# Patient Record
Sex: MALE | Race: WHITE | NOT HISPANIC OR LATINO | Employment: OTHER | URBAN - METROPOLITAN AREA
[De-identification: names, ages, dates, MRNs, and addresses within clinical notes are randomized per-mention and may not be internally consistent; named-entity substitution may affect disease eponyms.]

---

## 2017-01-23 ENCOUNTER — OFFICE VISIT (OUTPATIENT)
Dept: URGENT CARE | Facility: CLINIC | Age: 77
End: 2017-01-23
Payer: MEDICARE

## 2017-01-23 ENCOUNTER — OFFICE VISIT (OUTPATIENT)
Dept: CARDIOLOGY | Facility: CLINIC | Age: 77
End: 2017-01-23
Payer: MEDICARE

## 2017-01-23 VITALS
RESPIRATION RATE: 18 BRPM | HEART RATE: 67 BPM | DIASTOLIC BLOOD PRESSURE: 70 MMHG | HEIGHT: 70 IN | OXYGEN SATURATION: 95 % | TEMPERATURE: 98 F | BODY MASS INDEX: 27.94 KG/M2 | WEIGHT: 195.13 LBS | SYSTOLIC BLOOD PRESSURE: 120 MMHG

## 2017-01-23 VITALS
SYSTOLIC BLOOD PRESSURE: 120 MMHG | HEART RATE: 60 BPM | BODY MASS INDEX: 27.2 KG/M2 | WEIGHT: 189.63 LBS | RESPIRATION RATE: 16 BRPM | DIASTOLIC BLOOD PRESSURE: 64 MMHG

## 2017-01-23 DIAGNOSIS — I50.32 CHRONIC DIASTOLIC HEART FAILURE: ICD-10-CM

## 2017-01-23 DIAGNOSIS — I10 ESSENTIAL HYPERTENSION, MALIGNANT: Primary | ICD-10-CM

## 2017-01-23 DIAGNOSIS — H61.21 IMPACTED CERUMEN OF RIGHT EAR: Primary | ICD-10-CM

## 2017-01-23 DIAGNOSIS — R94.31 ABNORMAL ECG: Chronic | ICD-10-CM

## 2017-01-23 DIAGNOSIS — I10 ESSENTIAL HYPERTENSION: Chronic | ICD-10-CM

## 2017-01-23 DIAGNOSIS — I35.1 NONRHEUMATIC AORTIC VALVE INSUFFICIENCY: Chronic | ICD-10-CM

## 2017-01-23 DIAGNOSIS — E78.2 MIXED HYPERLIPIDEMIA: ICD-10-CM

## 2017-01-23 DIAGNOSIS — I27.20 PULMONARY HYPERTENSION: ICD-10-CM

## 2017-01-23 DIAGNOSIS — I49.3 PVC'S (PREMATURE VENTRICULAR CONTRACTIONS): ICD-10-CM

## 2017-01-23 DIAGNOSIS — I36.1 NON-RHEUMATIC TRICUSPID VALVE INSUFFICIENCY: ICD-10-CM

## 2017-01-23 PROCEDURE — 1159F MED LIST DOCD IN RCRD: CPT | Mod: S$GLB,,, | Performed by: NURSE PRACTITIONER

## 2017-01-23 PROCEDURE — 1160F RVW MEDS BY RX/DR IN RCRD: CPT | Mod: S$GLB,,, | Performed by: NURSE PRACTITIONER

## 2017-01-23 PROCEDURE — 3078F DIAST BP <80 MM HG: CPT | Mod: S$GLB,,, | Performed by: INTERNAL MEDICINE

## 2017-01-23 PROCEDURE — 99213 OFFICE O/P EST LOW 20 MIN: CPT | Mod: S$GLB,,, | Performed by: INTERNAL MEDICINE

## 2017-01-23 PROCEDURE — 3078F DIAST BP <80 MM HG: CPT | Mod: S$GLB,,, | Performed by: NURSE PRACTITIONER

## 2017-01-23 PROCEDURE — 99999 PR PBB SHADOW E&M-EST. PATIENT-LVL IV: CPT | Mod: PBBFAC,,,

## 2017-01-23 PROCEDURE — 1126F AMNT PAIN NOTED NONE PRSNT: CPT | Mod: S$GLB,,, | Performed by: NURSE PRACTITIONER

## 2017-01-23 PROCEDURE — 1159F MED LIST DOCD IN RCRD: CPT | Mod: S$GLB,,, | Performed by: INTERNAL MEDICINE

## 2017-01-23 PROCEDURE — 3074F SYST BP LT 130 MM HG: CPT | Mod: S$GLB,,, | Performed by: NURSE PRACTITIONER

## 2017-01-23 PROCEDURE — 1157F ADVNC CARE PLAN IN RCRD: CPT | Mod: S$GLB,,, | Performed by: INTERNAL MEDICINE

## 2017-01-23 PROCEDURE — 99999 PR PBB SHADOW E&M-EST. PATIENT-LVL III: CPT | Mod: PBBFAC,,, | Performed by: INTERNAL MEDICINE

## 2017-01-23 PROCEDURE — 99213 OFFICE O/P EST LOW 20 MIN: CPT | Mod: S$GLB,,, | Performed by: NURSE PRACTITIONER

## 2017-01-23 PROCEDURE — 1157F ADVNC CARE PLAN IN RCRD: CPT | Mod: S$GLB,,, | Performed by: NURSE PRACTITIONER

## 2017-01-23 PROCEDURE — 99499 UNLISTED E&M SERVICE: CPT | Mod: S$GLB,,, | Performed by: INTERNAL MEDICINE

## 2017-01-23 PROCEDURE — 3074F SYST BP LT 130 MM HG: CPT | Mod: S$GLB,,, | Performed by: INTERNAL MEDICINE

## 2017-01-23 PROCEDURE — 1160F RVW MEDS BY RX/DR IN RCRD: CPT | Mod: S$GLB,,, | Performed by: INTERNAL MEDICINE

## 2017-01-23 RX ORDER — HYDRALAZINE HYDROCHLORIDE 25 MG/1
TABLET, FILM COATED ORAL
Qty: 180 TABLET | Refills: 3 | Status: SHIPPED | OUTPATIENT
Start: 2017-01-23 | End: 2018-03-27 | Stop reason: SDUPTHER

## 2017-01-23 RX ORDER — HYDRALAZINE HYDROCHLORIDE 50 MG/1
50 TABLET, FILM COATED ORAL EVERY MORNING
Qty: 90 TABLET | Refills: 3 | Status: SHIPPED | OUTPATIENT
Start: 2017-01-23 | End: 2018-01-03 | Stop reason: SDUPTHER

## 2017-01-23 NOTE — PROGRESS NOTES
CHIEF COMPLAINT/REASON FOR VISIT: right ear stopped up      HISTORY OF PRESENT ILLNESS:  76 year-old male complains right ear stopped up! Patient admits tried OTC medication Debrox, with left ear unplugged.  Patient denies chest pain, shortness of breath, congestion, fever, cough, back pain and urinary discomfort.        Past Medical History   Diagnosis Date    Abnormal ECG 6/24/2013    Aortic insufficiency 6/24/2013    Aortic valve disorder 6/25/2013    Asthma     GERD (gastroesophageal reflux disease)     Hypertension     Mixed hyperlipidemia 6/24/2013    PSVT (paroxysmal supraventricular tachycardia) 6/24/2013    Pulmonary hypertension 6/13/2016    Rectal adenocarcinoma     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     left parietal scalp    Thymoma     TR (tricuspid regurgitation) 6/13/2016     Social History     Social History    Marital status: Single     Spouse name: N/A    Number of children: N/A    Years of education: N/A     Occupational History    Not on file.     Social History Main Topics    Smoking status: Former Smoker     Packs/day: 1.00     Years: 15.00     Quit date: 2/6/1969    Smokeless tobacco: Never Used    Alcohol use 1.8 oz/week     3 Cans of beer per week      Comment: socially    Drug use: No    Sexual activity: Not on file     Other Topics Concern    Not on file     Social History Narrative     Family History   Problem Relation Age of Onset    Diabetes Father     Amblyopia Neg Hx     Blindness Neg Hx     Cancer Neg Hx     Cataracts Neg Hx     Glaucoma Neg Hx     Hypertension Neg Hx     Macular degeneration Neg Hx     Retinal detachment Neg Hx     Strabismus Neg Hx     Stroke Neg Hx     Thyroid disease Neg Hx     Melanoma Neg Hx        ROS:  GENERAL: No fever, chills, fatigability or weight loss.  SKIN: No rashes, itching or changes in color or texture of skin.  HEENT: right ear stopped up. No headaches or recent head trauma. . Denies ear pain, discharge or  vertigo. No loss of smell, no epistaxis or postnasal drip. No hoarseness or change in voice.   CHEST: Denies cyanosis, wheezing, cough and sputum production.  CARDIOVASCULAR: Denies chest pain, PND, orthopnea or reduced exercise tolerance.  ABDOMEN: Appetite fine. No weight loss. Denies diarrhea, abdominal pain,  blood in stool.  MUSCULOSKELETAL: No joint stiffness or swelling. Denies back pain.  NEUROLOGIC: No history of seizures, paralysis, alteration of gait or coordination.  PSYCHIATRIC: Denies mood swings, depression or suicidal thoughts.    PE:   APPEARANCE: Well nourished, well developed, in no acute distress. Pulse ox: 95%  SKIN: Normal skin turgor, no lesions.  HEENT: turbinates pink, pink pharynx, right ear canal with cerumen impaction, TMs clear bilateral.  CHEST: No respiratory symptoms. No wheezing  CARDIOVASCULAR: Heart regular rate   MUSCULOSKELETAL: Motor: 5/5 strength major flexors/extensors.  NEUROLOGIC: No sensory deficits. Gait & Posture: Normal gait and fine motion. No cerebellar signs.  MENTAL STATUS: Patient alert, oriented x 3 & conversant.    PLAN:   Right Ear irrigation  Advised continue OTC Debrox  Advise increase p.o. fluids- of water/juice & rest  Tylenol or Ibuprofen for fever, headache and body aches.  Warm salt water gargles for throat comfort.  See PCP or go to ER if symptoms worsen or fail to improve with treatment.    DIAGNOSIS:  Cerumen impaction / right ear  Hypertension

## 2017-01-23 NOTE — PROGRESS NOTES
Subjective:    Patient ID:  Homero Tanner is a 76 y.o. male who presents for evaluation of Congestive Heart Failure; Hyperlipidemia; and Hypertension      HPI Mr. Tanner returns for f/u. His current medical conditions include chronic HTN, DD, PHTN, AI, TR, dyslipidemia, prostate & rectal cancer. Nonsmoker.    Had LHC 20+ years ago, no significant blockages noted.  He has chronic abnl ecgs.   Also has had PSVT with stress testing in past.    He has h/o thymoma surgery with xrt and also had rectal polyp surgery.  Pt here for f/u.   No chest pain sxs.  Denies dyspnea, pnd/orthopnea.  No palpitations, dizziness.  Used to run a lot.  Saw EP, Dr. Aguilar, for frequent pvcs and no new recs.  BP controlled on current meds.    Review of Systems   Constitution: Negative.   HENT: Negative.    Eyes: Negative.    Cardiovascular: Negative.    Respiratory: Negative.    Endocrine: Negative.    Hematologic/Lymphatic: Negative.    Skin: Negative.    Musculoskeletal: Negative.    Gastrointestinal: Negative.    Genitourinary: Negative.    Neurological: Negative.    Psychiatric/Behavioral: Negative.    Allergic/Immunologic: Negative.            Visit Vitals    /64 (BP Location: Right arm, Patient Position: Sitting)    Pulse 60    Resp 16    Wt 86 kg (189 lb 9.5 oz)    BMI 27.2 kg/m2         Objective:    Physical Exam   Constitutional: He is oriented to person, place, and time. He appears well-developed and well-nourished.   HENT:   Head: Normocephalic.   Neck: Normal range of motion. Neck supple. Normal carotid pulses, no hepatojugular reflux and no JVD present. Carotid bruit is not present. No thyromegaly present.   Cardiovascular: Normal rate, S1 normal and S2 normal.  A regularly irregular rhythm present. PMI is not displaced.  Exam reveals no S3, no S4, no distant heart sounds, no friction rub, no midsystolic click and no opening snap.    No murmur heard.  Pulses:       Radial pulses are 2+ on the right side,  and 2+ on the left side.   Pulmonary/Chest: Effort normal and breath sounds normal. He has no wheezes. He has no rales.   Abdominal: Soft. Bowel sounds are normal. He exhibits no distension, no abdominal bruit, no ascites and no mass. There is no tenderness.   Musculoskeletal: He exhibits no edema.   Neurological: He is alert and oriented to person, place, and time.   Skin: Skin is warm.   Psychiatric: He has a normal mood and affect. His behavior is normal.   Nursing note and vitals reviewed.      I have reviewed all pertinent labs and cardiac studies.    Lab Results   Component Value Date    CHOL 164 06/06/2016    CHOL 123 05/08/2013    CHOL 190 10/09/2012     Lab Results   Component Value Date    HDL 79 (H) 06/06/2016    HDL 60 05/08/2013    HDL 87 (H) 10/09/2012     Lab Results   Component Value Date    LDLCALC 69.4 06/06/2016    LDLCALC 50.0 (L) 05/08/2013    LDLCALC 91.0 10/09/2012     Lab Results   Component Value Date    TRIG 78 06/06/2016    TRIG 64 05/08/2013    TRIG 60 10/09/2012     Lab Results   Component Value Date    CHOLHDL 48.2 06/06/2016    CHOLHDL 48.8 05/08/2013    CHOLHDL 45.8 10/09/2012           Assessment:       Stable cv conditions on current medical tx.    1. Essential hypertension, malignant    2. Nonrheumatic aortic valve insufficiency    3. Essential hypertension    4. Chronic diastolic heart failure    5. PVC's (premature ventricular contractions)    6. Non-rheumatic tricuspid valve insufficiency    7. Pulmonary hypertension    8. Mixed hyperlipidemia    9. Abnormal ECG         Plan:             Continue current medical tx.  Cardiac diet.  F/u 6 months with echo/bnp/stress test.

## 2017-01-23 NOTE — PATIENT INSTRUCTIONS
PLAN:   Right Ear irrigation  Advise increase p.o. fluids- of water/juice & rest  Tylenol or Ibuprofen for fever, headache and body aches.  Warm salt water gargles for throat comfort.  See PCP or go to ER if symptoms worsen or fail to improve with treatment.

## 2017-01-23 NOTE — MR AVS SNAPSHOT
[facility]  [address]  [place] [address]  Phone: [phone]  Fax: [phone]                  Homero Tanner   2017 9:20 AM   Office Visit    Description:  Male : 1940   Provider:  Cristhian Wright MD   Department:  [facility] - Cardiology           Reason for Visit     Congestive Heart Failure     Hyperlipidemia     Hypertension           Diagnoses this Visit        Comments    Essential hypertension, malignant    -  Primary     Nonrheumatic aortic valve insufficiency         Essential hypertension         Chronic diastolic heart failure         PVC's (premature ventricular contractions)         Non-rheumatic tricuspid valve insufficiency         Pulmonary hypertension         Mixed hyperlipidemia         Abnormal ECG                To Do List           Future Appointments        Provider Department Dept Phone    2017 2:30 PM [facility]AT[facility], [facility] [phone]    2017 3:00 PM Abiodun Montaño MD [facility] Hemotology Oncology [phone]      Goals (5 Years of Data)     None      Follow-Up and Disposition     Return in about 6 months (around 2017).       These Medications        Disp Refills Start End    hydrALAZINE (APRESOLINE) 50 MG tablet 90 tablet 3 2017     Take 1 tablet (50 mg total) by mouth every morning. - Oral    [facility] [address] [place] [address] [id number] AT [facility] Ph #: [phone]       Notes to Pharmacy: **Patient requests 90 days supply**    hydrALAZINE (APRESOLINE) 25 MG tablet 180 tablet 3 2017     Take 1 tablet by mouth mid-day and in the evening    Pharmacy: [facility] [address] [place] [address] [id number] AT [facility] Ph #: [phone]         [facility]     [facility] -  Assistance  Registered nurses in the [facility] provide clinical advisement, health  education, appointment booking, and other advisory services.  Call for this free service at [phone].             Medications           Message regarding Medications     Verify the changes and/or additions to your medication regime listed below are the same as discussed with your clinician today.  If any of these changes or additions are incorrect, please notify your healthcare provider.        START taking these NEW medications        Refills    hydrALAZINE (APRESOLINE) 25 MG tablet 3    Sig: Take 1 tablet by mouth mid-day and in the evening    Class: Normal      CHANGE how you are taking these medications     Start Taking Instead of    hydrALAZINE (APRESOLINE) 50 MG tablet hydrALAZINE (APRESOLINE) 50 MG tablet    Dosage:  Take 1 tablet (50 mg total) by mouth every morning. Dosage:  TAKE 1 TABLET BY MOUTH EVERY MORNING, 1/2 TABLET AT MIDDDAY AND 1/2 TABLET EVERY EVENING    Reason for Change:  Reorder            Verify that the below list of medications is an accurate representation of the medications you are currently taking.  If none reported, the list may be blank. If incorrect, please contact your healthcare provider. Carry this list with you in case of emergency.           Current Medications     aspirin (ECOTRIN) 81 MG EC tablet Take 81 mg by mouth once daily.    b complex vitamins tablet Take by mouth. 1 tablet Oral Every day    clobetasol (TEMOVATE) 0.05 % external solution Apply to affected areas of scalp 1-2 times daily as needed for itch    cyanocobalamin 1,000 mcg/mL injection Give 1cc under the skin once a month    fluticasone (FLONASE) 50 mcg/actuation nasal spray 1 spray by Nasal route 2 (two) times daily as needed for Rhinitis or Allergies. 1 Aerosol, Spray Each nostril Twice a day     furosemide (LASIX) 20 MG tablet TAKE 1 TABLET(20 MG) BY MOUTH EVERY DAY    hydrALAZINE (APRESOLINE) 50 MG tablet Take 1 tablet (50 mg total) by mouth every morning.    ketoconazole (NIZORAL) 2 % shampoo Wash hair  with medicated shampoo at least 3x/week - let sit on scalp at least 5 minutes prior to rinsing    leuprolide (LUPRON) 3.75 mg injection Inject 3.75 mg into the muscle every 3 (three) months.     metoprolol tartrate (LOPRESSOR) 100 MG tablet Take 1 tablet (100 mg total) by mouth 2 (two) times daily.    metronidazole 0.75% (METROCREAM) 0.75 % Crea AAA face bid    omeprazole (PRILOSEC) 20 MG capsule TAKE ONE CAPSULE BY MOUTH DAILY    pravastatin (PRAVACHOL) 40 MG tablet TAKE 1 TABLET BY MOUTH DAILY    sulfacetamide sodium 10 % Sham Use twice daily as face wash for rosacea    triamcinolone acetonide 0.025% (KENALOG) 0.025 % cream AAA bid as needed for flares on face.  Mild steroid.    hydrALAZINE (APRESOLINE) 25 MG tablet Take 1 tablet by mouth mid-day and in the evening           Clinical Reference Information           Vital Signs - Last Recorded  Most recent update: 1/23/2017  9:24 AM by Cristhian Wright MD    BP Pulse Resp Wt BMI    120/64 (BP Location: Right arm, Patient Position: Sitting) 60 16 86 kg (189 lb 9.5 oz) 27.2 kg/m2      Blood Pressure          Most Recent Value    BP  120/64      Allergies as of 1/23/2017     Lipitor [Atorvastatin]    Norvasc [Amlodipine]      Immunizations Administered on Date of Encounter - 1/23/2017     None      Orders Placed During Today's Visit     Future Labs/Procedures Expected by Expires    2D echo with color flow doppler  7/23/2017 1/23/2018    Brain natriuretic peptide  7/23/2017 3/24/2018    NM Multi Pharm Stress Cardiac Component  7/23/2017 (Approximate) 1/23/2018    NM Myocardial Perfusion Spect Multi Pharmacologic  7/23/2017 (Approximate) 1/23/2018

## 2017-01-23 NOTE — MR AVS SNAPSHOT
[facility]  [address] [place] [address]  Phone: [phone]  Fax: [phone]                  Homero Tanner   2017 11:00 AM   Office Visit    Description:  Male : 1940   Provider:  [facility], [facility]   Department:  [place] - Urgent Care           Reason for Visit     Cerumen Impaction           Diagnoses this Visit        Comments    Impacted cerumen of right ear    -  Primary            To Do List           Future Appointments        Provider Department Dept Phone    2017 2:30 PM LAB[facility], [facility] [phone]    2017 3:00 PM Abiodun Montaño MD [facility] - Hemotology Oncology [phone]      Goals (5 Years of Data)     None      [facility]     [facility] Line -  Assistance  Registered nurses in the [facility] provide clinical advisement, health education, appointment booking, and other advisory services.  Call for this free service at [phone].             Medications           Message regarding Medications     Verify the changes and/or additions to your medication regime listed below are the same as discussed with your clinician today.  If any of these changes or additions are incorrect, please notify your healthcare provider.             Verify that the below list of medications is an accurate representation of the medications you are currently taking.  If none reported, the list may be blank. If incorrect, please contact your healthcare provider. Carry this list with you in case of emergency.           Current Medications     aspirin (ECOTRIN) 81 MG EC tablet Take 81 mg by mouth once daily.    b complex vitamins tablet Take by mouth. 1 tablet Oral Every day    clobetasol (TEMOVATE) 0.05 % external solution Apply to affected areas of scalp 1-2 times daily as needed for itch    cyanocobalamin 1,000 mcg/mL injection Give 1cc under the skin once a month     "fluticasone (FLONASE) 50 mcg/actuation nasal spray 1 spray by Nasal route 2 (two) times daily as needed for Rhinitis or Allergies. 1 Aerosol, Spray Each nostril Twice a day     furosemide (LASIX) 20 MG tablet TAKE 1 TABLET(20 MG) BY MOUTH EVERY DAY    hydrALAZINE (APRESOLINE) 50 MG tablet Take 1 tablet (50 mg total) by mouth every morning.    ketoconazole (NIZORAL) 2 % shampoo Wash hair with medicated shampoo at least 3x/week - let sit on scalp at least 5 minutes prior to rinsing    leuprolide (LUPRON) 3.75 mg injection Inject 3.75 mg into the muscle every 3 (three) months.     metoprolol tartrate (LOPRESSOR) 100 MG tablet Take 1 tablet (100 mg total) by mouth 2 (two) times daily.    metronidazole 0.75% (METROCREAM) 0.75 % Crea AAA face bid    omeprazole (PRILOSEC) 20 MG capsule TAKE ONE CAPSULE BY MOUTH DAILY    pravastatin (PRAVACHOL) 40 MG tablet TAKE 1 TABLET BY MOUTH DAILY    sulfacetamide sodium 10 % Sham Use twice daily as face wash for rosacea    triamcinolone acetonide 0.025% (KENALOG) 0.025 % cream AAA bid as needed for flares on face.  Mild steroid.    hydrALAZINE (APRESOLINE) 25 MG tablet Take 1 tablet by mouth mid-day and in the evening           Clinical Reference Information           Vital Signs - Last Recorded  Most recent update: 1/23/2017 11:05 AM by Elsy Rushing LPN    BP Pulse Temp Resp    120/70 (BP Location: Left arm, Patient Position: Sitting, BP Method: Manual) 67 97.6 °F (36.4 °C) (Tympanic) 18    Ht Wt SpO2 BMI    5' 10" (1.778 m) 88.5 kg (195 lb 1.7 oz) 95% 27.99 kg/m2      Blood Pressure          Most Recent Value    BP  120/70      Allergies as of 1/23/2017     Lipitor [Atorvastatin]    Norvasc [Amlodipine]      Immunizations Administered on Date of Encounter - 1/23/2017     None      Orders Placed During Today's Visit      Normal Orders This Visit    Ear wax removal       Instructions    PLAN:   Right Ear irrigation  Advise increase p.o. fluids- of water/juice & rest  Tylenol or " Ibuprofen for fever, headache and body aches.  Warm salt water gargles for throat comfort.  See PCP or go to ER if symptoms worsen or fail to improve with treatment.

## 2017-01-26 ENCOUNTER — OFFICE VISIT (OUTPATIENT)
Dept: HEMATOLOGY/ONCOLOGY | Facility: CLINIC | Age: 77
End: 2017-01-26
Payer: MEDICARE

## 2017-01-26 ENCOUNTER — LAB VISIT (OUTPATIENT)
Dept: LAB | Facility: HOSPITAL | Age: 77
End: 2017-01-26
Attending: INTERNAL MEDICINE
Payer: MEDICARE

## 2017-01-26 DIAGNOSIS — Z85.048 HISTORY OF RECTAL CANCER: ICD-10-CM

## 2017-01-26 DIAGNOSIS — Z87.898 HISTORY OF THYMOMA: ICD-10-CM

## 2017-01-26 DIAGNOSIS — C61 PROSTATE CANCER: Primary | ICD-10-CM

## 2017-01-26 DIAGNOSIS — D89.2 PARAPROTEINEMIA: ICD-10-CM

## 2017-01-26 DIAGNOSIS — D51.8 OTHER VITAMIN B12 DEFICIENCY ANEMIA: ICD-10-CM

## 2017-01-26 DIAGNOSIS — C61 PROSTATE CANCER: ICD-10-CM

## 2017-01-26 LAB
ALBUMIN SERPL BCP-MCNC: 3.6 G/DL
ALP SERPL-CCNC: 97 U/L
ALT SERPL W/O P-5'-P-CCNC: 16 U/L
ANION GAP SERPL CALC-SCNC: 18 MMOL/L
AST SERPL-CCNC: 32 U/L
BASOPHILS # BLD AUTO: 0.04 K/UL
BASOPHILS NFR BLD: 0.7 %
BILIRUB SERPL-MCNC: 0.9 MG/DL
BUN SERPL-MCNC: 17 MG/DL
CALCIUM SERPL-MCNC: 9.7 MG/DL
CHLORIDE SERPL-SCNC: 94 MMOL/L
CO2 SERPL-SCNC: 23 MMOL/L
COMPLEXED PSA SERPL-MCNC: 0.79 NG/ML
CREAT SERPL-MCNC: 1.4 MG/DL
DIFFERENTIAL METHOD: ABNORMAL
EOSINOPHIL # BLD AUTO: 0.2 K/UL
EOSINOPHIL NFR BLD: 3 %
ERYTHROCYTE [DISTWIDTH] IN BLOOD BY AUTOMATED COUNT: 12.5 %
EST. GFR  (AFRICAN AMERICAN): 56 ML/MIN/1.73 M^2
EST. GFR  (NON AFRICAN AMERICAN): 49 ML/MIN/1.73 M^2
GLUCOSE SERPL-MCNC: 108 MG/DL
HCT VFR BLD AUTO: 36.6 %
HGB BLD-MCNC: 12.4 G/DL
LYMPHOCYTES # BLD AUTO: 1.9 K/UL
LYMPHOCYTES NFR BLD: 31.7 %
MCH RBC QN AUTO: 33.2 PG
MCHC RBC AUTO-ENTMCNC: 33.9 %
MCV RBC AUTO: 98 FL
MONOCYTES # BLD AUTO: 0.8 K/UL
MONOCYTES NFR BLD: 13.7 %
NEUTROPHILS # BLD AUTO: 3.1 K/UL
NEUTROPHILS NFR BLD: 50.9 %
PLATELET # BLD AUTO: 186 K/UL
PMV BLD AUTO: 9.5 FL
POTASSIUM SERPL-SCNC: 4.6 MMOL/L
PROT SERPL-MCNC: 7.9 G/DL
RBC # BLD AUTO: 3.73 M/UL
SODIUM SERPL-SCNC: 135 MMOL/L
WBC # BLD AUTO: 6.08 K/UL

## 2017-01-26 PROCEDURE — 1159F MED LIST DOCD IN RCRD: CPT | Mod: S$GLB,,, | Performed by: INTERNAL MEDICINE

## 2017-01-26 PROCEDURE — 84165 PROTEIN E-PHORESIS SERUM: CPT | Mod: 26,,, | Performed by: PATHOLOGY

## 2017-01-26 PROCEDURE — 99214 OFFICE O/P EST MOD 30 MIN: CPT | Mod: S$GLB,,, | Performed by: INTERNAL MEDICINE

## 2017-01-26 PROCEDURE — 1160F RVW MEDS BY RX/DR IN RCRD: CPT | Mod: S$GLB,,, | Performed by: INTERNAL MEDICINE

## 2017-01-26 PROCEDURE — 99499 UNLISTED E&M SERVICE: CPT | Mod: S$GLB,,, | Performed by: INTERNAL MEDICINE

## 2017-01-26 PROCEDURE — 1157F ADVNC CARE PLAN IN RCRD: CPT | Mod: S$GLB,,, | Performed by: INTERNAL MEDICINE

## 2017-01-27 ENCOUNTER — TELEPHONE (OUTPATIENT)
Dept: HEMATOLOGY/ONCOLOGY | Facility: CLINIC | Age: 77
End: 2017-01-27

## 2017-01-27 VITALS
TEMPERATURE: 98 F | RESPIRATION RATE: 18 BRPM | DIASTOLIC BLOOD PRESSURE: 62 MMHG | BODY MASS INDEX: 27.81 KG/M2 | SYSTOLIC BLOOD PRESSURE: 118 MMHG | HEIGHT: 70 IN | WEIGHT: 194.25 LBS | HEART RATE: 60 BPM | OXYGEN SATURATION: 95 %

## 2017-01-27 LAB
ALBUMIN SERPL ELPH-MCNC: 3.63 G/DL
ALPHA1 GLOB SERPL ELPH-MCNC: 0.32 G/DL
ALPHA2 GLOB SERPL ELPH-MCNC: 0.82 G/DL
B-GLOBULIN SERPL ELPH-MCNC: 2.3 G/DL
GAMMA GLOB SERPL ELPH-MCNC: 0.62 G/DL
PROT SERPL-MCNC: 7.7 G/DL

## 2017-01-27 NOTE — TELEPHONE ENCOUNTER
----- Message from Abiodun Montaño MD sent at 1/27/2017  6:51 AM CST -----  Please call patietn and elt him know his PSA was 0.79, which is good.  The CBC, CMp were OK  Spep is pending and it will take a few days. I will let him know if there is anything out of the ordinary.  He had a flexible sigmoidoscopy 12/2015 and was supposed to have another in a year  Gi should be calling him. If he ahs notg hear from them by Tuesday, let me know  See me in 3 months with labs ( see yellow page)  DR MONTAÑO

## 2017-01-27 NOTE — PROGRESS NOTES
Subjective:       Patient ID: Homero Tanner is a 76 y.o. male.    Chief Complaint: No chief complaint on file.    HPI This is a 76  year-old gentleman who comes for follow up of her previously diagnosed thymoma, rectal cancer, prostate cancer, pararpteinemia and b12 deficiency.  He is known to us because of ahistory of   previously treated prostate cancer. He was diagnosed around 2004 and treated   with surgery. He relapsed by PSA and was treated with local radiation therapy.   He had further elevation of the PSA and started intermittent LHRH   analogues through the office of his urologist outside our clinic  he receives the injections at his urologist office.    A CBC done on   November 2012 was reported showing a hemoglobin of 12.7. As part of workup his   primary care physician ordered a serum ferritin and total iron binding   capacity, which were unremarkable. Serum folate was normal at 5.5. Vitamin B12   was low at 151. He then started supplemental B12 injection, which she is still   getting.   Also, as part of the workup for the anemia, serum protein electrophoresis   showed a paraprotein band that was quantified at 1.03 g and identified as IgM.   The patient was then referred to me. I ordered lab tests, which included a   thoracic spine view looking for lytic lesions. The radiologist reported that there   were no lytic lesions but there was a mass in the center of the chest  . This was followed by chest x-rays and CT scans. The   CT scan showed a large mediastinal mass. A CT-guided biopsy at [facility] was read   as showing a thymoma.   He underwent surgery with Dr. Levi Butler at [facility]. The thymoma was involving the pericardium so he received   postoperative radiation therapy.   He finished the radiation therapy and has   remained with without evaluable disease in regards to his thymoma.     A routine lab tests done a few months later showed that there has been a dramatic    drop in his hemoglobin, which was now 7.8 with a low serum ferritin of 8   suggesting iron deficiency anemia.   Stools were frankly Hemoccult positive.   In regards to his past history, the patient underwent a colonoscopy within our   system on 02/28/2012. The patient was found to have a sessile polyp in the   rectum. An endoscopic removal could not be done.   The patient was referred to another specialist in [facility] for resection since it seemed to   be arising in the setting of the radiation therapy field for his prostate   cancer.   The pathology report from that 2/2012 endoscopy was that of a tubular adenoma.   The patient was then seen in consultation by GI doctor outside the clinic. ,giana Arellano. The patient then underwent resection of the   lesion. The pathology report was the same.   The patient underwent a colonoscopy at [facility] on   05/07/2014, as part of the work up for his recently diagnosed iron deficiency.. It was done by Dr. Jaime Ash. Dr. Ash found a mass in the   rectal area. It was partially excised. Pathology was once again that of   tubulovillous adenoma with dysplasia.   The area of involvement coincided with the radiation therapy field for the prostate   cancer.   The patient had removal of the villous adenoma in [facility] by Dr Ansari.   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   Following the excision he had 2 episodes of rectal bleeding. He ended up having another surgery to control the bleeing and to do a re-excision of the area where the minimally invasive cancer was found.  Pathology revealed no residual cancer.  There has been no more bleeding. He feels fine  He also injects himself with b12 due low b12 levels.  He comes for follow up. He is receiving  Lupron at his urologist office  .He has had a colonosocopy Dec 2015 that showed intestinal polyps and was asked to return in a year polyps  ALLERGIES: see Med  card  MEDICATIONS: See MedCard.   PREVIOUS SURGERIES: Prostatectomy around the year 2000 or so, rectal surgery   for removal of tubular adenoma.x2, and another surgery for a minimally invasive rectal cancer 10/2014 Tonsillectomy, removal of thymoma.   SOCIAL HISTORY: Single with two children. Lives in [place]. He smoked   until age 31, approximately a pack a day for 15 years. He is a retired   .   FAMILY HISTORY: No cancer. Father had diabetes. Father had heart disease.   PAST MEDICAL HISTORY:   1. Anemia.   2. 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   3. History of treated prostate cancer.   4. Mild anemia.   5. Iron deficiency anemia.   6. High blood pressure.   7. Elevated cholesterol.   8. Recurrent villous adenoma of the rectum.   9. Reflux.   10. Vitamin B12 deficiency.   11. Status post resection of thymoma of the anterior mediastinum.   12. Skin cancer scalp  Review of Systems   Constitutional: Negative.  Negative for fatigue.   Eyes: Negative.    Cardiovascular: Negative.  Negative for chest pain.   Gastrointestinal: Negative for abdominal pain and nausea.   Genitourinary: Negative.  Negative for hematuria.   Musculoskeletal: Negative.    Skin: Negative.    Neurological: Negative.    Psychiatric/Behavioral: Negative.        Objective:      Physical Exam   Constitutional: He is oriented to person, place, and time. He appears well-developed and well-nourished.   HENT:   Head: Normocephalic.   Mouth/Throat: No oropharyngeal exudate.   Eyes: Pupils are equal, round, and reactive to light.   Neck: No thyromegaly present.   Cardiovascular: Normal rate, regular rhythm and normal heart sounds.  Exam reveals no gallop.    No murmur heard.  Pulmonary/Chest: No respiratory distress. He has no wheezes. He has no rales.   Abdominal: Soft. Bowel sounds are normal. He exhibits no distension and no mass. There is no rebound and no guarding.   Musculoskeletal: Normal range of  motion. He exhibits no edema.   Lymphadenopathy:     He has no cervical adenopathy.   Neurological: He is alert and oriented to person, place, and time.   Skin: Skin is warm and dry.   Psychiatric: He has a normal mood and affect. His behavior is normal.       Wt Readings from Last 3 Encounters:   01/23/17 88.5 kg (195 lb 1.7 oz)   01/23/17 86 kg (189 lb 9.5 oz)   10/12/16 88.2 kg (194 lb 7.1 oz)     Temp Readings from Last 3 Encounters:   01/23/17 97.6 °F (36.4 °C) (Tympanic)   10/10/16 98.1 °F (36.7 °C) (Oral)   06/09/16 (!) 95.9 °F (35.5 °C) (Oral)     BP Readings from Last 3 Encounters:   01/23/17 120/70   01/23/17 120/64   10/12/16 124/68     Pulse Readings from Last 3 Encounters:   01/23/17 67   01/23/17 60   10/12/16 78       Assessment:       1. Prostate cancer    2. History of thymoma    3. Other vitamin B12 deficiency anemia    4. Paraproteinemia    5. History of rectal cancer        Plan:       .lstcbc  Lab Results   Component Value Date    CREATININE 1.4 01/26/2017     Lab Results   Component Value Date    ALT 16 01/26/2017    AST 32 01/26/2017    ALKPHOS 97 01/26/2017    BILITOT 0.9 01/26/2017       Dx PSA is 0.79    Which is Ok  SPEp pending  continue Lupron through [facility] urologist office  No evidence of recurrence of thymma. CXR would due around mid 2017.  Continue b12 injections at home. Recheck b12 next time he comes  SPeP pending, will monitor  Due for flexible sigmoidoscopy/colonsocopy. Will ask GI to schedule  See me in 2 months with a cbc, cmp, SPEp , CEA and dx PSA

## 2017-01-28 ENCOUNTER — TELEPHONE (OUTPATIENT)
Dept: GASTROENTEROLOGY | Facility: HOSPITAL | Age: 77
End: 2017-01-28

## 2017-01-28 DIAGNOSIS — Z86.010 PERSONAL HISTORY OF COLONIC POLYPS: Primary | ICD-10-CM

## 2017-01-28 RX ORDER — SODIUM, POTASSIUM,MAG SULFATES 17.5-3.13G
SOLUTION, RECONSTITUTED, ORAL ORAL
Qty: 1 BOTTLE | Refills: 0 | Status: ON HOLD | OUTPATIENT
Start: 2017-01-28 | End: 2017-02-27 | Stop reason: CLARIF

## 2017-01-28 NOTE — TELEPHONE ENCOUNTER
Personal history of colonic polyps  -     sodium,potassium,mag sulfates (SUPREP BOWEL PREP KIT) 17.5-3.13-1.6 gram SolR; As directed  Dispense: 1 Bottle; Refill: 0  -     Case request GI: COLONOSCOPY        Please schedule colonoscopy. Bowel prep sent to pharmacy.       Abraham Hong

## 2017-01-28 NOTE — TELEPHONE ENCOUNTER
----- Message from Abiodun Montaño MD sent at 1/27/2017  6:41 AM CST -----  Abraham, you did a flex sig on this patient on 12/2015 and he was supposed to go back in a year.  I do not see an appointment   scheduled.  Can your department call him.?  Thanks  Abiodun

## 2017-01-30 LAB — PATHOLOGIST INTERPRETATION SPE: NORMAL

## 2017-02-25 PROBLEM — Z86.010 HISTORY OF COLON POLYPS: Status: ACTIVE | Noted: 2017-02-25

## 2017-02-25 PROBLEM — Z86.0100 HISTORY OF COLON POLYPS: Status: ACTIVE | Noted: 2017-02-25

## 2017-02-27 ENCOUNTER — SURGERY (OUTPATIENT)
Age: 77
End: 2017-02-27

## 2017-02-27 ENCOUNTER — ANESTHESIA (OUTPATIENT)
Dept: ENDOSCOPY | Facility: HOSPITAL | Age: 77
End: 2017-02-27
Payer: MEDICARE

## 2017-02-27 ENCOUNTER — PATIENT MESSAGE (OUTPATIENT)
Dept: GASTROENTEROLOGY | Facility: HOSPITAL | Age: 77
End: 2017-02-27

## 2017-02-27 ENCOUNTER — HOSPITAL ENCOUNTER (OUTPATIENT)
Facility: HOSPITAL | Age: 77
Discharge: HOME | End: 2017-02-27
Attending: INTERNAL MEDICINE | Admitting: INTERNAL MEDICINE
Payer: MEDICARE

## 2017-02-27 ENCOUNTER — ANESTHESIA EVENT (OUTPATIENT)
Dept: ENDOSCOPY | Facility: HOSPITAL | Age: 77
End: 2017-02-27
Payer: MEDICARE

## 2017-02-27 VITALS
OXYGEN SATURATION: 98 % | WEIGHT: 190 LBS | TEMPERATURE: 98 F | RESPIRATION RATE: 35 BRPM | HEIGHT: 70 IN | DIASTOLIC BLOOD PRESSURE: 80 MMHG | SYSTOLIC BLOOD PRESSURE: 121 MMHG | BODY MASS INDEX: 27.2 KG/M2 | HEART RATE: 80 BPM | RESPIRATION RATE: 20 BRPM

## 2017-02-27 DIAGNOSIS — Z12.11 SPECIAL SCREENING FOR MALIGNANT NEOPLASMS, COLON: ICD-10-CM

## 2017-02-27 DIAGNOSIS — K57.30 DIVERTICULOSIS OF LARGE INTESTINE WITHOUT HEMORRHAGE: Chronic | ICD-10-CM

## 2017-02-27 DIAGNOSIS — Z85.048 HISTORY OF RECTAL CANCER: Primary | ICD-10-CM

## 2017-02-27 DIAGNOSIS — D12.0 BENIGN NEOPLASM OF CECUM: ICD-10-CM

## 2017-02-27 DIAGNOSIS — Z86.010 HISTORY OF COLON POLYPS: ICD-10-CM

## 2017-02-27 DIAGNOSIS — D12.3 BENIGN NEOPLASM OF TRANSVERSE COLON: ICD-10-CM

## 2017-02-27 PROCEDURE — 37000009 HC ANESTHESIA EA ADD 15 MINS: Performed by: INTERNAL MEDICINE

## 2017-02-27 PROCEDURE — 45385 COLONOSCOPY W/LESION REMOVAL: CPT | Performed by: INTERNAL MEDICINE

## 2017-02-27 PROCEDURE — 37000008 HC ANESTHESIA 1ST 15 MINUTES: Performed by: INTERNAL MEDICINE

## 2017-02-27 PROCEDURE — 63600175 PHARM REV CODE 636 W HCPCS: Performed by: NURSE ANESTHETIST, CERTIFIED REGISTERED

## 2017-02-27 PROCEDURE — 27201089 HC SNARE, DISP (ANY): Performed by: INTERNAL MEDICINE

## 2017-02-27 PROCEDURE — 88305 TISSUE EXAM BY PATHOLOGIST: CPT | Performed by: PATHOLOGY

## 2017-02-27 PROCEDURE — 45385 COLONOSCOPY W/LESION REMOVAL: CPT | Mod: PT,,, | Performed by: INTERNAL MEDICINE

## 2017-02-27 PROCEDURE — 25000003 PHARM REV CODE 250: Performed by: INTERNAL MEDICINE

## 2017-02-27 PROCEDURE — 88305 TISSUE EXAM BY PATHOLOGIST: CPT | Mod: 26,,, | Performed by: PATHOLOGY

## 2017-02-27 PROCEDURE — 25000003 PHARM REV CODE 250: Performed by: NURSE ANESTHETIST, CERTIFIED REGISTERED

## 2017-02-27 RX ORDER — SODIUM CHLORIDE, SODIUM LACTATE, POTASSIUM CHLORIDE, CALCIUM CHLORIDE 600; 310; 30; 20 MG/100ML; MG/100ML; MG/100ML; MG/100ML
INJECTION, SOLUTION INTRAVENOUS CONTINUOUS
Status: DISCONTINUED | OUTPATIENT
Start: 2017-02-27 | End: 2017-02-27 | Stop reason: HOSPADM

## 2017-02-27 RX ORDER — PROPOFOL 10 MG/ML
VIAL (ML) INTRAVENOUS
Status: DISCONTINUED | OUTPATIENT
Start: 2017-02-27 | End: 2017-02-27

## 2017-02-27 RX ORDER — LIDOCAINE HYDROCHLORIDE 10 MG/ML
INJECTION INFILTRATION; PERINEURAL
Status: DISCONTINUED | OUTPATIENT
Start: 2017-02-27 | End: 2017-02-27

## 2017-02-27 RX ADMIN — PROPOFOL 100 MG: 10 INJECTION, EMULSION INTRAVENOUS at 12:02

## 2017-02-27 RX ADMIN — PROPOFOL 30 MG: 10 INJECTION, EMULSION INTRAVENOUS at 12:02

## 2017-02-27 RX ADMIN — PROPOFOL 50 MG: 10 INJECTION, EMULSION INTRAVENOUS at 12:02

## 2017-02-27 RX ADMIN — LIDOCAINE HYDROCHLORIDE 50 MG: 10 INJECTION, SOLUTION INFILTRATION; PERINEURAL at 12:02

## 2017-02-27 RX ADMIN — SODIUM CHLORIDE, SODIUM LACTATE, POTASSIUM CHLORIDE, AND CALCIUM CHLORIDE: 600; 310; 30; 20 INJECTION, SOLUTION INTRAVENOUS at 12:02

## 2017-02-27 RX ADMIN — SODIUM CHLORIDE, SODIUM LACTATE, POTASSIUM CHLORIDE, AND CALCIUM CHLORIDE: 600; 310; 30; 20 INJECTION, SOLUTION INTRAVENOUS at 11:02

## 2017-02-27 NOTE — DISCHARGE INSTRUCTIONS
If you develop fevers, severe abdominal pain, significant bleeding, nausea or vomiting, please contact us or come to our Emergency Department at [facility].  Our  contact number is [phone] available for emergencies or any question that you may have.      You may return to normal activities tomorrow.  Written discharge instructions were provided to you today and have them handy since you may not remember our conversation today.       I also recommend that you follow a high fiber diet and take calcium supplements daily as well as to continue your present medications.      If you take Aspirin, please resume taking it at your prior dose today. If we obtained biopsies or removed polyps during your procedure, we will contact you within 5 to 6 days with the results.      Thanks for trusting us with your healthcare needs.      Sincerely,     Abraham Hong M.D.        To rate your experience with Dr. Hong, please click on the link below     [email or web address]

## 2017-02-27 NOTE — IP AVS SNAPSHOT
[address] [place] [address]           Patient Discharge Instructions     Our goal is to set you up for success. This packet includes information on your condition, medications, and your home care. It will help you to care for yourself so you don't get sicker and need to go back to the hospital.     Please ask your nurse if you have any questions.        There are many details to remember when preparing to leave the hospital. Here is what you will need to do:    1. Take your medicine. If you are prescribed medications, review your Medication List in the following pages. You may have new medications to  at the pharmacy and others that you'll need to stop taking. Review the instructions for how and when to take your medications. Talk with your doctor or nurses if you are unsure of what to do.     2. Go to your follow-up appointments. Specific follow-up information is listed in the following pages. Your may be contacted by a transition nurse or clinical provider about future appointments. Be sure we have all of the phone numbers to reach you, if needed. Please contact your provider's office if you are unable to make an appointment.     3. Watch for warning signs. Your doctor or nurse will give you detailed warning signs to watch for and when to call for assistance. These instructions may also include educational information about your condition. If you experience any of warning signs to your health, call your doctor.               ** Verify the list of medication(s) below is accurate and up to date. Carry this with you in case of emergency. If your medications have changed, please notify your healthcare provider.             Medication List      CONTINUE taking these medications        Additional Info                      aspirin 81 MG EC tablet   Commonly known as:  ECOTRIN   Refills:  0   Dose:  81 mg    Instructions:  Take 81 mg by mouth once daily.     Begin  Date    AM    Noon    PM    Bedtime       b complex vitamins tablet   Refills:  0    Instructions:  Take by mouth. 1 tablet Oral Every day     Begin Date    AM    Noon    PM    Bedtime       clobetasol 0.05 % external solution   Commonly known as:  TEMOVATE   Quantity:  50 mL   Refills:  3    Instructions:  Apply to affected areas of scalp 1-2 times daily as needed for itch     Begin Date    AM    Noon    PM    Bedtime       cyanocobalamin 1,000 mcg/mL injection   Quantity:  10 mL   Refills:  4    Instructions:  Give 1cc under the skin once a month     Begin Date    AM    Noon    PM    Bedtime       FLONASE 50 mcg/actuation nasal spray   Refills:  0   Dose:  1 spray   Generic drug:  fluticasone    Instructions:  1 spray by Nasal route 2 (two) times daily as needed for Rhinitis or Allergies. 1 Aerosol, Spray Each nostril Twice a day     Begin Date    AM    Noon    PM    Bedtime       furosemide 20 MG tablet   Commonly known as:  LASIX   Quantity:  90 tablet   Refills:  11   Comments:  **Patient requests 90 days supply**    Instructions:  TAKE 1 TABLET(20 MG) BY MOUTH EVERY DAY     Begin Date    AM    Noon    PM    Bedtime       * hydrALAZINE 50 MG tablet   Commonly known as:  APRESOLINE   Quantity:  90 tablet   Refills:  3   Dose:  50 mg   Comments:  **Patient requests 90 days supply**    Instructions:  Take 1 tablet (50 mg total) by mouth every morning.     Begin Date    AM    Noon    PM    Bedtime       * hydrALAZINE 25 MG tablet   Commonly known as:  APRESOLINE   Quantity:  180 tablet   Refills:  3    Instructions:  Take 1 tablet by mouth mid-day and in the evening     Begin Date    AM    Noon    PM    Bedtime       ketoconazole 2 % shampoo   Commonly known as:  NIZORAL   Quantity:  120 mL   Refills:  5    Instructions:  Wash hair with medicated shampoo at least 3x/week - let sit on scalp at least 5 minutes prior to rinsing     Begin Date    AM    Noon    PM    Bedtime       leuprolide 3.75 mg injection   Commonly  known as:  LUPRON   Refills:  0   Dose:  3.75 mg    Instructions:  Inject 3.75 mg into the muscle every 3 (three) months.     Begin Date    AM    Noon    PM    Bedtime       metoprolol tartrate 100 MG tablet   Commonly known as:  LOPRESSOR   Quantity:  60 tablet   Refills:  11   Dose:  100 mg    Instructions:  Take 1 tablet (100 mg total) by mouth 2 (two) times daily.     Begin Date    AM    Noon    PM    Bedtime       omeprazole 20 MG capsule   Commonly known as:  PRILOSEC   Quantity:  90 capsule   Refills:  4    Instructions:  TAKE ONE CAPSULE BY MOUTH DAILY     Begin Date    AM    Noon    PM    Bedtime       pravastatin 40 MG tablet   Commonly known as:  PRAVACHOL   Quantity:  90 tablet   Refills:  11   Comments:  **Patient requests 90 days supply**    Instructions:  TAKE 1 TABLET BY MOUTH DAILY     Begin Date    AM    Noon    PM    Bedtime       sulfacetamide sodium 10 % Sham   Quantity:  237 mL   Refills:  6    Instructions:  Use twice daily as face wash for rosacea     Begin Date    AM    Noon    PM    Bedtime       triamcinolone acetonide 0.025% 0.025 % cream   Commonly known as:  KENALOG   Quantity:  30 g   Refills:  1    Instructions:  AAA bid as needed for flares on face.  Mild steroid.     Begin Date    AM    Noon    PM    Bedtime       * Notice:  This list has 2 medication(s) that are the same as other medications prescribed for you. Read the directions carefully, and ask your doctor or other care provider to review them with you.               Please bring to all follow up appointments:    1. A copy of your discharge instructions.  2. All medicines you are currently taking in their original bottles.  3. Identification and insurance card.    Please arrive 15 minutes ahead of scheduled appointment time.    Please call 24 hours in advance if you must reschedule your appointment and/or time.        Your Scheduled Appointments     Apr 13, 2017  9:50 AM CDT   Non-Fasting Lab with [facility], [facility] ([facility])    [address] [facility] [address]  [place] [address]   [phone]            Apr 18, 2017  2:40 PM CDT   Established Patient Visit with Abiodun Montaño MD   [facility] ([facility])    [address] [facility] [address]  [place] [id number] [phone]              Follow-up Information     Follow up with Solomon Cortés MD.    Specialty:  Internal Medicine    Why:  As needed    Contact information:    [address]  [address] [place] [address] [phone]          Discharge Instructions     Future Orders    Activity as tolerated     Call MD for:  difficulty breathing, headache or visual disturbances     Call MD for:  hives     Call MD for:  persistent dizziness or light-headedness     Call MD for:  persistent nausea and vomiting     Call MD for:  redness, tenderness, or signs of infection (pain, swelling, redness, odor or green/yellow discharge around incision site)     Call MD for:  severe uncontrolled pain     Call MD for:  temperature >100.4     Diet general     Questions:    Total calories:      Fat restriction, if any:      Protein restriction, if any:      Na restriction, if any:      Fluid restriction:      Additional restrictions:      No dressing needed         Discharge Instructions       If you develop fevers, severe abdominal pain, significant bleeding, nausea or vomiting, please contact us or come to our Emergency Department at [facility].  Our  contact number is [phone] available for emergencies or any question that you may have.      You may return to normal activities tomorrow.  Written discharge instructions were provided to you today and have them handy since you may not remember our conversation today.       I also recommend that you follow a high fiber diet and take calcium supplements daily as well as to continue your present medications.      If you take Aspirin, please resume taking it at your prior dose today. If we  obtained biopsies or removed polyps during your procedure, we will contact you within 5 to 6 days with the results.      Thanks for trusting us with your healthcare needs.      Sincerely,     Abraham oHng M.D.        To rate your experience with Dr. Hong, please click on the link below     [email or web address]      Discharge References/Attachments     DIVERTICULOSIS (ENGLISH)    COLON AND RECTAL POLYPS, UNDERSTANDING (ENGLISH)        Primary Diagnosis     Your primary diagnosis was:  History Of Malignant Neoplasm Of Rectum      Admission Information     Date & Time Provider Department CSN    2/27/2017 10:31 AM Abraham Hong MD [facility] -  [id number]      Care Providers     Provider Role Specialty Primary office phone    Abraham Hong MD Attending Provider Gastroenterology [phone]    Abraham Hong MD Surgeon  Gastroenterology [phone]      Your Vitals Were     BP                   150/88 (BP Location: Left arm, Patient Position: Lying, BP Method: Automatic)           Recent Lab Values        10/10/2014 6/6/2016                        4:55 AM 10:03 AM          A1C 5.1 5.2                     Pending Labs     Order Current Status    Specimen to Pathology - Surgery Collected (02/27/17 1304)    Protime-INR In process      Allergies as of 2/27/2017        Reactions    Lipitor [Atorvastatin] Other (See Comments)    Norvasc [Amlodipine] Swelling      [facility]     [facility] Nurse Care Line - 24/7 Assistance  Unless otherwise directed by your provider, please contact [facility], our nurse care line that is available for 24/7 assistance.     Registered nurses in the [facility] provide clinical advisement, health education, appointment booking, and other advisory services.  Call for this free service at [phone].        Advance Directives     An advance directive is a document which, in the event you are no longer able to make  decisions for yourself, tells your healthcare team what kind of treatment you do or do not want to receive, or who you would like to make those decisions for you.  If you do not currently have an advance directive, [facility] encourages you to create one.  For more information call:  [phone]), [phone] ([phone]),  or log on to [email or web address].        Smoking Cessation     If you would like to quit smoking:   You may be eligible for free services if you are a [place] resident and started smoking cigarettes before September 1, 1988.  Call the Smoking Cessation Trust ([facility]) toll free at (282) 393-3713 or (748) 269-4872.   Call 6-325-QUIT-NOW if you do not meet the above criteria.            Language Assistance Services     ATTENTION: Language assistance services are available, free of charge. Please call [phone].      ATENCIÓN: Si habla español, tiene a wing disposición servicios gratuitos de asistencia lingüística. Llame al [phone].     CHÚ Ý: N?u b?n nói Ti?ng Vi?t, có các d?ch v? h? tr? ngôn ng? mi?n phí dành cho b?n. G?i s? [phone].        Heart Failure Education       Heart Failure: Being Active  You have a condition called heart failure. Being active doesnt mean that you have to wear yourself out. Even a little movement each day helps to strengthen your heart. If you cant get out to exercise, you can do simple stretching and strengthening exercises at home. These are good ways to keep you well-conditioned and prevent you and your heart from becoming excessively weak.    Ideas to get you started  · Add a little movement to things you do now. Walk to mail letters. Park your car at the far end of the parking lot and walk to the store. Walk up a flight of stairs instead of taking the elevator.  · Choose activities you enjoy. You might walk, swim, or ride an exercise bike. Things like gardening and washing the car count, too. Other possibilities include:  washing dishes, walking the dog, walking around the mall, and doing aerobic activities with friends.  · Join a group exercise program at a [facility], a senior center, or a community center. Or look into a hospital cardiac rehabilitation program. Ask your doctor if you qualify.  Tips to keep you going  · Get up and get dressed each day. Go to a coffee shop and read a newspaper or go somewhere that you'll be in the presence of other active people. Youll feel more like being active.  · Make a plan. Choose one or more activities that you enjoy and that you can easily do. Then plan to do at least one each day. You might write your plan on a calendar.  · Go with a friend or a group if you like company. This can help you feel supported and stay motivated, too.  · Plan social events that you enjoy. This will keep you mentally engaged as well as physically motivated to do things you find pleasure in.  For your safety  · Talk with your healthcare provider before starting an exercise program.  · Exercise indoors when its too hot or too cold outside, or when the air quality is poor. Try walking at a shopping mall.  · Wear socks and sturdy shoes to maintain your balance and prevent falls.  · Start slowly. Do a few minutes several times a day at first. Increase your time and speed little by little.  · Stop and rest whenever you feel tired or get short of breath.  · Dont push yourself on days when you dont feel well.  Date Last Reviewed: 3/20/2016  © 7556-2911 [employer]. [address] [place] [address], [place] [address]. All rights reserved. This information is not intended as a substitute for professional medical care. Always follow your healthcare professional's instructions.              Heart Failure: Evaluating Your Heart  You have a condition called heart failure. To evaluate your condition, your doctor will examine you, ask questions, and do some tests. Along with looking for signs of heart failure, the  doctor looks for any other health problems that may have led to heart failure. The results of your evaluation will help your doctor form a treatment plan.  Health history and physical exam  Your visit will start with a health history. Tell the doctor about any symptoms youve noticed and about all medicines you take. Then youll have a physical exam. This includes listening to your heartbeat and breathing. Youll also be checked for swelling (edema) in your legs and neck. When you have fluid buildup or fluid in the lungs, it may be called congestive heart failure.  Diagnosing heart failure     During an echocardiogram, sound waves bounce off the heart. These are converted into a picture on the screen.   The following may be done to help your doctor form a diagnosis:  · X-rays show the size and shape of your heart. These pictures can also show fluid in your lungs.  · An electrocardiogram (ECG or EKG) shows the pattern of your heartbeat. Small pads (electrodes) are placed on your chest, arms, and legs. Wires connect the pads to the ECG machine, which records your hearts electrical signals. This can give the doctor information about heart function.  · An echocardiogram uses ultrasound waves to show the structure and movement of your heart muscle. This shows how well the heart pumps. It also shows the thickness of the heart walls, and if the heart is enlarged. It is one of the most useful, non-invasive tests as it provides information about the heart's general function. This helps your doctor make treatment decisions.  · Lab tests evaluate small amounts of blood or urine for signs of problems. A BNP lab test can help diagnose and evaluate heart failure. BNP stands for B-type natriuretic peptide. The ventricles secrete more BNP when heart failure worsens. Lab tests can also provide information about metabolic dysfunction or heart dysfunction.  Your treatment plan  Based on the results of your evaluation and tests, your  doctor will develop a treatment plan. This plan is designed to relieve some of your heart failure symptoms and help make you more comfortable. Your treatment plan may include:  · Medicine to help your heart work better and improve your quality of life  · Changes in what you eat and drink to help prevent fluid from backing up in your body  · Daily monitoring of your weight and heart failure symptoms to see how well your treatment plan is working  · Exercise to help you stay healthy  · Help with quitting smoking  · Emotional and psychological support to help adjust to the changes  · Referrals to other specialists to make sure you are being treated comprehensively  Date Last Reviewed: 3/21/2016  © 0076-4634 [employer]. [address] [place] [address], [place] [address]. All rights reserved. This information is not intended as a substitute for professional medical care. Always follow your healthcare professional's instructions.              Heart Failure: Making Changes to Your Diet  You have a condition called heart failure. When you have heart failure, excess fluid is more likely to build up in your body because your heart isn't working well. This makes the heart work harder to pump blood. Fluid buildup causes symptoms such as shortness of breath and swelling (edema). This is often referred to as congestive heart failure or CHF. Controlling the amount of salt (sodium) you eat may help stop fluid from building up. Your doctor may also tell you to reduce the amount of fluid you drink.  Reading food labels    Your healthcare provider will tell you how much sodium you can eat each day. Read food labels to keep track. Keep in mind that certain foods are high in salt. These include canned, frozen, and processed foods. Check the amount of sodium in each serving. Watch out for high-sodium ingredients. These include MSG (monosodium glutamate), baking soda, and sodium phosphate.   Eating less salt  Give yourself time to  get used to eating less salt. It may take a little while. Here are some tips to help:  · Take the saltshaker off the table. Replace it with salt-free herb mixes and spices.  · Eat fresh or plain frozen vegetables. These have much less salt than canned vegetables.  · Choose low-sodium snacks like sodium-free pretzels, crackers, or air-popped popcorn.  · Dont add salt to your food when youre cooking. Instead, season your foods with pepper, lemon, garlic, or onion.  · When you eat out, ask that your food be cooked without added salt.  · Avoid eating fried foods as these often have a great deal of salt.  If youre told to limit fluids  You may need to limit how much fluid you have to help prevent swelling. This includes anything that is liquid at room temperature, such as ice cream and soup. If your doctor tells you to limit fluid, try these tips:  · Measure drinks in a measuring cup before you drink them. This will help you meet daily goals.  · Chill drinks to make them more refreshing.  · Suck on frozen lemon wedges to quench thirst.  · Only drink when youre thirsty.  · Chew sugarless gum or suck on hard candy to keep your mouth moist.  · Weigh yourself daily to know if your body's fluid content is rising.  My sodium goal  Your healthcare provider may give you a sodium goal to meet each day. This includes sodium found in food as well as salt that you add. My goal is to eat no more than ___________ mg of sodium per day.     When to call your doctor  Call your doctor right away if you have any symptoms of worsening heart failure. These can include:  · Sudden weight gain  · Increased swelling of your legs or ankles  · Trouble breathing when youre resting or at night  · Increase in the number of pillows you have to sleep on  · Chest pain, pressure, discomfort, or pain in the jaw, neck, or back   Date Last Reviewed: 3/21/2016  © 7830-8423 [employer]. [address] [place] [address], [place] [address]. All rights  reserved. This information is not intended as a substitute for professional medical care. Always follow your healthcare professional's instructions.              Heart Failure: Medicines to Help Your Heart    You have a condition called heart failure (also known as congestive heart failure, or CHF). Your doctor will likely prescribe medicines for heart failure and any underlying health problems you have. Most heart failure patients take one or more types of medicinen. Your healthcare provider will work to find the combination of medicines that works best for you.  Heart failure medicines  Here are the most common heart failure medicines:  · ACE inhibitors lower blood pressure and decrease strain on the heart. This makes it easier for the heart to pump. Angiotensin receptor blockers have similar effects. These are prescribed for some patients instead of ACE inhibitors.  · Beta-blockers relieve stress on the heart. They also improve symptoms. They may also improve the heart's pumping action over time.  · Diuretics (also called water pills) help rid your body of excess water. This can help rid your body of swelling (edema). Having less fluid to pump means your heart doesnt have to work as hard. Some diuretics make your body lose a mineral called potassium. Your doctor will tell you if you need to take supplements or eat more foods high in potassium.  · Digoxin helps your heart pump with more strength. This helps your heart pump more blood with each beat. So, more oxygen-rich blood travels to the rest of the body.  · Aldosterone antagonists help alter hormones and decrease strain on the heart.  · Hydralazine and nitrates are two separate medicines used together to treat heart failure. They may come in one combination pill. They lower blood pressure and decrease how hard the heart has to pump.  Medicines for related conditions  Controlling other heart problems helps keep heart failure under control, too. Depending  on other heart problems you have, medicines may be prescribed to:  · Lower blood pressure (antihypertensives).  · Lower cholesterol levels (statins).  · Prevent blood clots (anticoagulants or aspirin).  · Keep the heartbeat steady (antiarrhythmics).  Date Last Reviewed: 3/5/2016  © 8962-4718 [employer]. [address] [place] [address], [place] [address]. All rights reserved. This information is not intended as a substitute for professional medical care. Always follow your healthcare professional's instructions.              Heart Failure: Procedures That May Help    The heart is a muscle that pumps oxygen-rich blood to all parts of the body. When you have heart failure, the heart is not able to pump as well as it should. Blood and fluid may back up into the lungs (congestive heart failure), and some parts of the body dont get enough oxygen-rich blood to work normally. These problems lead to the symptoms of heart failure.     Certain procedures may help the heart pump better in some cases of heart failure. Some procedures are done to treat health problems that may have caused the heart failure such as coronary artery disease or heart rhythm problems. For more serious heart failure, other options are available.  Treating artery and valve problems  If you have coronary artery disease or valve disease, procedures may be done to improve blood flow. This helps the heart pump better, which can improve heart failure symptoms. First, your doctor may do a cardiac catheterization to help detect clogged blood vessels or valve damage. During this procedure, a  thin tube (catheter) in inserted into a blood vessel and guided to the heart. There a dye is injected and a special type of X-ray (angiogram) is taken of the blood vessels. Procedures to open a blocked artery or fix damaged valves can also be done using catheterization.  · Angioplasty uses a balloon-tipped instrument at the end of the catheter. The balloon is  inflated to widen the narrowed artery. In many cases, a stent is expanded to further support the narrowed artery. A stent is a metal mesh tube.  · Valve surgery repairs or replacement of faulty valves can also be done during catheterization so blood can flow properly through the chambers of the heart.  Bypass surgery is another option to help treat blocked arteries. It uses a healthy blood vessel from elsewhere in the body. The healthy blood vessel is attached above and below the blocked area so that blood can flow around the blocked artery.  Treating heart rhythm problems  A device may be placed in the chest to help a weak heart maintain a healthy, heartbeat so the heart can pump more effectively:  · Pacemaker. A pacemaker is an implanted device that regulates your heartbeat electronically. It monitors your heart's rhythm and generates a painless electric impulse that helps the heart beat in a regular rhythm. A pacemaker is programmed to meet your specific heart rhythm needs.  · Biventricular pacing/cardiac resynchronization therapy. A type of pacemaker that paces both pumping chambers of the heart at the same time to coordinate contractions and to improve the heart's function. Some people with heart failure are candidates for this therapy.  · Implantable cardioverter defibrillator. A device similar to a pacemaker that senses when the heart is beating too fast and delivers an electrical shock to convert the fast rhythm to a normal rhythm. This can be a life saving device.  In severe cases  In more serious cases of heart failure when other treatments no longer work, other options may include:  · Ventricular assist devices (VADs). These are mechanical devices used to take over the pumping function for one or both of the heart's ventricles, or pumping chambers. A VAD may be necessary when heart failure progresses to the point that medicines and other treatments no longer help. In some cases, a VAD may be used as a  bridge to transplant.  · Heart transplant. This is replacing the diseased heart with a healthy one from a donor. This is an option for a few people who are very sick. A heart transplant is very serious and not an option for all patients. Your doctor can tell you more.  Date Last Reviewed: 3/20/2016  © 0502-9428 [employer]. [address] [place] [address], [place] [address]. All rights reserved. This information is not intended as a substitute for professional medical care. Always follow your healthcare professional's instructions.              Heart Failure: Tracking Your Weight  You have a condition called heart failure. When you have heart failure, a sudden weight gain or a steady rise in weight is a warning sign that your body is retaining too much water and salt. This could mean your heart failure is getting worse. If left untreated, it can cause problems for your lungs and result in shortness of breath. Weighing yourself each day is the best way to know if youre retaining water. If your weight goes up quickly, call your doctor. You will be given instructions on how to get rid of the excess water. You will likely need medicines and to avoid salt. This will help your heart work better.  Call your doctor if you gain more than 2 pounds in 1 day, more than 5 pounds in 1 week, or whatever weight gain you were told to report by your doctor. This is often a sign of worsening heart failure and needs to be evaluated and treated. Your doctor will tell you what to do next.   Tips for weighing yourself    · Weigh yourself at the same time each morning, wearing the same clothes. Weigh yourself after urinating and before eating.  · Use the same scale each day. Make sure the numbers are easy to read. Put the scale on a flat, hard surface -- not on a rug or carpet.  · Do not stop weighing yourself. If you forget one day, weigh again the next morning.  How to use your weight chart  · Keep your weight chart near the scale.  Write your weight on the chart as soon as you get off the scale.  · Fill in the month and the start date on the chart. Then write down your weight each day. Your chart will look like this:    · If you miss a day, leave the space blank. Weigh yourself the next day and write your weight in the next space.  · Take your weight chart with you when you go to see your doctor.  Date Last Reviewed: 3/20/2016  © 8158-6345 [employer]. [address] [place] [address]. All rights reserved. This information is not intended as a substitute for professional medical care. Always follow your healthcare professional's instructions.              Heart Failure: Warning Signs of a Flare-Up  You have a condition called heart failure. Once you have heart failure, flare-ups can happen. Below are signs that can mean your heart failure is getting worse. If you notice any of these warning signs, call your healthcare provider.  Swelling    · Your feet, ankles, or lower legs get puffier.  · You notice skin changes on your lower legs.  · Your shoes feel too tight.  · Your clothes are tighter in the waist.  · You have trouble getting rings on or off your fingers.  Shortness of breath  · You have to breathe harder even when youre doing your normal activities or when youre resting.  · You are short of breath walking up stairs or even short distances.  · You wake up at night short of breath or coughing.  · You need to use more pillows or sit up to sleep.  · You wake up tired or restless.  Other warning signs  · You feel weaker, dizzy, or more tired.  · You have chest pain or changes in your heartbeat.  · You have a cough that wont go away.  · You cant remember things or dont feel like eating.  Tracking your weight  Gaining weight is often the first warning sign that heart failure is getting worse. Gaining even a few pounds can be a sign that your body is retaining excess water and salt. Weighing yourself each day in the  morning after you urinate and before you eat, is the best way to know if you're retaining water. Get a scale that is easy to read and make sure you wear the same clothes and use the same scale every time you weigh. Your healthcare provider will show you how to track your weight. Call your doctor if you gain more than 2 pounds in 1 day, 5 pounds in 1 week, or whatever weight gain you were told to report by your doctor. This is often a sign of worsening heart failure and needs to be evaluated and treated before it compromises your breathing. Your doctor will tell you what to do next.    Date Last Reviewed: 3/15/2016  © 2564-2780 [employer]. [address] [place] [address], [place] [address]. All rights reserved. This information is not intended as a substitute for professional medical care. Always follow your healthcare professional's instructions.               [facility] complies with applicable Federal civil rights laws and does not discriminate on the basis of race, color, national origin, age, disability, or sex.

## 2017-02-27 NOTE — DISCHARGE SUMMARY
Endoscopy Discharge Summary      Admit Date: 2/27/2017    Discharge Date and Time:  2/27/2017 1:06 PM    Attending Physician: Abraham Hong MD     Discharge Physician: Abraham Hong MD     Principal Admitting Diagnoses: History of rectal cancer         Discharge Diagnosis: The primary encounter diagnosis was History of rectal cancer. Diagnoses of History of colon polyps, Special screening for malignant neoplasms, colon, Benign neoplasm of cecum, Benign neoplasm of transverse colon, and Diverticulosis of large intestine without hemorrhage were also pertinent to this visit.     Discharged Condition: Good    Indication for Admission: History of rectal cancer     Hospital Course: Patient was admitted for an inpatient procedure and tolerated the procedure well with no complications.    Significant Diagnostic Studies: COLONOSCOPY    Pathology (if any):  Specimen (12h ago through future)    Start     Ordered    02/27/17 1253  Specimen to Pathology - Surgery  Once     Comments:  1. Cecum and transverse colon polyps X 3  2. Rectum polyp    02/27/17 1304          Disposition: Home.    Bleeding: None    No Implants    Follow Up/Patient Instructions:   Current Discharge Medication List      CONTINUE these medications which have NOT CHANGED    Details   b complex vitamins tablet Take by mouth. 1 tablet Oral Every day      clobetasol (TEMOVATE) 0.05 % external solution Apply to affected areas of scalp 1-2 times daily as needed for itch  Qty: 50 mL, Refills: 3    Associated Diagnoses: Seborrheic dermatitis      cyanocobalamin 1,000 mcg/mL injection Give 1cc under the skin once a month  Qty: 10 mL, Refills: 4    Associated Diagnoses: Thymoma; Other vitamin B12 deficiency anemia      furosemide (LASIX) 20 MG tablet TAKE 1 TABLET(20 MG) BY MOUTH EVERY DAY  Qty: 90 tablet, Refills: 11    Comments: **Patient requests 90 days supply**  Associated Diagnoses: Edema of both legs; Essential hypertension      !! hydrALAZINE (APRESOLINE)  25 MG tablet Take 1 tablet by mouth mid-day and in the evening  Qty: 180 tablet, Refills: 3    Associated Diagnoses: Essential hypertension, malignant      !! hydrALAZINE (APRESOLINE) 50 MG tablet Take 1 tablet (50 mg total) by mouth every morning.  Qty: 90 tablet, Refills: 3    Comments: **Patient requests 90 days supply**  Associated Diagnoses: Essential hypertension, malignant      ketoconazole (NIZORAL) 2 % shampoo Wash hair with medicated shampoo at least 3x/week - let sit on scalp at least 5 minutes prior to rinsing  Qty: 120 mL, Refills: 5    Associated Diagnoses: Seborrheic dermatitis      leuprolide (LUPRON) 3.75 mg injection Inject 3.75 mg into the muscle every 3 (three) months.       metoprolol tartrate (LOPRESSOR) 100 MG tablet Take 1 tablet (100 mg total) by mouth 2 (two) times daily.  Qty: 60 tablet, Refills: 11      omeprazole (PRILOSEC) 20 MG capsule TAKE ONE CAPSULE BY MOUTH DAILY  Qty: 90 capsule, Refills: 4      pravastatin (PRAVACHOL) 40 MG tablet TAKE 1 TABLET BY MOUTH DAILY  Qty: 90 tablet, Refills: 11    Comments: **Patient requests 90 days supply**      sulfacetamide sodium 10 % Sham Use twice daily as face wash for rosacea  Qty: 237 mL, Refills: 6    Associated Diagnoses: Rosacea      triamcinolone acetonide 0.025% (KENALOG) 0.025 % cream AAA bid as needed for flares on face.  Mild steroid.  Qty: 30 g, Refills: 1    Associated Diagnoses: Seborrheic dermatitis      aspirin (ECOTRIN) 81 MG EC tablet Take 81 mg by mouth once daily.      fluticasone (FLONASE) 50 mcg/actuation nasal spray 1 spray by Nasal route 2 (two) times daily as needed for Rhinitis or Allergies. 1 Aerosol, Spray Each nostril Twice a day        !! - Potential duplicate medications found. Please discuss with provider.            Discharge Procedure Orders  Diet general     Activity as tolerated     Call MD for:  temperature >100.4     Call MD for:  persistent nausea and vomiting     Call MD for:  severe uncontrolled pain      Call MD for:  difficulty breathing, headache or visual disturbances     Call MD for:  redness, tenderness, or signs of infection (pain, swelling, redness, odor or green/yellow discharge around incision site)     Call MD for:  hives     Call MD for:  persistent dizziness or light-headedness     No dressing needed         Follow-up Information     Follow up with Solomon Cortés MD.    Specialty:  Internal Medicine    Why:  As needed    Contact information:    [id number] [facility]  [address] [place] [address] [phone]

## 2017-02-27 NOTE — TRANSFER OF CARE
"Anesthesia Transfer of Care Note    Patient: Homero Worley Ciro    Procedure(s) Performed: Procedure(s) (LRB):  COLONOSCOPY (Left)    Patient location: PACU    Anesthesia Type: MAC    Transport from OR: Transported from OR on room air with adequate spontaneous ventilation    Post pain: adequate analgesia    Post assessment: no apparent anesthetic complications    Post vital signs: stable    Level of consciousness: awake    Nausea/Vomiting: no nausea/vomiting    Complications: none          Last vitals:   Visit Vitals    BP (!) 150/88 (BP Location: Left arm, Patient Position: Lying, BP Method: Automatic)    Pulse 85    Temp 36.8 °C (98.2 °F) (Oral)    Resp 20    Ht 5' 10" (1.778 m)    Wt 86.2 kg (190 lb)    SpO2 96%    BMI 27.26 kg/m2     "

## 2017-02-27 NOTE — ANESTHESIA PREPROCEDURE EVALUATION
02/27/2017  Homero Tanner is a 76 y.o., male.    [facility] Anesthesia Evaluation    I have reviewed the Patient Summary Reports.    I have reviewed the Nursing Notes.   I have reviewed the Medications.     Review of Systems  Anesthesia Hx:  No problems with previous Anesthesia  History of prior surgery of interest to airway management or planning: Denies Family Hx of Anesthesia complications.   Denies Personal Hx of Anesthesia complications.   Social:  Former Smoker    Hematology/Oncology:         -- Anemia: --  Cancer in past history:    Cardiovascular:   Hypertension Valvular problems/Murmurs, AI Dysrhythmias CHF hyperlipidemia    Pulmonary:   Asthma    Hepatic/GI:   GERD    Psych:  Psychiatric Normal           Physical Exam  General:  Well nourished    Airway/Jaw/Neck:  Airway Findings: Mouth Opening: Normal Tongue: Normal  General Airway Assessment: Adult  Mallampati: II  TM Distance: Normal, at least 6 cm       Chest/Lungs:  Chest/Lungs Findings: Normal Respiratory Rate     Heart/Vascular:  Heart Findings: Rate: Normal             Anesthesia Plan  Type of Anesthesia, risks & benefits discussed:  Anesthesia Type:  MAC  Patient's Preference:   Intra-op Monitoring Plan:   Intra-op Monitoring Plan Comments:   Post Op Pain Control Plan:   Post Op Pain Control Plan Comments:   Induction:   IV  Beta Blocker:  Patient is not currently on a Beta-Blocker (No further documentation required).       Informed Consent: Patient understands risks and agrees with Anesthesia plan.  Questions answered. Anesthesia consent signed with patient.  ASA Score: 3     Day of Surgery Review of History & Physical:    H&P update referred to the surgeon.         Ready For Surgery From Anesthesia Perspective.     Echo 06/16  CONCLUSIONS     1 - Mild left atrial enlargement.     2 - Normal left ventricular systolic function (EF 60-65%).      3 - Normal left ventricular diastolic function.     4 - Low normal right ventricular systolic function .     5 - Pulmonary hypertension. The estimated PA systolic pressure is greater than 47 mmHg.     6 - Mild aortic regurgitation.     7 - Mild to moderate tricuspid regurgitation.

## 2017-02-27 NOTE — H&P
Short Stay Endoscopy History and Physical    PCP - Solomon Cortés MD    Procedure - COLONOSCOPY  ASA - 2  Mallampati - 1  History of Anesthesia problems - no  Family history Anesthesia problems -  no     Patient Active Problem List    Diagnosis Date Noted    Special screening for malignant neoplasms, colon 02/27/2017    History of colon polyps 02/25/2017    B12 deficiency anemia 10/10/2016    Chronic diastolic heart failure 09/13/2016    Abnormal liver enzymes 09/13/2016    Pulmonary hypertension 06/13/2016    TR (tricuspid regurgitation) 06/13/2016    PVC's (premature ventricular contractions) 06/13/2016    Edema of both legs 06/13/2016    Hyperlipidemia 05/05/2016    Prostate cancer 02/25/2016    History of thymoma 02/25/2016    Adenomatous rectal polyp 12/07/2015    Paraproteinemia 11/19/2015    History of rectal cancer 11/19/2015    Iron deficiency anemia 10/26/2015    Colon polyps 10/26/2015    Iron deficiency 04/21/2014    Aortic insufficiency 06/24/2013    Abnormal ECG 06/24/2013    Essential hypertension 06/24/2013      1. History of rectal cancer    2. History of colon polyps    3. Special screening for malignant neoplasms, colon        ROS:  Constitutional: No fevers, chills, No weight loss  ENT: No allergies  CV: No chest pain  Pulm: No cough, No shortness of breath  Ophtho: No vision changes  GI: see HPI  Derm: No rash  Heme: No lymphadenopathy, No bruising  MSK: No arthritis  : No dysuria, No hematuria  Endo: No hot or cold intolerance  Neuro: No syncope, No seizure  Psych: No anxiety, No depression    Medical History:  has a past medical history of Abnormal ECG (6/24/2013); Aortic insufficiency (6/24/2013); Aortic valve disorder (6/25/2013); Asthma; CHF (congestive heart failure); Encounter for blood transfusion; GERD (gastroesophageal reflux disease); Hypertension; Mixed hyperlipidemia (6/24/2013); Prostate cancer; PSVT (paroxysmal supraventricular tachycardia) (6/24/2013);  Pulmonary hypertension (6/13/2016); Rectal adenocarcinoma; SCC (squamous cell carcinoma) (04/2015); Thymoma; and TR (tricuspid regurgitation) (6/13/2016).    Surgical History:  has a past surgical history that includes ostatectomy; Colon surgery; Tonsillectomy; biopsy for chest mass; Tumor removal; Rectal surgery (N/A, 09/2014); Colonoscopy (N/A, 10/26/2015); and mohs.    Family History: family history includes Diabetes in his father. There is no history of Amblyopia, Blindness, Cancer, Cataracts, Glaucoma, Hypertension, Macular degeneration, Retinal detachment, Strabismus, Stroke, Thyroid disease, or Melanoma.       Social History:  reports that he quit smoking about 48 years ago. He has a 15.00 pack-year smoking history. He has never used smokeless tobacco. He reports that he drinks about 1.8 oz of alcohol per week  He reports that he does not use illicit drugs.    Review of patient's allergies indicates:   Allergen Reactions    Lipitor [atorvastatin] Other (See Comments)    Norvasc [amlodipine] Swelling       Medications:   Prescriptions Prior to Admission   Medication Sig Dispense Refill Last Dose    b complex vitamins tablet Take by mouth. 1 tablet Oral Every day   2/26/2017 at Unknown time    clobetasol (TEMOVATE) 0.05 % external solution Apply to affected areas of scalp 1-2 times daily as needed for itch 50 mL 3 2/26/2017 at Unknown time    cyanocobalamin 1,000 mcg/mL injection Give 1cc under the skin once a month 10 mL 4 Past Month at Unknown time    furosemide (LASIX) 20 MG tablet TAKE 1 TABLET(20 MG) BY MOUTH EVERY DAY 90 tablet 11 2/26/2017 at Unknown time    hydrALAZINE (APRESOLINE) 25 MG tablet Take 1 tablet by mouth mid-day and in the evening 180 tablet 3 2/26/2017    hydrALAZINE (APRESOLINE) 50 MG tablet Take 1 tablet (50 mg total) by mouth every morning. 90 tablet 3 2/27/2017 at 0500    ketoconazole (NIZORAL) 2 % shampoo Wash hair with medicated shampoo at least 3x/week - let sit on scalp at  least 5 minutes prior to rinsing 120 mL 5 2/26/2017 at Unknown time    leuprolide (LUPRON) 3.75 mg injection Inject 3.75 mg into the muscle every 3 (three) months.    Past Month at Unknown time    metoprolol tartrate (LOPRESSOR) 100 MG tablet Take 1 tablet (100 mg total) by mouth 2 (two) times daily. 60 tablet 11 2/27/2017 at 0500    omeprazole (PRILOSEC) 20 MG capsule TAKE ONE CAPSULE BY MOUTH DAILY 90 capsule 4 2/26/2017 at Unknown time    pravastatin (PRAVACHOL) 40 MG tablet TAKE 1 TABLET BY MOUTH DAILY 90 tablet 11 2/26/2017 at Unknown time    sulfacetamide sodium 10 % Sham Use twice daily as face wash for rosacea 237 mL 6 2/26/2017 at Unknown time    triamcinolone acetonide 0.025% (KENALOG) 0.025 % cream AAA bid as needed for flares on face.  Mild steroid. 30 g 1 2/26/2017 at Unknown time    aspirin (ECOTRIN) 81 MG EC tablet Take 81 mg by mouth once daily.   2/20/2017    fluticasone (FLONASE) 50 mcg/actuation nasal spray 1 spray by Nasal route 2 (two) times daily as needed for Rhinitis or Allergies. 1 Aerosol, Spray Each nostril Twice a day    More than a month at Unknown time       Objective Findings:    Vital Signs: See nurses notes.     Physical Exam:  General Appearance: Well appearing in no acute distress  Eyes:    No scleral icterus  ENT: Neck supple, Lips, mucosa, and tongue normal; teeth and gums normal  Lungs: CTA bilaterally in anterior and posterior fields, no wheezes, no crackles.  Heart:  Regular rate, S1, S2 normal, no murmurs heard.  Abdomen: Soft, non tender, non distended with normal bowel sounds. No hepatosplenomegaly, ascites, or mass.  Extremities: No clubbing, cyanosis or edema  Skin: No rash    Labs:  Lab Results   Component Value Date    WBC 6.08 01/26/2017    HGB 12.4 (L) 01/26/2017    HCT 36.6 (L) 01/26/2017     01/26/2017    CHOL 164 06/06/2016    TRIG 78 06/06/2016    HDL 79 (H) 06/06/2016    ALT 16 01/26/2017    AST 32 01/26/2017     (L) 01/26/2017    K 4.6  01/26/2017    CL 94 (L) 01/26/2017    CREATININE 1.4 01/26/2017    BUN 17 01/26/2017    CO2 23 01/26/2017    TSH 2.0 04/23/2008    PSA 1.1 01/02/2014    INR 1.1 10/10/2014    HGBA1C 5.2 06/06/2016       I have explained the risks and benefits of endoscopy procedures to the patient including but not limited to bleeding, perforation, infection, and death.

## 2017-02-27 NOTE — ANESTHESIA RELEASE NOTE
"Anesthesia Release from PACU Note    Patient: Homero Tanner    Procedure(s) Performed: Procedure(s) (LRB):  COLONOSCOPY (Left)    Anesthesia type: MAC    Post pain: Adequate analgesia    Post assessment: no apparent anesthetic complications    Last Vitals:   Visit Vitals    BP (!) 150/88 (BP Location: Left arm, Patient Position: Lying, BP Method: Automatic)    Pulse 85    Temp 36.8 °C (98.2 °F) (Oral)    Resp 20    Ht 5' 10" (1.778 m)    Wt 86.2 kg (190 lb)    SpO2 96%    BMI 27.26 kg/m2       Post vital signs: stable    Level of consciousness: awake    Nausea/Vomiting: no nausea/no vomiting    Complications: none    Airway Patency: patent    Respiratory: unassisted    Cardiovascular: stable and blood pressure at baseline    Hydration: euvolemic  "

## 2017-02-27 NOTE — ANESTHESIA POSTPROCEDURE EVALUATION
"Anesthesia Post Evaluation    Patient: Homero Tanner    Procedure(s) Performed: Procedure(s) (LRB):  COLONOSCOPY (Left)    Final Anesthesia Type: MAC  Patient location during evaluation: PACU  Patient participation: Yes- Able to Participate  Level of consciousness: awake and alert  Post-procedure vital signs: reviewed and stable  Pain management: adequate  Airway patency: patent  PONV status at discharge: No PONV  Anesthetic complications: no      Cardiovascular status: blood pressure returned to baseline  Respiratory status: unassisted  Hydration status: euvolemic  Follow-up not needed.        Visit Vitals    BP (!) 150/88 (BP Location: Left arm, Patient Position: Lying, BP Method: Automatic)    Pulse 85    Temp 36.8 °C (98.2 °F) (Oral)    Resp 20    Ht 5' 10" (1.778 m)    Wt 86.2 kg (190 lb)    SpO2 96%    BMI 27.26 kg/m2       Pain/Jose Score: Jose Score: 9 (2/27/2017  1:09 PM)      "

## 2017-03-03 ENCOUNTER — PATIENT MESSAGE (OUTPATIENT)
Dept: GASTROENTEROLOGY | Facility: HOSPITAL | Age: 77
End: 2017-03-03

## 2017-03-03 NOTE — TELEPHONE ENCOUNTER
FINAL PATHOLOGIC DIAGNOSIS  1  Cecum and transverse colon polyps x 3:  Tubular adenomas.  2  Rectal polyp:  Scant fragment of colonic mucosa with superficial hyperplastic changes.    Biopsy results sent to the patient.    [email or web address]

## 2017-04-13 ENCOUNTER — LAB VISIT (OUTPATIENT)
Dept: LAB | Facility: HOSPITAL | Age: 77
End: 2017-04-13
Attending: INTERNAL MEDICINE
Payer: MEDICARE

## 2017-04-13 DIAGNOSIS — D51.8 OTHER VITAMIN B12 DEFICIENCY ANEMIA: ICD-10-CM

## 2017-04-13 DIAGNOSIS — Z85.048 HISTORY OF RECTAL CANCER: ICD-10-CM

## 2017-04-13 DIAGNOSIS — D89.2 PARAPROTEINEMIA: ICD-10-CM

## 2017-04-13 DIAGNOSIS — Z87.898 HISTORY OF THYMOMA: ICD-10-CM

## 2017-04-13 DIAGNOSIS — C61 PROSTATE CANCER: ICD-10-CM

## 2017-04-13 LAB
ALBUMIN SERPL BCP-MCNC: 3.9 G/DL
ALP SERPL-CCNC: 99 U/L
ALT SERPL W/O P-5'-P-CCNC: 17 U/L
ANION GAP SERPL CALC-SCNC: 17 MMOL/L
AST SERPL-CCNC: 35 U/L
BASOPHILS # BLD AUTO: 0.04 K/UL
BASOPHILS NFR BLD: 0.8 %
BILIRUB SERPL-MCNC: 0.8 MG/DL
BUN SERPL-MCNC: 19 MG/DL
CALCIUM SERPL-MCNC: 10 MG/DL
CEA SERPL-MCNC: 2.1 NG/ML
CHLORIDE SERPL-SCNC: 91 MMOL/L
CO2 SERPL-SCNC: 31 MMOL/L
COMPLEXED PSA SERPL-MCNC: 1 NG/ML
CREAT SERPL-MCNC: 1.7 MG/DL
DIFFERENTIAL METHOD: ABNORMAL
EOSINOPHIL # BLD AUTO: 0.1 K/UL
EOSINOPHIL NFR BLD: 2.7 %
ERYTHROCYTE [DISTWIDTH] IN BLOOD BY AUTOMATED COUNT: 12.5 %
EST. GFR  (AFRICAN AMERICAN): 45 ML/MIN/1.73 M^2
EST. GFR  (NON AFRICAN AMERICAN): 39 ML/MIN/1.73 M^2
GLUCOSE SERPL-MCNC: 110 MG/DL
HCT VFR BLD AUTO: 36 %
HGB BLD-MCNC: 12.2 G/DL
LYMPHOCYTES # BLD AUTO: 1.6 K/UL
LYMPHOCYTES NFR BLD: 30.9 %
MCH RBC QN AUTO: 33.2 PG
MCHC RBC AUTO-ENTMCNC: 33.9 %
MCV RBC AUTO: 98 FL
MONOCYTES # BLD AUTO: 0.6 K/UL
MONOCYTES NFR BLD: 12.3 %
NEUTROPHILS # BLD AUTO: 2.8 K/UL
NEUTROPHILS NFR BLD: 53.3 %
PLATELET # BLD AUTO: 158 K/UL
PMV BLD AUTO: 9.1 FL
POTASSIUM SERPL-SCNC: 4.4 MMOL/L
PROT SERPL-MCNC: 8.4 G/DL
RBC # BLD AUTO: 3.67 M/UL
SODIUM SERPL-SCNC: 139 MMOL/L
VIT B12 SERPL-MCNC: 682 PG/ML
WBC # BLD AUTO: 5.21 K/UL

## 2017-04-13 PROCEDURE — 82607 VITAMIN B-12: CPT

## 2017-04-13 PROCEDURE — 84165 PROTEIN E-PHORESIS SERUM: CPT

## 2017-04-13 PROCEDURE — 82378 CARCINOEMBRYONIC ANTIGEN: CPT

## 2017-04-13 PROCEDURE — 85025 COMPLETE CBC W/AUTO DIFF WBC: CPT | Mod: PO

## 2017-04-13 PROCEDURE — 36415 COLL VENOUS BLD VENIPUNCTURE: CPT | Mod: PO

## 2017-04-13 PROCEDURE — 84165 PROTEIN E-PHORESIS SERUM: CPT | Mod: 26,,, | Performed by: PATHOLOGY

## 2017-04-13 PROCEDURE — 80053 COMPREHEN METABOLIC PANEL: CPT | Mod: PO

## 2017-04-13 PROCEDURE — 84153 ASSAY OF PSA TOTAL: CPT

## 2017-04-17 LAB
ALBUMIN SERPL ELPH-MCNC: 3.74 G/DL
ALPHA1 GLOB SERPL ELPH-MCNC: 0.3 G/DL
ALPHA2 GLOB SERPL ELPH-MCNC: 0.83 G/DL
B-GLOBULIN SERPL ELPH-MCNC: 2.31 G/DL
GAMMA GLOB SERPL ELPH-MCNC: 0.62 G/DL
PATHOLOGIST INTERPRETATION SPE: NORMAL
PROT SERPL-MCNC: 7.8 G/DL

## 2017-04-18 ENCOUNTER — OFFICE VISIT (OUTPATIENT)
Dept: HEMATOLOGY/ONCOLOGY | Facility: CLINIC | Age: 77
End: 2017-04-18
Payer: MEDICARE

## 2017-04-18 VITALS
HEIGHT: 70 IN | WEIGHT: 194.25 LBS | DIASTOLIC BLOOD PRESSURE: 66 MMHG | HEART RATE: 68 BPM | TEMPERATURE: 98 F | SYSTOLIC BLOOD PRESSURE: 120 MMHG | OXYGEN SATURATION: 93 % | BODY MASS INDEX: 27.81 KG/M2 | RESPIRATION RATE: 16 BRPM

## 2017-04-18 DIAGNOSIS — D89.2 PARAPROTEINEMIA: Primary | ICD-10-CM

## 2017-04-18 DIAGNOSIS — Z85.048 HISTORY OF RECTAL CANCER: ICD-10-CM

## 2017-04-18 DIAGNOSIS — C61 PROSTATE CANCER: ICD-10-CM

## 2017-04-18 DIAGNOSIS — Z87.898 HISTORY OF THYMOMA: ICD-10-CM

## 2017-04-18 DIAGNOSIS — D51.8 OTHER VITAMIN B12 DEFICIENCY ANEMIA: ICD-10-CM

## 2017-04-18 PROCEDURE — 99999 PR PBB SHADOW E&M-EST. PATIENT-LVL IV: CPT | Mod: PBBFAC,,, | Performed by: INTERNAL MEDICINE

## 2017-04-18 PROCEDURE — 3078F DIAST BP <80 MM HG: CPT | Mod: S$GLB,,, | Performed by: INTERNAL MEDICINE

## 2017-04-18 PROCEDURE — 3074F SYST BP LT 130 MM HG: CPT | Mod: S$GLB,,, | Performed by: INTERNAL MEDICINE

## 2017-04-18 PROCEDURE — 1126F AMNT PAIN NOTED NONE PRSNT: CPT | Mod: S$GLB,,, | Performed by: INTERNAL MEDICINE

## 2017-04-18 PROCEDURE — 1160F RVW MEDS BY RX/DR IN RCRD: CPT | Mod: S$GLB,,, | Performed by: INTERNAL MEDICINE

## 2017-04-18 PROCEDURE — 1159F MED LIST DOCD IN RCRD: CPT | Mod: S$GLB,,, | Performed by: INTERNAL MEDICINE

## 2017-04-18 PROCEDURE — 99214 OFFICE O/P EST MOD 30 MIN: CPT | Mod: S$GLB,,, | Performed by: INTERNAL MEDICINE

## 2017-04-18 PROCEDURE — 1157F ADVNC CARE PLAN IN RCRD: CPT | Mod: S$GLB,,, | Performed by: INTERNAL MEDICINE

## 2017-04-18 PROCEDURE — 99499 UNLISTED E&M SERVICE: CPT | Mod: S$GLB,,, | Performed by: INTERNAL MEDICINE

## 2017-04-18 NOTE — PROGRESS NOTES
Subjective:       Patient ID: Homero Tanner is a 76 y.o. male.    Chief Complaint: Follow-up    HPI This is a 76  year-old gentleman who comes for follow up of her previously diagnosed thymoma, rectal cancer, prostate cancer, pararpteinemia and b12 deficiency.  He is known to us because of ahistory of   previously treated prostate cancer. He was diagnosed around 2004 and treated   with surgery. He relapsed by PSA and was treated with local radiation therapy.   He had further elevation of the PSA and started intermittent LHRH   analogues through the office of his urologist outside our clinic  he receives the injections at his urologist office.     A CBC done on   November 2012 was reported showing a hemoglobin of 12.7. As part of workup his   primary care physician ordered a serum ferritin and total iron binding   capacity, which were unremarkable. Serum folate was normal at 5.5. Vitamin B12   was low at 151. He then started supplemental B12 injection, which she is still   getting.   Also, as part of the workup for the anemia, serum protein electrophoresis   showed a paraprotein band that was quantified at 1.03 g and identified as IgM.   The patient was then referred to me. I ordered lab tests, which included a   thoracic spine view looking for lytic lesions. The radiologist reported that there   were no lytic lesions but there was a mass in the center of the chest  . This was followed by chest x-rays and CT scans. The   CT scan showed a large mediastinal mass. A CT-guided biopsy at [facility] was read   as showing a thymoma.   He underwent surgery with Dr. Levi Butler at [facility]. The thymoma was involving the pericardium so he received   postoperative radiation therapy.   He finished the radiation therapy and has   remained with without evaluable disease in regards to his thymoma.      A routine lab tests done a few months later showed that there has been a dramatic   drop in his  hemoglobin, which was now 7.8 with a low serum ferritin of 8   suggesting iron deficiency anemia.   Stools were frankly Hemoccult positive.   In regards to his past history, the patient underwent a colonoscopy within our   system on 02/28/2012. The patient was found to have a sessile polyp in the   rectum. An endoscopic removal could not be done.   The patient was referred to another specialist in [facility] [address] for resection since it seemed to   be arising in the setting of the radiation therapy field for his prostate   cancer.   The pathology report from that 2/2012 endoscopy was that of a tubular adenoma.   The patient was then seen in consultation by GI doctor outside the clinic. ,giana Arellano. The patient then underwent resection of the   lesion. The pathology report was the same.   The patient underwent a colonoscopy at [facility] on   05/07/2014, as part of the work up for his recently diagnosed iron deficiency.. It was done by Dr. Jaime Ash. Dr. Ash found a mass in the   rectal area. It was partially excised. Pathology was once again that of   tubulovillous adenoma with dysplasia.   The area of involvement coincided with the radiation therapy field for the prostate   cancer.   The patient had removal of the villous adenoma in [place] by Dr Ansari.   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   Following the excision he had 2 episodes of rectal bleeding. He ended up having another surgery to control the bleeing and to do a re-excision of the area where the minimally invasive cancer was found.  Pathology revealed no residual cancer.  There has been no more bleeding. He feels fine  He also injects himself with b12 due low b12 levels.  He comes for follow up. He is receiving  Lupron at his urologist office .He has had a colonosocopy feb 2017  that showed intestinal polyps and was asked to return in a year  ALLERGIES: see Med card  MEDICATIONS: See  uSampandrew.   PREVIOUS SURGERIES: Prostatectomy around the year 2000 or so, rectal surgery   for removal of tubular adenoma.x2, and another surgery for a minimally invasive rectal cancer 10/2014 Tonsillectomy, removal of thymoma.   SOCIAL HISTORY: Single with two children. Lives in [place]. He smoked   until age 31, approximately a pack a day for 15 years. He is a retired   .   FAMILY HISTORY: No cancer. Father had diabetes. Father had heart disease.   PAST MEDICAL HISTORY:   1. Anemia.   2. 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   3. History of treated prostate cancer.   4. Mild anemia.   5. Iron deficiency anemia.   6. High blood pressure.   7. Elevated cholesterol.   8. Recurrent villous adenoma of the rectum.   9. Reflux.   10. Vitamin B12 deficiency.   11. Status post resection of thymoma of the anterior mediastinum.   12. Skin cancer scalp  Review of Systems   Constitutional: Negative.  Negative for fatigue.   Eyes: Negative.    Cardiovascular: Negative.  Negative for chest pain.   Gastrointestinal: Negative for abdominal pain and nausea.   Genitourinary: Negative.  Negative for hematuria.   Musculoskeletal: Negative.    Skin: Negative.    Neurological: Negative.    Psychiatric/Behavioral: Negative.        Objective:      Physical Exam   Constitutional: He is oriented to person, place, and time. He appears well-developed and well-nourished.   HENT:   Head: Normocephalic.   Mouth/Throat: No oropharyngeal exudate.   Eyes: Pupils are equal, round, and reactive to light.   Neck: No thyromegaly present.   Cardiovascular: Normal rate, regular rhythm and normal heart sounds.  Exam reveals no gallop.    No murmur heard.  Pulmonary/Chest: No respiratory distress. He has no wheezes. He has no rales.   Abdominal: Soft. Bowel sounds are normal. He exhibits no distension and no mass. There is no rebound and no guarding.   Musculoskeletal: Normal range of motion. He exhibits no  edema.   Lymphadenopathy:     He has no cervical adenopathy.   Neurological: He is alert and oriented to person, place, and time.   Skin: Skin is warm and dry.   Psychiatric: He has a normal mood and affect. His behavior is normal.       Wt Readings from Last 3 Encounters:   04/18/17 88.1 kg (194 lb 3.6 oz)   02/27/17 86.2 kg (190 lb)   01/26/17 88.1 kg (194 lb 3.6 oz)     Temp Readings from Last 3 Encounters:   04/18/17 98.1 °F (36.7 °C) (Oral)   02/27/17 98.2 °F (36.8 °C) (Oral)   01/26/17 97.9 °F (36.6 °C) (Oral)     BP Readings from Last 3 Encounters:   04/18/17 120/66   02/27/17 121/80   01/26/17 118/62     Pulse Readings from Last 3 Encounters:   04/18/17 68   02/27/17 80   01/26/17 60       Assessment:       1. Paraproteinemia    2. History of rectal cancer    3. Prostate cancer    4. History of thymoma    5. Other vitamin B12 deficiency anemia        Plan:       Lab Results   Component Value Date    CEA 2.1 04/13/2017        Lab Results   Component Value Date    WBC 5.21 04/13/2017    HGB 12.2 (L) 04/13/2017    HCT 36.0 (L) 04/13/2017    MCV 98 04/13/2017     04/13/2017     Lab Results   Component Value Date    CREATININE 1.7 (H) 04/13/2017     PSA 1.0 gr    Lab Results   Component Value Date    [id number] 682 04/13/2017       continue b12 injections at home  Continue Lupron at the office of his urologist  SPEp stable. Will recheck in 3 months  Will order a CXR for his previous diagnosis of thymoma, next time he comes  Last colonoscopy 2.2017 told to return in a year  Complex visit requiring addressing multiple important medical conditions      Lab Results   Component Value Date    [id number] 682 04/13/2017     SPEp shows a decrease in the monoclonal spike which now is reported at    1.68 gr down from 1.82 dillan  Continue b12 at home

## 2017-04-25 DIAGNOSIS — R60.0 EDEMA OF BOTH LEGS: ICD-10-CM

## 2017-04-25 DIAGNOSIS — I10 ESSENTIAL HYPERTENSION: Chronic | ICD-10-CM

## 2017-04-25 RX ORDER — FUROSEMIDE 20 MG/1
TABLET ORAL
Qty: 30 TABLET | Refills: 12 | Status: SHIPPED | OUTPATIENT
Start: 2017-04-25 | End: 2018-04-24 | Stop reason: SDUPTHER

## 2017-05-30 DIAGNOSIS — L71.9 ROSACEA: ICD-10-CM

## 2017-05-30 RX ORDER — METRONIDAZOLE 7.5 MG/G
CREAM TOPICAL
Qty: 45 G | Refills: 0 | Status: SHIPPED | OUTPATIENT
Start: 2017-05-30 | End: 2017-10-06 | Stop reason: SDUPTHER

## 2017-05-30 RX ORDER — PRAVASTATIN SODIUM 40 MG/1
40 TABLET ORAL DAILY
Qty: 90 TABLET | Refills: 4 | Status: SHIPPED | OUTPATIENT
Start: 2017-05-30 | End: 2018-06-14 | Stop reason: SDUPTHER

## 2017-07-10 ENCOUNTER — HOSPITAL ENCOUNTER (OUTPATIENT)
Dept: RADIOLOGY | Facility: HOSPITAL | Age: 77
Discharge: HOME | End: 2017-07-10
Attending: INTERNAL MEDICINE
Payer: MEDICARE

## 2017-07-10 DIAGNOSIS — Z85.048 HISTORY OF RECTAL CANCER: ICD-10-CM

## 2017-07-10 PROCEDURE — 71020 XR CHEST PA AND LATERAL: CPT | Mod: TC,PO

## 2017-07-10 PROCEDURE — 71020 XR CHEST PA AND LATERAL: CPT | Mod: 26,,, | Performed by: RADIOLOGY

## 2017-07-17 ENCOUNTER — OFFICE VISIT (OUTPATIENT)
Dept: HEMATOLOGY/ONCOLOGY | Facility: CLINIC | Age: 77
End: 2017-07-17
Payer: MEDICARE

## 2017-07-17 VITALS
WEIGHT: 195.13 LBS | TEMPERATURE: 98 F | SYSTOLIC BLOOD PRESSURE: 118 MMHG | DIASTOLIC BLOOD PRESSURE: 70 MMHG | HEART RATE: 70 BPM | BODY MASS INDEX: 27.94 KG/M2 | RESPIRATION RATE: 18 BRPM | OXYGEN SATURATION: 93 % | HEIGHT: 70 IN

## 2017-07-17 DIAGNOSIS — D51.8 OTHER VITAMIN B12 DEFICIENCY ANEMIA: ICD-10-CM

## 2017-07-17 DIAGNOSIS — D49.89 THYMOMA: ICD-10-CM

## 2017-07-17 PROCEDURE — 99215 OFFICE O/P EST HI 40 MIN: CPT | Mod: S$GLB,,, | Performed by: INTERNAL MEDICINE

## 2017-07-17 PROCEDURE — 1126F AMNT PAIN NOTED NONE PRSNT: CPT | Mod: S$GLB,,, | Performed by: INTERNAL MEDICINE

## 2017-07-17 PROCEDURE — 99499 UNLISTED E&M SERVICE: CPT | Mod: S$GLB,,, | Performed by: INTERNAL MEDICINE

## 2017-07-17 PROCEDURE — 1159F MED LIST DOCD IN RCRD: CPT | Mod: S$GLB,,, | Performed by: INTERNAL MEDICINE

## 2017-07-17 PROCEDURE — 99999 PR PBB SHADOW E&M-EST. PATIENT-LVL III: CPT | Mod: PBBFAC,,, | Performed by: INTERNAL MEDICINE

## 2017-07-17 RX ORDER — CYANOCOBALAMIN 1000 UG/ML
INJECTION, SOLUTION INTRAMUSCULAR; SUBCUTANEOUS
Qty: 10 ML | Refills: 4 | Status: SHIPPED | OUTPATIENT
Start: 2017-07-17 | End: 2018-08-14 | Stop reason: SDUPTHER

## 2017-07-17 NOTE — PROGRESS NOTES
Subjective:       Patient ID: Homero Tanner is a 77 y.o. male.    Chief Complaint: Other (paraproteinemia)    HPI  This is a 76  year-old gentleman who comes for follow up of her previously diagnosed thymoma, rectal cancer, prostate cancer, pararpteinemia and b12 deficiency.  He is known to us because of ahistory of   previously treated prostate cancer. He was diagnosed around 2004 and treated   with surgery. He relapsed by PSA and was treated with local radiation therapy.   He had further elevation of the PSA and started intermittent LHRH   analogues through the office of his urologist outside our clinic  he receives the injections at his urologist office.     A CBC done on   November 2012 was reported showing a hemoglobin of 12.7. As part of workup his   primary care physician ordered a serum ferritin and total iron binding   capacity, which were unremarkable. Serum folate was normal at 5.5. Vitamin B12   was low at 151. He then started supplemental B12 injection, which  he is still   getting.   Also, as part of the workup for the anemia, serum protein electrophoresis   showed a paraprotein band that was quantified at 1.03 g and identified as IgM.   The patient was then referred to me. I ordered lab tests, which included a   thoracic spine view looking for lytic lesions. The radiologist reported that there   were no lytic lesions but there was a mass in the center of the chest  . This was followed by chest x-rays and CT scans. The   CT scan showed a large mediastinal mass. A CT-guided biopsy at [facility] was read   as showing a thymoma.   He underwent surgery with Dr. Levi Butler at [facility]. The thymoma was involving the pericardium so he received   postoperative radiation therapy.   He finished the radiation therapy and has   remained with without evaluable disease in regards to his thymoma.      A routine lab tests done a few months later showed that there has been a dramatic   drop  in his hemoglobin, which was now 7.8 with a low serum ferritin of 8   suggesting iron deficiency anemia.   Stools were frankly Hemoccult positive.   In regards to his past history, the patient underwent a colonoscopy within our   system on 02/28/2012. The patient was found to have a sessile polyp in the   rectum. An endoscopic removal could not be done.   The patient was referred to another specialist in [facility] for resection since it seemed to   be arising in the setting of the radiation therapy field for his prostate   cancer.   The pathology report from that 2/2012 endoscopy was that of a tubular adenoma.   The patient was then seen in consultation by GI doctor outside the clinic. ,giana Arellano. The patient then underwent resection of the   lesion. The pathology report was the same.   The patient underwent a colonoscopy at [facility] on   05/07/2014, as part of the work up for his recently diagnosed iron deficiency.. It was done by Dr. Jaime Ash. Dr. Ash found a mass in the   rectal area. It was partially excised. Pathology was once again that of   tubulovillous adenoma with dysplasia.   The area of involvement coincided with the radiation therapy field for the prostate   cancer.   The patient had removal of the villous adenoma in [place] by Dr Ansari.   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   Following the excision he had 2 episodes of rectal bleeding. He ended up having another surgery to control the bleeing and to do a re-excision of the area where the minimally invasive cancer was found.  Pathology revealed no residual cancer.  There has been no more bleeding. He feels fine  He also injects himself with b12 due low b12 levels.  He comes for follow up. He is receiving  Lupron at his urologist office .He has had a colonosocopy feb 2017  that showed intestinal polyps and was asked to return in a year    ALLERGIES: see Med  card  MEDICATIONS: See MedCard.   PREVIOUS SURGERIES: Prostatectomy around the year 2000 or so, rectal surgery   for removal of tubular adenoma.x2, and another surgery for a minimally invasive rectal cancer 10/2014 Tonsillectomy, removal of thymoma.   SOCIAL HISTORY: Single with two children. Lives in [place]. He smoked   until age 31, approximately a pack a day for 15 years. He is a retired   .   FAMILY HISTORY: No cancer. Father had diabetes. Father had heart disease.   PAST MEDICAL HISTORY:   1. Anemia.   2. 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   3. History of treated prostate cancer.   4. Mild anemia.   5. Iron deficiency anemia.   6. High blood pressure.   7. Elevated cholesterol.   8. Recurrent villous adenoma of the rectum.   9. Reflux.   10. Vitamin B12 deficiency.   11. Status post resection of thymoma of the anterior mediastinum.   12. Skin cancer scalp  Review of Systems   Constitutional: Negative.  Negative for appetite change, fatigue and unexpected weight change.   Eyes: Negative.  Negative for visual disturbance.   Respiratory: Negative for cough and shortness of breath.    Cardiovascular: Negative.  Negative for chest pain.   Gastrointestinal: Negative for abdominal pain, diarrhea and nausea.   Genitourinary: Negative.  Negative for frequency and hematuria.   Musculoskeletal: Negative.  Negative for back pain.   Skin: Negative.  Negative for rash.   Neurological: Negative.  Negative for headaches.   Hematological: Negative for adenopathy.   Psychiatric/Behavioral: Negative.  The patient is not nervous/anxious.        Objective:      Physical Exam   Constitutional: He is oriented to person, place, and time. He appears well-developed and well-nourished.   HENT:   Head: Normocephalic.   Mouth/Throat: No oropharyngeal exudate.   Eyes: Pupils are equal, round, and reactive to light.   Neck: No thyromegaly present.   Cardiovascular: Normal rate, regular rhythm  and normal heart sounds.  Exam reveals no gallop.    No murmur heard.  Pulmonary/Chest: No respiratory distress. He has no wheezes. He has no rales.   Abdominal: Soft. Bowel sounds are normal. He exhibits no distension and no mass. There is no rebound and no guarding.   Musculoskeletal: Normal range of motion. He exhibits no edema.   Lymphadenopathy:     He has no cervical adenopathy.   Neurological: He is alert and oriented to person, place, and time.   Skin: Skin is warm and dry.   Psychiatric: He has a normal mood and affect. His behavior is normal.       Wt Readings from Last 3 Encounters:   07/17/17 88.5 kg (195 lb 1.7 oz)   04/18/17 88.1 kg (194 lb 3.6 oz)   02/27/17 86.2 kg (190 lb)     Temp Readings from Last 3 Encounters:   07/17/17 98 °F (36.7 °C)   04/18/17 98.1 °F (36.7 °C) (Oral)   02/27/17 98.2 °F (36.8 °C) (Oral)     BP Readings from Last 3 Encounters:   07/17/17 118/70   04/18/17 120/66   02/27/17 121/80     Pulse Readings from Last 3 Encounters:   07/17/17 70   04/18/17 68   02/27/17 80       Assessment:       1. Thymoma    2. Other vitamin B12 deficiency anemia        Plan:       Lab Results   Component Value Date    WBC 5.59 07/10/2017    HGB 11.7 (L) 07/10/2017    HCT 35.0 (L) 07/10/2017    MCV 98 07/10/2017     07/10/2017     .   Lab Results   Component Value Date    ALT 17 04/13/2017    AST 35 04/13/2017    ALKPHOS 99 04/13/2017    BILITOT 0.8 04/13/2017       Lab Results   Component Value Date    CEA 2.2 07/10/2017       PSA is 0.58  Lab Results   Component Value Date    [id number] 682 04/13/2017         SPEp shows a spike of 1.72. This is up from the 1.03 which he had in Novemeber 2012.  He was told we will be monitoring his SPEP.  CXR done 7/2017 showed no evidence of recurrence of the thymoma    See me in3 mnths with a cbc, cmp, PSA, SPEp and free light chains  Continue b12 injections at home    No evidence of recurrence of colon cancer of thymoma. PSA adequately suppressed on  Lupron  Complex visit having to address multiple medical issues.  40 minutes spent, 90% coordinating care

## 2017-07-20 ENCOUNTER — OFFICE VISIT (OUTPATIENT)
Dept: DERMATOLOGY | Facility: CLINIC | Age: 77
End: 2017-07-20
Payer: MEDICARE

## 2017-07-20 DIAGNOSIS — Z85.828 HISTORY OF SKIN CANCER: ICD-10-CM

## 2017-07-20 DIAGNOSIS — L21.9 SEBORRHEIC DERMATITIS: ICD-10-CM

## 2017-07-20 DIAGNOSIS — Z12.83 SCREENING, MALIGNANT NEOPLASM, SKIN: ICD-10-CM

## 2017-07-20 DIAGNOSIS — L90.5 SCAR CONDITIONS/SKIN FIBROSIS: Primary | ICD-10-CM

## 2017-07-20 PROCEDURE — 99999 PR PBB SHADOW E&M-EST. PATIENT-LVL II: CPT | Mod: PBBFAC,,, | Performed by: DERMATOLOGY

## 2017-07-20 PROCEDURE — 1159F MED LIST DOCD IN RCRD: CPT | Mod: S$GLB,,, | Performed by: DERMATOLOGY

## 2017-07-20 PROCEDURE — 99213 OFFICE O/P EST LOW 20 MIN: CPT | Mod: S$GLB,,, | Performed by: DERMATOLOGY

## 2017-07-20 RX ORDER — FLUOCINONIDE TOPICAL SOLUTION USP, 0.05% 0.5 MG/ML
SOLUTION TOPICAL
Qty: 60 ML | Refills: 2 | Status: SHIPPED | OUTPATIENT
Start: 2017-07-20 | End: 2018-09-27

## 2017-07-20 RX ORDER — KETOCONAZOLE 20 MG/ML
SHAMPOO, SUSPENSION TOPICAL
Qty: 120 ML | Refills: 5 | Status: SHIPPED | OUTPATIENT
Start: 2017-07-20 | End: 2018-07-23 | Stop reason: SDUPTHER

## 2017-07-20 NOTE — PROGRESS NOTES
Subjective:       Patient ID:  Homero Tanner is a 77 y.o. 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  He c/o scaly and itchy areas of the scalp.  He uses ketoconazole shampoo 2 times/week and clobetasol solution several times/week.  He uses metrocream bid.         Review of Systems   Constitutional: Negative for fever and chills.   Gastrointestinal: Negative for nausea and vomiting.   Skin: Positive for itching. Negative for daily sunscreen use, activity-related sunscreen use and recent sunburn.   Hematologic/Lymphatic: Does not bruise/bleed easily.        Objective:    Physical Exam   Constitutional: He appears well-developed and well-nourished. No distress.   Neurological: He is alert and oriented to person, place, and time. He is not disoriented.   Psychiatric: He has a normal mood and affect.   Skin:   Areas Examined (abnormalities noted in diagram):   Scalp / Hair Palpated and Inspected  Head / Face Inspection Performed  Neck Inspection Performed  Chest / Axilla Inspection Performed  Abdomen Inspection Performed  Back Inspection Performed  RUE Inspected  LUE Inspection Performed  Nails and Digits Inspection Performed                   Diagram Legend     Erythematous scaling macule/papule c/w actinic keratosis       Vascular papule c/w angioma      Pigmented verrucoid papule/plaque c/w seborrheic keratosis      Yellow umbilicated papule c/w sebaceous hyperplasia      Irregularly shaped tan macule c/w lentigo     1-2 mm smooth white papules consistent with Milia      Movable subcutaneous cyst with punctum c/w epidermal inclusion cyst      Subcutaneous movable cyst c/w pilar cyst      Firm  pink to brown papule c/w dermatofibroma      Pedunculated fleshy papule(s) c/w skin tag(s)      Evenly pigmented macule c/w junctional nevus     Mildly variegated pigmented, slightly irregular-bordered macule c/w mildly atypical nevus      Flesh colored to evenly pigmented papule c/w intradermal nevus       Pink pearly papule/plaque c/w basal cell carcinoma      Erythematous hyperkeratotic cursted plaque c/w SCC      Surgical scar with no sign of skin cancer recurrence      Open and closed comedones      Inflammatory papules and pustules      Verrucoid papule consistent consistent with wart     Erythematous eczematous patches and plaques     Dystrophic onycholytic nail with subungual debris c/w onychomycosis     Umbilicated papule    Erythematous-base heme-crusted tan verrucoid plaque consistent with inflamed seborrheic keratosis     Erythematous Silvery Scaling Plaque c/w Psoriasis     See annotation      Assessment / Plan:        Scar conditions/skin fibrosis  Screening, malignant neoplasm, skin  History of skin cancer  Scar of the left parietal scalp, hx of NMSC.  No evidence of recurrence on physical exam today.  Continue routine skin surveillance. Daily sunscreen advised.    Seborrheic dermatitis  -     fluocinonide (LIDEX) 0.05 % external solution; AAA of scalp 1-2 times daily as needed for itch  Dispense: 60 mL; Refill: 2  -     ketoconazole (NIZORAL) 2 % shampoo; Wash hair with medicated shampoo at least 3x/week - let sit on scalp at least 5 minutes prior to rinsing  Dispense: 120 mL; Refill: 5  -     Improved scaling, predominant pruritus.  Will d/c clobetasol solution.  Will start lidex, continue ketoconazole shampoo             Return in about 6 months (around 1/20/2018).

## 2017-08-01 ENCOUNTER — HOSPITAL ENCOUNTER (OUTPATIENT)
Dept: RADIOLOGY | Facility: HOSPITAL | Age: 77
Discharge: HOME | End: 2017-08-01
Attending: INTERNAL MEDICINE
Payer: MEDICARE

## 2017-08-01 ENCOUNTER — CLINICAL SUPPORT (OUTPATIENT)
Dept: CARDIOLOGY | Facility: CLINIC | Age: 77
End: 2017-08-01
Payer: MEDICARE

## 2017-08-01 DIAGNOSIS — I49.3 PVC'S (PREMATURE VENTRICULAR CONTRACTIONS): ICD-10-CM

## 2017-08-01 DIAGNOSIS — R94.31 ABNORMAL ECG: Chronic | ICD-10-CM

## 2017-08-01 DIAGNOSIS — I50.32 CHRONIC DIASTOLIC HEART FAILURE: ICD-10-CM

## 2017-08-01 DIAGNOSIS — I27.20 PULMONARY HYPERTENSION: ICD-10-CM

## 2017-08-01 DIAGNOSIS — I35.1 NONRHEUMATIC AORTIC VALVE INSUFFICIENCY: Chronic | ICD-10-CM

## 2017-08-01 DIAGNOSIS — I36.1 NON-RHEUMATIC TRICUSPID VALVE INSUFFICIENCY: ICD-10-CM

## 2017-08-01 DIAGNOSIS — I10 ESSENTIAL HYPERTENSION, MALIGNANT: ICD-10-CM

## 2017-08-01 DIAGNOSIS — I10 ESSENTIAL HYPERTENSION: Chronic | ICD-10-CM

## 2017-08-01 LAB
AORTIC VALVE REGURGITATION: ABNORMAL
DIASTOLIC DYSFUNCTION: NO
DIASTOLIC DYSFUNCTION: YES
ESTIMATED PA SYSTOLIC PRESSURE: 47
RETIRED EF AND QEF - SEE NOTES: 55 (ref 55–65)
TRICUSPID VALVE REGURGITATION: ABNORMAL

## 2017-08-01 PROCEDURE — 93306 TTE W/DOPPLER COMPLETE: CPT | Mod: S$GLB,,, | Performed by: INTERNAL MEDICINE

## 2017-08-01 PROCEDURE — 78452 HT MUSCLE IMAGE SPECT MULT: CPT | Mod: 26,,, | Performed by: INTERNAL MEDICINE

## 2017-08-01 PROCEDURE — 93015 CV STRESS TEST SUPVJ I&R: CPT | Mod: S$GLB,,, | Performed by: INTERNAL MEDICINE

## 2017-08-14 ENCOUNTER — OFFICE VISIT (OUTPATIENT)
Dept: CARDIOLOGY | Facility: CLINIC | Age: 77
End: 2017-08-14
Payer: MEDICARE

## 2017-08-14 VITALS
SYSTOLIC BLOOD PRESSURE: 96 MMHG | WEIGHT: 192.69 LBS | HEART RATE: 64 BPM | HEIGHT: 70 IN | BODY MASS INDEX: 27.58 KG/M2 | DIASTOLIC BLOOD PRESSURE: 56 MMHG

## 2017-08-14 DIAGNOSIS — I10 ESSENTIAL HYPERTENSION: Chronic | ICD-10-CM

## 2017-08-14 DIAGNOSIS — E78.2 MIXED HYPERLIPIDEMIA: ICD-10-CM

## 2017-08-14 DIAGNOSIS — I49.3 PVC'S (PREMATURE VENTRICULAR CONTRACTIONS): ICD-10-CM

## 2017-08-14 DIAGNOSIS — I27.20 PULMONARY HYPERTENSION: ICD-10-CM

## 2017-08-14 DIAGNOSIS — I35.1 NONRHEUMATIC AORTIC VALVE INSUFFICIENCY: Chronic | ICD-10-CM

## 2017-08-14 DIAGNOSIS — I36.1 NON-RHEUMATIC TRICUSPID VALVE INSUFFICIENCY: Primary | ICD-10-CM

## 2017-08-14 DIAGNOSIS — I50.32 CHRONIC DIASTOLIC HEART FAILURE: ICD-10-CM

## 2017-08-14 DIAGNOSIS — R60.0 EDEMA OF BOTH LEGS: ICD-10-CM

## 2017-08-14 PROCEDURE — 1159F MED LIST DOCD IN RCRD: CPT | Mod: S$GLB,,, | Performed by: INTERNAL MEDICINE

## 2017-08-14 PROCEDURE — 99499 UNLISTED E&M SERVICE: CPT | Mod: S$GLB,,, | Performed by: INTERNAL MEDICINE

## 2017-08-14 PROCEDURE — 99214 OFFICE O/P EST MOD 30 MIN: CPT | Mod: S$GLB,,, | Performed by: INTERNAL MEDICINE

## 2017-08-14 PROCEDURE — 99999 PR PBB SHADOW E&M-EST. PATIENT-LVL III: CPT | Mod: PBBFAC,,, | Performed by: INTERNAL MEDICINE

## 2017-08-14 PROCEDURE — 3074F SYST BP LT 130 MM HG: CPT | Mod: S$GLB,,, | Performed by: INTERNAL MEDICINE

## 2017-08-14 PROCEDURE — 3008F BODY MASS INDEX DOCD: CPT | Mod: S$GLB,,, | Performed by: INTERNAL MEDICINE

## 2017-08-14 PROCEDURE — 3078F DIAST BP <80 MM HG: CPT | Mod: S$GLB,,, | Performed by: INTERNAL MEDICINE

## 2017-08-14 NOTE — PROGRESS NOTES
Subjective:    Patient ID:  Homero Tanner is a 77 y.o. male who presents for evaluation of Hyperlipidemia; Hypertension; Results; and Congestive Heart Failure      HPI Mr. Tanner returns for f/u.   His current medical conditions include chronic HTN, DD, PHTN, AI, PVCs, TR, dyslipidemia, prostate & rectal cancer. Nonsmoker.    Had LHC 20+ years ago, no significant blockages noted.  He has chronic abnl ecgs.   Also has had PSVT with stress testing in past.    He has h/o thymoma surgery with xrt and also had rectal polyp surgery.  Pt here for f/u.  He feels ok.  Denies cp sxs.  No dyspnea. No pnd/orthopnea.  Stable B LE edema, controlled on Lasix, takes extra lasix on prn basis.  BNP elevated but better than a year ago.   Creatinine elevated 1.7 4/17 labs drawn by other provider.  Stress test shows no ischemia.  Echo is stable, normal EF, DD, mild-mod PHTN, mild AI, LVH, mild RVE with nl EF.      Patient Active Problem List   Diagnosis    Aortic insufficiency    Abnormal ECG    Essential hypertension    Iron deficiency    Iron deficiency anemia    Colon polyps    Paraproteinemia    History of rectal cancer    Adenomatous rectal polyp    Prostate cancer    History of thymoma    Hyperlipidemia    Pulmonary hypertension    TR (tricuspid regurgitation)    PVC's (premature ventricular contractions)    Edema of both legs    Chronic diastolic heart failure    Abnormal liver enzymes    B12 deficiency anemia    History of colon polyps    Special screening for malignant neoplasms, colon    Benign neoplasm of cecum    Benign neoplasm of transverse colon    Diverticulosis of large intestine without hemorrhage     Past Medical History:   Diagnosis Date    Abnormal ECG 6/24/2013    Aortic insufficiency 6/24/2013    Aortic valve disorder 6/25/2013    Asthma     as a child    CHF (congestive heart failure)     Encounter for blood transfusion     GERD (gastroesophageal reflux disease)      Hypertension     Mixed hyperlipidemia 6/24/2013    Prostate cancer     PSVT (paroxysmal supraventricular tachycardia) 6/24/2013    Pulmonary hypertension 6/13/2016    Rectal adenocarcinoma     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    left parietal scalp    Thymoma     TR (tricuspid regurgitation) 6/13/2016       Current Outpatient Prescriptions:     aspirin (ECOTRIN) 81 MG EC tablet, Take 81 mg by mouth once daily., Disp: , Rfl:     b complex vitamins tablet, Take by mouth. 1 tablet Oral Every day, Disp: , Rfl:     cyanocobalamin 1,000 mcg/mL injection, Give 1cc under the skin once a month, Disp: 10 mL, Rfl: 4    fluocinonide (LIDEX) 0.05 % external solution, AAA of scalp 1-2 times daily as needed for itch, Disp: 60 mL, Rfl: 2    fluticasone (FLONASE) 50 mcg/actuation nasal spray, 1 spray by Nasal route 2 (two) times daily as needed for Rhinitis or Allergies. 1 Aerosol, Spray Each nostril Twice a day , Disp: , Rfl:     furosemide (LASIX) 20 MG tablet, TAKE 1 TABLET(20 MG) BY MOUTH EVERY DAY, Disp: 90 tablet, Rfl: 11    furosemide (LASIX) 20 MG tablet, TAKE 1 TABLET(20 MG) BY MOUTH EVERY DAY, Disp: 30 tablet, Rfl: 12    hydrALAZINE (APRESOLINE) 25 MG tablet, Take 1 tablet by mouth mid-day and in the evening, Disp: 180 tablet, Rfl: 3    hydrALAZINE (APRESOLINE) 50 MG tablet, Take 1 tablet (50 mg total) by mouth every morning., Disp: 90 tablet, Rfl: 3    ketoconazole (NIZORAL) 2 % shampoo, Wash hair with medicated shampoo at least 3x/week - let sit on scalp at least 5 minutes prior to rinsing, Disp: 120 mL, Rfl: 5    leuprolide (LUPRON) 3.75 mg injection, Inject 3.75 mg into the muscle every 3 (three) months. , Disp: , Rfl:     metoprolol tartrate (LOPRESSOR) 100 MG tablet, Take 1 tablet (100 mg total) by mouth 2 (two) times daily., Disp: 60 tablet, Rfl: 11    metronidazole 0.75% (METROCREAM) 0.75 % Crea, APPLY TO THE AFFECTED AREA ON FACE TWICE DAILY, Disp: 45 g, Rfl: 0    omeprazole (PRILOSEC)  "20 MG capsule, TAKE ONE CAPSULE BY MOUTH DAILY, Disp: 90 capsule, Rfl: 4    pravastatin (PRAVACHOL) 40 MG tablet, Take 1 tablet (40 mg total) by mouth once daily., Disp: 90 tablet, Rfl: 4    sulfacetamide sodium 10 % Sham, Use twice daily as face wash for rosacea, Disp: 237 mL, Rfl: 6    triamcinolone acetonide 0.025% (KENALOG) 0.025 % cream, AAA bid as needed for flares on face.  Mild steroid., Disp: 30 g, Rfl: 1      Review of Systems   Constitution: Negative.   HENT: Negative.    Eyes: Negative.    Cardiovascular: Positive for leg swelling.   Respiratory: Negative.    Endocrine: Negative.    Hematologic/Lymphatic: Negative.    Skin: Negative.    Musculoskeletal: Negative.    Gastrointestinal: Negative.    Genitourinary: Negative.    Neurological: Negative.    Psychiatric/Behavioral: Negative.    Allergic/Immunologic: Negative.        BP (!) 96/56   Pulse 64   Ht 5' 10" (1.778 m)   Wt 87.4 kg (192 lb 10.9 oz)   BMI 27.65 kg/m²     Wt Readings from Last 3 Encounters:   08/14/17 87.4 kg (192 lb 10.9 oz)   07/17/17 88.5 kg (195 lb 1.7 oz)   04/18/17 88.1 kg (194 lb 3.6 oz)     Temp Readings from Last 3 Encounters:   07/17/17 98 °F (36.7 °C)   04/18/17 98.1 °F (36.7 °C) (Oral)   02/27/17 98.2 °F (36.8 °C) (Oral)     BP Readings from Last 3 Encounters:   08/14/17 (!) 96/56   07/17/17 118/70   04/18/17 120/66     Pulse Readings from Last 3 Encounters:   08/14/17 64   07/17/17 70   04/18/17 68          Objective:    Physical Exam   Constitutional: He is oriented to person, place, and time. He appears well-developed and well-nourished.   HENT:   Head: Normocephalic.   Neck: Normal range of motion. Neck supple. Normal carotid pulses, no hepatojugular reflux and no JVD present. Carotid bruit is not present. No thyromegaly present.   Cardiovascular: Normal rate, regular rhythm, S1 normal and S2 normal.  PMI is not displaced.  Exam reveals no S3, no S4, no distant heart sounds, no friction rub, no midsystolic click and " no opening snap.    No murmur heard.  Pulses:       Radial pulses are 2+ on the right side, and 2+ on the left side.   Pulmonary/Chest: Effort normal and breath sounds normal. He has no wheezes. He has no rales.   Abdominal: Soft. Bowel sounds are normal. He exhibits no distension, no abdominal bruit, no ascites and no mass. There is no tenderness.   Musculoskeletal: He exhibits edema.   Neurological: He is alert and oriented to person, place, and time.   Skin: Skin is warm.   Psychiatric: He has a normal mood and affect. His behavior is normal.   Nursing note and vitals reviewed.      I have reviewed all pertinent labs and cardiac studies.      Component      Latest Ref Rng & Units 8/1/2017   BNP      0 - 99 pg/mL 238 (H)         Chemistry        Component Value Date/Time     04/13/2017 0956    K 4.4 04/13/2017 0956    CL 91 (L) 04/13/2017 0956    CO2 31 (H) 04/13/2017 0956    BUN 19 04/13/2017 0956    CREATININE 1.7 (H) 04/13/2017 0956     04/13/2017 0956        Component Value Date/Time    CALCIUM 10.0 04/13/2017 0956    ALKPHOS 99 04/13/2017 0956    AST 35 04/13/2017 0956    ALT 17 04/13/2017 0956    BILITOT 0.8 04/13/2017 0956    ESTGFRAFRICA 45 (A) 04/13/2017 0956    EGFRNONAA 39 (A) 04/13/2017 0956          Lab Results   Component Value Date    WBC 5.59 07/10/2017    HGB 11.7 (L) 07/10/2017    HCT 35.0 (L) 07/10/2017    MCV 98 07/10/2017     07/10/2017     Lab Results   Component Value Date    HGBA1C 5.2 06/06/2016     Lab Results   Component Value Date    CHOL 164 06/06/2016    CHOL 123 05/08/2013    CHOL 190 10/09/2012     Lab Results   Component Value Date    HDL 79 (H) 06/06/2016    HDL 60 05/08/2013    HDL 87 (H) 10/09/2012     Lab Results   Component Value Date    LDLCALC 69.4 06/06/2016    LDLCALC 50.0 (L) 05/08/2013    LDLCALC 91.0 10/09/2012     Lab Results   Component Value Date    TRIG 78 06/06/2016    TRIG 64 05/08/2013    TRIG 60 10/09/2012     Lab Results   Component Value  Date    CHOLL 48.2 06/06/2016    CHOLHDL 48.8 05/08/2013    CHOLHDL 45.8 10/09/2012     PRE-TEST DATA   EKG: EKG demonstrates adequate. Resting electrocardiogram reveals normal sinus rhythm with PACs at a rate of 68 bpm. There was ST or T wave suggestive of ischemia.     TEST DESCRIPTION   The patient received 0.4 mg of Regadenoson as an IV bolus. Peak heart rate was 90 bpm, which is 63% of the age predicted maximum heart rate. .     EKG Conclusions:    1. The EKG portion of this study is negative for ischemia at a peak heart rate of 90 bpm (63% of predicted).   2. Blood pressure remained stable throughout the protocol  (Presenting BP: 138/69 Peak BP: 122/71).   3. The following arrhythmias were present: frequent PACs & PVCs.   4. There were no symptoms of chest discomfort or significant dyspnea throughout the protocol.     Nuclear Procedure:  Following a single isotope protocol, 9.1 mCi of Tc99 labeled Tetrofosmin was given at rest and tomographic imaging was performed. Regadenoson pharmacologic stress testing was performed as described above. Immediately following the IV bolus of   regadenoson, 28 mCi of Tc99 labeled Tetrofosmin was given and tomographic imaging was performed. The site of the IV injection was the right hand. Images were obtained on a Settle camera.     Comments:  This is a technically adequate study. Inspection of the transaxial images demonstrated no significant cranial, caudal, or lateral patient motion in the camera between rest and stress acquisitions. There is mild decrease activity of radiotracer in the   inferolateral wall of the left ventricle associated with radial thickening attributed to diaphragm attenuation. The remainder of the myocardium demonstrates normal radiotracer uptake. The extracardiac distribution of radioactivity is normal. The left   ventricular cavity is normal in size and does not increase with stress. On gated SPECT, left ventricular motion is normal at rest.      Nuclear Quantitative Functional Analysis:   LVEF: >= 70 %    Impression: NORMAL MYOCARDIAL PERFUSION  1. The perfusion scan is free of evidence for myocardial ischemia or injury.   2. There is a mild intensity fixed defect in the inferolateral wall of the left ventricle, secondary to diaphragm attenuation.   3. Resting wall motion is physiologic.   4. Resting LV function is normal.   5. The ventricular volumes are normal at rest and stress.   6. The extracardiac distribution of radioactivity is normal.           This document has been electronically    SIGNED BY: Cristhian Wright MD On: 08/01/2017 16:39          Date of Procedure: 08/01/2017        TEST DESCRIPTION   Technical Quality: This is a technically challenging study.     Aorta: The aortic root is normal in size, measuring 3.0 cm at sinotubular junction and 3.3 cm at Sinuses of Valsalva. The proximal ascending aorta is normal in size, measuring 3.0 cm across.     Left Atrium: The left atrial volume index is moderately enlarged, measuring 43.66 cc/m2.     Left Ventricle: The left ventricle is normal in size, with an end-diastolic diameter of 4.6 cm, and an end-systolic diameter of 3.3 cm. Septal wall thickness is increased, and posterior wall thickness is mildly increased, with the septum measuring 1.6 cm   and the posterior wall measuring 1.4 cm across. Relative wall thickness was increased at 0.61, and the LV mass index was increased at 165.9 g/m2 consistent with concentric left ventricular hypertrophy. There are no regional wall motion abnormalities.   Left ventricular systolic function appears normal. Visually estimated ejection fraction is 55-60%. The LV Doppler derived stroke volume equals 89.0 ccs; at a measured heart rate of 78 bpm this results in a LV Doppler derived cardiac output of  L/min.     Diastolic indices: E wave velocity 0.7 m/s, E/A ratio 0.8,  msec., E/e' ratio(avg) 9. There is pseudonormalization of mitral inflow pattern consistent  with significant diastolic dysfunction.     Right Atrium: The right atrium is mildly to moderately enlarged, measuring 5.2 cm in length and 4.0 cm in width in the apical view. The end-systolic right atrial area is 16.0 cm2.     Right Ventricle: The right ventricle is mildly enlarged measuring 4.3 cm at the base, 3.2 cm in the mid-cavity, and 6.2 cm in length in the apical right ventricle-focused view. Global right ventricular systolic function appears normal. RV fractional area   change (FAC) is 36%. Tricuspid annular plane systolic excursion (TAPSE) is 2.1 cm. Tissue Doppler-derived tricuspid annular peak systolic velocity (S prime) is 15.0 cm/s. The estimated PA systolic pressure is 47 mmHg, PA diastolic pressure is 11.64   mmHg.     Aortic Valve:  The aortic valve is moderately sclerotic with normal leaflet mobility. The peak gradient obtained across the aortic valve is 7 mmHg, with a mean gradient of 4 mmHg. Additionally, there is mild aortic regurgitation. There is a pressure half   time of 854.0 msec.     Mitral Valve:  The mitral valve is normal in structure.     Tricuspid Valve:  The tricuspid valve is normal in structure. There is mild to moderate tricuspid regurgitation.     Pulmonary Valve:  The pulmonic valve is not well seen. The peak gradient obtained across the pulmonic valve is 5 mmHg., with a mean gradient of 3 mmHg.     IVC: IVC is normal in size and collapses > 50% with a sniff, suggesting normal right atrial pressure of 3 mmHg.     Intracavitary: There is no evidence of pericardial effusion, intracavity mass, thrombi, or vegetation.         CONCLUSIONS     1 - Normal left ventricular systolic function (EF 55-60%).     2 - Impaired LV relaxation, elevated LAP (grade 2 diastolic dysfunction).     3 - Right ventricular enlargement with normal systolic function.     4 - Mild aortic regurgitation.     5 - Mild to moderate tricuspid regurgitation.     6 - Biatrial enlargement.     7 - Concentric  hypertrophy.     8 - Pulmonary hypertension. The estimated PA systolic pressure is 47 mmHg.             This document has been electronically    SIGNED BY: Cristhian Wright MD On: 08/01/2017 12:24      Specimen Collected: 08/01/17 08:00                  Assessment:       1. Non-rheumatic tricuspid valve insufficiency    2. PVC's (premature ventricular contractions)    3. Pulmonary hypertension    4. Mixed hyperlipidemia    5. Chronic diastolic heart failure    6. Essential hypertension    7. Edema of both legs    8. Nonrheumatic aortic valve insufficiency         Plan:             STABLE CHRONIC DIASTOLIC CHF ON CURRENT MEDICAL TX.  ALL TEST RESULTS REVIEWED IN DETAIL WITH PT.  CONTINUE CURRENT MEDICAL TX.  CARDIAC LOW SALT DIET.  F/U WITH PCP ON LABS, NEEDS F/U CMP TO ASSESS RENAL FUNCTION IN FUTURE (PT PREFERS TO HAVE HIS REGULAR LABS CHECKED BY DR. VALLEJO IN NEXT FEW MONTHS).  F/U 6 MONTHS WITH CMP/BNP.

## 2017-09-01 RX ORDER — OMEPRAZOLE 20 MG/1
20 CAPSULE, DELAYED RELEASE ORAL DAILY
Qty: 90 CAPSULE | Refills: 0 | Status: SHIPPED | OUTPATIENT
Start: 2017-09-01 | End: 2017-12-16 | Stop reason: SDUPTHER

## 2017-09-01 RX ORDER — METOPROLOL TARTRATE 100 MG/1
100 TABLET ORAL 2 TIMES DAILY
Qty: 180 TABLET | Refills: 0 | Status: SHIPPED | OUTPATIENT
Start: 2017-09-01 | End: 2017-12-04 | Stop reason: SDUPTHER

## 2017-09-14 DIAGNOSIS — C61 PROSTATE CANCER: Primary | ICD-10-CM

## 2017-09-14 DIAGNOSIS — Z85.048 HISTORY OF RECTAL CANCER: ICD-10-CM

## 2017-09-14 DIAGNOSIS — D89.2 PARAPROTEINEMIA: ICD-10-CM

## 2017-09-14 DIAGNOSIS — D50.0 IRON DEFICIENCY ANEMIA DUE TO CHRONIC BLOOD LOSS: ICD-10-CM

## 2017-10-06 DIAGNOSIS — L71.9 ROSACEA: ICD-10-CM

## 2017-10-06 RX ORDER — METRONIDAZOLE 7.5 MG/G
1 CREAM TOPICAL 2 TIMES DAILY
Qty: 45 G | Refills: 0 | Status: CANCELLED | OUTPATIENT
Start: 2017-10-06

## 2017-10-09 ENCOUNTER — LAB VISIT (OUTPATIENT)
Dept: LAB | Facility: HOSPITAL | Age: 77
End: 2017-10-09
Attending: INTERNAL MEDICINE
Payer: MEDICARE

## 2017-10-09 DIAGNOSIS — C61 PROSTATE CANCER: ICD-10-CM

## 2017-10-09 DIAGNOSIS — D50.0 IRON DEFICIENCY ANEMIA DUE TO CHRONIC BLOOD LOSS: ICD-10-CM

## 2017-10-09 DIAGNOSIS — Z85.048 HISTORY OF RECTAL CANCER: ICD-10-CM

## 2017-10-09 DIAGNOSIS — D89.2 PARAPROTEINEMIA: ICD-10-CM

## 2017-10-09 LAB
ALBUMIN SERPL BCP-MCNC: 3.5 G/DL
ALP SERPL-CCNC: 141 U/L
ALT SERPL W/O P-5'-P-CCNC: 25 U/L
ANION GAP SERPL CALC-SCNC: 14 MMOL/L
AST SERPL-CCNC: 47 U/L
BASOPHILS # BLD AUTO: 0.03 K/UL
BASOPHILS NFR BLD: 0.6 %
BILIRUB SERPL-MCNC: 0.7 MG/DL
BUN SERPL-MCNC: 15 MG/DL
CALCIUM SERPL-MCNC: 9.6 MG/DL
CHLORIDE SERPL-SCNC: 96 MMOL/L
CO2 SERPL-SCNC: 29 MMOL/L
COMPLEXED PSA SERPL-MCNC: 0.48 NG/ML
CREAT SERPL-MCNC: 1.4 MG/DL
DIFFERENTIAL METHOD: ABNORMAL
EOSINOPHIL # BLD AUTO: 0.1 K/UL
EOSINOPHIL NFR BLD: 2 %
ERYTHROCYTE [DISTWIDTH] IN BLOOD BY AUTOMATED COUNT: 13 %
EST. GFR  (AFRICAN AMERICAN): 56 ML/MIN/1.73 M^2
EST. GFR  (NON AFRICAN AMERICAN): 48 ML/MIN/1.73 M^2
GLUCOSE SERPL-MCNC: 112 MG/DL
HCT VFR BLD AUTO: 34 %
HGB BLD-MCNC: 11.7 G/DL
LYMPHOCYTES # BLD AUTO: 1.5 K/UL
LYMPHOCYTES NFR BLD: 27.4 %
MCH RBC QN AUTO: 34 PG
MCHC RBC AUTO-ENTMCNC: 34.4 G/DL
MCV RBC AUTO: 99 FL
MONOCYTES # BLD AUTO: 0.5 K/UL
MONOCYTES NFR BLD: 9.4 %
NEUTROPHILS # BLD AUTO: 3.3 K/UL
NEUTROPHILS NFR BLD: 60.6 %
PLATELET # BLD AUTO: 192 K/UL
PMV BLD AUTO: 9.6 FL
POTASSIUM SERPL-SCNC: 4.7 MMOL/L
PROT SERPL-MCNC: 8.1 G/DL
RBC # BLD AUTO: 3.44 M/UL
SODIUM SERPL-SCNC: 139 MMOL/L
WBC # BLD AUTO: 5.43 K/UL

## 2017-10-09 PROCEDURE — 84165 PROTEIN E-PHORESIS SERUM: CPT

## 2017-10-09 PROCEDURE — 84153 ASSAY OF PSA TOTAL: CPT

## 2017-10-09 PROCEDURE — 84165 PROTEIN E-PHORESIS SERUM: CPT | Mod: 26,,, | Performed by: PATHOLOGY

## 2017-10-09 PROCEDURE — 80053 COMPREHEN METABOLIC PANEL: CPT | Mod: PO

## 2017-10-09 PROCEDURE — 36415 COLL VENOUS BLD VENIPUNCTURE: CPT | Mod: PO

## 2017-10-09 PROCEDURE — 85025 COMPLETE CBC W/AUTO DIFF WBC: CPT | Mod: PO

## 2017-10-09 PROCEDURE — 83520 IMMUNOASSAY QUANT NOS NONAB: CPT

## 2017-10-09 RX ORDER — METRONIDAZOLE 7.5 MG/G
1 CREAM TOPICAL 2 TIMES DAILY
Qty: 45 G | Refills: 5 | Status: SHIPPED | OUTPATIENT
Start: 2017-10-09 | End: 2018-10-25

## 2017-10-10 LAB
ALBUMIN SERPL ELPH-MCNC: 3.78 G/DL
ALPHA1 GLOB SERPL ELPH-MCNC: 0.31 G/DL
ALPHA2 GLOB SERPL ELPH-MCNC: 0.81 G/DL
B-GLOBULIN SERPL ELPH-MCNC: 2.33 G/DL
GAMMA GLOB SERPL ELPH-MCNC: 0.56 G/DL
KAPPA LC SER QL IA: 1.89 MG/DL
KAPPA LC/LAMBDA SER IA: 0.4
LAMBDA LC SER QL IA: 4.68 MG/DL
PATHOLOGIST INTERPRETATION SPE: NORMAL
PROT SERPL-MCNC: 7.8 G/DL

## 2017-10-12 ENCOUNTER — OFFICE VISIT (OUTPATIENT)
Dept: HEMATOLOGY/ONCOLOGY | Facility: CLINIC | Age: 77
End: 2017-10-12
Payer: MEDICARE

## 2017-10-12 VITALS
HEIGHT: 70 IN | WEIGHT: 192 LBS | SYSTOLIC BLOOD PRESSURE: 110 MMHG | BODY MASS INDEX: 27.49 KG/M2 | RESPIRATION RATE: 18 BRPM | OXYGEN SATURATION: 96 % | HEART RATE: 67 BPM | DIASTOLIC BLOOD PRESSURE: 64 MMHG | TEMPERATURE: 98 F

## 2017-10-12 DIAGNOSIS — Z85.048 HISTORY OF RECTAL CANCER: Primary | ICD-10-CM

## 2017-10-12 DIAGNOSIS — Z87.898 HISTORY OF THYMOMA: ICD-10-CM

## 2017-10-12 DIAGNOSIS — D89.2 PARAPROTEINEMIA: ICD-10-CM

## 2017-10-12 DIAGNOSIS — D51.8 OTHER VITAMIN B12 DEFICIENCY ANEMIA: ICD-10-CM

## 2017-10-12 DIAGNOSIS — C61 PROSTATE CANCER: ICD-10-CM

## 2017-10-12 PROCEDURE — 99499 UNLISTED E&M SERVICE: CPT | Mod: S$GLB,,, | Performed by: INTERNAL MEDICINE

## 2017-10-12 PROCEDURE — 99999 PR PBB SHADOW E&M-EST. PATIENT-LVL IV: CPT | Mod: PBBFAC,,, | Performed by: INTERNAL MEDICINE

## 2017-10-12 PROCEDURE — 99215 OFFICE O/P EST HI 40 MIN: CPT | Mod: S$GLB,,, | Performed by: INTERNAL MEDICINE

## 2017-10-12 PROCEDURE — 90662 IIV NO PRSV INCREASED AG IM: CPT | Mod: S$GLB,,, | Performed by: INTERNAL MEDICINE

## 2017-10-12 PROCEDURE — G0008 ADMIN INFLUENZA VIRUS VAC: HCPCS | Mod: S$GLB,,, | Performed by: INTERNAL MEDICINE

## 2017-10-12 NOTE — PROGRESS NOTES
Subjective:       Patient ID: Homero Tanner is a 77 y.o. male.    Chief Complaint: Follow-up    HPI This is a 76  year-old gentleman who comes for follow up of her previously diagnosed thymoma, rectal cancer, prostate cancer, pararpteinemia and b12 deficiency.  He is known to us because of ahistory of   previously treated prostate cancer. He was diagnosed around 2004 and treated   with surgery. He relapsed by PSA and was treated with local radiation therapy.   He had further elevation of the PSA and started intermittent LHRH   analogues through the office of his urologist outside our clinic  he receives the injections at his urologist office.     A CBC done on   November 2012 was reported showing a hemoglobin of 12.7. As part of workup his   primary care physician ordered a serum ferritin and total iron binding   capacity, which were unremarkable. Serum folate was normal at 5.5. Vitamin B12   was low at 151. He then started supplemental B12 injection, which  he is still   getting.   Also, as part of the workup for the anemia, serum protein electrophoresis   showed a paraprotein band that was quantified at 1.03 g and identified as IgM.   The patient was then referred to me. I ordered lab tests, which included a   thoracic spine view looking for lytic lesions. The radiologist reported that there   were no lytic lesions but there was a mass in the center of the chest  . This was followed by chest x-rays and CT scans. The   CT scan showed a large mediastinal mass. A CT-guided biopsy at [facility] was read   as showing a thymoma.   He underwent surgery with Dr. Levi Butler at [facility]. The thymoma was involving the pericardium so he received   postoperative radiation therapy.   He finished the radiation therapy and has   remained with without evaluable disease in regards to his thymoma.      A routine lab tests done a few months later showed that there has been a dramatic   drop in his  hemoglobin, which was now 7.8 with a low serum ferritin of 8   suggesting iron deficiency anemia.   Stools were frankly Hemoccult positive.   In regards to his past history, the patient underwent a colonoscopy within our   system on 02/28/2012. The patient was found to have a sessile polyp in the   rectum. An endoscopic removal could not be done.   The patient was referred to another specialist in [facility] for resection since it seemed to   be arising in the setting of the radiation therapy field for his prostate   cancer.   The pathology report from that 2/2012 endoscopy was that of a tubular adenoma.   The patient was then seen in consultation by GI doctor outside the clinic. ,giana Arellano. The patient then underwent resection of the   lesion. The pathology report was the same.   The patient underwent a colonoscopy at [facility] on   05/07/2014, as part of the work up for his recently diagnosed iron deficiency.. It was done by Dr. Jaime Ash. Dr. Ash found a mass in the   rectal area. It was partially excised. Pathology was once again that of   tubulovillous adenoma with dysplasia.   The area of involvement coincided with the radiation therapy field for the prostate   cancer.   The patient had removal of the villous adenoma in [place] by Dr Ansari.   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   Following the excision he had 2 episodes of rectal bleeding. He ended up having another surgery to control the bleeing and to do a re-excision of the area where the minimally invasive cancer was found.  Pathology revealed no residual cancer.  There has been no more bleeding. He feels fine  He also injects himself with b12 due low b12 levels.  He comes for follow up   He riaz a history of prostate cancer treated with radiation therapy. He was placed on Lupron because of elevation of the PSA post-treatmetn but currently is off Lupron. He is receiving  Lupron  at his urologist office .He has had a colonosocopy feb 2017  that showed intestinal polyps and was asked to return in a year    ALLERGIES: see Med card  MEDICATIONS: See MedCard.   PREVIOUS SURGERIES: Prostatectomy around the year 2000 or so, rectal surgery   for removal of tubular adenoma.x2, and another surgery for a minimally invasive rectal cancer 10/2014 Tonsillectomy, removal of thymoma.   SOCIAL HISTORY: Single with two children. Lives in [place]. He smoked   until age 31, approximately a pack a day for 15 years. He is a retired   .   FAMILY HISTORY: No cancer. Father had diabetes. Father had heart disease.   PAST MEDICAL HISTORY:   1. Anemia.   2. 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   3. History of treated prostate cancer.   4. Mild anemia.   5. Iron deficiency anemia.   6. High blood pressure.   7. Elevated cholesterol.   8. Recurrent villous adenoma of the rectum.   9. Reflux.   10. Vitamin B12 deficiency.   11. Status post resection of thymoma of the anterior mediastinum.   12. Skin cancer scalp  13-hx of treated prostate cancer  Review of Systems   Constitutional: Negative.  Negative for appetite change, fatigue and unexpected weight change.   Eyes: Negative.  Negative for visual disturbance.   Respiratory: Negative for cough and shortness of breath.    Cardiovascular: Negative.  Negative for chest pain.   Gastrointestinal: Negative for abdominal pain, diarrhea and nausea.   Genitourinary: Positive for frequency. Negative for hematuria.   Musculoskeletal: Negative.  Negative for back pain.   Skin: Negative.  Negative for rash.   Neurological: Negative.  Negative for headaches.   Hematological: Negative for adenopathy.   Psychiatric/Behavioral: Negative.  The patient is not nervous/anxious.        Objective:      Physical Exam   Constitutional: He is oriented to person, place, and time. He appears well-developed and well-nourished.   HENT:   Head:  Normocephalic.   Mouth/Throat: No oropharyngeal exudate.   Eyes: Pupils are equal, round, and reactive to light.   Neck: No thyromegaly present.   Cardiovascular: Normal rate, regular rhythm and normal heart sounds.  Exam reveals no gallop.    No murmur heard.  Pulmonary/Chest: No respiratory distress. He has no wheezes. He has no rales.   Abdominal: Soft. Bowel sounds are normal. He exhibits no distension and no mass. There is no rebound and no guarding.   Musculoskeletal: Normal range of motion. He exhibits no edema.   Lymphadenopathy:     He has no cervical adenopathy.   Neurological: He is alert and oriented to person, place, and time.   Skin: Skin is warm and dry.   Psychiatric: He has a normal mood and affect. His behavior is normal.       Wt Readings from Last 3 Encounters:   10/12/17 87.1 kg (192 lb 0.3 oz)   08/14/17 87.4 kg (192 lb 10.9 oz)   07/17/17 88.5 kg (195 lb 1.7 oz)     Temp Readings from Last 3 Encounters:   10/12/17 97.6 °F (36.4 °C) (Oral)   07/17/17 98 °F (36.7 °C)   04/18/17 98.1 °F (36.7 °C) (Oral)     BP Readings from Last 3 Encounters:   10/12/17 110/64   08/14/17 (!) 96/56   07/17/17 118/70     Pulse Readings from Last 3 Encounters:   10/12/17 67   08/14/17 64   07/17/17 70       Assessment:       1. History of rectal cancer    2. Other vitamin B12 deficiency anemia    3. Prostate cancer    4. Paraproteinemia    5. History of thymoma        Plan:         He remains stable from his colon and prostate cancer point of view, as well as his history of treated thymoma and anemia.  Lab Results   Component Value Date    WBC 5.43 10/09/2017    HGB 11.7 (L) 10/09/2017    HCT 34.0 (L) 10/09/2017    MCV 99 (H) 10/09/2017     10/09/2017     Lab Results   Component Value Date    CEA 2.2 07/10/2017     Lab Results   Component Value Date    CREATININE 1.4 10/09/2017     Lab Results   Component Value Date    ALT 25 10/09/2017    AST 47 (H) 10/09/2017    ALKPHOS 141 (H) 10/09/2017    BILITOT 0.7  10/09/2017     PSA 0.48     SPEP SHOWS A SPIKE OF 1.90 GR  FREE LAMBDA CHAINS 4.8  WIll see hijm again in 3 months with cbc, cmp, PSA, SPEp, free light chains and CEA  Will be due for a CXR around 7/2018 to check on his history of operated thymoma  May need a bone marrow in the future  Complicated visit requing addressing many complicated issues.  Colonoscopy due 2/218  Flu shot today

## 2017-12-04 RX ORDER — METOPROLOL TARTRATE 100 MG/1
TABLET ORAL
Qty: 180 TABLET | Refills: 0 | Status: SHIPPED | OUTPATIENT
Start: 2017-12-04 | End: 2018-03-12 | Stop reason: SDUPTHER

## 2017-12-17 RX ORDER — OMEPRAZOLE 20 MG/1
CAPSULE, DELAYED RELEASE ORAL
Qty: 90 CAPSULE | Refills: 0 | Status: SHIPPED | OUTPATIENT
Start: 2017-12-17 | End: 2018-04-06 | Stop reason: SDUPTHER

## 2018-01-03 DIAGNOSIS — I10 ESSENTIAL HYPERTENSION, MALIGNANT: ICD-10-CM

## 2018-01-03 RX ORDER — HYDRALAZINE HYDROCHLORIDE 50 MG/1
TABLET, FILM COATED ORAL
Qty: 90 TABLET | Refills: 3 | Status: ON HOLD | OUTPATIENT
Start: 2018-01-03 | End: 2018-05-01 | Stop reason: HOSPADM

## 2018-01-16 ENCOUNTER — LAB VISIT (OUTPATIENT)
Dept: LAB | Facility: HOSPITAL | Age: 78
End: 2018-01-16
Attending: INTERNAL MEDICINE
Payer: MEDICARE

## 2018-01-16 DIAGNOSIS — Z85.048 HISTORY OF RECTAL CANCER: ICD-10-CM

## 2018-01-16 DIAGNOSIS — D89.2 PARAPROTEINEMIA: ICD-10-CM

## 2018-01-16 DIAGNOSIS — D51.8 OTHER VITAMIN B12 DEFICIENCY ANEMIA: ICD-10-CM

## 2018-01-16 DIAGNOSIS — C61 PROSTATE CANCER: ICD-10-CM

## 2018-01-16 LAB
ALBUMIN SERPL BCP-MCNC: 3.5 G/DL
ALP SERPL-CCNC: 200 U/L
ALT SERPL W/O P-5'-P-CCNC: 47 U/L
ANION GAP SERPL CALC-SCNC: 15 MMOL/L
AST SERPL-CCNC: 95 U/L
BASOPHILS # BLD AUTO: 0.02 K/UL
BASOPHILS NFR BLD: 0.4 %
BILIRUB SERPL-MCNC: 0.8 MG/DL
BUN SERPL-MCNC: 13 MG/DL
CALCIUM SERPL-MCNC: 9.1 MG/DL
CEA SERPL-MCNC: 2.6 NG/ML
CHLORIDE SERPL-SCNC: 96 MMOL/L
CO2 SERPL-SCNC: 28 MMOL/L
COMPLEXED PSA SERPL-MCNC: 0.48 NG/ML
CREAT SERPL-MCNC: 1.5 MG/DL
DIFFERENTIAL METHOD: ABNORMAL
EOSINOPHIL # BLD AUTO: 0.1 K/UL
EOSINOPHIL NFR BLD: 1.5 %
ERYTHROCYTE [DISTWIDTH] IN BLOOD BY AUTOMATED COUNT: 14.2 %
EST. GFR  (AFRICAN AMERICAN): 52 ML/MIN/1.73 M^2
EST. GFR  (NON AFRICAN AMERICAN): 45 ML/MIN/1.73 M^2
GLUCOSE SERPL-MCNC: 110 MG/DL
HCT VFR BLD AUTO: 33.5 %
HGB BLD-MCNC: 11.4 G/DL
LYMPHOCYTES # BLD AUTO: 1.4 K/UL
LYMPHOCYTES NFR BLD: 26 %
MCH RBC QN AUTO: 33.6 PG
MCHC RBC AUTO-ENTMCNC: 34 G/DL
MCV RBC AUTO: 99 FL
MONOCYTES # BLD AUTO: 0.6 K/UL
MONOCYTES NFR BLD: 10.2 %
NEUTROPHILS # BLD AUTO: 3.3 K/UL
NEUTROPHILS NFR BLD: 61.9 %
PLATELET # BLD AUTO: 177 K/UL
PMV BLD AUTO: 9.3 FL
POTASSIUM SERPL-SCNC: 3.7 MMOL/L
PROT SERPL-MCNC: 8 G/DL
RBC # BLD AUTO: 3.39 M/UL
SODIUM SERPL-SCNC: 139 MMOL/L
VIT B12 SERPL-MCNC: >2000 PG/ML
WBC # BLD AUTO: 5.39 K/UL

## 2018-01-16 PROCEDURE — 84153 ASSAY OF PSA TOTAL: CPT

## 2018-01-16 PROCEDURE — 84165 PROTEIN E-PHORESIS SERUM: CPT | Mod: 26,,, | Performed by: PATHOLOGY

## 2018-01-16 PROCEDURE — 84165 PROTEIN E-PHORESIS SERUM: CPT

## 2018-01-16 PROCEDURE — 85025 COMPLETE CBC W/AUTO DIFF WBC: CPT | Mod: PO

## 2018-01-16 PROCEDURE — 82378 CARCINOEMBRYONIC ANTIGEN: CPT

## 2018-01-16 PROCEDURE — 80053 COMPREHEN METABOLIC PANEL: CPT | Mod: PO

## 2018-01-16 PROCEDURE — 36415 COLL VENOUS BLD VENIPUNCTURE: CPT | Mod: PO

## 2018-01-16 PROCEDURE — 82607 VITAMIN B-12: CPT

## 2018-01-16 PROCEDURE — 83520 IMMUNOASSAY QUANT NOS NONAB: CPT | Mod: 59

## 2018-01-17 LAB
ALBUMIN SERPL ELPH-MCNC: 3.48 G/DL
ALPHA1 GLOB SERPL ELPH-MCNC: 0.35 G/DL
ALPHA2 GLOB SERPL ELPH-MCNC: 0.82 G/DL
B-GLOBULIN SERPL ELPH-MCNC: 2.28 G/DL
GAMMA GLOB SERPL ELPH-MCNC: 0.57 G/DL
KAPPA LC SER QL IA: 2.4 MG/DL
KAPPA LC/LAMBDA SER IA: 0.53
LAMBDA LC SER QL IA: 4.56 MG/DL
PATHOLOGIST INTERPRETATION SPE: NORMAL
PROT SERPL-MCNC: 7.5 G/DL

## 2018-01-23 ENCOUNTER — LAB VISIT (OUTPATIENT)
Dept: LAB | Facility: HOSPITAL | Age: 78
End: 2018-01-23
Attending: INTERNAL MEDICINE
Payer: MEDICARE

## 2018-01-23 ENCOUNTER — OFFICE VISIT (OUTPATIENT)
Dept: HEMATOLOGY/ONCOLOGY | Facility: CLINIC | Age: 78
End: 2018-01-23
Payer: MEDICARE

## 2018-01-23 VITALS
RESPIRATION RATE: 18 BRPM | WEIGHT: 193.81 LBS | HEIGHT: 70 IN | BODY MASS INDEX: 27.75 KG/M2 | SYSTOLIC BLOOD PRESSURE: 112 MMHG | TEMPERATURE: 98 F | DIASTOLIC BLOOD PRESSURE: 72 MMHG | OXYGEN SATURATION: 93 % | HEART RATE: 110 BPM

## 2018-01-23 DIAGNOSIS — D50.0 IRON DEFICIENCY ANEMIA DUE TO CHRONIC BLOOD LOSS: ICD-10-CM

## 2018-01-23 DIAGNOSIS — D89.2 PARAPROTEINEMIA: ICD-10-CM

## 2018-01-23 DIAGNOSIS — Z85.048 HISTORY OF RECTAL CANCER: ICD-10-CM

## 2018-01-23 DIAGNOSIS — C61 PROSTATE CANCER: ICD-10-CM

## 2018-01-23 DIAGNOSIS — Z87.898 HISTORY OF THYMOMA: ICD-10-CM

## 2018-01-23 DIAGNOSIS — R74.01 TRANSAMINITIS: ICD-10-CM

## 2018-01-23 DIAGNOSIS — R74.01 TRANSAMINITIS: Primary | ICD-10-CM

## 2018-01-23 LAB
ALBUMIN SERPL BCP-MCNC: 3.3 G/DL
ALP SERPL-CCNC: 174 U/L
ALT SERPL W/O P-5'-P-CCNC: 32 U/L
ANION GAP SERPL CALC-SCNC: 12 MMOL/L
AST SERPL-CCNC: 67 U/L
BILIRUB SERPL-MCNC: 0.6 MG/DL
BUN SERPL-MCNC: 13 MG/DL
CALCIUM SERPL-MCNC: 8.5 MG/DL
CEA SERPL-MCNC: 2.3 NG/ML
CHLORIDE SERPL-SCNC: 99 MMOL/L
CO2 SERPL-SCNC: 30 MMOL/L
CREAT SERPL-MCNC: 1.4 MG/DL
EST. GFR  (AFRICAN AMERICAN): 56 ML/MIN/1.73 M^2
EST. GFR  (NON AFRICAN AMERICAN): 48 ML/MIN/1.73 M^2
GLUCOSE SERPL-MCNC: 98 MG/DL
POTASSIUM SERPL-SCNC: 4.2 MMOL/L
PROT SERPL-MCNC: 7.5 G/DL
SODIUM SERPL-SCNC: 141 MMOL/L

## 2018-01-23 PROCEDURE — 36415 COLL VENOUS BLD VENIPUNCTURE: CPT | Mod: PO

## 2018-01-23 PROCEDURE — 99999 PR PBB SHADOW E&M-EST. PATIENT-LVL III: CPT | Mod: PBBFAC,,, | Performed by: INTERNAL MEDICINE

## 2018-01-23 PROCEDURE — 80053 COMPREHEN METABOLIC PANEL: CPT | Mod: PO

## 2018-01-23 PROCEDURE — 82378 CARCINOEMBRYONIC ANTIGEN: CPT

## 2018-01-23 PROCEDURE — 99215 OFFICE O/P EST HI 40 MIN: CPT | Mod: S$GLB,,, | Performed by: INTERNAL MEDICINE

## 2018-01-23 NOTE — PROGRESS NOTES
Hematology/Oncology Office Note    CC:    Referred by:  No ref. provider found    Diagnosis:  MGUS  Prostate cancer  Rectal cancer  Thymoma    HPI This is a 76  year-old gentleman who comes for follow up of her previously diagnosed thymoma, rectal cancer, prostate cancer, pararpteinemia and b12 deficiency.  He is known to us because of ahistory of   previously treated prostate cancer. He was diagnosed around 2004 and treated   with surgery. He relapsed by PSA and was treated with local radiation therapy.   He had further elevation of the PSA and started intermittent LHRH   analogues through the office of his urologist outside our clinic  he receives the injections at his urologist office.    I have reviewed and updated the HPI, ROS, PMHx, Social Hx, Family Hx and treatment history.    Today's visit:  Patient is without complaints today      Past Medical History:   Diagnosis Date    Abnormal ECG 6/24/2013    Aortic insufficiency 6/24/2013    Aortic valve disorder 6/25/2013    Asthma     as a child    CHF (congestive heart failure)     Encounter for blood transfusion     GERD (gastroesophageal reflux disease)     Hypertension     Mixed hyperlipidemia 6/24/2013    Prostate cancer     PSVT (paroxysmal supraventricular tachycardia) 6/24/2013    Pulmonary hypertension 6/13/2016    Rectal adenocarcinoma     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    left parietal scalp    Thymoma     TR (tricuspid regurgitation) 6/13/2016         Social History:  No tobacco, alcohol, or illicit drugs      Family History: family history includes Diabetes in his father.      HPI  Review of Systems   Constitutional: Negative for activity change, appetite change and unexpected weight change.   HENT: Negative.    Eyes: Negative.  Negative for visual disturbance.   Respiratory: Negative for apnea, cough, chest tightness, shortness of breath and stridor.    Cardiovascular: Negative for chest pain.   Gastrointestinal:  "Negative for abdominal distention, abdominal pain, blood in stool, constipation and diarrhea.   Endocrine: Negative.    Genitourinary: Negative for frequency.   Musculoskeletal: Negative for back pain.   Skin: Negative for rash.   Allergic/Immunologic: Negative.    Neurological: Negative.  Negative for dizziness, facial asymmetry and headaches.   Hematological: Negative for adenopathy.   Psychiatric/Behavioral: Negative.  The patient is not nervous/anxious.        Objective:       Vitals:    01/23/18 0905   BP: 112/72   Pulse: 110   Resp: 18   Temp: 97.5 °F (36.4 °C)   TempSrc: Oral   SpO2: (!) 93%   Weight: 87.9 kg (193 lb 12.6 oz)   Height: 5' 10" (1.778 m)     Physical Exam   Constitutional: He is oriented to person, place, and time. He appears well-developed and well-nourished. No distress.   HENT:   Head: Normocephalic and atraumatic.   Nose: Nose normal.   Mouth/Throat: Oropharynx is clear and moist. No oropharyngeal exudate.   Eyes: Conjunctivae and EOM are normal. Pupils are equal, round, and reactive to light. Right eye exhibits no discharge. Left eye exhibits no discharge. No scleral icterus.   Neck: Normal range of motion. Neck supple. No JVD present. No tracheal deviation present. No thyromegaly present.   Cardiovascular: Normal rate and regular rhythm.  Exam reveals no gallop and no friction rub.    No murmur heard.  Pulmonary/Chest: Effort normal and breath sounds normal. No accessory muscle usage or stridor. No respiratory distress. He has no wheezes. He has no rales. He exhibits no tenderness.   Abdominal: Soft. Bowel sounds are normal. He exhibits no distension and no mass. There is no tenderness. There is no rebound.   Musculoskeletal: Normal range of motion. He exhibits no edema.   Lymphadenopathy:     He has no cervical adenopathy.   Neurological: He is alert and oriented to person, place, and time. No cranial nerve deficit. He exhibits normal muscle tone. Coordination normal.   Skin: Skin is " warm and dry. No rash noted. He is not diaphoretic.   Psychiatric: He has a normal mood and affect. Thought content normal.   Vitals reviewed.      Lab Results   Component Value Date    WBC 5.39 01/16/2018    HGB 11.4 (L) 01/16/2018    HCT 33.5 (L) 01/16/2018    MCV 99 (H) 01/16/2018     01/16/2018     Lab Results   Component Value Date    CREATININE 1.5 (H) 01/16/2018    BUN 13 01/16/2018     01/16/2018    K 3.7 01/16/2018    CL 96 01/16/2018    CO2 28 01/16/2018     Lab Results   Component Value Date    ALT 47 (H) 01/16/2018    AST 95 (H) 01/16/2018    ALKPHOS 200 (H) 01/16/2018    BILITOT 0.8 01/16/2018         Assessment:       77-year-old gentleman with a history of prostate cancer, rectal cancer, thymoma, and MGUS.  He is followed by Dr. Fede Montaño in the hematology/oncology clinic and I'm seeing the patient for the first time today in Dr. Montaño's absence.  Labs have demonstrated a stable M spike, however, he has mild transaminitis.  We will repeat CMP today and proceed with imaging of the liver if AST/ALT remain elevated.  He will follow-up with Dr. Montaño in 2 months      Transaminitis:  --Repeat CMP today  --Proceed with imaging of liver if enzymes remain elevated    MGUS:  --Continue to monitor SPEP    History of rectal cancer:  --Repeat CEA  --Continue surveillance    H/o Prostate cancer:  --Continue surveillance

## 2018-01-25 DIAGNOSIS — R74.01 TRANSAMINITIS: Primary | ICD-10-CM

## 2018-02-14 ENCOUNTER — LAB VISIT (OUTPATIENT)
Dept: LAB | Facility: HOSPITAL | Age: 78
End: 2018-02-14
Attending: INTERNAL MEDICINE
Payer: MEDICARE

## 2018-02-14 DIAGNOSIS — I50.32 CHRONIC DIASTOLIC HEART FAILURE: ICD-10-CM

## 2018-02-14 LAB
ALBUMIN SERPL BCP-MCNC: 3 G/DL
ALP SERPL-CCNC: 190 U/L
ALT SERPL W/O P-5'-P-CCNC: 38 U/L
ANION GAP SERPL CALC-SCNC: 14 MMOL/L
AST SERPL-CCNC: 77 U/L
BILIRUB SERPL-MCNC: 1 MG/DL
BNP SERPL-MCNC: 477 PG/ML
BUN SERPL-MCNC: 16 MG/DL
CALCIUM SERPL-MCNC: 8.8 MG/DL
CHLORIDE SERPL-SCNC: 97 MMOL/L
CO2 SERPL-SCNC: 27 MMOL/L
CREAT SERPL-MCNC: 1.4 MG/DL
EST. GFR  (AFRICAN AMERICAN): 56 ML/MIN/1.73 M^2
EST. GFR  (NON AFRICAN AMERICAN): 48.5 ML/MIN/1.73 M^2
GLUCOSE SERPL-MCNC: 104 MG/DL
POTASSIUM SERPL-SCNC: 4.2 MMOL/L
PROT SERPL-MCNC: 7.6 G/DL
SODIUM SERPL-SCNC: 138 MMOL/L

## 2018-02-14 PROCEDURE — 83880 ASSAY OF NATRIURETIC PEPTIDE: CPT

## 2018-02-14 PROCEDURE — 80053 COMPREHEN METABOLIC PANEL: CPT

## 2018-02-14 PROCEDURE — 36415 COLL VENOUS BLD VENIPUNCTURE: CPT | Mod: PO

## 2018-02-23 DIAGNOSIS — I10 ESSENTIAL HYPERTENSION: Primary | ICD-10-CM

## 2018-02-27 ENCOUNTER — CLINICAL SUPPORT (OUTPATIENT)
Dept: CARDIOLOGY | Facility: CLINIC | Age: 78
End: 2018-02-27
Payer: MEDICARE

## 2018-02-27 ENCOUNTER — OFFICE VISIT (OUTPATIENT)
Dept: CARDIOLOGY | Facility: CLINIC | Age: 78
End: 2018-02-27
Payer: MEDICARE

## 2018-02-27 VITALS
WEIGHT: 189 LBS | DIASTOLIC BLOOD PRESSURE: 80 MMHG | HEIGHT: 70 IN | HEART RATE: 82 BPM | BODY MASS INDEX: 27.06 KG/M2 | SYSTOLIC BLOOD PRESSURE: 126 MMHG

## 2018-02-27 DIAGNOSIS — I27.20 PULMONARY HYPERTENSION: ICD-10-CM

## 2018-02-27 DIAGNOSIS — I49.3 PVC'S (PREMATURE VENTRICULAR CONTRACTIONS): ICD-10-CM

## 2018-02-27 DIAGNOSIS — I35.1 NONRHEUMATIC AORTIC VALVE INSUFFICIENCY: Primary | Chronic | ICD-10-CM

## 2018-02-27 DIAGNOSIS — I10 HTN (HYPERTENSION): ICD-10-CM

## 2018-02-27 DIAGNOSIS — I36.1 NON-RHEUMATIC TRICUSPID VALVE INSUFFICIENCY: ICD-10-CM

## 2018-02-27 DIAGNOSIS — R94.31 ABNORMAL ECG: Chronic | ICD-10-CM

## 2018-02-27 DIAGNOSIS — I10 ESSENTIAL HYPERTENSION: Chronic | ICD-10-CM

## 2018-02-27 DIAGNOSIS — I50.32 CHRONIC DIASTOLIC HEART FAILURE: ICD-10-CM

## 2018-02-27 DIAGNOSIS — E78.2 MIXED HYPERLIPIDEMIA: ICD-10-CM

## 2018-02-27 DIAGNOSIS — R60.0 EDEMA OF BOTH LEGS: ICD-10-CM

## 2018-02-27 PROCEDURE — 99499 UNLISTED E&M SERVICE: CPT | Mod: S$GLB,,, | Performed by: INTERNAL MEDICINE

## 2018-02-27 PROCEDURE — 93000 ELECTROCARDIOGRAM COMPLETE: CPT | Mod: S$GLB,,, | Performed by: INTERNAL MEDICINE

## 2018-02-27 PROCEDURE — 99214 OFFICE O/P EST MOD 30 MIN: CPT | Mod: S$GLB,,, | Performed by: INTERNAL MEDICINE

## 2018-02-27 PROCEDURE — 1159F MED LIST DOCD IN RCRD: CPT | Mod: S$GLB,,, | Performed by: INTERNAL MEDICINE

## 2018-02-27 PROCEDURE — 99999 PR PBB SHADOW E&M-EST. PATIENT-LVL III: CPT | Mod: PBBFAC,,, | Performed by: INTERNAL MEDICINE

## 2018-02-27 PROCEDURE — 3008F BODY MASS INDEX DOCD: CPT | Mod: S$GLB,,, | Performed by: INTERNAL MEDICINE

## 2018-02-27 NOTE — PROGRESS NOTES
Subjective:    Patient ID:  Homero Tanner is a 77 y.o. male who presents for evaluation of Hypertension; Congestive Heart Failure; Valvular Heart Disease; and Hyperlipidemia      HPI Mr. Tanner returns for f/u.   His current medical conditions include chronic HTN, DD, PHTN, AI, PVCs, TR, dyslipidemia, prostate & rectal cancer. Nonsmoker.    Had LHC 20+ years ago, no significant blockages noted.  He has chronic abnl ecgs.   Also has had PSVT with stress testing in past.    He has h/o thymoma surgery with xrt and also had rectal polyp surgery.  - Stress MPI Aug 2017.  Echo Aug 2017 normal EF, DD, mild-mod PHTN, mild AI, LVH, mild RVE.  No cp sxs.   No palpitations.  No dizziness.  No exercise.  No dyspnea, no pnd/orthopnea.  Stable B LE edema, none right now.  BNP is variable, higher than last check but still within prior range of old values.  ecg today shows NSR, chronic st-t abnl. Pvc, pac.  HTN well controlled on current meds, no associated sxs.  Lipids controlled in past, on statin.    Past Medical History:   Diagnosis Date    Abnormal ECG 6/24/2013    Aortic insufficiency 6/24/2013    Aortic valve disorder 6/25/2013    Asthma     as a child    CHF (congestive heart failure)     Encounter for blood transfusion     GERD (gastroesophageal reflux disease)     Hypertension     Mixed hyperlipidemia 6/24/2013    Prostate cancer     PSVT (paroxysmal supraventricular tachycardia) 6/24/2013    Pulmonary hypertension 6/13/2016    Rectal adenocarcinoma     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    left parietal scalp    Thymoma     TR (tricuspid regurgitation) 6/13/2016       Current Outpatient Prescriptions:     aspirin (ECOTRIN) 81 MG EC tablet, Take 81 mg by mouth once daily., Disp: , Rfl:     b complex vitamins tablet, Take by mouth. 1 tablet Oral Every day, Disp: , Rfl:     cyanocobalamin 1,000 mcg/mL injection, Give 1cc under the skin once a month, Disp: 10 mL, Rfl: 4    fluocinonide (LIDEX)  0.05 % external solution, AAA of scalp 1-2 times daily as needed for itch, Disp: 60 mL, Rfl: 2    fluticasone (FLONASE) 50 mcg/actuation nasal spray, 1 spray by Nasal route 2 (two) times daily as needed for Rhinitis or Allergies. 1 Aerosol, Spray Each nostril Twice a day , Disp: , Rfl:     furosemide (LASIX) 20 MG tablet, TAKE 1 TABLET(20 MG) BY MOUTH EVERY DAY, Disp: 30 tablet, Rfl: 12    hydrALAZINE (APRESOLINE) 25 MG tablet, Take 1 tablet by mouth mid-day and in the evening, Disp: 180 tablet, Rfl: 3    hydrALAZINE (APRESOLINE) 50 MG tablet, TAKE 1 TABLET BY MOUTH EVERY MORNING, Disp: 90 tablet, Rfl: 3    ketoconazole (NIZORAL) 2 % shampoo, Wash hair with medicated shampoo at least 3x/week - let sit on scalp at least 5 minutes prior to rinsing, Disp: 120 mL, Rfl: 5    leuprolide (LUPRON) 3.75 mg injection, Inject 3.75 mg into the muscle every 3 (three) months. , Disp: , Rfl:     metoprolol tartrate (LOPRESSOR) 100 MG tablet, TAKE 1 TABLET(100 MG) BY MOUTH TWICE DAILY, Disp: 180 tablet, Rfl: 0    metronidazole 0.75% (METROCREAM) 0.75 % Crea, Apply 1 application topically 2 (two) times daily. Apply to affected on face, Disp: 45 g, Rfl: 5    omeprazole (PRILOSEC) 20 MG capsule, TAKE 1 CAPSULE(20 MG) BY MOUTH EVERY DAY, Disp: 90 capsule, Rfl: 0    pravastatin (PRAVACHOL) 40 MG tablet, Take 1 tablet (40 mg total) by mouth once daily., Disp: 90 tablet, Rfl: 4    sulfacetamide sodium 10 % Sham, Use twice daily as face wash for rosacea, Disp: 237 mL, Rfl: 6    triamcinolone acetonide 0.025% (KENALOG) 0.025 % cream, AAA bid as needed for flares on face.  Mild steroid., Disp: 30 g, Rfl: 1      Review of Systems   Constitution: Negative.   HENT: Negative.    Eyes: Negative.    Cardiovascular: Negative.    Respiratory: Negative.    Endocrine: Negative.    Hematologic/Lymphatic: Negative.    Skin: Negative.    Musculoskeletal: Negative.    Gastrointestinal: Negative.    Genitourinary: Negative.    Neurological:  "Negative.    Psychiatric/Behavioral: Negative.    Allergic/Immunologic: Negative.        /80 (BP Location: Left arm)   Pulse 82   Ht 5' 10" (1.778 m)   Wt 85.7 kg (189 lb)   BMI 27.12 kg/m²     Wt Readings from Last 3 Encounters:   02/27/18 85.7 kg (189 lb)   01/23/18 87.9 kg (193 lb 12.6 oz)   10/12/17 87.1 kg (192 lb 0.3 oz)     Temp Readings from Last 3 Encounters:   01/23/18 97.5 °F (36.4 °C) (Oral)   10/12/17 97.6 °F (36.4 °C) (Oral)   07/17/17 98 °F (36.7 °C)     BP Readings from Last 3 Encounters:   02/27/18 126/80   01/23/18 112/72   10/12/17 110/64     Pulse Readings from Last 3 Encounters:   02/27/18 82   01/23/18 110   10/12/17 67          Objective:    Physical Exam   Constitutional: He is oriented to person, place, and time. He appears well-developed and well-nourished.   HENT:   Head: Normocephalic.   Neck: Normal range of motion. Neck supple. Normal carotid pulses, no hepatojugular reflux and no JVD present. Carotid bruit is not present. No thyromegaly present.   Cardiovascular: Normal rate, regular rhythm, S1 normal and S2 normal.  PMI is not displaced.  Exam reveals no S3, no S4, no distant heart sounds, no friction rub, no midsystolic click and no opening snap.    No murmur heard.  Pulses:       Radial pulses are 2+ on the right side, and 2+ on the left side.   Pulmonary/Chest: Effort normal and breath sounds normal. He has no wheezes. He has no rales.   Abdominal: Soft. Bowel sounds are normal. He exhibits no distension, no abdominal bruit, no ascites and no mass. There is no tenderness.   Musculoskeletal: He exhibits no edema.   Neurological: He is alert and oriented to person, place, and time.   Skin: Skin is warm.   Psychiatric: He has a normal mood and affect. His behavior is normal.   Nursing note and vitals reviewed.      I have reviewed all pertinent labs and cardiac studies.      Chemistry        Component Value Date/Time     02/14/2018 0732    K 4.2 02/14/2018 0732    CL " 97 02/14/2018 0732    CO2 27 02/14/2018 0732    BUN 16 02/14/2018 0732    CREATININE 1.4 02/14/2018 0732     02/14/2018 0732        Component Value Date/Time    CALCIUM 8.8 02/14/2018 0732    ALKPHOS 190 (H) 02/14/2018 0732    AST 77 (H) 02/14/2018 0732    ALT 38 02/14/2018 0732    BILITOT 1.0 02/14/2018 0732    ESTGFRAFRICA 56.0 (A) 02/14/2018 0732    EGFRNONAA 48.5 (A) 02/14/2018 0732        Component      Latest Ref Rng & Units 2/14/2018   BNP      0 - 99 pg/mL 477 (H)         Assessment:       1. Nonrheumatic aortic valve insufficiency    2. Abnormal ECG    3. Essential hypertension    4. Chronic diastolic heart failure    5. Edema of both legs    6. PVC's (premature ventricular contractions)    7. Non-rheumatic tricuspid valve insufficiency    8. Pulmonary hypertension    9. Mixed hyperlipidemia         Plan:             - Stable CV conditions on current medical tx.  - Chronic stable diastolic CHF, continue medical mgt.  - May take extra Lasix on prn basis for weight gain/fluid retention.  - Continue current meds.  - Cardiac low salt diet.  - Exercise 30 minutes daily.    F/u 6 months with labs.

## 2018-03-12 RX ORDER — METOPROLOL TARTRATE 100 MG/1
TABLET ORAL
Qty: 180 TABLET | Refills: 0 | Status: SHIPPED | OUTPATIENT
Start: 2018-03-12 | End: 2018-06-14 | Stop reason: SDUPTHER

## 2018-03-21 ENCOUNTER — HOSPITAL ENCOUNTER (OUTPATIENT)
Dept: RADIOLOGY | Facility: HOSPITAL | Age: 78
Discharge: HOME | End: 2018-03-21
Attending: INTERNAL MEDICINE
Payer: MEDICARE

## 2018-03-21 DIAGNOSIS — R74.01 TRANSAMINITIS: ICD-10-CM

## 2018-03-21 PROCEDURE — 76705 ECHO EXAM OF ABDOMEN: CPT | Mod: TC,PO

## 2018-03-21 PROCEDURE — 76705 ECHO EXAM OF ABDOMEN: CPT | Mod: 26,,, | Performed by: RADIOLOGY

## 2018-03-27 DIAGNOSIS — I10 ESSENTIAL HYPERTENSION, MALIGNANT: ICD-10-CM

## 2018-03-27 RX ORDER — HYDRALAZINE HYDROCHLORIDE 25 MG/1
TABLET, FILM COATED ORAL
Qty: 180 TABLET | Refills: 3 | Status: SHIPPED | OUTPATIENT
Start: 2018-03-27 | End: 2018-08-28 | Stop reason: ALTCHOICE

## 2018-03-28 ENCOUNTER — LAB VISIT (OUTPATIENT)
Dept: LAB | Facility: HOSPITAL | Age: 78
End: 2018-03-28
Attending: INTERNAL MEDICINE
Payer: MEDICARE

## 2018-03-28 ENCOUNTER — OFFICE VISIT (OUTPATIENT)
Dept: HEMATOLOGY/ONCOLOGY | Facility: CLINIC | Age: 78
End: 2018-03-28
Payer: MEDICARE

## 2018-03-28 VITALS
OXYGEN SATURATION: 95 % | TEMPERATURE: 98 F | HEIGHT: 70 IN | RESPIRATION RATE: 20 BRPM | BODY MASS INDEX: 26.82 KG/M2 | HEART RATE: 72 BPM | SYSTOLIC BLOOD PRESSURE: 110 MMHG | DIASTOLIC BLOOD PRESSURE: 62 MMHG | WEIGHT: 187.38 LBS

## 2018-03-28 DIAGNOSIS — Z87.898 HISTORY OF THYMOMA: ICD-10-CM

## 2018-03-28 DIAGNOSIS — Z85.048 HISTORY OF RECTAL CANCER: Primary | ICD-10-CM

## 2018-03-28 DIAGNOSIS — R74.8 ABNORMAL LIVER ENZYMES: ICD-10-CM

## 2018-03-28 DIAGNOSIS — C61 PROSTATE CANCER: ICD-10-CM

## 2018-03-28 DIAGNOSIS — K80.20 GALLSTONES: ICD-10-CM

## 2018-03-28 DIAGNOSIS — D89.2 PARAPROTEINEMIA: ICD-10-CM

## 2018-03-28 DIAGNOSIS — D51.8 OTHER VITAMIN B12 DEFICIENCY ANEMIA: ICD-10-CM

## 2018-03-28 PROCEDURE — 99999 PR PBB SHADOW E&M-EST. PATIENT-LVL IV: CPT | Mod: PBBFAC,,, | Performed by: INTERNAL MEDICINE

## 2018-03-28 PROCEDURE — 86803 HEPATITIS C AB TEST: CPT

## 2018-03-28 PROCEDURE — 36415 COLL VENOUS BLD VENIPUNCTURE: CPT | Mod: PO

## 2018-03-28 PROCEDURE — 3078F DIAST BP <80 MM HG: CPT | Mod: CPTII,S$GLB,, | Performed by: INTERNAL MEDICINE

## 2018-03-28 PROCEDURE — 3074F SYST BP LT 130 MM HG: CPT | Mod: CPTII,S$GLB,, | Performed by: INTERNAL MEDICINE

## 2018-03-28 PROCEDURE — 99215 OFFICE O/P EST HI 40 MIN: CPT | Mod: S$GLB,,, | Performed by: INTERNAL MEDICINE

## 2018-03-28 PROCEDURE — 87340 HEPATITIS B SURFACE AG IA: CPT

## 2018-03-28 PROCEDURE — 99499 UNLISTED E&M SERVICE: CPT | Mod: S$GLB,,, | Performed by: INTERNAL MEDICINE

## 2018-03-28 NOTE — PROGRESS NOTES
Subjective:       Patient ID: Homero Tanner is a 77 y.o. male.    Chief Complaint: No chief complaint on file.    HPI This is a 77  year-old gentleman who comes for follow up of her previously diagnosed thymoma, rectal cancer, prostate cancer, pararpteinemia and b12 deficiency.  He is known to us because of  A history of   previously treated prostate cancer. He was diagnosed around 2004 and treated   with surgery. He relapsed by PSA and was treated with local radiation therapy.   He had further elevation of the PSA and started intermittent LHRH   analogues through the office of his urologist outside our clinic  he receives the injections at his urologist office.  He says he hs been of Lupron x 6-8 months     A CBC done on   November 2012 was reported showing a hemoglobin of 12.7. As part of workup his   primary care physician ordered a serum ferritin and total iron binding   capacity, which were unremarkable. Serum folate was normal at 5.5. Vitamin B12   was low at 151. He then started supplemental B12 injection, which  he is still   getting.   Also, as part of the workup for the anemia, serum protein electrophoresis   showed a paraprotein band that was quantified at 1.03 g and identified as IgM.   The patient was then referred to me. I ordered lab tests, which included a   thoracic spine view looking for lytic lesions. The radiologist reported that there   were no lytic lesions but there was a mass in the center of the chest  . This was followed by chest x-rays and CT scans. The   CT scan showed a large mediastinal mass. A CT-guided biopsy at [facility] was read   as showing a thymoma.   He underwent surgery with Dr. Levi Butler at [facility]. The thymoma was involving the pericardium so he received   postoperative radiation therapy.   He finished the radiation therapy and has   remained with without evaluable disease in regards to his thymoma.      A routine lab tests done a few months  later showed that there has been a dramatic   drop in his hemoglobin, which was now 7.8 with a low serum ferritin of 8   suggesting iron deficiency anemia.   Stools were frankly Hemoccult positive.   In regards to his past history, the patient underwent a colonoscopy within our   system on 02/28/2012. The patient was found to have a sessile polyp in the   rectum. An endoscopic removal could not be done.   The patient was referred to another specialist in [facility] for resection since it seemed to   be arising in the setting of the radiation therapy field for his prostate   cancer.   The pathology report from that 2/2012 endoscopy was that of a tubular adenoma.   The patient was then seen in consultation by GI doctor outside the clinic. ,giana Arellano. The patient then underwent resection of the   lesion. The pathology report was the same.   The patient underwent a colonoscopy at [facility] on   05/07/2014, as part of the work up for his recently diagnosed iron deficiency.. It was done by Dr. Jaime Ash. Dr. Ash found a mass in the   rectal area. It was partially excised. Pathology was once again that of   tubulovillous adenoma with dysplasia.   The area of involvement coincided with the radiation therapy field for the prostate   cancer.   The patient had removal of the villous adenoma in [facility] by Dr Ansari.   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   Following the excision he had 2 episodes of rectal bleeding. He ended up having another surgery to control the bleeing and to do a re-excision of the area where the minimally invasive cancer was found.  Pathology revealed no residual cancer.  There has been no more bleeding. He feels fine  He also injects himself with b12 due low b12 levels.  He comes for follow up     .He has had a colonosocopy feb 2017  that showed intestinal polyps and was asked to return in a year     During a recent visit he was  found to have el;evated LFTs and an Us was requested.  He had gallstones and a fatty liver.  He says he has been drinking 3 alcoholic drinks a day   ALLERGIES: see Med card  MEDICATIONS: See MedCard.   PREVIOUS SURGERIES: Prostatectomy around the year 2000 or so, rectal surgery   for removal of tubular adenoma.x2, and another surgery for a minimally invasive rectal cancer 10/2014 Tonsillectomy, removal of thymoma.   SOCIAL HISTORY: Single with two children. Lives in [place]. He smoked   until age 31, approximately a pack a day for 15 years. He is a retired   .   FAMILY HISTORY: No cancer. Father had diabetes. Father had heart disease.   PAST MEDICAL HISTORY:   1. Anemia.   2. 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   3. History of treated prostate cancer.   4. Mild anemia.   5. Iron deficiency anemia.   6. High blood pressure.   7. Elevated cholesterol.   8. Recurrent villous adenoma of the rectum.   9. Reflux.   10. Vitamin B12 deficiency.   11. Status post resection of thymoma of the anterior mediastinum.   12. Skin cancer scalp  13-hx of treated prostate cancer  Review of Systems   Constitutional: Negative.  Negative for fatigue.   Eyes: Negative.    Cardiovascular: Negative.  Negative for chest pain.   Gastrointestinal: Negative for abdominal pain and nausea.   Genitourinary: Negative.  Negative for hematuria.   Musculoskeletal: Negative.    Skin: Negative.    Neurological: Negative.    Psychiatric/Behavioral: Negative.        Objective:      Physical Exam   Constitutional: He is oriented to person, place, and time. He appears well-developed and well-nourished.   HENT:   Head: Normocephalic.   Mouth/Throat: No oropharyngeal exudate.   Eyes: Pupils are equal, round, and reactive to light.   Neck: No thyromegaly present.   Cardiovascular: Normal rate, regular rhythm and normal heart sounds.  Exam reveals no gallop.    No murmur heard.  Pulmonary/Chest: No respiratory  distress. He has no wheezes. He has no rales.   Abdominal: Soft. Bowel sounds are normal. He exhibits no distension and no mass. There is no rebound and no guarding.   Musculoskeletal: Normal range of motion. He exhibits no edema.   Lymphadenopathy:     He has no cervical adenopathy.   Neurological: He is alert and oriented to person, place, and time.   Skin: Skin is warm and dry.   Psychiatric: He has a normal mood and affect. His behavior is normal.       Wt Readings from Last 3 Encounters:   03/28/18 85 kg (187 lb 6.3 oz)   02/27/18 85.7 kg (189 lb)   01/23/18 87.9 kg (193 lb 12.6 oz)     Temp Readings from Last 3 Encounters:   03/28/18 98.3 °F (36.8 °C) (Oral)   01/23/18 97.5 °F (36.4 °C) (Oral)   10/12/17 97.6 °F (36.4 °C) (Oral)     BP Readings from Last 3 Encounters:   03/28/18 110/62   02/27/18 126/80   01/23/18 112/72     Pulse Readings from Last 3 Encounters:   03/28/18 72   02/27/18 82   01/23/18 110       Lab Results   Component Value Date    CREATININE 1.5 (H) 03/21/2018     Lab Results   Component Value Date    ALT 48 (H) 03/21/2018    AST 82 (H) 03/21/2018    ALKPHOS 254 (H) 03/21/2018    BILITOT 0.8 03/21/2018     Lab Results   Component Value Date    CEA 2.9 03/21/2018        Lab Results   Component Value Date    WBC 5.71 03/21/2018    HGB 11.8 (L) 03/21/2018    HCT 34.5 (L) 03/21/2018    MCV 99 (H) 03/21/2018     03/21/2018       PSA  0.73  SPEp shows a monoclonal spike of 2.02 gr. Free light chains are both slightly elevated  Assessment:       1. History of rectal cancer    2. Paraproteinemia    3. Prostate cancer    4. History of thymoma    5. Abnormal liver enzymes    6. Other vitamin B12 deficiency anemia    7. Gallstones        Plan:        1-Hx of rectal cacner; CEA OK.N eeds referral for colonoscopy  2-Paraproteinemia: will monitor  3-prostate cancer: being followed by urology outside the clinic. PSa low  4-Hx of thymoma: needs CXR in 3 months  5-abnormal LFTS: asked to stop drinking.  Informed of gallstones finding. Hep b and C serologies ordered today. Will repeat LFTs in 3 months     6-B12 def: Continue b12 at home  7-gallstones: asymptomatic, will follow expectantly  Very complex visit requiring addressing multiple complicated medical issues

## 2018-03-29 LAB
HBV SURFACE AG SERPL QL IA: NEGATIVE
HCV AB SERPL QL IA: NEGATIVE

## 2018-04-06 RX ORDER — OMEPRAZOLE 20 MG/1
CAPSULE, DELAYED RELEASE ORAL
Qty: 90 CAPSULE | Refills: 0 | Status: SHIPPED | OUTPATIENT
Start: 2018-04-06 | End: 2018-07-08 | Stop reason: SDUPTHER

## 2018-04-11 ENCOUNTER — PATIENT MESSAGE (OUTPATIENT)
Dept: GASTROENTEROLOGY | Facility: CLINIC | Age: 78
End: 2018-04-11

## 2018-04-11 ENCOUNTER — DOCUMENTATION ONLY (OUTPATIENT)
Dept: ENDOSCOPY | Facility: HOSPITAL | Age: 78
End: 2018-04-11

## 2018-04-11 RX ORDER — SODIUM, POTASSIUM,MAG SULFATES 17.5-3.13G
SOLUTION, RECONSTITUTED, ORAL ORAL
Qty: 354 ML | Refills: 0 | Status: ON HOLD | OUTPATIENT
Start: 2018-04-11 | End: 2018-05-01 | Stop reason: HOSPADM

## 2018-04-24 DIAGNOSIS — R60.0 EDEMA OF BOTH LEGS: ICD-10-CM

## 2018-04-24 DIAGNOSIS — I10 ESSENTIAL HYPERTENSION: Chronic | ICD-10-CM

## 2018-04-24 RX ORDER — FUROSEMIDE 20 MG/1
TABLET ORAL
Qty: 30 TABLET | Refills: 12 | Status: SHIPPED | OUTPATIENT
Start: 2018-04-24 | End: 2018-08-28

## 2018-04-24 RX ORDER — FUROSEMIDE 20 MG/1
TABLET ORAL
Qty: 30 TABLET | Refills: 12 | Status: SHIPPED | OUTPATIENT
Start: 2018-04-24 | End: 2018-08-28 | Stop reason: SDUPTHER

## 2018-04-24 RX ORDER — FUROSEMIDE 20 MG/1
TABLET ORAL
Qty: 30 TABLET | Refills: 12 | Status: ON HOLD | OUTPATIENT
Start: 2018-04-24 | End: 2018-05-01 | Stop reason: HOSPADM

## 2018-05-01 ENCOUNTER — ANESTHESIA EVENT (OUTPATIENT)
Dept: ENDOSCOPY | Facility: HOSPITAL | Age: 78
End: 2018-05-01
Payer: MEDICARE

## 2018-05-01 ENCOUNTER — SURGERY (OUTPATIENT)
Age: 78
End: 2018-05-01

## 2018-05-01 ENCOUNTER — HOSPITAL ENCOUNTER (OUTPATIENT)
Facility: HOSPITAL | Age: 78
Discharge: HOME | End: 2018-05-01
Attending: INTERNAL MEDICINE | Admitting: INTERNAL MEDICINE
Payer: MEDICARE

## 2018-05-01 ENCOUNTER — ANESTHESIA (OUTPATIENT)
Dept: ENDOSCOPY | Facility: HOSPITAL | Age: 78
End: 2018-05-01
Payer: MEDICARE

## 2018-05-01 VITALS
RESPIRATION RATE: 18 BRPM | DIASTOLIC BLOOD PRESSURE: 67 MMHG | BODY MASS INDEX: 27.92 KG/M2 | SYSTOLIC BLOOD PRESSURE: 130 MMHG | OXYGEN SATURATION: 98 % | WEIGHT: 195 LBS | HEART RATE: 66 BPM | HEIGHT: 70 IN | TEMPERATURE: 98 F

## 2018-05-01 DIAGNOSIS — C20 RECTAL CANCER: ICD-10-CM

## 2018-05-01 PROCEDURE — 27201012 HC FORCEPS, HOT/COLD, DISP: Performed by: INTERNAL MEDICINE

## 2018-05-01 PROCEDURE — 45381 COLONOSCOPY SUBMUCOUS NJX: CPT | Performed by: INTERNAL MEDICINE

## 2018-05-01 PROCEDURE — 45385 COLONOSCOPY W/LESION REMOVAL: CPT | Mod: PT,,, | Performed by: INTERNAL MEDICINE

## 2018-05-01 PROCEDURE — 25000003 PHARM REV CODE 250: Performed by: INTERNAL MEDICINE

## 2018-05-01 PROCEDURE — 45381 COLONOSCOPY SUBMUCOUS NJX: CPT | Mod: 51,,, | Performed by: INTERNAL MEDICINE

## 2018-05-01 PROCEDURE — 88305 TISSUE EXAM BY PATHOLOGIST: CPT | Performed by: PATHOLOGY

## 2018-05-01 PROCEDURE — 45385 COLONOSCOPY W/LESION REMOVAL: CPT | Performed by: INTERNAL MEDICINE

## 2018-05-01 PROCEDURE — 37000008 HC ANESTHESIA 1ST 15 MINUTES: Performed by: INTERNAL MEDICINE

## 2018-05-01 PROCEDURE — 45380 COLONOSCOPY AND BIOPSY: CPT | Performed by: INTERNAL MEDICINE

## 2018-05-01 PROCEDURE — 63600175 PHARM REV CODE 636 W HCPCS: Performed by: NURSE ANESTHETIST, CERTIFIED REGISTERED

## 2018-05-01 PROCEDURE — 25000003 PHARM REV CODE 250: Performed by: NURSE ANESTHETIST, CERTIFIED REGISTERED

## 2018-05-01 PROCEDURE — 27201089 HC SNARE, DISP (ANY): Performed by: INTERNAL MEDICINE

## 2018-05-01 PROCEDURE — 37000009 HC ANESTHESIA EA ADD 15 MINS: Performed by: INTERNAL MEDICINE

## 2018-05-01 PROCEDURE — 88305 TISSUE EXAM BY PATHOLOGIST: CPT | Mod: 26,,, | Performed by: PATHOLOGY

## 2018-05-01 PROCEDURE — 45380 COLONOSCOPY AND BIOPSY: CPT | Mod: 59,,, | Performed by: INTERNAL MEDICINE

## 2018-05-01 RX ORDER — LIDOCAINE HCL/PF 100 MG/5ML
SYRINGE (ML) INTRAVENOUS
Status: DISCONTINUED | OUTPATIENT
Start: 2018-05-01 | End: 2018-05-01

## 2018-05-01 RX ORDER — SODIUM CHLORIDE, SODIUM LACTATE, POTASSIUM CHLORIDE, CALCIUM CHLORIDE 600; 310; 30; 20 MG/100ML; MG/100ML; MG/100ML; MG/100ML
INJECTION, SOLUTION INTRAVENOUS ONCE
Status: COMPLETED | OUTPATIENT
Start: 2018-05-01 | End: 2018-05-01

## 2018-05-01 RX ORDER — SODIUM CHLORIDE, SODIUM LACTATE, POTASSIUM CHLORIDE, CALCIUM CHLORIDE 600; 310; 30; 20 MG/100ML; MG/100ML; MG/100ML; MG/100ML
INJECTION, SOLUTION INTRAVENOUS CONTINUOUS PRN
Status: DISCONTINUED | OUTPATIENT
Start: 2018-05-01 | End: 2018-05-01

## 2018-05-01 RX ORDER — PROPOFOL 10 MG/ML
INJECTION, EMULSION INTRAVENOUS
Status: DISCONTINUED | OUTPATIENT
Start: 2018-05-01 | End: 2018-05-01

## 2018-05-01 RX ADMIN — PROPOFOL 30 MG: 10 INJECTION, EMULSION INTRAVENOUS at 08:05

## 2018-05-01 RX ADMIN — PROPOFOL 140 MG: 10 INJECTION, EMULSION INTRAVENOUS at 08:05

## 2018-05-01 RX ADMIN — SODIUM CHLORIDE, SODIUM LACTATE, POTASSIUM CHLORIDE, AND CALCIUM CHLORIDE: .6; .31; .03; .02 INJECTION, SOLUTION INTRAVENOUS at 07:05

## 2018-05-01 RX ADMIN — SODIUM CHLORIDE, SODIUM LACTATE, POTASSIUM CHLORIDE, AND CALCIUM CHLORIDE: 600; 310; 30; 20 INJECTION, SOLUTION INTRAVENOUS at 07:05

## 2018-05-01 RX ADMIN — LIDOCAINE HYDROCHLORIDE 100 MG: 20 INJECTION, SOLUTION INTRAVENOUS at 08:05

## 2018-05-01 RX ADMIN — SODIUM CHLORIDE, SODIUM LACTATE, POTASSIUM CHLORIDE, AND CALCIUM CHLORIDE: 600; 310; 30; 20 INJECTION, SOLUTION INTRAVENOUS at 08:05

## 2018-05-01 NOTE — ANESTHESIA POSTPROCEDURE EVALUATION
"Anesthesia Post Evaluation    Patient: Homero Maseau    Procedure(s) Performed: Procedure(s) (LRB):  hixtory of rectal cancer. Colonsocopy asked to be repeated minnie  year, alst one 2/2017 (N/A)    Final Anesthesia Type: MAC  Patient location during evaluation: PACU  Patient participation: Yes- Able to Participate  Level of consciousness: awake and alert and oriented  Post-procedure vital signs: reviewed and stable  Pain management: adequate  Airway patency: patent  PONV status at discharge: No PONV  Anesthetic complications: no      Cardiovascular status: blood pressure returned to baseline  Respiratory status: unassisted, spontaneous ventilation and room air  Hydration status: euvolemic  Follow-up not needed.        Visit Vitals  BP (!) 110/56   Pulse 71   Temp 36.4 °C (97.6 °F)   Resp 18   Ht 5' 10" (1.778 m)   Wt 88.5 kg (195 lb)   SpO2 97%   BMI 27.98 kg/m²       Pain/Jose Score: Pain Assessment Performed: Yes (5/1/2018  7:24 AM)  Presence of Pain: denies (5/1/2018  8:42 AM)  Jose Score: 10 (5/1/2018  8:42 AM)      "

## 2018-05-01 NOTE — ANESTHESIA PREPROCEDURE EVALUATION
05/01/2018  Homero Tanner is a 77 y.o., male.    Anesthesia Evaluation    I have reviewed the Patient Summary Reports.    I have reviewed the Nursing Notes.   I have reviewed the Medications.     Review of Systems  Anesthesia Hx:  No problems with previous Anesthesia  Denies Family Hx of Anesthesia complications.   Denies Personal Hx of Anesthesia complications.   Social:  Former Smoker, Social Alcohol Use    Hematology/Oncology:         -- Anemia: --  Cancer in past history:  surgery and radiation  Oncology Comments: Rectal adenocarcinoma  Skin cancer     EENT/Dental:   chronic allergic rhinitis   Cardiovascular:   Exercise tolerance: good Hypertension, well controlled Valvular problems/Murmurs, AI CHF hyperlipidemia ECG has been reviewed. Aortic insufficiency  PSVT (paroxysmal supraventricular tachycardia)  CHF (congestive heart failure)  TR (tricuspid regurgitation)  PVC's (premature ventricular contractions)  Edema of both legs     Pulmonary:   Asthma mild Shortness of breath    Renal/:  Renal/ Normal     Hepatic/GI:   Bowel Prep. GERD 0500 last drink of fluid.   Musculoskeletal:  Musculoskeletal Normal    Neurological:  Neurology Normal    Endocrine:  Endocrine Normal    Dermatological:  Skin Normal    Psych:  Psychiatric Normal           Physical Exam  General:  Well nourished    Airway/Jaw/Neck:  Airway Findings: Mouth Opening: Normal Tongue: Normal  General Airway Assessment: Adult, Average  Mallampati: II  TM Distance: Normal, at least 6 cm       Chest/Lungs:  Chest/Lungs Findings: Clear to auscultation, Normal Respiratory Rate     Heart/Vascular:  Heart Findings: Rate: Normal  Rhythm: Regular Rhythm  Sounds: Normal        Mental Status:  Mental Status Findings:  Cooperative, Alert and Oriented         Anesthesia Plan  Type of Anesthesia, risks & benefits discussed:  Anesthesia Type:   MAC  Patient's Preference:   Intra-op Monitoring Plan:   Intra-op Monitoring Plan Comments:   Post Op Pain Control Plan:   Post Op Pain Control Plan Comments:   Induction:   IV  Beta Blocker:  Patient is on a Beta-Blocker and has received one dose within the past 24 hours (No further documentation required).       Informed Consent: Patient understands risks and agrees with Anesthesia plan.  Questions answered. Anesthesia consent signed with patient.  ASA Score: 3     Day of Surgery Review of History & Physical: I have interviewed and examined the patient. I have reviewed the patient's H&P dated: 5/1/18. There are no significant changes.          Ready For Surgery From Anesthesia Perspective.

## 2018-05-01 NOTE — H&P
Short Stay Endoscopy History and Physical    PCP - Solomon Cortés MD    Procedure - Colonoscopy  ASA - II  Mallampati - per anesthesia  History of Anesthesia problems - no  Family history Anesthesia problems -  no     HPI:  This is a 77 y.o. male here for evaluation of :  Screening for colon cancer    Average Risk Screening:no  Personal history of colon cancer:yes  History of polyps: yes  Anemia: No  Blood in stools: No  Diarrhea: No  Abdominal Pain: No    Review of Systems:  CONSTITUTIONAL: Denies weight change,  fatigue, fevers, chills, night sweats.  CARDIOVASCULAR: Denies chest pain, shortness of breath, orthopnea and edema.  RESPIRATORY: Denies cough, hemoptysis, dyspnea, and wheezing.  GI: See HPI.    Medical History:  Past Medical History:   Diagnosis Date    Abnormal ECG 6/24/2013    Aortic insufficiency 6/24/2013    Aortic valve disorder 6/25/2013    Asthma     as a child    CHF (congestive heart failure)     Encounter for blood transfusion     GERD (gastroesophageal reflux disease)     Hypertension     Mixed hyperlipidemia 6/24/2013    Prostate cancer     PSVT (paroxysmal supraventricular tachycardia) 6/24/2013    Pulmonary hypertension 6/13/2016    Rectal adenocarcinoma     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    left parietal scalp    Thymoma     TR (tricuspid regurgitation) 6/13/2016       Surgical History:   Past Surgical History:   Procedure Laterality Date    biopsy for chest mass      COLON SURGERY      COLONOSCOPY N/A 10/26/2015    Procedure: Colonoscopy;  Surgeon: Abraham Hong MD;  Location: [facility];  Service: Endoscopy;  Laterality: N/A;    COLONOSCOPY Left 2/27/2017    Procedure: COLONOSCOPY;  Surgeon: Abraham Hong MD;  Location: [facility];  Service: Endoscopy;  Laterality: Left;    mohs      PROSTATECTOMY      RECTAL SURGERY N/A 09/2014    TONSILLECTOMY      TUMOR REMOVAL         Family History:   Family History   Problem Relation Age of Onset    Diabetes  Father     Amblyopia Neg Hx     Blindness Neg Hx     Cancer Neg Hx     Cataracts Neg Hx     Glaucoma Neg Hx     Hypertension Neg Hx     Macular degeneration Neg Hx     Retinal detachment Neg Hx     Strabismus Neg Hx     Stroke Neg Hx     Thyroid disease Neg Hx     Melanoma Neg Hx        Social History:   Social History   Substance Use Topics    Smoking status: Former Smoker     Packs/day: 1.00     Years: 15.00     Quit date: 2/6/1969    Smokeless tobacco: Never Used    Alcohol use 1.8 oz/week     3 Cans of beer per week      Comment: socially       Allergies: Reviewed.    Medications:  No current facility-administered medications on file prior to encounter.      Current Outpatient Prescriptions on File Prior to Encounter   Medication Sig Dispense Refill    b complex vitamins tablet Take by mouth. 1 tablet Oral Every day      cyanocobalamin 1,000 mcg/mL injection Give 1cc under the skin once a month 10 mL 4    fluocinonide (LIDEX) 0.05 % external solution AAA of scalp 1-2 times daily as needed for itch 60 mL 2    hydrALAZINE (APRESOLINE) 25 MG tablet TAKE 1 TABLET BY MOUTH MID-DAY AND IN THE EVENING 180 tablet 3    ketoconazole (NIZORAL) 2 % shampoo Wash hair with medicated shampoo at least 3x/week - let sit on scalp at least 5 minutes prior to rinsing 120 mL 5    metronidazole 0.75% (METROCREAM) 0.75 % Crea Apply 1 application topically 2 (two) times daily. Apply to affected on face 45 g 5    pravastatin (PRAVACHOL) 40 MG tablet Take 1 tablet (40 mg total) by mouth once daily. 90 tablet 4    sulfacetamide sodium 10 % Sham Use twice daily as face wash for rosacea 237 mL 6    aspirin (ECOTRIN) 81 MG EC tablet Take 81 mg by mouth once daily.      fluticasone (FLONASE) 50 mcg/actuation nasal spray 1 spray by Nasal route 2 (two) times daily as needed for Rhinitis or Allergies. 1 Aerosol, Spray Each nostril Twice a day       hydrALAZINE (APRESOLINE) 50 MG tablet TAKE 1 TABLET BY MOUTH EVERY  MORNING 90 tablet 3    leuprolide (LUPRON) 3.75 mg injection Inject 3.75 mg into the muscle every 3 (three) months.       metoprolol tartrate (LOPRESSOR) 100 MG tablet TAKE 1 TABLET(100 MG) BY MOUTH TWICE DAILY 180 tablet 0    triamcinolone acetonide 0.025% (KENALOG) 0.025 % cream AAA bid as needed for flares on face.  Mild steroid. 30 g 1       Physical Exam:  Vital Signs:   Vitals:    05/01/18 0737   BP: 125/76   Pulse: 68   Resp: 19   Temp: 97.6 °F (36.4 °C)     General Appearance: Well appearing in no acute distress  ENT: OP clear  Chest: CTA B  CV: RRR, no m/r/g  Abd: s/nt/nd/nabs  Ext: no edema    Labs:  Lab Results   Component Value Date    WBC 5.71 03/21/2018    HGB 11.8 (L) 03/21/2018    HCT 34.5 (L) 03/21/2018    MCV 99 (H) 03/21/2018     03/21/2018     Lab Results   Component Value Date    INR 1.1 10/10/2014    INR 3.7 (H) 10/09/2014    INR 1.1 09/30/2014     Lab Results   Component Value Date    IRON 101 11/16/2015    TIBC 216 (L) 11/16/2015    FERRITIN 124 11/16/2015         IMPRESSION:  Patient Active Problem List   Diagnosis    Aortic insufficiency    Abnormal ECG    Essential hypertension    Iron deficiency    Iron deficiency anemia    Colon polyps    Paraproteinemia    History of rectal cancer    Adenomatous rectal polyp    Prostate cancer    History of thymoma    Hyperlipidemia    Pulmonary hypertension    TR (tricuspid regurgitation)    PVC's (premature ventricular contractions)    Edema of both legs    Chronic diastolic heart failure    Abnormal liver enzymes    B12 deficiency anemia    History of colon polyps    Special screening for malignant neoplasms, colon    Benign neoplasm of cecum    Benign neoplasm of transverse colon    Diverticulosis of large intestine without hemorrhage    Gallstones     Personal history of rectal cancer    Plan:  I have explained the risks and benefits of colonoscopy to the patient including but not limited to bleeding, perforation,  infection, and death. The patient wishes to proceed with colonoscopy.

## 2018-05-01 NOTE — PROVATION PATIENT INSTRUCTIONS
Discharge Summary/Instructions after an Endoscopic Procedure  Patient Name: Homero Tanner  Patient MRN: [id number]  Patient YOB: 1940  Tuesday, May 01, 2018 Patricio Streeter MD  RESTRICTIONS:  During your procedure today, you received medications for sedation.  These   medications may affect your judgment, balance and coordination.  Therefore,   for 24 hours, you have the following restrictions:   - DO NOT drive a car, operate machinery, make legal/financial decisions,   sign important papers or drink alcohol.    ACTIVITY:  The following day: return to full activity including work, except no heavy   lifting, straining or running for 3 days if polyps were removed.  DIET:  Eat and drink normally unless instructed otherwise.     TREATMENT FOR COMMON SIDE EFFECTS:  - Mild abdominal pain, nausea, belching, bloating or excessive gas:  rest,   eat lightly and use a heating pad.  - Sore Throat: treat with throat lozenges and/or gargle with warm salt   water.  - Because air was used during the procedure, expelling large amounts of air   from your rectum or belching is normal.  - If a bowel prep was taken, you may not have a bowel movement for 1-3 days.    This is normal.  SYMPTOMS TO WATCH FOR AND REPORT TO YOUR PHYSICIAN:  1. Abdominal pain or bloating, other than gas cramps.  2. Chest pain.  3. Back pain.  4. Signs of infection such as: chills or fever occurring within 24 hours   after the procedure.  5. Rectal bleeding, which would show as bright red, maroon, or black stools.   (A tablespoon of blood from the rectum is not serious, especially if   hemorrhoids are present.)  6. Vomiting.  7. Weakness or dizziness.  GO DIRECTLY TO THE NEAREST EMERGENCY ROOM IF YOU HAVE ANY OF THE FOLLOWING:      Difficulty breathing              Chills and/or fever over 101 F   Persistent vomiting and/or vomiting blood   Severe abdominal pain   Severe chest pain   Black, tarry stools   Bleeding- more than one  tablespoon   Any other symptom or condition that you feel may need urgent attention  Your doctor recommends these additional instructions:  If any biopsies were taken, your doctors clinic will contact you in 1 to 2   weeks with any results.  - Patient has a contact number available for emergencies.  The signs and   symptoms of potential delayed complications were discussed with the   patient.  Return to normal activities tomorrow.  Written discharge   instructions were provided to the patient.   - Resume previous diet.   - Continue present medications.   - Await pathology results.   - Perform a flexible sigmoidoscopy for polypectomy at appointment to be   scheduled for ESD at [facility]   - Return to primary care physician as previously scheduled.   - Repeat colonoscopy in 1 year for surveillance.   - No ibuprofen, naproxen, or other non-steroidal anti-inflammatory drugs for   2 weeks after polyp removal.   - Discharge patient to home (with escort).  For questions, problems or results please call your physician Patricio Streeter MD at Work:  [phone]  If you have any questions about the above instructions, call the GI   department at [phone] or call the endoscopy unit at [phone]   from 7am until 3 pm.  [facility] [place], EMERGENCY ROOM PHONE NUMBER:   [phone]  IF A COMPLICATION OR EMERGENCY SITUATION ARISES AND YOU ARE UNABLE TO REACH   YOUR PHYSICIAN - GO DIRECTLY TO THE EMERGENCY ROOM.  I have read or have had read to me these discharge instructions for my   procedure and have received a written copy.  I understand these   instructions and will follow-up with my physician if I have any questions.     __________________________________       _____________________________________  Nurse Signature                                          Patient/Designated   Responsible Party Signature  Patricio Streeter MD  5/1/2018 8:46:09 AM  This report has  been verified and signed electronically.

## 2018-05-01 NOTE — DISCHARGE INSTRUCTIONS
Understanding Colon and Rectal Polyps    The colon (also called the large intestine) is a muscular tube that forms the last part of the digestive tract. It absorbs water and stores food waste. The colon is about 4 to 6 feet long. The rectum is the last 6 inches of the colon. The colon and rectum have a smooth lining composed of millions of cells. Changes in these cells can lead to growths in the colon that can become cancerous and should be removed. Multiple tests are available to screen for colon cancer, but the colonoscopy is the most recommended test. During colonoscopy, these polyps can be removed. How often you need this test depends on many things including your condition, your family history, symptoms, and what the findings were at the previous colonoscopy.   When the colon lining changes  Changes that happen in the cells that line the colon or rectum can lead to growths called polyps. Over a period of years, polyps can turn cancerous. Removing polyps early may prevent cancer from ever forming.  Polyps  Polyps are fleshy clumps of tissue that form on the lining of the colon or rectum. Small polyps are usually benign (not cancerous). However, over time, cells in a polyp can change and become cancerous. Certain types of polyps known as adenomatous polyps are premalignant. The risk for invasive cancer increases with the size of the polyp and certain cell and gene features. This means that they can become cancerous if they're not removed. Hyperplastic polyps are benign. They can grow quite large and not turn cancerous.   Cancer  Almost all colorectal cancers start when polyp cells begin growing abnormally. As a cancerous tumor grows, it may involve more and more of the colon or rectum. In time, cancer can also grow beyond the colon or rectum and spread to nearby organs or to glands called lymph nodes. The cells can also travel to other parts of the body. This is known as metastasis. The earlier a cancerous  tumor is removed, the better the chance of preventing its spread.    Date Last Reviewed: 8/1/2016  © 1741-8424 [employer]. [address] [place] [address], [place] [address]. All rights reserved. This information is not intended as a substitute for professional medical care. Always follow your healthcare professional's instructions.        Diverticulosis    Diverticulosis means that small pouches have formed in the wall of your large intestine (colon). Most often, this problem causes no symptoms and is common as people age. But the pouches in the colon are at risk of becoming infected. When this happens, the condition is called diverticulitis. Although most people with diverticulosis never develop diverticulitis, it is still not uncommon. Rectal bleeding can also occur and in less common situations, a type of colon inflammation called colitis.  While most people do not have symptoms, some people with diverticulosis may have:  · Abdominal cramps and pain  · Bloating  · Constipation  · Change in bowel habits  Causes  The exact cause of diverticulosis (and diverticulitis) has not been proved, but a few things are associated with the condition:  · Low-fiber diet  · Constipation  · Lack of exercise  Your healthcare provider will talk with you about how to manage your condition. Diet changes may be all that are needed to help control diverticulosis and prevent progression to diverticulitis. If you develop diverticulitis, you will likely need other treatments.  Home care  You may be told to take fiber supplements daily. Fiber adds bulk to the stool so that it passes through the colon more easily. Stool softeners may be recommended. You may also be given medications for pain relief. Be sure to take all medications as directed.  In the past, people were told to avoid corn, nuts, and seeds. This is no longer necessary.  Follow these guidelines when caring for yourself at home:  · Eat unprocessed foods that are high in  fiber. Whole grains, fruits, and vegetables are good choices.  · Drink 6 to 8 glasses of water every day unless your healthcare provider has you limit how much fluid you should have.  · Watch for changes in your bowel movements. Tell your provider if you notice any changes.  · Begin an exercise program. Ask your provider how to get started. Generally, walking is the best.  · Get plenty of rest and sleep.  Follow-up care  Follow up with your healthcare provider, or as advised. Regular visits may be needed to check on your health. Sometimes special procedures such as colonoscopy, are needed after an episode of diverticulitis or blooding. Be sure to keep all your appointments.  If a stool sample was taken, or cultures were done, you should be told if they are positive, or if your treatment needs to be changed. You can call as directed for the results.  If X-rays were done, a radiologist will look at them. You will be told if there is a change in your treatment.  If antibiotics were prescribed, be sure to finish them all.  When to seek medical advice  Call your healthcare provider right away if any of these occur:  · Fever of 100.4°F (38°C) or higher, or as directed by your healthcare provider  · Severe cramps in the lower left side of the abdomen or pain that is getting worse  · Tenderness in the lower left side of the abdomen or worsening pain throughout the abdomen  · Diarrhea or constipation that doesn't get better within 24 hours  · Nausea and vomiting  · Bleeding from the rectum  Call 911  Call emergency services if any of the following occur:  · Trouble breathing  · Confusion  · Very drowsy or trouble awakening  · Fainting or loss of consciousness  · Rapid heart rate  · Chest pain  Date Last Reviewed: 12/30/2015  © 5258-2420 [employer]. [address] [place] [address], [place] [address]. All rights reserved. This information is not intended as a substitute for professional medical care. Always follow your  healthcare professional's instructions.

## 2018-05-01 NOTE — OR NURSING
+++++  Diverticulosis noted.  1. Ascending colon polyps.  2. Sigmoid polyp.  Patient tolerated procedure well.

## 2018-05-01 NOTE — ANESTHESIA RELEASE NOTE
"Anesthesia Release from PACU Note    Patient: Homero Worley Ciro    Procedure(s) Performed: Procedure(s) (LRB):  hixtory of rectal cancer. Colonsocopy asked to be repeated minnie  year, alst one 2/2017 (N/A)    Anesthesia type: MAC    Post pain: Adequate analgesia    Post assessment: no apparent anesthetic complications, tolerated procedure well and no evidence of recall    Last Vitals:   Visit Vitals  BP (!) 110/56   Pulse 71   Temp 36.4 °C (97.6 °F)   Resp 18   Ht 5' 10" (1.778 m)   Wt 88.5 kg (195 lb)   SpO2 97%   BMI 27.98 kg/m²       Post vital signs: stable    Level of consciousness: awake and alert     Nausea/Vomiting: no nausea/no vomiting    Complications: none    Airway Patency: patent    Respiratory: unassisted, spontaneous ventilation, room air    Cardiovascular: stable    Hydration: euvolemic  "

## 2018-05-01 NOTE — TRANSFER OF CARE
"Anesthesia Transfer of Care Note    Patient: Homero Worley Ciro    Procedure(s) Performed: Procedure(s) (LRB):  hixtory of rectal cancer. Colonsocopy asked to be repeated minnie  year, alst one 2/2017 (N/A)    Patient location: PACU    Anesthesia Type: MAC    Transport from OR: Transported from OR on room air with adequate spontaneous ventilation    Post pain: adequate analgesia    Post assessment: no apparent anesthetic complications    Post vital signs: stable    Level of consciousness: awake and alert    Nausea/Vomiting: no nausea/vomiting    Complications: none    Transfer of care protocol was followed      Last vitals:   Visit Vitals  BP (!) 110/56   Pulse 71   Temp 36.4 °C (97.6 °F)   Resp 18   Ht 5' 10" (1.778 m)   Wt 88.5 kg (195 lb)   SpO2 97%   BMI 27.98 kg/m²     "

## 2018-05-03 ENCOUNTER — TELEPHONE (OUTPATIENT)
Dept: GASTROENTEROLOGY | Facility: CLINIC | Age: 78
End: 2018-05-03

## 2018-05-03 DIAGNOSIS — K63.5 POLYP OF COLON, UNSPECIFIED PART OF COLON, UNSPECIFIED TYPE: Primary | ICD-10-CM

## 2018-05-03 NOTE — TELEPHONE ENCOUNTER
----- Message from Murray Bee MD sent at 5/3/2018  3:13 PM CDT -----  Ok, we will schedule a colonoscopy with biopsies and endoscopic therapy. I am copying Lisbeth to schedule.  Thanks  Murray  ----- Message -----  From: Patricio Streeter MD  Sent: 5/3/2018   3:10 PM  To: Murray Bee MD    I did not biopsy! I was convinced it was recurrence... Sorry!    ----- Message -----  From: Murray Bee MD  Sent: 5/3/2018   2:48 PM  To: Patricio Streeter MD    I think we need to wait first for the pathology. This area was treated before and it may be difficult to removed vis ESD due to scarring. If the biopsies are negative for malignancy he may be a good candidate for hybrid APC or a EndoRotor device.  Thanks  Murray Bee MD  ----- Message -----  From: Patricio Streeter MD  Sent: 5/3/2018   2:18 PM  To: Lisbeth Linares MA, Murray Bee MD, #    Dr. Bee    He has h/o recurrent polyp at prior polypectomy site/adenocarcinoma (last biopsy hyperplastic) but has visible polyp now. The center of polyp was tethered so I didn't remove it. I think he would benefit from an ESD. Can you please schedule it?  Thanks  Patricio

## 2018-05-16 ENCOUNTER — TELEPHONE (OUTPATIENT)
Dept: GASTROENTEROLOGY | Facility: CLINIC | Age: 78
End: 2018-05-16

## 2018-05-16 NOTE — TELEPHONE ENCOUNTER
Spoke with patient. Colonoscopy/poss EMR scheduled for 6/27 at 11:30a. Endo scheduling nurse will contact patient with prep

## 2018-05-31 ENCOUNTER — LAB VISIT (OUTPATIENT)
Dept: LAB | Facility: HOSPITAL | Age: 78
End: 2018-05-31
Attending: INTERNAL MEDICINE
Payer: MEDICARE

## 2018-05-31 ENCOUNTER — OFFICE VISIT (OUTPATIENT)
Dept: INTERNAL MEDICINE | Facility: CLINIC | Age: 78
End: 2018-05-31
Payer: MEDICARE

## 2018-05-31 VITALS
WEIGHT: 188.69 LBS | HEIGHT: 70 IN | HEART RATE: 72 BPM | DIASTOLIC BLOOD PRESSURE: 60 MMHG | TEMPERATURE: 99 F | BODY MASS INDEX: 27.01 KG/M2 | SYSTOLIC BLOOD PRESSURE: 110 MMHG

## 2018-05-31 DIAGNOSIS — I10 ESSENTIAL HYPERTENSION: Chronic | ICD-10-CM

## 2018-05-31 DIAGNOSIS — E78.2 MIXED HYPERLIPIDEMIA: ICD-10-CM

## 2018-05-31 DIAGNOSIS — Z85.048 HISTORY OF RECTAL CANCER: ICD-10-CM

## 2018-05-31 DIAGNOSIS — D89.2 PARAPROTEINEMIA: ICD-10-CM

## 2018-05-31 DIAGNOSIS — Z87.898 HISTORY OF THYMOMA: ICD-10-CM

## 2018-05-31 DIAGNOSIS — C61 PROSTATE CANCER: ICD-10-CM

## 2018-05-31 DIAGNOSIS — Z00.00 ROUTINE GENERAL MEDICAL EXAMINATION AT HEALTH CARE FACILITY: Primary | ICD-10-CM

## 2018-05-31 DIAGNOSIS — D12.8 ADENOMATOUS RECTAL POLYP: ICD-10-CM

## 2018-05-31 PROBLEM — D12.3 BENIGN NEOPLASM OF TRANSVERSE COLON: Status: RESOLVED | Noted: 2017-02-27 | Resolved: 2018-05-31

## 2018-05-31 PROBLEM — Z12.11 SPECIAL SCREENING FOR MALIGNANT NEOPLASMS, COLON: Status: RESOLVED | Noted: 2017-02-27 | Resolved: 2018-05-31

## 2018-05-31 PROBLEM — C20 RECTAL CANCER: Status: RESOLVED | Noted: 2018-05-01 | Resolved: 2018-05-31

## 2018-05-31 PROBLEM — D12.0 BENIGN NEOPLASM OF CECUM: Status: RESOLVED | Noted: 2017-02-27 | Resolved: 2018-05-31

## 2018-05-31 LAB
ALBUMIN SERPL BCP-MCNC: 3.2 G/DL
ALP SERPL-CCNC: 267 U/L
ALT SERPL W/O P-5'-P-CCNC: 55 U/L
ANION GAP SERPL CALC-SCNC: 13 MMOL/L
AST SERPL-CCNC: 116 U/L
BILIRUB SERPL-MCNC: 1 MG/DL
BUN SERPL-MCNC: 13 MG/DL
CALCIUM SERPL-MCNC: 9 MG/DL
CHLORIDE SERPL-SCNC: 95 MMOL/L
CO2 SERPL-SCNC: 28 MMOL/L
CREAT SERPL-MCNC: 1.2 MG/DL
EST. GFR  (AFRICAN AMERICAN): >60 ML/MIN/1.73 M^2
EST. GFR  (NON AFRICAN AMERICAN): 58.4 ML/MIN/1.73 M^2
GLUCOSE SERPL-MCNC: 88 MG/DL
POTASSIUM SERPL-SCNC: 3.8 MMOL/L
PROT SERPL-MCNC: 7.7 G/DL
SODIUM SERPL-SCNC: 136 MMOL/L

## 2018-05-31 PROCEDURE — 99397 PER PM REEVAL EST PAT 65+ YR: CPT | Mod: S$GLB,,, | Performed by: INTERNAL MEDICINE

## 2018-05-31 PROCEDURE — 99499 UNLISTED E&M SERVICE: CPT | Mod: HCNC,S$GLB,, | Performed by: INTERNAL MEDICINE

## 2018-05-31 PROCEDURE — 3074F SYST BP LT 130 MM HG: CPT | Mod: CPTII,S$GLB,, | Performed by: INTERNAL MEDICINE

## 2018-05-31 PROCEDURE — 80053 COMPREHEN METABOLIC PANEL: CPT

## 2018-05-31 PROCEDURE — 99999 PR PBB SHADOW E&M-EST. PATIENT-LVL III: CPT | Mod: PBBFAC,,, | Performed by: INTERNAL MEDICINE

## 2018-05-31 PROCEDURE — 36415 COLL VENOUS BLD VENIPUNCTURE: CPT | Mod: PO

## 2018-05-31 PROCEDURE — 3078F DIAST BP <80 MM HG: CPT | Mod: CPTII,S$GLB,, | Performed by: INTERNAL MEDICINE

## 2018-05-31 NOTE — PROGRESS NOTES
Subjective:       Patient ID: Homero Tanner is a 77 y.o. male.    Chief Complaint: Annual Exam    HPI Patient is a 77-year-old male presenting today for updated physical exam review of chronic health issues.  He has longstanding issues with cancer related diagnoses.  He has been followed consistently by his Hematology Oncology team as well as his urologist.  He had lost follow-up with primary care.    Patient has history of hypertension which has been under good control.  He  continues take his medications as recommended.  He  describes no issues of cardiac decompensation.    He has had a history of rectal cancer which was surgically excised.  He has a polyp that tends to generate at the area of the surgical scar.  He is due for colonoscopy and will be going down new wounds for the colorectal surgeons to do an updated scope to assess that area.  He notes no bleeding or other issues associated with colon cancer.    Patient has history of prostate cancer and is followed by outside Urology.  He is on Lupron injections at this time.  He has regular PSAs drawn by the urologist and the management of that issue is being done by the urologist.  He relates no acute issues there.    He has had a history of a malignant thymoma.  This was treated with surgery and radiation.  He is followed by Oncology and there has been no evidence recurrence or further issues with that.    He has hyperlipidemia which has not been assessed recently.  He is followed by Dr. Wright for his cardiac issues.  In reviewing the chart it appears he has a  lipid panel scheduled for August.  Rather than duplicate result we will let Dr. Wright is labs in August take care updating his lipid panel.  Patient relates no cardiac symptoms at this time.    He is also noted to have paraproteinemia which is being followed by Hematology.  There has been no evidence of progression on this issue.    Overall the patient feels like he is stable and is doing well.   "He has no acute complaints.    Review of Systems   Constitutional: Positive for fatigue. Negative for fever and unexpected weight change.   HENT: Negative for hearing loss, postnasal drip and rhinorrhea.    Eyes: Negative for pain and visual disturbance.   Respiratory: Negative for cough, shortness of breath and wheezing.    Cardiovascular: Negative for chest pain and palpitations.   Gastrointestinal: Negative for constipation, diarrhea, nausea and vomiting.   Genitourinary: Negative for dysuria and hematuria.   Musculoskeletal: Positive for arthralgias. Negative for back pain, myalgias and neck stiffness.   Skin: Negative for pallor and rash.   Neurological: Negative for seizures, syncope and headaches.   Hematological: Negative for adenopathy.   Psychiatric/Behavioral: Negative for dysphoric mood. The patient is not nervous/anxious.        Objective:   /60   Pulse 72   Temp 98.8 °F (37.1 °C) (Tympanic)   Ht 5' 10" (1.778 m)   Wt 85.6 kg (188 lb 11.4 oz)   BMI 27.08 kg/m²      Physical Exam   Constitutional: He is oriented to person, place, and time. He appears well-developed and well-nourished. No distress.   HENT:   Head: Normocephalic and atraumatic.   Mouth/Throat: Oropharynx is clear and moist.   Eyes: EOM are normal. Pupils are equal, round, and reactive to light.   Neck: Normal range of motion. Neck supple. No JVD present. No thyromegaly present.   Cardiovascular: Normal rate, regular rhythm, normal heart sounds and intact distal pulses.  Exam reveals no gallop and no friction rub.    No murmur heard.  Pulmonary/Chest: Effort normal and breath sounds normal. He has no wheezes. He has no rales.   Abdominal: Soft. Bowel sounds are normal. He exhibits no distension. There is no tenderness. There is no rebound and no guarding.   Musculoskeletal: Normal range of motion. He exhibits no edema.   Lymphadenopathy:     He has no cervical adenopathy.   Neurological: He is alert and oriented to person, place, " and time. He has normal reflexes. No cranial nerve deficit.   Skin: Skin is warm and dry. No rash noted.   Psychiatric: He has a normal mood and affect. Judgment normal.   Vitals reviewed.          Assessment:       1. Routine general medical examination at health care facility    2. Essential hypertension    3. Adenomatous rectal polyp    4. Prostate cancer    5. History of rectal cancer    6. History of thymoma    7. Mixed hyperlipidemia    8. Paraproteinemia        Plan:   Essential hypertension  Blood pressure is under good control.  We will continue the current regimen.  Will work on regular aerobic exercise and a low salt diet.        Hyperlipidemia  Needs updated labs, continue pravastatin.    Paraproteinemia  Followed by hem;/onc. No issues at this time.    Prostate cancer  Followed by Dr. Fuentes with recurrent PSAs.  Being treated with lupron at this time.    History of thymoma  Chronic monitoring with cxr by hem/onc. No evidence of recurrence    History of rectal cancer  CLose follow up with annual colonoscopy.  GOes to [place] for scopes due to polyp at the scar from prior surgery.    Homero was seen today for annual exam.    Diagnoses and all orders for this visit:    Routine general medical examination at health care facility  Comments:  Focus on good health habits, low fat diet, regular exercise, seatbelt use, sunscreen use    Essential hypertension  -     Comprehensive metabolic panel; Future    Adenomatous rectal polyp    Prostate cancer    History of rectal cancer    History of thymoma    Mixed hyperlipidemia  Comments:  Having lipid panel drawn by Dr. Wright in August.    Paraproteinemia        Follow-up in about 6 months (around 11/30/2018).

## 2018-05-31 NOTE — ASSESSMENT & PLAN NOTE
CLose follow up with annual colonoscopy.  GOes to [place] for scopes due to polyp at the scar from prior surgery.

## 2018-06-01 ENCOUNTER — TELEPHONE (OUTPATIENT)
Dept: ENDOSCOPY | Facility: HOSPITAL | Age: 78
End: 2018-06-01

## 2018-06-01 ENCOUNTER — PATIENT MESSAGE (OUTPATIENT)
Dept: INTERNAL MEDICINE | Facility: CLINIC | Age: 78
End: 2018-06-01

## 2018-06-01 DIAGNOSIS — Z12.11 SPECIAL SCREENING FOR MALIGNANT NEOPLASMS, COLON: Primary | ICD-10-CM

## 2018-06-01 DIAGNOSIS — R74.01 TRANSAMINITIS: Primary | ICD-10-CM

## 2018-06-01 RX ORDER — SODIUM, POTASSIUM,MAG SULFATES 17.5-3.13G
1 SOLUTION, RECONSTITUTED, ORAL ORAL ONCE
Qty: 1 KIT | Refills: 0 | Status: SHIPPED | OUTPATIENT
Start: 2018-06-01 | End: 2018-06-01

## 2018-06-01 NOTE — TELEPHONE ENCOUNTER
Patient was informed of his results via phone.  Patient verbalized understanding.  Appointment scheduled and mailed for reminder.

## 2018-06-01 NOTE — TELEPHONE ENCOUNTER
Liver functions continue to increase.  I am going to make a referral to GI to address the liver abnormalities.    The rest of the labs looked okay.

## 2018-06-14 RX ORDER — METOPROLOL TARTRATE 100 MG/1
TABLET ORAL
Qty: 180 TABLET | Refills: 3 | Status: SHIPPED | OUTPATIENT
Start: 2018-06-14 | End: 2019-06-16 | Stop reason: SDUPTHER

## 2018-06-14 RX ORDER — PRAVASTATIN SODIUM 40 MG/1
TABLET ORAL
Qty: 90 TABLET | Refills: 3 | Status: SHIPPED | OUTPATIENT
Start: 2018-06-14 | End: 2018-08-28 | Stop reason: ALTCHOICE

## 2018-06-26 ENCOUNTER — HOSPITAL ENCOUNTER (OUTPATIENT)
Dept: RADIOLOGY | Facility: HOSPITAL | Age: 78
Discharge: HOME | End: 2018-06-26
Attending: INTERNAL MEDICINE
Payer: MEDICARE

## 2018-06-26 DIAGNOSIS — Z87.898 HISTORY OF THYMOMA: ICD-10-CM

## 2018-06-26 PROCEDURE — 71046 X-RAY EXAM CHEST 2 VIEWS: CPT | Mod: 26,,, | Performed by: RADIOLOGY

## 2018-06-26 PROCEDURE — 71046 X-RAY EXAM CHEST 2 VIEWS: CPT | Mod: TC,FY,PO

## 2018-06-27 ENCOUNTER — ANESTHESIA EVENT (OUTPATIENT)
Dept: ENDOSCOPY | Facility: HOSPITAL | Age: 78
End: 2018-06-27
Payer: MEDICARE

## 2018-06-27 ENCOUNTER — HOSPITAL ENCOUNTER (OUTPATIENT)
Facility: HOSPITAL | Age: 78
Discharge: HOME | End: 2018-06-27
Attending: INTERNAL MEDICINE | Admitting: INTERNAL MEDICINE
Payer: MEDICARE

## 2018-06-27 ENCOUNTER — ANESTHESIA (OUTPATIENT)
Dept: ENDOSCOPY | Facility: HOSPITAL | Age: 78
End: 2018-06-27
Payer: MEDICARE

## 2018-06-27 ENCOUNTER — SURGERY (OUTPATIENT)
Age: 78
End: 2018-06-27

## 2018-06-27 VITALS
DIASTOLIC BLOOD PRESSURE: 59 MMHG | SYSTOLIC BLOOD PRESSURE: 121 MMHG | WEIGHT: 187 LBS | RESPIRATION RATE: 18 BRPM | HEART RATE: 78 BPM | HEIGHT: 70 IN | TEMPERATURE: 99 F | OXYGEN SATURATION: 97 % | BODY MASS INDEX: 26.77 KG/M2

## 2018-06-27 DIAGNOSIS — D12.8 ADENOMATOUS RECTAL POLYP: Primary | ICD-10-CM

## 2018-06-27 PROCEDURE — 27200997: Performed by: INTERNAL MEDICINE

## 2018-06-27 PROCEDURE — 63600175 PHARM REV CODE 636 W HCPCS: Performed by: NURSE ANESTHETIST, CERTIFIED REGISTERED

## 2018-06-27 PROCEDURE — 88305 TISSUE EXAM BY PATHOLOGIST: CPT | Mod: 26,,, | Performed by: PATHOLOGY

## 2018-06-27 PROCEDURE — D9220A PRA ANESTHESIA: Mod: ANES,,, | Performed by: ANESTHESIOLOGY

## 2018-06-27 PROCEDURE — 37000009 HC ANESTHESIA EA ADD 15 MINS: Performed by: INTERNAL MEDICINE

## 2018-06-27 PROCEDURE — 45335 SIGMOIDOSCOPY W/SUBMUC INJ: CPT | Performed by: INTERNAL MEDICINE

## 2018-06-27 PROCEDURE — 25000003 PHARM REV CODE 250: Performed by: INTERNAL MEDICINE

## 2018-06-27 PROCEDURE — 25000003 PHARM REV CODE 250: Performed by: NURSE ANESTHETIST, CERTIFIED REGISTERED

## 2018-06-27 PROCEDURE — 27201028 HC NEEDLE, SCLERO: Performed by: INTERNAL MEDICINE

## 2018-06-27 PROCEDURE — 88305 TISSUE EXAM BY PATHOLOGIST: CPT | Performed by: PATHOLOGY

## 2018-06-27 PROCEDURE — 37000008 HC ANESTHESIA 1ST 15 MINUTES: Performed by: INTERNAL MEDICINE

## 2018-06-27 PROCEDURE — 45349 SIGMOIDOSCOPY W/RESECTION: CPT | Performed by: INTERNAL MEDICINE

## 2018-06-27 PROCEDURE — 45349 SIGMOIDOSCOPY W/RESECTION: CPT | Mod: ,,, | Performed by: INTERNAL MEDICINE

## 2018-06-27 PROCEDURE — D9220A PRA ANESTHESIA: Mod: CRNA,,, | Performed by: NURSE ANESTHETIST, CERTIFIED REGISTERED

## 2018-06-27 PROCEDURE — 45338 SIGMOIDOSCOPY W/TUMR REMOVE: CPT

## 2018-06-27 RX ORDER — PROPOFOL 10 MG/ML
VIAL (ML) INTRAVENOUS CONTINUOUS PRN
Status: DISCONTINUED | OUTPATIENT
Start: 2018-06-27 | End: 2018-06-27

## 2018-06-27 RX ORDER — SODIUM CHLORIDE 9 MG/ML
INJECTION, SOLUTION INTRAVENOUS CONTINUOUS
Status: DISCONTINUED | OUTPATIENT
Start: 2018-06-27 | End: 2018-06-27 | Stop reason: HOSPADM

## 2018-06-27 RX ORDER — METOCLOPRAMIDE HYDROCHLORIDE 5 MG/ML
10 INJECTION INTRAMUSCULAR; INTRAVENOUS EVERY 10 MIN PRN
Status: DISCONTINUED | OUTPATIENT
Start: 2018-06-27 | End: 2018-06-27 | Stop reason: HOSPADM

## 2018-06-27 RX ORDER — PROPOFOL 10 MG/ML
VIAL (ML) INTRAVENOUS
Status: DISCONTINUED | OUTPATIENT
Start: 2018-06-27 | End: 2018-06-27

## 2018-06-27 RX ORDER — PHENYLEPHRINE HYDROCHLORIDE 10 MG/ML
INJECTION INTRAVENOUS
Status: DISCONTINUED | OUTPATIENT
Start: 2018-06-27 | End: 2018-06-27

## 2018-06-27 RX ORDER — LIDOCAINE HCL/PF 100 MG/5ML
SYRINGE (ML) INTRAVENOUS
Status: DISCONTINUED | OUTPATIENT
Start: 2018-06-27 | End: 2018-06-27

## 2018-06-27 RX ADMIN — PHENYLEPHRINE HYDROCHLORIDE 100 MCG: 10 INJECTION INTRAVENOUS at 12:06

## 2018-06-27 RX ADMIN — LIDOCAINE HYDROCHLORIDE 100 MG: 20 INJECTION, SOLUTION INTRAVENOUS at 12:06

## 2018-06-27 RX ADMIN — Medication 20 MG: at 12:06

## 2018-06-27 RX ADMIN — Medication 30 MG: at 12:06

## 2018-06-27 RX ADMIN — PROPOFOL 60 MG: 10 INJECTION, EMULSION INTRAVENOUS at 12:06

## 2018-06-27 RX ADMIN — PROPOFOL 100 MCG/KG/MIN: 10 INJECTION, EMULSION INTRAVENOUS at 12:06

## 2018-06-27 RX ADMIN — SODIUM CHLORIDE: 0.9 INJECTION, SOLUTION INTRAVENOUS at 10:06

## 2018-06-27 NOTE — PLAN OF CARE
Plan of care discussed with pt. Understanding verbalized. Pt states can discuss results with his friend

## 2018-06-27 NOTE — [employer] PATIENT INSTRUCTIONS
Discharge Summary/Instructions after an Endoscopic Procedure  Patient Name: Homero Tanner  Patient MRN: [id number]  Patient YOB: 1940 Wednesday, June 27, 2018  Rao Medeiros MD  RESTRICTIONS:  During your procedure today, you received medications for sedation.  These   medications may affect your judgment, balance and coordination.  Therefore,   for 24 hours, you have the following restrictions:   - DO NOT drive a car, operate machinery, make legal/financial decisions,   sign important papers or drink alcohol.    ACTIVITY:  Today: no heavy lifting, straining or running due to procedural   sedation/anesthesia.  The following day: return to full activity including work.  DIET:  Eat and drink normally unless instructed otherwise.     TREATMENT FOR COMMON SIDE EFFECTS:  - Mild abdominal pain, nausea, belching, bloating or excessive gas:  rest,   eat lightly and use a heating pad.  - Sore Throat: treat with throat lozenges and/or gargle with warm salt   water.  - Because air was used during the procedure, expelling large amounts of air   from your rectum or belching is normal.  - If a bowel prep was taken, you may not have a bowel movement for 1-3 days.    This is normal.  SYMPTOMS TO WATCH FOR AND REPORT TO YOUR PHYSICIAN:  1. Abdominal pain or bloating, other than gas cramps.  2. Chest pain.  3. Back pain.  4. Signs of infection such as: chills or fever occurring within 24 hours   after the procedure.  5. Rectal bleeding, which would show as bright red, maroon, or black stools.   (A tablespoon of blood from the rectum is not serious, especially if   hemorrhoids are present.)  6. Vomiting.  7. Weakness or dizziness.  GO DIRECTLY TO THE NEAREST EMERGENCY ROOM IF YOU HAVE ANY OF THE FOLLOWING:      Difficulty breathing              Chills and/or fever over 101 F   Persistent vomiting and/or vomiting blood   Severe abdominal pain   Severe chest pain   Black, tarry stools   Bleeding- more than one  tablespoon   Any other symptom or condition that you feel may need urgent attention  Your doctor recommends these additional instructions:  If any biopsies were taken, your doctors clinic will contact you in 1 to 2   weeks with any results.  - Discharge patient to home (ambulatory).   - Patient has a contact number available for emergencies.  The signs and   symptoms of potential delayed complications were discussed with the   patient.  Return to normal activities tomorrow.  Written discharge   instructions were provided to the patient.   - Resume previous diet.   - Continue present medications.   - Await pathology results.   - Resume previous diet; Discharge to home (ambulatory); Resume outpatient   medications  - Repeat flexible sigmoidoscopy in 6 months for surveillance based on   pathology results.  For questions, problems or results please call your physician - Roa Medeiros MD at Work:  [phone].  [facility], EMERGENCY ROOM PHONE NUMBER: [phone]  IF A COMPLICATION OR EMERGENCY SITUATION ARISES AND YOU ARE UNABLE TO REACH   YOUR PHYSICIAN - GO DIRECTLY TO THE EMERGENCY ROOM.  Rao Medeiros MD  6/27/2018 2:50:49 PM  This report has been verified and signed electronically.  [employer]

## 2018-06-27 NOTE — PLAN OF CARE
Dr. Medeiros at bedside of patient.   preformed rectal exam.  No active bleeding noted. Dr. Medeiros gave orders that patient can be discharged home.   explained to patient if  he has active bleeding after being discharged that patient to go to nearest ER.  Patient stated understanding. Vss, No distress noted. Patient discharged home .

## 2018-06-27 NOTE — H&P
Short Stay Endoscopy History and Physical    PCP - Solomon Cortés MD  Referring Physician - Solomon Cortés MD  [id number] [facility]  [address]  [place] [id number]    Procedure - Colonoscopy with EMR  ASA - per anesthesia  Mallampati - per anesthesia  History of Anesthesia problems - no  Family history Anesthesia problems -  no   Plan of anesthesia - General    HPI:  This is a 77 y.o. male here for evaluation of: rectal adenoma    Reflux - no  Dysphagia - no  Abdominal pain - no  Diarrhea - no    ROS:  Constitutional: No fevers, chills, No weight loss  CV: No chest pain  Pulm: No cough, No shortness of breath  Ophtho: No vision changes  GI: see HPI  Derm: No rash    Medical History:  has a past medical history of Abnormal ECG (6/24/2013); Aortic insufficiency (6/24/2013); Aortic valve disorder (6/25/2013); Asthma; Benign neoplasm of cecum (2/27/2017); Benign neoplasm of transverse colon (2/27/2017); CHF (congestive heart failure); Encounter for blood transfusion; GERD (gastroesophageal reflux disease); History of colon polyps; Hypertension; Iron deficiency anemia (10/26/2015); Mixed hyperlipidemia (6/24/2013); Prostate cancer; PSVT (paroxysmal supraventricular tachycardia) (6/24/2013); Pulmonary hypertension (6/13/2016); Rectal adenocarcinoma; Rectal cancer (5/1/2018); SCC (squamous cell carcinoma) (04/2015); Thymoma; and TR (tricuspid regurgitation) (6/13/2016).    Surgical History:  has a past surgical history that includes Prostatectomy; Colon surgery; Tonsillectomy; biopsy for chest mass; Tumor removal; Rectal surgery (N/A, 09/2014); Colonoscopy (N/A, 10/26/2015); mohs; Colonoscopy (Left, 2/27/2017); and Colonoscopy (N/A, 5/1/2018).    Family History: family history includes Diabetes in his father..    Social History:  reports that he quit smoking about 49 years ago. He has a 15.00 pack-year smoking history. He has never used smokeless tobacco. He reports that he drinks about 1.8 oz of alcohol per  week . He reports that he does not use drugs.    Review of patient's allergies indicates:   Allergen Reactions    Lipitor [atorvastatin] Other (See Comments)    Norvasc [amlodipine] Swelling       Medications:   Prescriptions Prior to Admission   Medication Sig Dispense Refill Last Dose    aspirin (ECOTRIN) 81 MG EC tablet Take 81 mg by mouth once daily.   Past Week at Unknown time    b complex vitamins tablet Take by mouth. 1 tablet Oral Every day   6/27/2018 at 0600    cyanocobalamin 1,000 mcg/mL injection Give 1cc under the skin once a month 10 mL 4 Past Month at Unknown time    fluocinonide (LIDEX) 0.05 % external solution AAA of scalp 1-2 times daily as needed for itch 60 mL 2 6/26/2018 at Unknown time    furosemide (LASIX) 20 MG tablet TAKE 1 TABLET(20 MG) BY MOUTH EVERY DAY 30 tablet 12 6/27/2018 at 0600    furosemide (LASIX) 20 MG tablet TAKE 1 TABLET(20 MG) BY MOUTH EVERY DAY 30 tablet 12 6/27/2018 at 0600     hydrALAZINE (APRESOLINE) 25 MG tablet TAKE 1 TABLET BY MOUTH MID-DAY AND IN THE EVENING 180 tablet 3 6/27/2018 at 0600    ketoconazole (NIZORAL) 2 % shampoo Wash hair with medicated shampoo at least 3x/week - let sit on scalp at least 5 minutes prior to rinsing 120 mL 5 6/26/2018 at Unknown time    metoprolol tartrate (LOPRESSOR) 100 MG tablet TAKE 1 TABLET(100 MG) BY MOUTH TWICE DAILY 180 tablet 3 6/27/2018 at 0600    metronidazole 0.75% (METROCREAM) 0.75 % Crea Apply 1 application topically 2 (two) times daily. Apply to affected on face 45 g 5 6/26/2018 at Unknown time    omeprazole (PRILOSEC) 20 MG capsule TAKE 1 CAPSULE(20 MG) BY MOUTH EVERY DAY 90 capsule 0 6/26/2018 at Unknown time    pravastatin (PRAVACHOL) 40 MG tablet TAKE 1 TABLET BY MOUTH DAILY 90 tablet 3 6/26/2018 at Unknown time    sulfacetamide sodium 10 % Sham Use twice daily as face wash for rosacea 237 mL 6 6/27/2018 at Unknown time    fluticasone (FLONASE) 50 mcg/actuation nasal spray 1 spray by Nasal route 2 (two)  times daily as needed for Rhinitis or Allergies. 1 Aerosol, Spray Each nostril Twice a day    More than a month at Unknown time    leuprolide (LUPRON) 3.75 mg injection Inject 3.75 mg into the muscle every 3 (three) months.    More than a month at Unknown time    triamcinolone acetonide 0.025% (KENALOG) 0.025 % cream AAA bid as needed for flares on face.  Mild steroid. 30 g 1 Taking       Physical Exam:    Vital Signs:   Vitals:    06/27/18 1030   BP: 130/82   Pulse:    Resp:    Temp:        General Appearance: Well appearing in no acute distress  Eyes:    No scleral icterus  ENT: Neck supple  Lungs: CTA anteriorly  Heart:  Regular rate  Abdomen: Soft, non tender, non distended with normal bowel sounds.  Extremities: No edema  Skin: No rash    Labs:  Lab Results   Component Value Date    WBC 6.25 06/26/2018    HGB 10.7 (L) 06/26/2018    HCT 30.5 (L) 06/26/2018     06/26/2018    CHOL 164 06/06/2016    TRIG 78 06/06/2016    HDL 79 (H) 06/06/2016    ALT 41 06/26/2018    AST 82 (H) 06/26/2018     06/26/2018    K 4.1 06/26/2018    CL 97 06/26/2018    CREATININE 1.4 06/26/2018    BUN 10 06/26/2018    CO2 23 06/26/2018    TSH 2.0 04/23/2008    PSA 1.1 01/02/2014    INR 1.1 10/10/2014    HGBA1C 5.2 06/06/2016       I have explained the risks and benefits of this endoscopic procedure to the patient including but not limited to bleeding, inflammation, infection, perforation, and death.      Rao Medeiros MD

## 2018-06-27 NOTE — PLAN OF CARE
Patient Vss, no complaints of pain or nausea. Pt disconnected from monitors recovery complete. Patient getting dressed. Friend at side of bed. Patient waiting for Dr. Medeiros to come and discuss finding of procedure. Discharge instructions explained and given to patient.  Patient stated understanding.

## 2018-06-27 NOTE — ANESTHESIA POSTPROCEDURE EVALUATION
"Anesthesia Post Evaluation    Patient: Homero Tanner    Procedure(s) Performed: Procedure(s) (LRB):  COLONOSCOPY/hybrid APC or a EndoRotor device (N/A)    Final Anesthesia Type: general  Patient location during evaluation: [facility]  Patient participation: Yes- Able to Participate  Level of consciousness: awake and alert and oriented  Post-procedure vital signs: reviewed and stable  Pain management: adequate  Airway patency: patent  PONV status at discharge: No PONV  Anesthetic complications: no      Cardiovascular status: stable  Respiratory status: unassisted, spontaneous ventilation and room air  Hydration status: euvolemic  Follow-up not needed.        Visit Vitals  BP (!) 110/56 (BP Location: Left arm, Patient Position: Lying)   Pulse 70   Temp 36.5 °C (97.7 °F) (Temporal)   Resp 18   Ht 5' 10" (1.778 m)   Wt 84.8 kg (187 lb)   SpO2 97%   BMI 26.83 kg/m²       Pain/Jose Score: Pain Assessment Performed: Yes (6/27/2018  1:10 PM)  Presence of Pain: denies (6/27/2018  1:10 PM)  Jose Score: 9 (6/27/2018  1:10 PM)      "

## 2018-06-27 NOTE — DISCHARGE INSTRUCTIONS
Colonoscopy     A camera attached to a flexible tube with a viewing lens is used to take video pictures.     Colonoscopy is a test to view the inside of your lower digestive tract (colon and rectum). Sometimes it can show the last part of the small intestine (ileum). During the test, small pieces of tissue may be removed for testing. This is called a biopsy. Small growths, such as polyps, may also be removed.   Why is colonoscopy done?  The test is done to help look for colon cancer. And it can help find the source of abdominal pain, bleeding, and changes in bowel habits. It may be needed once a year, depending on factors such as your:  · Age  · Health history  · Family health history  · Symptoms  · Results from any prior colonoscopy  Risks and possible complications  These include:  · Bleeding               · A puncture or tear in the colon   · Risks of anesthesia  · A cancer lesion not being seen  Getting ready   To prepare for the test:  · Talk with your healthcare provider about the risks of the test (see below). Also ask your healthcare provider about alternatives to the test.  · Tell your healthcare provider about any medicines you take. Also tell him or her about any health conditions you may have.  · Make sure your rectum and colon are empty for the test. Follow the diet and bowel prep instructions exactly. If you dont, the test may need to be rescheduled.  · Plan for a friend or family member to drive you home after the test.     Colonoscopy provides an inside view of the entire colon.     You may discuss the results with your doctor right away or at a future visit.  During the test   The test is usually done in the hospital on an outpatient basis. This means you go home the same day. The procedure takes about 30 minutes. During that time:  · You are given relaxing (sedating) medicine through an IV line. You may be drowsy, or fully asleep.  · The healthcare provider will first give you a physical exam to  check for anal and rectal problems.  · Then the anus is lubricated and the scope inserted.  · If you are awake, you may have a feeling similar to needing to have a bowel movement. You may also feel pressure as air is pumped into the colon. Its OK to pass gas during the procedure.  · Biopsy, polyp removal, or other treatments may be done during the test.  After the test   You may have gas right after the test. It can help to try to pass it to help prevent later bloating. Your healthcare provider may discuss the results with you right away. Or you may need to schedule a follow-up visit to talk about the results. After the test, you can go back to your normal eating and other activities. You may be tired from the sedation and need to rest for a few hours.  Date Last Reviewed: 11/1/2016 © 2000-2017 [employer]. [address] [place] [address]. All rights reserved. This information is not intended as a substitute for professional medical care. Always follow your healthcare professional's instructions.    [employer] Patient Instructions        Discharge Summary/Instructions after an Endoscopic Procedure  Wednesday, June 27, 2018    RESTRICTIONS:  During your procedure today, you received medications for sedation.  These   medications may affect your judgment, balance and coordination.  Therefore,   for 24 hours, you have the following restrictions:   - DO NOT drive a car, operate machinery, make legal/financial decisions,   sign important papers or drink alcohol.    ACTIVITY:  Today: no heavy lifting, straining or running due to procedural   sedation/anesthesia.  The following day: return to full activity including work.  DIET:  Eat and drink normally unless instructed otherwise.                TREATMENT FOR COMMON SIDE EFFECTS:  - Mild abdominal pain, nausea, belching, bloating or excessive gas:  rest,   eat lightly and use a heating pad.  - Sore Throat: treat with throat lozenges and/or gargle  with warm salt   water.  - Because air was used during the procedure, expelling large amounts of air   from your rectum or belching is normal.  - If a bowel prep was taken, you may not have a bowel movement for 1-3 days.    This is normal.  SYMPTOMS TO WATCH FOR AND REPORT TO YOUR PHYSICIAN:  1. Abdominal pain or bloating, other than gas cramps.  2. Chest pain.  3. Back pain.  4. Signs of infection such as: chills or fever occurring within 24 hours   after the procedure.  5. Rectal bleeding, which would show as bright red, maroon, or black stools.   (A tablespoon of blood from the rectum is not serious, especially if   hemorrhoids are present.)  6. Vomiting.  7. Weakness or dizziness.  GO DIRECTLY TO THE NEAREST EMERGENCY ROOM IF YOU HAVE ANY OF THE FOLLOWING:                 Difficulty breathing              Chills and/or fever over 101 F              Persistent vomiting and/or vomiting blood              Severe abdominal pain              Severe chest pain              Black, tarry stools              Bleeding- more than one tablespoon              Any other symptom or condition that you feel may need urgent attention  Your doctor recommends these additional instructions:  If any biopsies were taken, your doctors clinic will contact you in 1 to 2   weeks with any results.  - Discharge patient to home.   - Resume previous diet.   - Continue present medications.   - Repeat colonoscopy in 4 months for surveillance after piecemeal   polypectomy.   - Await pathology results.   - Hold antiplatlets/anticoagulation for 3 days.   For questions, problems or results please call your physician - at Work:  [phone].  [facility], EMERGENCY ROOM PHONE NUMBER: [phone]  IF A COMPLICATION OR EMERGENCY SITUATION ARISES AND YOU ARE UNABLE TO REACH   YOUR PHYSICIAN - GO DIRECTLY TO THE EMERGENCY ROOM.    6/27/2018 12:57:03 PM  This report has been verified and signed electronically.  [employer]

## 2018-06-27 NOTE — PLAN OF CARE
Patient stated under briefs feel wet.  Observed that moderate rectal blood was in briefs. Patient placed back in bed and and monitor reattached , Dr. Medeiros notified.

## 2018-06-27 NOTE — ANESTHESIA PREPROCEDURE EVALUATION
06/27/2018  Homero Tanner is a 77 y.o., male with a pre-operative diagnosis of Polyp of colon, unspecified part of colon, unspecified type [K63.5] who is scheduled for Procedure(s) (LRB):  COLONOSCOPY/hybrid APC or a EndoRotor device (N/A).     Requested anesthesia type: General  Surgeon: Rao Medeiros MD  Allergies:   Review of patient's allergies indicates:   Allergen Reactions    Lipitor [atorvastatin] Other (See Comments)    Norvasc [amlodipine] Swelling     Vital Sign Range: BP: ()/()   Arterial Line BP: ()/()   Chronic Medications:   Prescriptions Prior to Admission   Medication Sig Dispense Refill Last Dose    aspirin (ECOTRIN) 81 MG EC tablet Take 81 mg by mouth once daily.   4/26/2018    b complex vitamins tablet Take by mouth. 1 tablet Oral Every day   4/30/2018 at Unknown time    cyanocobalamin 1,000 mcg/mL injection Give 1cc under the skin once a month 10 mL 4 Past Month at Unknown time    fluocinonide (LIDEX) 0.05 % external solution AAA of scalp 1-2 times daily as needed for itch 60 mL 2 4/30/2018 at Unknown time    fluticasone (FLONASE) 50 mcg/actuation nasal spray 1 spray by Nasal route 2 (two) times daily as needed for Rhinitis or Allergies. 1 Aerosol, Spray Each nostril Twice a day    More than a month at Unknown time    furosemide (LASIX) 20 MG tablet TAKE 1 TABLET(20 MG) BY MOUTH EVERY DAY 30 tablet 12 5/1/2018 at Unknown time    furosemide (LASIX) 20 MG tablet TAKE 1 TABLET(20 MG) BY MOUTH EVERY DAY 30 tablet 12     hydrALAZINE (APRESOLINE) 25 MG tablet TAKE 1 TABLET BY MOUTH MID-DAY AND IN THE EVENING 180 tablet 3 5/1/2018 at Unknown time    ketoconazole (NIZORAL) 2 % shampoo Wash hair with medicated shampoo at least 3x/week - let sit on scalp at least 5 minutes prior to rinsing 120 mL 5 4/30/2018 at Unknown time    leuprolide (LUPRON) 3.75 mg injection Inject 3.75  mg into the muscle every 3 (three) months.    More than a month at Unknown time    metoprolol tartrate (LOPRESSOR) 100 MG tablet TAKE 1 TABLET(100 MG) BY MOUTH TWICE DAILY 180 tablet 3     metronidazole 0.75% (METROCREAM) 0.75 % Crea Apply 1 application topically 2 (two) times daily. Apply to affected on face 45 g 5 5/1/2018 at Unknown time    omeprazole (PRILOSEC) 20 MG capsule TAKE 1 CAPSULE(20 MG) BY MOUTH EVERY DAY 90 capsule 0 4/30/2018 at Unknown time    pravastatin (PRAVACHOL) 40 MG tablet TAKE 1 TABLET BY MOUTH DAILY 90 tablet 3     sulfacetamide sodium 10 % Sham Use twice daily as face wash for rosacea 237 mL 6 4/30/2018 at Unknown time    triamcinolone acetonide 0.025% (KENALOG) 0.025 % cream AAA bid as needed for flares on face.  Mild steroid. 30 g 1 Taking     Current Medications:   No current facility-administered medications for this encounter.      Medical History:   Past Medical History:   Diagnosis Date    Abnormal ECG 6/24/2013    Aortic insufficiency 6/24/2013    Aortic valve disorder 6/25/2013    Asthma     as a child    Benign neoplasm of cecum 2/27/2017    Benign neoplasm of transverse colon 2/27/2017    CHF (congestive heart failure)     Encounter for blood transfusion     GERD (gastroesophageal reflux disease)     Hypertension     Iron deficiency anemia 10/26/2015    Mixed hyperlipidemia 6/24/2013    Prostate cancer     PSVT (paroxysmal supraventricular tachycardia) 6/24/2013    Pulmonary hypertension 6/13/2016    Rectal adenocarcinoma     Rectal cancer 5/1/2018    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    left parietal scalp    Thymoma     TR (tricuspid regurgitation) 6/13/2016     .    Anesthesia Evaluation    I have reviewed the Patient Summary Reports.    I have reviewed the Nursing Notes.   I have reviewed the Medications.     Review of Systems  Anesthesia Hx:  No problems with previous Anesthesia  Denies Family Hx of Anesthesia complications.   Denies Personal  Hx of Anesthesia complications.   Social:  Former Smoker, Social Alcohol Use    Hematology/Oncology:         -- Anemia: --  Cancer in past history:  surgery and radiation  Oncology Comments: Rectal adenocarcinoma  Skin cancer     EENT/Dental:   chronic allergic rhinitis   Cardiovascular:   Exercise tolerance: good Hypertension, well controlled Valvular problems/Murmurs, AI CHF hyperlipidemia ECG has been reviewed. Aortic insufficiency  PSVT (paroxysmal supraventricular tachycardia)  CHF (congestive heart failure)  TR (tricuspid regurgitation)  PVC's (premature ventricular contractions)  Edema of both legs     Pulmonary:   Asthma mild Shortness of breath    Renal/:  Renal/ Normal     Hepatic/GI:   Bowel Prep. GERD 0500 last drink of fluid.   Musculoskeletal:  Musculoskeletal Normal    Neurological:  Neurology Normal    Endocrine:  Endocrine Normal    Dermatological:  Skin Normal    Psych:  Psychiatric Normal           Physical Exam  General:  Well nourished    Airway/Jaw/Neck:  Airway Findings: Mouth Opening: Normal Tongue: Normal  General Airway Assessment: Adult, Average, Good  Mallampati: II  TM Distance: Normal, at least 6 cm       Chest/Lungs:  Chest/Lungs Findings: Clear to auscultation, Normal Respiratory Rate     Heart/Vascular:  Heart Findings: Rate: Normal  Rhythm: Regular Rhythm  Sounds: Normal        Mental Status:  Mental Status Findings:  Cooperative, Alert and Oriented         Anesthesia Plan  Type of Anesthesia, risks & benefits discussed:  Anesthesia Type:  general, MAC  Patient's Preference: as indicated  Intra-op Monitoring Plan: standard ASA monitors  Intra-op Monitoring Plan Comments:   Post Op Pain Control Plan:   Post Op Pain Control Plan Comments:   Induction:   IV  Beta Blocker:  Patient is on a Beta-Blocker and has received one dose within the past 24 hours (No further documentation required).       Informed Consent: Patient understands risks and agrees with Anesthesia plan.  Questions  answered. Anesthesia consent signed with patient.  ASA Score: 3     Day of Surgery Review of History & Physical:  There are no significant changes.  H&P update referred to the provider.     Anesthesia Plan Notes: Risks of dental and eye injury reviewed with patient, agrees to proceed. Reassurance given.        Ready For Surgery From Anesthesia Perspective.

## 2018-06-29 ENCOUNTER — OFFICE VISIT (OUTPATIENT)
Dept: HEMATOLOGY/ONCOLOGY | Facility: CLINIC | Age: 78
End: 2018-06-29
Payer: MEDICARE

## 2018-06-29 ENCOUNTER — LAB VISIT (OUTPATIENT)
Dept: LAB | Facility: HOSPITAL | Age: 78
End: 2018-06-29
Attending: INTERNAL MEDICINE
Payer: MEDICARE

## 2018-06-29 VITALS
RESPIRATION RATE: 18 BRPM | HEIGHT: 70 IN | HEART RATE: 78 BPM | OXYGEN SATURATION: 90 % | TEMPERATURE: 98 F | SYSTOLIC BLOOD PRESSURE: 98 MMHG | DIASTOLIC BLOOD PRESSURE: 58 MMHG | BODY MASS INDEX: 26.76 KG/M2 | WEIGHT: 186.94 LBS

## 2018-06-29 DIAGNOSIS — Z87.898 HISTORY OF THYMOMA: ICD-10-CM

## 2018-06-29 DIAGNOSIS — D50.0 IRON DEFICIENCY ANEMIA DUE TO CHRONIC BLOOD LOSS: Primary | ICD-10-CM

## 2018-06-29 DIAGNOSIS — D89.2 PARAPROTEINEMIA: ICD-10-CM

## 2018-06-29 DIAGNOSIS — D51.8 OTHER VITAMIN B12 DEFICIENCY ANEMIA: ICD-10-CM

## 2018-06-29 DIAGNOSIS — D50.0 IRON DEFICIENCY ANEMIA DUE TO CHRONIC BLOOD LOSS: ICD-10-CM

## 2018-06-29 DIAGNOSIS — C61 PROSTATE CANCER: ICD-10-CM

## 2018-06-29 LAB
FERRITIN SERPL-MCNC: 192 NG/ML
IRON SERPL-MCNC: 71 UG/DL
SATURATED IRON: 49 %
TOTAL IRON BINDING CAPACITY: 144 UG/DL
TRANSFERRIN SERPL-MCNC: 97 MG/DL
VIT B12 SERPL-MCNC: 1505 PG/ML

## 2018-06-29 PROCEDURE — 83540 ASSAY OF IRON: CPT

## 2018-06-29 PROCEDURE — 99215 OFFICE O/P EST HI 40 MIN: CPT | Mod: S$GLB,,, | Performed by: INTERNAL MEDICINE

## 2018-06-29 PROCEDURE — 3078F DIAST BP <80 MM HG: CPT | Mod: CPTII,S$GLB,, | Performed by: INTERNAL MEDICINE

## 2018-06-29 PROCEDURE — 99999 PR PBB SHADOW E&M-EST. PATIENT-LVL IV: CPT | Mod: PBBFAC,,, | Performed by: INTERNAL MEDICINE

## 2018-06-29 PROCEDURE — 82607 VITAMIN B-12: CPT

## 2018-06-29 PROCEDURE — 82728 ASSAY OF FERRITIN: CPT

## 2018-06-29 PROCEDURE — 3074F SYST BP LT 130 MM HG: CPT | Mod: CPTII,S$GLB,, | Performed by: INTERNAL MEDICINE

## 2018-06-29 NOTE — PROGRESS NOTES
Subjective:       Patient ID: Homero Tanner is a 77 y.o. male.    Chief Complaint: Follow-up    HPI This is a 77  year-old gentleman who comes for follow up of her previously diagnosed thymoma, rectal cancer, prostate cancer, pararpteinemia and b12 deficiency.  He is known to us because of  A history of   previously treated prostate cancer. He was diagnosed around 2004 and treated   with surgery. He relapsed by PSA and was treated with local radiation therapy.   He had further elevation of the PSA and started intermittent LHRH   analogues through the office of his urologist outside our clinic  he receives the injections at his urologist office.  He says he hs been of Lupron x 6-8 months     A CBC done on   November 2012 was reported showing a hemoglobin of 12.7. As part of workup his   primary care physician ordered a serum ferritin and total iron binding   capacity, which were unremarkable. Serum folate was normal at 5.5. Vitamin B12   was low at 151. He then started supplemental B12 injection, which  he is still   getting.   Also, as part of the workup for the anemia, serum protein electrophoresis   showed a paraprotein band that was quantified at 1.03 g and identified as IgM.   The patient was then referred to me. I ordered lab tests, which included a   thoracic spine view looking for lytic lesions. The radiologist reported that there   were no lytic lesions but there was a mass in the center of the chest  . This was followed by chest x-rays and CT scans. The   CT scan showed a large mediastinal mass. A CT-guided biopsy at [facility] was read   as showing a thymoma.   He underwent surgery with Dr. Levi Butler at [facility]. The thymoma was involving the pericardium so he received   postoperative radiation therapy.   He finished the radiation therapy and has   remained with without evaluable disease in regards to his thymoma.      A routine lab tests done a few months later showed that  there has been a dramatic   drop in his hemoglobin, which was now 7.8 with a low serum ferritin of 8   suggesting iron deficiency anemia.   Stools were frankly Hemoccult positive.   In regards to his past history, the patient underwent a colonoscopy within our   system on 02/28/2012. The patient was found to have a sessile polyp in the   rectum. An endoscopic removal could not be done.   The patient was referred to another specialist in [facility] for resection since it seemed to   be arising in the setting of the radiation therapy field for his prostate   cancer.   The pathology report from that 2/2012 endoscopy was that of a tubular adenoma.   The patient was then seen in consultation by GI doctor outside the clinic. ,giana Arellano. The patient then underwent resection of the   lesion. The pathology report was the same.   The patient underwent a colonoscopy at [facility] on   05/07/2014, as part of the work up for his recently diagnosed iron deficiency.. It was done by Dr. Jaime Ash. Dr. Ash found a mass in the   rectal area. It was partially excised. Pathology was once again that of   tubulovillous adenoma with dysplasia.   The area of involvement coincided with the radiation therapy field for the prostate   cancer.   The patient had removal of the villous adenoma in [place] by Dr Ansari.   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   Following the excision he had 2 episodes of rectal bleeding. He ended up having another surgery to control the bleeing and to do a re-excision of the area where the minimally invasive cancer was found.  Pathology revealed no residual cancer.  There has been no more bleeding. He feels fine  He also injects himself with b12 due low b12 levels.  He comes for follow up     .He has had a colonosocopy feb 2017  that showed intestinal polyps and was asked to return in a year     During a recent visit he was found to have  el;evated LFTs and an Us was requested.  He had gallstones and a fatty liver.  He had  been drinking 3 alcoholic drinks a day   ALLERGIES: see Med card  MEDICATIONS: See MedCard.   PREVIOUS SURGERIES: Prostatectomy around the year 2000 or so, rectal surgery   for removal of tubular adenoma.x2, and another surgery for a minimally invasive rectal cancer 10/2014 Tonsillectomy, removal of thymoma.   SOCIAL HISTORY: Single with two children. Lives in [place]. He smoked   until age 31, approximately a pack a day for 15 years. He is a retired   .   FAMILY HISTORY: No cancer. Father had diabetes. Father had heart disease.   PAST MEDICAL HISTORY:   1. Anemia.   2. 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   3. History of treated prostate cancer.   4. Mild anemia.   5. Iron deficiency anemia.   6. High blood pressure.   7. Elevated cholesterol.   8. Recurrent villous adenoma of the rectum.   9. Reflux.   10. Vitamin B12 deficiency.   11. Status post resection of thymoma of the anterior mediastinum.   12. Skin cancer scalp  13-hx of treated prostate cancer  Review of Systems   Constitutional: Negative.  Negative for fatigue.   Eyes: Negative.    Cardiovascular: Negative.  Negative for chest pain.   Gastrointestinal: Negative for abdominal pain and nausea.   Genitourinary: Negative.  Negative for hematuria.   Musculoskeletal: Negative.    Skin: Negative.    Neurological: Negative.    Psychiatric/Behavioral: Negative.        Objective:      Physical Exam   Constitutional: He is oriented to person, place, and time. He appears well-developed and well-nourished.   HENT:   Head: Normocephalic.   Mouth/Throat: No oropharyngeal exudate.   Eyes: Pupils are equal, round, and reactive to light.   Neck: No thyromegaly present.   Cardiovascular: Normal rate, regular rhythm and normal heart sounds.  Exam reveals no gallop.    No murmur heard.  Pulmonary/Chest: No respiratory distress. He has no  wheezes. He has no rales.   Abdominal: Soft. Bowel sounds are normal. He exhibits no distension and no mass. There is no rebound and no guarding.   Musculoskeletal: Normal range of motion. He exhibits no edema.   Lymphadenopathy:     He has no cervical adenopathy.   Neurological: He is alert and oriented to person, place, and time.   Skin: Skin is warm and dry.   Psychiatric: He has a normal mood and affect. His behavior is normal.       Wt Readings from Last 3 Encounters:   06/29/18 84.8 kg (186 lb 15.2 oz)   06/27/18 84.8 kg (187 lb)   05/31/18 85.6 kg (188 lb 11.4 oz)     Temp Readings from Last 3 Encounters:   06/29/18 97.9 °F (36.6 °C) (Oral)   06/27/18 98.6 °F (37 °C) (Temporal)   05/31/18 98.8 °F (37.1 °C) (Tympanic)     BP Readings from Last 3 Encounters:   06/29/18 (!) 98/58   06/27/18 (!) 121/59   05/31/18 110/60     Pulse Readings from Last 3 Encounters:   06/29/18 78   06/27/18 78   05/31/18 72       Assessment:       1. Prostate cancer    2. Paraproteinemia    3. History of thymoma    4. Iron deficiency anemia due to chronic blood loss        Plan:       Lab Results   Component Value Date    WBC 6.25 06/26/2018    HGB 10.7 (L) 06/26/2018    HCT 30.5 (L) 06/26/2018    MCV 98 06/26/2018     06/26/2018     Lab Results   Component Value Date    CREATININE 1.4 06/26/2018      Lab Results   Component Value Date    ALT 41 06/26/2018    AST 82 (H) 06/26/2018    ALKPHOS 267 (H) 06/26/2018    BILITOT 1.4 (H) 06/26/2018     PSA 0.88  Lab Results   Component Value Date    CEA 2.9 06/26/2018       SPEp shows a spike of 2.01 gr    1-prostate ca; see urologist. He is being monitored off Lupron  2-Thymoma: CXR  Is negative. Repeat in a year\  3-colon cancer: had flexible sigmoidoscopy at [facility] a few days ago and had endoscopic mucosa; resection of polyp.  Pathology pending.  Has to repeat it in 6 months  4-Elevated LFTs; has fatty liver, stay off alcohol  5-B12 deficiency; continue b12  6-paraproteinemia. Up to  2.01 gr. Has now moderae anenia.Slight elevation of lambda chainsWill schedule bone marrow  7-anemia; hgb 10.7  Will do bone marrow. Order ferritin/b12/tibc    See me after the bone marrow

## 2018-07-02 ENCOUNTER — TELEPHONE (OUTPATIENT)
Dept: HEMATOLOGY/ONCOLOGY | Facility: CLINIC | Age: 78
End: 2018-07-02

## 2018-07-02 NOTE — TELEPHONE ENCOUNTER
----- Message from Rafy Dwyer sent at 7/2/2018  2:15 PM CDT -----  Contact: [phone]  Returning call, please call back at [phone].  Thx-AH

## 2018-07-02 NOTE — TELEPHONE ENCOUNTER
Attempted to contact patient to confirm BMBX scheduled for next Wednesday at 100PM. No answer, left voicemail with callback number.

## 2018-07-02 NOTE — TELEPHONE ENCOUNTER
Spoke with patient confirmed BMBX location and time for next week. Patient verbalized understanding.

## 2018-07-06 NOTE — PRE-PROCEDURE INSTRUCTIONS
Pre op instructions reviewed with patient per phone:    To confirm, Your surgeon has instructed you:  Surgery is scheduled 7/11/18 at 1300.      Please report to [facility] 1st floor main lobby by 1130.   Pre admit office to call afternoon prior to surgery with final arrival time      INSTRUCTIONS IMPORTANT!!!  ¨ Do not eat, drink, or smoke after 12 midnight-including water. OK to brush teeth, no gum, candy or mints!    ¨ Take only these medicines with a small swallow of water-morning of surgery.  Hydralazine, Metoprolol    ____  Do not wear makeup, including mascara.  ____  No powder, lotions or creams to surgical area.  ____  Please remove all jewelry, including piercings and leave at home.  ____  No money or valuables needed. Please leave at home.  ____  Please bring identification and insurance information to hospital.  ____  If going home the same day, arrange for a ride home. You will not be able to   drive if Anesthesia was used.  ____  Children, under 12 years old, must remain in the waiting room with an adult.  They are not allowed in patient areas.  ____  Wear loose fitting clothing. Allow for dressings, bandages.  ____  Stop Aspirin, Ibuprofen, Motrin and Aleve at least 5-7 days before surgery, unless otherwise instructed by your doctor, or the nurse.   You MAY use Tylenol/acetaminophen until day of surgery.  ____  If you take diabetic medication, do not take am of surgery unless instructed by   Doctor.  ____ Stop taking any Fish Oil supplement or any Vitamins that contain Vitamin E at least 5 days prior to surgery.          Bathing Instructions-- The night before surgery and the morning prior to coming to the hospital:   -Do not shave the surgical area.   -Shower and wash your hair and body as usual with anti-bacterial  soap and shampoo.   -Rinse your hair and body completely.   -Use one packet of hibiclens to wash the surgical site (using your hand) gently for 5 minutes.  Do not  scrub you skin too hard.   -Do not use hibiclens on your head, face, or genitals.   -Do not wash with anti-bacterial soap after you use the hibiclens.   -Rinse your body thoroughly.   -Dry with clean, soft towel.  Do not use lotion, cream, deodorant, or powders on   the surgical site.    Use antibacterial soap in place of hibiclens if your surgery is on the head, face or genitals.         Surgical Site Infection    Prevention of surgical site infections:     -Keep incisions clean and dry.   -Do not soak/submerge incisions in water until completely healed.   -Do not apply lotions, powders, creams, or deodorants to site.   -Always make sure hands are cleaned with antibacterial soap/ alcohol-based   prior to touching the surgical site.  (This includes doctors, nurses, staff, and yourself.)    Signs and symptoms:   -Redness and pain around the area where you had surgery   -Drainage of cloudy fluid from your surgical wound   -Fever over 100.4  I have read or had read and explained to me, and understand the above information.

## 2018-07-08 RX ORDER — OMEPRAZOLE 20 MG/1
CAPSULE, DELAYED RELEASE ORAL
Qty: 90 CAPSULE | Refills: 3 | Status: SHIPPED | OUTPATIENT
Start: 2018-07-08 | End: 2019-08-13 | Stop reason: SDUPTHER

## 2018-07-10 ENCOUNTER — ANESTHESIA EVENT (OUTPATIENT)
Dept: SURGERY | Facility: HOSPITAL | Age: 78
End: 2018-07-10
Payer: MEDICARE

## 2018-07-11 ENCOUNTER — ANESTHESIA (OUTPATIENT)
Dept: SURGERY | Facility: HOSPITAL | Age: 78
End: 2018-07-11
Payer: MEDICARE

## 2018-07-11 ENCOUNTER — HOSPITAL ENCOUNTER (OUTPATIENT)
Facility: HOSPITAL | Age: 78
Discharge: HOME | End: 2018-07-11
Attending: PATHOLOGY | Admitting: EMERGENCY MEDICINE
Payer: MEDICARE

## 2018-07-11 ENCOUNTER — SURGERY (OUTPATIENT)
Age: 78
End: 2018-07-11

## 2018-07-11 VITALS
HEIGHT: 70 IN | SYSTOLIC BLOOD PRESSURE: 122 MMHG | RESPIRATION RATE: 16 BRPM | TEMPERATURE: 99 F | WEIGHT: 179.69 LBS | HEART RATE: 79 BPM | DIASTOLIC BLOOD PRESSURE: 82 MMHG | BODY MASS INDEX: 25.73 KG/M2 | OXYGEN SATURATION: 96 %

## 2018-07-11 PROCEDURE — 88342 IMHCHEM/IMCYTCHM 1ST ANTB: CPT | Mod: 26,59,, | Performed by: PATHOLOGY

## 2018-07-11 PROCEDURE — 88264 CHROMOSOME ANALYSIS 20-25: CPT

## 2018-07-11 PROCEDURE — 88189 FLOWCYTOMETRY/READ 16 & >: CPT | Mod: ,,, | Performed by: PATHOLOGY

## 2018-07-11 PROCEDURE — 85097 BONE MARROW INTERPRETATION: CPT | Mod: ,,, | Performed by: PATHOLOGY

## 2018-07-11 PROCEDURE — 88313 SPECIAL STAINS GROUP 2: CPT | Performed by: PATHOLOGY

## 2018-07-11 PROCEDURE — 88364 INSITU HYBRIDIZATION (FISH): CPT | Mod: 26,,, | Performed by: PATHOLOGY

## 2018-07-11 PROCEDURE — 25000003 PHARM REV CODE 250: Performed by: NURSE ANESTHETIST, CERTIFIED REGISTERED

## 2018-07-11 PROCEDURE — 88185 FLOWCYTOMETRY/TC ADD-ON: CPT | Performed by: PATHOLOGY

## 2018-07-11 PROCEDURE — 88311 DECALCIFY TISSUE: CPT | Mod: 26,,, | Performed by: PATHOLOGY

## 2018-07-11 PROCEDURE — 88313 SPECIAL STAINS GROUP 2: CPT

## 2018-07-11 PROCEDURE — 63600175 PHARM REV CODE 636 W HCPCS: Performed by: NURSE ANESTHETIST, CERTIFIED REGISTERED

## 2018-07-11 PROCEDURE — 38222 DX BONE MARROW BX & ASPIR: CPT | Performed by: PATHOLOGY

## 2018-07-11 PROCEDURE — 88365 INSITU HYBRIDIZATION (FISH): CPT | Mod: 26,,, | Performed by: PATHOLOGY

## 2018-07-11 PROCEDURE — 38221 DX BONE MARROW BIOPSIES: CPT | Performed by: PATHOLOGY

## 2018-07-11 PROCEDURE — 88341 IMHCHEM/IMCYTCHM EA ADD ANTB: CPT | Mod: 26,,, | Performed by: PATHOLOGY

## 2018-07-11 PROCEDURE — 88313 SPECIAL STAINS GROUP 2: CPT | Mod: 26,,, | Performed by: PATHOLOGY

## 2018-07-11 PROCEDURE — 88184 FLOWCYTOMETRY/ TC 1 MARKER: CPT | Performed by: PATHOLOGY

## 2018-07-11 PROCEDURE — 88342 IMHCHEM/IMCYTCHM 1ST ANTB: CPT | Performed by: PATHOLOGY

## 2018-07-11 PROCEDURE — 88341 IMHCHEM/IMCYTCHM EA ADD ANTB: CPT | Performed by: PATHOLOGY

## 2018-07-11 PROCEDURE — 37000008 HC ANESTHESIA 1ST 15 MINUTES: Performed by: PATHOLOGY

## 2018-07-11 PROCEDURE — 88237 TISSUE CULTURE BONE MARROW: CPT

## 2018-07-11 PROCEDURE — 37000009 HC ANESTHESIA EA ADD 15 MINS: Performed by: PATHOLOGY

## 2018-07-11 PROCEDURE — 88305 TISSUE EXAM BY PATHOLOGIST: CPT | Mod: 26,,, | Performed by: PATHOLOGY

## 2018-07-11 RX ORDER — OXYCODONE AND ACETAMINOPHEN 5; 325 MG/1; MG/1
1 TABLET ORAL
Status: DISCONTINUED | OUTPATIENT
Start: 2018-07-11 | End: 2018-07-11 | Stop reason: HOSPADM

## 2018-07-11 RX ORDER — LIDOCAINE HYDROCHLORIDE 10 MG/ML
INJECTION INFILTRATION; PERINEURAL
Status: DISCONTINUED | OUTPATIENT
Start: 2018-07-11 | End: 2018-07-11

## 2018-07-11 RX ORDER — ONDANSETRON 2 MG/ML
4 INJECTION INTRAMUSCULAR; INTRAVENOUS ONCE AS NEEDED
Status: DISCONTINUED | OUTPATIENT
Start: 2018-07-11 | End: 2018-07-11 | Stop reason: HOSPADM

## 2018-07-11 RX ORDER — MORPHINE SULFATE 4 MG/ML
2 INJECTION, SOLUTION INTRAMUSCULAR; INTRAVENOUS EVERY 5 MIN PRN
Status: DISCONTINUED | OUTPATIENT
Start: 2018-07-11 | End: 2018-07-11 | Stop reason: HOSPADM

## 2018-07-11 RX ORDER — SODIUM CHLORIDE, SODIUM LACTATE, POTASSIUM CHLORIDE, CALCIUM CHLORIDE 600; 310; 30; 20 MG/100ML; MG/100ML; MG/100ML; MG/100ML
INJECTION, SOLUTION INTRAVENOUS CONTINUOUS PRN
Status: DISCONTINUED | OUTPATIENT
Start: 2018-07-11 | End: 2018-07-11

## 2018-07-11 RX ORDER — HYDROMORPHONE HYDROCHLORIDE 1 MG/ML
0.2 INJECTION, SOLUTION INTRAMUSCULAR; INTRAVENOUS; SUBCUTANEOUS EVERY 5 MIN PRN
Status: DISCONTINUED | OUTPATIENT
Start: 2018-07-11 | End: 2018-07-11 | Stop reason: HOSPADM

## 2018-07-11 RX ORDER — PROPOFOL 10 MG/ML
VIAL (ML) INTRAVENOUS
Status: DISCONTINUED | OUTPATIENT
Start: 2018-07-11 | End: 2018-07-11

## 2018-07-11 RX ORDER — MEPERIDINE HYDROCHLORIDE 50 MG/ML
12.5 INJECTION INTRAMUSCULAR; INTRAVENOUS; SUBCUTANEOUS ONCE AS NEEDED
Status: DISCONTINUED | OUTPATIENT
Start: 2018-07-11 | End: 2018-07-11 | Stop reason: HOSPADM

## 2018-07-11 RX ORDER — SODIUM CHLORIDE 0.9 % (FLUSH) 0.9 %
3 SYRINGE (ML) INJECTION
Status: DISCONTINUED | OUTPATIENT
Start: 2018-07-11 | End: 2018-07-11 | Stop reason: HOSPADM

## 2018-07-11 RX ADMIN — PROPOFOL 50 MG: 10 INJECTION, EMULSION INTRAVENOUS at 01:07

## 2018-07-11 RX ADMIN — LIDOCAINE HYDROCHLORIDE 50 MG: 10 INJECTION, SOLUTION INFILTRATION; PERINEURAL at 01:07

## 2018-07-11 RX ADMIN — SODIUM CHLORIDE, SODIUM LACTATE, POTASSIUM CHLORIDE, AND CALCIUM CHLORIDE: 600; 310; 30; 20 INJECTION, SOLUTION INTRAVENOUS at 01:07

## 2018-07-11 RX ADMIN — PROPOFOL 30 MG: 10 INJECTION, EMULSION INTRAVENOUS at 01:07

## 2018-07-11 NOTE — ANESTHESIA PREPROCEDURE EVALUATION
07/11/2018  Homero Tanner is a 77 y.o., male.    Anesthesia Evaluation    I have reviewed the Patient Summary Reports.    I have reviewed the Nursing Notes.   I have reviewed the Medications.     Review of Systems  Anesthesia Hx:  No problems with previous Anesthesia  History of prior surgery of interest to airway management or planning: Denies Family Hx of Anesthesia complications.   Denies Personal Hx of Anesthesia complications.   Social:  Former Smoker, Social Alcohol Use    Hematology/Oncology:         -- Anemia: Hematology Comments: Fe deficiency anemia Oncology Comments: Rectal adenocarcinoma  Prostate cancer  Squamous cell carcinoma     Cardiovascular:   Hypertension Valvular problems/Murmurs, AI CHF hyperlipidemia ECG has been reviewed. Pulm HTN  Tricuspid regurgitation  Hx of PSVT/PVCs   Pulmonary:   Asthma Hx pulmonary HTN   Hepatic/GI:   GERD Colon polyps  Diverticulosis  gallstones   Musculoskeletal:  Musculoskeletal Normal    Neurological:  Neurology Normal    Endocrine:   Hx thymoma  B12 deficiency   Dermatological:  Skin Normal    Psych:  Psychiatric Normal           Physical Exam  General:  Well nourished    Airway/Jaw/Neck:  Airway Findings: Mouth Opening: Normal Tongue: Normal  General Airway Assessment: Adult  Mallampati: II  TM Distance: Normal, at least 6 cm          Chest/Lungs:  Chest/Lungs Findings:    Heart/Vascular:  Heart Findings: Rate: Normal             Anesthesia Plan  Type of Anesthesia, risks & benefits discussed:  Anesthesia Type:  MAC  Patient's Preference:   Intra-op Monitoring Plan: standard ASA monitors  Intra-op Monitoring Plan Comments:   Post Op Pain Control Plan: per primary service following discharge from PACU  Post Op Pain Control Plan Comments:   Induction:    Beta Blocker:  Patient is not currently on a Beta-Blocker (No further documentation required).        Informed Consent: Patient understands risks and agrees with Anesthesia plan.  Questions answered. Anesthesia consent signed with patient.  ASA Score: 3     Day of Surgery Review of History & Physical:    H&P update referred to the surgeon.     Anesthesia Plan Notes: Labs (6/26/18) Hgb 10.7, Plt 161, K 4.1, Cr 1.4, Glu 99        Ready For Surgery From Anesthesia Perspective.

## 2018-07-11 NOTE — ANESTHESIA RELEASE NOTE
"Anesthesia Release from PACU Note    Patient: Homero Tanner    Procedure(s) Performed: Procedure(s) (LRB):  Biopsy-bone marrow (Left)    Anesthesia type: MAC    Post pain: Adequate analgesia    Post assessment: no apparent anesthetic complications    Last Vitals:   Visit Vitals  BP (!) 152/80 (BP Location: Right arm, Patient Position: Lying)   Pulse 85   Temp 36.9 °C (98.4 °F) (Skin)   Resp 18   Ht 5' 10" (1.778 m)   Wt 81.5 kg (179 lb 10.8 oz)   SpO2 95%   BMI 25.78 kg/m²       Post vital signs: stable    Level of consciousness: awake    Nausea/Vomiting: no nausea/no vomiting    Complications: none    Airway Patency: patent    Respiratory: unassisted    Cardiovascular: stable and blood pressure at baseline    Hydration: euvolemic  "

## 2018-07-11 NOTE — ANESTHESIA POSTPROCEDURE EVALUATION
"Anesthesia Post Evaluation    Patient: Homero Tanner    Procedure(s) Performed: Procedure(s) (LRB):  Biopsy-bone marrow (Left)    Final Anesthesia Type: MAC  Patient location during evaluation: PACU  Patient participation: Yes- Able to Participate  Level of consciousness: awake and alert  Post-procedure vital signs: reviewed and stable  Pain management: adequate  Airway patency: patent  PONV status at discharge: No PONV  Anesthetic complications: no      Cardiovascular status: blood pressure returned to baseline  Respiratory status: unassisted  Hydration status: euvolemic  Follow-up not needed.        Visit Vitals  BP (!) 152/80 (BP Location: Right arm, Patient Position: Lying)   Pulse 85   Temp 36.9 °C (98.4 °F) (Skin)   Resp 18   Ht 5' 10" (1.778 m)   Wt 81.5 kg (179 lb 10.8 oz)   SpO2 95%   BMI 25.78 kg/m²       Pain/Jose Score: Pain Assessment Performed: Yes (7/11/2018 12:51 PM)  Presence of Pain: denies (7/11/2018 12:51 PM)      "

## 2018-07-11 NOTE — DISCHARGE INSTRUCTIONS
Activity as tolerated. No heavy lifting or strenuous activity for 24-48 hrs.  Regular diet.  Follow up with Dr. Montaño. Call Dr. Montaño for questions or concerns.  Remove bandaid in 24 hrs then ok to shower. NO bathing for next 24 hrs.  Call  For temp greater than 100.4 degrees farenheit, swelling, redness, severe pain or bleeding or signs of infection (drainage or foul odor).  Watch for signs and systoms of infection, redness, swelling foul order from site or discolored discharge from site.  Take Tylenol or ibuprofen for pain/discomfort.    General Information:    1. Do not drink alcoholic beverages including beer for 24 hours or as long as you are on pain medication..  2. Do not drive a motor vehicle, operate machinery or power tools, or signs legal papers for 24 hours or as long as you are on pain medication.   3. You may experience light-headedness, dizziness, and sleepiness following surgery. Please do not stay alone. A responsible adult should be with you for this 24 hour period.  4. Go home and rest.  5. Progress slowly to a normal diet unless instructed.  Otherwise, begin with liquids such as soft drinks, then soup and crackers working up to solid foods. Drink plenty of nonalcoholic fluids.  6. Certain anesthetics and pain medications produce nausea and vomiting in certain individuals. If nausea becomes a problem at home, call you doctor.  7. A nurse will be calling you sometime after surgery. Do not be alarmed. This is our way of finding out how you are doing.  8. Several times every hour while you are awake, take 2-3 deep breaths and cough. If you had stomach surgery hold a pillow or rolled towel firmly against your stomach before you cough. This will help with any pain the cough might cause.  9. Several times every hour while you are awake, pump and flex your feet 5-6 times and do foot circles. This will help prevent blood clots.  10. Call your doctor for severe pain, bleeding, fever, or signs or  symptoms of infection (pain, swelling, redness, foul odor, drainage).  11. You can contact your doctor anytime by [phone] for the [facility] ([facility]) or [phone] for the [facility] on [facility].   [email or web address] is another way to contact your doctor if you are an active participant online with [facility].

## 2018-07-11 NOTE — H&P (VIEW-ONLY)
Subjective:       Patient ID: Homero Tanner is a 77 y.o. male.    Chief Complaint: Follow-up    HPI This is a 77  year-old gentleman who comes for follow up of her previously diagnosed thymoma, rectal cancer, prostate cancer, pararpteinemia and b12 deficiency.  He is known to us because of  A history of   previously treated prostate cancer. He was diagnosed around 2004 and treated   with surgery. He relapsed by PSA and was treated with local radiation therapy.   He had further elevation of the PSA and started intermittent LHRH   analogues through the office of his urologist outside our clinic  he receives the injections at his urologist office.  He says he hs been of Lupron x 6-8 months     A CBC done on   November 2012 was reported showing a hemoglobin of 12.7. As part of workup his   primary care physician ordered a serum ferritin and total iron binding   capacity, which were unremarkable. Serum folate was normal at 5.5. Vitamin B12   was low at 151. He then started supplemental B12 injection, which  he is still   getting.   Also, as part of the workup for the anemia, serum protein electrophoresis   showed a paraprotein band that was quantified at 1.03 g and identified as IgM.   The patient was then referred to me. I ordered lab tests, which included a   thoracic spine view looking for lytic lesions. The radiologist reported that there   were no lytic lesions but there was a mass in the center of the chest  . This was followed by chest x-rays and CT scans. The   CT scan showed a large mediastinal mass. A CT-guided biopsy at [facility] was read   as showing a thymoma.   He underwent surgery with Dr. Levi Butler at [facility]. The thymoma was involving the pericardium so he received   postoperative radiation therapy.   He finished the radiation therapy and has   remained with without evaluable disease in regards to his thymoma.      A routine lab tests done a few months later showed that  there has been a dramatic   drop in his hemoglobin, which was now 7.8 with a low serum ferritin of 8   suggesting iron deficiency anemia.   Stools were frankly Hemoccult positive.   In regards to his past history, the patient underwent a colonoscopy within our   system on 02/28/2012. The patient was found to have a sessile polyp in the   rectum. An endoscopic removal could not be done.   The patient was referred to another specialist in [facility] for resection since it seemed to   be arising in the setting of the radiation therapy field for his prostate   cancer.   The pathology report from that 2/2012 endoscopy was that of a tubular adenoma.   The patient was then seen in consultation by GI doctor outside the clinic. ,giana Arellano. The patient then underwent resection of the   lesion. The pathology report was the same.   The patient underwent a colonoscopy at [facility] on   05/07/2014, as part of the work up for his recently diagnosed iron deficiency.. It was done by Dr. Jaime Ash. Dr. Ash found a mass in the   rectal area. It was partially excised. Pathology was once again that of   tubulovillous adenoma with dysplasia.   The area of involvement coincided with the radiation therapy field for the prostate   cancer.   The patient had removal of the villous adenoma in [place] by Dr Ansari.   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   Following the excision he had 2 episodes of rectal bleeding. He ended up having another surgery to control the bleeing and to do a re-excision of the area where the minimally invasive cancer was found.  Pathology revealed no residual cancer.  There has been no more bleeding. He feels fine  He also injects himself with b12 due low b12 levels.  He comes for follow up     .He has had a colonosocopy feb 2017  that showed intestinal polyps and was asked to return in a year     During a recent visit he was found to have  el;evated LFTs and an Us was requested.  He had gallstones and a fatty liver.  He had  been drinking 3 alcoholic drinks a day   ALLERGIES: see Med card  MEDICATIONS: See MedCard.   PREVIOUS SURGERIES: Prostatectomy around the year 2000 or so, rectal surgery   for removal of tubular adenoma.x2, and another surgery for a minimally invasive rectal cancer 10/2014 Tonsillectomy, removal of thymoma.   SOCIAL HISTORY: Single with two children. Lives in [place]. He smoked   until age 31, approximately a pack a day for 15 years. He is a retired   .   FAMILY HISTORY: No cancer. Father had diabetes. Father had heart disease.   PAST MEDICAL HISTORY:   1. Anemia.   2. 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   3. History of treated prostate cancer.   4. Mild anemia.   5. Iron deficiency anemia.   6. High blood pressure.   7. Elevated cholesterol.   8. Recurrent villous adenoma of the rectum.   9. Reflux.   10. Vitamin B12 deficiency.   11. Status post resection of thymoma of the anterior mediastinum.   12. Skin cancer scalp  13-hx of treated prostate cancer  Review of Systems   Constitutional: Negative.  Negative for fatigue.   Eyes: Negative.    Cardiovascular: Negative.  Negative for chest pain.   Gastrointestinal: Negative for abdominal pain and nausea.   Genitourinary: Negative.  Negative for hematuria.   Musculoskeletal: Negative.    Skin: Negative.    Neurological: Negative.    Psychiatric/Behavioral: Negative.        Objective:      Physical Exam   Constitutional: He is oriented to person, place, and time. He appears well-developed and well-nourished.   HENT:   Head: Normocephalic.   Mouth/Throat: No oropharyngeal exudate.   Eyes: Pupils are equal, round, and reactive to light.   Neck: No thyromegaly present.   Cardiovascular: Normal rate, regular rhythm and normal heart sounds.  Exam reveals no gallop.    No murmur heard.  Pulmonary/Chest: No respiratory distress. He has no  wheezes. He has no rales.   Abdominal: Soft. Bowel sounds are normal. He exhibits no distension and no mass. There is no rebound and no guarding.   Musculoskeletal: Normal range of motion. He exhibits no edema.   Lymphadenopathy:     He has no cervical adenopathy.   Neurological: He is alert and oriented to person, place, and time.   Skin: Skin is warm and dry.   Psychiatric: He has a normal mood and affect. His behavior is normal.       Wt Readings from Last 3 Encounters:   06/29/18 84.8 kg (186 lb 15.2 oz)   06/27/18 84.8 kg (187 lb)   05/31/18 85.6 kg (188 lb 11.4 oz)     Temp Readings from Last 3 Encounters:   06/29/18 97.9 °F (36.6 °C) (Oral)   06/27/18 98.6 °F (37 °C) (Temporal)   05/31/18 98.8 °F (37.1 °C) (Tympanic)     BP Readings from Last 3 Encounters:   06/29/18 (!) 98/58   06/27/18 (!) 121/59   05/31/18 110/60     Pulse Readings from Last 3 Encounters:   06/29/18 78   06/27/18 78   05/31/18 72       Assessment:       1. Prostate cancer    2. Paraproteinemia    3. History of thymoma    4. Iron deficiency anemia due to chronic blood loss        Plan:       Lab Results   Component Value Date    WBC 6.25 06/26/2018    HGB 10.7 (L) 06/26/2018    HCT 30.5 (L) 06/26/2018    MCV 98 06/26/2018     06/26/2018     Lab Results   Component Value Date    CREATININE 1.4 06/26/2018      Lab Results   Component Value Date    ALT 41 06/26/2018    AST 82 (H) 06/26/2018    ALKPHOS 267 (H) 06/26/2018    BILITOT 1.4 (H) 06/26/2018     PSA 0.88  Lab Results   Component Value Date    CEA 2.9 06/26/2018       SPEp shows a spike of 2.01 gr    1-prostate ca; see urologist. He is being monitored off Lupron  2-Thymoma: CXR  Is negative. Repeat in a year\  3-colon cancer: had flexible sigmoidoscopy at [facility] a few days ago and had endoscopic mucosa; resection of polyp.  Pathology pending.  Has to repeat it in 6 months  4-Elevated LFTs; has fatty liver, stay off alcohol  5-B12 deficiency; continue b12  6-paraproteinemia. Up to  2.01 gr. Has now moderae anenia.Slight elevation of lambda chainsWill schedule bone marrow  7-anemia; hgb 10.7  Will do bone marrow. Order ferritin/b12/tibc    See me after the bone marrow

## 2018-07-11 NOTE — BRIEF OP NOTE
Bone Marrow Biopsy  Procedure Note      Date of Service: 7/11/2018      Indication/Diagnosis: anemia    Consent Source: self    Consent Type: elective procedure    Time Out Completed: yes    Aseptic Technique: Betadine    Anesthesia: systemic    Anesthetic Drugs Used: per anesthesia    Instrumentation: bone marrow kit    Procedure Site: left posterior iliac crest    Patient Position: prone    Volume Removed (in cc): 8-10    Aspirate Obtained: yes: EDTA - sent to [facility] for analysis    Fluid Characteristics: not examined    Sterile Dressing: yes    Complications: none    Narrative:  Bone marrow aspiration/biopsy performed left posterior iliac crest without complications.  Patient to lie supine x 1 hr.  May resume normal diet/activity.  Follow up with Dr. Montaño.

## 2018-07-12 LAB — BONE MARROW IRON STAIN COMMENT: NORMAL

## 2018-07-13 LAB
BODY SITE - BONE MARROW: NORMAL
BONE MARROW WRIGHT STAIN COMMENT: NORMAL
CLINICAL DIAGNOSIS - BONE MARROW: NORMAL
FLOW CYTOMETRY ANTIBODIES ANALYZED - BONE MARROW: NORMAL
FLOW CYTOMETRY COMMENT - BONE MARROW: NORMAL
FLOW CYTOMETRY INTERPRETATION - BONE MARROW: NORMAL

## 2018-07-16 ENCOUNTER — TELEPHONE (OUTPATIENT)
Dept: GASTROENTEROLOGY | Facility: CLINIC | Age: 78
End: 2018-07-16

## 2018-07-16 NOTE — TELEPHONE ENCOUNTER
----- Message from Rao Medeiros MD sent at 7/16/2018 11:01 AM CDT -----  Johanna- Please arrange for a repeat flex sig in 6 months. The procedure can be done at [facility] [place] [place], or [place] (we may be going there by then).

## 2018-07-20 LAB
CHROM BANDING METHOD: NORMAL
CHROMOSOME ANALYSIS BM ADDITIONAL INFORMATION: NORMAL
CHROMOSOME ANALYSIS BM RELEASED BY: NORMAL
CHROMOSOME ANALYSIS BM RESULT SUMMARY: NORMAL
CLINICAL CYTOGENETICIST REVIEW: NORMAL
KARYOTYP MAR: NORMAL
REASON FOR REFERRAL (NARRATIVE): NORMAL
REF LAB TEST METHOD: NORMAL
SPECIMEN SOURCE: NORMAL
SPECIMEN: NORMAL

## 2018-07-23 DIAGNOSIS — L21.9 SEBORRHEIC DERMATITIS: ICD-10-CM

## 2018-07-23 RX ORDER — KETOCONAZOLE 20 MG/ML
SHAMPOO, SUSPENSION TOPICAL
Qty: 120 ML | Refills: 3 | Status: SHIPPED | OUTPATIENT
Start: 2018-07-23 | End: 2018-10-25

## 2018-08-10 ENCOUNTER — TELEPHONE (OUTPATIENT)
Dept: INTERNAL MEDICINE | Facility: CLINIC | Age: 78
End: 2018-08-10

## 2018-08-10 DIAGNOSIS — D49.89 THYMOMA: ICD-10-CM

## 2018-08-10 DIAGNOSIS — D51.8 OTHER VITAMIN B12 DEFICIENCY ANEMIA: ICD-10-CM

## 2018-08-10 RX ORDER — CYANOCOBALAMIN 1000 UG/ML
INJECTION, SOLUTION INTRAMUSCULAR; SUBCUTANEOUS
Qty: 10 ML | Refills: 0 | Status: CANCELLED | OUTPATIENT
Start: 2018-08-10

## 2018-08-10 NOTE — TELEPHONE ENCOUNTER
----- Message from Regi Naqvi sent at 8/10/2018  3:13 PM CDT -----  Patient needs call back to discuss lab results..[phone] (home)

## 2018-08-10 NOTE — TELEPHONE ENCOUNTER
Returned call to patient and he states that he never received results of colonoscopy or biopsey and he was told Dr. Montaño was retired and he needs results please advise.

## 2018-08-10 NOTE — TELEPHONE ENCOUNTER
If he is talking about the sigmoidoscopy which was done on June 28, it was released to him through the portal.  The polyp was benign but they want to repeat the scope in six months.

## 2018-08-13 ENCOUNTER — TELEPHONE (OUTPATIENT)
Dept: INTERNAL MEDICINE | Facility: CLINIC | Age: 78
End: 2018-08-13

## 2018-08-13 NOTE — TELEPHONE ENCOUNTER
----- Message from Corina Champion sent at 8/13/2018  2:48 PM CDT -----  needs callback rg previous discussion...[phone] (home)

## 2018-08-14 ENCOUNTER — OFFICE VISIT (OUTPATIENT)
Dept: HEMATOLOGY/ONCOLOGY | Facility: CLINIC | Age: 78
End: 2018-08-14
Payer: MEDICARE

## 2018-08-14 ENCOUNTER — TELEPHONE (OUTPATIENT)
Dept: HEMATOLOGY/ONCOLOGY | Facility: CLINIC | Age: 78
End: 2018-08-14

## 2018-08-14 ENCOUNTER — LAB VISIT (OUTPATIENT)
Dept: LAB | Facility: HOSPITAL | Age: 78
End: 2018-08-14
Attending: INTERNAL MEDICINE
Payer: MEDICARE

## 2018-08-14 VITALS
BODY MASS INDEX: 25.98 KG/M2 | HEART RATE: 107 BPM | TEMPERATURE: 99 F | OXYGEN SATURATION: 98 % | WEIGHT: 181.44 LBS | DIASTOLIC BLOOD PRESSURE: 66 MMHG | SYSTOLIC BLOOD PRESSURE: 103 MMHG | HEIGHT: 70 IN

## 2018-08-14 DIAGNOSIS — D89.2 PARAPROTEINEMIA: ICD-10-CM

## 2018-08-14 DIAGNOSIS — D51.8 OTHER VITAMIN B12 DEFICIENCY ANEMIA: ICD-10-CM

## 2018-08-14 DIAGNOSIS — C61 PROSTATE CANCER: ICD-10-CM

## 2018-08-14 DIAGNOSIS — D49.89 THYMOMA: ICD-10-CM

## 2018-08-14 DIAGNOSIS — Z85.048 HISTORY OF RECTAL CANCER: ICD-10-CM

## 2018-08-14 DIAGNOSIS — D50.0 IRON DEFICIENCY ANEMIA DUE TO CHRONIC BLOOD LOSS: ICD-10-CM

## 2018-08-14 DIAGNOSIS — D47.Z9 LOW GRADE B CELL LYMPHOPROLIFERATIVE DISORDER: Primary | ICD-10-CM

## 2018-08-14 DIAGNOSIS — D47.Z9 LOW GRADE B CELL LYMPHOPROLIFERATIVE DISORDER: ICD-10-CM

## 2018-08-14 LAB
ALBUMIN SERPL BCP-MCNC: 2.7 G/DL
ALP SERPL-CCNC: 416 U/L
ALT SERPL W/O P-5'-P-CCNC: 58 U/L
ANION GAP SERPL CALC-SCNC: 17 MMOL/L
AST SERPL-CCNC: 142 U/L
B2 MICROGLOB SERPL-MCNC: 7.2 UG/ML
BASOPHILS # BLD AUTO: 0.04 K/UL
BASOPHILS NFR BLD: 0.5 %
BILIRUB SERPL-MCNC: 2 MG/DL
BUN SERPL-MCNC: 11 MG/DL
CALCIUM SERPL-MCNC: 8.9 MG/DL
CHLORIDE SERPL-SCNC: 95 MMOL/L
CO2 SERPL-SCNC: 25 MMOL/L
CREAT SERPL-MCNC: 1.4 MG/DL
DIFFERENTIAL METHOD: ABNORMAL
EOSINOPHIL # BLD AUTO: 0.1 K/UL
EOSINOPHIL NFR BLD: 0.9 %
ERYTHROCYTE [DISTWIDTH] IN BLOOD BY AUTOMATED COUNT: 14.6 %
EST. GFR  (AFRICAN AMERICAN): 56 ML/MIN/1.73 M^2
EST. GFR  (NON AFRICAN AMERICAN): 48 ML/MIN/1.73 M^2
GLUCOSE SERPL-MCNC: 99 MG/DL
HCT VFR BLD AUTO: 31.5 %
HGB BLD-MCNC: 11 G/DL
HIV1+2 IGG SERPL QL IA.RAPID: NEGATIVE
IGA SERPL-MCNC: 95 MG/DL
IGG SERPL-MCNC: 500 MG/DL
IGM SERPL-MCNC: 4805 MG/DL
LYMPHOCYTES # BLD AUTO: 1.4 K/UL
LYMPHOCYTES NFR BLD: 17.7 %
MCH RBC QN AUTO: 34.9 PG
MCHC RBC AUTO-ENTMCNC: 34.9 G/DL
MCV RBC AUTO: 100 FL
MONOCYTES # BLD AUTO: 0.6 K/UL
MONOCYTES NFR BLD: 7.6 %
NEUTROPHILS # BLD AUTO: 5.7 K/UL
NEUTROPHILS NFR BLD: 73.3 %
PLATELET # BLD AUTO: 198 K/UL
PMV BLD AUTO: 10.2 FL
POTASSIUM SERPL-SCNC: 3.4 MMOL/L
PROT SERPL-MCNC: 7.6 G/DL
RBC # BLD AUTO: 3.15 M/UL
SODIUM SERPL-SCNC: 137 MMOL/L
WBC # BLD AUTO: 7.81 K/UL

## 2018-08-14 PROCEDURE — 36415 COLL VENOUS BLD VENIPUNCTURE: CPT

## 2018-08-14 PROCEDURE — 80053 COMPREHEN METABOLIC PANEL: CPT

## 2018-08-14 PROCEDURE — 86704 HEP B CORE ANTIBODY TOTAL: CPT

## 2018-08-14 PROCEDURE — 86334 IMMUNOFIX E-PHORESIS SERUM: CPT | Mod: 26,,, | Performed by: PATHOLOGY

## 2018-08-14 PROCEDURE — 84165 PROTEIN E-PHORESIS SERUM: CPT | Mod: 26,,, | Performed by: PATHOLOGY

## 2018-08-14 PROCEDURE — 86334 IMMUNOFIX E-PHORESIS SERUM: CPT

## 2018-08-14 PROCEDURE — 99999 PR PBB SHADOW E&M-EST. PATIENT-LVL IV: CPT | Mod: PBBFAC,,, | Performed by: INTERNAL MEDICINE

## 2018-08-14 PROCEDURE — 83520 IMMUNOASSAY QUANT NOS NONAB: CPT | Mod: 59

## 2018-08-14 PROCEDURE — 82232 ASSAY OF BETA-2 PROTEIN: CPT

## 2018-08-14 PROCEDURE — 84165 PROTEIN E-PHORESIS SERUM: CPT

## 2018-08-14 PROCEDURE — 3074F SYST BP LT 130 MM HG: CPT | Mod: CPTII,S$GLB,, | Performed by: INTERNAL MEDICINE

## 2018-08-14 PROCEDURE — 85025 COMPLETE CBC W/AUTO DIFF WBC: CPT

## 2018-08-14 PROCEDURE — 99499 UNLISTED E&M SERVICE: CPT | Mod: HCNC,S$GLB,, | Performed by: INTERNAL MEDICINE

## 2018-08-14 PROCEDURE — 86703 HIV-1/HIV-2 1 RESULT ANTBDY: CPT

## 2018-08-14 PROCEDURE — 3078F DIAST BP <80 MM HG: CPT | Mod: CPTII,S$GLB,, | Performed by: INTERNAL MEDICINE

## 2018-08-14 PROCEDURE — 99215 OFFICE O/P EST HI 40 MIN: CPT | Mod: S$GLB,,, | Performed by: INTERNAL MEDICINE

## 2018-08-14 PROCEDURE — 82784 ASSAY IGA/IGD/IGG/IGM EACH: CPT | Mod: 59

## 2018-08-14 RX ORDER — CYANOCOBALAMIN 1000 UG/ML
INJECTION, SOLUTION INTRAMUSCULAR; SUBCUTANEOUS
Qty: 10 ML | Refills: 4 | Status: SHIPPED | OUTPATIENT
Start: 2018-08-14 | End: 2020-01-10 | Stop reason: SDUPTHER

## 2018-08-14 NOTE — TELEPHONE ENCOUNTER
----- Message from Radu Weaver MD sent at 8/14/2018  1:04 PM CDT -----  His liver tests are slightly elevated on lab work from today.  Please have the patient stop pravastatin at this time.  Thank you.

## 2018-08-14 NOTE — PROGRESS NOTES
Subjective:      Patient ID: Homero Tanner is a 78 y.o. male.    Chief Complaint: Results    The patient is a 78-year-old  male who presents to the Hematology Oncology Clinic today to review the results of his bone marrow aspiration and biopsy done in July 2018.  The patient was previously followed in the outpatient Hematology Oncology Clinic by Dr. Abiodun Montaño and I am evaluating the patient today for the 1st time.  I have reviewed all of the patient's relevant clinical history available in the medical record and have utilized this in my evaluation and management recommendations today.  Today the patient reports that overall he feels well and has no specific complaints.  He has been taking his monthly vitamin B12 injections and is requesting a refill today.  He is off iron supplementation at this time for his history of iron deficiency anemia which has since resolved.  He is on intermittent ADT for biochemical recurrence of prostate carcinoma and is followed by a urologist outside of the [facility].  His rectal cancer is in remission at this time.  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      Review of Systems   Constitutional: Negative for activity change, appetite change, chills, diaphoresis, fatigue, fever and unexpected weight change.   HENT: Negative for congestion, dental problem, ear pain, mouth sores, nosebleeds, postnasal drip, sinus pressure, sore throat, tinnitus, trouble swallowing and voice change.    Eyes: Negative for photophobia, pain, discharge, redness, itching and visual disturbance.   Respiratory: Negative for cough, chest tightness, shortness of breath, wheezing and stridor.    Cardiovascular: Negative for chest pain, palpitations and leg swelling.   Gastrointestinal: Negative for abdominal distention, abdominal pain, anal bleeding, blood in stool, constipation, diarrhea, nausea and vomiting.   Endocrine:  Negative for cold intolerance, heat intolerance, polydipsia, polyphagia and polyuria.   Genitourinary: Negative for decreased urine volume, difficulty urinating, dysuria, flank pain, frequency, hematuria and urgency.   Musculoskeletal: Negative for arthralgias, back pain, gait problem, joint swelling and myalgias.   Skin: Negative for pallor and rash.   Allergic/Immunologic: Negative for immunocompromised state.   Neurological: Negative for dizziness, syncope, weakness, light-headedness and headaches.   Hematological: Negative for adenopathy. Does not bruise/bleed easily.   Psychiatric/Behavioral: Negative for agitation and confusion. The patient is not nervous/anxious.        Medication List with Changes/Refills   Current Medications    ASPIRIN (ECOTRIN) 81 MG EC TABLET    Take 81 mg by mouth once daily.    B COMPLEX VITAMINS TABLET    Take by mouth. 1 tablet Oral Every day    FLUOCINONIDE (LIDEX) 0.05 % EXTERNAL SOLUTION    AAA of scalp 1-2 times daily as needed for itch    FLUTICASONE (FLONASE) 50 MCG/ACTUATION NASAL SPRAY    1 spray by Nasal route 2 (two) times daily as needed for Rhinitis or Allergies. 1 Aerosol, Spray Each nostril Twice a day     FUROSEMIDE (LASIX) 20 MG TABLET    TAKE 1 TABLET(20 MG) BY MOUTH EVERY DAY    FUROSEMIDE (LASIX) 20 MG TABLET    TAKE 1 TABLET(20 MG) BY MOUTH EVERY DAY    HYDRALAZINE (APRESOLINE) 25 MG TABLET    TAKE 1 TABLET BY MOUTH MID-DAY AND IN THE EVENING    KETOCONAZOLE (NIZORAL) 2 % SHAMPOO    WASH HAIR WITH MEDICATED SHAMPOO AT LEAST 3 TIMES A WEEK. LET SIT ON SCALP AT LEAST 5 MINUTES PRIOR TO RINSING    LEUPROLIDE (LUPRON) 3.75 MG INJECTION    Inject 3.75 mg into the muscle every 3 (three) months.     METOPROLOL TARTRATE (LOPRESSOR) 100 MG TABLET    TAKE 1 TABLET(100 MG) BY MOUTH TWICE DAILY    METRONIDAZOLE 0.75% (METROCREAM) 0.75 % CREA    Apply 1 application topically 2 (two) times daily. Apply to affected on face    OMEPRAZOLE (PRILOSEC) 20 MG CAPSULE    TAKE 1  CAPSULE(20 MG) BY MOUTH EVERY DAY    PRAVASTATIN (PRAVACHOL) 40 MG TABLET    TAKE 1 TABLET BY MOUTH DAILY    SULFACETAMIDE SODIUM 10 % SHAM    Use twice daily as face wash for rosacea    TRIAMCINOLONE ACETONIDE 0.025% (KENALOG) 0.025 % CREAM    AAA bid as needed for flares on face.  Mild steroid.   Changed and/or Refilled Medications    Modified Medication Previous Medication    CYANOCOBALAMIN 1,000 MCG/ML INJECTION cyanocobalamin 1,000 mcg/mL injection       Give 1cc under the skin once a month    Give 1cc under the skin once a month     Review of patient's allergies indicates:   Allergen Reactions    Lipitor [atorvastatin] Other (See Comments)    Norvasc [amlodipine] Swelling       Lab: I have reviewed all of the patient's relevant lab work available in the medical record and have utilized this in my evaluation and management recommendations today    Imaging: I have reviewed all of the patient's diagnostic/imaging results available in the medical record and have utilized this in my evaluation and management recommendations today.      Objective:     Vitals:    08/14/18 1035   BP: 103/66   Pulse: 107   Temp: 98.7 °F (37.1 °C)       Physical Exam   Constitutional: He is oriented to person, place, and time. He appears well-developed and well-nourished. No distress.   HENT:   Head: Normocephalic and atraumatic.   Nose: Nose normal.   Mouth/Throat: Oropharynx is clear and moist. No oropharyngeal exudate.   Eyes: EOM are normal. Pupils are equal, round, and reactive to light. No scleral icterus.   Neck: Normal range of motion. Neck supple. No tracheal deviation present. No thyromegaly present.   Cardiovascular: Normal rate, regular rhythm, normal heart sounds and intact distal pulses.   No murmur heard.  Pulmonary/Chest: Effort normal and breath sounds normal. No stridor. No respiratory distress. He has no wheezes. He has no rales. He exhibits no tenderness.   Abdominal: Soft. Bowel sounds are normal. He exhibits no  distension and no mass. There is no tenderness. There is no rebound and no guarding.   Musculoskeletal: Normal range of motion. He exhibits no edema or tenderness.   Lymphadenopathy:     He has no cervical adenopathy.   Neurological: He is alert and oriented to person, place, and time. Coordination normal.   Skin: Skin is warm. No rash noted. He is not diaphoretic. No erythema.   Psychiatric: He has a normal mood and affect. His behavior is normal. Judgment and thought content normal.   Nursing note and vitals reviewed.      Assessment:     1. Low grade B cell lymphoproliferative disorder    2. History of rectal cancer    3. Prostate cancer    4. Iron deficiency anemia due to chronic blood loss    5. Other vitamin B12 deficiency anemia    6. Paraproteinemia    7. Thymoma        Plan:   1.  I had a detailed discussion with the patient today with regard to the results of his bone marrow aspiration biopsy done in July 2018.  This shows evidence of a B-cell lymphoproliferative disorder.  It has been sent to the [facility] for a 2nd opinion/additional testing.  These results are pending at this time.  I will contact hematopathology at [facility], [place] with regard to an update on this.  2.  I will check lab work today for further evaluation.  The patient will also be scheduled for CT scans and PET-CT scan to evaluate for any evidence of pathologic lymphadenopathy.    Follow-up after above to discuss further management.  He knows to call for any additional questions or new problems.  Low grade B cell lymphoproliferative disorder  -     CBC auto differential; Future; Expected date: 08/14/2018  -     CMP; Future; Expected date: 08/14/2018  -     BETA 2 MICROGLOBULIN, SERUM; Future; Expected date: 08/14/2018  -     IMMUNOGLOBULINS (IGG, IGA, IGM) QUANTITATIVE; Future; Expected date: 08/14/2018  -     Protein electrophoresis, serum; Future; Expected date: 08/14/2018  -     Immunoglobulin free LT chains blood; Future;  Expected date: 08/14/2018  -     IMMUNOFIXATION ELECTROPHORESIS, SERUM; Future; Expected date: 08/14/2018  -     Protein electrophoresis, timed urine; Future  -     Immunofixation, 24 hour Urine; Future  -     CT Chest Abdoment Pelvis With Contrast; Future; Expected date: 08/14/2018  -     NM PET CT Whole Body; Future; Expected date: 08/14/2018  -     RAPID HIV; Future; Expected date: 08/14/2018  -     HEPATITIS B CORE ANTIBODY, TOTAL; Future; Expected date: 08/14/2018    History of rectal cancer  -     CBC auto differential; Future; Expected date: 08/14/2018  -     CMP; Future; Expected date: 08/14/2018  -     BETA 2 MICROGLOBULIN, SERUM; Future; Expected date: 08/14/2018  -     IMMUNOGLOBULINS (IGG, IGA, IGM) QUANTITATIVE; Future; Expected date: 08/14/2018  -     Protein electrophoresis, serum; Future; Expected date: 08/14/2018  -     Immunoglobulin free LT chains blood; Future; Expected date: 08/14/2018  -     IMMUNOFIXATION ELECTROPHORESIS, SERUM; Future; Expected date: 08/14/2018  -     Protein electrophoresis, timed urine; Future  -     Immunofixation, 24 hour Urine; Future  -     CT Chest Abdoment Pelvis With Contrast; Future; Expected date: 08/14/2018  -     NM PET CT Whole Body; Future; Expected date: 08/14/2018  -     RAPID HIV; Future; Expected date: 08/14/2018  -     HEPATITIS B CORE ANTIBODY, TOTAL; Future; Expected date: 08/14/2018    Prostate cancer  -     CBC auto differential; Future; Expected date: 08/14/2018  -     CMP; Future; Expected date: 08/14/2018  -     BETA 2 MICROGLOBULIN, SERUM; Future; Expected date: 08/14/2018  -     IMMUNOGLOBULINS (IGG, IGA, IGM) QUANTITATIVE; Future; Expected date: 08/14/2018  -     Protein electrophoresis, serum; Future; Expected date: 08/14/2018  -     Immunoglobulin free LT chains blood; Future; Expected date: 08/14/2018  -     IMMUNOFIXATION ELECTROPHORESIS, SERUM; Future; Expected date: 08/14/2018  -     Protein electrophoresis, timed urine; Future  -      Immunofixation, 24 hour Urine; Future  -     CT Chest Abdoment Pelvis With Contrast; Future; Expected date: 08/14/2018  -     NM PET CT Whole Body; Future; Expected date: 08/14/2018  -     RAPID HIV; Future; Expected date: 08/14/2018  -     HEPATITIS B CORE ANTIBODY, TOTAL; Future; Expected date: 08/14/2018    Iron deficiency anemia due to chronic blood loss  -     CBC auto differential; Future; Expected date: 08/14/2018  -     CMP; Future; Expected date: 08/14/2018  -     BETA 2 MICROGLOBULIN, SERUM; Future; Expected date: 08/14/2018  -     IMMUNOGLOBULINS (IGG, IGA, IGM) QUANTITATIVE; Future; Expected date: 08/14/2018  -     Protein electrophoresis, serum; Future; Expected date: 08/14/2018  -     Immunoglobulin free LT chains blood; Future; Expected date: 08/14/2018  -     IMMUNOFIXATION ELECTROPHORESIS, SERUM; Future; Expected date: 08/14/2018  -     Protein electrophoresis, timed urine; Future  -     Immunofixation, 24 hour Urine; Future  -     CT Chest Abdoment Pelvis With Contrast; Future; Expected date: 08/14/2018  -     NM PET CT Whole Body; Future; Expected date: 08/14/2018  -     RAPID HIV; Future; Expected date: 08/14/2018  -     HEPATITIS B CORE ANTIBODY, TOTAL; Future; Expected date: 08/14/2018    Other vitamin B12 deficiency anemia  -     cyanocobalamin 1,000 mcg/mL injection; Give 1cc under the skin once a month  Dispense: 10 mL; Refill: 4  -     CBC auto differential; Future; Expected date: 08/14/2018  -     CMP; Future; Expected date: 08/14/2018  -     BETA 2 MICROGLOBULIN, SERUM; Future; Expected date: 08/14/2018  -     IMMUNOGLOBULINS (IGG, IGA, IGM) QUANTITATIVE; Future; Expected date: 08/14/2018  -     Protein electrophoresis, serum; Future; Expected date: 08/14/2018  -     Immunoglobulin free LT chains blood; Future; Expected date: 08/14/2018  -     IMMUNOFIXATION ELECTROPHORESIS, SERUM; Future; Expected date: 08/14/2018  -     Protein electrophoresis, timed urine; Future  -     Immunofixation,  24 hour Urine; Future  -     CT Chest Abdoment Pelvis With Contrast; Future; Expected date: 08/14/2018  -     NM PET CT Whole Body; Future; Expected date: 08/14/2018  -     RAPID HIV; Future; Expected date: 08/14/2018  -     HEPATITIS B CORE ANTIBODY, TOTAL; Future; Expected date: 08/14/2018    Paraproteinemia  -     CBC auto differential; Future; Expected date: 08/14/2018  -     CMP; Future; Expected date: 08/14/2018  -     BETA 2 MICROGLOBULIN, SERUM; Future; Expected date: 08/14/2018  -     IMMUNOGLOBULINS (IGG, IGA, IGM) QUANTITATIVE; Future; Expected date: 08/14/2018  -     Protein electrophoresis, serum; Future; Expected date: 08/14/2018  -     Immunoglobulin free LT chains blood; Future; Expected date: 08/14/2018  -     IMMUNOFIXATION ELECTROPHORESIS, SERUM; Future; Expected date: 08/14/2018  -     Protein electrophoresis, timed urine; Future  -     Immunofixation, 24 hour Urine; Future  -     CT Chest Abdoment Pelvis With Contrast; Future; Expected date: 08/14/2018  -     NM PET CT Whole Body; Future; Expected date: 08/14/2018  -     RAPID HIV; Future; Expected date: 08/14/2018  -     HEPATITIS B CORE ANTIBODY, TOTAL; Future; Expected date: 08/14/2018    Thymoma  -     cyanocobalamin 1,000 mcg/mL injection; Give 1cc under the skin once a month  Dispense: 10 mL; Refill: 4  -     CBC auto differential; Future; Expected date: 08/14/2018  -     CMP; Future; Expected date: 08/14/2018  -     BETA 2 MICROGLOBULIN, SERUM; Future; Expected date: 08/14/2018  -     IMMUNOGLOBULINS (IGG, IGA, IGM) QUANTITATIVE; Future; Expected date: 08/14/2018  -     Protein electrophoresis, serum; Future; Expected date: 08/14/2018  -     Immunoglobulin free LT chains blood; Future; Expected date: 08/14/2018  -     IMMUNOFIXATION ELECTROPHORESIS, SERUM; Future; Expected date: 08/14/2018  -     Protein electrophoresis, timed urine; Future  -     Immunofixation, 24 hour Urine; Future  -     CT Chest Abdoment Pelvis With Contrast; Future;  Expected date: 08/14/2018  -     NM PET CT Whole Body; Future; Expected date: 08/14/2018  -     RAPID HIV; Future; Expected date: 08/14/2018  -     HEPATITIS B CORE ANTIBODY, TOTAL; Future; Expected date: 08/14/2018

## 2018-08-15 LAB
ALBUMIN SERPL ELPH-MCNC: 2.9 G/DL
ALPHA1 GLOB SERPL ELPH-MCNC: 0.38 G/DL
ALPHA2 GLOB SERPL ELPH-MCNC: 0.77 G/DL
B-GLOBULIN SERPL ELPH-MCNC: 2.69 G/DL
GAMMA GLOB SERPL ELPH-MCNC: 0.56 G/DL
HBV CORE AB SERPL QL IA: NEGATIVE
INTERPRETATION SERPL IFE-IMP: NORMAL
KAPPA LC SER QL IA: 1.78 MG/DL
KAPPA LC/LAMBDA SER IA: 0.29
LAMBDA LC SER QL IA: 6.18 MG/DL
PROT SERPL-MCNC: 7.3 G/DL

## 2018-08-20 ENCOUNTER — TELEPHONE (OUTPATIENT)
Dept: RADIOLOGY | Facility: HOSPITAL | Age: 78
End: 2018-08-20

## 2018-08-20 LAB
PATHOLOGIST INTERPRETATION IFE: NORMAL
PATHOLOGIST INTERPRETATION SPE: NORMAL

## 2018-08-21 ENCOUNTER — HOSPITAL ENCOUNTER (OUTPATIENT)
Dept: RADIOLOGY | Facility: HOSPITAL | Age: 78
Discharge: HOME | End: 2018-08-21
Attending: INTERNAL MEDICINE
Payer: MEDICARE

## 2018-08-21 DIAGNOSIS — D47.Z9 LOW GRADE B CELL LYMPHOPROLIFERATIVE DISORDER: ICD-10-CM

## 2018-08-21 DIAGNOSIS — Z85.048 HISTORY OF RECTAL CANCER: ICD-10-CM

## 2018-08-21 DIAGNOSIS — D50.0 IRON DEFICIENCY ANEMIA DUE TO CHRONIC BLOOD LOSS: ICD-10-CM

## 2018-08-21 DIAGNOSIS — D51.8 OTHER VITAMIN B12 DEFICIENCY ANEMIA: ICD-10-CM

## 2018-08-21 DIAGNOSIS — C61 PROSTATE CANCER: ICD-10-CM

## 2018-08-21 DIAGNOSIS — D89.2 PARAPROTEINEMIA: ICD-10-CM

## 2018-08-21 DIAGNOSIS — D49.89 THYMOMA: ICD-10-CM

## 2018-08-21 PROCEDURE — 25500020 PHARM REV CODE 255: Mod: PO | Performed by: INTERNAL MEDICINE

## 2018-08-21 PROCEDURE — 74177 CT ABD & PELVIS W/CONTRAST: CPT | Mod: TC,PO

## 2018-08-21 PROCEDURE — 71260 CT THORAX DX C+: CPT | Mod: 26,,, | Performed by: RADIOLOGY

## 2018-08-21 PROCEDURE — 74177 CT ABD & PELVIS W/CONTRAST: CPT | Mod: 26,,, | Performed by: RADIOLOGY

## 2018-08-21 RX ADMIN — IOHEXOL 100 ML: 350 INJECTION, SOLUTION INTRAVENOUS at 10:08

## 2018-08-21 RX ADMIN — IOHEXOL 30 ML: 350 INJECTION, SOLUTION INTRAVENOUS at 08:08

## 2018-08-22 ENCOUNTER — TELEPHONE (OUTPATIENT)
Dept: CARDIOLOGY | Facility: HOSPITAL | Age: 78
End: 2018-08-22

## 2018-08-22 ENCOUNTER — HOSPITAL ENCOUNTER (OUTPATIENT)
Dept: RADIOLOGY | Facility: HOSPITAL | Age: 78
Discharge: HOME | End: 2018-08-22
Attending: INTERNAL MEDICINE
Payer: MEDICARE

## 2018-08-22 DIAGNOSIS — D51.8 OTHER VITAMIN B12 DEFICIENCY ANEMIA: ICD-10-CM

## 2018-08-22 DIAGNOSIS — D50.0 IRON DEFICIENCY ANEMIA DUE TO CHRONIC BLOOD LOSS: ICD-10-CM

## 2018-08-22 DIAGNOSIS — D49.89 THYMOMA: ICD-10-CM

## 2018-08-22 DIAGNOSIS — D89.2 PARAPROTEINEMIA: ICD-10-CM

## 2018-08-22 DIAGNOSIS — Z85.048 HISTORY OF RECTAL CANCER: ICD-10-CM

## 2018-08-22 DIAGNOSIS — D47.Z9 LOW GRADE B CELL LYMPHOPROLIFERATIVE DISORDER: ICD-10-CM

## 2018-08-22 DIAGNOSIS — C61 PROSTATE CANCER: ICD-10-CM

## 2018-08-22 PROCEDURE — 78816 PET IMAGE W/CT FULL BODY: CPT | Mod: 26,PI,, | Performed by: RADIOLOGY

## 2018-08-22 PROCEDURE — 78816 PET IMAGE W/CT FULL BODY: CPT | Mod: TC,PI

## 2018-08-22 NOTE — TELEPHONE ENCOUNTER
I have attempted without success to contact this patient by phone left message to [facility]

## 2018-08-22 NOTE — TELEPHONE ENCOUNTER
I have attempted without success to contact this patient by phone left message to call clinic back.

## 2018-08-22 NOTE — TELEPHONE ENCOUNTER
Please call pt  Alkaline phosphatase (liver enzyme) is worse than what has been noted on past labs.  Needs GI consultation.  Please schedule.    Dr Wright

## 2018-08-28 ENCOUNTER — INITIAL CONSULT (OUTPATIENT)
Dept: GASTROENTEROLOGY | Facility: CLINIC | Age: 78
End: 2018-08-28
Payer: MEDICARE

## 2018-08-28 ENCOUNTER — OFFICE VISIT (OUTPATIENT)
Dept: CARDIOLOGY | Facility: CLINIC | Age: 78
End: 2018-08-28
Payer: MEDICARE

## 2018-08-28 VITALS
BODY MASS INDEX: 25.85 KG/M2 | SYSTOLIC BLOOD PRESSURE: 102 MMHG | DIASTOLIC BLOOD PRESSURE: 58 MMHG | HEIGHT: 70 IN | WEIGHT: 180.56 LBS | HEART RATE: 80 BPM

## 2018-08-28 VITALS
SYSTOLIC BLOOD PRESSURE: 102 MMHG | DIASTOLIC BLOOD PRESSURE: 60 MMHG | WEIGHT: 182.13 LBS | HEART RATE: 80 BPM | BODY MASS INDEX: 26.07 KG/M2 | HEIGHT: 70 IN

## 2018-08-28 DIAGNOSIS — I36.1 NON-RHEUMATIC TRICUSPID VALVE INSUFFICIENCY: ICD-10-CM

## 2018-08-28 DIAGNOSIS — K76.0 FATTY LIVER: Primary | ICD-10-CM

## 2018-08-28 DIAGNOSIS — I27.20 PULMONARY HYPERTENSION: ICD-10-CM

## 2018-08-28 DIAGNOSIS — I10 ESSENTIAL HYPERTENSION: Primary | Chronic | ICD-10-CM

## 2018-08-28 DIAGNOSIS — R94.31 ABNORMAL ECG: Chronic | ICD-10-CM

## 2018-08-28 DIAGNOSIS — R74.8 ELEVATED LIVER ENZYMES: ICD-10-CM

## 2018-08-28 DIAGNOSIS — I50.32 CHRONIC DIASTOLIC HEART FAILURE: ICD-10-CM

## 2018-08-28 DIAGNOSIS — R74.8 ABNORMAL LIVER ENZYMES: ICD-10-CM

## 2018-08-28 DIAGNOSIS — E78.2 MIXED HYPERLIPIDEMIA: ICD-10-CM

## 2018-08-28 DIAGNOSIS — I35.1 NONRHEUMATIC AORTIC VALVE INSUFFICIENCY: Chronic | ICD-10-CM

## 2018-08-28 DIAGNOSIS — I49.3 PVC'S (PREMATURE VENTRICULAR CONTRACTIONS): ICD-10-CM

## 2018-08-28 DIAGNOSIS — R60.0 EDEMA OF BOTH LEGS: ICD-10-CM

## 2018-08-28 PROCEDURE — 99214 OFFICE O/P EST MOD 30 MIN: CPT | Mod: S$GLB,,, | Performed by: INTERNAL MEDICINE

## 2018-08-28 PROCEDURE — 99999 PR PBB SHADOW E&M-EST. PATIENT-LVL III: CPT | Mod: PBBFAC,,, | Performed by: INTERNAL MEDICINE

## 2018-08-28 PROCEDURE — 99499 UNLISTED E&M SERVICE: CPT | Mod: HCNC,S$GLB,, | Performed by: INTERNAL MEDICINE

## 2018-08-28 PROCEDURE — 3078F DIAST BP <80 MM HG: CPT | Mod: CPTII,S$GLB,, | Performed by: INTERNAL MEDICINE

## 2018-08-28 PROCEDURE — 99999 PR PBB SHADOW E&M-EST. PATIENT-LVL III: CPT | Mod: PBBFAC,,, | Performed by: NURSE PRACTITIONER

## 2018-08-28 PROCEDURE — 99204 OFFICE O/P NEW MOD 45 MIN: CPT | Mod: S$GLB,,, | Performed by: NURSE PRACTITIONER

## 2018-08-28 PROCEDURE — 3074F SYST BP LT 130 MM HG: CPT | Mod: CPTII,S$GLB,, | Performed by: INTERNAL MEDICINE

## 2018-08-28 PROCEDURE — 3074F SYST BP LT 130 MM HG: CPT | Mod: CPTII,S$GLB,, | Performed by: NURSE PRACTITIONER

## 2018-08-28 PROCEDURE — 3078F DIAST BP <80 MM HG: CPT | Mod: CPTII,S$GLB,, | Performed by: NURSE PRACTITIONER

## 2018-08-28 RX ORDER — FUROSEMIDE 20 MG/1
TABLET ORAL
Qty: 30 TABLET | Refills: 12
Start: 2018-08-28 | End: 2018-09-18 | Stop reason: ALTCHOICE

## 2018-08-28 RX ORDER — SODIUM, POTASSIUM,MAG SULFATES 17.5-3.13G
SOLUTION, RECONSTITUTED, ORAL ORAL
Refills: 0 | COMMUNITY
Start: 2018-06-20 | End: 2018-09-17 | Stop reason: ALTCHOICE

## 2018-08-28 NOTE — PROGRESS NOTES
Subjective:    Patient ID:  Homero Tanner is a 78 y.o. male who presents for evaluation of Hypertension; Hyperlipidemia; Coronary Artery Disease; and Follow-up      HPI Mr. Tanner returns for f/u.   His current medical conditions include chronic HTN, DD, PHTN, AI, PVCs, TR, dyslipidemia, prostate & rectal cancer. Nonsmoker.    Had LHC 20+ years ago, no significant blockages noted.  He has chronic abnl ecgs.   Also has had PSVT with stress testing in past.    He has h/o thymoma surgery with xrt and also had rectal polyp surgery.  - Stress MPI Aug 2017.  Echo Aug 2017 normal EF, DD, mild-mod PHTN, mild AI, LVH, mild RVE.  Here for f/u.  Abnormal liver enzymes.  Alkaline phosphatase worse.  He was called and referred to GI and he saw them today and is undergoing further evaluation.  No chest pain sxs.  He has BACH with walking, that he attributes to age, lack of exercise.  He thinks it is getting progressively worse.  No pnd/orthopnea.    Chronic leg edema.  BNP test elevated.  BP on low side.  Some occasional dizziness when he first stands up.  No syncope.   Weight stable.      Current Outpatient Medications:     aspirin (ECOTRIN) 81 MG EC tablet, Take 81 mg by mouth once daily., Disp: , Rfl:     b complex vitamins tablet, Take by mouth. 1 tablet Oral Every day, Disp: , Rfl:     cyanocobalamin 1,000 mcg/mL injection, Give 1cc under the skin once a month, Disp: 10 mL, Rfl: 4    fluocinonide (LIDEX) 0.05 % external solution, AAA of scalp 1-2 times daily as needed for itch, Disp: 60 mL, Rfl: 2    fluticasone (FLONASE) 50 mcg/actuation nasal spray, 1 spray by Nasal route 2 (two) times daily as needed for Rhinitis or Allergies. 1 Aerosol, Spray Each nostril Twice a day , Disp: , Rfl:     furosemide (LASIX) 20 MG tablet, TAKE 1 TABLET(20 MG) BY MOUTH EVERY DAY, Disp: 30 tablet, Rfl: 12    hydrALAZINE (APRESOLINE) 25 MG tablet, TAKE 1 TABLET BY MOUTH MID-DAY AND IN THE EVENING, Disp: 180 tablet, Rfl: 3     "ketoconazole (NIZORAL) 2 % shampoo, WASH HAIR WITH MEDICATED SHAMPOO AT LEAST 3 TIMES A WEEK. LET SIT ON SCALP AT LEAST 5 MINUTES PRIOR TO RINSING, Disp: 120 mL, Rfl: 3    leuprolide (LUPRON) 3.75 mg injection, Inject 3.75 mg into the muscle every 3 (three) months. , Disp: , Rfl:     metoprolol tartrate (LOPRESSOR) 100 MG tablet, TAKE 1 TABLET(100 MG) BY MOUTH TWICE DAILY, Disp: 180 tablet, Rfl: 3    metronidazole 0.75% (METROCREAM) 0.75 % Crea, Apply 1 application topically 2 (two) times daily. Apply to affected on face, Disp: 45 g, Rfl: 5    omeprazole (PRILOSEC) 20 MG capsule, TAKE 1 CAPSULE(20 MG) BY MOUTH EVERY DAY, Disp: 90 capsule, Rfl: 3    pravastatin (PRAVACHOL) 40 MG tablet, TAKE 1 TABLET BY MOUTH DAILY, Disp: 90 tablet, Rfl: 3    SUPREP BOWEL PREP KIT 17.5-3.13-1.6 gram SolR, TK 1 PO ONCE, Disp: , Rfl: 0      Review of Systems   Constitution: Negative.   HENT: Negative.    Eyes: Negative.    Cardiovascular: Positive for dyspnea on exertion and leg swelling.   Respiratory: Positive for shortness of breath.    Endocrine: Negative.    Hematologic/Lymphatic: Negative.    Skin: Negative.    Musculoskeletal: Negative.    Gastrointestinal: Negative.    Genitourinary: Negative.    Neurological: Positive for dizziness.   Psychiatric/Behavioral: Negative.    Allergic/Immunologic: Negative.        BP (!) 102/58 (BP Location: Right arm, Patient Position: Sitting, BP Method: Medium (Manual))   Pulse 80   Ht 5' 10" (1.778 m)   Wt 81.9 kg (180 lb 8.9 oz)   BMI 25.91 kg/m²     Wt Readings from Last 3 Encounters:   08/28/18 81.9 kg (180 lb 8.9 oz)   08/28/18 82.6 kg (182 lb 1.6 oz)   08/14/18 82.3 kg (181 lb 7 oz)     Temp Readings from Last 3 Encounters:   08/14/18 98.7 °F (37.1 °C)   07/11/18 98.5 °F (36.9 °C) (Temporal)   06/29/18 97.9 °F (36.6 °C) (Oral)     BP Readings from Last 3 Encounters:   08/28/18 (!) 102/58   08/28/18 102/60   08/14/18 103/66     Pulse Readings from Last 3 Encounters:   08/28/18 80 "   08/28/18 80   08/14/18 107          Objective:    Physical Exam   Constitutional: He is oriented to person, place, and time. He appears well-developed and well-nourished.   HENT:   Head: Normocephalic.   Neck: Normal range of motion. Neck supple. Normal carotid pulses, no hepatojugular reflux and no JVD present. Carotid bruit is not present. No thyromegaly present.   Cardiovascular: Normal rate, S1 normal and S2 normal. A regularly irregular rhythm present. PMI is not displaced. Exam reveals no S3, no S4, no distant heart sounds, no friction rub, no midsystolic click and no opening snap.   No murmur heard.  Pulses:       Radial pulses are 2+ on the right side, and 2+ on the left side.   Pulmonary/Chest: Effort normal and breath sounds normal. He has no wheezes. He has no rales.   Abdominal: Soft. Bowel sounds are normal. He exhibits no distension, no abdominal bruit, no ascites and no mass. There is no tenderness.   Musculoskeletal: He exhibits edema.   Neurological: He is alert and oriented to person, place, and time.   Skin: Skin is warm.   Psychiatric: He has a normal mood and affect. His behavior is normal.   Nursing note and vitals reviewed.      I have reviewed all pertinent labs and cardiac studies.      Chemistry        Component Value Date/Time     08/21/2018 0854    K 3.9 08/21/2018 0854    CL 93 (L) 08/21/2018 0854    CO2 26 08/21/2018 0854    BUN 11 08/21/2018 0854    CREATININE 1.5 (H) 08/21/2018 0854    GLU 91 08/21/2018 0854        Component Value Date/Time    CALCIUM 8.5 (L) 08/21/2018 0854    ALKPHOS 423 (H) 08/21/2018 0854     (H) 08/21/2018 0854    ALT 45 (H) 08/21/2018 0854    BILITOT 1.9 (H) 08/21/2018 0854    ESTGFRAFRICA 51.2 (A) 08/21/2018 0854    EGFRNONAA 44.3 (A) 08/21/2018 0854        Lab Results   Component Value Date    WBC 7.81 08/14/2018    HGB 11.0 (L) 08/14/2018    HCT 31.5 (L) 08/14/2018     (H) 08/14/2018     08/14/2018     Lab Results   Component  Value Date    TSH 2.0 04/23/2008     Lab Results   Component Value Date    HGBA1C 5.2 06/06/2016     Lab Results   Component Value Date    CHOL 160 08/21/2018    CHOL 164 06/06/2016    CHOL 123 05/08/2013     Lab Results   Component Value Date    HDL 65 08/21/2018    HDL 79 (H) 06/06/2016    HDL 60 05/08/2013     Lab Results   Component Value Date    LDLCALC 80.0 08/21/2018    LDLCALC 69.4 06/06/2016    LDLCALC 50.0 (L) 05/08/2013     Lab Results   Component Value Date    TRIG 75 08/21/2018    TRIG 78 06/06/2016    TRIG 64 05/08/2013     Lab Results   Component Value Date    CHOLHDL 40.6 08/21/2018    CHOLHDL 48.2 06/06/2016    CHOLHDL 48.8 05/08/2013     Component      Latest Ref Rng & Units 8/21/2018   BNP      0 - 99 pg/mL 574 (H)         Assessment:       1. Essential hypertension    2. Abnormal ECG    3. Nonrheumatic aortic valve insufficiency    4. Chronic diastolic heart failure    5. Edema of both legs    6. PVC's (premature ventricular contractions)    7. Non-rheumatic tricuspid valve insufficiency    8. Pulmonary hypertension    9. Mixed hyperlipidemia    10. Abnormal liver enzymes         Plan:             Increase Lasix to 40 mg qam M, W, Fridays.  Continue 20 mg qam all other days.  Stop Hydralazine for now due to low BP issues and orthostatic dizziness.  Recheck BNP, CMP in 2 weeks.  F/u with GI on abnl LFTs, alkaline phosphatase has worsened.  Usually not due to statin.  Will stop statin for next few months to remove it from clinical picture.  Continue other meds  Echo  48 hour Holter  Cardiac low salt diet    F/u 3 weeks.

## 2018-08-28 NOTE — LETTER
August 28, 2018      Solomon Cortés MD  [address] [facility]  [address]  [place] [address]           [facility]  [address]  [place] [address]  Phone: [phone]  Fax: [phone]          Patient: Homero Tanner   MR Number: [id number]   YOB: 1940   Date of Visit: 8/28/2018       Dear Dr. Solomon Cortés:    Thank you for referring Homero Tanner to me for evaluation. Attached you will find relevant portions of my assessment and plan of care.    If you have questions, please do not hesitate to call me. I look forward to following Homero Tanner along with you.    Sincerely,    Lizzette Rutledge, [facility]    Enclosure  CC:  No Recipients    If you would like to receive this communication electronically, please contact [email or web address] or [phone] to request more information on [employer] access.    For providers and/or their staff who would like to refer a patient to [facility], please contact us through our one-stop-shop provider referral line, [facility], at [phone].    If you feel you have received this communication in error or would no longer like to receive these types of communications, please e-mail [email or web address]

## 2018-08-28 NOTE — PROGRESS NOTES
Clinic Consult:  [facility] Consultation Note    Reason for Consult:  The primary encounter diagnosis was Fatty liver. A diagnosis of Elevated liver enzymes was also pertinent to this visit.    PCP: Solomon Cortés   [address] / [place] [id number]    HPI:  This is a 78 y.o. male here for evaluation of the above  Pt was referred by his PCP for further workup.  Pt states he was diagnosed with fatty liver years ago.  Chart review shows persistent elevation of LFT since prior to 2016.  He admits to daily ETOH intake.  Was previously on statin for HLD.  Recently stopped per PCP and hematology.   He denies any IVDU.   Recent imaging showed fatty liver.  No concerning findings.  Denies any abdominal pain.  No nausea or vomiting.  No change in bowel pattern.  No melena or hematochezia. No weight loss.  No upper GI bleeding.  No ascites or BLE.  No overt confusion.      Review of Systems   Constitutional: Negative for chills, fever, malaise/fatigue and weight loss.   Respiratory: Negative for cough.    Cardiovascular: Negative for chest pain.   Gastrointestinal:        Per HPI   Musculoskeletal: Negative for myalgias.   Skin: Negative for itching and rash.   Neurological: Negative for headaches.   Psychiatric/Behavioral: The patient is not nervous/anxious.        Medical History:   Past Medical History:   Diagnosis Date    Abnormal ECG 6/24/2013    Aortic insufficiency 6/24/2013    Aortic valve disorder 6/25/2013    Asthma     as a child    Benign neoplasm of cecum 2/27/2017    Benign neoplasm of transverse colon 2/27/2017    CHF (congestive heart failure)     Pt states He's never been told this    Encounter for blood transfusion     GERD (gastroesophageal reflux disease)     History of colon polyps     Hypertension     Iron deficiency anemia 10/26/2015    Mixed hyperlipidemia 6/24/2013    Prostate cancer     PSVT (paroxysmal supraventricular tachycardia) 6/24/2013     Pulmonary hypertension 2016    Rectal adenocarcinoma     Rectal cancer 2018    SCC (squamous cell carcinoma) 2015    left parietal scalp    Thymoma     TR (tricuspid regurgitation) 2016       Surgical History:  Past Surgical History:   Procedure Laterality Date    biopsy for chest mass      COLON SURGERY      mohs      PROSTATECTOMY      RECTAL SURGERY N/A 2014    TONSILLECTOMY      TUMOR REMOVAL         Family History:   Family History   Problem Relation Age of Onset    Diabetes Father     Amblyopia Neg Hx     Blindness Neg Hx     Cancer Neg Hx     Cataracts Neg Hx     Glaucoma Neg Hx     Hypertension Neg Hx     Macular degeneration Neg Hx     Retinal detachment Neg Hx     Strabismus Neg Hx     Stroke Neg Hx     Thyroid disease Neg Hx     Melanoma Neg Hx        Social History:   Social History     Tobacco Use    Smoking status: Former Smoker     Packs/day: 1.00     Years: 15.00     Pack years: 15.00     Last attempt to quit: 1969     Years since quittin.5    Smokeless tobacco: Never Used   Substance Use Topics    Alcohol use: Yes     Alcohol/week: 1.8 oz     Types: 3 Cans of beer per week     Comment: socially    Drug use: No       Allergies: Reviewed    Home Medications:   Current Outpatient Medications on File Prior to Visit   Medication Sig Dispense Refill    aspirin (ECOTRIN) 81 MG EC tablet Take 81 mg by mouth once daily.      b complex vitamins tablet Take by mouth. 1 tablet Oral Every day      cyanocobalamin 1,000 mcg/mL injection Give 1cc under the skin once a month 10 mL 4    fluocinonide (LIDEX) 0.05 % external solution AAA of scalp 1-2 times daily as needed for itch 60 mL 2    ketoconazole (NIZORAL) 2 % shampoo WASH HAIR WITH MEDICATED SHAMPOO AT LEAST 3 TIMES A WEEK. LET SIT ON SCALP AT LEAST 5 MINUTES PRIOR TO RINSING 120 mL 3    metoprolol tartrate (LOPRESSOR) 100 MG tablet TAKE 1 TABLET(100 MG) BY MOUTH TWICE DAILY 180 tablet 3     "metronidazole 0.75% (METROCREAM) 0.75 % Crea Apply 1 application topically 2 (two) times daily. Apply to affected on face 45 g 5    omeprazole (PRILOSEC) 20 MG capsule TAKE 1 CAPSULE(20 MG) BY MOUTH EVERY DAY 90 capsule 3    [DISCONTINUED] furosemide (LASIX) 20 MG tablet TAKE 1 TABLET(20 MG) BY MOUTH EVERY DAY 30 tablet 12    [DISCONTINUED] hydrALAZINE (APRESOLINE) 25 MG tablet TAKE 1 TABLET BY MOUTH MID-DAY AND IN THE EVENING 180 tablet 3    [DISCONTINUED] sulfacetamide sodium 10 % Sham Use twice daily as face wash for rosacea 237 mL 6    fluticasone (FLONASE) 50 mcg/actuation nasal spray 1 spray by Nasal route 2 (two) times daily as needed for Rhinitis or Allergies. 1 Aerosol, Spray Each nostril Twice a day       furosemide (LASIX) 20 MG tablet Take 40 mg daily on Mon, Wed, Fridays and 20 mg once daily all other days 30 tablet 12    leuprolide (LUPRON) 3.75 mg injection Inject 3.75 mg into the muscle every 3 (three) months.       SUPREP BOWEL PREP KIT 17.5-3.13-1.6 gram SolR TK 1 PO ONCE  0    [DISCONTINUED] furosemide (LASIX) 20 MG tablet TAKE 1 TABLET(20 MG) BY MOUTH EVERY DAY 30 tablet 12    [DISCONTINUED] pravastatin (PRAVACHOL) 40 MG tablet TAKE 1 TABLET BY MOUTH DAILY 90 tablet 3    [DISCONTINUED] triamcinolone acetonide 0.025% (KENALOG) 0.025 % cream AAA bid as needed for flares on face.  Mild steroid. 30 g 1     No current facility-administered medications on file prior to visit.        Physical Exam:  Vital Signs:  /60   Pulse 80   Ht 5' 10" (1.778 m)   Wt 82.6 kg (182 lb 1.6 oz)   BMI 26.13 kg/m²   Body mass index is 26.13 kg/m².  Physical Exam   Constitutional: He is oriented to person, place, and time. He appears well-developed and well-nourished.   HENT:   Head: Normocephalic.   Eyes: No scleral icterus.   Neck: Normal range of motion.   Cardiovascular: Normal rate and regular rhythm.   Pulmonary/Chest: Effort normal and breath sounds normal.   Abdominal: Soft. Bowel sounds are " normal. He exhibits no distension. There is no tenderness.   Musculoskeletal: Normal range of motion.   Neurological: He is alert and oriented to person, place, and time.   Skin: Skin is warm and dry.   Psychiatric: He has a normal mood and affect.   Vitals reviewed.      Labs: Pertinent labs reviewed.    Assessment:  1. Fatty liver    2. Elevated liver enzymes         Recommendations:  - will get full liver workup to R/O causes of liver disease and continued elevation of enzymes  - Strongly encouraged complete cessation of ETOH  - Pt had recent PET and CT abdomen without any signs of advanced disease or HCC    Fatty liver  -     Hepatic function panel; Future; Expected date: 08/28/2018  -     CBC auto differential; Future; Expected date: 08/28/2018  -     Protime-INR; Future; Expected date: 08/28/2018  -     Hepatitis B surface antibody; Future; Expected date: 08/28/2018  -     Hepatitis B surface antigen; Future; Expected date: 08/28/2018  -     Hepatitis B core antibody, total; Future; Expected date: 08/28/2018  -     Hepatitis C antibody; Future; Expected date: 08/28/2018  -     ASHWINI; Future; Expected date: 08/28/2018  -     Antimitochondrial antibody; Future; Expected date: 08/28/2018  -     Anti-smooth muscle antibody; Future; Expected date: 08/28/2018  -     Ceruloplasmin; Future; Expected date: 08/28/2018  -     Gamma GT; Future; Expected date: 08/28/2018  -     Hepatitis A antibody, IgG; Future; Expected date: 08/28/2018    Elevated liver enzymes  -     Hepatic function panel; Future; Expected date: 08/28/2018  -     CBC auto differential; Future; Expected date: 08/28/2018  -     Protime-INR; Future; Expected date: 08/28/2018  -     Hepatitis B surface antibody; Future; Expected date: 08/28/2018  -     Hepatitis B surface antigen; Future; Expected date: 08/28/2018  -     Hepatitis B core antibody, total; Future; Expected date: 08/28/2018  -     Hepatitis C antibody; Future; Expected date: 08/28/2018  -      ASHWINI; Future; Expected date: 08/28/2018  -     Antimitochondrial antibody; Future; Expected date: 08/28/2018  -     Anti-smooth muscle antibody; Future; Expected date: 08/28/2018  -     Ceruloplasmin; Future; Expected date: 08/28/2018  -     Gamma GT; Future; Expected date: 08/28/2018  -     Hepatitis A antibody, IgG; Future; Expected date: 08/28/2018        Follow up to be determined by results of above.        Thank you so much for allowing me to participate in the care of Homero Rutledge, LAURAP-C

## 2018-08-30 ENCOUNTER — OFFICE VISIT (OUTPATIENT)
Dept: HEMATOLOGY/ONCOLOGY | Facility: CLINIC | Age: 78
End: 2018-08-30
Payer: MEDICARE

## 2018-08-30 VITALS
BODY MASS INDEX: 25.98 KG/M2 | OXYGEN SATURATION: 93 % | HEART RATE: 73 BPM | TEMPERATURE: 98 F | HEIGHT: 70 IN | WEIGHT: 181.44 LBS | DIASTOLIC BLOOD PRESSURE: 68 MMHG | SYSTOLIC BLOOD PRESSURE: 104 MMHG

## 2018-08-30 DIAGNOSIS — R74.8 ABNORMAL LIVER ENZYMES: Primary | ICD-10-CM

## 2018-08-30 DIAGNOSIS — D51.8 OTHER VITAMIN B12 DEFICIENCY ANEMIA: ICD-10-CM

## 2018-08-30 DIAGNOSIS — C88.0 WALDENSTROM'S MACROGLOBULINEMIA: ICD-10-CM

## 2018-08-30 PROBLEM — C88.00 WALDENSTROM'S MACROGLOBULINEMIA: Status: ACTIVE | Noted: 2018-08-30

## 2018-08-30 PROCEDURE — 99999 PR PBB SHADOW E&M-EST. PATIENT-LVL III: CPT | Mod: PBBFAC,,, | Performed by: INTERNAL MEDICINE

## 2018-08-30 PROCEDURE — 3074F SYST BP LT 130 MM HG: CPT | Mod: CPTII,S$GLB,, | Performed by: INTERNAL MEDICINE

## 2018-08-30 PROCEDURE — 3078F DIAST BP <80 MM HG: CPT | Mod: CPTII,S$GLB,, | Performed by: INTERNAL MEDICINE

## 2018-08-30 PROCEDURE — 99499 UNLISTED E&M SERVICE: CPT | Mod: HCNC,S$GLB,, | Performed by: INTERNAL MEDICINE

## 2018-08-30 PROCEDURE — 99215 OFFICE O/P EST HI 40 MIN: CPT | Mod: S$GLB,,, | Performed by: INTERNAL MEDICINE

## 2018-08-30 RX ORDER — DEXAMETHASONE 4 MG/1
TABLET ORAL
Qty: 120 TABLET | Refills: 1 | Status: SHIPPED | OUTPATIENT
Start: 2018-08-30 | End: 2018-11-28

## 2018-08-30 RX ORDER — ACYCLOVIR 400 MG/1
400 TABLET ORAL 2 TIMES DAILY
Qty: 60 TABLET | Refills: 5 | Status: SHIPPED | OUTPATIENT
Start: 2018-08-30 | End: 2019-01-24 | Stop reason: ALTCHOICE

## 2018-08-30 NOTE — PROGRESS NOTES
Subjective:      Patient ID: Homero Tanner is a 78 y.o. male.    Chief Complaint: Lymphoma    The patient is a 78-year-old  male who presents to the Hematology Oncology Clinic today to discuss further evaluation management recommendations for newly diagnosed Waldenstrom's macroglobulinemia.   I have reviewed all of the patient's relevant clinical history available in the medical record and have utilized this in my evaluation and management recommendations today.  Today the patient reports that he has been having chronic fatigue.  Otherwise overall he feels well and has no specific complaints.  He has been taking his monthly vitamin B12 injections. He is off iron supplementation at this time for his history of iron deficiency anemia which has since resolved.  He is on intermittent ADT for biochemical recurrence of prostate carcinoma and is followed by a urologist outside of the [facility].  His rectal cancer is in remission at this time.      Review of Systems   Constitutional: Positive for fatigue. Negative for activity change, appetite change, chills, diaphoresis, fever and unexpected weight change.   HENT: Negative for congestion, dental problem, ear pain, mouth sores, nosebleeds, postnasal drip, sinus pressure, sore throat, tinnitus, trouble swallowing and voice change.    Eyes: Negative for photophobia, pain, discharge, redness, itching and visual disturbance.   Respiratory: Negative for cough, chest tightness, shortness of breath, wheezing and stridor.    Cardiovascular: Negative for chest pain, palpitations and leg swelling.   Gastrointestinal: Negative for abdominal distention, abdominal pain, anal bleeding, blood in stool, constipation, diarrhea, nausea and vomiting.   Endocrine: Negative for cold intolerance, heat intolerance, polydipsia, polyphagia and polyuria.   Genitourinary: Negative for decreased urine volume, difficulty urinating, dysuria, flank pain, frequency, hematuria and  urgency.   Musculoskeletal: Negative for arthralgias, back pain, gait problem, joint swelling and myalgias.   Skin: Negative for pallor and rash.   Allergic/Immunologic: Negative for immunocompromised state.   Neurological: Negative for dizziness, syncope, weakness, light-headedness and headaches.   Hematological: Negative for adenopathy. Does not bruise/bleed easily.   Psychiatric/Behavioral: Negative for agitation and confusion. The patient is not nervous/anxious.        Medication List with Changes/Refills   New Medications    ACYCLOVIR (ZOVIRAX) 400 MG TABLET    Take 1 tablet (400 mg total) by mouth 2 (two) times daily.    DEXAMETHASONE (DECADRON) 4 MG TAB    Take 5 tablets p.o. once weekly   Current Medications    ASPIRIN (ECOTRIN) 81 MG EC TABLET    Take 81 mg by mouth once daily.    B COMPLEX VITAMINS TABLET    Take by mouth. 1 tablet Oral Every day    CYANOCOBALAMIN 1,000 MCG/ML INJECTION    Give 1cc under the skin once a month    FLUOCINONIDE (LIDEX) 0.05 % EXTERNAL SOLUTION    AAA of scalp 1-2 times daily as needed for itch    FLUTICASONE (FLONASE) 50 MCG/ACTUATION NASAL SPRAY    1 spray by Nasal route 2 (two) times daily as needed for Rhinitis or Allergies. 1 Aerosol, Spray Each nostril Twice a day     FUROSEMIDE (LASIX) 20 MG TABLET    Take 40 mg daily on Mon, Wed, Fridays and 20 mg once daily all other days    KETOCONAZOLE (NIZORAL) 2 % SHAMPOO    WASH HAIR WITH MEDICATED SHAMPOO AT LEAST 3 TIMES A WEEK. LET SIT ON SCALP AT LEAST 5 MINUTES PRIOR TO RINSING    LEUPROLIDE (LUPRON) 3.75 MG INJECTION    Inject 3.75 mg into the muscle every 3 (three) months.     METOPROLOL TARTRATE (LOPRESSOR) 100 MG TABLET    TAKE 1 TABLET(100 MG) BY MOUTH TWICE DAILY    METRONIDAZOLE 0.75% (METROCREAM) 0.75 % CREA    Apply 1 application topically 2 (two) times daily. Apply to affected on face    OMEPRAZOLE (PRILOSEC) 20 MG CAPSULE    TAKE 1 CAPSULE(20 MG) BY MOUTH EVERY DAY    SUPREP BOWEL PREP KIT 17.5-3.13-1.6 GRAM SOLR     TK 1 PO ONCE     Review of patient's allergies indicates:   Allergen Reactions    Lipitor [atorvastatin] Other (See Comments)    Norvasc [amlodipine] Swelling       Lab: I have reviewed all of the patient's relevant lab work available in the medical record and have utilized this in my evaluation and management recommendations today    Imaging: I have reviewed all of the patient's diagnostic/imaging results available in the medical record and have utilized this in my evaluation and management recommendations today.      Objective:     Vitals:    08/30/18 1321   BP: 104/68   Pulse: 73   Temp: 97.9 °F (36.6 °C)       Physical Exam   Constitutional: He is oriented to person, place, and time. He appears well-developed and well-nourished. No distress.   HENT:   Head: Normocephalic and atraumatic.   Nose: Nose normal.   Mouth/Throat: Oropharynx is clear and moist. No oropharyngeal exudate.   Eyes: EOM are normal. Pupils are equal, round, and reactive to light. No scleral icterus.   Neck: Normal range of motion. Neck supple. No tracheal deviation present. No thyromegaly present.   Cardiovascular: Normal rate, regular rhythm, normal heart sounds and intact distal pulses.   No murmur heard.  Pulmonary/Chest: Effort normal and breath sounds normal. No stridor. No respiratory distress. He has no wheezes. He has no rales. He exhibits no tenderness.   Abdominal: Soft. Bowel sounds are normal. He exhibits no distension and no mass. There is no tenderness. There is no rebound and no guarding.   Musculoskeletal: Normal range of motion. He exhibits no edema or tenderness.   Lymphadenopathy:     He has no cervical adenopathy.   Neurological: He is alert and oriented to person, place, and time. Coordination normal.   Skin: Skin is warm. No rash noted. He is not diaphoretic. No erythema.   Psychiatric: He has a normal mood and affect. His behavior is normal. Judgment and thought content normal.   Nursing note and vitals  reviewed.      Assessment:     1. Waldenstrom's macroglobulinemia        Plan:   1.  I had a detailed discussion with the patient today with regard to the results of his bone marrow aspiration biopsy done in July 2018 as well as the results of all of his lab work done in August 2018.  At this time the patient has Waldenstrom's macroglobulinemia.  His IgM quantitative level is greater than 4 g at this time.  2.  I had a detailed discussion with him today with regard to the diagnosis, prognosis and treatment of this malignancy.  We discussed the indications to begin treatment.  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  I am also concerned about possible renal involvement because of his elevated creatinine with monoclonal chain noted in UPEP immunofixation.  3.  We discussed that his treatment options are complicated because of the above.  At this time I have discussed that the best course of action would be to start him on Velcade/dexamethasone/Rituxan.  I will hold off on initiation of Rituxan until we get his IgM quantitative level to less than 4 g.  Therefore I will initially start him on twice weekly Velcade and weekly dexamethasone at 20 mg p.o. once daily.  We will then re-evaluate our treatment options based on clinical response and based on how he tolerates this treatment.  4.  All necessary prescriptions including for herpes zoster prophylaxis have been sent to the patient's pharmacy today.    Follow-up as above with labs for cycle 1 day 1 of Velcade.  He knows to call for any additional questions or new problems.     Waldenstrom's macroglobulinemia  -     CBC auto differential; Future; Expected date: 08/30/2018  -     CMP; Future; Expected date: 08/30/2018  -     dexamethasone (DECADRON) 4 MG Tab; Take 5 tablets p.o. once weekly  Dispense: 120 tablet; Refill: 1  -     acyclovir (ZOVIRAX) 400 MG tablet; Take 1 tablet (400 mg  total) by mouth 2 (two) times daily.  Dispense: 60 tablet; Refill: 5

## 2018-09-06 ENCOUNTER — OFFICE VISIT (OUTPATIENT)
Dept: HEMATOLOGY/ONCOLOGY | Facility: CLINIC | Age: 78
End: 2018-09-06
Payer: MEDICARE

## 2018-09-06 DIAGNOSIS — Z71.9 ENCOUNTER FOR EDUCATION: Primary | ICD-10-CM

## 2018-09-06 PROCEDURE — 1101F PT FALLS ASSESS-DOCD LE1/YR: CPT | Mod: CPTII,,, | Performed by: NURSE PRACTITIONER

## 2018-09-06 PROCEDURE — 99211 OFF/OP EST MAY X REQ PHY/QHP: CPT | Mod: PBBFAC | Performed by: NURSE PRACTITIONER

## 2018-09-06 PROCEDURE — 99999 PR PBB SHADOW E&M-EST. PATIENT-LVL I: CPT | Mod: PBBFAC,,, | Performed by: NURSE PRACTITIONER

## 2018-09-06 PROCEDURE — 99215 OFFICE O/P EST HI 40 MIN: CPT | Mod: S$PBB,,, | Performed by: NURSE PRACTITIONER

## 2018-09-06 NOTE — PROGRESS NOTES
79 y/o male for chemotherapy teaching. Pt given the Navigation Notebook. Explained how to use notebook. Discussed with patient rationale for chemotherapy, how it works, the process of treatment, potential side effects and symptoms to report.     Reviewed the specific medication and gave them a handout describing the side effects of Velcade Side effects of Velcade discussed.      Discussed potential side effects such as:  Nausea and vomiting  Myelosuppression  Fatigue  Anorexia  Alopecia  Stomatitis  Diarrhea  Cystitis  Gastritis  Fever > 100.4  Antiemetics instructions  Skin care  Constipation  Hyperpigmentation  Rash  Photosensitivity  Sunscreen  Small freq meals  Increased protein  Increased calories  Vitamin support   Taste alterations  Peripheral Neuropathys  Hydration  Renal Toxicity  Community resources  Thrombocytopenia precautions  Importance of monitoring blood sugars if diabetic and reporting elevations or lows

## 2018-09-07 ENCOUNTER — INFUSION (OUTPATIENT)
Dept: INFUSION THERAPY | Facility: HOSPITAL | Age: 78
End: 2018-09-07
Attending: INTERNAL MEDICINE
Payer: MEDICARE

## 2018-09-07 ENCOUNTER — OFFICE VISIT (OUTPATIENT)
Dept: HEMATOLOGY/ONCOLOGY | Facility: CLINIC | Age: 78
End: 2018-09-07
Payer: MEDICARE

## 2018-09-07 VITALS
HEIGHT: 70 IN | TEMPERATURE: 98 F | RESPIRATION RATE: 18 BRPM | SYSTOLIC BLOOD PRESSURE: 100 MMHG | DIASTOLIC BLOOD PRESSURE: 64 MMHG | OXYGEN SATURATION: 90 % | HEART RATE: 75 BPM | WEIGHT: 181.88 LBS | BODY MASS INDEX: 26.04 KG/M2

## 2018-09-07 DIAGNOSIS — Z78.9 ALCOHOL USE: ICD-10-CM

## 2018-09-07 DIAGNOSIS — R17 ELEVATED BILIRUBIN: ICD-10-CM

## 2018-09-07 DIAGNOSIS — C88.0 WALDENSTROM'S MACROGLOBULINEMIA: Primary | ICD-10-CM

## 2018-09-07 DIAGNOSIS — K76.0 FATTY LIVER: ICD-10-CM

## 2018-09-07 DIAGNOSIS — R74.8 ABNORMAL LIVER ENZYMES: ICD-10-CM

## 2018-09-07 PROBLEM — F10.90 ALCOHOL USE: Status: ACTIVE | Noted: 2018-09-07

## 2018-09-07 PROCEDURE — 3078F DIAST BP <80 MM HG: CPT | Mod: CPTII,,, | Performed by: INTERNAL MEDICINE

## 2018-09-07 PROCEDURE — 99214 OFFICE O/P EST MOD 30 MIN: CPT | Mod: PBBFAC,25,PO | Performed by: INTERNAL MEDICINE

## 2018-09-07 PROCEDURE — 63600175 PHARM REV CODE 636 W HCPCS: Mod: JW,JG,PO | Performed by: INTERNAL MEDICINE

## 2018-09-07 PROCEDURE — 99999 PR PBB SHADOW E&M-EST. PATIENT-LVL IV: CPT | Mod: PBBFAC,,, | Performed by: INTERNAL MEDICINE

## 2018-09-07 PROCEDURE — 96401 CHEMO ANTI-NEOPL SQ/IM: CPT | Mod: PO

## 2018-09-07 PROCEDURE — 99215 OFFICE O/P EST HI 40 MIN: CPT | Mod: 25,S$PBB,, | Performed by: INTERNAL MEDICINE

## 2018-09-07 PROCEDURE — 99499 UNLISTED E&M SERVICE: CPT | Mod: HCNC,S$GLB,, | Performed by: INTERNAL MEDICINE

## 2018-09-07 PROCEDURE — 1101F PT FALLS ASSESS-DOCD LE1/YR: CPT | Mod: CPTII,,, | Performed by: INTERNAL MEDICINE

## 2018-09-07 PROCEDURE — 3074F SYST BP LT 130 MM HG: CPT | Mod: CPTII,,, | Performed by: INTERNAL MEDICINE

## 2018-09-07 RX ORDER — HEPARIN 100 UNIT/ML
500 SYRINGE INTRAVENOUS
Status: CANCELLED | OUTPATIENT
Start: 2018-09-07

## 2018-09-07 RX ORDER — BORTEZOMIB 3.5 MG/1
0.7 INJECTION, POWDER, LYOPHILIZED, FOR SOLUTION INTRAVENOUS; SUBCUTANEOUS
Status: CANCELLED | OUTPATIENT
Start: 2018-09-07

## 2018-09-07 RX ORDER — SODIUM CHLORIDE 0.9 % (FLUSH) 0.9 %
10 SYRINGE (ML) INJECTION
Status: CANCELLED | OUTPATIENT
Start: 2018-09-07

## 2018-09-07 RX ORDER — BORTEZOMIB 3.5 MG/1
0.7 INJECTION, POWDER, LYOPHILIZED, FOR SOLUTION INTRAVENOUS; SUBCUTANEOUS
Status: COMPLETED | OUTPATIENT
Start: 2018-09-07 | End: 2018-09-07

## 2018-09-07 RX ADMIN — BORTEZOMIB 1.4 MG: 3.5 INJECTION, POWDER, LYOPHILIZED, FOR SOLUTION INTRAVENOUS; SUBCUTANEOUS at 11:09

## 2018-09-07 NOTE — PATIENT INSTRUCTIONS
[facility]  [address]  [address] [address]  [phone] phone     [phone] fax  Hours of Operation: Monday- Friday 8:00am- 5:00pm  After hours phone  [phone]  Hematology / Oncology Physicians on call      Dr. Bridger Cloud                        Please call with any concerns regarding your appointment today.  YOU HAVE STARTED ON CHEMOTHERAPY. IF YOU EXPERIENCE ANY OF THE FOLLOWING PROBLEMS, CALL THE OFFICE IMMEDIATELY.    *FEVER .0 OR GREATER    *CHILLS, ESPECIALLY SHAKING CHILLS, OR SWEATING    *A SEVERE COUGH OR SORE THROAT, OR SINUS PAIN/     PRESSURE    *REDNESS, SWELLING, OR TENDERNESS AROUND A WOUND,     SORE, PIMPLE, RECTAL AREA, OR IV SITE    *SORES OR ULCERS IN THE MOUTH    *BLISTERS ON THE LIPS OR SKIN    *FREQUENT URGENCY TO URINATE OR A BURNING FEELING   WHEN YOU URINATE    *BLOOD IN THE URINE OR STOOL    *ANY UNEXPLAINED BRUISING OR PROLONGED BLEEDING,     (NOSEBLEEDS OR BLEEDING GUMS)    *LOOSE BOWEL MOVEMENTS THAT DO NOT RESPOND TO     IMODIUM OR MORE THAN THREE TIMES A DAY    *VOMITING UNRESPONSIVE TO ANTINAUSEA MEDICINE    *ANY UNUSUAL PHYSICAL SYMPTOMS THAT BEGAN AFTER     CHEMOTHERAPY    DURING WEEKDAYS, CALL AND ASK TO SPEAK DIRECTLY TO A NURSE.  AT OTHER TIMES, CALL THE OFFICE PHONE NUMBER; THE ANSWERING SERVICE WILL CONTACT THE ON-CALL PHYSICIAN.  SOMEONE IS AVAILABLE 24 HOURS A DAY, SEVEN DAYS A WEEK.FALL PREVENTION   Falls often occur due to slipping, tripping or losing your balance. Here are ways to reduce your risk of falling again.   Was there anything that caused your fall that can be fixed, removed or replaced?   Make your home safe by keeping walkways clear of objects you may trip over.   Use non-slip pads under rugs.   Do not walk in poorly lit areas.   Do not stand on chairs or wobbly ladders.   Use caution when reaching overhead or looking upward. This position can cause a loss of balance.    Be sure your shoes fit properly, have non-slip bottoms and are in good condition.   Be cautious when going up and down stairs, curbs, and when walking on uneven sidewalks.   If your balance is poor, consider using a cane or walker.   If your fall was related to alcohol use, stop or limit alcohol intake.   If your fall was related to use of sleeping medicines, talk to your doctor about this. You may need to reduce your dosage at bedtime if you awaken during the night to go to the bathroom.   To reduce the need for nighttime bathroom trips:   Avoid drinking fluids for several hours before going to bed   Empty your bladder before going to bed   Men can keep a urinal at the bedside   © 2476-5015 [facility], [address] [place] [address], [place] [address]. All rights reserved. This information is not intended as a substitute for professional medical care. Always follow your healthcare professional's instructions.  WAYS TO HELP PREVENT INFECTION         WASH YOUR HANDS OFTEN DURING THE DAY, ESPECIALLY BEFORE YOU EAT, AFTER USING THE BATHROOM, AND AFTER TOUCHING ANIMALS     STAY AWAY FROM PEOPLE WHO HAVE ILLNESSES YOU CAN CATCH; SUCH AS COLDS, FLU, CHICKEN POX     TRY TO AVOID CROWDS     STAY AWAY FROM CHILDREN WHO RECENTLY HAVE RECEIVED LIVE VIRUS VACCINES     MAINTAIN GOOD MOUTH CARE     DO NOT SQUEEZE OR SCRATCH PIMPLES     CLEAN CUTS & SCRAPES RIGHT AWAY AND DAILY UNTIL HEALED WITH WARM WATER, SOAP & AN ANTISEPTIC     AVOID CONTACT WITH LITTER BOXES, BIRD CAGES, & FISH TANKS     AVOID STANDING WATER, IE., BIRD BATHS, FLOWER POTS/VASES, OR HUMIDIFIERS     WEAR GLOVES WHEN GARDENING OR CLEANING UP AFTER OTHERS, ESPECIALLY BABIES & SMALL CHILDREN    DO NOT EAT RAW FISH, SEAFOOD, MEAT, OR EGGSHOME CARE AFTER CHEMOTHERAPY   Meals   Many patients feel sick and lose their appetites during treatment. Eat small meals several times a day. Choose bland foods with little taste or smell if you have problems with  nausea. Be sure to cook all food thoroughly. This kills bacteria and helps you avoid intestinal infection. Soft foods are easier to swallow and digest.   Activity   Exercise keeps you strong and keeps your heart and lungs active. Talk to your doctor about an appropriate exercise program for you.   Skin Care   To prevent a skin infection, bathe or shower once a day. Use a moisturizing soap and wash with warm water. Avoid very hot or cold water. Chemotherapy can make your skin dry . Apply moisturizing lotion to help relieve dry skin. Some drugs used in high doses can cause slight burns to appear (like sunburn). Ask for a special cream to help relieve the burn and protect your skin.   Prevent Mouth Sores   During chemotherapy, many people get mouth sores. Do the following to help prevent mouth sores or to ease discomfort.   Brush your teeth with a soft-bristle toothbrush after every meal.  Don't use dental floss if your platelet count is below 50,000. Your doctor or nurse will tell you if this is the case.  Use an oral swab or special soft toothbrush if your gums bleed during regular brushing.  Use mouthwash as directed. If you can't tolerate commercial mouthwash, use salt and baking soda to clean your mouth. Mix 1 teaspoon of salt and 1 teaspoon of baking soda into a glass of water. Swish and spit.  Call your doctor or return to this facility if you develop any of the following:   Sore throat   White patches in the mouth or throat   Fever of 100.4ºF (38ºC) or higher, or as directed by your healthcare provider  © 7042-9996 [facility], [address] [place] [address], [place] [address]. All rights reserved. This information is not intended as a substitute for professional medical care. Always follow your healthcare professional's

## 2018-09-07 NOTE — PROGRESS NOTES
Subjective:      Patient ID: Homero Tanner is a 78 y.o. male.    Chief Complaint: Follow-up    The patient is a 78-year-old  male who presents to the [facility]ology [facility] today to discuss further evaluation management recommendations for newly diagnosed Waldenstrom's macroglobulinemia.   I have reviewed all of the patient's relevant clinical history available in the medical record and have utilized this in my evaluation and management recommendations today.  Today the patient reports that he has been having chronic fatigue.  Otherwise overall he feels well and has no specific complaints.  He has been taking his monthly vitamin B12 injections. He is off iron supplementation at this time for his history of iron deficiency anemia which has since resolved.  He is on intermittent ADT for biochemical recurrence of prostate carcinoma and is followed by a urologist outside of the [facility].  His rectal cancer is in remission at this time.      Review of Systems   Constitutional: Positive for fatigue. Negative for activity change, appetite change, chills, diaphoresis, fever and unexpected weight change.   HENT: Negative for congestion, dental problem, ear pain, mouth sores, nosebleeds, postnasal drip, sinus pressure, sore throat, tinnitus, trouble swallowing and voice change.    Eyes: Negative for photophobia, pain, discharge, redness, itching and visual disturbance.   Respiratory: Negative for cough, chest tightness, shortness of breath, wheezing and stridor.    Cardiovascular: Negative for chest pain, palpitations and leg swelling.   Gastrointestinal: Negative for abdominal distention, abdominal pain, anal bleeding, blood in stool, constipation, diarrhea, nausea and vomiting.   Endocrine: Negative for cold intolerance, heat intolerance, polydipsia, polyphagia and polyuria.   Genitourinary: Negative for decreased urine volume, difficulty urinating, dysuria, flank pain, frequency, hematuria and  urgency.   Musculoskeletal: Negative for arthralgias, back pain, gait problem, joint swelling and myalgias.   Skin: Negative for pallor and rash.   Allergic/Immunologic: Negative for immunocompromised state.   Neurological: Negative for dizziness, syncope, weakness, light-headedness and headaches.   Hematological: Negative for adenopathy. Does not bruise/bleed easily.   Psychiatric/Behavioral: Negative for agitation and confusion. The patient is not nervous/anxious.           Medication List           Accurate as of 9/7/18 10:57 AM. If you have any questions, ask your nurse or doctor.               CONTINUE taking these medications    acyclovir 400 MG tablet  Commonly known as:  ZOVIRAX  Take 1 tablet (400 mg total) by mouth 2 (two) times daily.     aspirin 81 MG EC tablet  Commonly known as:  ECOTRIN     b complex vitamins tablet     cyanocobalamin 1,000 mcg/mL injection  Give 1cc under the skin once a month     dexamethasone 4 MG Tab  Commonly known as:  DECADRON  Take 5 tablets p.o. once weekly     FLONASE 50 mcg/actuation nasal spray  Generic drug:  fluticasone     fluocinonide 0.05 % external solution  Commonly known as:  LIDEX  AAA of scalp 1-2 times daily as needed for itch     furosemide 20 MG tablet  Commonly known as:  LASIX  Take 40 mg daily on Mon, Wed, Fridays and 20 mg once daily all other days     ketoconazole 2 % shampoo  Commonly known as:  NIZORAL  WASH HAIR WITH MEDICATED SHAMPOO AT LEAST 3 TIMES A WEEK. LET SIT ON SCALP AT LEAST 5 MINUTES PRIOR TO RINSING     leuprolide 3.75 mg injection  Commonly known as:  LUPRON     metoprolol tartrate 100 MG tablet  Commonly known as:  LOPRESSOR  TAKE 1 TABLET(100 MG) BY MOUTH TWICE DAILY     metronidazole 0.75% 0.75 % Crea  Commonly known as:  METROCREAM  Apply 1 application topically 2 (two) times daily. Apply to affected on face     omeprazole 20 MG capsule  Commonly known as:  PRILOSEC  TAKE 1 CAPSULE(20 MG) BY MOUTH EVERY DAY     SUPREP BOWEL PREP KIT  17.5-3.13-1.6 gram Solr  Generic drug:  sodium,potassium,mag sulfates          Review of patient's allergies indicates:   Allergen Reactions    Lipitor [atorvastatin] Other (See Comments)    Norvasc [amlodipine] Swelling       Lab: I have reviewed all of the patient's relevant lab work available in the medical record and have utilized this in my evaluation and management recommendations today    Imaging: I have reviewed all of the patient's diagnostic/imaging results available in the medical record and have utilized this in my evaluation and management recommendations today.      Objective:     Vitals:    09/07/18 1007   BP: 100/64   Pulse: 75   Resp: 18   Temp: 98.4 °F (36.9 °C)       Physical Exam   Constitutional: He is oriented to person, place, and time. He appears well-developed and well-nourished. No distress.   HENT:   Head: Normocephalic and atraumatic.   Nose: Nose normal.   Mouth/Throat: Oropharynx is clear and moist. No oropharyngeal exudate.   Eyes: EOM are normal. Pupils are equal, round, and reactive to light. No scleral icterus.   Neck: Normal range of motion. Neck supple. No tracheal deviation present. No thyromegaly present.   Cardiovascular: Normal rate, regular rhythm, normal heart sounds and intact distal pulses.   No murmur heard.  Pulmonary/Chest: Effort normal and breath sounds normal. No stridor. No respiratory distress. He has no wheezes. He has no rales. He exhibits no tenderness.   Abdominal: Soft. Bowel sounds are normal. He exhibits no distension and no mass. There is no tenderness. There is no rebound and no guarding.   Musculoskeletal: Normal range of motion. He exhibits no edema or tenderness.   Lymphadenopathy:     He has no cervical adenopathy.   Neurological: He is alert and oriented to person, place, and time. Coordination normal.   Skin: Skin is warm. No rash noted. He is not diaphoretic. No erythema.   Psychiatric: He has a normal mood and affect. His behavior is normal.  Judgment and thought content normal.   Nursing note and vitals reviewed.      Assessment:     1. Waldenstrom's macroglobulinemia    2. Elevated bilirubin    3. Abnormal liver enzymes    4. Fatty liver    5. Alcohol use        Plan:   1.  I had a detailed discussion with the patient today with regard to the results of his bone marrow aspiration biopsy done in July 2018 as well as the results of all of his lab work done in August 2018.  At this time the patient has Waldenstrom's macroglobulinemia.  His IgM quantitative level is greater than 4 g at this time.  2.  I had a detailed discussion with him today with regard to the diagnosis, prognosis and treatment of this malignancy.  We discussed the indications to begin treatment.  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  I am also concerned about possible renal involvement because of his elevated creatinine with monoclonal chain noted in UPEP immunofixation.  3.  We discussed that his treatment options are complicated because of the above.  At this time I have discussed that the best course of action would be to start him on Velcade/dexamethasone/Rituxan.  I will hold off on initiation of Rituxan until we get his IgM quantitative level to less than 4 g.  Therefore I will initially start him on twice weekly Velcade and weekly dexamethasone at 20 mg p.o. once daily.  We will then re-evaluate our treatment options based on clinical response and based on how he tolerates this treatment. Velcade will be dose adjusted for liver function. He will proceed with cycle 1 day 1 of velcade today. Informed consent taken.  4.  All necessary prescriptions including for herpes zoster prophylaxis have been sent to the patient's pharmacy previously.  5. I have strongly recommended cessation of alcohol use.    Follow-up as above with labs for cycle 1 day 4 of Velcade.  He knows to call for any additional  questions or new problems.     Waldenstrom's macroglobulinemia  -     CBC auto differential; Future; Expected date: 09/07/2018  -     CMP; Future; Expected date: 09/07/2018    Elevated bilirubin  -     CBC auto differential; Future; Expected date: 09/07/2018  -     CMP; Future; Expected date: 09/07/2018    Abnormal liver enzymes    Fatty liver    Alcohol use    Other orders  -     bortezomib (VELCADE) injection 1.4 mg; Inject 1.4 mg into the skin one time.  -     sodium chloride 0.9% flush 10 mL; Inject 10 mLs into the vein as needed for Line Care (Flush lines as needed.).  -     heparin, porcine (PF) 100 unit/mL injection flush 500 Units; Inject 5 mLs (500 Units total) into the vein as needed (Flush lines as needed).  -     alteplase injection 2 mg; 2 mg by Intra-Catheter route as needed (To restore central venous catheter function).

## 2018-09-07 NOTE — PLAN OF CARE
Problem: Patient Care Overview  Goal: Plan of Care Review  Outcome: Ongoing (interventions implemented as appropriate)  I'm feeling pretty good today, just ready to get this over with.

## 2018-09-10 ENCOUNTER — INFUSION (OUTPATIENT)
Dept: INFUSION THERAPY | Facility: HOSPITAL | Age: 78
End: 2018-09-10
Attending: INTERNAL MEDICINE
Payer: MEDICARE

## 2018-09-10 ENCOUNTER — OFFICE VISIT (OUTPATIENT)
Dept: HEMATOLOGY/ONCOLOGY | Facility: CLINIC | Age: 78
End: 2018-09-10
Payer: MEDICARE

## 2018-09-10 ENCOUNTER — TELEPHONE (OUTPATIENT)
Dept: HEMATOLOGY/ONCOLOGY | Facility: CLINIC | Age: 78
End: 2018-09-10

## 2018-09-10 VITALS
HEIGHT: 70 IN | SYSTOLIC BLOOD PRESSURE: 98 MMHG | OXYGEN SATURATION: 97 % | DIASTOLIC BLOOD PRESSURE: 70 MMHG | TEMPERATURE: 98 F | BODY MASS INDEX: 26.14 KG/M2 | WEIGHT: 182.56 LBS | HEART RATE: 75 BPM | RESPIRATION RATE: 18 BRPM

## 2018-09-10 DIAGNOSIS — C88.0 WALDENSTROM'S MACROGLOBULINEMIA: Primary | ICD-10-CM

## 2018-09-10 DIAGNOSIS — R74.8 ABNORMAL LIVER ENZYMES: ICD-10-CM

## 2018-09-10 PROCEDURE — 1101F PT FALLS ASSESS-DOCD LE1/YR: CPT | Mod: CPTII,,, | Performed by: INTERNAL MEDICINE

## 2018-09-10 PROCEDURE — 99999 PR PBB SHADOW E&M-EST. PATIENT-LVL IV: CPT | Mod: PBBFAC,,, | Performed by: INTERNAL MEDICINE

## 2018-09-10 PROCEDURE — 3078F DIAST BP <80 MM HG: CPT | Mod: CPTII,,, | Performed by: INTERNAL MEDICINE

## 2018-09-10 PROCEDURE — 3074F SYST BP LT 130 MM HG: CPT | Mod: CPTII,,, | Performed by: INTERNAL MEDICINE

## 2018-09-10 PROCEDURE — 96401 CHEMO ANTI-NEOPL SQ/IM: CPT | Mod: PO

## 2018-09-10 PROCEDURE — 99499 UNLISTED E&M SERVICE: CPT | Mod: HCNC,S$GLB,, | Performed by: INTERNAL MEDICINE

## 2018-09-10 PROCEDURE — 99214 OFFICE O/P EST MOD 30 MIN: CPT | Mod: PBBFAC,25,PO | Performed by: INTERNAL MEDICINE

## 2018-09-10 PROCEDURE — 63600175 PHARM REV CODE 636 W HCPCS: Mod: JW,JG,PO | Performed by: INTERNAL MEDICINE

## 2018-09-10 PROCEDURE — 99215 OFFICE O/P EST HI 40 MIN: CPT | Mod: 25,S$PBB,, | Performed by: INTERNAL MEDICINE

## 2018-09-10 RX ORDER — BORTEZOMIB 3.5 MG/1
1.3 INJECTION, POWDER, LYOPHILIZED, FOR SOLUTION INTRAVENOUS; SUBCUTANEOUS
Status: DISCONTINUED | OUTPATIENT
Start: 2018-09-10 | End: 2018-09-10

## 2018-09-10 RX ORDER — SODIUM CHLORIDE 0.9 % (FLUSH) 0.9 %
10 SYRINGE (ML) INJECTION
Status: CANCELLED | OUTPATIENT
Start: 2018-09-10

## 2018-09-10 RX ORDER — BORTEZOMIB 3.5 MG/1
0.7 INJECTION, POWDER, LYOPHILIZED, FOR SOLUTION INTRAVENOUS; SUBCUTANEOUS
Status: CANCELLED | OUTPATIENT
Start: 2018-09-10

## 2018-09-10 RX ORDER — HEPARIN 100 UNIT/ML
500 SYRINGE INTRAVENOUS
Status: CANCELLED | OUTPATIENT
Start: 2018-09-10

## 2018-09-10 RX ORDER — BORTEZOMIB 3.5 MG/1
0.7 INJECTION, POWDER, LYOPHILIZED, FOR SOLUTION INTRAVENOUS; SUBCUTANEOUS
Status: COMPLETED | OUTPATIENT
Start: 2018-09-10 | End: 2018-09-10

## 2018-09-10 RX ORDER — BORTEZOMIB 3.5 MG/1
1.3 INJECTION, POWDER, LYOPHILIZED, FOR SOLUTION INTRAVENOUS; SUBCUTANEOUS
Status: CANCELLED | OUTPATIENT
Start: 2018-09-10

## 2018-09-10 RX ADMIN — BORTEZOMIB 1.4 MG: 3.5 INJECTION, POWDER, LYOPHILIZED, FOR SOLUTION INTRAVENOUS; SUBCUTANEOUS at 10:09

## 2018-09-10 NOTE — PATIENT INSTRUCTIONS
[facility]  [address]  [address] [address]  [phone] phone     [phone] fax  Hours of Operation: Monday- Friday 8:00am- 5:00pm  After hours phone  [phone]  Hematology / Oncology Physicians on call      Dr. Bridger Cloud                        Please call with any concerns regarding your appointment today.  HOME CARE AFTER CHEMOTHERAPY   Meals   Many patients feel sick and lose their appetites during treatment. Eat small meals several times a day. Choose bland foods with little taste or smell if you have problems with nausea. Be sure to cook all food thoroughly. This kills bacteria and helps you avoid intestinal infection. Soft foods are easier to swallow and digest.   Activity   Exercise keeps you strong and keeps your heart and lungs active. Talk to your doctor about an appropriate exercise program for you.   Skin Care   To prevent a skin infection, bathe or shower once a day. Use a moisturizing soap and wash with warm water. Avoid very hot or cold water. Chemotherapy can make your skin dry . Apply moisturizing lotion to help relieve dry skin. Some drugs used in high doses can cause slight burns to appear (like sunburn). Ask for a special cream to help relieve the burn and protect your skin.   Prevent Mouth Sores   During chemotherapy, many people get mouth sores. Do the following to help prevent mouth sores or to ease discomfort.   Brush your teeth with a soft-bristle toothbrush after every meal.  Don't use dental floss if your platelet count is below 50,000. Your doctor or nurse will tell you if this is the case.  Use an oral swab or special soft toothbrush if your gums bleed during regular brushing.  Use mouthwash as directed. If you can't tolerate commercial mouthwash, use salt and baking soda to clean your mouth. Mix 1 teaspoon of salt and 1 teaspoon of baking soda into a glass of water. Swish and spit.  Call your doctor  or return to this facility if you develop any of the following:   Sore throat   White patches in the mouth or throat   Fever of 100.4ºF (38ºC) or higher, or as directed by your healthcare provider  © 2000-2011 [facility], [address] [place] [address]. All rights reserved. This information is not intended as a substitute for professional medical care. Always follow your healthcare professional's   FALL PREVENTION   Falls often occur due to slipping, tripping or losing your balance. Here are ways to reduce your risk of falling again.   Was there anything that caused your fall that can be fixed, removed or replaced?   Make your home safe by keeping walkways clear of objects you may trip over.   Use non-slip pads under rugs.   Do not walk in poorly lit areas.   Do not stand on chairs or wobbly ladders.   Use caution when reaching overhead or looking upward. This position can cause a loss of balance.   Be sure your shoes fit properly, have non-slip bottoms and are in good condition.   Be cautious when going up and down stairs, curbs, and when walking on uneven sidewalks.   If your balance is poor, consider using a cane or walker.   If your fall was related to alcohol use, stop or limit alcohol intake.   If your fall was related to use of sleeping medicines, talk to your doctor about this. You may need to reduce your dosage at bedtime if you awaken during the night to go to the bathroom.   To reduce the need for nighttime bathroom trips:   Avoid drinking fluids for several hours before going to bed   Empty your bladder before going to bed   Men can keep a urinal at the bedside   © 2000-2011 [facility], [address] [place] [address]. All rights reserved. This information is not intended as a substitute for professional medical care. Always follow your healthcare professional's instructions.  WAYS TO HELP PREVENT INFECTION         WASH YOUR HANDS OFTEN DURING THE DAY, ESPECIALLY BEFORE  YOU EAT, AFTER USING THE BATHROOM, AND AFTER TOUCHING ANIMALS     STAY AWAY FROM PEOPLE WHO HAVE ILLNESSES YOU CAN CATCH; SUCH AS COLDS, FLU, CHICKEN POX     TRY TO AVOID CROWDS     STAY AWAY FROM CHILDREN WHO RECENTLY HAVE RECEIVED LIVE VIRUS VACCINES     MAINTAIN GOOD MOUTH CARE     DO NOT SQUEEZE OR SCRATCH PIMPLES     CLEAN CUTS & SCRAPES RIGHT AWAY AND DAILY UNTIL HEALED WITH WARM WATER, SOAP & AN ANTISEPTIC     AVOID CONTACT WITH LITTER BOXES, BIRD CAGES, & FISH TANKS     AVOID STANDING WATER, IE., BIRD BATHS, FLOWER POTS/VASES, OR HUMIDIFIERS     WEAR GLOVES WHEN GARDENING OR CLEANING UP AFTER OTHERS, ESPECIALLY BABIES & SMALL CHILDREN     DO NOT EAT RAW FISH, SEAFOOD, MEAT, OR EGGS

## 2018-09-10 NOTE — TELEPHONE ENCOUNTER
LM for pt checking to see how he is doing following treatment.  Phone number given to call back with any info, questions, or concerns.

## 2018-09-10 NOTE — PLAN OF CARE
Problem: Patient Care Overview  Goal: Plan of Care Review  Outcome: Ongoing (interventions implemented as appropriate)  I feel ok, just always weak and tired.

## 2018-09-10 NOTE — PROGRESS NOTES
Subjective:      Patient ID: Homero Tanner is a 78 y.o. male.    Chief Complaint: Follow-up    The patient is a 78-year-old  male who presents to the Hematology Oncology Clinic today to discuss further evaluation management recommendations for newly diagnosed Waldenstrom's macroglobulinemia.   I have reviewed all of the patient's relevant clinical history available in the medical record and have utilized this in my evaluation and management recommendations today.  Today the patient reports that he has been having chronic fatigue.  Otherwise overall he feels well and has no specific complaints.  He has been taking his monthly vitamin B12 injections. He is off iron supplementation at this time for his history of iron deficiency anemia which has since resolved.  He is on intermittent ADT for biochemical recurrence of prostate carcinoma and is followed by a urologist outside of the [facility].  His rectal cancer is in remission at this time.      Review of Systems   Constitutional: Positive for fatigue. Negative for activity change, appetite change, chills, diaphoresis, fever and unexpected weight change.   HENT: Negative for congestion, dental problem, ear pain, mouth sores, nosebleeds, postnasal drip, sinus pressure, sore throat, tinnitus, trouble swallowing and voice change.    Eyes: Negative for photophobia, pain, discharge, redness, itching and visual disturbance.   Respiratory: Negative for cough, chest tightness, shortness of breath, wheezing and stridor.    Cardiovascular: Negative for chest pain, palpitations and leg swelling.   Gastrointestinal: Negative for abdominal distention, abdominal pain, anal bleeding, blood in stool, constipation, diarrhea, nausea and vomiting.   Endocrine: Negative for cold intolerance, heat intolerance, polydipsia, polyphagia and polyuria.   Genitourinary: Negative for decreased urine volume, difficulty urinating, dysuria, flank pain, frequency, hematuria and  urgency.   Musculoskeletal: Negative for arthralgias, back pain, gait problem, joint swelling and myalgias.   Skin: Negative for pallor and rash.   Allergic/Immunologic: Negative for immunocompromised state.   Neurological: Negative for dizziness, syncope, weakness, light-headedness and headaches.   Hematological: Negative for adenopathy. Does not bruise/bleed easily.   Psychiatric/Behavioral: Negative for agitation and confusion. The patient is not nervous/anxious.           Medication List           Accurate as of 9/10/18  9:27 AM. If you have any questions, ask your nurse or doctor.               CONTINUE taking these medications    acyclovir 400 MG tablet  Commonly known as:  ZOVIRAX  Take 1 tablet (400 mg total) by mouth 2 (two) times daily.     aspirin 81 MG EC tablet  Commonly known as:  ECOTRIN     b complex vitamins tablet     cyanocobalamin 1,000 mcg/mL injection  Give 1cc under the skin once a month     dexamethasone 4 MG Tab  Commonly known as:  DECADRON  Take 5 tablets p.o. once weekly     FLONASE 50 mcg/actuation nasal spray  Generic drug:  fluticasone     fluocinonide 0.05 % external solution  Commonly known as:  LIDEX  AAA of scalp 1-2 times daily as needed for itch     furosemide 20 MG tablet  Commonly known as:  LASIX  Take 40 mg daily on Mon, Wed, Fridays and 20 mg once daily all other days     ketoconazole 2 % shampoo  Commonly known as:  NIZORAL  WASH HAIR WITH MEDICATED SHAMPOO AT LEAST 3 TIMES A WEEK. LET SIT ON SCALP AT LEAST 5 MINUTES PRIOR TO RINSING     leuprolide 3.75 mg injection  Commonly known as:  LUPRON     metoprolol tartrate 100 MG tablet  Commonly known as:  LOPRESSOR  TAKE 1 TABLET(100 MG) BY MOUTH TWICE DAILY     metronidazole 0.75% 0.75 % Crea  Commonly known as:  METROCREAM  Apply 1 application topically 2 (two) times daily. Apply to affected on face     omeprazole 20 MG capsule  Commonly known as:  PRILOSEC  TAKE 1 CAPSULE(20 MG) BY MOUTH EVERY DAY     SUPREP BOWEL PREP KIT  17.5-3.13-1.6 gram Solr  Generic drug:  sodium,potassium,mag sulfates          Review of patient's allergies indicates:   Allergen Reactions    Lipitor [atorvastatin] Other (See Comments)    Norvasc [amlodipine] Swelling       Lab: I have reviewed all of the patient's relevant lab work available in the medical record and have utilized this in my evaluation and management recommendations today    Imaging: I have reviewed all of the patient's diagnostic/imaging results available in the medical record and have utilized this in my evaluation and management recommendations today.      Objective:     Vitals:    09/10/18 0854   BP: 98/70   Pulse: 75   Resp: 18   Temp: 97.5 °F (36.4 °C)       Physical Exam   Constitutional: He is oriented to person, place, and time. He appears well-developed and well-nourished. No distress.   HENT:   Head: Normocephalic and atraumatic.   Nose: Nose normal.   Mouth/Throat: Oropharynx is clear and moist. No oropharyngeal exudate.   Eyes: EOM are normal. Pupils are equal, round, and reactive to light. No scleral icterus.   Neck: Normal range of motion. Neck supple. No tracheal deviation present. No thyromegaly present.   Cardiovascular: Normal rate, regular rhythm, normal heart sounds and intact distal pulses.   No murmur heard.  Pulmonary/Chest: Effort normal and breath sounds normal. No stridor. No respiratory distress. He has no wheezes. He has no rales. He exhibits no tenderness.   Abdominal: Soft. Bowel sounds are normal. He exhibits no distension and no mass. There is no tenderness. There is no rebound and no guarding.   Musculoskeletal: Normal range of motion. He exhibits no edema or tenderness.   Lymphadenopathy:     He has no cervical adenopathy.   Neurological: He is alert and oriented to person, place, and time. Coordination normal.   Skin: Skin is warm. No rash noted. He is not diaphoretic. No erythema.   Psychiatric: He has a normal mood and affect. His behavior is normal.  Judgment and thought content normal.   Nursing note and vitals reviewed.      Assessment:     1. Waldenstrom's macroglobulinemia    2. Abnormal liver enzymes        Plan:   1.  I had a detailed discussion with the patient today with regard to the results of his bone marrow aspiration biopsy done in July 2018 as well as the results of all of his lab work done in August 2018.  At this time the patient has Waldenstrom's macroglobulinemia.  His IgM quantitative level is greater than 4 g at this time.  2.  I had a detailed discussion with him today with regard to the diagnosis, prognosis and treatment of this malignancy.  We discussed the indications to begin treatment.  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  I am also concerned about possible renal involvement because of his elevated creatinine with monoclonal chain noted in UPEP immunofixation.  3.  We discussed that his treatment options are complicated because of the above.  At this time I have discussed that the best course of action would be to start him on Velcade/dexamethasone/Rituxan.  I will hold off on initiation of Rituxan until we get his IgM quantitative level to less than 4 g.  Therefore I will initially start him on twice weekly Velcade and weekly dexamethasone at 20 mg p.o. once daily.  We will then re-evaluate our treatment options based on clinical response and based on how he tolerates this treatment. Velcade will be dose adjusted for liver function. He will proceed with cycle 1 day 4 of velcade today. Informed consent taken.  4.  All necessary prescriptions including for herpes zoster prophylaxis have been sent to the patient's pharmacy previously.  5. I have strongly recommended cessation of alcohol use.    Follow-up as above with labs for cycle 1 day 8 of Velcade.  He knows to call for any additional questions or new problems.     Waldenstrom's  macroglobulinemia  -     CBC auto differential; Future; Expected date: 09/10/2018  -     CMP; Future; Expected date: 09/10/2018    Abnormal liver enzymes  -     CBC auto differential; Future; Expected date: 09/10/2018  -     CMP; Future; Expected date: 09/10/2018

## 2018-09-12 ENCOUNTER — CLINICAL SUPPORT (OUTPATIENT)
Dept: CARDIOLOGY | Facility: CLINIC | Age: 78
End: 2018-09-12
Attending: INTERNAL MEDICINE
Payer: MEDICARE

## 2018-09-12 ENCOUNTER — LAB VISIT (OUTPATIENT)
Dept: LAB | Facility: HOSPITAL | Age: 78
End: 2018-09-12
Attending: INTERNAL MEDICINE
Payer: MEDICARE

## 2018-09-12 DIAGNOSIS — I35.1 NONRHEUMATIC AORTIC VALVE INSUFFICIENCY: Chronic | ICD-10-CM

## 2018-09-12 DIAGNOSIS — I50.32 CHRONIC DIASTOLIC HEART FAILURE: ICD-10-CM

## 2018-09-12 DIAGNOSIS — I10 ESSENTIAL HYPERTENSION: Chronic | ICD-10-CM

## 2018-09-12 DIAGNOSIS — I36.1 NON-RHEUMATIC TRICUSPID VALVE INSUFFICIENCY: ICD-10-CM

## 2018-09-12 DIAGNOSIS — I49.3 PVC'S (PREMATURE VENTRICULAR CONTRACTIONS): ICD-10-CM

## 2018-09-12 DIAGNOSIS — R94.31 ABNORMAL ECG: Chronic | ICD-10-CM

## 2018-09-12 DIAGNOSIS — R60.0 EDEMA OF BOTH LEGS: ICD-10-CM

## 2018-09-12 LAB
ALBUMIN SERPL BCP-MCNC: 2.5 G/DL
ALP SERPL-CCNC: 386 U/L
ALT SERPL W/O P-5'-P-CCNC: 51 U/L
ANION GAP SERPL CALC-SCNC: 15 MMOL/L
AORTIC VALVE REGURGITATION: ABNORMAL
AST SERPL-CCNC: 113 U/L
BILIRUB SERPL-MCNC: 1.9 MG/DL
BNP SERPL-MCNC: 659 PG/ML
BUN SERPL-MCNC: 14 MG/DL
CALCIUM SERPL-MCNC: 7.8 MG/DL
CHLORIDE SERPL-SCNC: 91 MMOL/L
CO2 SERPL-SCNC: 31 MMOL/L
CREAT SERPL-MCNC: 1.5 MG/DL
DIASTOLIC DYSFUNCTION: NO
EST. GFR  (AFRICAN AMERICAN): 51.2 ML/MIN/1.73 M^2
EST. GFR  (NON AFRICAN AMERICAN): 44.3 ML/MIN/1.73 M^2
ESTIMATED PA SYSTOLIC PRESSURE: 41.44
GLUCOSE SERPL-MCNC: 90 MG/DL
POTASSIUM SERPL-SCNC: 3.3 MMOL/L
PROT SERPL-MCNC: 7.4 G/DL
RETIRED EF AND QEF - SEE NOTES: 50 (ref 55–65)
SODIUM SERPL-SCNC: 137 MMOL/L
TRICUSPID VALVE REGURGITATION: ABNORMAL

## 2018-09-12 PROCEDURE — 83880 ASSAY OF NATRIURETIC PEPTIDE: CPT

## 2018-09-12 PROCEDURE — 93306 TTE W/DOPPLER COMPLETE: CPT | Mod: PBBFAC,PO | Performed by: INTERNAL MEDICINE

## 2018-09-12 PROCEDURE — 80053 COMPREHEN METABOLIC PANEL: CPT

## 2018-09-12 PROCEDURE — 93224 XTRNL ECG REC UP TO 48 HRS: CPT | Mod: ,,, | Performed by: INTERNAL MEDICINE

## 2018-09-12 PROCEDURE — 36415 COLL VENOUS BLD VENIPUNCTURE: CPT | Mod: PO

## 2018-09-13 ENCOUNTER — INFUSION (OUTPATIENT)
Dept: INFUSION THERAPY | Facility: HOSPITAL | Age: 78
End: 2018-09-13
Attending: INTERNAL MEDICINE
Payer: MEDICARE

## 2018-09-13 ENCOUNTER — OFFICE VISIT (OUTPATIENT)
Dept: HEMATOLOGY/ONCOLOGY | Facility: CLINIC | Age: 78
End: 2018-09-13
Payer: MEDICARE

## 2018-09-13 VITALS
BODY MASS INDEX: 26.01 KG/M2 | SYSTOLIC BLOOD PRESSURE: 100 MMHG | RESPIRATION RATE: 18 BRPM | WEIGHT: 181.69 LBS | HEART RATE: 71 BPM | TEMPERATURE: 98 F | HEIGHT: 70 IN | OXYGEN SATURATION: 90 % | DIASTOLIC BLOOD PRESSURE: 80 MMHG

## 2018-09-13 VITALS — WEIGHT: 181.69 LBS | BODY MASS INDEX: 26.01 KG/M2 | HEIGHT: 70 IN

## 2018-09-13 DIAGNOSIS — C88.0 WALDENSTROM'S MACROGLOBULINEMIA: Primary | ICD-10-CM

## 2018-09-13 DIAGNOSIS — R74.8 ABNORMAL LIVER ENZYMES: ICD-10-CM

## 2018-09-13 PROCEDURE — 99999 PR PBB SHADOW E&M-EST. PATIENT-LVL IV: CPT | Mod: PBBFAC,,, | Performed by: INTERNAL MEDICINE

## 2018-09-13 PROCEDURE — 96401 CHEMO ANTI-NEOPL SQ/IM: CPT | Mod: PO

## 2018-09-13 PROCEDURE — 1101F PT FALLS ASSESS-DOCD LE1/YR: CPT | Mod: CPTII,,, | Performed by: INTERNAL MEDICINE

## 2018-09-13 PROCEDURE — 63600175 PHARM REV CODE 636 W HCPCS: Mod: JW,JG,PO | Performed by: INTERNAL MEDICINE

## 2018-09-13 PROCEDURE — 3079F DIAST BP 80-89 MM HG: CPT | Mod: CPTII,,, | Performed by: INTERNAL MEDICINE

## 2018-09-13 PROCEDURE — 3074F SYST BP LT 130 MM HG: CPT | Mod: CPTII,,, | Performed by: INTERNAL MEDICINE

## 2018-09-13 PROCEDURE — 99499 UNLISTED E&M SERVICE: CPT | Mod: HCNC,S$GLB,, | Performed by: INTERNAL MEDICINE

## 2018-09-13 PROCEDURE — 99215 OFFICE O/P EST HI 40 MIN: CPT | Mod: 25,S$PBB,, | Performed by: INTERNAL MEDICINE

## 2018-09-13 PROCEDURE — 99214 OFFICE O/P EST MOD 30 MIN: CPT | Mod: PBBFAC,PO,25 | Performed by: INTERNAL MEDICINE

## 2018-09-13 RX ORDER — SODIUM CHLORIDE 0.9 % (FLUSH) 0.9 %
10 SYRINGE (ML) INJECTION
Status: CANCELLED | OUTPATIENT
Start: 2018-09-13

## 2018-09-13 RX ORDER — BORTEZOMIB 3.5 MG/1
0.7 INJECTION, POWDER, LYOPHILIZED, FOR SOLUTION INTRAVENOUS; SUBCUTANEOUS
Status: CANCELLED | OUTPATIENT
Start: 2018-09-13

## 2018-09-13 RX ORDER — HEPARIN 100 UNIT/ML
500 SYRINGE INTRAVENOUS
Status: CANCELLED | OUTPATIENT
Start: 2018-09-13

## 2018-09-13 RX ORDER — BORTEZOMIB 3.5 MG/1
0.7 INJECTION, POWDER, LYOPHILIZED, FOR SOLUTION INTRAVENOUS; SUBCUTANEOUS
Status: COMPLETED | OUTPATIENT
Start: 2018-09-13 | End: 2018-09-13

## 2018-09-13 RX ADMIN — BORTEZOMIB 1.4 MG: 3.5 INJECTION, POWDER, LYOPHILIZED, FOR SOLUTION INTRAVENOUS; SUBCUTANEOUS at 11:09

## 2018-09-13 NOTE — PLAN OF CARE
"Problem: Patient Care Overview  Goal: Plan of Care Review  Outcome: Ongoing (interventions implemented as appropriate)  Pt. Stated,"I'm doing ok."      "

## 2018-09-13 NOTE — PROGRESS NOTES
Subjective:      Patient ID: Homero Tanner is a 78 y.o. male.    Chief Complaint: Follow-up    The patient is a 78-year-old  male who presents to the [facility] today to discuss further evaluation management recommendations for Waldenstrom's macroglobulinemia.   I have reviewed all of the patient's relevant clinical history available in the medical record and have utilized this in my evaluation and management recommendations today.  Today the patient reports that he has been having chronic fatigue.  Otherwise overall he feels well and has no specific complaints.  He has been taking his monthly vitamin B12 injections. He is off iron supplementation at this time for his history of iron deficiency anemia which has since resolved.  He is on intermittent ADT for biochemical recurrence of prostate carcinoma and is followed by a urologist outside of the [facility].  His rectal cancer is in remission at this time.  He reports that overall he is tolerating his velcade well without any significant symptoms.      Review of Systems   Constitutional: Positive for fatigue. Negative for activity change, appetite change, chills, diaphoresis, fever and unexpected weight change.   HENT: Negative for congestion, dental problem, ear pain, mouth sores, nosebleeds, postnasal drip, sinus pressure, sore throat, tinnitus, trouble swallowing and voice change.    Eyes: Negative for photophobia, pain, discharge, redness, itching and visual disturbance.   Respiratory: Negative for cough, chest tightness, shortness of breath, wheezing and stridor.    Cardiovascular: Negative for chest pain, palpitations and leg swelling.   Gastrointestinal: Negative for abdominal distention, abdominal pain, anal bleeding, blood in stool, constipation, diarrhea, nausea and vomiting.   Endocrine: Negative for cold intolerance, heat intolerance, polydipsia, polyphagia and polyuria.   Genitourinary: Negative for decreased urine  volume, difficulty urinating, dysuria, flank pain, frequency, hematuria and urgency.   Musculoskeletal: Negative for arthralgias, back pain, gait problem, joint swelling and myalgias.   Skin: Negative for pallor and rash.   Allergic/Immunologic: Negative for immunocompromised state.   Neurological: Negative for dizziness, syncope, weakness, light-headedness and headaches.   Hematological: Negative for adenopathy. Does not bruise/bleed easily.   Psychiatric/Behavioral: Negative for agitation and confusion. The patient is not nervous/anxious.           Medication List           Accurate as of 9/13/18 10:53 AM. If you have any questions, ask your nurse or doctor.               CONTINUE taking these medications    acyclovir 400 MG tablet  Commonly known as:  ZOVIRAX  Take 1 tablet (400 mg total) by mouth 2 (two) times daily.     aspirin 81 MG EC tablet  Commonly known as:  ECOTRIN     b complex vitamins tablet     cyanocobalamin 1,000 mcg/mL injection  Give 1cc under the skin once a month     dexamethasone 4 MG Tab  Commonly known as:  DECADRON  Take 5 tablets p.o. once weekly     FLONASE 50 mcg/actuation nasal spray  Generic drug:  fluticasone     fluocinonide 0.05 % external solution  Commonly known as:  LIDEX  AAA of scalp 1-2 times daily as needed for itch     furosemide 20 MG tablet  Commonly known as:  LASIX  Take 40 mg daily on Mon, Wed, Fridays and 20 mg once daily all other days     ketoconazole 2 % shampoo  Commonly known as:  NIZORAL  WASH HAIR WITH MEDICATED SHAMPOO AT LEAST 3 TIMES A WEEK. LET SIT ON SCALP AT LEAST 5 MINUTES PRIOR TO RINSING     leuprolide 3.75 mg injection  Commonly known as:  LUPRON     metoprolol tartrate 100 MG tablet  Commonly known as:  LOPRESSOR  TAKE 1 TABLET(100 MG) BY MOUTH TWICE DAILY     metronidazole 0.75% 0.75 % Crea  Commonly known as:  METROCREAM  Apply 1 application topically 2 (two) times daily. Apply to affected on face     omeprazole 20 MG capsule  Commonly known as:   PRILOSEC  TAKE 1 CAPSULE(20 MG) BY MOUTH EVERY DAY     SUPREP BOWEL PREP KIT 17.5-3.13-1.6 gram Solr  Generic drug:  sodium,potassium,mag sulfates          Review of patient's allergies indicates:   Allergen Reactions    Lipitor [atorvastatin] Other (See Comments)    Norvasc [amlodipine] Swelling       Lab: I have reviewed all of the patient's relevant lab work available in the medical record and have utilized this in my evaluation and management recommendations today    Imaging: I have reviewed all of the patient's diagnostic/imaging results available in the medical record and have utilized this in my evaluation and management recommendations today.      Objective:     Vitals:    09/13/18 1046   BP: 100/80   Pulse: 71   Resp: 18   Temp: 97.5 °F (36.4 °C)       Physical Exam   Constitutional: He is oriented to person, place, and time. He appears well-developed and well-nourished. No distress.   HENT:   Head: Normocephalic and atraumatic.   Nose: Nose normal.   Mouth/Throat: Oropharynx is clear and moist. No oropharyngeal exudate.   Eyes: EOM are normal. Pupils are equal, round, and reactive to light. No scleral icterus.   Neck: Normal range of motion. Neck supple. No tracheal deviation present. No thyromegaly present.   Cardiovascular: Normal rate, regular rhythm, normal heart sounds and intact distal pulses.   No murmur heard.  Pulmonary/Chest: Effort normal and breath sounds normal. No stridor. No respiratory distress. He has no wheezes. He has no rales. He exhibits no tenderness.   Abdominal: Soft. Bowel sounds are normal. He exhibits no distension and no mass. There is no tenderness. There is no rebound and no guarding.   Musculoskeletal: Normal range of motion. He exhibits no edema or tenderness.   Lymphadenopathy:     He has no cervical adenopathy.   Neurological: He is alert and oriented to person, place, and time. Coordination normal.   Skin: Skin is warm. No rash noted. He is not diaphoretic. No erythema.    Psychiatric: He has a normal mood and affect. His behavior is normal. Judgment and thought content normal.   Nursing note and vitals reviewed.      Assessment:     1. Waldenstrom's macroglobulinemia    Plan:   1.  I had a detailed discussion with the patient today with regard to the results of his bone marrow aspiration biopsy done in July 2018 as well as the results of all of his lab work done in August 2018.  At this time the patient has Waldenstrom's macroglobulinemia.  His IgM quantitative level is greater than 4 g at this time.  2.  I had a detailed discussion with him today with regard to the diagnosis, prognosis and treatment of this malignancy.  We discussed the indications to begin treatment.  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  I am also concerned about possible renal involvement because of his elevated creatinine with monoclonal chain noted in UPEP immunofixation.  3.  We discussed that his treatment options are complicated because of the above.  At this time I have discussed that the best course of action would be to start him on Velcade/dexamethasone/Rituxan.  I will hold off on initiation of Rituxan until we get his IgM quantitative level to less than 4 g.  Therefore I will initially start him on twice weekly Velcade and weekly dexamethasone at 20 mg p.o. once daily.  We will then re-evaluate our treatment options based on clinical response and based on how he tolerates this treatment. Velcade will be dose adjusted for liver function. He will proceed with cycle 1 day 8 of velcade today. Informed consent taken.  4.  All necessary prescriptions including for herpes zoster prophylaxis have been sent to the patient's pharmacy previously.  5. I have strongly recommended cessation of alcohol use.    Follow-up as above with labs for cycle 1 day 11 of Velcade.  He knows to call for any additional questions or new  problems.

## 2018-09-13 NOTE — PATIENT INSTRUCTIONS
[facility]  [id number]  [phone] phone     [phone] fax  Hours of Operation: Monday- Friday 8:00am- 5:00pm  After hours phone  [phone]  Hematology / Oncology Physicians on call      Dr. Bridger Cloud                        Please call with any concerns regarding your appointment today.    [address]  [phone] phone     [phone] fax  Hours of Operation: Monday- Friday 8:00am- 5:00pm  After hours phone  [phone]  Hematology / Oncology Physicians on call      Dr. Bridger Cloud                        Please call with any concerns regarding your appointment today.  HOME CARE AFTER CHEMOTHERAPY   Meals   Many patients feel sick and lose their appetites during treatment. Eat small meals several times a day. Choose bland foods with little taste or smell if you have problems with nausea. Be sure to cook all food thoroughly. This kills bacteria and helps you avoid intestinal infection. Soft foods are easier to swallow and digest.   Activity   Exercise keeps you strong and keeps your heart and lungs active. Talk to your doctor about an appropriate exercise program for you.   Skin Care   To prevent a skin infection, bathe or shower once a day. Use a moisturizing soap and wash with warm water. Avoid very hot or cold water. Chemotherapy can make your skin dry . Apply moisturizing lotion to help relieve dry skin. Some drugs used in high doses can cause slight burns to appear (like sunburn). Ask for a special cream to help relieve the burn and protect your skin.   Prevent Mouth Sores   During chemotherapy, many people get mouth sores. Do the following to help prevent mouth sores or to ease discomfort.   Brush your teeth with a soft-bristle toothbrush after every meal.  Don't use dental floss if your  platelet count is below 50,000. Your doctor or nurse will tell you if this is the case.  Use an oral swab or special soft toothbrush if your gums bleed during regular brushing.  Use mouthwash as directed. If you can't tolerate commercial mouthwash, use salt and baking soda to clean your mouth. Mix 1 teaspoon of salt and 1 teaspoon of baking soda into a glass of water. Swish and spit.  Call your doctor or return to this facility if you develop any of the following:   Sore throat   White patches in the mouth or throat   Fever of 100.4ºF (38ºC) or higher, or as directed by your healthcare provider  © 4900-3455 [facility], [address] [place] [address], [place] [id number]. All rights reserved. This information is not intended as a substitute for professional medical care. Always follow your healthcare professional's   Support Groups/Classes    Support groups and classes are being offered at the   [facility]!!    Nutrition Class:  Second Wednesday of each month at noon.  Metastatic Support Group:  Third Tuesday of each month at noon.  Next Steps Class/Group: Second Thursday and   Last Wednesday of each month at noon.    If you are interested in attending or would like more information please ask our social workers or your nurse!

## 2018-09-17 ENCOUNTER — INFUSION (OUTPATIENT)
Dept: INFUSION THERAPY | Facility: HOSPITAL | Age: 78
End: 2018-09-17
Attending: INTERNAL MEDICINE
Payer: MEDICARE

## 2018-09-17 ENCOUNTER — OFFICE VISIT (OUTPATIENT)
Dept: HEMATOLOGY/ONCOLOGY | Facility: CLINIC | Age: 78
End: 2018-09-17
Payer: MEDICARE

## 2018-09-17 VITALS
TEMPERATURE: 97 F | OXYGEN SATURATION: 95 % | WEIGHT: 184.94 LBS | HEIGHT: 70 IN | BODY MASS INDEX: 26.48 KG/M2 | HEART RATE: 76 BPM | DIASTOLIC BLOOD PRESSURE: 72 MMHG | SYSTOLIC BLOOD PRESSURE: 113 MMHG

## 2018-09-17 VITALS — WEIGHT: 184.94 LBS | BODY MASS INDEX: 26.48 KG/M2 | HEIGHT: 70 IN

## 2018-09-17 DIAGNOSIS — Z85.048 HISTORY OF RECTAL CANCER: ICD-10-CM

## 2018-09-17 DIAGNOSIS — C88.0 WALDENSTROM'S MACROGLOBULINEMIA: Primary | ICD-10-CM

## 2018-09-17 DIAGNOSIS — R60.0 EDEMA OF BOTH LEGS: ICD-10-CM

## 2018-09-17 DIAGNOSIS — D51.8 OTHER VITAMIN B12 DEFICIENCY ANEMIA: ICD-10-CM

## 2018-09-17 DIAGNOSIS — Z87.898 HISTORY OF THYMOMA: ICD-10-CM

## 2018-09-17 PROCEDURE — 1101F PT FALLS ASSESS-DOCD LE1/YR: CPT | Mod: CPTII,,, | Performed by: INTERNAL MEDICINE

## 2018-09-17 PROCEDURE — 99499 UNLISTED E&M SERVICE: CPT | Mod: HCNC,S$GLB,, | Performed by: INTERNAL MEDICINE

## 2018-09-17 PROCEDURE — 99999 PR PBB SHADOW E&M-EST. PATIENT-LVL III: CPT | Mod: PBBFAC,,, | Performed by: INTERNAL MEDICINE

## 2018-09-17 PROCEDURE — 63600175 PHARM REV CODE 636 W HCPCS: Mod: JG | Performed by: INTERNAL MEDICINE

## 2018-09-17 PROCEDURE — 3074F SYST BP LT 130 MM HG: CPT | Mod: CPTII,,, | Performed by: INTERNAL MEDICINE

## 2018-09-17 PROCEDURE — 99213 OFFICE O/P EST LOW 20 MIN: CPT | Mod: PBBFAC | Performed by: INTERNAL MEDICINE

## 2018-09-17 PROCEDURE — 3078F DIAST BP <80 MM HG: CPT | Mod: CPTII,,, | Performed by: INTERNAL MEDICINE

## 2018-09-17 PROCEDURE — 96401 CHEMO ANTI-NEOPL SQ/IM: CPT

## 2018-09-17 PROCEDURE — 99215 OFFICE O/P EST HI 40 MIN: CPT | Mod: 25,S$PBB,, | Performed by: INTERNAL MEDICINE

## 2018-09-17 RX ORDER — BORTEZOMIB 3.5 MG/1
0.7 INJECTION, POWDER, LYOPHILIZED, FOR SOLUTION INTRAVENOUS; SUBCUTANEOUS
Status: CANCELLED | OUTPATIENT
Start: 2018-09-17

## 2018-09-17 RX ORDER — SODIUM CHLORIDE 0.9 % (FLUSH) 0.9 %
10 SYRINGE (ML) INJECTION
Status: CANCELLED | OUTPATIENT
Start: 2018-09-17

## 2018-09-17 RX ORDER — HEPARIN 100 UNIT/ML
500 SYRINGE INTRAVENOUS
Status: CANCELLED | OUTPATIENT
Start: 2018-09-17

## 2018-09-17 RX ORDER — BORTEZOMIB 3.5 MG/1
0.7 INJECTION, POWDER, LYOPHILIZED, FOR SOLUTION INTRAVENOUS; SUBCUTANEOUS
Status: COMPLETED | OUTPATIENT
Start: 2018-09-17 | End: 2018-09-17

## 2018-09-17 RX ADMIN — BORTEZOMIB 1.4 MG: 3.5 INJECTION, POWDER, LYOPHILIZED, FOR SOLUTION INTRAVENOUS; SUBCUTANEOUS at 12:09

## 2018-09-17 NOTE — PLAN OF CARE
Problem: Patient Care Overview  Goal: Plan of Care Review  Outcome: Ongoing (interventions implemented as appropriate)  States he just feels tired.

## 2018-09-17 NOTE — PROGRESS NOTES
Subjective:      Patient ID: Homero Tanner is a 78 y.o. male.    Chief Complaint: Chemotherapy    The patient is a 78-year-old  male who presents to the Hematology Oncology Clinic today to discuss further evaluation management recommendations for Waldenstrom's macroglobulinemia.   I have reviewed all of the patient's relevant clinical history available in the medical record and have utilized this in my evaluation and management recommendations today.  Today the patient reports that he has been having chronic fatigue.  Otherwise overall he feels well and has no specific complaints.  He has been taking his monthly vitamin B12 injections. He is off iron supplementation at this time for his history of iron deficiency anemia which has since resolved.  He is on intermittent ADT for biochemical recurrence of prostate carcinoma and is followed by a urologist outside of the [facility].  His rectal cancer is in remission at this time.  He reports that overall he is tolerating his velcade well without any significant symptoms.      Review of Systems   Constitutional: Positive for fatigue. Negative for activity change, appetite change, chills, diaphoresis, fever and unexpected weight change.   HENT: Negative for congestion, dental problem, ear pain, mouth sores, nosebleeds, postnasal drip, sinus pressure, sore throat, tinnitus, trouble swallowing and voice change.    Eyes: Negative for photophobia, pain, discharge, redness, itching and visual disturbance.   Respiratory: Negative for cough, chest tightness, shortness of breath, wheezing and stridor.    Cardiovascular: Negative for chest pain, palpitations and leg swelling.   Gastrointestinal: Negative for abdominal distention, abdominal pain, anal bleeding, blood in stool, constipation, diarrhea, nausea and vomiting.   Endocrine: Negative for cold intolerance, heat intolerance, polydipsia, polyphagia and polyuria.   Genitourinary: Negative for decreased  urine volume, difficulty urinating, dysuria, flank pain, frequency, hematuria and urgency.   Musculoskeletal: Negative for arthralgias, back pain, gait problem, joint swelling and myalgias.   Skin: Negative for pallor and rash.   Allergic/Immunologic: Negative for immunocompromised state.   Neurological: Negative for dizziness, syncope, weakness, light-headedness and headaches.   Hematological: Negative for adenopathy. Does not bruise/bleed easily.   Psychiatric/Behavioral: Negative for agitation and confusion. The patient is not nervous/anxious.           Medication List           Accurate as of 9/17/18 11:47 AM. If you have any questions, ask your nurse or doctor.               CONTINUE taking these medications    acyclovir 400 MG tablet  Commonly known as:  ZOVIRAX  Take 1 tablet (400 mg total) by mouth 2 (two) times daily.     aspirin 81 MG EC tablet  Commonly known as:  ECOTRIN     b complex vitamins tablet     cyanocobalamin 1,000 mcg/mL injection  Give 1cc under the skin once a month     dexamethasone 4 MG Tab  Commonly known as:  DECADRON  Take 5 tablets p.o. once weekly     FLONASE 50 mcg/actuation nasal spray  Generic drug:  fluticasone     fluocinonide 0.05 % external solution  Commonly known as:  LIDEX  AAA of scalp 1-2 times daily as needed for itch     furosemide 20 MG tablet  Commonly known as:  LASIX  Take 40 mg daily on Mon, Wed, Fridays and 20 mg once daily all other days     ketoconazole 2 % shampoo  Commonly known as:  NIZORAL  WASH HAIR WITH MEDICATED SHAMPOO AT LEAST 3 TIMES A WEEK. LET SIT ON SCALP AT LEAST 5 MINUTES PRIOR TO RINSING     leuprolide 3.75 mg injection  Commonly known as:  LUPRON     metoprolol tartrate 100 MG tablet  Commonly known as:  LOPRESSOR  TAKE 1 TABLET(100 MG) BY MOUTH TWICE DAILY     metronidazole 0.75% 0.75 % Crea  Commonly known as:  METROCREAM  Apply 1 application topically 2 (two) times daily. Apply to affected on face     omeprazole 20 MG capsule  Commonly known  as:  PRILOSEC  TAKE 1 CAPSULE(20 MG) BY MOUTH EVERY DAY        STOP taking these medications    SUPREP BOWEL PREP KIT 17.5-3.13-1.6 gram Solr  Generic drug:  sodium,potassium,mag sulfates  Stopped by:  Radu Weaver MD          Review of patient's allergies indicates:   Allergen Reactions    Lipitor [atorvastatin] Other (See Comments)    Norvasc [amlodipine] Swelling       Lab: I have reviewed all of the patient's relevant lab work available in the medical record and have utilized this in my evaluation and management recommendations today    Imaging: I have reviewed all of the patient's diagnostic/imaging results available in the medical record and have utilized this in my evaluation and management recommendations today.      Objective:     Vitals:    09/17/18 1119   BP: 113/72   Pulse: 76   Temp: 97.2 °F (36.2 °C)       Physical Exam   Constitutional: He is oriented to person, place, and time. He appears well-developed and well-nourished. No distress.   HENT:   Head: Normocephalic and atraumatic.   Nose: Nose normal.   Mouth/Throat: Oropharynx is clear and moist. No oropharyngeal exudate.   Eyes: EOM are normal. Pupils are equal, round, and reactive to light. No scleral icterus.   Neck: Normal range of motion. Neck supple. No tracheal deviation present. No thyromegaly present.   Cardiovascular: Normal rate, regular rhythm, normal heart sounds and intact distal pulses.   No murmur heard.  Pulmonary/Chest: Effort normal and breath sounds normal. No stridor. No respiratory distress. He has no wheezes. He has no rales. He exhibits no tenderness.   Abdominal: Soft. Bowel sounds are normal. He exhibits no distension and no mass. There is no tenderness. There is no rebound and no guarding.   Musculoskeletal: Normal range of motion. He exhibits no edema or tenderness.   Lymphadenopathy:     He has no cervical adenopathy.   Neurological: He is alert and oriented to person, place, and time. Coordination normal.    Skin: Skin is warm. No rash noted. He is not diaphoretic. No erythema.   Psychiatric: He has a normal mood and affect. His behavior is normal. Judgment and thought content normal.   Nursing note and vitals reviewed.      Assessment:     1. Waldenstrom's macroglobulinemia    Plan:   1.  I had a detailed discussion with the patient today with regard to the results of his bone marrow aspiration biopsy done in July 2018 as well as the results of all of his lab work done in August 2018.  At this time the patient has Waldenstrom's macroglobulinemia.  His IgM quantitative level is greater than 4 g at this time.  2.  I had a detailed discussion with him today with regard to the diagnosis, prognosis and treatment of this malignancy.  We discussed the indications to begin treatment.  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  I am also concerned about possible renal involvement because of his elevated creatinine with monoclonal chain noted in UPEP immunofixation.  3.  We discussed that his treatment options are complicated because of the above.  At this time I have discussed that the best course of action would be to start him on Velcade/dexamethasone/Rituxan.  I will hold off on initiation of Rituxan until we get his IgM quantitative level to less than 4 g.  Therefore I will initially start him on twice weekly Velcade and weekly dexamethasone at 20 mg p.o. once daily.  We will then re-evaluate our treatment options based on clinical response and based on how he tolerates this treatment. Velcade will be dose adjusted for liver function. He will proceed with cycle 1 day 11 of velcade today. Informed consent taken.  4.  All necessary prescriptions including for herpes zoster prophylaxis have been sent to the patient's pharmacy previously.  5. I have strongly recommended cessation of alcohol use.    Follow-up in 10 days with labs for cycle 2  day 1 of Velcade.  He knows to call for any additional questions or new problems.

## 2018-09-18 ENCOUNTER — OFFICE VISIT (OUTPATIENT)
Dept: CARDIOLOGY | Facility: CLINIC | Age: 78
End: 2018-09-18
Payer: MEDICARE

## 2018-09-18 VITALS
HEIGHT: 70 IN | BODY MASS INDEX: 26.22 KG/M2 | DIASTOLIC BLOOD PRESSURE: 64 MMHG | HEART RATE: 100 BPM | WEIGHT: 183.19 LBS | SYSTOLIC BLOOD PRESSURE: 114 MMHG

## 2018-09-18 DIAGNOSIS — I36.1 NON-RHEUMATIC TRICUSPID VALVE INSUFFICIENCY: ICD-10-CM

## 2018-09-18 DIAGNOSIS — I10 ESSENTIAL HYPERTENSION: Chronic | ICD-10-CM

## 2018-09-18 DIAGNOSIS — I47.20 VT (VENTRICULAR TACHYCARDIA): ICD-10-CM

## 2018-09-18 DIAGNOSIS — R94.31 ABNORMAL ECG: Chronic | ICD-10-CM

## 2018-09-18 DIAGNOSIS — I50.32 CHRONIC DIASTOLIC HEART FAILURE: ICD-10-CM

## 2018-09-18 DIAGNOSIS — R60.0 EDEMA OF BOTH LEGS: ICD-10-CM

## 2018-09-18 DIAGNOSIS — N18.9 CHRONIC RENAL IMPAIRMENT, UNSPECIFIED CKD STAGE: ICD-10-CM

## 2018-09-18 DIAGNOSIS — I35.1 NONRHEUMATIC AORTIC VALVE INSUFFICIENCY: Primary | Chronic | ICD-10-CM

## 2018-09-18 DIAGNOSIS — I49.3 PVC'S (PREMATURE VENTRICULAR CONTRACTIONS): ICD-10-CM

## 2018-09-18 DIAGNOSIS — E78.2 MIXED HYPERLIPIDEMIA: ICD-10-CM

## 2018-09-18 DIAGNOSIS — I27.20 PULMONARY HYPERTENSION: ICD-10-CM

## 2018-09-18 DIAGNOSIS — R74.8 ABNORMAL LIVER ENZYMES: ICD-10-CM

## 2018-09-18 PROCEDURE — 3074F SYST BP LT 130 MM HG: CPT | Mod: CPTII,,, | Performed by: INTERNAL MEDICINE

## 2018-09-18 PROCEDURE — 99214 OFFICE O/P EST MOD 30 MIN: CPT | Mod: S$PBB,,, | Performed by: INTERNAL MEDICINE

## 2018-09-18 PROCEDURE — 99499 UNLISTED E&M SERVICE: CPT | Mod: HCNC,S$GLB,, | Performed by: INTERNAL MEDICINE

## 2018-09-18 PROCEDURE — 3078F DIAST BP <80 MM HG: CPT | Mod: CPTII,,, | Performed by: INTERNAL MEDICINE

## 2018-09-18 PROCEDURE — 1101F PT FALLS ASSESS-DOCD LE1/YR: CPT | Mod: CPTII,,, | Performed by: INTERNAL MEDICINE

## 2018-09-18 PROCEDURE — 99214 OFFICE O/P EST MOD 30 MIN: CPT | Mod: PBBFAC,PO | Performed by: INTERNAL MEDICINE

## 2018-09-18 PROCEDURE — 99999 PR PBB SHADOW E&M-EST. PATIENT-LVL IV: CPT | Mod: PBBFAC,,, | Performed by: INTERNAL MEDICINE

## 2018-09-18 RX ORDER — FUROSEMIDE 40 MG/1
40 TABLET ORAL DAILY
Qty: 30 TABLET | Refills: 11
Start: 2018-09-18 | End: 2019-01-08 | Stop reason: SDUPTHER

## 2018-09-18 NOTE — PROGRESS NOTES
Subjective:    Patient ID:  Homero Tanner is a 78 y.o. male who presents for evaluation of Congestive Heart Failure; Hypertension; Hyperlipidemia; Valvular Heart Disease; Foot Swelling; Joint Swelling; and Dizziness      HPI Mr. Tanner returns for f/u.   His current medical conditions include chronic HTN, DD, PHTN, AI, PVCs, TR, dyslipidemia, prostate & rectal cancer. Nonsmoker.    Had LHC 20+ years ago, no significant blockages noted.  He has chronic abnl ecgs.   Also has had PSVT with stress testing in past.    He has h/o thymoma surgery with xrt and also had rectal polyp surgery.  - Stress MPI Aug 2017.  Echo Aug 2017 normal EF, DD, mild-mod PHTN, mild AI, LVH, mild RVE.  Pt here for f/u.  He was seen last month.  Had elevated alkaline phosphatase, referred to GI.  Due to abnl LFTs, statin was stopped.  Lasix was increased for CHF sxs. BNP test is more elevated.   His LFTs remain elevated but no worse over last month.  Creatinine 1.5  Echo 9/18 read as EF 50%, normal diastolic function, mild PHTN, LVH.  Holter shows sinus rhythm, frequent PAC/PVCs, NSVT.  Pt has seen Dr. Weaver, heme/onc, dx with Waldenstrom's macroglobulinemia malignancy with chemo started and steroids.  He is tired, from chemo per pt.   Has stable BACH.  No pnd/orthopnea.  Some orthostatic dizziness.  No cp sxs.        Current Outpatient Medications:     acyclovir (ZOVIRAX) 400 MG tablet, Take 1 tablet (400 mg total) by mouth 2 (two) times daily., Disp: 60 tablet, Rfl: 5    aspirin (ECOTRIN) 81 MG EC tablet, Take 81 mg by mouth once daily., Disp: , Rfl:     b complex vitamins tablet, Take by mouth. 1 tablet Oral Every day, Disp: , Rfl:     cyanocobalamin 1,000 mcg/mL injection, Give 1cc under the skin once a month, Disp: 10 mL, Rfl: 4    dexamethasone (DECADRON) 4 MG Tab, Take 5 tablets p.o. once weekly, Disp: 120 tablet, Rfl: 1    fluocinonide (LIDEX) 0.05 % external solution, AAA of scalp 1-2 times daily as needed for itch,  "Disp: 60 mL, Rfl: 2    furosemide (LASIX) 20 MG tablet, Take 40 mg daily on Mon, Wed, Fridays and 20 mg once daily all other days, Disp: 30 tablet, Rfl: 12    ketoconazole (NIZORAL) 2 % shampoo, WASH HAIR WITH MEDICATED SHAMPOO AT LEAST 3 TIMES A WEEK. LET SIT ON SCALP AT LEAST 5 MINUTES PRIOR TO RINSING, Disp: 120 mL, Rfl: 3    leuprolide (LUPRON) 3.75 mg injection, Inject 3.75 mg into the muscle every 3 (three) months. , Disp: , Rfl:     metoprolol tartrate (LOPRESSOR) 100 MG tablet, TAKE 1 TABLET(100 MG) BY MOUTH TWICE DAILY, Disp: 180 tablet, Rfl: 3    metronidazole 0.75% (METROCREAM) 0.75 % Crea, Apply 1 application topically 2 (two) times daily. Apply to affected on face, Disp: 45 g, Rfl: 5    omeprazole (PRILOSEC) 20 MG capsule, TAKE 1 CAPSULE(20 MG) BY MOUTH EVERY DAY, Disp: 90 capsule, Rfl: 3    fluticasone (FLONASE) 50 mcg/actuation nasal spray, 1 spray by Nasal route 2 (two) times daily as needed for Rhinitis or Allergies. 1 Aerosol, Spray Each nostril Twice a day , Disp: , Rfl:   No current facility-administered medications for this visit.     Review of Systems   Constitution: Positive for weakness and malaise/fatigue.   HENT: Negative.    Eyes: Negative.    Cardiovascular: Positive for dyspnea on exertion and leg swelling.   Respiratory: Positive for shortness of breath.    Endocrine: Negative.    Hematologic/Lymphatic: Negative.    Skin: Negative.    Musculoskeletal: Negative.    Gastrointestinal: Negative.    Genitourinary: Negative.    Psychiatric/Behavioral: Negative.    Allergic/Immunologic: Negative.        /64 (BP Location: Right arm, Patient Position: Sitting, BP Method: Small (Manual))   Pulse 100   Ht 5' 10" (1.778 m)   Wt 83.1 kg (183 lb 3.2 oz)   BMI 26.29 kg/m²     Wt Readings from Last 3 Encounters:   09/18/18 83.1 kg (183 lb 3.2 oz)   09/17/18 83.9 kg (184 lb 15.5 oz)   09/17/18 83.9 kg (184 lb 15.5 oz)     Temp Readings from Last 3 Encounters:   09/17/18 97.2 °F (36.2 " °C)   09/13/18 97.5 °F (36.4 °C) (Oral)   09/10/18 97.5 °F (36.4 °C) (Oral)     BP Readings from Last 3 Encounters:   09/18/18 114/64   09/17/18 113/72   09/13/18 100/80     Pulse Readings from Last 3 Encounters:   09/18/18 100   09/17/18 76   09/13/18 71          Objective:    Physical Exam   Constitutional: He is oriented to person, place, and time. He appears well-developed and well-nourished.   HENT:   Head: Normocephalic.   Neck: Normal range of motion. Neck supple. Normal carotid pulses, no hepatojugular reflux and no JVD present. Carotid bruit is not present. No thyromegaly present.   Cardiovascular: Normal rate, S1 normal and S2 normal. A regularly irregular rhythm present. PMI is not displaced. Exam reveals no S3, no S4, no distant heart sounds, no friction rub, no midsystolic click and no opening snap.   No murmur heard.  Pulses:       Radial pulses are 2+ on the right side, and 2+ on the left side.   Pulmonary/Chest: Effort normal and breath sounds normal. He has no wheezes. He has no rales.   Abdominal: Soft. Bowel sounds are normal. He exhibits no distension, no abdominal bruit and no mass. There is no tenderness.   Musculoskeletal: He exhibits edema.   Neurological: He is alert and oriented to person, place, and time.   Skin: Skin is warm.   Psychiatric: He has a normal mood and affect. His behavior is normal.   Nursing note and vitals reviewed.      I have reviewed all pertinent labs and cardiac studies.      Component      Latest Ref Rng & Units 9/12/2018   BNP      0 - 99 pg/mL 659 (H)       Chemistry        Component Value Date/Time     09/17/2018 1025    K 4.0 09/17/2018 1025    CL 93 (L) 09/17/2018 1025    CO2 27 09/17/2018 1025    BUN 15 09/17/2018 1025    CREATININE 1.6 (H) 09/17/2018 1025     09/17/2018 1025        Component Value Date/Time    CALCIUM 7.2 (L) 09/17/2018 1025    ALKPHOS 409 (H) 09/17/2018 1025     (H) 09/17/2018 1025    ALT 60 (H) 09/17/2018 [facility]    [facility]  [facility] ([facility] 09/17/2018 1025    ESTGFRAFRICA 47 (A) 09/17/2018 1025    EGFRNONAA 41 (A) 09/17/2018 1025        Lab Results   Component Value Date    WBC 8.96 09/17/2018    HGB 10.7 (L) 09/17/2018    HCT 30.1 (L) 09/17/2018    MCV 99 (H) 09/17/2018     09/17/2018     Lab Results   Component Value Date    HGBA1C 5.2 06/06/2016     Lab Results   Component Value Date    CHOL 160 08/21/2018    CHOL 164 06/06/2016    CHOL 123 05/08/2013     Lab Results   Component Value Date    HDL 65 08/21/2018    HDL 79 (H) 06/06/2016    HDL 60 05/08/2013     Lab Results   Component Value Date    LDLCALC 80.0 08/21/2018    LDLCALC 69.4 06/06/2016    LDLCALC 50.0 (L) 05/08/2013     Lab Results   Component Value Date    TRIG 75 08/21/2018    TRIG 78 06/06/2016    TRIG 64 05/08/2013     Lab Results   Component Value Date    CHOLHDL 40.6 08/21/2018    CHOLHDL 48.2 06/06/2016    CHOLHDL 48.8 05/08/2013     Date of Procedure: 09/12/2018        TEST DESCRIPTION   Technical Quality: This is a technically challenging study.     Aorta: The aortic root is normal in size, measuring 2.6 cm at sinotubular junction and 3.3 cm at Sinuses of Valsalva. The proximal ascending aorta is normal in size, measuring 4.3 cm across.     Left Atrium: The left atrium is normal in size, measuring 5.3 cm across in the parasternal view, and 6.2 cm across in the apical view.     Left Ventricle: The left ventricle is normal in size, with an end-diastolic diameter of 4.9 cm, and an end-systolic diameter of 3.7 cm. Septal wall thickness is markedly increased, with the septum measuring 1.8 cm and the posterior wall measuring 1.1 cm   across. Relative wall thickness was increased at 0.45, and the LV mass index was increased at 177.9 g/m2 consistent with concentric left ventricular hypertrophy. There are no regional wall motion abnormalities. Left ventricular systolic function appears   low normal to mildly depressed. Visually estimated ejection fraction is 50-55%. The LV  Doppler derived stroke volume equals 74.0 ccs.     Diastolic indices: E wave velocity 0.5 m/s, E/A ratio 0.7,  msec., E/e' ratio(avg) 5. Diastolic function is normal.     Right Atrium: The right atrium is normal in size, measuring 4.8 cm in length and 3.7 cm in width in the apical view.     Right Ventricle: The right ventricle is normal in size measuring 3.1 cm at the base in the apical right ventricle-focused view. Global right ventricular systolic function appears normal. There is abnormal septal motion. Tricuspid annular plane systolic   excursion (TAPSE) is 2.1 cm. The estimated PA systolic pressure is 41 mmHg.     Aortic Valve:  Aortic valve is normal in structure with normal leaflet mobility. The peak gradient obtained across the aortic valve is 5 mmHg, with a mean gradient of 3 mmHg. Using a left ventricular outflow tract diameter of 2.0 cm, a left ventricular   outflow tract velocity time integral of 23 cm, and a peak instantaneous transvalvular velocity time integral of 28 cm, the calculated aortic valve area is 2.66 cm2(AVAi is 1.32 cm2/m2). Additionally, there is trivial to mild aortic regurgitation.     Mitral Valve:  Mitral valve is normal in structure with normal leaflet mobility.     Tricuspid Valve:  Tricuspid valve is normal in structure with normal leaflet mobility. There is mild tricuspid regurgitation.     Pulmonary Valve:  Pulmonary valve is normal in structure with normal leaflet mobility. There is trivial pulmonic regurgitation.     IVC: IVC is normal in size and collapses > 50% with a sniff, suggesting normal right atrial pressure of 3 mmHg.     Atrial Septum: The atrial septum is intact.     Intracavitary: There is no evidence of pericardial effusion, intracavity mass, thrombi, or vegetation.         CONCLUSIONS     1 - Concentric hypertrophy.     2 - No wall motion abnormalities.     3 - Low normal to mildly depressed left ventricular systolic function (EF 50-55%).     4 - Normal left  ventricular diastolic function.     5 - Normal right ventricular systolic function .     6 - Pulmonary hypertension. The estimated PA systolic pressure is 41 mmHg.     7 - Trivial to mild aortic regurgitation.     8 - Mild tricuspid regurgitation.     9 - Trivial pulmonic regurgitation.             This document has been electronically    SIGNED BY: Jay Augustine MD On: 09/12/2018 12:42    Date of Procedure: 09/12/2018    PRE-TEST DATA   The diary was returned, but not completed.        TEST DESCRIPTION   PREDOMINANT RHYTHM  1. Sinus rhythm with heart rates varying between 57 and 136 bpm with an average of 88 bpm.     VENTRICULAR ARRHYTHMIAS  1. There were very frequent PVCs totalling 95292 and averaging 411 per hour.   The ventricular arrhythmias percentage was 8.4.   There were 441 bigeminal cycles.  There were 474 triplets.     2. There were 12 runs of non-sustained ventricular tachycardia.     SUPRA VENTRICULAR ARRHYTHMIAS  1. There were very frequent PACs totalling 13770 and averaging 1450 per hour.  There were 3184 bigeminal cycles.  There were 4483 triplets.     2. There were 3632 runs of EAT.     SINUS NODE FUNCTION  1. There was no evidence of high grade SA adeline block.     AV CONDUCTION  1. There was no evidence of high grade AV block.     DIARY  1. The diary was returned, but not completed    MISCELLANEOUS  1. This was a tape of adequate length (48 hrs).             This document has been electronically    SIGNED BY: Tan Herring MD On: 09/14/2018 16:41        Assessment:       1. Nonrheumatic aortic valve insufficiency    2. Abnormal ECG    3. Essential hypertension    4. PVC's (premature ventricular contractions)    5. Non-rheumatic tricuspid valve insufficiency    6. Pulmonary hypertension    7. Mixed hyperlipidemia    8. Chronic diastolic heart failure    9. Edema of both legs    10. Abnormal liver enzymes    11. Chronic renal impairment, unspecified CKD stage         Plan:             Increase Lasix  to 40 mg qd.    CMP/BNP next week.  Continue current dose of Metoprolol for CHF/NSVT/PVCs.  F/u with heme/onc and GI.  Test results reviewed in detail with pt.  EP consult with Dr. Aguilar advised for NSVT/frequent ectopy.  Continue other meds.    F/u 4 - 6 weeks.

## 2018-09-21 DIAGNOSIS — N18.9 CHRONIC KIDNEY DISEASE, UNSPECIFIED CKD STAGE: ICD-10-CM

## 2018-09-27 ENCOUNTER — INFUSION (OUTPATIENT)
Dept: INFUSION THERAPY | Facility: HOSPITAL | Age: 78
End: 2018-09-27
Attending: INTERNAL MEDICINE
Payer: MEDICARE

## 2018-09-27 ENCOUNTER — OFFICE VISIT (OUTPATIENT)
Dept: HEMATOLOGY/ONCOLOGY | Facility: CLINIC | Age: 78
End: 2018-09-27
Payer: MEDICARE

## 2018-09-27 VITALS
WEIGHT: 190.5 LBS | OXYGEN SATURATION: 99 % | SYSTOLIC BLOOD PRESSURE: 120 MMHG | HEIGHT: 70 IN | RESPIRATION RATE: 18 BRPM | BODY MASS INDEX: 27.27 KG/M2 | HEART RATE: 71 BPM | TEMPERATURE: 98 F | DIASTOLIC BLOOD PRESSURE: 60 MMHG

## 2018-09-27 VITALS — BODY MASS INDEX: 27.27 KG/M2 | WEIGHT: 190.5 LBS | HEIGHT: 70 IN

## 2018-09-27 DIAGNOSIS — C88.0 WALDENSTROM'S MACROGLOBULINEMIA: Primary | ICD-10-CM

## 2018-09-27 DIAGNOSIS — R60.0 EDEMA OF BOTH LEGS: ICD-10-CM

## 2018-09-27 PROCEDURE — 3078F DIAST BP <80 MM HG: CPT | Mod: CPTII,,, | Performed by: INTERNAL MEDICINE

## 2018-09-27 PROCEDURE — 90662 IIV NO PRSV INCREASED AG IM: CPT | Mod: ,,, | Performed by: INTERNAL MEDICINE

## 2018-09-27 PROCEDURE — 99499 UNLISTED E&M SERVICE: CPT | Mod: HCNC,S$GLB,, | Performed by: INTERNAL MEDICINE

## 2018-09-27 PROCEDURE — 3074F SYST BP LT 130 MM HG: CPT | Mod: CPTII,,, | Performed by: INTERNAL MEDICINE

## 2018-09-27 PROCEDURE — 96401 CHEMO ANTI-NEOPL SQ/IM: CPT

## 2018-09-27 PROCEDURE — 99213 OFFICE O/P EST LOW 20 MIN: CPT | Mod: PBBFAC,25 | Performed by: INTERNAL MEDICINE

## 2018-09-27 PROCEDURE — 63600175 PHARM REV CODE 636 W HCPCS: Mod: JG | Performed by: INTERNAL MEDICINE

## 2018-09-27 PROCEDURE — 99215 OFFICE O/P EST HI 40 MIN: CPT | Mod: 25,S$PBB,, | Performed by: INTERNAL MEDICINE

## 2018-09-27 PROCEDURE — 99999 PR PBB SHADOW E&M-EST. PATIENT-LVL III: CPT | Mod: PBBFAC,,, | Performed by: INTERNAL MEDICINE

## 2018-09-27 PROCEDURE — 1101F PT FALLS ASSESS-DOCD LE1/YR: CPT | Mod: CPTII,,, | Performed by: INTERNAL MEDICINE

## 2018-09-27 PROCEDURE — G0008 ADMIN INFLUENZA VIRUS VAC: HCPCS | Mod: ,,, | Performed by: INTERNAL MEDICINE

## 2018-09-27 RX ORDER — HEPARIN 100 UNIT/ML
500 SYRINGE INTRAVENOUS
Status: CANCELLED | OUTPATIENT
Start: 2018-09-30

## 2018-09-27 RX ORDER — BORTEZOMIB 3.5 MG/1
0.7 INJECTION, POWDER, LYOPHILIZED, FOR SOLUTION INTRAVENOUS; SUBCUTANEOUS
Status: COMPLETED | OUTPATIENT
Start: 2018-09-27 | End: 2018-09-27

## 2018-09-27 RX ORDER — HEPARIN 100 UNIT/ML
500 SYRINGE INTRAVENOUS
Status: CANCELLED | OUTPATIENT
Start: 2018-09-27

## 2018-09-27 RX ORDER — BORTEZOMIB 3.5 MG/1
0.7 INJECTION, POWDER, LYOPHILIZED, FOR SOLUTION INTRAVENOUS; SUBCUTANEOUS
Status: CANCELLED | OUTPATIENT
Start: 2018-09-30

## 2018-09-27 RX ORDER — SODIUM CHLORIDE 0.9 % (FLUSH) 0.9 %
10 SYRINGE (ML) INJECTION
Status: CANCELLED | OUTPATIENT
Start: 2018-09-27

## 2018-09-27 RX ORDER — BORTEZOMIB 3.5 MG/1
0.7 INJECTION, POWDER, LYOPHILIZED, FOR SOLUTION INTRAVENOUS; SUBCUTANEOUS
Status: CANCELLED | OUTPATIENT
Start: 2018-09-27

## 2018-09-27 RX ORDER — SODIUM CHLORIDE 0.9 % (FLUSH) 0.9 %
10 SYRINGE (ML) INJECTION
Status: CANCELLED | OUTPATIENT
Start: 2018-09-30

## 2018-09-27 RX ADMIN — BORTEZOMIB 1.4 MG: 3.5 INJECTION, POWDER, LYOPHILIZED, FOR SOLUTION INTRAVENOUS; SUBCUTANEOUS at 01:09

## 2018-09-27 NOTE — PATIENT INSTRUCTIONS
[facility]  [address]  [address]  [phone] phone     [phone] fax  Hours of Operation: Monday- Friday 8:00am- 5:00pm  After hours phone  [phone]  Hematology / Oncology Physicians on call      Dr. Bridger Cloud                        Please call with any concerns regarding your appointment today.      HOME CARE AFTER CHEMOTHERAPY   Meals   Many patients feel sick and lose their appetites during treatment. Eat small meals several times a day. Choose bland foods with little taste or smell if you have problems with nausea. Be sure to cook all food thoroughly. This kills bacteria and helps you avoid intestinal infection. Soft foods are easier to swallow and digest.   Activity   Exercise keeps you strong and keeps your heart and lungs active. Talk to your doctor about an appropriate exercise program for you.   Skin Care   To prevent a skin infection, bathe or shower once a day. Use a moisturizing soap and wash with warm water. Avoid very hot or cold water. Chemotherapy can make your skin dry . Apply moisturizing lotion to help relieve dry skin. Some drugs used in high doses can cause slight burns to appear (like sunburn). Ask for a special cream to help relieve the burn and protect your skin.   Prevent Mouth Sores   During chemotherapy, many people get mouth sores. Do the following to help prevent mouth sores or to ease discomfort.   Brush your teeth with a soft-bristle toothbrush after every meal.  Don't use dental floss if your platelet count is below 50,000. Your doctor or nurse will tell you if this is the case.  Use an oral swab or special soft toothbrush if your gums bleed during regular brushing.  Use mouthwash as directed. If you can't tolerate commercial mouthwash, use salt and baking soda to clean your mouth. Mix 1 teaspoon of salt and 1 teaspoon of baking soda into a glass of water. Swish and spit.  Call your  doctor or return to this facility if you develop any of the following:   Sore throat   White patches in the mouth or throat   Fever of 100.4ºF (38ºC) or higher, or as directed by your healthcare provider  © 0644-7058 [employer], [address] [place] [address], [place] [address]. All rights reserved. This information is not intended as a substitute for professional medical care. Always follow your healthcare professional's       WAYS TO HELP PREVENT INFECTION         WASH YOUR HANDS OFTEN DURING THE DAY, ESPECIALLY BEFORE YOU EAT, AFTER USING THE BATHROOM, AND AFTER TOUCHING ANIMALS     STAY AWAY FROM PEOPLE WHO HAVE ILLNESSES YOU CAN CATCH; SUCH AS COLDS, FLU, CHICKEN POX     TRY TO AVOID CROWDS     STAY AWAY FROM CHILDREN WHO RECENTLY HAVE RECEIVED LIVE VIRUS VACCINES     MAINTAIN GOOD MOUTH CARE     DO NOT SQUEEZE OR SCRATCH PIMPLES     CLEAN CUTS & SCRAPES RIGHT AWAY AND DAILY UNTIL HEALED WITH WARM WATER, SOAP & AN ANTISEPTIC     AVOID CONTACT WITH LITTER BOXES, BIRD CAGES, & FISH TANKS     AVOID STANDING WATER, IE., BIRD BATHS, FLOWER POTS/VASES, OR HUMIDIFIERS     WEAR GLOVES WHEN GARDENING OR CLEANING UP AFTER OTHERS, ESPECIALLY BABIES & SMALL CHILDREN     DO NOT EAT RAW FISH, SEAFOOD, MEAT, OR EGGS    YOU HAVE STARTED ON CHEMOTHERAPY. IF YOU EXPERIENCE ANY OF THE FOLLOWING PROBLEMS, CALL THE OFFICE IMMEDIATELY.    *FEVER .0 OR GREATER    *CHILLS, ESPECIALLY SHAKING CHILLS, OR SWEATING    *A SEVERE COUGH OR SORE THROAT, OR SINUS PAIN/     PRESSURE    *REDNESS, SWELLING, OR TENDERNESS AROUND A WOUND,     SORE, PIMPLE, RECTAL AREA, OR IV SITE    *SORES OR ULCERS IN THE MOUTH    *BLISTERS ON THE LIPS OR SKIN    *FREQUENT URGENCY TO URINATE OR A BURNING FEELING   WHEN YOU URINATE    *BLOOD IN THE URINE OR STOOL    *ANY UNEXPLAINED BRUISING OR PROLONGED BLEEDING,     (NOSEBLEEDS OR BLEEDING GUMS)    *LOOSE BOWEL MOVEMENTS THAT DO NOT RESPOND TO     IMODIUM OR MORE THAN THREE TIMES A  DAY    *VOMITING UNRESPONSIVE TO ANTINAUSEA MEDICINE    *ANY UNUSUAL PHYSICAL SYMPTOMS THAT BEGAN AFTER     CHEMOTHERAPY    DURING WEEKDAYS, CALL AND ASK TO SPEAK DIRECTLY TO A NURSE.  AT OTHER TIMES, CALL THE OFFICE PHONE NUMBER; THE ANSWERING SERVICE WILL CONTACT THE ON-CALL PHYSICIAN.  SOMEONE IS AVAILABLE 24 HOURS A DAY, SEVEN DAYS A WEEK.    FALL PREVENTION   Falls often occur due to slipping, tripping or losing your balance. Here are ways to reduce your risk of falling again.   Was there anything that caused your fall that can be fixed, removed or replaced?   Make your home safe by keeping walkways clear of objects you may trip over.   Use non-slip pads under rugs.   Do not walk in poorly lit areas.   Do not stand on chairs or wobbly ladders.   Use caution when reaching overhead or looking upward. This position can cause a loss of balance.   Be sure your shoes fit properly, have non-slip bottoms and are in good condition.   Be cautious when going up and down stairs, curbs, and when walking on uneven sidewalks.   If your balance is poor, consider using a cane or walker.   If your fall was related to alcohol use, stop or limit alcohol intake.   If your fall was related to use of sleeping medicines, talk to your doctor about this. You may need to reduce your dosage at bedtime if you awaken during the night to go to the bathroom.   To reduce the need for nighttime bathroom trips:   Avoid drinking fluids for several hours before going to bed   Empty your bladder before going to bed   Men can keep a urinal at the bedside   © 0756-6791 [employer] [facility], [address] [place] [address], [place] [address]. All rights reserved. This information is not intended as a substitute for professional medical care. Always follow your healthcare professional's instructions.

## 2018-09-28 NOTE — PROGRESS NOTES
Subjective:      Patient ID: Homero Tanner is a 78 y.o. male.    Chief Complaint: Chemotherapy    The patient is a 78-year-old  male who presents to the [facility]ology [facility] today to discuss further evaluation management recommendations for Waldenstrom's macroglobulinemia.   I have reviewed all of the patient's relevant clinical history available in the medical record and have utilized this in my evaluation and management recommendations today.  Today the patient reports that he has been having chronic fatigue.  Otherwise overall he feels well and has no specific complaints.  He has been taking his monthly vitamin B12 injections. He is off iron supplementation at this time for his history of iron deficiency anemia which has since resolved.  He is on intermittent ADT for biochemical recurrence of prostate carcinoma and is followed by a urologist outside of the [facility].  His rectal cancer is in remission at this time.  He reports that overall he is tolerating his velcade well without any significant symptoms.      Review of Systems   Constitutional: Positive for fatigue. Negative for activity change, appetite change, chills, diaphoresis, fever and unexpected weight change.   HENT: Negative for congestion, dental problem, ear pain, mouth sores, nosebleeds, postnasal drip, sinus pressure, sore throat, tinnitus, trouble swallowing and voice change.    Eyes: Negative for photophobia, pain, discharge, redness, itching and visual disturbance.   Respiratory: Negative for cough, chest tightness, shortness of breath, wheezing and stridor.    Cardiovascular: Negative for chest pain, palpitations and leg swelling.   Gastrointestinal: Negative for abdominal distention, abdominal pain, anal bleeding, blood in stool, constipation, diarrhea, nausea and vomiting.   Endocrine: Negative for cold intolerance, heat intolerance, polydipsia, polyphagia and polyuria.   Genitourinary: Negative for decreased  urine volume, difficulty urinating, dysuria, flank pain, frequency, hematuria and urgency.   Musculoskeletal: Negative for arthralgias, back pain, gait problem, joint swelling and myalgias.   Skin: Negative for pallor and rash.   Allergic/Immunologic: Negative for immunocompromised state.   Neurological: Negative for dizziness, syncope, weakness, light-headedness and headaches.   Hematological: Negative for adenopathy. Does not bruise/bleed easily.   Psychiatric/Behavioral: Negative for agitation and confusion. The patient is not nervous/anxious.           Medication List           Accurate as of 9/27/18 11:59 PM. If you have any questions, ask your nurse or doctor.               CONTINUE taking these medications    acyclovir 400 MG tablet  Commonly known as:  ZOVIRAX  Take 1 tablet (400 mg total) by mouth 2 (two) times daily.     aspirin 81 MG EC tablet  Commonly known as:  ECOTRIN     b complex vitamins tablet     cyanocobalamin 1,000 mcg/mL injection  Give 1cc under the skin once a month     dexamethasone 4 MG Tab  Commonly known as:  DECADRON  Take 5 tablets p.o. once weekly     FLONASE 50 mcg/actuation nasal spray  Generic drug:  fluticasone     furosemide 40 MG tablet  Commonly known as:  LASIX  Take 1 tablet (40 mg total) by mouth once daily.     ketoconazole 2 % shampoo  Commonly known as:  NIZORAL  WASH HAIR WITH MEDICATED SHAMPOO AT LEAST 3 TIMES A WEEK. LET SIT ON SCALP AT LEAST 5 MINUTES PRIOR TO RINSING     leuprolide 3.75 mg injection  Commonly known as:  LUPRON     metoprolol tartrate 100 MG tablet  Commonly known as:  LOPRESSOR  TAKE 1 TABLET(100 MG) BY MOUTH TWICE DAILY     metronidazole 0.75% 0.75 % Crea  Commonly known as:  METROCREAM  Apply 1 application topically 2 (two) times daily. Apply to affected on face     omeprazole 20 MG capsule  Commonly known as:  PRILOSEC  TAKE 1 CAPSULE(20 MG) BY MOUTH EVERY DAY        STOP taking these medications    fluocinonide 0.05 % external solution  Commonly  known as:  LIDEX  Stopped by:  Radu Weaver MD          Review of patient's allergies indicates:   Allergen Reactions    Lipitor [atorvastatin] Other (See Comments)    Norvasc [amlodipine] Swelling       Lab: I have reviewed all of the patient's relevant lab work available in the medical record and have utilized this in my evaluation and management recommendations today    Imaging: I have reviewed all of the patient's diagnostic/imaging results available in the medical record and have utilized this in my evaluation and management recommendations today.      Objective:     Vitals:    09/27/18 1143   BP: 120/60   Pulse: 71   Resp: 18   Temp: 97.5 °F (36.4 °C)       Physical Exam   Constitutional: He is oriented to person, place, and time. He appears well-developed and well-nourished. No distress.   HENT:   Head: Normocephalic and atraumatic.   Nose: Nose normal.   Mouth/Throat: Oropharynx is clear and moist. No oropharyngeal exudate.   Eyes: EOM are normal. Pupils are equal, round, and reactive to light. No scleral icterus.   Neck: Normal range of motion. Neck supple. No tracheal deviation present. No thyromegaly present.   Cardiovascular: Normal rate, regular rhythm, normal heart sounds and intact distal pulses.   No murmur heard.  Pulmonary/Chest: Effort normal and breath sounds normal. No stridor. No respiratory distress. He has no wheezes. He has no rales. He exhibits no tenderness.   Abdominal: Soft. Bowel sounds are normal. He exhibits no distension and no mass. There is no tenderness. There is no rebound and no guarding.   Musculoskeletal: Normal range of motion. He exhibits edema. He exhibits no tenderness.   Lymphadenopathy:     He has no cervical adenopathy.   Neurological: He is alert and oriented to person, place, and time. Coordination normal.   Skin: Skin is warm. No rash noted. He is not diaphoretic. No erythema.   Psychiatric: He has a normal mood and affect. His behavior is normal. Judgment  and thought content normal.   Nursing note and vitals reviewed.      Assessment:     1. Waldenstrom's macroglobulinemia    Plan:   1.  I had a detailed discussion with the patient today with regard to the results of his bone marrow aspiration biopsy done in July 2018 as well as the results of all of his lab work done in August 2018.  At this time the patient has Waldenstrom's macroglobulinemia.  His IgM quantitative level is greater than 4 g at this time.  2.  I had a detailed discussion with him today with regard to the diagnosis, prognosis and treatment of this malignancy.  We discussed the indications to begin treatment.  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  I am also concerned about possible renal involvement because of his elevated creatinine with monoclonal chain noted in UPEP immunofixation.  3.  We discussed that his treatment options are complicated because of the above.  At this time I have discussed that the best course of action would be to start him on Velcade/dexamethasone/Rituxan.  I will hold off on initiation of Rituxan until we get his IgM quantitative level to less than 4 g.  Therefore I will initially start him on twice weekly Velcade and weekly dexamethasone at 20 mg p.o. once daily.  We will then re-evaluate our treatment options based on clinical response and based on how he tolerates this treatment. Velcade will be dose adjusted for liver function. He will proceed with cycle 2 day 1 of velcade today. Informed consent taken.  He will proceed with cycle 2 day 4 of Velcade when scheduled.  4.  All necessary prescriptions including for herpes zoster prophylaxis have been sent to the patient's pharmacy previously.  5. I have previously strongly recommended cessation of alcohol use.  He reports good compliance with this recommendation.    Follow-up in 1 week with labs for cycle 2 day 8 of Velcade.  He knows to  call for any additional questions or new problems.

## 2018-09-28 NOTE — NURSING
1311  9/27/18  Injection given without difficulties.Bandaid applied. Patient instructed to stay in the clinic for 15 minutes. Patient verbalized understanding and will notify nurse with any complaints.

## 2018-10-01 ENCOUNTER — INFUSION (OUTPATIENT)
Dept: INFUSION THERAPY | Facility: HOSPITAL | Age: 78
End: 2018-10-01
Attending: INTERNAL MEDICINE
Payer: MEDICARE

## 2018-10-01 VITALS
TEMPERATURE: 97 F | HEART RATE: 70 BPM | OXYGEN SATURATION: 96 % | DIASTOLIC BLOOD PRESSURE: 63 MMHG | SYSTOLIC BLOOD PRESSURE: 104 MMHG | RESPIRATION RATE: 18 BRPM

## 2018-10-01 DIAGNOSIS — C88.0 WALDENSTROM'S MACROGLOBULINEMIA: Primary | ICD-10-CM

## 2018-10-01 PROCEDURE — 96401 CHEMO ANTI-NEOPL SQ/IM: CPT

## 2018-10-01 PROCEDURE — 63600175 PHARM REV CODE 636 W HCPCS: Mod: JG | Performed by: INTERNAL MEDICINE

## 2018-10-01 RX ORDER — BORTEZOMIB 3.5 MG/1
0.7 INJECTION, POWDER, LYOPHILIZED, FOR SOLUTION INTRAVENOUS; SUBCUTANEOUS
Status: COMPLETED | OUTPATIENT
Start: 2018-10-01 | End: 2018-10-01

## 2018-10-01 RX ADMIN — BORTEZOMIB 1.4 MG: 3.5 INJECTION, POWDER, LYOPHILIZED, FOR SOLUTION INTRAVENOUS; SUBCUTANEOUS at 11:10

## 2018-10-01 NOTE — PATIENT INSTRUCTIONS
[facility]  [address]  [address] [address]  [phone] phone     [phone] fax  Hours of Operation: Monday- Friday 8:00am- 5:00pm  After hours phone  [phone]  Hematology / Oncology Physicians on call      Dr. Bridger Cloud                        Please call with any concerns regarding your appointment today.  HOME CARE AFTER CHEMOTHERAPY   Meals   Many patients feel sick and lose their appetites during treatment. Eat small meals several times a day. Choose bland foods with little taste or smell if you have problems with nausea. Be sure to cook all food thoroughly. This kills bacteria and helps you avoid intestinal infection. Soft foods are easier to swallow and digest.   Activity   Exercise keeps you strong and keeps your heart and lungs active. Talk to your doctor about an appropriate exercise program for you.   Skin Care   To prevent a skin infection, bathe or shower once a day. Use a moisturizing soap and wash with warm water. Avoid very hot or cold water. Chemotherapy can make your skin dry . Apply moisturizing lotion to help relieve dry skin. Some drugs used in high doses can cause slight burns to appear (like sunburn). Ask for a special cream to help relieve the burn and protect your skin.   Prevent Mouth Sores   During chemotherapy, many people get mouth sores. Do the following to help prevent mouth sores or to ease discomfort.   Brush your teeth with a soft-bristle toothbrush after every meal.  Don't use dental floss if your platelet count is below 50,000. Your doctor or nurse will tell you if this is the case.  Use an oral swab or special soft toothbrush if your gums bleed during regular brushing.  Use mouthwash as directed. If you can't tolerate commercial mouthwash, use salt and baking soda to clean your mouth. Mix 1 teaspoon of salt and 1 teaspoon of baking soda into a glass of water. Swish and spit.  Call your doctor  or return to this facility if you develop any of the following:   Sore throat   White patches in the mouth or throat   Fever of 100.4ºF (38ºC) or higher, or as directed by your healthcare provider  © 3627-8133 [employer], [address] [place] [address], [place] [address]. All rights reserved. This information is not intended as a substitute for professional medical care. Always follow your healthcare professional's

## 2018-10-01 NOTE — PLAN OF CARE
Problem: Patient Care Overview  Goal: Plan of Care Review  Outcome: Ongoing (interventions implemented as appropriate)  Pt. Said he has blurry vision since starting velcade.

## 2018-10-02 NOTE — PLAN OF CARE
Problem: Patient Care Overview  Goal: Plan of Care Review  Outcome: Ongoing (interventions implemented as appropriate)  Pt states tired, weak at times, edema, bruising.  States skin tears easily,bruises easily.

## 2018-10-04 ENCOUNTER — OFFICE VISIT (OUTPATIENT)
Dept: HEMATOLOGY/ONCOLOGY | Facility: CLINIC | Age: 78
End: 2018-10-04
Payer: MEDICARE

## 2018-10-04 ENCOUNTER — INFUSION (OUTPATIENT)
Dept: INFUSION THERAPY | Facility: HOSPITAL | Age: 78
End: 2018-10-04
Attending: INTERNAL MEDICINE
Payer: MEDICARE

## 2018-10-04 VITALS
DIASTOLIC BLOOD PRESSURE: 60 MMHG | WEIGHT: 190.69 LBS | SYSTOLIC BLOOD PRESSURE: 96 MMHG | HEART RATE: 81 BPM | HEIGHT: 70 IN | TEMPERATURE: 97 F | BODY MASS INDEX: 27.3 KG/M2 | RESPIRATION RATE: 18 BRPM | OXYGEN SATURATION: 95 %

## 2018-10-04 DIAGNOSIS — R60.0 EDEMA OF BOTH LEGS: ICD-10-CM

## 2018-10-04 DIAGNOSIS — C88.0 WALDENSTROM'S MACROGLOBULINEMIA: Primary | ICD-10-CM

## 2018-10-04 PROCEDURE — 99215 OFFICE O/P EST HI 40 MIN: CPT | Mod: 25,S$PBB,, | Performed by: INTERNAL MEDICINE

## 2018-10-04 PROCEDURE — 63600175 PHARM REV CODE 636 W HCPCS: Mod: JG,PO | Performed by: INTERNAL MEDICINE

## 2018-10-04 PROCEDURE — 99499 UNLISTED E&M SERVICE: CPT | Mod: HCNC,S$GLB,, | Performed by: INTERNAL MEDICINE

## 2018-10-04 PROCEDURE — 99213 OFFICE O/P EST LOW 20 MIN: CPT | Mod: PBBFAC,25,PO | Performed by: INTERNAL MEDICINE

## 2018-10-04 PROCEDURE — 99999 PR PBB SHADOW E&M-EST. PATIENT-LVL III: CPT | Mod: PBBFAC,,, | Performed by: INTERNAL MEDICINE

## 2018-10-04 PROCEDURE — 96401 CHEMO ANTI-NEOPL SQ/IM: CPT | Mod: PO

## 2018-10-04 PROCEDURE — 3078F DIAST BP <80 MM HG: CPT | Mod: CPTII,,, | Performed by: INTERNAL MEDICINE

## 2018-10-04 PROCEDURE — 3074F SYST BP LT 130 MM HG: CPT | Mod: CPTII,,, | Performed by: INTERNAL MEDICINE

## 2018-10-04 PROCEDURE — 1101F PT FALLS ASSESS-DOCD LE1/YR: CPT | Mod: CPTII,,, | Performed by: INTERNAL MEDICINE

## 2018-10-04 RX ORDER — SODIUM CHLORIDE 0.9 % (FLUSH) 0.9 %
10 SYRINGE (ML) INJECTION
Status: CANCELLED | OUTPATIENT
Start: 2018-10-04

## 2018-10-04 RX ORDER — BORTEZOMIB 3.5 MG/1
0.7 INJECTION, POWDER, LYOPHILIZED, FOR SOLUTION INTRAVENOUS; SUBCUTANEOUS
Status: CANCELLED | OUTPATIENT
Start: 2018-10-07

## 2018-10-04 RX ORDER — BORTEZOMIB 3.5 MG/1
0.7 INJECTION, POWDER, LYOPHILIZED, FOR SOLUTION INTRAVENOUS; SUBCUTANEOUS
Status: COMPLETED | OUTPATIENT
Start: 2018-10-04 | End: 2018-10-04

## 2018-10-04 RX ORDER — SODIUM CHLORIDE 0.9 % (FLUSH) 0.9 %
10 SYRINGE (ML) INJECTION
Status: CANCELLED | OUTPATIENT
Start: 2018-10-07

## 2018-10-04 RX ORDER — HEPARIN 100 UNIT/ML
500 SYRINGE INTRAVENOUS
Status: CANCELLED | OUTPATIENT
Start: 2018-10-04

## 2018-10-04 RX ORDER — BORTEZOMIB 3.5 MG/1
0.7 INJECTION, POWDER, LYOPHILIZED, FOR SOLUTION INTRAVENOUS; SUBCUTANEOUS
Status: CANCELLED | OUTPATIENT
Start: 2018-10-04

## 2018-10-04 RX ORDER — HEPARIN 100 UNIT/ML
500 SYRINGE INTRAVENOUS
Status: CANCELLED | OUTPATIENT
Start: 2018-10-07

## 2018-10-04 RX ADMIN — BORTEZOMIB 1.4 MG: 3.5 INJECTION, POWDER, LYOPHILIZED, FOR SOLUTION INTRAVENOUS; SUBCUTANEOUS at 12:10

## 2018-10-04 NOTE — PATIENT INSTRUCTIONS
[facility]  [address]  [address] [address]  [phone] phone     [phone] fax  Hours of Operation: Monday- Friday 8:00am- 5:00pm  After hours phone  [phone]  Hematology / Oncology Physicians on call      Dr. Bridger Cloud                        Please call with any concerns regarding your appointment today.  HOME CARE AFTER CHEMOTHERAPY   Meals   Many patients feel sick and lose their appetites during treatment. Eat small meals several times a day. Choose bland foods with little taste or smell if you have problems with nausea. Be sure to cook all food thoroughly. This kills bacteria and helps you avoid intestinal infection. Soft foods are easier to swallow and digest.   Activity   Exercise keeps you strong and keeps your heart and lungs active. Talk to your doctor about an appropriate exercise program for you.   Skin Care   To prevent a skin infection, bathe or shower once a day. Use a moisturizing soap and wash with warm water. Avoid very hot or cold water. Chemotherapy can make your skin dry . Apply moisturizing lotion to help relieve dry skin. Some drugs used in high doses can cause slight burns to appear (like sunburn). Ask for a special cream to help relieve the burn and protect your skin.   Prevent Mouth Sores   During chemotherapy, many people get mouth sores. Do the following to help prevent mouth sores or to ease discomfort.   Brush your teeth with a soft-bristle toothbrush after every meal.  Don't use dental floss if your platelet count is below 50,000. Your doctor or nurse will tell you if this is the case.  Use an oral swab or special soft toothbrush if your gums bleed during regular brushing.  Use mouthwash as directed. If you can't tolerate commercial mouthwash, use salt and baking soda to clean your mouth. Mix 1 teaspoon of salt and 1 teaspoon of baking soda into a glass of water. Swish and spit.  Call your doctor  or return to this facility if you develop any of the following:   Sore throat   White patches in the mouth or throat   Fever of 100.4ºF (38ºC) or higher, or as directed by your healthcare provider  © 2000-2011 [facility] [facility], [address] [place] [address]. All rights reserved. This information is not intended as a substitute for professional medical care. Always follow your healthcare professional's   FALL PREVENTION   Falls often occur due to slipping, tripping or losing your balance. Here are ways to reduce your risk of falling again.   Was there anything that caused your fall that can be fixed, removed or replaced?   Make your home safe by keeping walkways clear of objects you may trip over.   Use non-slip pads under rugs.   Do not walk in poorly lit areas.   Do not stand on chairs or wobbly ladders.   Use caution when reaching overhead or looking upward. This position can cause a loss of balance.   Be sure your shoes fit properly, have non-slip bottoms and are in good condition.   Be cautious when going up and down stairs, curbs, and when walking on uneven sidewalks.   If your balance is poor, consider using a cane or walker.   If your fall was related to alcohol use, stop or limit alcohol intake.   If your fall was related to use of sleeping medicines, talk to your doctor about this. You may need to reduce your dosage at bedtime if you awaken during the night to go to the bathroom.   To reduce the need for nighttime bathroom trips:   Avoid drinking fluids for several hours before going to bed   Empty your bladder before going to bed   Men can keep a urinal at the bedside   © 2000-2011 [employer] [facility], [address] [place] [address]. All rights reserved. This information is not intended as a substitute for professional medical care. Always follow your healthcare professional's instructions.  WAYS TO HELP PREVENT INFECTION         WASH YOUR HANDS OFTEN DURING THE DAY, ESPECIALLY BEFORE  YOU EAT, AFTER USING THE BATHROOM, AND AFTER TOUCHING ANIMALS     STAY AWAY FROM PEOPLE WHO HAVE ILLNESSES YOU CAN CATCH; SUCH AS COLDS, FLU, CHICKEN POX     TRY TO AVOID CROWDS     STAY AWAY FROM CHILDREN WHO RECENTLY HAVE RECEIVED LIVE VIRUS VACCINES     MAINTAIN GOOD MOUTH CARE     DO NOT SQUEEZE OR SCRATCH PIMPLES     CLEAN CUTS & SCRAPES RIGHT AWAY AND DAILY UNTIL HEALED WITH WARM WATER, SOAP & AN ANTISEPTIC     AVOID CONTACT WITH LITTER BOXES, BIRD CAGES, & FISH TANKS     AVOID STANDING WATER, IE., BIRD BATHS, FLOWER POTS/VASES, OR HUMIDIFIERS     WEAR GLOVES WHEN GARDENING OR CLEANING UP AFTER OTHERS, ESPECIALLY BABIES & SMALL CHILDREN     DO NOT EAT RAW FISH, SEAFOOD, MEAT, OR EGGS

## 2018-10-04 NOTE — PLAN OF CARE
Problem: Patient Care Overview  Goal: Plan of Care Review  Outcome: Ongoing (interventions implemented as appropriate)  I feel pretty good, just tired of sitting.

## 2018-10-04 NOTE — PROGRESS NOTES
Subjective:      Patient ID: Homero Tanner is a 78 y.o. male.    Chief Complaint: Follow-up    The patient is a 78-year-old  male who presents to the [facility] today to discuss further evaluation and management recommendations for Waldenstrom's macroglobulinemia.   I have reviewed all of the patient's relevant clinical history available in the medical record and have utilized this in my evaluation and management recommendations today.  Today the patient reports that he has been having chronic fatigue and edema in his legs.  He continues with follow-up with Dr. Wright with Cardiology.  Otherwise overall he feels well and has no specific complaints.  He has been taking his monthly vitamin B12 injections. He is off iron supplementation at this time for his history of iron deficiency anemia which has since resolved.  He is on intermittent ADT for biochemical recurrence of prostate carcinoma and is followed by a urologist outside of the [facility].  His rectal cancer is in remission at this time.  He reports that overall he is tolerating his velcade for treatment of Waldenstrom's macroglobulinemia well without any significant symptoms.      Review of Systems   Constitutional: Positive for fatigue. Negative for activity change, appetite change, chills, diaphoresis, fever and unexpected weight change.   HENT: Negative for congestion, dental problem, ear pain, mouth sores, nosebleeds, postnasal drip, sinus pressure, sore throat, tinnitus, trouble swallowing and voice change.    Eyes: Negative for photophobia, pain, discharge, redness, itching and visual disturbance.   Respiratory: Negative for cough, chest tightness, shortness of breath, wheezing and stridor.    Cardiovascular: Negative for chest pain, palpitations and leg swelling.   Gastrointestinal: Negative for abdominal distention, abdominal pain, anal bleeding, blood in stool, constipation, diarrhea, nausea and vomiting.    Endocrine: Negative for cold intolerance, heat intolerance, polydipsia, polyphagia and polyuria.   Genitourinary: Negative for decreased urine volume, difficulty urinating, dysuria, flank pain, frequency, hematuria and urgency.   Musculoskeletal: Negative for arthralgias, back pain, gait problem, joint swelling and myalgias.   Skin: Negative for pallor and rash.   Allergic/Immunologic: Negative for immunocompromised state.   Neurological: Negative for dizziness, syncope, weakness, light-headedness and headaches.   Hematological: Negative for adenopathy. Does not bruise/bleed easily.   Psychiatric/Behavioral: Negative for agitation and confusion. The patient is not nervous/anxious.           Medication List           Accurate as of 10/4/18  2:15 PM. If you have any questions, ask your nurse or doctor.               CONTINUE taking these medications    acyclovir 400 MG tablet  Commonly known as:  ZOVIRAX  Take 1 tablet (400 mg total) by mouth 2 (two) times daily.     aspirin 81 MG EC tablet  Commonly known as:  ECOTRIN     b complex vitamins tablet     cyanocobalamin 1,000 mcg/mL injection  Give 1cc under the skin once a month     dexamethasone 4 MG Tab  Commonly known as:  DECADRON  Take 5 tablets p.o. once weekly     FLONASE 50 mcg/actuation nasal spray  Generic drug:  fluticasone     furosemide 40 MG tablet  Commonly known as:  LASIX  Take 1 tablet (40 mg total) by mouth once daily.     ketoconazole 2 % shampoo  Commonly known as:  NIZORAL  WASH HAIR WITH MEDICATED SHAMPOO AT LEAST 3 TIMES A WEEK. LET SIT ON SCALP AT LEAST 5 MINUTES PRIOR TO RINSING     leuprolide 3.75 mg injection  Commonly known as:  LUPRON     metoprolol tartrate 100 MG tablet  Commonly known as:  LOPRESSOR  TAKE 1 TABLET(100 MG) BY MOUTH TWICE DAILY     metronidazole 0.75% 0.75 % Crea  Commonly known as:  METROCREAM  Apply 1 application topically 2 (two) times daily. Apply to affected on face     omeprazole 20 MG capsule  Commonly known as:   PRILOSEC  TAKE 1 CAPSULE(20 MG) BY MOUTH EVERY DAY          Review of patient's allergies indicates:   Allergen Reactions    Lipitor [atorvastatin] Other (See Comments)    Norvasc [amlodipine] Swelling       Lab: I have reviewed all of the patient's relevant lab work available in the medical record and have utilized this in my evaluation and management recommendations today    Imaging: I have reviewed all of the patient's diagnostic/imaging results available in the medical record and have utilized this in my evaluation and management recommendations today.      Objective:     Vitals:    10/04/18 1137   BP: 96/60   Pulse: 81   Resp: 18   Temp: 97.2 °F (36.2 °C)       Physical Exam   Constitutional: He is oriented to person, place, and time. He appears well-developed and well-nourished. No distress.   HENT:   Head: Normocephalic and atraumatic.   Nose: Nose normal.   Mouth/Throat: Oropharynx is clear and moist. No oropharyngeal exudate.   Eyes: EOM are normal. Pupils are equal, round, and reactive to light. No scleral icterus.   Neck: Normal range of motion. Neck supple. No tracheal deviation present. No thyromegaly present.   Cardiovascular: Normal rate, regular rhythm, normal heart sounds and intact distal pulses.   No murmur heard.  Pulmonary/Chest: Effort normal and breath sounds normal. No stridor. No respiratory distress. He has no wheezes. He has no rales. He exhibits no tenderness.   Abdominal: Soft. Bowel sounds are normal. He exhibits no distension and no mass. There is no tenderness. There is no rebound and no guarding.   Musculoskeletal: Normal range of motion. He exhibits edema. He exhibits no tenderness.   Lymphadenopathy:     He has no cervical adenopathy.   Neurological: He is alert and oriented to person, place, and time. Coordination normal.   Skin: Skin is warm. No rash noted. He is not diaphoretic. No erythema.   Psychiatric: He has a normal mood and affect. His behavior is normal. Judgment and  thought content normal.   Nursing note and vitals reviewed.      Assessment:     1. Waldenstrom's macroglobulinemia    Plan:   1.  I had a detailed discussion with the patient previously with regard to the results of his bone marrow aspiration biopsy done in July 2018 as well as the results of all of his lab work done in August 2018.  At this time the patient has Waldenstrom's macroglobulinemia.  His IgM quantitative level was initially greater than 4 g.  2.  I had a detailed discussion with him previously with regard to the diagnosis, prognosis and treatment of this malignancy.  We discussed the indications to begin treatment.  I was concerned about possible liver involvement by his lymphoma because of his recently abnormal/worsening LFTs.  I was also concerned about possible renal involvement because of his elevated creatinine with monoclonal chain noted in UPEP immunofixation.  3.  We discussed that his treatment options are complicated because of the above.  I have previously discussed that the best course of action would be to start him on Velcade/dexamethasone/Rituxan.  I decided to hold off on initiation of Rituxan until we get his IgM quantitative level to less than 4 g.  Therefore he was initially started on twice weekly Velcade and weekly dexamethasone at 20 mg p.o. once daily.  We decided to re-evaluate our treatment options based on clinical response and based on how he tolerates this treatment. Velcade was dose adjusted for liver function. He is scheduled to proceed with cycle 2 day 8 of velcade today. Informed consent taken.  He will proceed with cycle 2 day 11 of Velcade when scheduled.  4.  All necessary prescriptions including for herpes zoster prophylaxis have been sent to the patient's pharmacy previously and the patient reports compliance with this medication.  5. I have previously strongly recommended cessation of alcohol use.  He reports good compliance with this recommendation.  6.  I had a  detailed discussion with the patient today with regard to the results of all of his recent lab work which shows excellent response to ongoing treatment with interval significant decrease in IgM quantitative level.  At this time I have recommended that we add IV Rituxan to his treatment.  The rationale for this was discussed in detail and he expressed understanding.  We have decided to initiate Rituxan starting with cycle 3 of Velcade.  At that time I will also transition is Velcade to once weekly.    Follow-up on October 18, 2018 with labs for cycle 3 day 1 of Velcade and rituxan.  He knows to call sooner for any additional questions or new problems.

## 2018-10-08 ENCOUNTER — INFUSION (OUTPATIENT)
Dept: INFUSION THERAPY | Facility: HOSPITAL | Age: 78
End: 2018-10-08
Attending: INTERNAL MEDICINE
Payer: MEDICARE

## 2018-10-08 ENCOUNTER — OFFICE VISIT (OUTPATIENT)
Dept: CARDIOLOGY | Facility: CLINIC | Age: 78
End: 2018-10-08
Payer: MEDICARE

## 2018-10-08 VITALS — WEIGHT: 194.69 LBS | TEMPERATURE: 98 F | HEIGHT: 70 IN | OXYGEN SATURATION: 99 % | BODY MASS INDEX: 27.87 KG/M2

## 2018-10-08 VITALS
WEIGHT: 194.69 LBS | BODY MASS INDEX: 27.87 KG/M2 | SYSTOLIC BLOOD PRESSURE: 110 MMHG | DIASTOLIC BLOOD PRESSURE: 64 MMHG | HEART RATE: 72 BPM | HEIGHT: 70 IN

## 2018-10-08 DIAGNOSIS — C88.0 WALDENSTROM'S MACROGLOBULINEMIA: ICD-10-CM

## 2018-10-08 DIAGNOSIS — R60.0 EDEMA OF BOTH LEGS: ICD-10-CM

## 2018-10-08 DIAGNOSIS — I47.20 VT (VENTRICULAR TACHYCARDIA): ICD-10-CM

## 2018-10-08 DIAGNOSIS — C88.0 WALDENSTROM'S MACROGLOBULINEMIA: Primary | ICD-10-CM

## 2018-10-08 DIAGNOSIS — I49.3 PVC'S (PREMATURE VENTRICULAR CONTRACTIONS): ICD-10-CM

## 2018-10-08 DIAGNOSIS — I27.20 PULMONARY HYPERTENSION: ICD-10-CM

## 2018-10-08 DIAGNOSIS — E78.2 MIXED HYPERLIPIDEMIA: ICD-10-CM

## 2018-10-08 DIAGNOSIS — I50.32 CHRONIC DIASTOLIC HEART FAILURE: ICD-10-CM

## 2018-10-08 DIAGNOSIS — I10 ESSENTIAL HYPERTENSION: Chronic | ICD-10-CM

## 2018-10-08 DIAGNOSIS — R94.31 ABNORMAL ECG: Chronic | ICD-10-CM

## 2018-10-08 DIAGNOSIS — N18.9 CHRONIC RENAL IMPAIRMENT, UNSPECIFIED CKD STAGE: ICD-10-CM

## 2018-10-08 DIAGNOSIS — I36.1 NON-RHEUMATIC TRICUSPID VALVE INSUFFICIENCY: ICD-10-CM

## 2018-10-08 DIAGNOSIS — I35.1 NONRHEUMATIC AORTIC VALVE INSUFFICIENCY: Primary | Chronic | ICD-10-CM

## 2018-10-08 PROCEDURE — 63600175 PHARM REV CODE 636 W HCPCS: Mod: JW,JG,PO | Performed by: INTERNAL MEDICINE

## 2018-10-08 PROCEDURE — 3078F DIAST BP <80 MM HG: CPT | Mod: CPTII,,, | Performed by: INTERNAL MEDICINE

## 2018-10-08 PROCEDURE — 1101F PT FALLS ASSESS-DOCD LE1/YR: CPT | Mod: CPTII,,, | Performed by: INTERNAL MEDICINE

## 2018-10-08 PROCEDURE — 96401 CHEMO ANTI-NEOPL SQ/IM: CPT | Mod: PO

## 2018-10-08 PROCEDURE — 99999 PR PBB SHADOW E&M-EST. PATIENT-LVL III: CPT | Mod: PBBFAC,,, | Performed by: INTERNAL MEDICINE

## 2018-10-08 PROCEDURE — 99214 OFFICE O/P EST MOD 30 MIN: CPT | Mod: S$PBB,,, | Performed by: INTERNAL MEDICINE

## 2018-10-08 PROCEDURE — 99499 UNLISTED E&M SERVICE: CPT | Mod: HCNC,S$GLB,, | Performed by: INTERNAL MEDICINE

## 2018-10-08 PROCEDURE — 99213 OFFICE O/P EST LOW 20 MIN: CPT | Mod: PBBFAC,PO | Performed by: INTERNAL MEDICINE

## 2018-10-08 PROCEDURE — 3074F SYST BP LT 130 MM HG: CPT | Mod: CPTII,,, | Performed by: INTERNAL MEDICINE

## 2018-10-08 RX ORDER — BORTEZOMIB 3.5 MG/1
0.7 INJECTION, POWDER, LYOPHILIZED, FOR SOLUTION INTRAVENOUS; SUBCUTANEOUS
Status: COMPLETED | OUTPATIENT
Start: 2018-10-08 | End: 2018-10-08

## 2018-10-08 RX ORDER — METOLAZONE 2.5 MG/1
2.5 TABLET ORAL
Qty: 12 TABLET | Refills: 11 | Status: SHIPPED | OUTPATIENT
Start: 2018-10-08 | End: 2018-11-05

## 2018-10-08 RX ADMIN — BORTEZOMIB 1.4 MG: 3.5 INJECTION, POWDER, LYOPHILIZED, FOR SOLUTION INTRAVENOUS; SUBCUTANEOUS at 09:10

## 2018-10-08 NOTE — PLAN OF CARE
"Problem: Patient Care Overview  Goal: Plan of Care Review  Outcome: Ongoing (interventions implemented as appropriate)  Pt states,"I just left my cardiologist."      "

## 2018-10-08 NOTE — NURSING
..Injection given without difficulties.Bandaid applied. Patient instructed to stay in the clinic for 15 minutes. Patient verbalized understanding and will notify nurse with any complaints.

## 2018-10-08 NOTE — PROGRESS NOTES
Subjective:    Patient ID:  Homero Tanner is a 78 y.o. male who presents for evaluation of Hypertension; Hyperlipidemia; Congestive Heart Failure; Risk Factor Management For Atherosclerosis; Shortness of Breath; and Dizziness      HPI Mr. Tanner returns for f/u.   His current medical conditions include chronic HTN, DD, PHTN, AI, PVCs, TR, dyslipidemia, prostate & rectal cancer. Nonsmoker.    Past hx pertinent for following:  Had LHC 20+ years ago, no significant blockages noted.  He has chronic abnl ecgs.   Also has had PSVT with stress testing in past.    He has h/o thymoma surgery with xrt and also had rectal polyp surgery.  - Stress MPI Aug 2017.  Echo Aug 2017 normal EF, DD, mild-mod PHTN, mild AI, LVH, mild RVE.  Had elevated alkaline phosphatase, referred to GI.  Due to abnl LFTs, statin was stopped.  Echo 9/18 read as EF 50%, normal diastolic function, mild PHTN, LVH.  Holter shows sinus rhythm, frequent PAC/PVCs, NSVT.  Pt has seen Dr. Weaver, heme/onc, dx with Waldenstrom's macroglobulinemia lymphoma malignancy with chemo started and steroids  Now here.  Has continued leg edema, bilateral, worsening per pt.  Some BACH, stable.  No pnd/orthopnea.  Continued weakness, fatigue with cancer treatment.  Gets dizziness with positional changes.  No syncope.   No chest pain sxs.   No palpitations.   Lasix increased to 40 mg qd last appt.  BNP higher on last check.  On dexamethasone weekly.        Current Outpatient Medications:     acyclovir (ZOVIRAX) 400 MG tablet, Take 1 tablet (400 mg total) by mouth 2 (two) times daily., Disp: 60 tablet, Rfl: 5    aspirin (ECOTRIN) 81 MG EC tablet, Take 81 mg by mouth once daily., Disp: , Rfl:     b complex vitamins tablet, Take by mouth. 1 tablet Oral Every day, Disp: , Rfl:     cyanocobalamin 1,000 mcg/mL injection, Give 1cc under the skin once a month, Disp: 10 mL, Rfl: 4    dexamethasone (DECADRON) 4 MG Tab, Take 5 tablets p.o. once weekly, Disp: 120 tablet,  "Rfl: 1    fluticasone (FLONASE) 50 mcg/actuation nasal spray, 1 spray by Nasal route 2 (two) times daily as needed for Rhinitis or Allergies. 1 Aerosol, Spray Each nostril Twice a day , Disp: , Rfl:     furosemide (LASIX) 40 MG tablet, Take 1 tablet (40 mg total) by mouth once daily., Disp: 30 tablet, Rfl: 11    ketoconazole (NIZORAL) 2 % shampoo, WASH HAIR WITH MEDICATED SHAMPOO AT LEAST 3 TIMES A WEEK. LET SIT ON SCALP AT LEAST 5 MINUTES PRIOR TO RINSING, Disp: 120 mL, Rfl: 3    leuprolide (LUPRON) 3.75 mg injection, Inject 3.75 mg into the muscle every 3 (three) months. , Disp: , Rfl:     metoprolol tartrate (LOPRESSOR) 100 MG tablet, TAKE 1 TABLET(100 MG) BY MOUTH TWICE DAILY, Disp: 180 tablet, Rfl: 3    metronidazole 0.75% (METROCREAM) 0.75 % Crea, Apply 1 application topically 2 (two) times daily. Apply to affected on face, Disp: 45 g, Rfl: 5    omeprazole (PRILOSEC) 20 MG capsule, TAKE 1 CAPSULE(20 MG) BY MOUTH EVERY DAY, Disp: 90 capsule, Rfl: 3    metOLazone (ZAROXOLYN) 2.5 MG tablet, Take 1 tablet (2.5 mg total) by mouth every Mon, Wed, Fri., Disp: 12 tablet, Rfl: 11      Review of Systems   Constitution: Positive for weakness and malaise/fatigue.   HENT: Negative.    Eyes: Negative.    Cardiovascular: Positive for leg swelling.   Respiratory: Negative.    Endocrine: Negative.    Hematologic/Lymphatic: Negative.    Skin: Negative.    Musculoskeletal: Negative.    Gastrointestinal: Negative.    Genitourinary: Negative.    Psychiatric/Behavioral: Negative.    Allergic/Immunologic: Negative.        /64 (BP Location: Right arm, Patient Position: Sitting, BP Method: Medium (Manual))   Pulse 72   Ht 5' 10" (1.778 m)   Wt 88.3 kg (194 lb 10.7 oz)   BMI 27.93 kg/m²       Wt Readings from Last 3 Encounters:   10/08/18 88.3 kg (194 lb 10.7 oz)   10/04/18 86.5 kg (190 lb 11.2 oz)   09/27/18 86.4 kg (190 lb 7.6 oz)     Temp Readings from Last 3 Encounters:   10/04/18 97.2 °F (36.2 °C) (Oral) "   10/01/18 97.4 °F (36.3 °C)   09/27/18 97.5 °F (36.4 °C) (Oral)     BP Readings from Last 3 Encounters:   10/08/18 110/64   10/04/18 96/60   10/01/18 104/63     Pulse Readings from Last 3 Encounters:   10/08/18 72   10/04/18 81   10/01/18 70          Objective:    Physical Exam   Constitutional: He is oriented to person, place, and time. He appears well-developed and well-nourished. He has a sickly appearance.   HENT:   Head: Normocephalic.   Neck: Normal range of motion. Neck supple. Normal carotid pulses, no hepatojugular reflux and no JVD present. Carotid bruit is not present. No thyromegaly present.   Cardiovascular: Normal rate, regular rhythm, S1 normal and S2 normal. PMI is not displaced. Exam reveals no S3, no S4, no distant heart sounds, no friction rub, no midsystolic click and no opening snap.   No murmur heard.  Pulses:       Radial pulses are 2+ on the right side, and 2+ on the left side.   Pulmonary/Chest: Effort normal and breath sounds normal. He has no wheezes. He has no rales.   Abdominal: Soft. Bowel sounds are normal. He exhibits no distension, no abdominal bruit, no ascites and no mass. There is no tenderness.   Musculoskeletal: He exhibits edema.   Neurological: He is alert and oriented to person, place, and time.   Skin: Skin is warm.   Psychiatric: He has a normal mood and affect. His behavior is normal.   Nursing note and vitals reviewed.      I have reviewed all pertinent labs and cardiac studies.      Component      Latest Ref Rng & Units 9/27/2018   BNP      0 - 99 pg/mL 765 (H)         Chemistry        Component Value Date/Time     10/04/2018 1121    K 4.3 10/04/2018 1121    CL 95 10/04/2018 1121    CO2 27 10/04/2018 1121    BUN 10 10/04/2018 1121    CREATININE 1.5 (H) 10/04/2018 1121     10/04/2018 1121        Component Value Date/Time    CALCIUM 7.5 (L) 10/04/2018 1121    ALKPHOS 424 (H) 10/04/2018 1121    AST 89 (H) 10/04/2018 1121    ALT 47 (H) 10/04/2018 1121     BILITOT 1.7 (H) 10/04/2018 1121    ESTGFRAFRICA 51 (A) 10/04/2018 1121    EGFRNONAA 44 (A) 10/04/2018 1121        Lab Results   Component Value Date    WBC 8.28 10/04/2018    HGB 9.8 (L) 10/04/2018    HCT 27.7 (L) 10/04/2018     (H) 10/04/2018    PLT 95 (L) 10/04/2018     Lab Results   Component Value Date    HGBA1C 5.2 06/06/2016     Lab Results   Component Value Date    CHOL 160 08/21/2018    CHOL 164 06/06/2016    CHOL 123 05/08/2013     Lab Results   Component Value Date    HDL 65 08/21/2018    HDL 79 (H) 06/06/2016    HDL 60 05/08/2013     Lab Results   Component Value Date    LDLCALC 80.0 08/21/2018    LDLCALC 69.4 06/06/2016    LDLCALC 50.0 (L) 05/08/2013     Lab Results   Component Value Date    TRIG 75 08/21/2018    TRIG 78 06/06/2016    TRIG 64 05/08/2013     Lab Results   Component Value Date    CHOLHDL 40.6 08/21/2018    CHOLHDL 48.2 06/06/2016    CHOLHDL 48.8 05/08/2013           Assessment:       1. Nonrheumatic aortic valve insufficiency    2. Abnormal ECG    3. Essential hypertension    4. VT (ventricular tachycardia)    5. Chronic renal impairment, unspecified CKD stage    6. Waldenstrom's macroglobulinemia    7. Chronic diastolic heart failure    8. Edema of both legs    9. PVC's (premature ventricular contractions)    10. Non-rheumatic tricuspid valve insufficiency    11. Pulmonary hypertension    12. Mixed hyperlipidemia         Plan:             Add Metolozone 2.5 Monday, Wed, Friday am before Lasix dose.  Continue Lasix 40 mg bid.  Continue current dose of Metoprolol.  Fall precautions advised, can expect orthostatic dizziness issues.  F/u with Dr. Aguilar EP, for reevaluation this week.  Cardiac diet  Continue other meds  Reviewed tests with pt.    F/u 3 - 4 weeks.

## 2018-10-10 ENCOUNTER — OFFICE VISIT (OUTPATIENT)
Dept: CARDIOLOGY | Facility: CLINIC | Age: 78
End: 2018-10-10
Payer: MEDICARE

## 2018-10-10 VITALS
SYSTOLIC BLOOD PRESSURE: 98 MMHG | WEIGHT: 195.13 LBS | HEART RATE: 68 BPM | BODY MASS INDEX: 27.94 KG/M2 | HEIGHT: 70 IN | DIASTOLIC BLOOD PRESSURE: 60 MMHG

## 2018-10-10 DIAGNOSIS — I49.3 PVC'S (PREMATURE VENTRICULAR CONTRACTIONS): Primary | ICD-10-CM

## 2018-10-10 DIAGNOSIS — N18.9 CHRONIC RENAL IMPAIRMENT, UNSPECIFIED CKD STAGE: ICD-10-CM

## 2018-10-10 DIAGNOSIS — E78.2 MIXED HYPERLIPIDEMIA: ICD-10-CM

## 2018-10-10 DIAGNOSIS — D89.2 PARAPROTEINEMIA: ICD-10-CM

## 2018-10-10 DIAGNOSIS — Z87.898 HISTORY OF THYMOMA: ICD-10-CM

## 2018-10-10 DIAGNOSIS — R60.0 EDEMA OF BOTH LEGS: ICD-10-CM

## 2018-10-10 DIAGNOSIS — D50.0 IRON DEFICIENCY ANEMIA DUE TO CHRONIC BLOOD LOSS: ICD-10-CM

## 2018-10-10 DIAGNOSIS — I10 ESSENTIAL HYPERTENSION: Chronic | ICD-10-CM

## 2018-10-10 DIAGNOSIS — Z78.9 ALCOHOL USE: ICD-10-CM

## 2018-10-10 DIAGNOSIS — K76.0 FATTY LIVER: ICD-10-CM

## 2018-10-10 DIAGNOSIS — I27.20 PULMONARY HYPERTENSION: ICD-10-CM

## 2018-10-10 DIAGNOSIS — R74.8 ABNORMAL LIVER ENZYMES: ICD-10-CM

## 2018-10-10 DIAGNOSIS — I50.32 CHRONIC DIASTOLIC HEART FAILURE: ICD-10-CM

## 2018-10-10 DIAGNOSIS — I36.1 NON-RHEUMATIC TRICUSPID VALVE INSUFFICIENCY: ICD-10-CM

## 2018-10-10 DIAGNOSIS — Z85.048 HISTORY OF RECTAL CANCER: ICD-10-CM

## 2018-10-10 DIAGNOSIS — C61 PROSTATE CANCER: ICD-10-CM

## 2018-10-10 DIAGNOSIS — C88.0 WALDENSTROM'S MACROGLOBULINEMIA: ICD-10-CM

## 2018-10-10 DIAGNOSIS — I35.1 NONRHEUMATIC AORTIC VALVE INSUFFICIENCY: Chronic | ICD-10-CM

## 2018-10-10 PROCEDURE — 1101F PT FALLS ASSESS-DOCD LE1/YR: CPT | Mod: CPTII,HCNC,, | Performed by: INTERNAL MEDICINE

## 2018-10-10 PROCEDURE — 99205 OFFICE O/P NEW HI 60 MIN: CPT | Mod: S$PBB,HCNC,, | Performed by: INTERNAL MEDICINE

## 2018-10-10 PROCEDURE — 3074F SYST BP LT 130 MM HG: CPT | Mod: CPTII,HCNC,, | Performed by: INTERNAL MEDICINE

## 2018-10-10 PROCEDURE — 99999 PR PBB SHADOW E&M-EST. PATIENT-LVL III: CPT | Mod: PBBFAC,HCNC,, | Performed by: INTERNAL MEDICINE

## 2018-10-10 PROCEDURE — 99499 UNLISTED E&M SERVICE: CPT | Mod: HCNC,S$GLB,, | Performed by: INTERNAL MEDICINE

## 2018-10-10 PROCEDURE — 99213 OFFICE O/P EST LOW 20 MIN: CPT | Mod: PBBFAC,HCNC,PO | Performed by: INTERNAL MEDICINE

## 2018-10-10 PROCEDURE — 3078F DIAST BP <80 MM HG: CPT | Mod: CPTII,HCNC,, | Performed by: INTERNAL MEDICINE

## 2018-10-10 NOTE — LETTER
October 22, 2018      Cristhian Wright MD  [address] [place] [address]           [facility]  [address] [place] [address]  Phone: [phone]  Fax: [phone]          Patient: Homero Tanner   MR Number: [id number]   YOB: 1940   Date of Visit: 10/10/2018       Dear Dr. Cristhian Wright:    Thank you for referring Homero Tanner to me for evaluation. Attached you will find relevant portions of my assessment and plan of care.    If you have questions, please do not hesitate to call me. I look forward to following Homero Tanner along with you.    Sincerely,    Ramiro Razo  CC:  No Recipients    If you would like to receive this communication electronically, please contact [email or web address] or [phone] to request more information on [facility] access.    For providers and/or their staff who would like to refer a patient to [facility], please contact us through our one-stop-shop provider referral line, [facility], at [phone].    If you feel you have received this communication in error or would no longer like to receive these types of communications, please e-mail [email or web address]

## 2018-10-10 NOTE — PROGRESS NOTES
Subjective:   Patient ID:  Homero Tanner is a 78 y.o. male     Chief complaint:Dizziness (Restarted Chemo therapy 3wks ago) and Leg Swelling      HPI  Background as recorded in my last note (10/16/2016):  From Dr Wright'note :  His current medical conditions include chronic HTN, DD, PHTN, AI, TR, dyslipidemia, prostate & rectal cancer. Nonsmoker.    Had LHC 20+ years ago, no significant blockages noted.    Also has had PSVT with stress testing in past.    He has chronically abnormal ecgs.    He has h/o thymoma surgery with xrt and also had rectal polyp surgery.  Pt here for f/u.   He feels ok. Denies chest pain sxs or dyspnea.  LE edema controlled on lasix. BNP test elevated but stable.  Has abnl lfts, overall stable.  Compliant with meds and diet.  ecg reviewed and shows NSR, frequent PVCs, chronic st-t abnl.  HTN well controlled on current medical tx, no associated sxs.  Lipids well controlled on statin tx.     Stable compensated chronic diastolic CHF.  Continue optimal medical tx for CHF.  Frequent PVCs, noted in past. Will check 48 hour Holter, also to assess for any possible a fib.  Metoprolol 100 mg twice daily seems sufficient to control PVCs.  Cardiac low salt diet.  Monitor elevated LFTs, possibly due to statin. Will recheck in future.  Phone review for holter results  F/u 4 months.     Update 10/16/2016:  He was referred for eval of PVCs   He appears to be ASxc and he has a normal LVEF  Holter:  There were very frequent PVCs totalling 24574 and averaging 251 per hour.  There were 162 monomorphic bigeminal cycles.  There were 759 monomorphic couplets. There were 128 monomorphic triplets.     Update since then:  My A then was that his  PVC's were benign and thus did not require Rx  Most recent update from Dr Wright with the following relevant info:  Echo 9/18 read as EF 50%, normal diastolic function, mild PHTN, LVH.  Holter shows sinus rhythm, frequent PAC/PVCs, NSVT.  Pt has seen Dr. Weaver,  heme/onc, dx with Waldenstrom's macroglobulinemia lymphoma malignancy with chemo started and steroids  Now here.  Has continued leg edema, bilateral, worsening per pt.  Some BACH, stable.  No pnd/orthopnea.  Continued weakness, fatigue with cancer treatment.  Gets dizziness with positional changes.  No syncope.   No chest pain sxs.   No palpitations.   Lasix increased to 40 mg qd last appt.  BNP higher on last check.  On dexamethasone weekly.    Dr Wright asked that he see me again. His main c/o is leg swelling. He is on chemo Rx and steroids.   Results for MARI JORDAN (MRN [id number]) as of 10/10/2018 14:10   Ref. Range 5/31/2018 09:01 6/26/2018 10:00 8/14/2018 11:50 8/21/2018 08:54 8/30/2018 10:32 9/7/2018 09:56 9/10/2018 08:40 9/12/2018 12:00 9/13/2018 10:04 9/17/2018 10:25 9/27/2018 10:45 9/27/2018 10:45 10/4/2018 11:21   Albumin Latest Ref Range: 3.5 - 5.2 g/dL 3.2 (L) 3.3 (L) 2.7 (L) 2.6 (L) 2.4 (L) 2.5 (L) 2.5 (L) 2.5 (L) 2.4 (L) 2.5 (L) 2.4 (L) 2.4 (L) 2.3 (L)   Total Bilirubin Latest Ref Range: 0.1 - 1.0 mg/dL 1.0 1.4 (H) 2.0 (H) 1.9 (H) 1.8 (H) 2.5 (H) 1.7 (H) 1.9 (H) 1.8 (H) 2.0 (H) 1.9 (H) 1.9 (H) 1.7 (H)        Ref. Range 4/25/2008 09:24 12/17/2012 14:24 8/21/2018 08:51   Urine Protein, Timed Latest Ref Range: 0 - 100 mg/Spec   107 (H)        Ref. Range 5/7/2013 06:45 5/7/2013 09:09 5/7/2013 14:52 5/7/2013 21:04 6/13/2016 11:56 6/27/2016 09:53 8/9/2016 09:37 8/1/2017 07:10 2/14/2018 07:32 8/21/2018 08:54 9/12/2018 12:00 9/27/2018 10:45   BNP Latest Ref Range: 0 - 99 pg/mL 173 (H)    500 (H) 227 (H) 482 (H) 238 (H) 477 (H) 574 (H) 659 (H) 765 (H)        Ref. Range 5/31/2018 09:01 6/26/2018 10:00 8/14/2018 11:50 8/21/2018 08:54 8/30/2018 10:32 9/7/2018 09:56 9/10/2018 08:40 9/12/2018 12:00 9/13/2018 10:04 9/17/2018 10:25 9/27/2018 10:45 9/27/2018 10:45 10/4/2018 11:21   BUN, Bld Latest Ref Range: 8 - 23 mg/dL 13 10 11 11  11 15 14 12 15 13 13 10   Creatinine Latest Ref Range: 0.5 - 1.4 mg/dL 1.2 1.4  1.4 1.5 (H)  1.4 1.6 (H) 1.5 (H) 1.4 1.6 (H) 1.3 1.3 1.5 (H)   eGFR if non African American Latest Ref Range: >60 mL/min/1.73 m^2 58.4 (A) 48 (A) 48 (A) 44.3 (A)  48 (A) 41 (A) 44.3 (A) 48 (A) 41 (A) 53 (A) 53 (A) 44 (A)                      Potassium Latest Ref Range: 3.5 - 5.1 mmol/L 3.8 4.1 3.4 (L) 3.9  4.2 4.1 3.3 (L) 3.4 (L) 4.0 3.6 3.6 4.3       He has some BACH and his BP is low. Dr Wright has been trying to control his edema with diuretics and a new script was added today     Current Outpatient Medications   Medication Sig    acyclovir (ZOVIRAX) 400 MG tablet Take 1 tablet (400 mg total) by mouth 2 (two) times daily.    aspirin (ECOTRIN) 81 MG EC tablet Take 81 mg by mouth once daily.    b complex vitamins tablet Take by mouth. 1 tablet Oral Every day    cyanocobalamin 1,000 mcg/mL injection Give 1cc under the skin once a month    dexamethasone (DECADRON) 4 MG Tab Take 5 tablets p.o. once weekly    furosemide (LASIX) 40 MG tablet Take 1 tablet (40 mg total) by mouth once daily.    leuprolide (LUPRON) 3.75 mg injection Inject 3.75 mg into the muscle every 3 (three) months.     metOLazone (ZAROXOLYN) 2.5 MG tablet Take 1 tablet (2.5 mg total) by mouth every Mon, Wed, Fri.    metoprolol tartrate (LOPRESSOR) 100 MG tablet TAKE 1 TABLET(100 MG) BY MOUTH TWICE DAILY    omeprazole (PRILOSEC) 20 MG capsule TAKE 1 CAPSULE(20 MG) BY MOUTH EVERY DAY    potassium chloride SA (K-DUR,KLOR-CON) 20 MEQ tablet Take 1 tablet (20 mEq total) by mouth once daily. for 4 days     No current facility-administered medications for this visit.      Review of Systems   Constitution: Positive for weight gain. Negative for decreased appetite, weakness, malaise/fatigue and weight loss.   Eyes: Negative for blurred vision.   Cardiovascular: Negative for chest pain, claudication, cyanosis, dyspnea on exertion, irregular heartbeat, leg swelling, near-syncope, orthopnea and palpitations.   Respiratory: Positive for shortness of  breath. Negative for cough, sleep disturbances due to breathing, snoring and wheezing.    Endocrine: Negative for heat intolerance.   Hematologic/Lymphatic: Bruises/bleeds easily.   Musculoskeletal: Positive for stiffness. Negative for muscle weakness and myalgias.   Gastrointestinal: Negative for melena, nausea and vomiting.   Genitourinary: Negative for nocturia.   Neurological: Positive for dizziness and loss of balance. Negative for excessive daytime sleepiness, headaches and light-headedness.   Psychiatric/Behavioral: Negative for depression, memory loss and substance abuse. The patient does not have insomnia and is not nervous/anxious.        Objective:   Physical Exam   Constitutional: He is oriented to person, place, and time. He appears well-developed and well-nourished.   HENT:   Head: Normocephalic and atraumatic.   Right Ear: External ear normal.   Left Ear: External ear normal.   Eyes: Conjunctivae are normal. Pupils are equal, round, and reactive to light. Right eye exhibits no discharge. Left eye exhibits no discharge. Right conjunctiva is not injected. Left conjunctiva has no hemorrhage.   Neck: Neck supple. No JVD present. No thyromegaly present.   Cardiovascular: Normal rate, regular rhythm, normal heart sounds and intact distal pulses. PMI is not displaced. Exam reveals no gallop, no friction rub, no midsystolic click and no opening snap.   No murmur heard.  Pulses:       Carotid pulses are 2+ on the right side, and 2+ on the left side.       Radial pulses are 2+ on the right side, and 2+ on the left side.        Dorsalis pedis pulses are 2+ on the right side, and 2+ on the left side.        Posterior tibial pulses are 2+ on the right side, and 2+ on the left side.   Pulmonary/Chest: Effort normal and breath sounds normal. No respiratory distress. He has no wheezes. He has no rales. He exhibits no tenderness.   Abdominal: Soft. Normal appearance. He exhibits no pulsatile liver. There is no  "hepatomegaly. There is no tenderness. There is no rebound and no guarding.   Musculoskeletal: Normal range of motion. He exhibits no edema or tenderness.        Right knee: He exhibits no swelling.        Left knee: He exhibits no swelling.        Right ankle: He exhibits no swelling.        Left ankle: He exhibits no swelling.        Right lower leg: He exhibits no swelling.        Left lower leg: He exhibits no swelling.        Right foot: There is no swelling.        Left foot: There is no swelling.   Neurological: He is alert and oriented to person, place, and time. He has normal strength and normal reflexes. No cranial nerve deficit. Coordination normal.   Skin: Skin is warm, dry and intact. No rash noted. He is not diaphoretic. No cyanosis.   Psychiatric: He has a normal mood and affect. His behavior is normal.   Nursing note and vitals reviewed.    BP 98/60 (BP Location: Right arm, Patient Position: Sitting)   Pulse 68   Ht 5' 10" (1.778 m)   Wt 88.5 kg (195 lb 1.7 oz)   BMI 27.99 kg/m²      Assessment:      1. PVC's (premature ventricular contractions)    2. Non-rheumatic tricuspid valve insufficiency    3. Pulmonary hypertension    4. Mixed hyperlipidemia    5. Essential hypertension    6. Chronic diastolic heart failure    7. Nonrheumatic aortic valve insufficiency    8. Waldenstrom's macroglobulinemia    9. Prostate cancer    10. Paraproteinemia    11. Iron deficiency anemia due to chronic blood loss    12. History of thymoma    13. History of rectal cancer    14. Edema of both legs    15. Fatty liver    16. Chronic renal impairment, unspecified CKD stage    17. Abnormal liver enzymes    18. Alcohol use        Plan:    He may benefit from adding aldactone   He needs a high protein diet   There would be no benefit from Rxing his PVCs --   No orders of the defined types were placed in this encounter.    Follow-up if symptoms worsen or fail to improve.  Medications Discontinued During This Encounter "   Medication Reason    fluticasone (FLONASE) 50 mcg/actuation nasal spray Patient no longer taking     This SmartLink is deprecated. Use AVGLSSEDLIST instead to display the medication list for a patient.  This SmartLink is deprecated. Use AVSMEDLIST instead to display the medication list for a patient.

## 2018-10-18 ENCOUNTER — OFFICE VISIT (OUTPATIENT)
Dept: HEMATOLOGY/ONCOLOGY | Facility: CLINIC | Age: 78
End: 2018-10-18
Payer: MEDICARE

## 2018-10-18 ENCOUNTER — INFUSION (OUTPATIENT)
Dept: INFUSION THERAPY | Facility: HOSPITAL | Age: 78
End: 2018-10-18
Attending: INTERNAL MEDICINE
Payer: MEDICARE

## 2018-10-18 VITALS
HEART RATE: 68 BPM | HEIGHT: 70 IN | WEIGHT: 186.06 LBS | BODY MASS INDEX: 26.64 KG/M2 | DIASTOLIC BLOOD PRESSURE: 69 MMHG | TEMPERATURE: 98 F | SYSTOLIC BLOOD PRESSURE: 95 MMHG | OXYGEN SATURATION: 98 %

## 2018-10-18 VITALS
HEART RATE: 76 BPM | WEIGHT: 186.06 LBS | SYSTOLIC BLOOD PRESSURE: 88 MMHG | OXYGEN SATURATION: 98 % | HEIGHT: 70 IN | RESPIRATION RATE: 20 BRPM | BODY MASS INDEX: 26.64 KG/M2 | DIASTOLIC BLOOD PRESSURE: 60 MMHG

## 2018-10-18 DIAGNOSIS — E86.0 DEHYDRATION: ICD-10-CM

## 2018-10-18 DIAGNOSIS — R79.89 ELEVATED SERUM CREATININE: ICD-10-CM

## 2018-10-18 DIAGNOSIS — C88.0 WALDENSTROM'S MACROGLOBULINEMIA: Primary | ICD-10-CM

## 2018-10-18 DIAGNOSIS — R60.0 EDEMA OF BOTH LEGS: ICD-10-CM

## 2018-10-18 DIAGNOSIS — R74.8 ABNORMAL LIVER ENZYMES: ICD-10-CM

## 2018-10-18 DIAGNOSIS — E87.1 DEHYDRATION WITH HYPONATREMIA: ICD-10-CM

## 2018-10-18 DIAGNOSIS — E86.0 DEHYDRATION WITH HYPONATREMIA: ICD-10-CM

## 2018-10-18 PROCEDURE — 99213 OFFICE O/P EST LOW 20 MIN: CPT | Mod: PBBFAC | Performed by: INTERNAL MEDICINE

## 2018-10-18 PROCEDURE — 1101F PT FALLS ASSESS-DOCD LE1/YR: CPT | Mod: CPTII,,, | Performed by: INTERNAL MEDICINE

## 2018-10-18 PROCEDURE — 99499 UNLISTED E&M SERVICE: CPT | Mod: HCNC,S$GLB,, | Performed by: INTERNAL MEDICINE

## 2018-10-18 PROCEDURE — 99999 PR PBB SHADOW E&M-EST. PATIENT-LVL III: CPT | Mod: PBBFAC,,, | Performed by: INTERNAL MEDICINE

## 2018-10-18 PROCEDURE — 3078F DIAST BP <80 MM HG: CPT | Mod: CPTII,,, | Performed by: INTERNAL MEDICINE

## 2018-10-18 PROCEDURE — 96415 CHEMO IV INFUSION ADDL HR: CPT

## 2018-10-18 PROCEDURE — 96375 TX/PRO/DX INJ NEW DRUG ADDON: CPT

## 2018-10-18 PROCEDURE — 3074F SYST BP LT 130 MM HG: CPT | Mod: CPTII,,, | Performed by: INTERNAL MEDICINE

## 2018-10-18 PROCEDURE — 96401 CHEMO ANTI-NEOPL SQ/IM: CPT

## 2018-10-18 PROCEDURE — S0028 INJECTION, FAMOTIDINE, 20 MG: HCPCS | Performed by: INTERNAL MEDICINE

## 2018-10-18 PROCEDURE — 63600175 PHARM REV CODE 636 W HCPCS: Performed by: INTERNAL MEDICINE

## 2018-10-18 PROCEDURE — 25000003 PHARM REV CODE 250: Performed by: INTERNAL MEDICINE

## 2018-10-18 PROCEDURE — 99215 OFFICE O/P EST HI 40 MIN: CPT | Mod: 25,S$PBB,, | Performed by: INTERNAL MEDICINE

## 2018-10-18 PROCEDURE — 96413 CHEMO IV INFUSION 1 HR: CPT

## 2018-10-18 RX ORDER — DEXAMETHASONE SODIUM PHOSPHATE 4 MG/ML
8 INJECTION, SOLUTION INTRA-ARTICULAR; INTRALESIONAL; INTRAMUSCULAR; INTRAVENOUS; SOFT TISSUE
Status: COMPLETED | OUTPATIENT
Start: 2018-10-18 | End: 2018-10-18

## 2018-10-18 RX ORDER — SODIUM CHLORIDE 0.9 % (FLUSH) 0.9 %
10 SYRINGE (ML) INJECTION
Status: CANCELLED | OUTPATIENT
Start: 2018-10-18

## 2018-10-18 RX ORDER — DIPHENHYDRAMINE HYDROCHLORIDE 50 MG/ML
25 INJECTION INTRAMUSCULAR; INTRAVENOUS
Status: CANCELLED
Start: 2018-10-18 | End: 2018-10-18

## 2018-10-18 RX ORDER — FAMOTIDINE 10 MG/ML
20 INJECTION INTRAVENOUS
Status: COMPLETED | OUTPATIENT
Start: 2018-10-18 | End: 2018-10-18

## 2018-10-18 RX ORDER — FAMOTIDINE 10 MG/ML
20 INJECTION INTRAVENOUS
Status: CANCELLED | OUTPATIENT
Start: 2018-10-18

## 2018-10-18 RX ORDER — SODIUM CHLORIDE 0.9 % (FLUSH) 0.9 %
10 SYRINGE (ML) INJECTION
Status: DISCONTINUED | OUTPATIENT
Start: 2018-10-18 | End: 2018-10-18 | Stop reason: HOSPADM

## 2018-10-18 RX ORDER — BORTEZOMIB 3.5 MG/1
0.7 INJECTION, POWDER, LYOPHILIZED, FOR SOLUTION INTRAVENOUS; SUBCUTANEOUS
Status: CANCELLED | OUTPATIENT
Start: 2018-10-18

## 2018-10-18 RX ORDER — HEPARIN 100 UNIT/ML
500 SYRINGE INTRAVENOUS
Status: DISCONTINUED | OUTPATIENT
Start: 2018-10-18 | End: 2018-10-18 | Stop reason: HOSPADM

## 2018-10-18 RX ORDER — MEPERIDINE HYDROCHLORIDE 25 MG/ML
25 INJECTION INTRAMUSCULAR; INTRAVENOUS; SUBCUTANEOUS
Status: DISCONTINUED | OUTPATIENT
Start: 2018-10-18 | End: 2018-10-18 | Stop reason: HOSPADM

## 2018-10-18 RX ORDER — MEPERIDINE HYDROCHLORIDE 50 MG/ML
25 INJECTION INTRAMUSCULAR; INTRAVENOUS; SUBCUTANEOUS
Status: CANCELLED | OUTPATIENT
Start: 2018-10-18

## 2018-10-18 RX ORDER — DIPHENHYDRAMINE HYDROCHLORIDE 50 MG/ML
25 INJECTION INTRAMUSCULAR; INTRAVENOUS
Status: COMPLETED | OUTPATIENT
Start: 2018-10-18 | End: 2018-10-18

## 2018-10-18 RX ORDER — DEXAMETHASONE SODIUM PHOSPHATE 4 MG/ML
8 INJECTION, SOLUTION INTRA-ARTICULAR; INTRALESIONAL; INTRAMUSCULAR; INTRAVENOUS; SOFT TISSUE
Status: CANCELLED
Start: 2018-10-18 | End: 2018-10-18

## 2018-10-18 RX ORDER — ACETAMINOPHEN 325 MG/1
650 TABLET ORAL
Status: CANCELLED | OUTPATIENT
Start: 2018-10-18

## 2018-10-18 RX ORDER — HEPARIN 100 UNIT/ML
500 SYRINGE INTRAVENOUS
Status: CANCELLED | OUTPATIENT
Start: 2018-10-18

## 2018-10-18 RX ORDER — BORTEZOMIB 3.5 MG/1
0.7 INJECTION, POWDER, LYOPHILIZED, FOR SOLUTION INTRAVENOUS; SUBCUTANEOUS
Status: COMPLETED | OUTPATIENT
Start: 2018-10-18 | End: 2018-10-18

## 2018-10-18 RX ORDER — ACETAMINOPHEN 325 MG/1
650 TABLET ORAL
Status: COMPLETED | OUTPATIENT
Start: 2018-10-18 | End: 2018-10-18

## 2018-10-18 RX ADMIN — ACETAMINOPHEN 650 MG: 325 TABLET ORAL at 09:10

## 2018-10-18 RX ADMIN — DEXAMETHASONE SODIUM PHOSPHATE 8 MG: 4 INJECTION, SOLUTION INTRA-ARTICULAR; INTRALESIONAL; INTRAMUSCULAR; INTRAVENOUS; SOFT TISSUE at 10:10

## 2018-10-18 RX ADMIN — BORTEZOMIB 1.4 MG: 3.5 INJECTION, POWDER, LYOPHILIZED, FOR SOLUTION INTRAVENOUS; SUBCUTANEOUS at 10:10

## 2018-10-18 RX ADMIN — FAMOTIDINE 20 MG: 10 INJECTION, SOLUTION INTRAVENOUS at 09:10

## 2018-10-18 RX ADMIN — RITUXIMAB 765 MG: 10 INJECTION, SOLUTION INTRAVENOUS at 10:10

## 2018-10-18 RX ADMIN — DIPHENHYDRAMINE HYDROCHLORIDE 25 MG: 50 INJECTION INTRAMUSCULAR; INTRAVENOUS at 09:10

## 2018-10-18 NOTE — PROGRESS NOTES
Subjective:      Patient ID: Homero Tanner is a 78 y.o. male.    Chief Complaint: Lymphoma    The patient is a 78-year-old  male who presents to the [facility] today to discuss further evaluation and management recommendations for Waldenstrom's macroglobulinemia.   I have reviewed all of the patient's relevant clinical history available in the medical record and have utilized this in my evaluation and management recommendations today.  Today the patient reports that he has been having chronic fatigue and edema in his legs.  He continues with follow-up with Dr. Wright with Cardiology.  Otherwise overall he feels well and has no specific complaints.  He has been taking his monthly vitamin B12 injections. He is off iron supplementation at this time for his history of iron deficiency anemia which has since resolved.  He is on intermittent ADT for biochemical recurrence of prostate carcinoma and is followed by a urologist outside of the [facility].  His rectal cancer is in remission at this time.  He reports that overall he is tolerating his velcade for treatment of Waldenstrom's macroglobulinemia well without any significant symptoms.      Review of Systems   Constitutional: Positive for fatigue. Negative for activity change, appetite change, chills, diaphoresis, fever and unexpected weight change.   HENT: Negative for congestion, dental problem, ear pain, mouth sores, nosebleeds, postnasal drip, sinus pressure, sore throat, tinnitus, trouble swallowing and voice change.    Eyes: Negative for photophobia, pain, discharge, redness, itching and visual disturbance.   Respiratory: Negative for cough, chest tightness, shortness of breath, wheezing and stridor.    Cardiovascular: Negative for chest pain, palpitations and leg swelling.   Gastrointestinal: Negative for abdominal distention, abdominal pain, anal bleeding, blood in stool, constipation, diarrhea, nausea and vomiting.    Endocrine: Negative for cold intolerance, heat intolerance, polydipsia, polyphagia and polyuria.   Genitourinary: Negative for decreased urine volume, difficulty urinating, dysuria, flank pain, frequency, hematuria and urgency.   Musculoskeletal: Negative for arthralgias, back pain, gait problem, joint swelling and myalgias.   Skin: Negative for pallor and rash.   Allergic/Immunologic: Negative for immunocompromised state.   Neurological: Negative for dizziness, syncope, weakness, light-headedness and headaches.   Hematological: Negative for adenopathy. Does not bruise/bleed easily.   Psychiatric/Behavioral: Negative for agitation and confusion. The patient is not nervous/anxious.           Medication List           Accurate as of 10/18/18  8:23 AM. If you have any questions, ask your nurse or doctor.               CONTINUE taking these medications    acyclovir 400 MG tablet  Commonly known as:  ZOVIRAX  Take 1 tablet (400 mg total) by mouth 2 (two) times daily.     aspirin 81 MG EC tablet  Commonly known as:  ECOTRIN     b complex vitamins tablet     cyanocobalamin 1,000 mcg/mL injection  Give 1cc under the skin once a month     dexamethasone 4 MG Tab  Commonly known as:  DECADRON  Take 5 tablets p.o. once weekly     furosemide 40 MG tablet  Commonly known as:  LASIX  Take 1 tablet (40 mg total) by mouth once daily.     ketoconazole 2 % shampoo  Commonly known as:  NIZORAL  WASH HAIR WITH MEDICATED SHAMPOO AT LEAST 3 TIMES A WEEK. LET SIT ON SCALP AT LEAST 5 MINUTES PRIOR TO RINSING     leuprolide 3.75 mg injection  Commonly known as:  LUPRON     metOLazone 2.5 MG tablet  Commonly known as:  ZAROXOLYN  Take 1 tablet (2.5 mg total) by mouth every Mon, Wed, Fri.     metoprolol tartrate 100 MG tablet  Commonly known as:  LOPRESSOR  TAKE 1 TABLET(100 MG) BY MOUTH TWICE DAILY     metronidazole 0.75% 0.75 % Crea  Commonly known as:  METROCREAM  Apply 1 application topically 2 (two) times daily. Apply to affected on  face     omeprazole 20 MG capsule  Commonly known as:  PRILOSEC  TAKE 1 CAPSULE(20 MG) BY MOUTH EVERY DAY          Review of patient's allergies indicates:   Allergen Reactions    Lipitor [atorvastatin] Other (See Comments)    Norvasc [amlodipine] Swelling       Lab: I have reviewed all of the patient's relevant lab work available in the medical record and have utilized this in my evaluation and management recommendations today    Imaging: I have reviewed all of the patient's diagnostic/imaging results available in the medical record and have utilized this in my evaluation and management recommendations today.      Objective:     Vitals:    10/18/18 0808   BP: 95/69   Pulse: 68   Temp: 97.8 °F (36.6 °C)       Physical Exam   Constitutional: He is oriented to person, place, and time. He appears well-developed and well-nourished. No distress.   HENT:   Head: Normocephalic and atraumatic.   Nose: Nose normal.   Mouth/Throat: Oropharynx is clear and moist. No oropharyngeal exudate.   Eyes: EOM are normal. Pupils are equal, round, and reactive to light. No scleral icterus.   Neck: Normal range of motion. Neck supple. No tracheal deviation present. No thyromegaly present.   Cardiovascular: Normal rate, regular rhythm, normal heart sounds and intact distal pulses.   No murmur heard.  Pulmonary/Chest: Effort normal and breath sounds normal. No stridor. No respiratory distress. He has no wheezes. He has no rales. He exhibits no tenderness.   Abdominal: Soft. Bowel sounds are normal. He exhibits no distension and no mass. There is no tenderness. There is no rebound and no guarding.   Musculoskeletal: Normal range of motion. He exhibits edema. He exhibits no tenderness.   Lymphadenopathy:     He has no cervical adenopathy.   Neurological: He is alert and oriented to person, place, and time. Coordination normal.   Skin: Skin is warm. No rash noted. He is not diaphoretic. No erythema.   Psychiatric: He has a normal mood and  affect. His behavior is normal. Judgment and thought content normal.   Nursing note and vitals reviewed.      Assessment:     1. Waldenstrom's macroglobulinemia    Plan:   1.  I had a detailed discussion with the patient previously with regard to the results of his bone marrow aspiration biopsy done in July 2018 as well as the results of all of his lab work done in August 2018.  At this time the patient has Waldenstrom's macroglobulinemia.  His IgM quantitative level was initially greater than 4 g.  2.  I had a detailed discussion with him previously with regard to the diagnosis, prognosis and treatment of this malignancy.  We discussed the indications to begin treatment.  I was concerned about possible liver involvement by his lymphoma because of his recently abnormal/worsening LFTs.  I was also concerned about possible renal involvement because of his elevated creatinine with monoclonal chain noted in UPEP immunofixation.  3.  We discussed that his treatment options are complicated because of the above.  I have previously discussed that the best course of action would be to start him on Velcade/dexamethasone/Rituxan.  I decided to hold off on initiation of Rituxan until we get his IgM quantitative level to less than 4 g.  Therefore he was initially started on twice weekly Velcade and weekly dexamethasone at 20 mg p.o. once daily.  We decided to re-evaluate our treatment options based on clinical response and based on how he tolerates this treatment. Velcade was dose adjusted for liver function. He is scheduled to proceed with cycle 3 day 1 of velcade today. Informed consent taken.    4.  All necessary prescriptions including for herpes zoster prophylaxis have been sent to the patient's pharmacy previously and the patient reports compliance with this medication.  5. I have previously strongly recommended cessation of alcohol use.  He reports good compliance with this recommendation.  6.  I had a detailed discussion  with the patient today with regard to the results of all of his recent lab work which shows excellent response to ongoing treatment with interval significant decrease in IgM quantitative level.  At this time I have recommended that we add IV Rituxan to his treatment.  The rationale for this was discussed in detail and he expressed understanding.  We have decided to initiate Rituxan starting with cycle 3 of Velcade.  At this time I will also transition is Velcade to once weekly.  7. Lab results from today reviewed in detail. Advised to hold metolazone. He will will also decrease his dose of po lasix by half. We will reevaluate in 1 week.    Follow-up in 1 week with labs for cycle 3 day 8 of velcade/rituxan. He knows to call sooner for any additional questions or new problems.

## 2018-10-18 NOTE — NURSING
1020  10/18/18  Discussed B/P with Dr. Weaver as well as pt's current diuretic use/edema.  Per Dr. Weaver, tx with Rituxan is to proceed and I will report any significant decrease in B/P prior to any rate increase (as well as any report of symptom occurrence/changes).

## 2018-10-18 NOTE — PATIENT INSTRUCTIONS
[facility]  [address]  [address] [address]  [phone] phone     [phone] fax  Hours of Operation: Monday- Friday 8:00am- 5:00pm  After hours phone  [phone]  Hematology / Oncology Physicians on call      Dr. Bridger Cloud                        Please call with any concerns regarding your appointment today.      HOME CARE AFTER CHEMOTHERAPY   Meals   Many patients feel sick and lose their appetites during treatment. Eat small meals several times a day. Choose bland foods with little taste or smell if you have problems with nausea. Be sure to cook all food thoroughly. This kills bacteria and helps you avoid intestinal infection. Soft foods are easier to swallow and digest.   Activity   Exercise keeps you strong and keeps your heart and lungs active. Talk to your doctor about an appropriate exercise program for you.   Skin Care   To prevent a skin infection, bathe or shower once a day. Use a moisturizing soap and wash with warm water. Avoid very hot or cold water. Chemotherapy can make your skin dry . Apply moisturizing lotion to help relieve dry skin. Some drugs used in high doses can cause slight burns to appear (like sunburn). Ask for a special cream to help relieve the burn and protect your skin.   Prevent Mouth Sores   During chemotherapy, many people get mouth sores. Do the following to help prevent mouth sores or to ease discomfort.   Brush your teeth with a soft-bristle toothbrush after every meal.  Don't use dental floss if your platelet count is below 50,000. Your doctor or nurse will tell you if this is the case.  Use an oral swab or special soft toothbrush if your gums bleed during regular brushing.  Use mouthwash as directed. If you can't tolerate commercial mouthwash, use salt and baking soda to clean your mouth. Mix 1 teaspoon of salt and 1 teaspoon of baking soda into a glass of water. Swish and spit.  Call your  doctor or return to this facility if you develop any of the following:   Sore throat   White patches in the mouth or throat   Fever of 100.4ºF (38ºC) or higher, or as directed by your healthcare provider  © 2822-0553 [employer], [address] [place] [address], [place] [address]. All rights reserved. This information is not intended as a substitute for professional medical care. Always follow your healthcare professional's   WAYS TO HELP PREVENT INFECTION         WASH YOUR HANDS OFTEN DURING THE DAY, ESPECIALLY BEFORE YOU EAT, AFTER USING THE BATHROOM, AND AFTER TOUCHING ANIMALS     STAY AWAY FROM PEOPLE WHO HAVE ILLNESSES YOU CAN CATCH; SUCH AS COLDS, FLU, CHICKEN POX     TRY TO AVOID CROWDS     STAY AWAY FROM CHILDREN WHO RECENTLY HAVE RECEIVED LIVE VIRUS VACCINES     MAINTAIN GOOD MOUTH CARE     DO NOT SQUEEZE OR SCRATCH PIMPLES     CLEAN CUTS & SCRAPES RIGHT AWAY AND DAILY UNTIL HEALED WITH WARM WATER, SOAP & AN ANTISEPTIC     AVOID CONTACT WITH LITTER BOXES, BIRD CAGES, & FISH TANKS     AVOID STANDING WATER, IE., BIRD BATHS, FLOWER POTS/VASES, OR HUMIDIFIERS     WEAR GLOVES WHEN GARDENING OR CLEANING UP AFTER OTHERS, ESPECIALLY BABIES & SMALL CHILDREN     DO NOT EAT RAW FISH, SEAFOOD, MEAT, OR EGGS    YOU HAVE STARTED ON CHEMOTHERAPY. IF YOU EXPERIENCE ANY OF THE FOLLOWING PROBLEMS, CALL THE OFFICE IMMEDIATELY.    *FEVER .0 OR GREATER    *CHILLS, ESPECIALLY SHAKING CHILLS, OR SWEATING    *A SEVERE COUGH OR SORE THROAT, OR SINUS PAIN/     PRESSURE    *REDNESS, SWELLING, OR TENDERNESS AROUND A WOUND,     SORE, PIMPLE, RECTAL AREA, OR IV SITE    *SORES OR ULCERS IN THE MOUTH    *BLISTERS ON THE LIPS OR SKIN    *FREQUENT URGENCY TO URINATE OR A BURNING FEELING   WHEN YOU URINATE    *BLOOD IN THE URINE OR STOOL    *ANY UNEXPLAINED BRUISING OR PROLONGED BLEEDING,     (NOSEBLEEDS OR BLEEDING GUMS)    *LOOSE BOWEL MOVEMENTS THAT DO NOT RESPOND TO     IMODIUM OR MORE THAN THREE TIMES A DAY    *VOMITING  UNRESPONSIVE TO ANTINAUSEA MEDICINE    *ANY UNUSUAL PHYSICAL SYMPTOMS THAT BEGAN AFTER     CHEMOTHERAPY    DURING WEEKDAYS, CALL AND ASK TO SPEAK DIRECTLY TO A NURSE.  AT OTHER TIMES, CALL THE OFFICE PHONE NUMBER; THE ANSWERING SERVICE WILL CONTACT THE ON-CALL PHYSICIAN.  SOMEONE IS AVAILABLE 24 HOURS A DAY, SEVEN DAYS A WEEK.    FALL PREVENTION   Falls often occur due to slipping, tripping or losing your balance. Here are ways to reduce your risk of falling again.   Was there anything that caused your fall that can be fixed, removed or replaced?   Make your home safe by keeping walkways clear of objects you may trip over.   Use non-slip pads under rugs.   Do not walk in poorly lit areas.   Do not stand on chairs or wobbly ladders.   Use caution when reaching overhead or looking upward. This position can cause a loss of balance.   Be sure your shoes fit properly, have non-slip bottoms and are in good condition.   Be cautious when going up and down stairs, curbs, and when walking on uneven sidewalks.   If your balance is poor, consider using a cane or walker.   If your fall was related to alcohol use, stop or limit alcohol intake.   If your fall was related to use of sleeping medicines, talk to your doctor about this. You may need to reduce your dosage at bedtime if you awaken during the night to go to the bathroom.   To reduce the need for nighttime bathroom trips:   Avoid drinking fluids for several hours before going to bed   Empty your bladder before going to bed   Men can keep a urinal at the bedside   © 6801-0376 [employer], [address] [place] [address], [place] [address]. All rights reserved. This information is not intended as a substitute for professional medical care. Always follow your healthcare professional's instructions.

## 2018-10-18 NOTE — PLAN OF CARE
Problem: Patient Care Overview  Goal: Plan of Care Review  Outcome: Ongoing (interventions implemented as appropriate)  Pt states feeling weak, tired, has edema in lower extremities, bruising very easily.

## 2018-10-25 ENCOUNTER — OFFICE VISIT (OUTPATIENT)
Dept: HEMATOLOGY/ONCOLOGY | Facility: CLINIC | Age: 78
End: 2018-10-25
Payer: MEDICARE

## 2018-10-25 ENCOUNTER — INFUSION (OUTPATIENT)
Dept: INFUSION THERAPY | Facility: HOSPITAL | Age: 78
End: 2018-10-25
Attending: INTERNAL MEDICINE
Payer: MEDICARE

## 2018-10-25 VITALS — HEART RATE: 42 BPM | DIASTOLIC BLOOD PRESSURE: 55 MMHG | SYSTOLIC BLOOD PRESSURE: 84 MMHG

## 2018-10-25 VITALS
TEMPERATURE: 98 F | BODY MASS INDEX: 26.64 KG/M2 | OXYGEN SATURATION: 92 % | RESPIRATION RATE: 18 BRPM | HEART RATE: 54 BPM | SYSTOLIC BLOOD PRESSURE: 108 MMHG | WEIGHT: 186.06 LBS | DIASTOLIC BLOOD PRESSURE: 62 MMHG | HEIGHT: 70 IN

## 2018-10-25 DIAGNOSIS — K76.0 FATTY LIVER: ICD-10-CM

## 2018-10-25 DIAGNOSIS — C88.0 WALDENSTROM'S MACROGLOBULINEMIA: Primary | ICD-10-CM

## 2018-10-25 DIAGNOSIS — R74.8 ABNORMAL LIVER ENZYMES: ICD-10-CM

## 2018-10-25 DIAGNOSIS — E87.6 HYPOKALEMIA: ICD-10-CM

## 2018-10-25 DIAGNOSIS — R79.89 ELEVATED SERUM CREATININE: ICD-10-CM

## 2018-10-25 PROCEDURE — 63600175 PHARM REV CODE 636 W HCPCS: Performed by: INTERNAL MEDICINE

## 2018-10-25 PROCEDURE — 25000003 PHARM REV CODE 250: Performed by: INTERNAL MEDICINE

## 2018-10-25 PROCEDURE — 99499 UNLISTED E&M SERVICE: CPT | Mod: HCNC,S$GLB,, | Performed by: INTERNAL MEDICINE

## 2018-10-25 PROCEDURE — 99213 OFFICE O/P EST LOW 20 MIN: CPT | Mod: PBBFAC | Performed by: INTERNAL MEDICINE

## 2018-10-25 PROCEDURE — 96367 TX/PROPH/DG ADDL SEQ IV INF: CPT

## 2018-10-25 PROCEDURE — 3078F DIAST BP <80 MM HG: CPT | Mod: CPTII,HCNC,, | Performed by: INTERNAL MEDICINE

## 2018-10-25 PROCEDURE — 96401 CHEMO ANTI-NEOPL SQ/IM: CPT

## 2018-10-25 PROCEDURE — 96413 CHEMO IV INFUSION 1 HR: CPT

## 2018-10-25 PROCEDURE — 96375 TX/PRO/DX INJ NEW DRUG ADDON: CPT

## 2018-10-25 PROCEDURE — 1101F PT FALLS ASSESS-DOCD LE1/YR: CPT | Mod: CPTII,HCNC,, | Performed by: INTERNAL MEDICINE

## 2018-10-25 PROCEDURE — 99999 PR PBB SHADOW E&M-EST. PATIENT-LVL III: CPT | Mod: PBBFAC,,, | Performed by: INTERNAL MEDICINE

## 2018-10-25 PROCEDURE — 96366 THER/PROPH/DIAG IV INF ADDON: CPT

## 2018-10-25 PROCEDURE — S0028 INJECTION, FAMOTIDINE, 20 MG: HCPCS | Performed by: INTERNAL MEDICINE

## 2018-10-25 PROCEDURE — 63600175 PHARM REV CODE 636 W HCPCS: Mod: JG | Performed by: INTERNAL MEDICINE

## 2018-10-25 PROCEDURE — 99215 OFFICE O/P EST HI 40 MIN: CPT | Mod: S$PBB,HCNC,, | Performed by: INTERNAL MEDICINE

## 2018-10-25 PROCEDURE — 3074F SYST BP LT 130 MM HG: CPT | Mod: CPTII,HCNC,, | Performed by: INTERNAL MEDICINE

## 2018-10-25 PROCEDURE — 96415 CHEMO IV INFUSION ADDL HR: CPT

## 2018-10-25 RX ORDER — POTASSIUM CHLORIDE 20 MEQ/1
20 TABLET, EXTENDED RELEASE ORAL DAILY
Qty: 4 TABLET | Refills: 0 | Status: SHIPPED | OUTPATIENT
Start: 2018-10-25 | End: 2018-10-29

## 2018-10-25 RX ORDER — DEXAMETHASONE SODIUM PHOSPHATE 4 MG/ML
8 INJECTION, SOLUTION INTRA-ARTICULAR; INTRALESIONAL; INTRAMUSCULAR; INTRAVENOUS; SOFT TISSUE
Status: CANCELLED
Start: 2018-10-25 | End: 2018-10-25

## 2018-10-25 RX ORDER — BORTEZOMIB 3.5 MG/1
0.7 INJECTION, POWDER, LYOPHILIZED, FOR SOLUTION INTRAVENOUS; SUBCUTANEOUS
Status: CANCELLED | OUTPATIENT
Start: 2018-10-25

## 2018-10-25 RX ORDER — ACETAMINOPHEN 325 MG/1
650 TABLET ORAL
Status: COMPLETED | OUTPATIENT
Start: 2018-10-25 | End: 2018-10-25

## 2018-10-25 RX ORDER — MEPERIDINE HYDROCHLORIDE 50 MG/ML
25 INJECTION INTRAMUSCULAR; INTRAVENOUS; SUBCUTANEOUS
Status: CANCELLED | OUTPATIENT
Start: 2018-10-25

## 2018-10-25 RX ORDER — HEPARIN 100 UNIT/ML
500 SYRINGE INTRAVENOUS
Status: CANCELLED | OUTPATIENT
Start: 2018-10-25

## 2018-10-25 RX ORDER — FAMOTIDINE 10 MG/ML
20 INJECTION INTRAVENOUS
Status: COMPLETED | OUTPATIENT
Start: 2018-10-25 | End: 2018-10-25

## 2018-10-25 RX ORDER — ACETAMINOPHEN 325 MG/1
650 TABLET ORAL
Status: CANCELLED | OUTPATIENT
Start: 2018-10-25

## 2018-10-25 RX ORDER — DEXAMETHASONE SODIUM PHOSPHATE 4 MG/ML
8 INJECTION, SOLUTION INTRA-ARTICULAR; INTRALESIONAL; INTRAMUSCULAR; INTRAVENOUS; SOFT TISSUE
Status: COMPLETED | OUTPATIENT
Start: 2018-10-25 | End: 2018-10-25

## 2018-10-25 RX ORDER — SODIUM CHLORIDE 0.9 % (FLUSH) 0.9 %
10 SYRINGE (ML) INJECTION
Status: DISCONTINUED | OUTPATIENT
Start: 2018-10-25 | End: 2018-10-25 | Stop reason: HOSPADM

## 2018-10-25 RX ORDER — SODIUM CHLORIDE 0.9 % (FLUSH) 0.9 %
10 SYRINGE (ML) INJECTION
Status: CANCELLED | OUTPATIENT
Start: 2018-10-25

## 2018-10-25 RX ORDER — FAMOTIDINE 10 MG/ML
20 INJECTION INTRAVENOUS
Status: CANCELLED | OUTPATIENT
Start: 2018-10-25

## 2018-10-25 RX ORDER — MEPERIDINE HYDROCHLORIDE 25 MG/ML
25 INJECTION INTRAMUSCULAR; INTRAVENOUS; SUBCUTANEOUS
Status: DISCONTINUED | OUTPATIENT
Start: 2018-10-25 | End: 2018-10-25 | Stop reason: HOSPADM

## 2018-10-25 RX ORDER — BORTEZOMIB 3.5 MG/1
0.7 INJECTION, POWDER, LYOPHILIZED, FOR SOLUTION INTRAVENOUS; SUBCUTANEOUS
Status: COMPLETED | OUTPATIENT
Start: 2018-10-25 | End: 2018-10-25

## 2018-10-25 RX ORDER — DIPHENHYDRAMINE HYDROCHLORIDE 50 MG/ML
25 INJECTION INTRAMUSCULAR; INTRAVENOUS
Status: COMPLETED | OUTPATIENT
Start: 2018-10-25 | End: 2018-10-25

## 2018-10-25 RX ORDER — SODIUM CHLORIDE AND POTASSIUM CHLORIDE 150; 900 MG/100ML; MG/100ML
INJECTION, SOLUTION INTRAVENOUS CONTINUOUS
Status: CANCELLED
Start: 2018-10-25

## 2018-10-25 RX ORDER — POTASSIUM CHLORIDE 20 MEQ/1
20 TABLET, EXTENDED RELEASE ORAL DAILY
Qty: 4 TABLET | Refills: 0 | Status: SHIPPED | OUTPATIENT
Start: 2018-10-25 | End: 2018-10-25

## 2018-10-25 RX ORDER — DIPHENHYDRAMINE HYDROCHLORIDE 50 MG/ML
25 INJECTION INTRAMUSCULAR; INTRAVENOUS
Status: CANCELLED
Start: 2018-10-25 | End: 2018-10-25

## 2018-10-25 RX ADMIN — ACETAMINOPHEN 650 MG: 325 TABLET ORAL at 09:10

## 2018-10-25 RX ADMIN — RITUXIMAB 765 MG: 10 INJECTION, SOLUTION INTRAVENOUS at 09:10

## 2018-10-25 RX ADMIN — DIPHENHYDRAMINE HYDROCHLORIDE 25 MG: 50 INJECTION, SOLUTION INTRAMUSCULAR; INTRAVENOUS at 09:10

## 2018-10-25 RX ADMIN — POTASSIUM CHLORIDE: 149 INJECTION, SOLUTION, CONCENTRATE INTRAVENOUS at 11:10

## 2018-10-25 RX ADMIN — FAMOTIDINE 20 MG: 10 INJECTION, SOLUTION INTRAVENOUS at 09:10

## 2018-10-25 RX ADMIN — DEXAMETHASONE SODIUM PHOSPHATE 8 MG: 4 INJECTION, SOLUTION INTRA-ARTICULAR; INTRALESIONAL; INTRAMUSCULAR; INTRAVENOUS; SOFT TISSUE at 09:10

## 2018-10-25 RX ADMIN — BORTEZOMIB 1.4 MG: 3.5 INJECTION, POWDER, LYOPHILIZED, FOR SOLUTION INTRAVENOUS; SUBCUTANEOUS at 11:10

## 2018-10-25 NOTE — PATIENT INSTRUCTIONS
[facility]  [address]  [phone] phone     [phone] fax  Hours of Operation: Monday- Friday 8:00am- 5:00pm  After hours phone  [phone]  Hematology / Oncology Physicians on call      Dr. Bridger Cloud                        Please call with any concerns regarding your appointment today.

## 2018-10-25 NOTE — PROGRESS NOTES
Hematology/Oncology Office Note    CC:    Referred by:  No ref. provider found    Diagnosis:  Waldenstrom's hypergammaglobulinemia  Prostate cancer  Rectal cancer  Thymoma    HPI This is a 76  year-old gentleman who comes for follow up of her previously diagnosed thymoma, rectal cancer, prostate cancer, pararpteinemia and b12 deficiency.  He is known to us because of ahistory of   previously treated prostate cancer. He was diagnosed around 2004 and treated   with surgery. He relapsed by PSA and was treated with local radiation therapy.   He had further elevation of the PSA and started intermittent LHRH   analogues through the office of his urologist outside our clinic  he receives the injections at his urologist office.    I have reviewed and updated the HPI, ROS, PMHx, Social Hx, Family Hx and treatment history.    Today's visit:  Patient is without complaints today      Past Medical History:   Diagnosis Date    Abnormal ECG 6/24/2013    Aortic insufficiency 6/24/2013    Aortic valve disorder 6/25/2013    Asthma     as a child    Benign neoplasm of cecum 2/27/2017    Benign neoplasm of transverse colon 2/27/2017    CHF (congestive heart failure)     Pt states He's never been told this    Encounter for blood transfusion     GERD (gastroesophageal reflux disease)     History of colon polyps     Hypertension     Iron deficiency anemia 10/26/2015    Mixed hyperlipidemia 6/24/2013    Prostate cancer     PSVT (paroxysmal supraventricular tachycardia) 6/24/2013    Pulmonary hypertension 6/13/2016    Rectal adenocarcinoma     Rectal cancer 5/1/2018    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    left parietal scalp    Thymoma     TR (tricuspid regurgitation) 6/13/2016         Social History:  No tobacco, alcohol, or illicit drugs      Family History: family history includes Diabetes in his father.      HPI    Review of Systems   Constitutional: Negative for activity change, appetite change and  "unexpected weight change.   HENT: Negative.    Eyes: Negative.  Negative for visual disturbance.   Respiratory: Negative for apnea, cough, chest tightness, shortness of breath and stridor.    Cardiovascular: Negative for chest pain.   Gastrointestinal: Negative for abdominal distention, abdominal pain, blood in stool, constipation and diarrhea.   Endocrine: Negative.    Genitourinary: Negative for frequency.   Musculoskeletal: Negative for back pain.   Skin: Negative for rash.   Allergic/Immunologic: Negative.    Neurological: Negative.  Negative for dizziness, facial asymmetry and headaches.   Hematological: Negative for adenopathy.   Psychiatric/Behavioral: Negative.  The patient is not nervous/anxious.        Objective:       Vitals:    10/25/18 0816   BP: 108/62   Pulse: (!) 54   Resp: 18   Temp: 97.5 °F (36.4 °C)   TempSrc: Oral   SpO2: (!) 92%   Weight: 84.4 kg (186 lb 1.1 oz)   Height: 5' 10" (1.778 m)     Physical Exam   Constitutional: He is oriented to person, place, and time. He appears well-developed and well-nourished. No distress.   HENT:   Head: Normocephalic and atraumatic.   Nose: Nose normal.   Mouth/Throat: Oropharynx is clear and moist. No oropharyngeal exudate.   Eyes: Conjunctivae and EOM are normal. Pupils are equal, round, and reactive to light. Right eye exhibits no discharge. Left eye exhibits no discharge. No scleral icterus.   Neck: Normal range of motion. Neck supple. No JVD present. No tracheal deviation present. No thyromegaly present.   Cardiovascular: Normal rate and regular rhythm. Exam reveals no gallop and no friction rub.   No murmur heard.  Pulmonary/Chest: Effort normal and breath sounds normal. No accessory muscle usage or stridor. No respiratory distress. He has no wheezes. He has no rales. He exhibits no tenderness.   Abdominal: Soft. Bowel sounds are normal. He exhibits no distension and no mass. There is no tenderness. There is no rebound.   Musculoskeletal: Normal range " of motion. He exhibits no edema.   Lymphadenopathy:     He has no cervical adenopathy.   Neurological: He is alert and oriented to person, place, and time. No cranial nerve deficit. He exhibits normal muscle tone. Coordination normal.   Skin: Skin is warm and dry. No rash noted. He is not diaphoretic.   Psychiatric: He has a normal mood and affect. Thought content normal.   Vitals reviewed.      Lab Results   Component Value Date    WBC 8.92 10/25/2018    HGB 9.4 (L) 10/25/2018    HCT 26.4 (L) 10/25/2018     (H) 10/25/2018     (L) 10/25/2018     Lab Results   Component Value Date    CREATININE 1.9 (H) 10/25/2018    BUN 15 10/25/2018     (L) 10/25/2018    K 2.7 (LL) 10/25/2018    CL 86 (L) 10/25/2018    CO2 24 10/25/2018     Lab Results   Component Value Date    ALT 31 10/25/2018    AST 64 (H) 10/25/2018    GGT 1,086 (H) 08/30/2018    ALKPHOS 327 (H) 10/25/2018    BILITOT 3.0 (H) 10/25/2018         Assessment:       77-year-old gentleman with a history of prostate cancer, rectal cancer, thymoma, and MGUS.  He is followed by Dr. Weaver in the Hematology/Oncology Clinic for Waldenstrom's macroglobulinemia.  He is currently on treatment with weekly Velcade/Rituxan.  The he presents today to proceed with treatment and is noted to have potassium of 2.7 and worsening total bilirubin noted to be 3.0 today.  The patient continues to drink vodka daily and I strongly encouraged the patient to discontinue alcohol use due to worsening liver function.  We will replete potassium with 20 mEq in clinic today and he will continue 20 mEq daily for 4 days.        Waldenstrom's macroglobulinemia:  --continue weekly Velcade/Rituxan  --follow-up 1 week with repeat labs    Hypokalemia:  --20 mEq IV x1 clinic  --20 mEq daily x4 days    Worsening total bilirubin/liver function:  --strongly encouraged the patient to decrease/discontinue alcohol use    History of rectal cancer:  --Continue surveillance    H/o Prostate  cancer:  --Continue surveillance

## 2018-10-25 NOTE — PROGRESS NOTES
Subjective:       Patient ID: Homero Tanner is a 78 y.o. male.    Chief Complaint: No chief complaint on file.    HPI  Review of Systems    Objective:      Physical Exam    Assessment:       1. Waldenstrom's macroglobulinemia    2. Elevated serum creatinine    3. Abnormal liver enzymes    4. Fatty liver    5. Hypokalemia        Plan:       ***

## 2018-10-25 NOTE — PLAN OF CARE
Problem: Patient Care Overview  Goal: Plan of Care Review  Outcome: Ongoing (interventions implemented as appropriate)  My blood pressure is always low.

## 2018-11-01 ENCOUNTER — OFFICE VISIT (OUTPATIENT)
Dept: HEMATOLOGY/ONCOLOGY | Facility: CLINIC | Age: 78
End: 2018-11-01
Payer: MEDICARE

## 2018-11-01 ENCOUNTER — INFUSION (OUTPATIENT)
Dept: INFUSION THERAPY | Facility: HOSPITAL | Age: 78
End: 2018-11-01
Attending: INTERNAL MEDICINE
Payer: MEDICARE

## 2018-11-01 VITALS
WEIGHT: 189.63 LBS | HEIGHT: 70 IN | TEMPERATURE: 98 F | HEART RATE: 67 BPM | RESPIRATION RATE: 16 BRPM | SYSTOLIC BLOOD PRESSURE: 110 MMHG | BODY MASS INDEX: 27.15 KG/M2 | OXYGEN SATURATION: 97 % | DIASTOLIC BLOOD PRESSURE: 78 MMHG

## 2018-11-01 VITALS
HEART RATE: 75 BPM | OXYGEN SATURATION: 99 % | WEIGHT: 189.63 LBS | BODY MASS INDEX: 27.15 KG/M2 | SYSTOLIC BLOOD PRESSURE: 99 MMHG | HEIGHT: 70 IN | DIASTOLIC BLOOD PRESSURE: 64 MMHG

## 2018-11-01 DIAGNOSIS — C88.0 WALDENSTROM'S MACROGLOBULINEMIA: Primary | ICD-10-CM

## 2018-11-01 DIAGNOSIS — K76.0 FATTY LIVER: ICD-10-CM

## 2018-11-01 DIAGNOSIS — R17 ELEVATED BILIRUBIN: ICD-10-CM

## 2018-11-01 DIAGNOSIS — R74.8 ABNORMAL LIVER ENZYMES: ICD-10-CM

## 2018-11-01 DIAGNOSIS — Z78.9 ALCOHOL USE: ICD-10-CM

## 2018-11-01 LAB
BILIRUB DIRECT SERPL-MCNC: 2.4 MG/DL
IGA SERPL-MCNC: 40 MG/DL
IGG SERPL-MCNC: 331 MG/DL
IGM SERPL-MCNC: 2351 MG/DL

## 2018-11-01 PROCEDURE — 86334 IMMUNOFIX E-PHORESIS SERUM: CPT | Mod: HCNC

## 2018-11-01 PROCEDURE — 99499 UNLISTED E&M SERVICE: CPT | Mod: HCNC,S$GLB,, | Performed by: INTERNAL MEDICINE

## 2018-11-01 PROCEDURE — 82784 ASSAY IGA/IGD/IGG/IGM EACH: CPT | Mod: 59,HCNC

## 2018-11-01 PROCEDURE — 63600175 PHARM REV CODE 636 W HCPCS: Mod: HCNC | Performed by: INTERNAL MEDICINE

## 2018-11-01 PROCEDURE — S0028 INJECTION, FAMOTIDINE, 20 MG: HCPCS | Mod: HCNC | Performed by: INTERNAL MEDICINE

## 2018-11-01 PROCEDURE — 3074F SYST BP LT 130 MM HG: CPT | Mod: CPTII,HCNC,S$GLB, | Performed by: INTERNAL MEDICINE

## 2018-11-01 PROCEDURE — 96415 CHEMO IV INFUSION ADDL HR: CPT | Mod: HCNC

## 2018-11-01 PROCEDURE — 96413 CHEMO IV INFUSION 1 HR: CPT | Mod: HCNC

## 2018-11-01 PROCEDURE — 86334 IMMUNOFIX E-PHORESIS SERUM: CPT | Mod: 26,HCNC,, | Performed by: PATHOLOGY

## 2018-11-01 PROCEDURE — 25000003 PHARM REV CODE 250: Mod: HCNC | Performed by: INTERNAL MEDICINE

## 2018-11-01 PROCEDURE — 83520 IMMUNOASSAY QUANT NOS NONAB: CPT | Mod: HCNC

## 2018-11-01 PROCEDURE — 99999 PR PBB SHADOW E&M-EST. PATIENT-LVL IV: CPT | Mod: PBBFAC,HCNC,, | Performed by: INTERNAL MEDICINE

## 2018-11-01 PROCEDURE — 99215 OFFICE O/P EST HI 40 MIN: CPT | Mod: 25,HCNC,S$GLB, | Performed by: INTERNAL MEDICINE

## 2018-11-01 PROCEDURE — 96375 TX/PRO/DX INJ NEW DRUG ADDON: CPT | Mod: HCNC

## 2018-11-01 PROCEDURE — 99214 OFFICE O/P EST MOD 30 MIN: CPT | Mod: PBBFAC,HCNC,25 | Performed by: INTERNAL MEDICINE

## 2018-11-01 PROCEDURE — 84165 PROTEIN E-PHORESIS SERUM: CPT | Mod: 26,HCNC,, | Performed by: PATHOLOGY

## 2018-11-01 PROCEDURE — 1101F PT FALLS ASSESS-DOCD LE1/YR: CPT | Mod: CPTII,HCNC,S$GLB, | Performed by: INTERNAL MEDICINE

## 2018-11-01 PROCEDURE — 84165 PROTEIN E-PHORESIS SERUM: CPT | Mod: HCNC

## 2018-11-01 PROCEDURE — 3078F DIAST BP <80 MM HG: CPT | Mod: CPTII,HCNC,S$GLB, | Performed by: INTERNAL MEDICINE

## 2018-11-01 PROCEDURE — 82248 BILIRUBIN DIRECT: CPT | Mod: HCNC

## 2018-11-01 RX ORDER — DEXAMETHASONE SODIUM PHOSPHATE 4 MG/ML
8 INJECTION, SOLUTION INTRA-ARTICULAR; INTRALESIONAL; INTRAMUSCULAR; INTRAVENOUS; SOFT TISSUE
Status: COMPLETED | OUTPATIENT
Start: 2018-11-01 | End: 2018-11-01

## 2018-11-01 RX ORDER — DEXAMETHASONE SODIUM PHOSPHATE 4 MG/ML
8 INJECTION, SOLUTION INTRA-ARTICULAR; INTRALESIONAL; INTRAMUSCULAR; INTRAVENOUS; SOFT TISSUE
Status: CANCELLED
Start: 2018-11-01 | End: 2018-11-01

## 2018-11-01 RX ORDER — HEPARIN 100 UNIT/ML
500 SYRINGE INTRAVENOUS
Status: CANCELLED | OUTPATIENT
Start: 2018-11-01

## 2018-11-01 RX ORDER — SODIUM CHLORIDE 9 MG/ML
INJECTION, SOLUTION INTRAVENOUS CONTINUOUS
Status: CANCELLED
Start: 2018-11-01

## 2018-11-01 RX ORDER — FAMOTIDINE 10 MG/ML
20 INJECTION INTRAVENOUS
Status: COMPLETED | OUTPATIENT
Start: 2018-11-01 | End: 2018-11-01

## 2018-11-01 RX ORDER — ACETAMINOPHEN 325 MG/1
650 TABLET ORAL
Status: CANCELLED | OUTPATIENT
Start: 2018-11-01

## 2018-11-01 RX ORDER — MEPERIDINE HYDROCHLORIDE 50 MG/ML
25 INJECTION INTRAMUSCULAR; INTRAVENOUS; SUBCUTANEOUS
Status: CANCELLED | OUTPATIENT
Start: 2018-11-01

## 2018-11-01 RX ORDER — SODIUM CHLORIDE 9 MG/ML
INJECTION, SOLUTION INTRAVENOUS CONTINUOUS
Status: ACTIVE | OUTPATIENT
Start: 2018-11-01 | End: 2018-11-01

## 2018-11-01 RX ORDER — FAMOTIDINE 10 MG/ML
20 INJECTION INTRAVENOUS
Status: CANCELLED | OUTPATIENT
Start: 2018-11-01

## 2018-11-01 RX ORDER — ACETAMINOPHEN 325 MG/1
650 TABLET ORAL
Status: COMPLETED | OUTPATIENT
Start: 2018-11-01 | End: 2018-11-01

## 2018-11-01 RX ORDER — MEPERIDINE HYDROCHLORIDE 25 MG/ML
25 INJECTION INTRAMUSCULAR; INTRAVENOUS; SUBCUTANEOUS
Status: DISPENSED | OUTPATIENT
Start: 2018-11-01 | End: 2018-11-01

## 2018-11-01 RX ORDER — SODIUM CHLORIDE 0.9 % (FLUSH) 0.9 %
10 SYRINGE (ML) INJECTION
Status: CANCELLED | OUTPATIENT
Start: 2018-11-01

## 2018-11-01 RX ORDER — SODIUM CHLORIDE 0.9 % (FLUSH) 0.9 %
10 SYRINGE (ML) INJECTION
Status: DISCONTINUED | OUTPATIENT
Start: 2018-11-01 | End: 2018-11-02 | Stop reason: HOSPADM

## 2018-11-01 RX ORDER — DIPHENHYDRAMINE HYDROCHLORIDE 50 MG/ML
25 INJECTION INTRAMUSCULAR; INTRAVENOUS
Status: CANCELLED
Start: 2018-11-01 | End: 2018-11-01

## 2018-11-01 RX ORDER — DIPHENHYDRAMINE HYDROCHLORIDE 50 MG/ML
25 INJECTION INTRAMUSCULAR; INTRAVENOUS
Status: COMPLETED | OUTPATIENT
Start: 2018-11-01 | End: 2018-11-01

## 2018-11-01 RX ADMIN — DEXAMETHASONE SODIUM PHOSPHATE 8 MG: 4 INJECTION, SOLUTION INTRA-ARTICULAR; INTRALESIONAL; INTRAMUSCULAR; INTRAVENOUS; SOFT TISSUE at 11:11

## 2018-11-01 RX ADMIN — DIPHENHYDRAMINE HYDROCHLORIDE 25 MG: 50 INJECTION, SOLUTION INTRAMUSCULAR; INTRAVENOUS at 11:11

## 2018-11-01 RX ADMIN — FAMOTIDINE 20 MG: 10 INJECTION, SOLUTION INTRAVENOUS at 11:11

## 2018-11-01 RX ADMIN — RITUXIMAB 773 MG: 10 INJECTION, SOLUTION INTRAVENOUS at 11:11

## 2018-11-01 RX ADMIN — SODIUM CHLORIDE: 9 INJECTION, SOLUTION INTRAVENOUS at 01:11

## 2018-11-01 RX ADMIN — ACETAMINOPHEN 650 MG: 325 TABLET ORAL at 11:11

## 2018-11-01 NOTE — PLAN OF CARE
Problem: Patient Care Overview  Goal: Plan of Care Review  Outcome: Ongoing (interventions implemented as appropriate)  Pt stated feeling weak and tired
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
## 2018-11-01 NOTE — PROGRESS NOTES
Subjective:      Patient ID: Homero Tanner is a 78 y.o. male.    Chief Complaint: Lymphoma    The patient is a 78-year-old  male who presents to the [facility] today to discuss further evaluation and management recommendations for Waldenstrom's macroglobulinemia.   I have reviewed all of the patient's relevant clinical history available in the medical record and have utilized this in my evaluation and management recommendations today.  Today the patient reports that he has been having chronic fatigue and edema in his legs.  He continues with follow-up with Dr. Wright with Cardiology. He has been taking his monthly vitamin B12 injections. He is off iron supplementation at this time for his history of iron deficiency anemia which has since resolved.  He is on intermittent ADT for biochemical recurrence of prostate carcinoma and is followed by a urologist outside of the [facility].  His rectal cancer is in remission at this time.  He is on velcade/rituxan/dexamethasone for treatment of Waldenstrom's macroglobulinemia.        Review of Systems   Constitutional: Positive for activity change, appetite change, fatigue and unexpected weight change. Negative for chills, diaphoresis and fever.   HENT: Negative for congestion, dental problem, ear pain, mouth sores, nosebleeds, postnasal drip, sinus pressure, sore throat, tinnitus, trouble swallowing and voice change.    Eyes: Negative for photophobia, pain, discharge, redness, itching and visual disturbance.   Respiratory: Positive for shortness of breath. Negative for cough, chest tightness, wheezing and stridor.    Cardiovascular: Negative for chest pain, palpitations and leg swelling.   Gastrointestinal: Negative for abdominal distention, abdominal pain, anal bleeding, blood in stool, constipation, diarrhea, nausea and vomiting.   Endocrine: Negative for cold intolerance, heat intolerance, polydipsia, polyphagia and polyuria.    Genitourinary: Negative for decreased urine volume, difficulty urinating, dysuria, flank pain, frequency, hematuria and urgency.   Musculoskeletal: Negative for arthralgias, back pain, gait problem, joint swelling and myalgias.   Skin: Negative for pallor and rash.   Allergic/Immunologic: Negative for immunocompromised state.   Neurological: Positive for weakness. Negative for dizziness, syncope, light-headedness and headaches.   Hematological: Negative for adenopathy. Does not bruise/bleed easily.   Psychiatric/Behavioral: Negative for agitation and confusion. The patient is nervous/anxious.           Medication List           Accurate as of 11/1/18 10:07 AM. If you have any questions, ask your nurse or doctor.               CONTINUE taking these medications    acyclovir 400 MG tablet  Commonly known as:  ZOVIRAX  Take 1 tablet (400 mg total) by mouth 2 (two) times daily.     aspirin 81 MG EC tablet  Commonly known as:  ECOTRIN     b complex vitamins tablet     cyanocobalamin 1,000 mcg/mL injection  Give 1cc under the skin once a month     dexamethasone 4 MG Tab  Commonly known as:  DECADRON  Take 5 tablets p.o. once weekly     furosemide 40 MG tablet  Commonly known as:  LASIX  Take 1 tablet (40 mg total) by mouth once daily.     leuprolide 3.75 mg injection  Commonly known as:  LUPRON     metOLazone 2.5 MG tablet  Commonly known as:  ZAROXOLYN  Take 1 tablet (2.5 mg total) by mouth every Mon, Wed, Fri.     metoprolol tartrate 100 MG tablet  Commonly known as:  LOPRESSOR  TAKE 1 TABLET(100 MG) BY MOUTH TWICE DAILY     omeprazole 20 MG capsule  Commonly known as:  PRILOSEC  TAKE 1 CAPSULE(20 MG) BY MOUTH EVERY DAY          Review of patient's allergies indicates:   Allergen Reactions    Lipitor [atorvastatin] Other (See Comments)    Norvasc [amlodipine] Swelling       Lab: I have reviewed all of the patient's relevant lab work available in the medical record and have utilized this in my evaluation and  management recommendations today    Imaging: I have reviewed all of the patient's diagnostic/imaging results available in the medical record and have utilized this in my evaluation and management recommendations today.      Objective:     Vitals:    11/01/18 0853   BP: 110/78   Pulse: 67   Resp: 16   Temp: 97.6 °F (36.4 °C)       Physical Exam   Constitutional: He is oriented to person, place, and time. He appears well-developed and well-nourished. No distress.   HENT:   Head: Normocephalic and atraumatic.   Nose: Nose normal.   Mouth/Throat: Oropharynx is clear and moist. No oropharyngeal exudate.   Eyes: EOM are normal. Pupils are equal, round, and reactive to light. No scleral icterus.   Neck: Normal range of motion. Neck supple. No tracheal deviation present. No thyromegaly present.   Cardiovascular: Normal rate, regular rhythm, normal heart sounds and intact distal pulses.   No murmur heard.  Pulmonary/Chest: Effort normal and breath sounds normal. No stridor. No respiratory distress. He has no wheezes. He has no rales. He exhibits no tenderness.   Abdominal: Soft. Bowel sounds are normal. He exhibits no distension and no mass. There is no tenderness. There is no rebound and no guarding.   Musculoskeletal: Normal range of motion. He exhibits edema. He exhibits no tenderness.   Lymphadenopathy:     He has no cervical adenopathy.   Neurological: He is alert and oriented to person, place, and time. Coordination normal.   Skin: Skin is warm. No rash noted. He is not diaphoretic. No erythema.   Psychiatric: He has a normal mood and affect. His behavior is normal. Judgment and thought content normal.   Nursing note and vitals reviewed.      Assessment:     1. Waldenstrom's macroglobulinemia  2.  Elevated LFTs/elevated bilirubin  3.  Elevated creatinine  4.  Generalized weakness/fatigue  5.  Alcohol abuse    Plan:   1.  I had a detailed discussion with the patient previously with regard to the results of his bone  marrow aspiration biopsy done in July 2018 as well as the results of all of his lab work done in August 2018.  At this time the patient has Waldenstrom's macroglobulinemia.  His IgM quantitative level was initially greater than 4 g.  2.  I had a detailed discussion with him previously with regard to the diagnosis, prognosis and treatment of this malignancy.  We discussed the indications to begin treatment.  I was concerned about possible liver involvement by his lymphoma because of his recently abnormal/worsening LFTs.  I was also concerned about possible renal involvement because of his elevated creatinine with monoclonal chain noted in UPEP immunofixation.  3.  We discussed that his treatment options are complicated because of the above.  I have previously discussed that the best course of action would be to start him on Velcade/dexamethasone/Rituxan.  I decided to hold off on initiation of Rituxan until we get his IgM quantitative level to less than 4 g.  Therefore he was initially started on twice weekly Velcade and weekly dexamethasone at 20 mg p.o. once daily.  We decided to re-evaluate our treatment options based on clinical response and based on how he tolerates this treatment. Velcade was dose adjusted for liver function. He is scheduled to proceed with cycle 3 day 15 of velcade today. Informed consent taken.    4.  All necessary prescriptions including for herpes zoster prophylaxis have been sent to the patient's pharmacy previously and the patient reports compliance with this medication.  5. I have previously strongly recommended cessation of alcohol use.  He reports that he continues to drink 1 drink a day.  6.  I had a detailed discussion with the patient today with regard to the results of all of his recent lab work which shows excellent response to ongoing treatment with interval significant decrease in IgM quantitative level.  I have since added IV Rituxan to his treatment.  The rationale for this  was discussed in detail and he expressed understanding.  We have decided to initiate Rituxan starting with cycle 3 of Velcade.  I have since transitioned Velcade to once weekly.  7. Lab results from today reviewed in detail. Advised to hold metolazone.  Continue Lasix once daily.  I will check ultrasound of the abdomen.  I will check direct bilirubin.  I will also check SPEP, light chains, serum immunofixation and quantitative immunoglobulins for follow-up with regard to his Waldenstrom's macroglobulinemia.  I have discussed with the patient with regard to my concerns with regard to his combined liver/kidney dysfunction.  I am concerned about my ability to continue treatment safely.  At this time I will hold Velcade and dexamethasone.  I will continue with Rituxan.    Follow-up in 1 week with lab to discuss further management.  He knows to call sooner for any additional questions or new problems.

## 2018-11-02 ENCOUNTER — TELEPHONE (OUTPATIENT)
Dept: RADIOLOGY | Facility: HOSPITAL | Age: 78
End: 2018-11-02

## 2018-11-02 LAB
ALBUMIN SERPL ELPH-MCNC: 2.94 G/DL
ALPHA1 GLOB SERPL ELPH-MCNC: 0.37 G/DL
ALPHA2 GLOB SERPL ELPH-MCNC: 0.66 G/DL
B-GLOBULIN SERPL ELPH-MCNC: 2 G/DL
GAMMA GLOB SERPL ELPH-MCNC: 0.33 G/DL
INTERPRETATION SERPL IFE-IMP: NORMAL
KAPPA LC SER QL IA: 1.03 MG/DL
KAPPA LC/LAMBDA SER IA: 0.36
LAMBDA LC SER QL IA: 2.89 MG/DL
PATHOLOGIST INTERPRETATION IFE: NORMAL
PATHOLOGIST INTERPRETATION SPE: NORMAL
PROT SERPL-MCNC: 6.3 G/DL

## 2018-11-05 ENCOUNTER — OFFICE VISIT (OUTPATIENT)
Dept: CARDIOLOGY | Facility: CLINIC | Age: 78
End: 2018-11-05
Payer: MEDICARE

## 2018-11-05 ENCOUNTER — HOSPITAL ENCOUNTER (OUTPATIENT)
Dept: RADIOLOGY | Facility: HOSPITAL | Age: 78
Discharge: HOME | End: 2018-11-05
Attending: INTERNAL MEDICINE
Payer: MEDICARE

## 2018-11-05 VITALS
WEIGHT: 189 LBS | DIASTOLIC BLOOD PRESSURE: 60 MMHG | SYSTOLIC BLOOD PRESSURE: 102 MMHG | BODY MASS INDEX: 27.06 KG/M2 | HEART RATE: 60 BPM | HEIGHT: 70 IN

## 2018-11-05 DIAGNOSIS — C88.0 WALDENSTROM'S MACROGLOBULINEMIA: ICD-10-CM

## 2018-11-05 DIAGNOSIS — I35.1 NONRHEUMATIC AORTIC VALVE INSUFFICIENCY: Chronic | ICD-10-CM

## 2018-11-05 DIAGNOSIS — R74.8 ABNORMAL LIVER ENZYMES: ICD-10-CM

## 2018-11-05 DIAGNOSIS — I36.1 NON-RHEUMATIC TRICUSPID VALVE INSUFFICIENCY: ICD-10-CM

## 2018-11-05 DIAGNOSIS — N18.9 CHRONIC RENAL IMPAIRMENT, UNSPECIFIED CKD STAGE: ICD-10-CM

## 2018-11-05 DIAGNOSIS — E78.2 MIXED HYPERLIPIDEMIA: ICD-10-CM

## 2018-11-05 DIAGNOSIS — R60.0 EDEMA OF BOTH LEGS: ICD-10-CM

## 2018-11-05 DIAGNOSIS — Z78.9 ALCOHOL USE: ICD-10-CM

## 2018-11-05 DIAGNOSIS — R17 ELEVATED BILIRUBIN: ICD-10-CM

## 2018-11-05 DIAGNOSIS — I50.32 CHRONIC DIASTOLIC HEART FAILURE: ICD-10-CM

## 2018-11-05 DIAGNOSIS — I10 ESSENTIAL HYPERTENSION: Primary | Chronic | ICD-10-CM

## 2018-11-05 DIAGNOSIS — I47.20 VT (VENTRICULAR TACHYCARDIA): ICD-10-CM

## 2018-11-05 DIAGNOSIS — I49.3 PVC'S (PREMATURE VENTRICULAR CONTRACTIONS): ICD-10-CM

## 2018-11-05 DIAGNOSIS — K76.0 FATTY LIVER: ICD-10-CM

## 2018-11-05 DIAGNOSIS — I27.20 PULMONARY HYPERTENSION: ICD-10-CM

## 2018-11-05 PROCEDURE — 3078F DIAST BP <80 MM HG: CPT | Mod: CPTII,HCNC,S$GLB, | Performed by: INTERNAL MEDICINE

## 2018-11-05 PROCEDURE — 99214 OFFICE O/P EST MOD 30 MIN: CPT | Mod: HCNC,S$GLB,, | Performed by: INTERNAL MEDICINE

## 2018-11-05 PROCEDURE — 99499 UNLISTED E&M SERVICE: CPT | Mod: HCNC,S$GLB,, | Performed by: INTERNAL MEDICINE

## 2018-11-05 PROCEDURE — 1101F PT FALLS ASSESS-DOCD LE1/YR: CPT | Mod: CPTII,HCNC,S$GLB, | Performed by: INTERNAL MEDICINE

## 2018-11-05 PROCEDURE — 76700 US EXAM ABDOM COMPLETE: CPT | Mod: 26,HCNC,, | Performed by: RADIOLOGY

## 2018-11-05 PROCEDURE — 99999 PR PBB SHADOW E&M-EST. PATIENT-LVL III: CPT | Mod: PBBFAC,HCNC,, | Performed by: INTERNAL MEDICINE

## 2018-11-05 PROCEDURE — 3074F SYST BP LT 130 MM HG: CPT | Mod: CPTII,HCNC,S$GLB, | Performed by: INTERNAL MEDICINE

## 2018-11-05 PROCEDURE — 76700 US EXAM ABDOM COMPLETE: CPT | Mod: TC,HCNC,PO

## 2018-11-05 NOTE — PROGRESS NOTES
Subjective:    Patient ID:  Homero Tanner is a 78 y.o. male who presents for evaluation of Hypertension; Hyperlipidemia; and Congestive Heart Failure        HPI Mr. Tanner returns for f/u.   His current medical conditions include chronic HTN, DD, PHTN, AI, PVCs, TR, dyslipidemia, prostate & rectal cancer. Nonsmoker.    Past hx pertinent for following:  Had LHC 20+ years ago, no significant blockages noted.  He has chronic abnl ecgs.   Also has had PSVT with stress testing in past.    He has h/o thymoma surgery with xrt and also had rectal polyp surgery.  - Stress MPI Aug 2017.  Echo Aug 2017 normal EF, DD, mild-mod PHTN, mild AI, LVH, mild RVE.  Had elevated alkaline phosphatase, referred to GI.  Due to abnl LFTs, statin was stopped.  Echo 9/18 read as EF 50%, normal diastolic function, mild PHTN, LVH.  Holter shows sinus rhythm, frequent PAC/PVCs, NSVT.  Pt has seen Dr. Weaver, heme/onc, dx with Waldenstrom's macroglobulinemia lymphoma malignancy tx with chemo and steroids  Now here.  Lasix cut back to once daily and metolazone stopped by heme/onc.  He has generalized weakness from his current condition.  Weight is stable from last appt, down few pounds.  His leg edema is problematic, bothers him.  He saw Dr. Aguilar, EP, who reviewed his case with no new recommendations noted.  Has BACH.  No chest pain sxs.  He gets dizzy standing up.  BP runs low but stable.   Labs reviewed.       Current Outpatient Medications:     acyclovir (ZOVIRAX) 400 MG tablet, Take 1 tablet (400 mg total) by mouth 2 (two) times daily., Disp: 60 tablet, Rfl: 5    aspirin (ECOTRIN) 81 MG EC tablet, Take 81 mg by mouth once daily., Disp: , Rfl:     b complex vitamins tablet, Take by mouth. 1 tablet Oral Every day, Disp: , Rfl:     cyanocobalamin 1,000 mcg/mL injection, Give 1cc under the skin once a month, Disp: 10 mL, Rfl: 4    dexamethasone (DECADRON) 4 MG Tab, Take 5 tablets p.o. once weekly, Disp: 120 tablet, Rfl: 1     "furosemide (LASIX) 40 MG tablet, Take 1 tablet (40 mg total) by mouth once daily., Disp: 30 tablet, Rfl: 11    leuprolide (LUPRON) 3.75 mg injection, Inject 3.75 mg into the muscle every 3 (three) months. , Disp: , Rfl:     metoprolol tartrate (LOPRESSOR) 100 MG tablet, TAKE 1 TABLET(100 MG) BY MOUTH TWICE DAILY, Disp: 180 tablet, Rfl: 3    omeprazole (PRILOSEC) 20 MG capsule, TAKE 1 CAPSULE(20 MG) BY MOUTH EVERY DAY, Disp: 90 capsule, Rfl: 3      Review of Systems   Constitution: Positive for weakness and malaise/fatigue.   HENT: Negative.    Eyes: Negative.    Cardiovascular: Positive for dyspnea on exertion and leg swelling.   Respiratory: Positive for shortness of breath.    Endocrine: Negative.    Hematologic/Lymphatic: Negative.    Skin: Negative.    Musculoskeletal: Negative.    Gastrointestinal: Negative.    Genitourinary: Negative.    Neurological: Positive for dizziness and light-headedness.   Psychiatric/Behavioral: Negative.    Allergic/Immunologic: Negative.        /60 (BP Location: Right arm, Patient Position: Sitting, BP Method: Medium (Manual))   Pulse 60   Ht 5' 10" (1.778 m)   Wt 85.7 kg (189 lb)   BMI 27.12 kg/m²     Wt Readings from Last 3 Encounters:   11/05/18 85.7 kg (189 lb)   11/01/18 86 kg (189 lb 9.5 oz)   11/01/18 86 kg (189 lb 9.5 oz)     Temp Readings from Last 3 Encounters:   11/01/18 97.6 °F (36.4 °C) (Oral)   10/25/18 97.5 °F (36.4 °C) (Oral)   10/18/18 97.8 °F (36.6 °C) (Oral)     BP Readings from Last 3 Encounters:   11/05/18 102/60   11/01/18 99/64   11/01/18 110/78     Pulse Readings from Last 3 Encounters:   11/05/18 60   11/01/18 75   11/01/18 67          Objective:    Physical Exam   Constitutional: He is oriented to person, place, and time. He appears well-developed and well-nourished.   HENT:   Head: Normocephalic.   Neck: Normal range of motion. Neck supple. Normal carotid pulses, no hepatojugular reflux and no JVD present. Carotid bruit is not present. No " thyromegaly present.   Cardiovascular: Normal rate, S1 normal and S2 normal. A regularly irregular rhythm present. PMI is not displaced. Exam reveals no S3, no S4, no distant heart sounds, no friction rub, no midsystolic click and no opening snap.   No murmur heard.  Pulses:       Radial pulses are 2+ on the right side, and 2+ on the left side.   Pulmonary/Chest: Effort normal and breath sounds normal. He has no wheezes. He has no rales.   Abdominal: Soft. Bowel sounds are normal. He exhibits no distension, no abdominal bruit, no ascites and no mass. There is no tenderness.   Musculoskeletal: Edema: 3+ B LE edema.   Neurological: He is alert and oriented to person, place, and time.   Skin: Skin is warm.   Psychiatric: He has a normal mood and affect. His behavior is normal.   Nursing note and vitals reviewed.      I have reviewed all pertinent labs and cardiac studies.      Chemistry        Component Value Date/Time     (L) 11/01/2018 0845    K 3.6 11/01/2018 0845    CL 91 (L) 11/01/2018 0845    CO2 24 11/01/2018 0845    BUN 20 11/01/2018 0845    CREATININE 2.1 (H) 11/01/2018 0845    GLU 93 11/01/2018 0845        Component Value Date/Time    CALCIUM 7.5 (L) 11/01/2018 0845    ALKPHOS 310 (H) 11/01/2018 0845    AST 86 (H) 11/01/2018 0845    ALT 39 11/01/2018 0845    BILITOT 3.6 (H) 11/01/2018 0845    ESTGFRAFRICA 34 (A) 11/01/2018 0845    EGFRNONAA 29 (A) 11/01/2018 0845        Lab Results   Component Value Date    WBC 10.58 11/01/2018    HGB 10.2 (L) 11/01/2018    HCT 28.5 (L) 11/01/2018     (H) 11/01/2018     (L) 11/01/2018     Lab Results   Component Value Date    HGBA1C 5.2 06/06/2016     Lab Results   Component Value Date    CHOL 160 08/21/2018    CHOL 164 06/06/2016    CHOL 123 05/08/2013     Lab Results   Component Value Date    HDL 65 08/21/2018    HDL 79 (H) 06/06/2016    HDL 60 05/08/2013     Lab Results   Component Value Date    LDLCALC 80.0 08/21/2018    LDLCALC 69.4 06/06/2016     LDLCALC 50.0 (L) 05/08/2013     Lab Results   Component Value Date    TRIG 75 08/21/2018    TRIG 78 06/06/2016    TRIG 64 05/08/2013     Lab Results   Component Value Date    CHOLHDL 40.6 08/21/2018    CHOLHDL 48.2 06/06/2016    CHOLHDL 48.8 05/08/2013           Assessment:       1. Essential hypertension    2. Nonrheumatic aortic valve insufficiency    3. VT (ventricular tachycardia)    4. Chronic renal impairment, unspecified CKD stage    5. Waldenstrom's macroglobulinemia    6. Abnormal liver enzymes    7. Chronic diastolic heart failure    8. Edema of both legs    9. PVC's (premature ventricular contractions)    10. Non-rheumatic tricuspid valve insufficiency    11. Pulmonary hypertension    12. Mixed hyperlipidemia         Plan:             Continue current medical tx.  Continue current dose of Lasix 40 mg qd.  Has significant leg edema and lasix was increased and metolazone added however stopped by heme/onc as his potassium got low and creatinine elevated.  Take extra lasix on prn basis.  Low salt diet.  Elevate legs.  Continue current dose of Metoprolol 100 mg bid.  F/u with heme/onc on malignancy related issues.  Stay off statin due to abnl LFTs which are stable.   Has had significant decline in overall health in last 6 months.    F/u 2 months.

## 2018-11-06 ENCOUNTER — TELEPHONE (OUTPATIENT)
Dept: HEMATOLOGY/ONCOLOGY | Facility: CLINIC | Age: 78
End: 2018-11-06

## 2018-11-06 NOTE — TELEPHONE ENCOUNTER
----- Message from Radu Weaver MD sent at 11/4/2018  6:49 AM CST -----  Please let the patient know that lab results show that his cancer continues to respond very well to his ongoing treatment which is very good news.  Thank you.

## 2018-11-08 ENCOUNTER — OFFICE VISIT (OUTPATIENT)
Dept: HEMATOLOGY/ONCOLOGY | Facility: CLINIC | Age: 78
End: 2018-11-08
Payer: MEDICARE

## 2018-11-08 ENCOUNTER — INFUSION (OUTPATIENT)
Dept: INFUSION THERAPY | Facility: HOSPITAL | Age: 78
End: 2018-11-08
Attending: INTERNAL MEDICINE
Payer: MEDICARE

## 2018-11-08 VITALS
DIASTOLIC BLOOD PRESSURE: 72 MMHG | WEIGHT: 191.38 LBS | OXYGEN SATURATION: 95 % | TEMPERATURE: 97 F | HEIGHT: 70 IN | SYSTOLIC BLOOD PRESSURE: 110 MMHG | HEART RATE: 56 BPM | BODY MASS INDEX: 27.4 KG/M2

## 2018-11-08 VITALS — SYSTOLIC BLOOD PRESSURE: 101 MMHG | DIASTOLIC BLOOD PRESSURE: 67 MMHG | HEART RATE: 62 BPM

## 2018-11-08 DIAGNOSIS — R60.0 EDEMA OF BOTH LEGS: ICD-10-CM

## 2018-11-08 DIAGNOSIS — R74.8 ABNORMAL LIVER ENZYMES: ICD-10-CM

## 2018-11-08 DIAGNOSIS — C88.0 WALDENSTROM'S MACROGLOBULINEMIA: Primary | ICD-10-CM

## 2018-11-08 DIAGNOSIS — K76.0 FATTY LIVER: ICD-10-CM

## 2018-11-08 PROCEDURE — 25000003 PHARM REV CODE 250: Mod: HCNC | Performed by: INTERNAL MEDICINE

## 2018-11-08 PROCEDURE — 99499 UNLISTED E&M SERVICE: CPT | Mod: HCNC,S$GLB,, | Performed by: INTERNAL MEDICINE

## 2018-11-08 PROCEDURE — S0028 INJECTION, FAMOTIDINE, 20 MG: HCPCS | Mod: HCNC | Performed by: INTERNAL MEDICINE

## 2018-11-08 PROCEDURE — 96375 TX/PRO/DX INJ NEW DRUG ADDON: CPT | Mod: HCNC

## 2018-11-08 PROCEDURE — 63600175 PHARM REV CODE 636 W HCPCS: Mod: HCNC | Performed by: INTERNAL MEDICINE

## 2018-11-08 PROCEDURE — 96413 CHEMO IV INFUSION 1 HR: CPT | Mod: HCNC

## 2018-11-08 PROCEDURE — 96415 CHEMO IV INFUSION ADDL HR: CPT | Mod: HCNC

## 2018-11-08 PROCEDURE — 1101F PT FALLS ASSESS-DOCD LE1/YR: CPT | Mod: CPTII,HCNC,S$GLB, | Performed by: INTERNAL MEDICINE

## 2018-11-08 PROCEDURE — 99999 PR PBB SHADOW E&M-EST. PATIENT-LVL III: CPT | Mod: PBBFAC,HCNC,, | Performed by: INTERNAL MEDICINE

## 2018-11-08 PROCEDURE — 3078F DIAST BP <80 MM HG: CPT | Mod: CPTII,HCNC,S$GLB, | Performed by: INTERNAL MEDICINE

## 2018-11-08 PROCEDURE — 99215 OFFICE O/P EST HI 40 MIN: CPT | Mod: 25,HCNC,S$GLB, | Performed by: INTERNAL MEDICINE

## 2018-11-08 PROCEDURE — 3074F SYST BP LT 130 MM HG: CPT | Mod: CPTII,HCNC,S$GLB, | Performed by: INTERNAL MEDICINE

## 2018-11-08 RX ORDER — DEXAMETHASONE SODIUM PHOSPHATE 4 MG/ML
8 INJECTION, SOLUTION INTRA-ARTICULAR; INTRALESIONAL; INTRAMUSCULAR; INTRAVENOUS; SOFT TISSUE
Status: COMPLETED | OUTPATIENT
Start: 2018-11-08 | End: 2018-11-08

## 2018-11-08 RX ORDER — FAMOTIDINE 10 MG/ML
20 INJECTION INTRAVENOUS
Status: CANCELLED | OUTPATIENT
Start: 2018-11-08

## 2018-11-08 RX ORDER — FAMOTIDINE 10 MG/ML
20 INJECTION INTRAVENOUS
Status: COMPLETED | OUTPATIENT
Start: 2018-11-08 | End: 2018-11-08

## 2018-11-08 RX ORDER — SODIUM CHLORIDE 9 MG/ML
INJECTION, SOLUTION INTRAVENOUS CONTINUOUS
Status: CANCELLED
Start: 2018-11-08

## 2018-11-08 RX ORDER — ACETAMINOPHEN 325 MG/1
650 TABLET ORAL
Status: COMPLETED | OUTPATIENT
Start: 2018-11-08 | End: 2018-11-08

## 2018-11-08 RX ORDER — MEPERIDINE HYDROCHLORIDE 50 MG/ML
25 INJECTION INTRAMUSCULAR; INTRAVENOUS; SUBCUTANEOUS
Status: CANCELLED | OUTPATIENT
Start: 2018-11-08

## 2018-11-08 RX ORDER — ACETAMINOPHEN 325 MG/1
650 TABLET ORAL
Status: CANCELLED | OUTPATIENT
Start: 2018-11-08

## 2018-11-08 RX ORDER — DEXAMETHASONE SODIUM PHOSPHATE 4 MG/ML
8 INJECTION, SOLUTION INTRA-ARTICULAR; INTRALESIONAL; INTRAMUSCULAR; INTRAVENOUS; SOFT TISSUE
Status: CANCELLED
Start: 2018-11-08 | End: 2018-11-08

## 2018-11-08 RX ORDER — DIPHENHYDRAMINE HYDROCHLORIDE 50 MG/ML
25 INJECTION INTRAMUSCULAR; INTRAVENOUS
Status: COMPLETED | OUTPATIENT
Start: 2018-11-08 | End: 2018-11-08

## 2018-11-08 RX ORDER — SODIUM CHLORIDE 0.9 % (FLUSH) 0.9 %
10 SYRINGE (ML) INJECTION
Status: CANCELLED | OUTPATIENT
Start: 2018-11-08

## 2018-11-08 RX ORDER — SODIUM CHLORIDE 0.9 % (FLUSH) 0.9 %
10 SYRINGE (ML) INJECTION
Status: DISCONTINUED | OUTPATIENT
Start: 2018-11-08 | End: 2018-11-08 | Stop reason: HOSPADM

## 2018-11-08 RX ORDER — HEPARIN 100 UNIT/ML
500 SYRINGE INTRAVENOUS
Status: CANCELLED | OUTPATIENT
Start: 2018-11-08

## 2018-11-08 RX ORDER — MEPERIDINE HYDROCHLORIDE 25 MG/ML
25 INJECTION INTRAMUSCULAR; INTRAVENOUS; SUBCUTANEOUS
Status: DISCONTINUED | OUTPATIENT
Start: 2018-11-08 | End: 2018-11-08 | Stop reason: HOSPADM

## 2018-11-08 RX ORDER — DIPHENHYDRAMINE HYDROCHLORIDE 50 MG/ML
25 INJECTION INTRAMUSCULAR; INTRAVENOUS
Status: CANCELLED
Start: 2018-11-08 | End: 2018-11-08

## 2018-11-08 RX ADMIN — FAMOTIDINE 20 MG: 10 INJECTION, SOLUTION INTRAVENOUS at 09:11

## 2018-11-08 RX ADMIN — RITUXIMAB 776 MG: 10 INJECTION, SOLUTION INTRAVENOUS at 10:11

## 2018-11-08 RX ADMIN — DIPHENHYDRAMINE HYDROCHLORIDE 25 MG: 50 INJECTION, SOLUTION INTRAMUSCULAR; INTRAVENOUS at 09:11

## 2018-11-08 RX ADMIN — ACETAMINOPHEN 650 MG: 325 TABLET ORAL at 09:11

## 2018-11-08 RX ADMIN — DEXAMETHASONE SODIUM PHOSPHATE 8 MG: 4 INJECTION, SOLUTION INTRA-ARTICULAR; INTRALESIONAL; INTRAMUSCULAR; INTRAVENOUS; SOFT TISSUE at 09:11

## 2018-11-08 NOTE — PATIENT INSTRUCTIONS
[facility]  [address]  [phone] phone     [phone] fax  Hours of Operation: Monday- Friday 8:00am- 5:00pm  After hours phone  [phone]  Hematology / Oncology Physicians on call      Dr. Bridger Cloud                        Please call with any concerns regarding your appointment today.

## 2018-11-08 NOTE — PROGRESS NOTES
Subjective:      Patient ID: Homero Tanner is a 78 y.o. male.    Chief Complaint: Lymphoma    The patient is a 78-year-old  male who presents to the [facility] today to discuss further evaluation and management recommendations for Waldenstrom's macroglobulinemia.   I have reviewed all of the patient's relevant clinical history available in the medical record and have utilized this in my evaluation and management recommendations today.  Today the patient reports that he has been having chronic fatigue and edema in his legs.  He continues with follow-up with Dr. Wright with Cardiology. He has been taking his monthly vitamin B12 injections. He is off iron supplementation at this time for his history of iron deficiency anemia which has since resolved.  He is on intermittent ADT for biochemical recurrence of prostate carcinoma and is followed by a urologist outside of the [facility].  His rectal cancer is in remission at this time.  He is on velcade/rituxan/dexamethasone for treatment of Waldenstrom's macroglobulinemia.        Review of Systems   Constitutional: Positive for activity change, appetite change, fatigue and unexpected weight change. Negative for chills, diaphoresis and fever.   HENT: Negative for congestion, dental problem, ear pain, mouth sores, nosebleeds, postnasal drip, sinus pressure, sore throat, tinnitus, trouble swallowing and voice change.    Eyes: Negative for photophobia, pain, discharge, redness, itching and visual disturbance.   Respiratory: Positive for shortness of breath. Negative for cough, chest tightness, wheezing and stridor.    Cardiovascular: Negative for chest pain, palpitations and leg swelling.   Gastrointestinal: Negative for abdominal distention, abdominal pain, anal bleeding, blood in stool, constipation, diarrhea, nausea and vomiting.   Endocrine: Negative for cold intolerance, heat intolerance, polydipsia, polyphagia and polyuria.    Genitourinary: Negative for decreased urine volume, difficulty urinating, dysuria, flank pain, frequency, hematuria and urgency.   Musculoskeletal: Negative for arthralgias, back pain, gait problem, joint swelling and myalgias.   Skin: Negative for pallor and rash.   Allergic/Immunologic: Negative for immunocompromised state.   Neurological: Positive for weakness. Negative for dizziness, syncope, light-headedness and headaches.   Hematological: Negative for adenopathy. Does not bruise/bleed easily.   Psychiatric/Behavioral: Negative for agitation and confusion. The patient is nervous/anxious.           Medication List           Accurate as of 11/8/18  8:49 AM. If you have any questions, ask your nurse or doctor.               CONTINUE taking these medications    acyclovir 400 MG tablet  Commonly known as:  ZOVIRAX  Take 1 tablet (400 mg total) by mouth 2 (two) times daily.     aspirin 81 MG EC tablet  Commonly known as:  ECOTRIN     b complex vitamins tablet     cyanocobalamin 1,000 mcg/mL injection  Give 1cc under the skin once a month     dexamethasone 4 MG Tab  Commonly known as:  DECADRON  Take 5 tablets p.o. once weekly     furosemide 40 MG tablet  Commonly known as:  LASIX  Take 1 tablet (40 mg total) by mouth once daily.     leuprolide 3.75 mg injection  Commonly known as:  LUPRON     metoprolol tartrate 100 MG tablet  Commonly known as:  LOPRESSOR  TAKE 1 TABLET(100 MG) BY MOUTH TWICE DAILY     omeprazole 20 MG capsule  Commonly known as:  PRILOSEC  TAKE 1 CAPSULE(20 MG) BY MOUTH EVERY DAY          Review of patient's allergies indicates:   Allergen Reactions    Lipitor [atorvastatin] Other (See Comments)    Norvasc [amlodipine] Swelling       Lab: I have reviewed all of the patient's relevant lab work available in the medical record and have utilized this in my evaluation and management recommendations today    Imaging: I have reviewed all of the patient's diagnostic/imaging results available in the  medical record and have utilized this in my evaluation and management recommendations today.      Objective:     Vitals:    11/08/18 0827   BP: 110/72   Pulse: (!) 56   Temp: 97.3 °F (36.3 °C)       Physical Exam   Constitutional: He is oriented to person, place, and time. He appears well-developed and well-nourished. No distress.   HENT:   Head: Normocephalic and atraumatic.   Nose: Nose normal.   Mouth/Throat: Oropharynx is clear and moist. No oropharyngeal exudate.   Eyes: EOM are normal. Pupils are equal, round, and reactive to light. No scleral icterus.   Neck: Normal range of motion. Neck supple. No tracheal deviation present. No thyromegaly present.   Cardiovascular: Normal rate, regular rhythm, normal heart sounds and intact distal pulses.   No murmur heard.  Pulmonary/Chest: Effort normal and breath sounds normal. No stridor. No respiratory distress. He has no wheezes. He has no rales. He exhibits no tenderness.   Abdominal: Soft. Bowel sounds are normal. He exhibits no distension and no mass. There is no tenderness. There is no rebound and no guarding.   Musculoskeletal: Normal range of motion. He exhibits edema. He exhibits no tenderness.   Lymphadenopathy:     He has no cervical adenopathy.   Neurological: He is alert and oriented to person, place, and time. Coordination normal.   Skin: Skin is warm. No rash noted. He is not diaphoretic. No erythema.   Psychiatric: He has a normal mood and affect. His behavior is normal. Judgment and thought content normal.   Nursing note and vitals reviewed.      Assessment:     1. Waldenstrom's macroglobulinemia  2.  Elevated LFTs/elevated bilirubin  3.  Elevated creatinine  4.  Generalized weakness/fatigue  5.  Alcohol abuse    Plan:   1.  I had a detailed discussion with the patient previously with regard to the results of his bone marrow aspiration biopsy done in July 2018 as well as the results of all of his lab work done in August 2018.  At this time the patient  has Waldenstrom's macroglobulinemia.  His IgM quantitative level was initially greater than 4 g.  2.  I had a detailed discussion with him previously with regard to the diagnosis, prognosis and treatment of this malignancy.  We discussed the indications to begin treatment.  I was concerned about possible liver involvement by his lymphoma because of his recently abnormal/worsening LFTs.  I was also concerned about possible renal involvement because of his elevated creatinine with monoclonal chain noted in UPEP immunofixation.  3.  We discussed that his treatment options are complicated because of the above.  I have previously discussed that the best course of action would be to start him on Velcade/dexamethasone/Rituxan.  I decided to hold off on initiation of Rituxan until we get his IgM quantitative level to less than 4 g.  Therefore he was initially started on twice weekly Velcade and weekly dexamethasone at 20 mg p.o. once daily.  We decided to re-evaluate our treatment options based on clinical response and based on how he tolerates this treatment. Velcade was dose adjusted for liver function. He is scheduled to proceed with cycle 3 day 15 of velcade today. Informed consent taken.    4.  All necessary prescriptions including for herpes zoster prophylaxis have been sent to the patient's pharmacy previously and the patient reports compliance with this medication.  5. I have previously strongly recommended cessation of alcohol use.  He reports that he continues to drink 1 drink a day.  6.  I had a detailed discussion with the patient today with regard to the results of all of his recent lab work which shows excellent response to ongoing treatment with interval significant decrease in IgM quantitative level.  I have also previously added IV Rituxan to his treatment.  The rationale for this was discussed in detail and he expressed understanding.  We have decided to initiate Rituxan starting with cycle 3 of Velcade.   I have since transitioned Velcade to once weekly.  7.I will follow up with pending labs. Continue Lasix once daily. I have discussed with the patient with regard to my concerns with regard to his combined liver/kidney dysfunction.  I am concerned about my ability to continue treatment safely.  At this time I will continue to hold Velcade and dexamethasone.  I will continue with Rituxan. Today will be his 4th weekly dose.  He stated that he has been feeling better in the past week since I have held his Velcade and dexamethasone.    Follow-up in 10 days with labs to discuss further management.  He knows to call sooner for any additional questions or new problems.

## 2018-11-09 DIAGNOSIS — C61 PROSTATE CANCER: Primary | ICD-10-CM

## 2018-11-19 ENCOUNTER — OFFICE VISIT (OUTPATIENT)
Dept: HEMATOLOGY/ONCOLOGY | Facility: CLINIC | Age: 78
End: 2018-11-19
Payer: MEDICARE

## 2018-11-19 ENCOUNTER — LAB VISIT (OUTPATIENT)
Dept: LAB | Facility: HOSPITAL | Age: 78
End: 2018-11-19
Attending: INTERNAL MEDICINE
Payer: MEDICARE

## 2018-11-19 VITALS
SYSTOLIC BLOOD PRESSURE: 121 MMHG | BODY MASS INDEX: 27.81 KG/M2 | DIASTOLIC BLOOD PRESSURE: 78 MMHG | WEIGHT: 194.25 LBS | HEART RATE: 66 BPM | TEMPERATURE: 97 F | HEIGHT: 70 IN | OXYGEN SATURATION: 98 %

## 2018-11-19 DIAGNOSIS — C61 PROSTATE CANCER: ICD-10-CM

## 2018-11-19 DIAGNOSIS — K76.0 FATTY LIVER: ICD-10-CM

## 2018-11-19 DIAGNOSIS — R60.0 EDEMA OF BOTH LEGS: ICD-10-CM

## 2018-11-19 DIAGNOSIS — R74.8 ABNORMAL LIVER ENZYMES: ICD-10-CM

## 2018-11-19 DIAGNOSIS — Z85.048 HISTORY OF RECTAL CANCER: ICD-10-CM

## 2018-11-19 DIAGNOSIS — C88.0 WALDENSTROM'S MACROGLOBULINEMIA: Primary | ICD-10-CM

## 2018-11-19 DIAGNOSIS — C88.0 WALDENSTROM'S MACROGLOBULINEMIA: ICD-10-CM

## 2018-11-19 DIAGNOSIS — H53.8 BLURRY VISION, BILATERAL: ICD-10-CM

## 2018-11-19 LAB
ALBUMIN SERPL BCP-MCNC: 2.5 G/DL
ALP SERPL-CCNC: 233 U/L
ALT SERPL W/O P-5'-P-CCNC: 35 U/L
ANION GAP SERPL CALC-SCNC: 13 MMOL/L
AST SERPL-CCNC: 61 U/L
BASOPHILS # BLD AUTO: 0.02 K/UL
BASOPHILS NFR BLD: 0.3 %
BILIRUB SERPL-MCNC: 1.7 MG/DL
BUN SERPL-MCNC: 16 MG/DL
CALCIUM SERPL-MCNC: 8.4 MG/DL
CHLORIDE SERPL-SCNC: 100 MMOL/L
CO2 SERPL-SCNC: 24 MMOL/L
COMPLEXED PSA SERPL-MCNC: 0.62 NG/ML
CREAT SERPL-MCNC: 1.3 MG/DL
DIFFERENTIAL METHOD: ABNORMAL
EOSINOPHIL # BLD AUTO: 0.1 K/UL
EOSINOPHIL NFR BLD: 1.6 %
ERYTHROCYTE [DISTWIDTH] IN BLOOD BY AUTOMATED COUNT: 17.1 %
EST. GFR  (AFRICAN AMERICAN): >60 ML/MIN/1.73 M^2
EST. GFR  (NON AFRICAN AMERICAN): 53 ML/MIN/1.73 M^2
GLUCOSE SERPL-MCNC: 97 MG/DL
HCT VFR BLD AUTO: 29.3 %
HGB BLD-MCNC: 10.1 G/DL
LYMPHOCYTES # BLD AUTO: 0.7 K/UL
LYMPHOCYTES NFR BLD: 9 %
MCH RBC QN AUTO: 37.1 PG
MCHC RBC AUTO-ENTMCNC: 34.5 G/DL
MCV RBC AUTO: 108 FL
MONOCYTES # BLD AUTO: 0.7 K/UL
MONOCYTES NFR BLD: 9.1 %
NEUTROPHILS # BLD AUTO: 6.3 K/UL
NEUTROPHILS NFR BLD: 80 %
PLATELET # BLD AUTO: 275 K/UL
PMV BLD AUTO: 9.9 FL
POTASSIUM SERPL-SCNC: 3.9 MMOL/L
PROT SERPL-MCNC: 6.8 G/DL
RBC # BLD AUTO: 2.72 M/UL
SODIUM SERPL-SCNC: 137 MMOL/L
WBC # BLD AUTO: 7.91 K/UL

## 2018-11-19 PROCEDURE — 99499 UNLISTED E&M SERVICE: CPT | Mod: HCNC,S$GLB,, | Performed by: INTERNAL MEDICINE

## 2018-11-19 PROCEDURE — 36415 COLL VENOUS BLD VENIPUNCTURE: CPT | Mod: HCNC

## 2018-11-19 PROCEDURE — 85025 COMPLETE CBC W/AUTO DIFF WBC: CPT | Mod: HCNC

## 2018-11-19 PROCEDURE — 3078F DIAST BP <80 MM HG: CPT | Mod: CPTII,HCNC,S$GLB, | Performed by: INTERNAL MEDICINE

## 2018-11-19 PROCEDURE — 99214 OFFICE O/P EST MOD 30 MIN: CPT | Mod: HCNC,S$GLB,, | Performed by: INTERNAL MEDICINE

## 2018-11-19 PROCEDURE — 80053 COMPREHEN METABOLIC PANEL: CPT | Mod: HCNC

## 2018-11-19 PROCEDURE — 84153 ASSAY OF PSA TOTAL: CPT | Mod: HCNC

## 2018-11-19 PROCEDURE — 1101F PT FALLS ASSESS-DOCD LE1/YR: CPT | Mod: CPTII,HCNC,S$GLB, | Performed by: INTERNAL MEDICINE

## 2018-11-19 PROCEDURE — 3074F SYST BP LT 130 MM HG: CPT | Mod: CPTII,HCNC,S$GLB, | Performed by: INTERNAL MEDICINE

## 2018-11-19 PROCEDURE — 99999 PR PBB SHADOW E&M-EST. PATIENT-LVL III: CPT | Mod: PBBFAC,HCNC,, | Performed by: INTERNAL MEDICINE

## 2018-11-19 NOTE — PROGRESS NOTES
Subjective:      Patient ID: Homero Tanner is a 78 y.o. male.    Chief Complaint: Follow-up    The patient is a 78-year-old  male who presents to the Hematology Oncology Clinic today to discuss further evaluation and management recommendations for Waldenstrom's macroglobulinemia.   I have reviewed all of the patient's relevant clinical history available in the medical record and have utilized this in my evaluation and management recommendations today.  Today the patient reports that he has been having chronic fatigue and edema in his legs.  He continues with follow-up with Dr. Wright with Cardiology. He has been taking his monthly vitamin B12 injections. He is off iron supplementation at this time for his history of iron deficiency anemia which has since resolved.  He is on intermittent ADT for biochemical recurrence of prostate carcinoma and is followed by a urologist outside of the [facility].  His rectal cancer is in remission at this time.  He is on velcade/rituxan/dexamethasone for treatment of Waldenstrom's macroglobulinemia.        Review of Systems   Constitutional: Positive for activity change, appetite change, fatigue and unexpected weight change. Negative for chills, diaphoresis and fever.   HENT: Negative for congestion, dental problem, ear pain, mouth sores, nosebleeds, postnasal drip, sinus pressure, sore throat, tinnitus, trouble swallowing and voice change.    Eyes: Negative for photophobia, pain, discharge, redness, itching and visual disturbance.   Respiratory: Positive for shortness of breath. Negative for cough, chest tightness, wheezing and stridor.    Cardiovascular: Negative for chest pain, palpitations and leg swelling.   Gastrointestinal: Negative for abdominal distention, abdominal pain, anal bleeding, blood in stool, constipation, diarrhea, nausea and vomiting.   Endocrine: Negative for cold intolerance, heat intolerance, polydipsia, polyphagia and polyuria.    Genitourinary: Negative for decreased urine volume, difficulty urinating, dysuria, flank pain, frequency, hematuria and urgency.   Musculoskeletal: Negative for arthralgias, back pain, gait problem, joint swelling and myalgias.   Skin: Negative for pallor and rash.   Allergic/Immunologic: Negative for immunocompromised state.   Neurological: Positive for weakness. Negative for dizziness, syncope, light-headedness and headaches.   Hematological: Negative for adenopathy. Does not bruise/bleed easily.   Psychiatric/Behavioral: Negative for agitation and confusion. The patient is nervous/anxious.           Medication List           Accurate as of 11/19/18 10:19 AM. If you have any questions, ask your nurse or doctor.               CONTINUE taking these medications    acyclovir 400 MG tablet  Commonly known as:  ZOVIRAX  Take 1 tablet (400 mg total) by mouth 2 (two) times daily.     aspirin 81 MG EC tablet  Commonly known as:  ECOTRIN     b complex vitamins tablet     cyanocobalamin 1,000 mcg/mL injection  Give 1cc under the skin once a month     dexamethasone 4 MG Tab  Commonly known as:  DECADRON  Take 5 tablets p.o. once weekly     furosemide 40 MG tablet  Commonly known as:  LASIX  Take 1 tablet (40 mg total) by mouth once daily.     leuprolide 3.75 mg injection  Commonly known as:  LUPRON     metoprolol tartrate 100 MG tablet  Commonly known as:  LOPRESSOR  TAKE 1 TABLET(100 MG) BY MOUTH TWICE DAILY     omeprazole 20 MG capsule  Commonly known as:  PRILOSEC  TAKE 1 CAPSULE(20 MG) BY MOUTH EVERY DAY          Review of patient's allergies indicates:   Allergen Reactions    Lipitor [atorvastatin] Other (See Comments)    Norvasc [amlodipine] Swelling       Lab: I have reviewed all of the patient's relevant lab work available in the medical record and have utilized this in my evaluation and management recommendations today    Imaging: I have reviewed all of the patient's diagnostic/imaging results available in the  medical record and have utilized this in my evaluation and management recommendations today.      Objective:     Vitals:    11/19/18 0951   BP: 121/78   Pulse: 66   Temp: 97.4 °F (36.3 °C)       Physical Exam   Constitutional: He is oriented to person, place, and time. He appears well-developed and well-nourished. No distress.   HENT:   Head: Normocephalic and atraumatic.   Nose: Nose normal.   Mouth/Throat: Oropharynx is clear and moist. No oropharyngeal exudate.   Eyes: EOM are normal. Pupils are equal, round, and reactive to light. No scleral icterus.   Neck: Normal range of motion. Neck supple. No tracheal deviation present. No thyromegaly present.   Cardiovascular: Normal rate, regular rhythm, normal heart sounds and intact distal pulses.   No murmur heard.  Pulmonary/Chest: Effort normal and breath sounds normal. No stridor. No respiratory distress. He has no wheezes. He has no rales. He exhibits no tenderness.   Abdominal: Soft. Bowel sounds are normal. He exhibits no distension and no mass. There is no tenderness. There is no rebound and no guarding.   Musculoskeletal: Normal range of motion. He exhibits edema. He exhibits no tenderness.   Lymphadenopathy:     He has no cervical adenopathy.   Neurological: He is alert and oriented to person, place, and time. Coordination normal.   Skin: Skin is warm. No rash noted. He is not diaphoretic. No erythema.   Psychiatric: He has a normal mood and affect. His behavior is normal. Judgment and thought content normal.   Nursing note and vitals reviewed.      Assessment:     1. Waldenstrom's macroglobulinemia  2.  Elevated LFTs/elevated bilirubin  3.  Elevated creatinine - resolved  4.  Generalized weakness/fatigue - improved  5.  Alcohol abuse  6.  Bilateral blurry vision    Plan:   1.  I had a detailed discussion with the patient previously with regard to the results of his bone marrow aspiration biopsy done in July 2018 as well as the results of all of his lab work  done in August 2018.  At this time the patient has Waldenstrom's macroglobulinemia.  His IgM quantitative level was initially greater than 4 g.  2.  I had a detailed discussion with him previously with regard to the diagnosis, prognosis and treatment of this malignancy.  We discussed the indications to begin treatment.  I was concerned about possible liver involvement by his lymphoma because of his recently abnormal/worsening LFTs.  I was also concerned about possible renal involvement because of his elevated creatinine with monoclonal chain noted in UPEP immunofixation.  3.  We discussed that his treatment options are complicated because of the above.  I have previously discussed that the best course of action would be to start him on Velcade/dexamethasone/Rituxan.  I decided to hold off on initiation of Rituxan until we get his IgM quantitative level to less than 4 g.  Therefore he was initially started on twice weekly Velcade and weekly dexamethasone at 20 mg p.o. once daily.  We decided to re-evaluate our treatment options based on clinical response and based on how he tolerates this treatment. Velcade was dose adjusted for liver function. He is scheduled to proceed with cycle 3 day 15 of velcade today. Informed consent taken.    4.  All necessary prescriptions including for herpes zoster prophylaxis have been sent to the patient's pharmacy previously and the patient reports compliance with this medication.  5. I have previously strongly recommended cessation of alcohol use.  He reports that he continues to drink 1 drink a day.  6.  I had a detailed discussion with the patient today with regard to the results of all of his recent lab work which shows excellent response to ongoing treatment with interval significant decrease in IgM quantitative level.  I have also previously added IV Rituxan to his treatment.  The rationale for this was discussed in detail and he expressed understanding.  We have decided to  initiate Rituxan starting with cycle 3 of Velcade.  I have since transitioned Velcade to once weekly.  7.I will follow up with pending labs. Continue Lasix once daily. I have discussed with the patient with regard to my concerns with regard to his combined liver/kidney dysfunction.  I am concerned about my ability to continue treatment safely.  At this time I will continue to hold Velcade and dexamethasone.  He has completed 4 weekly doses of rituxan.  He stated that he has been feeling better in the past couple of weeks since I have held his Velcade and dexamethasone. At this point we have decided to proceed with close observation.  8. He will follow up with his ophthalmologist for further evaluation of his blurry vision.    Follow-up in 1 month with labs 1 week before visit. He knows to call sooner for any additional questions or new problems.

## 2018-11-20 ENCOUNTER — OFFICE VISIT (OUTPATIENT)
Dept: OPHTHALMOLOGY | Facility: CLINIC | Age: 78
End: 2018-11-20
Payer: MEDICARE

## 2018-11-20 DIAGNOSIS — H25.13 NUCLEAR SCLEROSIS, BILATERAL: Primary | ICD-10-CM

## 2018-11-20 DIAGNOSIS — H25.013 CORTICAL AGE-RELATED CATARACT OF BOTH EYES: ICD-10-CM

## 2018-11-20 DIAGNOSIS — H52.4 HYPEROPIA WITH PRESBYOPIA, BILATERAL: ICD-10-CM

## 2018-11-20 DIAGNOSIS — H52.03 HYPEROPIA WITH PRESBYOPIA, BILATERAL: ICD-10-CM

## 2018-11-20 PROCEDURE — 92014 COMPRE OPH EXAM EST PT 1/>: CPT | Mod: HCNC,S$GLB,, | Performed by: OPTOMETRIST

## 2018-11-20 PROCEDURE — 99999 PR PBB SHADOW E&M-EST. PATIENT-LVL I: CPT | Mod: PBBFAC,HCNC,, | Performed by: OPTOMETRIST

## 2018-11-20 NOTE — PROGRESS NOTES
HPI     Pts last exam was 11/29/16 with DNL. PT c/o overall blurred va and wears   gls full time.   Pt undergoing chemo therapy and c/o decreased overall va  HPI    Any vision changes since last exam: decreased overall  Eye pain: no  Other ocular symptoms: no    Do you wear currently wear glasses or contacts? gls    Interested in contacts today? no    Do you plan on getting new glasses today? If needed      Last edited by Georgette Crandall MA on 11/20/2018  2:55 PM. (History)            Assessment /Plan     For exam results, see Encounter Report.      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

## 2018-11-23 ENCOUNTER — OFFICE VISIT (OUTPATIENT)
Dept: OPHTHALMOLOGY | Facility: CLINIC | Age: 78
End: 2018-11-23
Payer: MEDICARE

## 2018-11-23 DIAGNOSIS — C88.0 WALDENSTROM'S MACROGLOBULINEMIA: ICD-10-CM

## 2018-11-23 DIAGNOSIS — H18.519 FUCHS' CORNEAL DYSTROPHY: ICD-10-CM

## 2018-11-23 DIAGNOSIS — H25.12 NUCLEAR SENILE CATARACT OF LEFT EYE: ICD-10-CM

## 2018-11-23 DIAGNOSIS — H25.11 NUCLEAR SENILE CATARACT OF RIGHT EYE: Primary | ICD-10-CM

## 2018-11-23 PROCEDURE — 99999 PR PBB SHADOW E&M-EST. PATIENT-LVL I: CPT | Mod: PBBFAC,HCNC,, | Performed by: OPHTHALMOLOGY

## 2018-11-23 PROCEDURE — 92014 COMPRE OPH EXAM EST PT 1/>: CPT | Mod: HCNC,S$GLB,, | Performed by: OPHTHALMOLOGY

## 2018-11-23 NOTE — PROGRESS NOTES
HPI     The patient saw Dr.Lafluer on 11/20/18 and was referred to  for   cataracts. The patient states his vision has been gradually getting   blurrier. The patient finished chemo about 3-4 weeks.     Originally from [place], [place]  Finished school in [place].  Duck gurpreet    1. CATS OU    Last edited by Kathy Castro on 11/23/2018  2:13 PM. (History)            Assessment /Plan     For exam results, see Encounter Report.      ICD-10-CM ICD-9-CM    1. Nuclear senile cataract of right eye H25.11 366.16 Visually Significant Cataract OD > OS  Patient reports decreased vision consistent with the clinical amount of lenticular opacity, which reaches the level of visual significance and affects activities of daily living including reading and glare. Risks, benefits, and alternatives to cataract surgery were discussed.   The pt expressed a desire to proceed with surgery with the potential for some reasonable degree of visual improvement.    Discussed IOL options and refractive outcomes for this patient.    Phaco right eye,   Topical, viscoat  monofocal IOL.  Will aim for distance    Explained that patient may need glasses after surgery.  Discussed that vision may be limited by fuch's        Do not recommend multifocal due to being on Chemo     2. Nuclear senile cataract of left eye H25.12 366.16 Will do OD first.    3. Waldenstrom's macroglobulinemia C88.0 273.3 Followed by oncology    4. Fuchs' corneal dystrophy H18.51 371.57 Follow. Add systane ultra qid OU        Return to clinic for ascan-will place orders next visit and see teri next visit.   OD penciled in for 12/6  OS penciled in for 12/20

## 2018-11-28 ENCOUNTER — OFFICE VISIT (OUTPATIENT)
Dept: OPHTHALMOLOGY | Facility: CLINIC | Age: 78
End: 2018-11-28
Payer: MEDICARE

## 2018-11-28 DIAGNOSIS — H25.11 NUCLEAR SENILE CATARACT OF RIGHT EYE: Primary | ICD-10-CM

## 2018-11-28 DIAGNOSIS — H18.519 FUCHS' CORNEAL DYSTROPHY: ICD-10-CM

## 2018-11-28 DIAGNOSIS — H25.12 NUCLEAR SENILE CATARACT OF LEFT EYE: ICD-10-CM

## 2018-11-28 PROCEDURE — 99999 PR PBB SHADOW E&M-EST. PATIENT-LVL II: CPT | Mod: PBBFAC,HCNC,, | Performed by: OPHTHALMOLOGY

## 2018-11-28 PROCEDURE — 99499 UNLISTED E&M SERVICE: CPT | Mod: HCNC,S$GLB,, | Performed by: OPHTHALMOLOGY

## 2018-11-28 RX ORDER — KETOROLAC TROMETHAMINE 5 MG/ML
1 SOLUTION OPHTHALMIC 4 TIMES DAILY
Qty: 1 BOTTLE | Refills: 3 | Status: SHIPPED | OUTPATIENT
Start: 2018-11-28 | End: 2018-12-05

## 2018-11-28 RX ORDER — POLYMYXIN B SULFATE AND TRIMETHOPRIM 1; 10000 MG/ML; [USP'U]/ML
1 SOLUTION OPHTHALMIC EVERY 4 HOURS
Qty: 1 BOTTLE | Refills: 1 | Status: SHIPPED | OUTPATIENT
Start: 2018-11-28 | End: 2018-12-03

## 2018-11-28 RX ORDER — DIFLUPREDNATE OPHTHALMIC 0.5 MG/ML
1 EMULSION OPHTHALMIC 4 TIMES DAILY
Qty: 1 BOTTLE | Refills: 1 | Status: SHIPPED | OUTPATIENT
Start: 2018-11-28 | End: 2018-12-28

## 2018-11-28 NOTE — PROGRESS NOTES
HPI     Patient returns for a cataract preop, OD is set for 12/06/18, patient   needs to see Zoya , patient needs all drops sent into pharmacy, patient   is still taking a baby aspirin.        Originally from [place], [place]  Finished school in [place].  Duck gurpreet    1. CATS OU    Last edited by BARBIE Howell on 11/28/2018 11:15 AM. (History)            Assessment /Plan     For exam results, see Encounter Report.      ICD-10-CM ICD-9-CM    1. Nuclear senile cataract of right eye H25.11 366.16 Visually Significant Cataract OD  Patient reports decreased vision consistent with the clinical amount of lenticular opacity, which reaches the level of visual significance and affects activities of daily living including reading and glare. Risks, benefits, and alternatives to cataract surgery were discussed.   The pt expressed a desire to proceed with surgery with the potential for some reasonable degree of visual improvement.    Discussed IOL options and refractive outcomes for this patient.    Phaco right eye,   Topical, viscoat  monofocal IOL.  Will aim for distance  Referral to [facility] for [facility]hthalmic surgery  Prescriptions sent for preoperative medications  Durezol, polytrim and ketorolac  Explained that patient may need glasses after surgery.  Discussed that vision may be limited by fuch's     Do not recommend multifocal due to being on chemo.    Declines toric.   Good MOCT          2. Nuclear senile cataract of left eye H25.12 366.16 Will do OD first.    3. 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

## 2018-11-29 ENCOUNTER — TELEPHONE (OUTPATIENT)
Dept: ENDOSCOPY | Facility: HOSPITAL | Age: 78
End: 2018-11-29

## 2018-11-29 DIAGNOSIS — K63.5 POLYP OF COLON, UNSPECIFIED PART OF COLON, UNSPECIFIED TYPE: Primary | ICD-10-CM

## 2018-11-30 ENCOUNTER — TELEPHONE (OUTPATIENT)
Dept: ENDOSCOPY | Facility: HOSPITAL | Age: 78
End: 2018-11-30

## 2018-12-05 NOTE — TELEPHONE ENCOUNTER
Dr Waldemar Bob is booked for Dec and January in [place]. I have changed the location to route the case back to your depot.     Randi

## 2018-12-06 ENCOUNTER — TELEPHONE (OUTPATIENT)
Dept: OPHTHALMOLOGY | Facility: CLINIC | Age: 78
End: 2018-12-06

## 2018-12-06 ENCOUNTER — OUTSIDE PLACE OF SERVICE (OUTPATIENT)
Dept: ADMINISTRATIVE | Facility: OTHER | Age: 78
End: 2018-12-06
Payer: MEDICARE

## 2018-12-06 PROCEDURE — 67800 REMOVE EYELID LESION: CPT | Mod: E3,,, | Performed by: OPHTHALMOLOGY

## 2018-12-06 NOTE — TELEPHONE ENCOUNTER
----- Message from Leandro Oquendo sent at 12/6/2018  2:42 PM CST -----  Contact: JenniferJessika's wife  She is calling in regards to getting a further instruction for thept after his procedure today, please advise [phone]

## 2018-12-06 NOTE — TELEPHONE ENCOUNTER
Spoke to patient wife and advised to use RX given by [facility] today, use cold compresses today, and alternate between warm/cold compresses starting tomorrow.  Make follow-up appointment  For 12/18 @1803.

## 2018-12-11 ENCOUNTER — OFFICE VISIT (OUTPATIENT)
Dept: OPHTHALMOLOGY | Facility: CLINIC | Age: 78
End: 2018-12-11
Payer: MEDICARE

## 2018-12-11 DIAGNOSIS — H00.033 ABSCESS OF RIGHT EYELID: ICD-10-CM

## 2018-12-11 DIAGNOSIS — Z98.890 POST-OPERATIVE STATE: Primary | ICD-10-CM

## 2018-12-11 PROCEDURE — 99024 POSTOP FOLLOW-UP VISIT: CPT | Mod: HCNC,S$GLB,, | Performed by: OPHTHALMOLOGY

## 2018-12-11 PROCEDURE — 99999 PR PBB SHADOW E&M-EST. PATIENT-LVL I: CPT | Mod: PBBFAC,HCNC,, | Performed by: OPHTHALMOLOGY

## 2018-12-11 NOTE — PROGRESS NOTES
HPI     Post-op Evaluation      Additional comments: Chalazion removal OD F/U              Comments     The patient states his right lid is feeling fine and he denies any ocular   pain at this time.  S/p blepharotomy OD for lid abscess 12/06. Pt presented for phaco OD and   was noted to have lid abscess as well as chalazion. This was drained and   chalazion was removed.     Originally from [place], [place]  Finished school in [place].  Duck gurpreet    1. CATS OU  2. Chalazion removal OD 12/6/18            Last edited by Victor M Cárdenas MD on 12/11/2018 11:41 AM. (History)            Assessment /Plan     For exam results, see Encounter Report.      ICD-10-CM ICD-9-CM    1. Post-operative state Z98.890 V45.89 S/p blepharotomy for lid abscess and chalazion removal OD 12/06/2018.     Infection still present RLL and chalazion upper lid RUL    2. Right eyelid abscess-still present s/p blepharotomy.       Continue bactrim bid po until out then d/c   Begin warm compresses tid   Add polytrim qid OD       Return to clinic 1 week with s/p blepharotomy OD

## 2018-12-12 ENCOUNTER — LAB VISIT (OUTPATIENT)
Dept: LAB | Facility: HOSPITAL | Age: 78
End: 2018-12-12
Attending: INTERNAL MEDICINE
Payer: MEDICARE

## 2018-12-12 DIAGNOSIS — H53.8 BLURRY VISION, BILATERAL: ICD-10-CM

## 2018-12-12 DIAGNOSIS — R74.8 ABNORMAL LIVER ENZYMES: ICD-10-CM

## 2018-12-12 DIAGNOSIS — Z85.048 HISTORY OF RECTAL CANCER: ICD-10-CM

## 2018-12-12 DIAGNOSIS — C88.0 WALDENSTROM'S MACROGLOBULINEMIA: ICD-10-CM

## 2018-12-12 DIAGNOSIS — C61 PROSTATE CANCER: ICD-10-CM

## 2018-12-12 DIAGNOSIS — R60.0 EDEMA OF BOTH LEGS: ICD-10-CM

## 2018-12-12 DIAGNOSIS — K76.0 FATTY LIVER: ICD-10-CM

## 2018-12-12 LAB
ALBUMIN SERPL BCP-MCNC: 2.7 G/DL
ALP SERPL-CCNC: 285 U/L
ALT SERPL W/O P-5'-P-CCNC: 22 U/L
ANION GAP SERPL CALC-SCNC: 13 MMOL/L
AST SERPL-CCNC: 59 U/L
BASOPHILS # BLD AUTO: 0.04 K/UL
BASOPHILS NFR BLD: 0.5 %
BILIRUB SERPL-MCNC: 1 MG/DL
BUN SERPL-MCNC: 17 MG/DL
CALCIUM SERPL-MCNC: 9.5 MG/DL
CHLORIDE SERPL-SCNC: 104 MMOL/L
CO2 SERPL-SCNC: 21 MMOL/L
CREAT SERPL-MCNC: 1.6 MG/DL
DIFFERENTIAL METHOD: ABNORMAL
EOSINOPHIL # BLD AUTO: 0.2 K/UL
EOSINOPHIL NFR BLD: 1.9 %
ERYTHROCYTE [DISTWIDTH] IN BLOOD BY AUTOMATED COUNT: 14.3 %
EST. GFR  (AFRICAN AMERICAN): 47 ML/MIN/1.73 M^2
EST. GFR  (NON AFRICAN AMERICAN): 41 ML/MIN/1.73 M^2
GLUCOSE SERPL-MCNC: 126 MG/DL
HCT VFR BLD AUTO: 31.1 %
HGB BLD-MCNC: 10.2 G/DL
IGA SERPL-MCNC: 39 MG/DL
IGG SERPL-MCNC: 270 MG/DL
IGM SERPL-MCNC: 2171 MG/DL
LDH SERPL L TO P-CCNC: 243 U/L
LYMPHOCYTES # BLD AUTO: 1.4 K/UL
LYMPHOCYTES NFR BLD: 17.7 %
MCH RBC QN AUTO: 35.1 PG
MCHC RBC AUTO-ENTMCNC: 32.8 G/DL
MCV RBC AUTO: 107 FL
MONOCYTES # BLD AUTO: 0.7 K/UL
MONOCYTES NFR BLD: 8.2 %
NEUTROPHILS # BLD AUTO: 5.8 K/UL
NEUTROPHILS NFR BLD: 71.7 %
PLATELET # BLD AUTO: 297 K/UL
PMV BLD AUTO: 9.8 FL
POTASSIUM SERPL-SCNC: 4.9 MMOL/L
PROT SERPL-MCNC: 6.8 G/DL
RBC # BLD AUTO: 2.91 M/UL
SODIUM SERPL-SCNC: 138 MMOL/L
WBC # BLD AUTO: 8.08 K/UL

## 2018-12-12 PROCEDURE — 83520 IMMUNOASSAY QUANT NOS NONAB: CPT | Mod: HCNC

## 2018-12-12 PROCEDURE — 80053 COMPREHEN METABOLIC PANEL: CPT | Mod: HCNC

## 2018-12-12 PROCEDURE — 86334 IMMUNOFIX E-PHORESIS SERUM: CPT | Mod: 26,HCNC,, | Performed by: PATHOLOGY

## 2018-12-12 PROCEDURE — 85025 COMPLETE CBC W/AUTO DIFF WBC: CPT | Mod: HCNC

## 2018-12-12 PROCEDURE — 83615 LACTATE (LD) (LDH) ENZYME: CPT | Mod: HCNC

## 2018-12-12 PROCEDURE — 36415 COLL VENOUS BLD VENIPUNCTURE: CPT | Mod: HCNC

## 2018-12-12 PROCEDURE — 82784 ASSAY IGA/IGD/IGG/IGM EACH: CPT | Mod: 59,HCNC

## 2018-12-12 PROCEDURE — 86334 IMMUNOFIX E-PHORESIS SERUM: CPT | Mod: HCNC

## 2018-12-12 PROCEDURE — 84165 PROTEIN E-PHORESIS SERUM: CPT | Mod: HCNC

## 2018-12-12 PROCEDURE — 84165 PROTEIN E-PHORESIS SERUM: CPT | Mod: 26,HCNC,, | Performed by: PATHOLOGY

## 2018-12-13 LAB
ALBUMIN SERPL ELPH-MCNC: 3 G/DL
ALPHA1 GLOB SERPL ELPH-MCNC: 0.4 G/DL
ALPHA2 GLOB SERPL ELPH-MCNC: 0.83 G/DL
B-GLOBULIN SERPL ELPH-MCNC: 1.98 G/DL
GAMMA GLOB SERPL ELPH-MCNC: 0.29 G/DL
INTERPRETATION SERPL IFE-IMP: NORMAL
KAPPA LC SER QL IA: 1.59 MG/DL
KAPPA LC/LAMBDA SER IA: 0.43
LAMBDA LC SER QL IA: 3.7 MG/DL
PATHOLOGIST INTERPRETATION IFE: NORMAL
PATHOLOGIST INTERPRETATION SPE: NORMAL
PROT SERPL-MCNC: 6.5 G/DL

## 2018-12-18 ENCOUNTER — OFFICE VISIT (OUTPATIENT)
Dept: OPHTHALMOLOGY | Facility: CLINIC | Age: 78
End: 2018-12-18
Payer: MEDICARE

## 2018-12-18 DIAGNOSIS — H00.033 ABSCESS OF RIGHT EYELID: Primary | ICD-10-CM

## 2018-12-18 DIAGNOSIS — H01.00B BLEPHARITIS OF BOTH UPPER AND LOWER EYELID OF LEFT EYE, UNSPECIFIED TYPE: ICD-10-CM

## 2018-12-18 PROCEDURE — 99999 PR PBB SHADOW E&M-EST. PATIENT-LVL I: CPT | Mod: PBBFAC,HCNC,, | Performed by: OPHTHALMOLOGY

## 2018-12-18 PROCEDURE — 99024 POSTOP FOLLOW-UP VISIT: CPT | Mod: HCNC,S$GLB,, | Performed by: OPHTHALMOLOGY

## 2018-12-18 RX ORDER — SULFAMETHOXAZOLE AND TRIMETHOPRIM 800; 160 MG/1; MG/1
1 TABLET ORAL 2 TIMES DAILY
Qty: 28 TABLET | Refills: 0 | Status: SHIPPED | OUTPATIENT
Start: 2018-12-18 | End: 2019-01-01

## 2018-12-18 RX ORDER — NEOMYCIN SULFATE, POLYMYXIN B SULFATE, AND DEXAMETHASONE 3.5; 10000; 1 MG/G; [USP'U]/G; MG/G
OINTMENT OPHTHALMIC 3 TIMES DAILY
Qty: 1 TUBE | Refills: 1 | Status: SHIPPED | OUTPATIENT
Start: 2018-12-18 | End: 2019-01-04

## 2018-12-18 RX ORDER — POLYMYXIN B SULFATE AND TRIMETHOPRIM 1; 10000 MG/ML; [USP'U]/ML
1 SOLUTION OPHTHALMIC EVERY 4 HOURS
Qty: 10 ML | Refills: 1 | Status: SHIPPED | OUTPATIENT
Start: 2018-12-18 | End: 2019-01-28

## 2018-12-18 NOTE — PROGRESS NOTES
HPI     Post-op Evaluation      Additional comments: 1 week s/p blepharotomy OD              Comments     Vision stable  No pain   Slight irritation  1. CATS OU  2. Chalazion removal OD 12/6/18/ blepharotomy for lid abscess      Warm Compress TID  Polytrim qid OD          Last edited by Jada Kessler, PCT on 12/18/2018 11:14 AM.   (History)            Assessment /Plan     For exam results, see Encounter Report.      ICD-10-CM ICD-9-CM    1. Abscess of right eyelid H00.033 373.13 S/p blepharotomy OD 12/6/18. Thickened tissue remains. Will do another round of antibiotics and continue drops.    2. Blepharitis of both upper and lower eyelid of left eye, unspecified type H01.00A 373.00 Begin polytrim and dexacine.        Resume bactrim bid for 14 days   Add dexacine marcel TID OD, qhs OS  Use polytrim qid OU due to blepharitis OS     Warm Compress TID    Return to clinic 2 weeks -will postpone phaco OD until January 3rd

## 2018-12-19 ENCOUNTER — OFFICE VISIT (OUTPATIENT)
Dept: HEMATOLOGY/ONCOLOGY | Facility: CLINIC | Age: 78
End: 2018-12-19
Payer: MEDICARE

## 2018-12-19 VITALS
BODY MASS INDEX: 28.78 KG/M2 | HEART RATE: 60 BPM | HEIGHT: 70 IN | DIASTOLIC BLOOD PRESSURE: 58 MMHG | TEMPERATURE: 97 F | SYSTOLIC BLOOD PRESSURE: 97 MMHG | WEIGHT: 201.06 LBS

## 2018-12-19 DIAGNOSIS — Z85.048 HISTORY OF RECTAL CANCER: ICD-10-CM

## 2018-12-19 DIAGNOSIS — C61 PROSTATE CANCER: ICD-10-CM

## 2018-12-19 DIAGNOSIS — D51.8 OTHER VITAMIN B12 DEFICIENCY ANEMIA: ICD-10-CM

## 2018-12-19 DIAGNOSIS — C88.0 WALDENSTROM'S MACROGLOBULINEMIA: Primary | ICD-10-CM

## 2018-12-19 DIAGNOSIS — C83.50 LYMPHOBLASTIC LYMPHOMA, UNSPECIFIED BODY REGION: ICD-10-CM

## 2018-12-19 PROCEDURE — 99214 OFFICE O/P EST MOD 30 MIN: CPT | Mod: HCNC,S$GLB,, | Performed by: INTERNAL MEDICINE

## 2018-12-19 PROCEDURE — 99499 UNLISTED E&M SERVICE: CPT | Mod: HCNC,S$GLB,, | Performed by: INTERNAL MEDICINE

## 2018-12-19 PROCEDURE — 1101F PT FALLS ASSESS-DOCD LE1/YR: CPT | Mod: CPTII,HCNC,S$GLB, | Performed by: INTERNAL MEDICINE

## 2018-12-19 PROCEDURE — 99999 PR PBB SHADOW E&M-EST. PATIENT-LVL III: CPT | Mod: PBBFAC,HCNC,, | Performed by: INTERNAL MEDICINE

## 2018-12-19 PROCEDURE — 3074F SYST BP LT 130 MM HG: CPT | Mod: CPTII,HCNC,S$GLB, | Performed by: INTERNAL MEDICINE

## 2018-12-19 PROCEDURE — 3078F DIAST BP <80 MM HG: CPT | Mod: CPTII,HCNC,S$GLB, | Performed by: INTERNAL MEDICINE

## 2018-12-19 NOTE — PROGRESS NOTES
Subjective:      Patient ID: Homero Tanner is a 78 y.o. male.    Chief Complaint: Lymphoma    The patient is a 78-year-old  male who presents to the [facility] today to discuss further evaluation and management recommendations for Waldenstrom's macroglobulinemia.   I have reviewed all of the patient's relevant clinical history available in the medical record and have utilized this in my evaluation and management recommendations today.  Today the patient reports that he has been having chronic fatigue and edema in his legs. His fatigue is improved since being off treatment.  He continues with follow-up with Dr. Wright with Cardiology. He has been taking his monthly vitamin B12 injections. He is off iron supplementation at this time for his history of iron deficiency anemia which has since resolved.  He is on intermittent ADT for biochemical recurrence of prostate carcinoma and is followed by a urologist outside of the [facility].  His rectal cancer is in remission at this time.  He was on velcade/rituxan/dexamethasone for treatment of Waldenstrom's macroglobulinemia with his last treatment given in Oct 2018.        Review of Systems   Constitutional: Positive for activity change, appetite change, fatigue and unexpected weight change. Negative for chills, diaphoresis and fever.   HENT: Negative for congestion, dental problem, ear pain, mouth sores, nosebleeds, postnasal drip, sinus pressure, sore throat, tinnitus, trouble swallowing and voice change.    Eyes: Negative for photophobia, pain, discharge, redness, itching and visual disturbance.   Respiratory: Positive for shortness of breath. Negative for cough, chest tightness, wheezing and stridor.    Cardiovascular: Negative for chest pain, palpitations and leg swelling.   Gastrointestinal: Negative for abdominal distention, abdominal pain, anal bleeding, blood in stool, constipation, diarrhea, nausea and vomiting.   Endocrine:  Negative for cold intolerance, heat intolerance, polydipsia, polyphagia and polyuria.   Genitourinary: Negative for decreased urine volume, difficulty urinating, dysuria, flank pain, frequency, hematuria and urgency.   Musculoskeletal: Negative for arthralgias, back pain, gait problem, joint swelling and myalgias.   Skin: Negative for pallor and rash.   Allergic/Immunologic: Negative for immunocompromised state.   Neurological: Positive for weakness. Negative for dizziness, syncope, light-headedness and headaches.   Hematological: Negative for adenopathy. Does not bruise/bleed easily.   Psychiatric/Behavioral: Negative for agitation and confusion. The patient is nervous/anxious.           Medication List           Accurate as of 12/19/18 10:51 AM. If you have any questions, ask your nurse or doctor.               CONTINUE taking these medications    acyclovir 400 MG tablet  Commonly known as:  ZOVIRAX  Take 1 tablet (400 mg total) by mouth 2 (two) times daily.     aspirin 81 MG EC tablet  Commonly known as:  ECOTRIN     b complex vitamins tablet     cyanocobalamin 1,000 mcg/mL injection  Give 1cc under the skin once a month     difluprednate 0.05 % Drop ophthalmic solution  Commonly known as:  DUREZOL  Place 1 drop into the right eye 4 (four) times daily.     furosemide 40 MG tablet  Commonly known as:  LASIX  Take 1 tablet (40 mg total) by mouth once daily.     leuprolide 3.75 mg injection  Commonly known as:  LUPRON     metoprolol tartrate 100 MG tablet  Commonly known as:  LOPRESSOR  TAKE 1 TABLET(100 MG) BY MOUTH TWICE DAILY     neomycin-polymyxin-dexamethasone 3.5 mg/g-10,000 unit/g-0.1 % Oint  Commonly known as:  DEXACINE  Place into the right eye 3 (three) times daily. Right eyelid     omeprazole 20 MG capsule  Commonly known as:  PRILOSEC  TAKE 1 CAPSULE(20 MG) BY MOUTH EVERY DAY     polymyxin B sulf-trimethoprim 10,000 unit- 1 mg/mL Drop  Commonly known as:  POLYTRIM  Place 1 drop into the right eye every 4  (four) hours.     sulfamethoxazole-trimethoprim 800-160mg 800-160 mg Tab  Commonly known as:  BACTRIM DS  Take 1 tablet by mouth 2 (two) times daily. for 14 days          Review of patient's allergies indicates:   Allergen Reactions    Lipitor [atorvastatin] Other (See Comments)    Norvasc [amlodipine] Swelling       Lab: I have reviewed all of the patient's relevant lab work available in the medical record and have utilized this in my evaluation and management recommendations today    Imaging: I have reviewed all of the patient's diagnostic/imaging results available in the medical record and have utilized this in my evaluation and management recommendations today.      Objective:     Vitals:    12/19/18 1028   BP: (!) 97/58   Pulse: 60   Temp: 97.2 °F (36.2 °C)       Physical Exam   Constitutional: He is oriented to person, place, and time. He appears well-developed and well-nourished. No distress.   HENT:   Head: Normocephalic and atraumatic.   Nose: Nose normal.   Mouth/Throat: Oropharynx is clear and moist. No oropharyngeal exudate.   Eyes: EOM are normal. Pupils are equal, round, and reactive to light. No scleral icterus.   Neck: Normal range of motion. Neck supple. No tracheal deviation present. No thyromegaly present.   Cardiovascular: Normal rate, regular rhythm, normal heart sounds and intact distal pulses.   No murmur heard.  Pulmonary/Chest: Effort normal and breath sounds normal. No stridor. No respiratory distress. He has no wheezes. He has no rales. He exhibits no tenderness.   Abdominal: Soft. Bowel sounds are normal. He exhibits no distension and no mass. There is no tenderness. There is no rebound and no guarding.   Musculoskeletal: Normal range of motion. He exhibits edema. He exhibits no tenderness.   Lymphadenopathy:     He has no cervical adenopathy.   Neurological: He is alert and oriented to person, place, and time. Coordination normal.   Skin: Skin is warm. No rash noted. He is not  diaphoretic. No erythema.   Psychiatric: He has a normal mood and affect. His behavior is normal. Judgment and thought content normal.   Nursing note and vitals reviewed.      Assessment:     1. Waldenstrom's macroglobulinemia  2.  Elevated LFTs/elevated bilirubin - improved  3.  Elevated creatinine - stable/improved  4.  Generalized weakness/fatigue - improved  5.  History of Alcohol abuse    Plan:   1.  I had a detailed discussion with the patient previously with regard to the results of his bone marrow aspiration biopsy done in July 2018 as well as the results of all of his lab work done in August 2018.  At this time the patient has Waldenstrom's macroglobulinemia.  His IgM quantitative level was initially greater than 4 g.  2.  I had a detailed discussion with him previously with regard to the diagnosis, prognosis and treatment of this malignancy.  We discussed the indications to begin treatment.  I was concerned about possible liver involvement by his lymphoma because of his recently abnormal/worsening LFTs.  I was also concerned about possible renal involvement because of his elevated creatinine with monoclonal chain noted in UPEP immunofixation.  3.  We discussed that his treatment options are complicated because of the above.  I have previously discussed that the best course of action would be to start him on Velcade/dexamethasone/Rituxan.  I decided to hold off on initiation of Rituxan until we get his IgM quantitative level to less than 4 g.  Therefore he was initially started on twice weekly Velcade and weekly dexamethasone at 20 mg p.o. once daily.  We decided to re-evaluate our treatment options based on clinical response and based on how he tolerates this treatment. Velcade was dose adjusted for liver function. He is scheduled to proceed with cycle 3 day 15 of velcade today. Informed consent taken.    4.  All necessary prescriptions including for herpes zoster prophylaxis have been sent to the  patient's pharmacy previously and the patient reports compliance with this medication.  5. I have previously strongly recommended cessation of alcohol use.  He reports that he continues to drink 1 drink a day.  6.  I had a detailed discussion with the patient today with regard to the results of all of his recent lab work which shows excellent response to ongoing treatment with interval significant decrease in IgM quantitative level.  I have also previously added IV Rituxan to his treatment.  The rationale for this was discussed in detail and he expressed understanding.  We have decided to initiate Rituxan starting with cycle 3 of Velcade.  I have since transitioned Velcade to once weekly.  7. I have previously discussed with the patient with regard to my concerns with regard to his combined liver/kidney dysfunction.  I am concerned about my ability to continue treatment safely.  At this time I will continue to hold Velcade and dexamethasone.  He has previously completed 4 weekly doses of rituxan in Oct 2018.   8. At this time we have decided to proceed with maintenance rituxan q12 weeks x 4 doses starting in Jan 2018. All labs from Dec 2018 reviewed and look stable/improved suggesting good control of his malignancy.    Follow-up in 1 month with labs, PET/CT 1 week before visit for cycle 1/4 of maintenance rituxan. He knows to call sooner for any additional questions or new problems.

## 2019-01-02 ENCOUNTER — OFFICE VISIT (OUTPATIENT)
Dept: OPHTHALMOLOGY | Facility: CLINIC | Age: 79
End: 2019-01-02
Payer: MEDICARE

## 2019-01-02 ENCOUNTER — TELEPHONE (OUTPATIENT)
Dept: OPHTHALMOLOGY | Facility: CLINIC | Age: 79
End: 2019-01-02

## 2019-01-02 DIAGNOSIS — H25.11 NUCLEAR SENILE CATARACT OF RIGHT EYE: ICD-10-CM

## 2019-01-02 DIAGNOSIS — H18.519 FUCHS' CORNEAL DYSTROPHY: ICD-10-CM

## 2019-01-02 DIAGNOSIS — H00.033 ABSCESS OF RIGHT EYELID: Primary | ICD-10-CM

## 2019-01-02 DIAGNOSIS — H25.12 NUCLEAR SENILE CATARACT OF LEFT EYE: ICD-10-CM

## 2019-01-02 PROCEDURE — 99999 PR PBB SHADOW E&M-EST. PATIENT-LVL II: CPT | Mod: PBBFAC,HCNC,, | Performed by: OPHTHALMOLOGY

## 2019-01-02 PROCEDURE — 99999 PR PBB SHADOW E&M-EST. PATIENT-LVL II: ICD-10-PCS | Mod: PBBFAC,HCNC,, | Performed by: OPHTHALMOLOGY

## 2019-01-02 PROCEDURE — 92012 PR EYE EXAM, EST PATIENT,INTERMED: ICD-10-PCS | Mod: HCNC,S$GLB,, | Performed by: OPHTHALMOLOGY

## 2019-01-02 PROCEDURE — 92012 INTRM OPH EXAM EST PATIENT: CPT | Mod: HCNC,S$GLB,, | Performed by: OPHTHALMOLOGY

## 2019-01-02 NOTE — TELEPHONE ENCOUNTER
----- Message from Kaleigh Clark sent at 1/2/2019  3:06 PM CST -----  Contact: SELF   Requesting a call back regarding eye drops to use.PLEASE CALL BACK -[phone].        Thanks,  Kaleigh Clark

## 2019-01-02 NOTE — PROGRESS NOTES
HPI     Eye Problem      Additional comments: pt states ou are about the same doing well with   current drops and marcel vision is still blurred               Comments     Originally from [place]  Finished school in [place].  Duck gurpreet    1. CATS OU  2. Chalazion removal OD 12/6/18/ blepharotomy for lid abscess    Polytrim QID OD/TID OS  Warm compresses TID  Dexacine  TID OD QHS OS           Last edited by Kell Bruce MA on 1/2/2019 10:29 AM. (History)            Assessment /Plan     For exam results, see Encounter Report.      ICD-10-CM ICD-9-CM    1. Abscess of right eyelid H00.033 373.13 Resolved after I&D no signs of infection being present    2. Nuclear senile cataract of right eye H25.11 366.16  proceed with phaco as previously scheduled    Phaco right eye tomorrow   Topical, viscoat  monofocal IOL.  Will aim for distance  Referral to [facility] for [facility]hthalmic [facility]  Prescriptions sent for preoperative medications  Durezol, polytrim and ketorolac  Explained that patient may need glasses after surgery.  Discussed that vision may be limited by fuch's      Do not recommend multifocal due to being on chemo.    Declines toric.   Good MOCT       3. Nuclear senile cataract of left eye H25.12 366.16    4. 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

## 2019-01-03 ENCOUNTER — OUTSIDE PLACE OF SERVICE (OUTPATIENT)
Dept: ADMINISTRATIVE | Facility: OTHER | Age: 79
End: 2019-01-03
Payer: MEDICARE

## 2019-01-03 PROCEDURE — 66984 XCAPSL CTRC RMVL W/O ECP: CPT | Mod: RT,,, | Performed by: OPHTHALMOLOGY

## 2019-01-03 PROCEDURE — 66984 PR REMOVAL, CATARACT, W/INSRT INTRAOC LENS, W/O ENDO CYCLO: ICD-10-PCS | Mod: RT,,, | Performed by: OPHTHALMOLOGY

## 2019-01-04 ENCOUNTER — OFFICE VISIT (OUTPATIENT)
Dept: OPHTHALMOLOGY | Facility: CLINIC | Age: 79
End: 2019-01-04
Payer: MEDICARE

## 2019-01-04 DIAGNOSIS — Z98.41 CATARACT EXTRACTION STATUS, RIGHT: ICD-10-CM

## 2019-01-04 DIAGNOSIS — Z98.890 POST-OPERATIVE STATE: Primary | ICD-10-CM

## 2019-01-04 DIAGNOSIS — N18.9 CHRONIC KIDNEY DISEASE, UNSPECIFIED CKD STAGE: ICD-10-CM

## 2019-01-04 PROCEDURE — 99999 PR PBB SHADOW E&M-EST. PATIENT-LVL I: ICD-10-PCS | Mod: PBBFAC,HCNC,, | Performed by: OPHTHALMOLOGY

## 2019-01-04 PROCEDURE — 99999 PR PBB SHADOW E&M-EST. PATIENT-LVL I: CPT | Mod: PBBFAC,HCNC,, | Performed by: OPHTHALMOLOGY

## 2019-01-04 PROCEDURE — 99024 POSTOP FOLLOW-UP VISIT: CPT | Mod: HCNC,S$GLB,, | Performed by: OPHTHALMOLOGY

## 2019-01-04 PROCEDURE — 99024 PR POST-OP FOLLOW-UP VISIT: ICD-10-PCS | Mod: HCNC,S$GLB,, | Performed by: OPHTHALMOLOGY

## 2019-01-04 RX ORDER — DIFLUPREDNATE OPHTHALMIC 0.5 MG/ML
1 EMULSION OPHTHALMIC
COMMUNITY
End: 2019-02-18

## 2019-01-04 RX ORDER — KETOROLAC TROMETHAMINE 4 MG/ML
1 SOLUTION/ DROPS OPHTHALMIC 4 TIMES DAILY
COMMUNITY
End: 2019-01-28

## 2019-01-04 NOTE — PROGRESS NOTES
HPI     Post-op Evaluation      Additional comments: 1 day s/p phaco OD. doing well.               Comments     1. CATS OS  PCIOL OD +21.0 SN60WF / CDE: 12.47 / 1-3-19 (topical, viscoat)  2. Chalazion removal OD 12/6/18/ blepharotomy for lid abscess        OD: durezol qid, poly qid, ket qid          Last edited by Irasema Layne MA on 1/4/2019  1:25 PM. (History)            Assessment /Plan     For exam results, see Encounter Report.      ICD-10-CM ICD-9-CM    1. Post-operative state Z98.890 V45.89 1 day s/p phaco OD. Doing well.    2. Cataract extraction status, right Z98.41 V45.61        PO Day 1 S/P Phaco/IOL  right eye  Doing well.    Use Durezol QID  Ketorolac  Polytrim  4 x day  Reinstructed in importance of absolute compliance with Post-OP instructions including medications, shield at bedtime, and limitation of activities. Follow up appointments in approximately one and six weeks or call immediately for increased pain, redness or vision loss.

## 2019-01-08 ENCOUNTER — CLINICAL SUPPORT (OUTPATIENT)
Dept: CARDIOLOGY | Facility: CLINIC | Age: 79
End: 2019-01-08
Payer: MEDICARE

## 2019-01-08 ENCOUNTER — OFFICE VISIT (OUTPATIENT)
Dept: CARDIOLOGY | Facility: CLINIC | Age: 79
End: 2019-01-08
Payer: MEDICARE

## 2019-01-08 VITALS
BODY MASS INDEX: 26.48 KG/M2 | HEIGHT: 70 IN | HEART RATE: 72 BPM | WEIGHT: 184.94 LBS | DIASTOLIC BLOOD PRESSURE: 68 MMHG | SYSTOLIC BLOOD PRESSURE: 106 MMHG

## 2019-01-08 DIAGNOSIS — N18.9 CHRONIC RENAL IMPAIRMENT, UNSPECIFIED CKD STAGE: ICD-10-CM

## 2019-01-08 DIAGNOSIS — I27.20 PULMONARY HYPERTENSION: ICD-10-CM

## 2019-01-08 DIAGNOSIS — I47.20 VT (VENTRICULAR TACHYCARDIA): ICD-10-CM

## 2019-01-08 DIAGNOSIS — I10 ESSENTIAL HYPERTENSION: Primary | ICD-10-CM

## 2019-01-08 DIAGNOSIS — R60.0 EDEMA OF BOTH LEGS: ICD-10-CM

## 2019-01-08 DIAGNOSIS — I50.32 CHRONIC DIASTOLIC HEART FAILURE: Primary | ICD-10-CM

## 2019-01-08 DIAGNOSIS — I36.1 NON-RHEUMATIC TRICUSPID VALVE INSUFFICIENCY: ICD-10-CM

## 2019-01-08 DIAGNOSIS — R94.31 ABNORMAL ECG: Chronic | ICD-10-CM

## 2019-01-08 DIAGNOSIS — I10 ESSENTIAL HYPERTENSION: Chronic | ICD-10-CM

## 2019-01-08 DIAGNOSIS — C88.0 WALDENSTROM'S MACROGLOBULINEMIA: ICD-10-CM

## 2019-01-08 DIAGNOSIS — I35.1 NONRHEUMATIC AORTIC VALVE INSUFFICIENCY: Chronic | ICD-10-CM

## 2019-01-08 DIAGNOSIS — R74.8 ABNORMAL LIVER ENZYMES: ICD-10-CM

## 2019-01-08 DIAGNOSIS — E78.2 MIXED HYPERLIPIDEMIA: ICD-10-CM

## 2019-01-08 DIAGNOSIS — I10 ESSENTIAL HYPERTENSION: ICD-10-CM

## 2019-01-08 DIAGNOSIS — I49.3 PVC'S (PREMATURE VENTRICULAR CONTRACTIONS): ICD-10-CM

## 2019-01-08 PROCEDURE — 99214 PR OFFICE/OUTPT VISIT, EST, LEVL IV, 30-39 MIN: ICD-10-PCS | Mod: HCNC,S$GLB,, | Performed by: INTERNAL MEDICINE

## 2019-01-08 PROCEDURE — 99499 UNLISTED E&M SERVICE: CPT | Mod: HCNC,S$GLB,, | Performed by: INTERNAL MEDICINE

## 2019-01-08 PROCEDURE — 3074F SYST BP LT 130 MM HG: CPT | Mod: CPTII,HCNC,S$GLB, | Performed by: INTERNAL MEDICINE

## 2019-01-08 PROCEDURE — 99214 OFFICE O/P EST MOD 30 MIN: CPT | Mod: HCNC,S$GLB,, | Performed by: INTERNAL MEDICINE

## 2019-01-08 PROCEDURE — 99999 PR PBB SHADOW E&M-EST. PATIENT-LVL III: ICD-10-PCS | Mod: PBBFAC,HCNC,, | Performed by: INTERNAL MEDICINE

## 2019-01-08 PROCEDURE — 1101F PR PT FALLS ASSESS DOC 0-1 FALLS W/OUT INJ PAST YR: ICD-10-PCS | Mod: CPTII,HCNC,S$GLB, | Performed by: INTERNAL MEDICINE

## 2019-01-08 PROCEDURE — 99999 PR PBB SHADOW E&M-EST. PATIENT-LVL III: CPT | Mod: PBBFAC,HCNC,, | Performed by: INTERNAL MEDICINE

## 2019-01-08 PROCEDURE — 3074F PR MOST RECENT SYSTOLIC BLOOD PRESSURE < 130 MM HG: ICD-10-PCS | Mod: CPTII,HCNC,S$GLB, | Performed by: INTERNAL MEDICINE

## 2019-01-08 PROCEDURE — 3078F DIAST BP <80 MM HG: CPT | Mod: CPTII,HCNC,S$GLB, | Performed by: INTERNAL MEDICINE

## 2019-01-08 PROCEDURE — 3078F PR MOST RECENT DIASTOLIC BLOOD PRESSURE < 80 MM HG: ICD-10-PCS | Mod: CPTII,HCNC,S$GLB, | Performed by: INTERNAL MEDICINE

## 2019-01-08 PROCEDURE — 99499 RISK ADDL DX/OHS AUDIT: ICD-10-PCS | Mod: HCNC,S$GLB,, | Performed by: INTERNAL MEDICINE

## 2019-01-08 PROCEDURE — 93000 EKG 12-LEAD: ICD-10-PCS | Mod: HCNC,S$GLB,, | Performed by: INTERNAL MEDICINE

## 2019-01-08 PROCEDURE — 93000 ELECTROCARDIOGRAM COMPLETE: CPT | Mod: HCNC,S$GLB,, | Performed by: INTERNAL MEDICINE

## 2019-01-08 PROCEDURE — 1101F PT FALLS ASSESS-DOCD LE1/YR: CPT | Mod: CPTII,HCNC,S$GLB, | Performed by: INTERNAL MEDICINE

## 2019-01-08 RX ORDER — FUROSEMIDE 40 MG/1
40 TABLET ORAL DAILY
Qty: 90 TABLET | Refills: 3 | Status: SHIPPED | OUTPATIENT
Start: 2019-01-08 | End: 2020-04-20 | Stop reason: SDUPTHER

## 2019-01-08 NOTE — PROGRESS NOTES
Subjective:    Patient ID:  Homero Tanner is a 78 y.o. male who presents for evaluation of Hypertension; Hyperlipidemia; and Congestive Heart Failure      HPI Mr. Tanner returns for f/u.   His current medical conditions include chronic HTN, DD, PHTN, AI, PVCs, TR, dyslipidemia, prostate & rectal cancer. Nonsmoker.    Past hx pertinent for following:  Had LHC 20+ years ago, no significant blockages noted.  He has chronic abnl ecgs.   Also has had PSVT with stress testing in past.    He has h/o thymoma surgery with xrt and also had rectal polyp surgery.  - Stress MPI Aug 2017.  Echo Aug 2017 normal EF, DD, mild-mod PHTN, mild AI, LVH, mild RVE.  Had elevated alkaline phosphatase, referred to GI.  Due to abnl LFTs, statin was stopped.  Echo 9/18 read as EF 50%, normal diastolic function, mild PHTN, LVH.  Holter shows sinus rhythm, frequent PAC/PVCs, NSVT.  Pt has seen Dr. Weaver, heme/onc, dx with Waldenstrom's macroglobulinemia lymphoma malignancy tx with chemo and steroids  Now here.  ecg today shows NSR, low voltage, nonspecific st-t abnl, PVCs.  No chest pain sxs.  No dyspnea, pnd/orthopnea.  Orthostatic dizziness seems resolved.  His leg edema has improved per pt over last week or so.   No palpitations.  Weight down some.  No tia/cva sxs.  HTN controlled on current meds.   Takes lasix m, wed, fridays.   CRI stable, creatinine 1.6 on labs last month.  LFTs improved quite a bit.   Was taken off pravastatin due to abnl LFTs.        Current Outpatient Medications:     acyclovir (ZOVIRAX) 400 MG tablet, Take 1 tablet (400 mg total) by mouth 2 (two) times daily., Disp: 60 tablet, Rfl: 5    aspirin (ECOTRIN) 81 MG EC tablet, Take 81 mg by mouth once daily., Disp: , Rfl:     b complex vitamins tablet, Take by mouth. 1 tablet Oral Every day, Disp: , Rfl:     cyanocobalamin 1,000 mcg/mL injection, Give 1cc under the skin once a month, Disp: 10 mL, Rfl: 4    difluprednate (DUREZOL) 0.05 % Drop ophthalmic  "solution, 1 drop., Disp: , Rfl:     furosemide (LASIX) 40 MG tablet, Take 1 tablet (40 mg total) by mouth once daily., Disp: 30 tablet, Rfl: 11    ketorolac 0.4% (ACULAR) 0.4 % Drop, 1 drop 4 (four) times daily., Disp: , Rfl:     leuprolide (LUPRON) 3.75 mg injection, Inject 3.75 mg into the muscle every 3 (three) months. , Disp: , Rfl:     metoprolol tartrate (LOPRESSOR) 100 MG tablet, TAKE 1 TABLET(100 MG) BY MOUTH TWICE DAILY, Disp: 180 tablet, Rfl: 3    omeprazole (PRILOSEC) 20 MG capsule, TAKE 1 CAPSULE(20 MG) BY MOUTH EVERY DAY, Disp: 90 capsule, Rfl: 3    polymyxin B sulf-trimethoprim (POLYTRIM) 10,000 unit- 1 mg/mL Drop, Place 1 drop into the right eye every 4 (four) hours., Disp: 10 mL, Rfl: 1      Review of Systems   Constitution: Positive for weakness.   HENT: Negative.    Eyes: Negative.    Cardiovascular: Positive for leg swelling.   Respiratory: Negative.    Endocrine: Negative.    Hematologic/Lymphatic: Negative.    Skin: Negative.    Musculoskeletal: Negative.    Gastrointestinal: Negative.    Genitourinary: Negative.    Psychiatric/Behavioral: Negative.    Allergic/Immunologic: Negative.        /68 (BP Location: Right arm, Patient Position: Sitting, BP Method: Medium (Manual))   Pulse 72 Comment: Irregular  Ht 5' 10" (1.778 m)   Wt 83.9 kg (184 lb 15.5 oz)   BMI 26.54 kg/m²     Wt Readings from Last 3 Encounters:   01/08/19 83.9 kg (184 lb 15.5 oz)   12/19/18 91.2 kg (201 lb 1 oz)   11/19/18 88.1 kg (194 lb 3.6 oz)     Temp Readings from Last 3 Encounters:   12/19/18 97.2 °F (36.2 °C) (Oral)   11/19/18 97.4 °F (36.3 °C) (Oral)   11/08/18 97.3 °F (36.3 °C)     BP Readings from Last 3 Encounters:   01/08/19 106/68   12/19/18 (!) 97/58   11/19/18 121/78     Pulse Readings from Last 3 Encounters:   01/08/19 72   12/19/18 60   11/19/18 66          Objective:    Physical Exam   Constitutional: He is oriented to person, place, and time. He appears well-developed and well-nourished.   HENT: "   Head: Normocephalic.   Neck: Normal range of motion. Neck supple. Normal carotid pulses, no hepatojugular reflux and no JVD present. Carotid bruit is not present. No thyromegaly present.   Cardiovascular: Normal rate, S1 normal and S2 normal. A regularly irregular rhythm present. PMI is not displaced. Exam reveals no S3, no S4, no distant heart sounds, no friction rub, no midsystolic click and no opening snap.   No murmur heard.  Pulses:       Radial pulses are 2+ on the right side, and 2+ on the left side.   Pulmonary/Chest: Effort normal and breath sounds normal. He has no wheezes. He has no rales.   Abdominal: Soft. Bowel sounds are normal. He exhibits no distension, no abdominal bruit, no ascites and no mass. There is no tenderness.   Musculoskeletal: He exhibits edema.   Neurological: He is alert and oriented to person, place, and time.   Skin: Skin is warm.   Psychiatric: He has a normal mood and affect. His behavior is normal.   Nursing note and vitals reviewed.      I have reviewed all pertinent labs and cardiac studies.      Chemistry        Component Value Date/Time     12/12/2018 1033    K 4.9 12/12/2018 1033     12/12/2018 1033    CO2 21 (L) 12/12/2018 1033    BUN 17 12/12/2018 1033    CREATININE 1.6 (H) 12/12/2018 1033     (H) 12/12/2018 1033        Component Value Date/Time    CALCIUM 9.5 12/12/2018 1033    ALKPHOS 285 (H) 12/12/2018 1033    AST 59 (H) 12/12/2018 1033    ALT 22 12/12/2018 1033    BILITOT 1.0 12/12/2018 1033    ESTGFRAFRICA 47 (A) 12/12/2018 1033    EGFRNONAA 41 (A) 12/12/2018 1033        Lab Results   Component Value Date    WBC 8.08 12/12/2018    HGB 10.2 (L) 12/12/2018    HCT 31.1 (L) 12/12/2018     (H) 12/12/2018     12/12/2018     Lab Results   Component Value Date    HGBA1C 5.2 06/06/2016     Lab Results   Component Value Date    CHOL 160 08/21/2018    CHOL 164 06/06/2016    CHOL 123 05/08/2013     Lab Results   Component Value Date    HDL 65  08/21/2018    HDL 79 (H) 06/06/2016    HDL 60 05/08/2013     Lab Results   Component Value Date    LDLCALC 80.0 08/21/2018    LDLCALC 69.4 06/06/2016    LDLCALC 50.0 (L) 05/08/2013     Lab Results   Component Value Date    TRIG 75 08/21/2018    TRIG 78 06/06/2016    TRIG 64 05/08/2013     Lab Results   Component Value Date    CHOLHDL 40.6 08/21/2018    CHOLHDL 48.2 06/06/2016    CHOLHDL 48.8 05/08/2013           Assessment:       1. Chronic diastolic heart failure    2. Abnormal ECG    3. Abnormal liver enzymes    4. Chronic renal impairment, unspecified CKD stage    5. Nonrheumatic aortic valve insufficiency    6. Edema of both legs    7. Essential hypertension    8. Mixed hyperlipidemia    9. Waldenstrom's macroglobulinemia    10. VT (ventricular tachycardia)    11. Non-rheumatic tricuspid valve insufficiency    12. Pulmonary hypertension    13. PVC's (premature ventricular contractions)         Plan:             Stable CV conditions on current medical tx.  Continue current meds.  Continue Lasix 40 mg 3 days/week.  F/u with heme/onc on Waldenstroms condition.  Cardiac diet  Exercise as tolerated  Continue to stay off statin for now due to abnl LFTs.  F/u 3 months with CMP, BNP.

## 2019-01-10 DIAGNOSIS — L71.9 ROSACEA: ICD-10-CM

## 2019-01-11 ENCOUNTER — OFFICE VISIT (OUTPATIENT)
Dept: OPHTHALMOLOGY | Facility: CLINIC | Age: 79
End: 2019-01-11
Payer: MEDICARE

## 2019-01-11 DIAGNOSIS — H18.519 FUCHS' CORNEAL DYSTROPHY: ICD-10-CM

## 2019-01-11 DIAGNOSIS — Z98.890 POST-OPERATIVE STATE: Primary | ICD-10-CM

## 2019-01-11 DIAGNOSIS — Z98.41 CATARACT EXTRACTION STATUS, RIGHT: ICD-10-CM

## 2019-01-11 DIAGNOSIS — H04.123 DRY EYE SYNDROME, BILATERAL: ICD-10-CM

## 2019-01-11 PROCEDURE — 99999 PR PBB SHADOW E&M-EST. PATIENT-LVL II: CPT | Mod: PBBFAC,HCNC,, | Performed by: OPHTHALMOLOGY

## 2019-01-11 PROCEDURE — 99999 PR PBB SHADOW E&M-EST. PATIENT-LVL II: ICD-10-PCS | Mod: PBBFAC,HCNC,, | Performed by: OPHTHALMOLOGY

## 2019-01-11 PROCEDURE — 99024 POSTOP FOLLOW-UP VISIT: CPT | Mod: HCNC,S$GLB,, | Performed by: OPHTHALMOLOGY

## 2019-01-11 PROCEDURE — 99024 PR POST-OP FOLLOW-UP VISIT: ICD-10-PCS | Mod: HCNC,S$GLB,, | Performed by: OPHTHALMOLOGY

## 2019-01-11 NOTE — PROGRESS NOTES
HPI     Post-op Evaluation      Additional comments: 1 week PCIOL OD              Comments     The patient states his right eye is feeling good and he denies any ocular   pain at this time.  100% drop compliance    Originally from [place]  Finished school in [place].  Duck gurpreet    1. CATS OS  PCIOL OD +21.0 SN60WF / CDE: 12.47 / 1-3-19 (topical, viscoat)  2. Chalazion removal OD 12/6/18/ blepharotomy for lid abscess      OD: durezol qid, poly qid, ket qid          Last edited by Kathy Castro on 1/11/2019 10:54 AM. (History)            Assessment /Plan     For exam results, see Encounter Report.      ICD-10-CM ICD-9-CM    1. Post-operative state Z98.890 V45.89    2. Cataract extraction status, right Z98.41 V45.61    3. Dry eye syndrome, bilateral H04.123 375.15 Very advanced today - start Clear Eyes Pure Relief and genteal Gel at bedtime - treat aggressively and wait before booking phaco os due to dryness    4. Fuchs' corneal dystrophy H18.51 371.57        PO Week 1 S/P Phaco/ IOL right eye:   Doing well with no evidence of infection or abnormal inflammation.     D/C antibiotic drops and NSAID  Taper Durezol weekly per instruction sheet.  Resume normal activitites and d/c eye shield.  OTC reading glasses can be used until evaluated for final MR.  D/c Shield at night    RTC 2-3  weeks or PRN pain, redness, vision loss, or other concerns.

## 2019-01-15 ENCOUNTER — TELEPHONE (OUTPATIENT)
Dept: ENDOSCOPY | Facility: HOSPITAL | Age: 79
End: 2019-01-15

## 2019-01-15 RX ORDER — METRONIDAZOLE 7.5 MG/G
CREAM TOPICAL
Qty: 45 G | Refills: 0 | OUTPATIENT
Start: 2019-01-15

## 2019-01-16 ENCOUNTER — LAB VISIT (OUTPATIENT)
Dept: LAB | Facility: HOSPITAL | Age: 79
End: 2019-01-16
Attending: INTERNAL MEDICINE
Payer: MEDICARE

## 2019-01-16 DIAGNOSIS — C61 PROSTATE CANCER: ICD-10-CM

## 2019-01-16 DIAGNOSIS — D51.8 OTHER VITAMIN B12 DEFICIENCY ANEMIA: ICD-10-CM

## 2019-01-16 DIAGNOSIS — C88.0 WALDENSTROM'S MACROGLOBULINEMIA: ICD-10-CM

## 2019-01-16 DIAGNOSIS — Z85.048 HISTORY OF RECTAL CANCER: ICD-10-CM

## 2019-01-16 LAB
ALBUMIN SERPL BCP-MCNC: 2.9 G/DL
ALP SERPL-CCNC: 296 U/L
ALT SERPL W/O P-5'-P-CCNC: 17 U/L
ANION GAP SERPL CALC-SCNC: 13 MMOL/L
AST SERPL-CCNC: 31 U/L
BASOPHILS # BLD AUTO: 0.04 K/UL
BASOPHILS NFR BLD: 0.4 %
BILIRUB SERPL-MCNC: 0.7 MG/DL
BUN SERPL-MCNC: 18 MG/DL
CALCIUM SERPL-MCNC: 8.8 MG/DL
CHLORIDE SERPL-SCNC: 103 MMOL/L
CO2 SERPL-SCNC: 22 MMOL/L
CREAT SERPL-MCNC: 1.2 MG/DL
DIFFERENTIAL METHOD: ABNORMAL
EOSINOPHIL # BLD AUTO: 0.3 K/UL
EOSINOPHIL NFR BLD: 2.9 %
ERYTHROCYTE [DISTWIDTH] IN BLOOD BY AUTOMATED COUNT: 13.9 %
EST. GFR  (AFRICAN AMERICAN): >60 ML/MIN/1.73 M^2
EST. GFR  (NON AFRICAN AMERICAN): 58 ML/MIN/1.73 M^2
GLUCOSE SERPL-MCNC: 118 MG/DL
HCT VFR BLD AUTO: 33 %
HGB BLD-MCNC: 10.8 G/DL
IGA SERPL-MCNC: 37 MG/DL
IGG SERPL-MCNC: 254 MG/DL
IGM SERPL-MCNC: 2253 MG/DL
LYMPHOCYTES # BLD AUTO: 1.7 K/UL
LYMPHOCYTES NFR BLD: 18.2 %
MCH RBC QN AUTO: 32.7 PG
MCHC RBC AUTO-ENTMCNC: 32.7 G/DL
MCV RBC AUTO: 100 FL
MONOCYTES # BLD AUTO: 1 K/UL
MONOCYTES NFR BLD: 10.6 %
NEUTROPHILS # BLD AUTO: 6.5 K/UL
NEUTROPHILS NFR BLD: 67.9 %
PLATELET # BLD AUTO: 351 K/UL
PMV BLD AUTO: 9.5 FL
POTASSIUM SERPL-SCNC: 4.2 MMOL/L
PROT SERPL-MCNC: 7 G/DL
RBC # BLD AUTO: 3.3 M/UL
SODIUM SERPL-SCNC: 138 MMOL/L
WBC # BLD AUTO: 9.53 K/UL

## 2019-01-16 PROCEDURE — 86334 PATHOLOGIST INTERPRETATION IFE: ICD-10-PCS | Mod: 26,HCNC,, | Performed by: PATHOLOGY

## 2019-01-16 PROCEDURE — 36415 COLL VENOUS BLD VENIPUNCTURE: CPT | Mod: HCNC

## 2019-01-16 PROCEDURE — 84165 PROTEIN E-PHORESIS SERUM: CPT | Mod: 26,HCNC,, | Performed by: PATHOLOGY

## 2019-01-16 PROCEDURE — 80053 COMPREHEN METABOLIC PANEL: CPT | Mod: HCNC

## 2019-01-16 PROCEDURE — 84165 PROTEIN E-PHORESIS SERUM: CPT | Mod: HCNC

## 2019-01-16 PROCEDURE — 86334 IMMUNOFIX E-PHORESIS SERUM: CPT | Mod: HCNC

## 2019-01-16 PROCEDURE — 83520 IMMUNOASSAY QUANT NOS NONAB: CPT | Mod: HCNC

## 2019-01-16 PROCEDURE — 82784 ASSAY IGA/IGD/IGG/IGM EACH: CPT | Mod: 59,HCNC

## 2019-01-16 PROCEDURE — 85025 COMPLETE CBC W/AUTO DIFF WBC: CPT | Mod: HCNC

## 2019-01-16 PROCEDURE — 84165 PATHOLOGIST INTERPRETATION SPE: ICD-10-PCS | Mod: 26,HCNC,, | Performed by: PATHOLOGY

## 2019-01-16 PROCEDURE — 86334 IMMUNOFIX E-PHORESIS SERUM: CPT | Mod: 26,HCNC,, | Performed by: PATHOLOGY

## 2019-01-17 LAB
ALBUMIN SERPL ELPH-MCNC: 3.22 G/DL
ALPHA1 GLOB SERPL ELPH-MCNC: 0.39 G/DL
ALPHA2 GLOB SERPL ELPH-MCNC: 0.88 G/DL
B-GLOBULIN SERPL ELPH-MCNC: 2.13 G/DL
GAMMA GLOB SERPL ELPH-MCNC: 0.29 G/DL
INTERPRETATION SERPL IFE-IMP: NORMAL
KAPPA LC SER QL IA: 1.73 MG/DL
KAPPA LC/LAMBDA SER IA: 0.6
LAMBDA LC SER QL IA: 2.9 MG/DL
PATHOLOGIST INTERPRETATION IFE: NORMAL
PATHOLOGIST INTERPRETATION SPE: NORMAL
PROT SERPL-MCNC: 6.9 G/DL

## 2019-01-22 ENCOUNTER — TELEPHONE (OUTPATIENT)
Dept: RADIOLOGY | Facility: HOSPITAL | Age: 79
End: 2019-01-22

## 2019-01-23 ENCOUNTER — HOSPITAL ENCOUNTER (OUTPATIENT)
Dept: RADIOLOGY | Facility: HOSPITAL | Age: 79
Discharge: HOME | End: 2019-01-23
Attending: INTERNAL MEDICINE
Payer: MEDICARE

## 2019-01-23 DIAGNOSIS — C88.0 WALDENSTROM'S MACROGLOBULINEMIA: ICD-10-CM

## 2019-01-23 DIAGNOSIS — Z85.048 HISTORY OF RECTAL CANCER: ICD-10-CM

## 2019-01-23 PROCEDURE — 78815 NM PET CT ROUTINE: ICD-10-PCS | Mod: 26,HCNC,PS, | Performed by: RADIOLOGY

## 2019-01-23 PROCEDURE — 78815 PET IMAGE W/CT SKULL-THIGH: CPT | Mod: 26,HCNC,PS, | Performed by: RADIOLOGY

## 2019-01-23 PROCEDURE — A9552 F18 FDG: HCPCS | Mod: HCNC

## 2019-01-23 PROCEDURE — 78815 PET IMAGE W/CT SKULL-THIGH: CPT | Mod: TC,HCNC

## 2019-01-24 ENCOUNTER — INFUSION (OUTPATIENT)
Dept: INFUSION THERAPY | Facility: HOSPITAL | Age: 79
End: 2019-01-24
Attending: INTERNAL MEDICINE
Payer: MEDICARE

## 2019-01-24 ENCOUNTER — OFFICE VISIT (OUTPATIENT)
Dept: HEMATOLOGY/ONCOLOGY | Facility: CLINIC | Age: 79
End: 2019-01-24
Payer: MEDICARE

## 2019-01-24 VITALS
OXYGEN SATURATION: 97 % | BODY MASS INDEX: 25.34 KG/M2 | WEIGHT: 177 LBS | TEMPERATURE: 97 F | HEART RATE: 49 BPM | SYSTOLIC BLOOD PRESSURE: 123 MMHG | HEIGHT: 70 IN | DIASTOLIC BLOOD PRESSURE: 86 MMHG

## 2019-01-24 VITALS — SYSTOLIC BLOOD PRESSURE: 112 MMHG | HEART RATE: 80 BPM | DIASTOLIC BLOOD PRESSURE: 73 MMHG

## 2019-01-24 DIAGNOSIS — C88.0 WALDENSTROM'S MACROGLOBULINEMIA: Primary | ICD-10-CM

## 2019-01-24 DIAGNOSIS — D51.8 OTHER VITAMIN B12 DEFICIENCY ANEMIA: ICD-10-CM

## 2019-01-24 DIAGNOSIS — K76.0 FATTY LIVER: ICD-10-CM

## 2019-01-24 DIAGNOSIS — Z85.048 HISTORY OF RECTAL CANCER: ICD-10-CM

## 2019-01-24 DIAGNOSIS — R94.2 ABNORMAL PET OF LEFT LUNG: ICD-10-CM

## 2019-01-24 PROCEDURE — 96367 TX/PROPH/DG ADDL SEQ IV INF: CPT | Mod: HCNC

## 2019-01-24 PROCEDURE — 99215 OFFICE O/P EST HI 40 MIN: CPT | Mod: 25,HCNC,S$GLB, | Performed by: INTERNAL MEDICINE

## 2019-01-24 PROCEDURE — S0028 INJECTION, FAMOTIDINE, 20 MG: HCPCS | Mod: HCNC | Performed by: INTERNAL MEDICINE

## 2019-01-24 PROCEDURE — 96413 CHEMO IV INFUSION 1 HR: CPT | Mod: HCNC

## 2019-01-24 PROCEDURE — 99999 PR PBB SHADOW E&M-EST. PATIENT-LVL III: ICD-10-PCS | Mod: PBBFAC,HCNC,, | Performed by: INTERNAL MEDICINE

## 2019-01-24 PROCEDURE — 99499 RISK ADDL DX/OHS AUDIT: ICD-10-PCS | Mod: HCNC,S$GLB,, | Performed by: INTERNAL MEDICINE

## 2019-01-24 PROCEDURE — 99215 PR OFFICE/OUTPT VISIT, EST, LEVL V, 40-54 MIN: ICD-10-PCS | Mod: 25,HCNC,S$GLB, | Performed by: INTERNAL MEDICINE

## 2019-01-24 PROCEDURE — 3074F SYST BP LT 130 MM HG: CPT | Mod: HCNC,CPTII,S$GLB, | Performed by: INTERNAL MEDICINE

## 2019-01-24 PROCEDURE — 3079F DIAST BP 80-89 MM HG: CPT | Mod: HCNC,CPTII,S$GLB, | Performed by: INTERNAL MEDICINE

## 2019-01-24 PROCEDURE — 96376 TX/PRO/DX INJ SAME DRUG ADON: CPT | Mod: HCNC

## 2019-01-24 PROCEDURE — 99499 UNLISTED E&M SERVICE: CPT | Mod: HCNC,S$GLB,, | Performed by: INTERNAL MEDICINE

## 2019-01-24 PROCEDURE — 1101F PT FALLS ASSESS-DOCD LE1/YR: CPT | Mod: HCNC,CPTII,S$GLB, | Performed by: INTERNAL MEDICINE

## 2019-01-24 PROCEDURE — 63600175 PHARM REV CODE 636 W HCPCS: Mod: HCNC,JG | Performed by: INTERNAL MEDICINE

## 2019-01-24 PROCEDURE — 96415 CHEMO IV INFUSION ADDL HR: CPT | Mod: HCNC

## 2019-01-24 PROCEDURE — 99999 PR PBB SHADOW E&M-EST. PATIENT-LVL III: CPT | Mod: PBBFAC,HCNC,, | Performed by: INTERNAL MEDICINE

## 2019-01-24 PROCEDURE — 25000003 PHARM REV CODE 250: Mod: HCNC | Performed by: INTERNAL MEDICINE

## 2019-01-24 PROCEDURE — 3074F PR MOST RECENT SYSTOLIC BLOOD PRESSURE < 130 MM HG: ICD-10-PCS | Mod: HCNC,CPTII,S$GLB, | Performed by: INTERNAL MEDICINE

## 2019-01-24 PROCEDURE — 3079F PR MOST RECENT DIASTOLIC BLOOD PRESSURE 80-89 MM HG: ICD-10-PCS | Mod: HCNC,CPTII,S$GLB, | Performed by: INTERNAL MEDICINE

## 2019-01-24 PROCEDURE — 1101F PR PT FALLS ASSESS DOC 0-1 FALLS W/OUT INJ PAST YR: ICD-10-PCS | Mod: HCNC,CPTII,S$GLB, | Performed by: INTERNAL MEDICINE

## 2019-01-24 RX ORDER — SODIUM CHLORIDE 0.9 % (FLUSH) 0.9 %
10 SYRINGE (ML) INJECTION
Status: CANCELLED | OUTPATIENT
Start: 2019-01-24

## 2019-01-24 RX ORDER — MEPERIDINE HYDROCHLORIDE 50 MG/ML
25 INJECTION INTRAMUSCULAR; INTRAVENOUS; SUBCUTANEOUS
Status: CANCELLED | OUTPATIENT
Start: 2019-01-24

## 2019-01-24 RX ORDER — FAMOTIDINE 10 MG/ML
20 INJECTION INTRAVENOUS
Status: COMPLETED | OUTPATIENT
Start: 2019-01-24 | End: 2019-01-24

## 2019-01-24 RX ORDER — HEPARIN 100 UNIT/ML
500 SYRINGE INTRAVENOUS
Status: CANCELLED | OUTPATIENT
Start: 2019-01-24

## 2019-01-24 RX ORDER — ACETAMINOPHEN 325 MG/1
650 TABLET ORAL
Status: COMPLETED | OUTPATIENT
Start: 2019-01-24 | End: 2019-01-24

## 2019-01-24 RX ORDER — ACETAMINOPHEN 325 MG/1
650 TABLET ORAL
Status: CANCELLED | OUTPATIENT
Start: 2019-01-24

## 2019-01-24 RX ORDER — MEPERIDINE HYDROCHLORIDE 25 MG/ML
25 INJECTION INTRAMUSCULAR; INTRAVENOUS; SUBCUTANEOUS
Status: DISCONTINUED | OUTPATIENT
Start: 2019-01-24 | End: 2019-01-24 | Stop reason: HOSPADM

## 2019-01-24 RX ORDER — FAMOTIDINE 10 MG/ML
20 INJECTION INTRAVENOUS
Status: CANCELLED | OUTPATIENT
Start: 2019-01-24

## 2019-01-24 RX ADMIN — ACETAMINOPHEN 650 MG: 325 TABLET ORAL at 10:01

## 2019-01-24 RX ADMIN — DIPHENHYDRAMINE HYDROCHLORIDE 50 MG: 50 INJECTION, SOLUTION INTRAMUSCULAR; INTRAVENOUS at 10:01

## 2019-01-24 RX ADMIN — FAMOTIDINE 20 MG: 10 INJECTION, SOLUTION INTRAVENOUS at 10:01

## 2019-01-24 RX ADMIN — RITUXIMAB 746 MG: 10 INJECTION, SOLUTION INTRAVENOUS at 10:01

## 2019-01-24 NOTE — DISCHARGE INSTRUCTIONS
[facility]  [address]  [address] [address]  [phone] phone     [phone] fax  Hours of Operation: Monday- Friday 8:00am- 5:00pm  After hours phone  [phone]  Hematology / Oncology Physicians on call      Dr. Bridger Cloud                        Please call with any concerns regarding your appointment today.  FALL PREVENTION   Falls often occur due to slipping, tripping or losing your balance. Here are ways to reduce your risk of falling again.   Was there anything that caused your fall that can be fixed, removed or replaced?   Make your home safe by keeping walkways clear of objects you may trip over.   Use non-slip pads under rugs.   Do not walk in poorly lit areas.   Do not stand on chairs or wobbly ladders.   Use caution when reaching overhead or looking upward. This position can cause a loss of balance.   Be sure your shoes fit properly, have non-slip bottoms and are in good condition.   Be cautious when going up and down stairs, curbs, and when walking on uneven sidewalks.   If your balance is poor, consider using a cane or walker.   If your fall was related to alcohol use, stop or limit alcohol intake.   If your fall was related to use of sleeping medicines, talk to your doctor about this. You may need to reduce your dosage at bedtime if you awaken during the night to go to the bathroom.   To reduce the need for nighttime bathroom trips:   Avoid drinking fluids for several hours before going to bed   Empty your bladder before going to bed   Men can keep a urinal at the bedside   © 8153-6417 [facility] [place], [address] [place] [address]. All rights reserved. This information is not intended as a substitute for professional medical care. Always follow your healthcare professional's instructions.  HOME CARE AFTER CHEMOTHERAPY   Meals   Many patients feel sick and lose their appetites during treatment. Eat small  meals several times a day. Choose bland foods with little taste or smell if you have problems with nausea. Be sure to cook all food thoroughly. This kills bacteria and helps you avoid intestinal infection. Soft foods are easier to swallow and digest.   Activity   Exercise keeps you strong and keeps your heart and lungs active. Talk to your doctor about an appropriate exercise program for you.   Skin Care   To prevent a skin infection, bathe or shower once a day. Use a moisturizing soap and wash with warm water. Avoid very hot or cold water. Chemotherapy can make your skin dry . Apply moisturizing lotion to help relieve dry skin. Some drugs used in high doses can cause slight burns to appear (like sunburn). Ask for a special cream to help relieve the burn and protect your skin.   Prevent Mouth Sores   During chemotherapy, many people get mouth sores. Do the following to help prevent mouth sores or to ease discomfort.   Brush your teeth with a soft-bristle toothbrush after every meal.  Don't use dental floss if your platelet count is below 50,000. Your doctor or nurse will tell you if this is the case.  Use an oral swab or special soft toothbrush if your gums bleed during regular brushing.  Use mouthwash as directed. If you can't tolerate commercial mouthwash, use salt and baking soda to clean your mouth. Mix 1 teaspoon of salt and 1 teaspoon of baking soda into a glass of water. Swish and spit.  Call your doctor or return to this facility if you develop any of the following:   Sore throat   White patches in the mouth or throat   Fever of 100.4ºF (38ºC) or higher, or as directed by your healthcare provider  © 4396-2131 [employer], [address] [place] [address], [place] [address]. All rights reserved. This information is not intended as a substitute for professional medical care. Always follow your healthcare professional's

## 2019-01-24 NOTE — PLAN OF CARE
Problem: Adult Inpatient Plan of Care  Goal: Plan of Care Review  Outcome: Ongoing (interventions implemented as appropriate)  Pt states feels pretty good today but states has poor veins and lots of bruising

## 2019-01-24 NOTE — PROGRESS NOTES
Subjective:      Patient ID: Homero Tanner is a 78 y.o. male.    Chief Complaint: Lymphoma    The patient is a 78-year-old  male who presents to the Hematology Oncology Clinic today to discuss further evaluation and management recommendations for Waldenstrom's macroglobulinemia.   I have reviewed all of the patient's relevant clinical history available in the medical record and have utilized this in my evaluation and management recommendations today.  Today the patient reports that he has been having chronic fatigue and edema in his legs. His fatigue is improved since being off treatment.  He continues with follow-up with Dr. Wright with Cardiology. He has been taking his monthly vitamin B12 injections. He is off iron supplementation at this time for his history of iron deficiency anemia which has since resolved.  He is on intermittent ADT for biochemical recurrence of prostate carcinoma and is followed by a urologist outside of the [facility].  His rectal cancer is in remission at this time.  He was on velcade/rituxan/dexamethasone for treatment of Waldenstrom's macroglobulinemia with his last treatment given in Oct 2018.        Review of Systems   Constitutional: Positive for activity change, appetite change, fatigue and unexpected weight change. Negative for chills, diaphoresis and fever.   HENT: Negative for congestion, dental problem, ear pain, mouth sores, nosebleeds, postnasal drip, sinus pressure, sore throat, tinnitus, trouble swallowing and voice change.    Eyes: Negative for photophobia, pain, discharge, redness, itching and visual disturbance.   Respiratory: Positive for shortness of breath. Negative for cough, chest tightness, wheezing and stridor.    Cardiovascular: Negative for chest pain, palpitations and leg swelling.   Gastrointestinal: Negative for abdominal distention, abdominal pain, anal bleeding, blood in stool, constipation, diarrhea, nausea and vomiting.   Endocrine:  Negative for cold intolerance, heat intolerance, polydipsia, polyphagia and polyuria.   Genitourinary: Negative for decreased urine volume, difficulty urinating, dysuria, flank pain, frequency, hematuria and urgency.   Musculoskeletal: Negative for arthralgias, back pain, gait problem, joint swelling and myalgias.   Skin: Negative for pallor and rash.   Allergic/Immunologic: Negative for immunocompromised state.   Neurological: Positive for weakness. Negative for dizziness, syncope, light-headedness and headaches.   Hematological: Negative for adenopathy. Does not bruise/bleed easily.   Psychiatric/Behavioral: Negative for agitation and confusion. The patient is nervous/anxious.           Medication List           Accurate as of 1/24/19  9:35 AM. If you have any questions, ask your nurse or doctor.               CONTINUE taking these medications    acyclovir 400 MG tablet  Commonly known as:  ZOVIRAX  Take 1 tablet (400 mg total) by mouth 2 (two) times daily.     aspirin 81 MG EC tablet  Commonly known as:  ECOTRIN     b complex vitamins tablet     cyanocobalamin 1,000 mcg/mL injection  Give 1cc under the skin once a month     DUREZOL 0.05 % Drop ophthalmic solution  Generic drug:  difluprednate     furosemide 40 MG tablet  Commonly known as:  LASIX  Take 1 tablet (40 mg total) by mouth once daily.     ketorolac 0.4% 0.4 % Drop  Commonly known as:  ACULAR     leuprolide 3.75 mg injection  Commonly known as:  LUPRON     metoprolol tartrate 100 MG tablet  Commonly known as:  LOPRESSOR  TAKE 1 TABLET(100 MG) BY MOUTH TWICE DAILY     omeprazole 20 MG capsule  Commonly known as:  PRILOSEC  TAKE 1 CAPSULE(20 MG) BY MOUTH EVERY DAY     polymyxin B sulf-trimethoprim 10,000 unit- 1 mg/mL Drop  Commonly known as:  POLYTRIM  Place 1 drop into the right eye every 4 (four) hours.          Review of patient's allergies indicates:   Allergen Reactions    Lipitor [atorvastatin] Other (See Comments)    Norvasc [amlodipine]  Swelling       Lab: I have reviewed all of the patient's relevant lab work available in the medical record and have utilized this in my evaluation and management recommendations today    Imaging: I have reviewed all of the patient's diagnostic/imaging results available in the medical record and have utilized this in my evaluation and management recommendations today.      Objective:     Vitals:    01/24/19 0857   BP: 123/86   Pulse: (!) 49   Temp: 96.7 °F (35.9 °C)       Physical Exam   Constitutional: He is oriented to person, place, and time. He appears well-developed and well-nourished. No distress.   HENT:   Head: Normocephalic and atraumatic.   Nose: Nose normal.   Mouth/Throat: Oropharynx is clear and moist. No oropharyngeal exudate.   Eyes: EOM are normal. Pupils are equal, round, and reactive to light. No scleral icterus.   Neck: Normal range of motion. Neck supple. No tracheal deviation present. No thyromegaly present.   Cardiovascular: Normal rate, regular rhythm, normal heart sounds and intact distal pulses.   No murmur heard.  Pulmonary/Chest: Effort normal and breath sounds normal. No stridor. No respiratory distress. He has no wheezes. He has no rales. He exhibits no tenderness.   Abdominal: Soft. Bowel sounds are normal. He exhibits no distension and no mass. There is no tenderness. There is no rebound and no guarding.   Musculoskeletal: Normal range of motion. He exhibits edema. He exhibits no tenderness.   Lymphadenopathy:     He has no cervical adenopathy.   Neurological: He is alert and oriented to person, place, and time. Coordination normal.   Skin: Skin is warm. No rash noted. He is not diaphoretic. No erythema.   Psychiatric: He has a normal mood and affect. His behavior is normal. Judgment and thought content normal.   Nursing note and vitals reviewed.      Assessment:     1. Waldenstrom's macroglobulinemia  2.  Elevated LFTs/elevated bilirubin - improved  3.  Elevated creatinine -  stable/improved  4.  Generalized weakness/fatigue - improved  5.  History of Alcohol abuse    Plan:   1.  I had a detailed discussion with the patient previously with regard to the results of his bone marrow aspiration biopsy done in July 2018 as well as the results of all of his lab work done in August 2018.  At this time the patient has Waldenstrom's macroglobulinemia.  His IgM quantitative level was initially greater than 4 g.  2.  I had a detailed discussion with him previously with regard to the diagnosis, prognosis and treatment of this malignancy.  We discussed the indications to begin treatment.  I was concerned about possible liver involvement by his lymphoma because of his recently abnormal/worsening LFTs.  I was also concerned about possible renal involvement because of his elevated creatinine with monoclonal chain noted in UPEP immunofixation.  3.  We discussed that his treatment options are complicated because of the above.  I have previously discussed that the best course of action would be to start him on Velcade/dexamethasone/Rituxan.  I decided to hold off on initiation of Rituxan until we get his IgM quantitative level to less than 4 g.  Therefore he was initially started on twice weekly Velcade and weekly dexamethasone at 20 mg p.o. once daily.  We decided to re-evaluate our treatment options based on clinical response and based on how he tolerates this treatment. Velcade was dose adjusted for liver function. Informed consent taken.    4.  All necessary prescriptions including for herpes zoster prophylaxis have been sent to the patient's pharmacy previously and the patient reports compliance with this medication.  5. I have previously strongly recommended cessation of alcohol use.  He reports that he continues to drink 1 drink a day.  6.  I had a detailed discussion with the patient today with regard to the results of all of his recent lab work which shows excellent response to ongoing treatment  with interval significant decrease in IgM quantitative level.  I have also previously added IV Rituxan to his treatment.  The rationale for this was discussed in detail and he expressed understanding.  We have decided to initiate Rituxan starting with cycle 3 of Velcade.  I have since transitioned Velcade to once weekly.  7. I have previously discussed with the patient with regard to my concerns with regard to his combined liver/kidney dysfunction.  I am concerned about my ability to continue treatment safely.  At this time I will continue to hold Velcade and dexamethasone.  He has previously completed 4 weekly doses of rituxan in Oct 2018.   8. At this time we have decided to proceed with maintenance rituxan q12 weeks x 4 doses starting in Jan 2018. All labs from Jan 2019 reviewed and look stable/improved suggesting good control of his malignancy.  He will proceed with cycle 1/4 of maintenance Rituxan today.  9.  I will discuss with IR with regard to finding noted in left lung on PET-CT scan.  Results reviewed in detail with the patient and he expressed understanding.    Follow-up in 1 month with labs 1 week before clinic visit. He knows to call sooner for any additional questions or new problems.

## 2019-01-28 ENCOUNTER — OFFICE VISIT (OUTPATIENT)
Dept: OPHTHALMOLOGY | Facility: CLINIC | Age: 79
End: 2019-01-28
Payer: MEDICARE

## 2019-01-28 DIAGNOSIS — Z98.41 CATARACT EXTRACTION STATUS, RIGHT: ICD-10-CM

## 2019-01-28 DIAGNOSIS — H25.12 NUCLEAR SENILE CATARACT OF LEFT EYE: ICD-10-CM

## 2019-01-28 DIAGNOSIS — Z98.890 POST-OPERATIVE STATE: Primary | ICD-10-CM

## 2019-01-28 DIAGNOSIS — H18.519 FUCHS' CORNEAL DYSTROPHY: ICD-10-CM

## 2019-01-28 DIAGNOSIS — H04.123 DRY EYE SYNDROME, BILATERAL: ICD-10-CM

## 2019-01-28 PROCEDURE — 92136 IOL MASTER - MOD 26 - OS - LEFT EYE: ICD-10-PCS | Mod: 26,HCNC,LT,S$GLB | Performed by: OPHTHALMOLOGY

## 2019-01-28 PROCEDURE — 99024 POSTOP FOLLOW-UP VISIT: CPT | Mod: HCNC,S$GLB,, | Performed by: OPHTHALMOLOGY

## 2019-01-28 PROCEDURE — 99999 PR PBB SHADOW E&M-EST. PATIENT-LVL II: CPT | Mod: PBBFAC,HCNC,, | Performed by: OPHTHALMOLOGY

## 2019-01-28 PROCEDURE — 99999 PR PBB SHADOW E&M-EST. PATIENT-LVL II: ICD-10-PCS | Mod: PBBFAC,HCNC,, | Performed by: OPHTHALMOLOGY

## 2019-01-28 PROCEDURE — 92136 OPHTHALMIC BIOMETRY: CPT | Mod: 26,HCNC,LT,S$GLB | Performed by: OPHTHALMOLOGY

## 2019-01-28 PROCEDURE — 99024 PR POST-OP FOLLOW-UP VISIT: ICD-10-PCS | Mod: HCNC,S$GLB,, | Performed by: OPHTHALMOLOGY

## 2019-01-28 NOTE — PROGRESS NOTES
HPI     Patient returns for a 17 day p.o. Visit patient states his vision is much   Better OD and blurred va os, trouble with glare     Durezol qid OD  Clear Eye pr qid OU  genteal gel q hs OU    Last edited by Victor M Cárdenas MD on 1/28/2019  8:42 AM. (History)            Assessment /Plan     For exam results, see Encounter Report.      ICD-10-CM ICD-9-CM    1. Post-operative state Z98.890 V45.89 Will reduce Durezol to bid OD   2. Cataract extraction status, right Z98.41 V45.61    3. 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 Nuclear senile cataract of left eye H25.12 366.16 Patient reports decreased vision in the fellow eye consistent with the clinical amount of lenticular opacity, which reaches the level of visual significance and affects activities of daily living including reading and glare. Risks, benefits, and alternatives to cataract surgery were discussed and pt desired to schedule cataract surgery. Pt was consented and the biometry and lens options were reviewed.  ,   Phaco left , Viscoat   Topical   Requests a monofocal  IOL.  Will aim for distance  Patient given prescriptions for Polytrim, Durezol, and Ketorolac  Explained that patient may need glasses after surgery.  Discussed that vision may be limited by cornea      5. Dry eye syndrome, bilateral H04.123 375.15 Still  significant, continue intense dry eye tx

## 2019-01-29 ENCOUNTER — TELEPHONE (OUTPATIENT)
Dept: INTERNAL MEDICINE | Facility: CLINIC | Age: 79
End: 2019-01-29

## 2019-01-29 NOTE — TELEPHONE ENCOUNTER
----- Message from Dash Rivera sent at 1/29/2019  1:26 PM CST -----  Contact: self [phone]  Would like to consult with nurse regarding referral.  Please call back at [phone]. Md Regina

## 2019-01-29 NOTE — TELEPHONE ENCOUNTER
Spoke to pt. Pt believes he may have a hernia. He wanted to know if he needed to see Dr. Cortés or would Dr. Cortés just refer him to specialty without being seen. Advised him to come in to be seen by Primary Care Team for proper diagnosis first and then go from there. Assisted in scheduling appt. Voiced understanding.

## 2019-01-30 ENCOUNTER — OFFICE VISIT (OUTPATIENT)
Dept: INTERNAL MEDICINE | Facility: CLINIC | Age: 79
End: 2019-01-30
Payer: MEDICARE

## 2019-01-30 VITALS
SYSTOLIC BLOOD PRESSURE: 116 MMHG | DIASTOLIC BLOOD PRESSURE: 78 MMHG | HEART RATE: 72 BPM | BODY MASS INDEX: 25.34 KG/M2 | HEIGHT: 70 IN | TEMPERATURE: 97 F | WEIGHT: 177 LBS

## 2019-01-30 DIAGNOSIS — K40.90 NON-RECURRENT UNILATERAL INGUINAL HERNIA WITHOUT OBSTRUCTION OR GANGRENE: Primary | ICD-10-CM

## 2019-01-30 PROCEDURE — 1101F PT FALLS ASSESS-DOCD LE1/YR: CPT | Mod: HCNC,CPTII,S$GLB, | Performed by: INTERNAL MEDICINE

## 2019-01-30 PROCEDURE — 99213 OFFICE O/P EST LOW 20 MIN: CPT | Mod: HCNC,S$GLB,, | Performed by: INTERNAL MEDICINE

## 2019-01-30 PROCEDURE — 99999 PR PBB SHADOW E&M-EST. PATIENT-LVL III: CPT | Mod: PBBFAC,HCNC,, | Performed by: INTERNAL MEDICINE

## 2019-01-30 PROCEDURE — 3074F PR MOST RECENT SYSTOLIC BLOOD PRESSURE < 130 MM HG: ICD-10-PCS | Mod: HCNC,CPTII,S$GLB, | Performed by: INTERNAL MEDICINE

## 2019-01-30 PROCEDURE — 3078F DIAST BP <80 MM HG: CPT | Mod: HCNC,CPTII,S$GLB, | Performed by: INTERNAL MEDICINE

## 2019-01-30 PROCEDURE — 3074F SYST BP LT 130 MM HG: CPT | Mod: HCNC,CPTII,S$GLB, | Performed by: INTERNAL MEDICINE

## 2019-01-30 PROCEDURE — 1101F PR PT FALLS ASSESS DOC 0-1 FALLS W/OUT INJ PAST YR: ICD-10-PCS | Mod: HCNC,CPTII,S$GLB, | Performed by: INTERNAL MEDICINE

## 2019-01-30 PROCEDURE — 99213 PR OFFICE/OUTPT VISIT, EST, LEVL III, 20-29 MIN: ICD-10-PCS | Mod: HCNC,S$GLB,, | Performed by: INTERNAL MEDICINE

## 2019-01-30 PROCEDURE — 99999 PR PBB SHADOW E&M-EST. PATIENT-LVL III: ICD-10-PCS | Mod: PBBFAC,HCNC,, | Performed by: INTERNAL MEDICINE

## 2019-01-30 PROCEDURE — 3078F PR MOST RECENT DIASTOLIC BLOOD PRESSURE < 80 MM HG: ICD-10-PCS | Mod: HCNC,CPTII,S$GLB, | Performed by: INTERNAL MEDICINE

## 2019-01-31 NOTE — PROGRESS NOTES
"Subjective:       Patient ID: Homero Tanner is a 78 y.o. male.    Chief Complaint: Hernia    HPI Patient is a 78-year-old male presenting today with concerns of possible hernia.  He states over the last few months he has been having a little bit trouble with some constipation and some hard bowel movements.  She has been straining a bit states the few days ago he was straining for a bowel movement and felt sore sort of a pulling sensation in his lower groin now has lot of swelling in that area.  He is concerned that he has a hernia.  He is not having significant amount of discomfort.  He states little bit sore but no other significant problems    Review of Systems    Objective:   /78   Pulse 72   Temp 97.1 °F (36.2 °C) (Tympanic)   Ht 5' 10" (1.778 m)   Wt 80.3 kg (177 lb 0.5 oz)   BMI 25.40 kg/m²      Physical Exam   Constitutional: He appears well-developed and well-nourished.   HENT:   Head: Normocephalic and atraumatic.   Neck: Normal range of motion.   Pulmonary/Chest: Effort normal.   Genitourinary:   Genitourinary Comments: Normal external genitalia.  Uncircumcised male.  No evidence of inguinal hernia on the left.  On the right side there is a large area of swelling.  Palpation is very reveals bowel palpable.  Assessment for hernias confirmed that there is an inguinal hernia on the right hand side.  It is reducible.   Vitals reviewed.      No visits with results within 2 Week(s) from this visit.   Latest known visit with results is:   Lab Visit on 01/16/2019   Component Date Value    WBC 01/16/2019 9.53     RBC 01/16/2019 3.30*    Hemoglobin 01/16/2019 10.8*    Hematocrit 01/16/2019 33.0*    MCV 01/16/2019 100*    MCH 01/16/2019 32.7*    MCHC 01/16/2019 32.7     RDW 01/16/2019 13.9     Platelets 01/16/2019 351*    MPV 01/16/2019 9.5     Gran # (ANC) 01/16/2019 6.5     Lymph # 01/16/2019 1.7     Mono # 01/16/2019 1.0     Eos # 01/16/2019 0.3     Baso # 01/16/2019 0.04     " Gran% 01/16/2019 67.9     Lymph% 01/16/2019 18.2     Mono% 01/16/2019 10.6     Eosinophil% 01/16/2019 2.9     Basophil% 01/16/2019 0.4     Differential Method 01/16/2019 Automated     Sodium 01/16/2019 138     Potassium 01/16/2019 4.2     Chloride 01/16/2019 103     CO2 01/16/2019 22*    Glucose 01/16/2019 118*    BUN, Bld 01/16/2019 18     Creatinine 01/16/2019 1.2     Calcium 01/16/2019 8.8     Total Protein 01/16/2019 7.0     Albumin 01/16/2019 2.9*    Total Bilirubin 01/16/2019 0.7     Alkaline Phosphatase 01/16/2019 296*    AST 01/16/2019 31     ALT 01/16/2019 17     Anion Gap 01/16/2019 13     eGFR if African American 01/16/2019 >60     eGFR if non African Amer* 01/16/2019 58*    Protein, Serum 01/16/2019 6.9     Albumin grams/dl 01/16/2019 3.22*    Alpha-1 grams/dl 01/16/2019 0.39     Alpha-2 grams/dl 01/16/2019 0.88     Beta grams/dl 01/16/2019 2.13*    Gamma grams/dl 01/16/2019 0.29*    Kappa Free Light Chains 01/16/2019 1.73     Lambda Free Light Chains 01/16/2019 2.90*    Kappa/Lambda FLC Ratio 01/16/2019 0.60     Immunofix Interp. 01/16/2019 SEE COMMENT     IgG - Serum 01/16/2019 254*    IgA 01/16/2019 37*    IgM 01/16/2019 2253*    Pathologist Interpretati* 01/16/2019 REVIEWED     Pathologist Interpretati* 01/16/2019 REVIEWED        Assessment:       1. Non-recurrent unilateral inguinal hernia without obstruction or gangrene        Plan:   No problem-specific Assessment & Plan notes found for this encounter.    Non-recurrent unilateral inguinal hernia without obstruction or gangrene  -     Ambulatory consult to General Surgery     Patient was educated on the signs and symptoms of incarceration and strangulation.  He was informed if he has loosens or symptoms he should seek care in the emergency room he expressed understanding.  Referral to general surgery will be made for assessment and proposed repair of this hernia.      Follow-up if symptoms worsen or fail to  improve.

## 2019-02-04 ENCOUNTER — OFFICE VISIT (OUTPATIENT)
Dept: SURGERY | Facility: CLINIC | Age: 79
End: 2019-02-04
Payer: MEDICARE

## 2019-02-04 VITALS
BODY MASS INDEX: 25.4 KG/M2 | WEIGHT: 177 LBS | HEART RATE: 76 BPM | DIASTOLIC BLOOD PRESSURE: 79 MMHG | SYSTOLIC BLOOD PRESSURE: 114 MMHG | TEMPERATURE: 98 F

## 2019-02-04 DIAGNOSIS — K40.90 NON-RECURRENT UNILATERAL INGUINAL HERNIA WITHOUT OBSTRUCTION OR GANGRENE: Primary | ICD-10-CM

## 2019-02-04 PROCEDURE — 99499 UNLISTED E&M SERVICE: CPT | Mod: HCNC,S$GLB,, | Performed by: SURGERY

## 2019-02-04 PROCEDURE — 3074F SYST BP LT 130 MM HG: CPT | Mod: HCNC,CPTII,S$GLB, | Performed by: SURGERY

## 2019-02-04 PROCEDURE — 99203 OFFICE O/P NEW LOW 30 MIN: CPT | Mod: HCNC,S$GLB,, | Performed by: SURGERY

## 2019-02-04 PROCEDURE — 99203 PR OFFICE/OUTPT VISIT, NEW, LEVL III, 30-44 MIN: ICD-10-PCS | Mod: HCNC,S$GLB,, | Performed by: SURGERY

## 2019-02-04 PROCEDURE — 1101F PT FALLS ASSESS-DOCD LE1/YR: CPT | Mod: HCNC,CPTII,S$GLB, | Performed by: SURGERY

## 2019-02-04 PROCEDURE — 3078F DIAST BP <80 MM HG: CPT | Mod: HCNC,CPTII,S$GLB, | Performed by: SURGERY

## 2019-02-04 PROCEDURE — 99499 RISK ADDL DX/OHS AUDIT: ICD-10-PCS | Mod: HCNC,S$GLB,, | Performed by: SURGERY

## 2019-02-04 PROCEDURE — 3074F PR MOST RECENT SYSTOLIC BLOOD PRESSURE < 130 MM HG: ICD-10-PCS | Mod: HCNC,CPTII,S$GLB, | Performed by: SURGERY

## 2019-02-04 PROCEDURE — 99999 PR PBB SHADOW E&M-EST. PATIENT-LVL IV: ICD-10-PCS | Mod: PBBFAC,HCNC,, | Performed by: SURGERY

## 2019-02-04 PROCEDURE — 1101F PR PT FALLS ASSESS DOC 0-1 FALLS W/OUT INJ PAST YR: ICD-10-PCS | Mod: HCNC,CPTII,S$GLB, | Performed by: SURGERY

## 2019-02-04 PROCEDURE — 99999 PR PBB SHADOW E&M-EST. PATIENT-LVL IV: CPT | Mod: PBBFAC,HCNC,, | Performed by: SURGERY

## 2019-02-04 PROCEDURE — 3078F PR MOST RECENT DIASTOLIC BLOOD PRESSURE < 80 MM HG: ICD-10-PCS | Mod: HCNC,CPTII,S$GLB, | Performed by: SURGERY

## 2019-02-04 RX ORDER — ONDANSETRON 4 MG/1
8 TABLET, ORALLY DISINTEGRATING ORAL EVERY 8 HOURS PRN
Status: CANCELLED | OUTPATIENT
Start: 2019-02-04

## 2019-02-04 RX ORDER — LIDOCAINE HYDROCHLORIDE 10 MG/ML
1 INJECTION, SOLUTION EPIDURAL; INFILTRATION; INTRACAUDAL; PERINEURAL ONCE
Status: DISCONTINUED | OUTPATIENT
Start: 2019-02-04 | End: 2019-02-21 | Stop reason: ALTCHOICE

## 2019-02-04 NOTE — PATIENT INSTRUCTIONS
"Surgery scheduled for February 26th at 7 in the morning.  The hospital call you with the time for arrival, as surgery start times are subject to change    Please stop your aspirin on February 19th.    No solid food after midnight on January 25th.  You may have clear liquids up to 3 hr before the start time of your surgery.      [facility]  Instructions for Patients and Families    You are invited to share your experience with me and my staff.  If you receive a survey in the mail, please return it at your convenience, or complete a brief survey on [employer].  We value your opinion!        Did you know that [employer] can be used to make appointments?  Just select "Schedule Appointment" under the "Visits" menu.    Notify you if any of your preop laboratories show abnormalities.  I    Before surgery:  The pre-op nurse from the hospital will call you before the day of your surgery to confirm your arrival time.  The time of your surgery may change due to emergencies or other unforeseen events.  Do not eat or drink anything after midnight the night before your procedure, except for clear liquids up to three hours before your surgery time, and sips of water with medication.  If you are not diabetic, it is recommended that you drink a glass of clear fruit juice (apple, grape, cranberry, not orange) three hours before your surgery time, but nothing after that.  If you are diabetic, you may have water or sugar-free clear liquids such as Crystal Light up to three hours before your surgery time.    Day of Surgery:  · You will go to a pre-op area where an IV will be started and you will speak to the anesthesiologist and surgeon.  · Your family will be updated throughout the operation.  After surgery, your family member may be taken to a private room for consultation with the surgeon.  This is for the privacy of your medical information and does not necessarily mean there is anything wrong.    If " your incisions have:  · Glue:  You may take a bath or shower immediately and wash your skin as you normally do.  The glue will eventually crumble or peel off. Do not let your incisions soak under water.  · Strips: Leave them on, but it is OK if they fall off on their own. It is OK to get them wet 48 hours after surgery.  · Bandage: You may remove it 2 days after your surgery, and then you may leave the incision open and take a shower or bath, unless otherwise instructed. If your bandage has clear plastic, you may shower with it on and then remove it 2 days after surgery.    Activities  · Walking is recommended after surgery; bed rest is not recommended unless specifically ordered.  · If you have had abdominal surgery, do not lift over 20 pounds for 4 weeks after surgery.  · If you have had hernia surgery, do not lift over 20 pounds for 6 weeks after surgery.  · You may drive when you are off your post-operative pain medication.  · Do not smoke after surgery, it decreases your ability to heal and increases the risk of infection and pneumonia.    Diet:  Drink lots of fluids after surgery.  You might not have much of an appetite at first, you may eat regular food when you feel ready, unless you are given special diet instructions.    Post-operative symptoms and medications  · It is safe to take over-the-counter medications for constipation, heartburn, sleep, or itching if needed.  Prescription pain medication may contain acetaminophen (Tylenol), so you should not take additional acetaminophen (Tylenol) at the same time as your pain medication.  · You may experience nausea, low fever/chills, and clear drainage from your incision, sometimes up to a month after surgery.  Notify our office if you have fever over 101 degrees, worsening redness around your incision, thick cloudy drainage, or inability to drink any liquids.  · You will experience some level of pain after surgery.  Your pain medication should help with the  "pain, but may not be able to eliminate it entirely.  Pain will decrease with time, and most pain will be gone by 4 to 6 weeks after surgery.  · We are not able to call in prescriptions for pain medication after hours or on weekends.  If your pain medication is ineffective or you will run out soon and need a refill, please call our office at [phone].  We are not able to replace pain medication that has been lost or stolen.    After surgery, you will either be discharged home or admitted to the hospital.  If you are admitted to the hospital, one of the surgeons or a physician assistant will see you once a day.  Due to scheduled surgery, we may see you in the afternoon or at night; however, your nurse is able to page us at any time.  If you feel there is a situation that is not being addressed properly, please dial [phone] from the phone in your room.    Follow-up appointment  · You will see your surgeon or a physician assistant in clinic for a follow-up appointment at either our [facility] (off [facility]) or [facility] (off [facility]) locations, usually between one and four weeks after your surgery.  · The hospital nurses can make your follow up appointment, or you can make it online at [email or web address] or call [phone].  · If you have a smartphone with the [employer] kiersten, please let us know if you would like to do a phone visit instead of a post-op office visit.    If you are signed up for [facility], install the "QThru" kiersten to access your test results, send messages to your doctors, and schedule appointments from your smartphone!          Hernia (Adult)    A hernia can happen when there is a weakness or defect in the wall of the abdomen or groin. Intestines or nearby tissues may move from their usual location and push through the weakness in the wall. This can cause a hernia (bulge) you may see or feel.  Causes and Risk Factors   A hernia may be present at birth. Or it may be caused by the wear and tear of " daily living. Certain factors can make a hernia more likely. These can include:  · Heavy lifting  · Straining, whether from lifting, movement, or constipation  · Chronic cough  · Injury to the abdominal wall  · Excess weight  · Pregnancy  · Prior surgery  · Older age  · Family history of hernia  Symptoms  Symptoms of a hernia may come on suddenly. Or they may appear slowly over time. Some common symptoms include:  · Bulge in the groin area, around the navel, or in the scrotum (the bulge may get bigger when you stand and go away when you lie down)  · Pain or pressure around the bulge  · Pain during activities such as lifting, coughing, or sneezing  · A feeling of weakness or pressure in the groin  · Pain or swelling in the scrotum  Types of hernias  There are different types of hernia. The type you have depends on its location:  · Inguinal: This type is in the groin or scrotum. It is more common in men.  · Femoral: This type is in the groin, upper thigh (where the leg bends), or labia. It is more common in women.  · Ventral: This type is in the abdominal wall.  · Umbilical: This type occurs around the navel (belly button).  · Incisional: This type occurs at the site of a previous surgery.  The condition of the hernia can help determine how urgently it needs to be treated.  · Reducible: It goes back in by itself, or it can be pushed back in.  · Irreducible: It cant be pushed back in.  · Incarcerated/Strangulated: The intestine is trapped (incarcerated). If this happens, you wont be able to push the bulge back in. If the incarcerated hernia isnt treated, it may become strangulated. This means the area loses blood supply and the tissue may die. This requires emergency surgery! Treatment is needed right away!  In most cases, a hernia will not heal on its own. Surgery is usually needed to repair the defect in the abdominal wall or groin. Youll be told more about surgery, if needed.  If your symptoms are not severe,  treatment may sometimes be delayed. In such cases, regular follow-up visits with the provider will be needed. Youll be asked to keep track of your symptoms and to watch for signs of more serious problems. You may also be given guidelines similar to the home care instructions below.  Home Care  To help keep a hernia from getting worse, you may be advised to:  · Avoid heavy lifting and straining as directed.  · Take steps to prevent constipation, such as eating more fiber and drinking more water. This may help reduce straining that can occur when having a bowel movement. Reducing straining may help keep your symptoms from getting worse.  · Maintain a healthy weight or lose excess weight. This can help reduce strain on abdominal muscles and tissues.  · Stop smoking. This can help prevent coughing that may also strain abdominal muscles and tissues.  Follow-up care  Follow up with your healthcare provider, or as directed. If imaging tests were done, they will be reviewed a doctor. You will be told the results and any new findings that may affect your care.  When to seek medical advice  Call your healthcare provider right away if any of these occur:  · Hernia hardens, swells, or grows larger  · Hernia can no longer be pushed back in  · Pain moves to the lower right abdomen (just below the waistline), or spreads to the back  Call 911  Call 911 right away if any of these occur:  · Nausea and vomiting  · Severe pain, redness, or tenderness in the area near the hernia  · Pain worsens quickly and doesnt get better  · Inability to have a bowel movement or pass gas  · Fever of 100.4°F (38°C) or higher  · Trouble breathing  · Fainting  · Rapid heart rate  · Vomiting blood  · Large amounts of blood in stool  Date Last Reviewed: 6/9/2015  © 8694-3610 [employer]. [address] [place] [address], [place] [address]. All rights reserved. This information is not intended as a substitute for professional medical care. Always  follow your healthcare professional's instructions.        Anatomy of the Abdomen and Groin  The abdomen is the largest space (cavity) in the body. It lies between the chest and the pelvis, holding many of the bodys organs. These include the liver, stomach, and intestines. The groin is the area in the body where the upper thighs meet the lowest part of the abdomen. Normally, the abdomen and groin are kept separate by a wall of muscle and tissue. The only openings in the wall are small tunnels called the inguinal and femoral canals. These allow nerves, blood vessels, and other structures to pass between these two areas.              The abdomen and groin  · Muscles. These are soft tissues. They enclose the intestines and other organs in the abdomen.  · Connective tissue. These help bind the muscles together.  · Inguinal canal. This is a tunnel in the groin. It is formed by layers of muscle and other tissues in the wall of the abdomen.  · Femoral canal. This is a tunnel in the wall of the abdomen. It allows blood vessels and nerves to pass through the groin into the leg.  · Spermatic cord. This passes through the inguinal canal and connects to the testicle.  Date Last Reviewed: 10/1/2016  © 3672-3755 [employer]. [address] [place] [address]. All rights reserved. This information is not intended as a substitute for professional medical care. Always follow your healthcare professional's instructions.        Hernia Repair Surgery  A hernia (or rupture) is a weakness or defect in the wall of the abdomen. A hernia will not heal on its own. Surgery is needed to repair the defect in the abdominal wall. If not treated, a hernia can get larger. It can also lead to serious health complications. Fortunately hernia surgery can be done quickly and safely. Below is an overview of hernia repair surgery.  Preparing for surgery  Your healthcare provider will talk with you about getting ready for surgery.  Follow all the instructions youre given and be sure to:  · Tell your healthcare provider about any medicines, supplements, or herbs you take. This includes both prescription and over-the-counter items. Ask if you should stop taking any of them.  · Stop taking aspirin, ibuprofen, naproxen, and other NSAIDs 7 to 14 days before surgery.  · Arrange for an adult family member or friend to give you a ride home after surgery.  · Stop smoking. Smoking affects blood flow and can slow healing.  · Gently wash the surgical area the night before surgery.  · Follow any directions you are given for not eating or drinking before surgery.  The day of surgery  Arrive at the hospital or surgical center at your scheduled time. Youll be asked to change into a patient gown. Youll then be given an IV to provide fluids and medicine. Shortly before surgery, an anesthesiologist or nurse anesthetist will talk with you. He or she will explain the types of anesthesia used to prevent pain during surgery. You will have one or more of the following:  · Monitored sedation to make you relaxed and sleepy.  · Local anesthesia to numb the surgical site.  · Regional anesthesia to numb specific areas of your body.  · General anesthesia to let you sleep during surgery.  Fixing the weakness  Surgery treats a hernia by repairing the weakness in the abdominal wall. Most hernias are treated using tension-free repairs. This is surgery that uses special mesh materials to repair the weak area. The mesh covers the weak area like a patch. The mesh is made of strong, flexible plastic that stays in the body. Over time, nearby tissues grow into the mesh to strengthen the repair.  After surgery  When the procedure is over, youll be taken to the recovery area to rest. Your blood pressure and heart rate will be monitored. Youll also have a bandage over the surgical site. To help reduce discomfort, youll be given pain medicines. You may also be given breathing  exercises to keep your lungs clear. Later youll be asked to get up and walk. This helps prevent blood clots in the legs. You can go home when your healthcare provider says youre ready.     Risks and possible complications of hernia surgery  · Bleeding  · Infection  · Numbness or pain in the groin or leg  · Risk the hernia will recur  · Damage to the testicles or testicular function · Anesthesia risks  · Mesh complications  · Inability to urinate  · Bowel or bladder injury       Date Last Reviewed: 10/1/2016  © 4526-5829 [employer]. [address] [place] [address], [place] [address]. All rights reserved. This information is not intended as a substitute for professional medical care. Always follow your healthcare professional's instructions.

## 2019-02-04 NOTE — LETTER
February 4, 2019      Solomon Cortés MD  [address]  [address]  [place] [address]           [facility]  [address] [place] [id number]  Phone: [phone]  Fax: [phone]          Patient: Homero Tanner   MR Number: [id number]   YOB: 1940   Date of Visit: 2/4/2019       Dear Dr. Solomon Cortés:    Thank you for referring Homero Tanner to me for evaluation. Attached you will find relevant portions of my assessment and plan of care.    If you have questions, please do not hesitate to call me. I look forward to following Homero Tanner along with you.    Sincerely,    Bridger Alvarez MD    Enclosure  CC:  No Recipients    If you would like to receive this communication electronically, please contact [email or web address] or [phone] to request more information on [employer] access.    For providers and/or their staff who would like to refer a patient to [facility], please contact us through our one-stop-shop provider referral line, [facility], at [phone].    If you feel you have received this communication in error or would no longer like to receive these types of communications, please e-mail [email or web address]

## 2019-02-04 NOTE — PROGRESS NOTES
Patient ID: Homero Tanner is a 78 y.o. male.    Chief Complaint: Consult and Hernia      HPI:  Patient presents for evaluation of a right inguinal hernia.  The he noticed a bulge in his right groin.  He states that occurs because of some coughing and some straining with bowel movements.  He was evaluated by his primary care doctor and a right inguinal hernia was diagnosed.  The hernia does not cause him any pain or discomfort.  He presents now to discuss surgical repair      Patient is to undergo his 2nd cataract surgery next week    Review of Systems   Constitutional: Negative.    HENT: Negative.    Eyes: Negative.    Respiratory: Negative.    Cardiovascular: Positive for chest pain.   Gastrointestinal: Positive for constipation and diarrhea.        Right groin buldge   Endocrine: Negative.    Genitourinary: Negative.  Negative for difficulty urinating.   Musculoskeletal: Negative.    Skin: Negative.    Allergic/Immunologic: Negative.    Neurological: Negative.    Hematological: Negative.    Psychiatric/Behavioral: Negative.        Current Outpatient Medications   Medication Sig Dispense Refill    aspirin (ECOTRIN) 81 MG EC tablet Take 81 mg by mouth once daily.      b complex vitamins tablet Take by mouth. 1 tablet Oral Every day      cyanocobalamin 1,000 mcg/mL injection Give 1cc under the skin once a month 10 mL 4    difluprednate (DUREZOL) 0.05 % Drop ophthalmic solution 1 drop.      furosemide (LASIX) 40 MG tablet Take 1 tablet (40 mg total) by mouth once daily. 90 tablet 3    leuprolide (LUPRON) 3.75 mg injection Inject 3.75 mg into the muscle every 3 (three) months.       metoprolol tartrate (LOPRESSOR) 100 MG tablet TAKE 1 TABLET(100 MG) BY MOUTH TWICE DAILY 180 tablet 3    omeprazole (PRILOSEC) 20 MG capsule TAKE 1 CAPSULE(20 MG) BY MOUTH EVERY DAY 90 capsule 3     No current facility-administered medications for this visit.        Review of patient's allergies indicates:   Allergen  Reactions    Lipitor [atorvastatin] Other (See Comments)    Norvasc [amlodipine] Swelling       Past Medical History:   Diagnosis Date    Abnormal ECG 6/24/2013    Aortic insufficiency 6/24/2013    Aortic valve disorder 6/25/2013    Arthritis     Asthma     as a child    Benign neoplasm of cecum 2/27/2017    Benign neoplasm of transverse colon 2/27/2017    Cataract     CHF (congestive heart failure)     Pt states He's never been told this    Encounter for blood transfusion     GERD (gastroesophageal reflux disease)     History of colon polyps     Hypertension     Iron deficiency anemia 10/26/2015    Mixed hyperlipidemia 6/24/2013    Prostate cancer     PSVT (paroxysmal supraventricular tachycardia) 6/24/2013    Pulmonary hypertension 6/13/2016    Rectal adenocarcinoma     Rectal cancer 5/1/2018    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    left parietal scalp    Thymoma     TR (tricuspid regurgitation) 6/13/2016       Past Surgical History:   Procedure Laterality Date    biopsy for chest mass      BIOPSY, WITH CT GUIDANCE Right 1/3/2013    [facility] 2ND F[facility]    Biopsy-bone marrow Left 7/11/2018    Performed by [facility]jit [facility]acosta [facility] at [facility] OR    CATARACT EXTRACTION W/  INTRAOCULAR LENS IMPLANT Right 01/03/2019    COLON SURGERY      COLONOSCOPY Left 2/27/2017    Performed by Abraham Hong [facility] at [facility] ENDO    Colonoscopy N/A 10/26/2015    Performed by Abraham Hong MD at [facility] ENDO    COLONOSCOPY N/A 6/26/2014    Performed by James Griffin [facility] at [facility] ENDO (2ND FLR)    COLONOSCOPY N/A 5/7/2014    Performed by Jaime Ash MD at [facility] ENDO    COLONOSCOPY/hybrid APC or a EndoRotor device N/A 6/27/2018    Performed by Rao Medeiros MD at [facility] ENDO (2ND FLR)    EGD (ESOPHAGOGASTRODUODENOSCOPY) N/A 5/7/2014    Performed by Jaime Ash MD at [facility] ENDO    EXAM UNDER ANESTHESIA N/A 10/15/2014    Per[facility] by Milo [facility]ari [facility] at [facility] OR  2ND FLR    hixtory of rectal cancer. Colonsocopy asked to be repeated minnie  year, alst one 2017 N/A 2018    [facility]io [facility]eter [facility] ENDO    mohs      PROSTATECTOMY      RECTAL SURGERY N/A 2014    SIGMOIDOSCOPY-FLEXIBLE Left 2015    [facility] Abraham [facility] ENDO    TAMIS (TRANSANAL MINIMALLY INVASIVE SURGERY) N/A 2014    Performed by [facility]o [facility]rai [facility] at [facility] OR 2ND FLR    THYMECTOMY N/A 2013    Performed by Pablo Butler MD at [facility] OR 2ND FLR    TONSILLECTOMY      TUMOR REMOVAL      ULTRASOUND-ENDOSCOPIC-LOWER N/A 2014    Performed by James Griffin MD at [facility] ENDO (2ND FLR)       Family History   Problem Relation Age of Onset    Diabetes Father     Cataracts Mother     Amblyopia Neg Hx     Blindness Neg Hx     Cancer Neg Hx     Glaucoma Neg Hx     Hypertension Neg Hx     Macular degeneration Neg Hx     Retinal detachment Neg Hx     Strabismus Neg Hx     Stroke Neg Hx     Thyroid disease Neg Hx     Melanoma Neg Hx        Social History     Socioeconomic History    Marital status: Single     Spouse name: Not on file    Number of children: Not on file    Years of education: Not on file    Highest education level: Not on file   Social Needs    Financial resource strain: Not on file    Food insecurity - worry: Not on file    Food insecurity - inability: Not on file    Transportation needs - medical: Not on file    Transportation needs - non-medical: Not on file   Occupational History    Not on file   Tobacco Use    Smoking status: Former Smoker     Packs/day: 1.00     Years: 15.00     Pack years: 15.00     Last attempt to quit: 1969     Years since quittin.0    Smokeless tobacco: Never Used   Substance and Sexual Activity    Alcohol use: Yes     Alcohol/week: 1.8 oz     Types: 3 Cans of beer per week     Comment: socially    Drug use: No    Sexual activity: Not on file   Other Topics Concern     Not on file   Social History Narrative    Not on file       Vitals:    02/04/19 1150   Temp: 98 °F (36.7 °C)       Physical Exam   Constitutional: No distress.   Slightly chronically ill   HENT:   Head: Normocephalic and atraumatic.   Eyes: Pupils are equal, round, and reactive to light. No scleral icterus.   Neck: Normal range of motion. Neck supple. No tracheal deviation present.   Cardiovascular: Normal rate, regular rhythm, normal heart sounds and intact distal pulses.   Pulmonary/Chest: Effort normal.   Abdominal: Soft. Bowel sounds are normal. A hernia (Reducible right inguinal hernia) is present.   Well-healed lower midline incision   Musculoskeletal: Normal range of motion. He exhibits no edema.   Neurological: He is alert.   Skin: Skin is warm. Capillary refill takes less than 2 seconds.   Psychiatric: He has a normal mood and affect. His behavior is normal. Judgment and thought content normal.   Vitals reviewed.      Assessment & Plan:    reducible right inguinal hernia    Open right inguinal hernia repair with mesh    The rationale for using mesh was discussed    Risk of hernia repair includes infection, bleeding, mesh infection, testicular atrophy, and pain or numbness in the groin that may be long-lasting.

## 2019-02-04 NOTE — H&P (VIEW-ONLY)
Patient ID: Homero Tanner is a 78 y.o. male.    Chief Complaint: Consult and Hernia      HPI:  Patient presents for evaluation of a right inguinal hernia.  The he noticed a bulge in his right groin.  He states that occurs because of some coughing and some straining with bowel movements.  He was evaluated by his primary care doctor and a right inguinal hernia was diagnosed.  The hernia does not cause him any pain or discomfort.  He presents now to discuss surgical repair      Patient is to undergo his 2nd cataract surgery next week    Review of Systems   Constitutional: Negative.    HENT: Negative.    Eyes: Negative.    Respiratory: Negative.    Cardiovascular: Positive for chest pain.   Gastrointestinal: Positive for constipation and diarrhea.        Right groin buldge   Endocrine: Negative.    Genitourinary: Negative.  Negative for difficulty urinating.   Musculoskeletal: Negative.    Skin: Negative.    Allergic/Immunologic: Negative.    Neurological: Negative.    Hematological: Negative.    Psychiatric/Behavioral: Negative.        Current Outpatient Medications   Medication Sig Dispense Refill    aspirin (ECOTRIN) 81 MG EC tablet Take 81 mg by mouth once daily.      b complex vitamins tablet Take by mouth. 1 tablet Oral Every day      cyanocobalamin 1,000 mcg/mL injection Give 1cc under the skin once a month 10 mL 4    difluprednate (DUREZOL) 0.05 % Drop ophthalmic solution 1 drop.      furosemide (LASIX) 40 MG tablet Take 1 tablet (40 mg total) by mouth once daily. 90 tablet 3    leuprolide (LUPRON) 3.75 mg injection Inject 3.75 mg into the muscle every 3 (three) months.       metoprolol tartrate (LOPRESSOR) 100 MG tablet TAKE 1 TABLET(100 MG) BY MOUTH TWICE DAILY 180 tablet 3    omeprazole (PRILOSEC) 20 MG capsule TAKE 1 CAPSULE(20 MG) BY MOUTH EVERY DAY 90 capsule 3     No current facility-administered medications for this visit.        Review of patient's allergies indicates:   Allergen  Reactions    Lipitor [atorvastatin] Other (See Comments)    Norvasc [amlodipine] Swelling       Past Medical History:   Diagnosis Date    Abnormal ECG 6/24/2013    Aortic insufficiency 6/24/2013    Aortic valve disorder 6/25/2013    Arthritis     Asthma     as a child    Benign neoplasm of cecum 2/27/2017    Benign neoplasm of transverse colon 2/27/2017    Cataract     CHF (congestive heart failure)     Pt states He's never been told this    Encounter for blood transfusion     GERD (gastroesophageal reflux disease)     History of colon polyps     Hypertension     Iron deficiency anemia 10/26/2015    Mixed hyperlipidemia 6/24/2013    Prostate cancer     PSVT (paroxysmal supraventricular tachycardia) 6/24/2013    Pulmonary hypertension 6/13/2016    Rectal adenocarcinoma     Rectal cancer 5/1/2018    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    left parietal scalp    Thymoma     TR (tricuspid regurgitation) 6/13/2016       Past Surgical History:   Procedure Laterality Date    biopsy for chest mass      BIOPSY, WITH CT GUIDANCE Right 1/3/2013    Performed by Huang [facility]argas [facility] at [facility] OR 2ND FLR    Biopsy-bone marrow Left 7/11/2018    Performed by [facility]it [facility]acosta [facility] at [facility] OR    CATARACT EXTRACTION W/  INTRAOCULAR LENS IMPLANT Right 01/03/2019    COLON SURGERY      COLONOSCOPY Left 2/27/2017    Performed by Abraham Hong MD at [facility] ENDO    Colonoscopy N/A 10/26/2015    Performed by Abraham Hong MD at [facility] ENDO    COLONOSCOPY N/A 6/26/2014    Performed by James Griffin MD at [facility] ENDO (2ND FLR)    COLONOSCOPY N/A 5/7/2014    Performed by Jaime Ash MD at [facility] ENDO    COLONOSCOPY/hybrid APC or a EndoRotor device N/A 6/27/2018    Performed by Rao Medeiros MD at [facility] ENDO (2ND FLR)    EGD (ESOPHAGOGASTRODUODENOSCOPY) N/A 5/7/2014    Performed by Jaime Ash MD at [facility] ENDO    EXAM UNDER ANESTHESIA N/A 10/15/2014    Performed by Milo Ansari MD at [facility] OR  2ND FLR    hixtory of rectal cancer. Colonsocopy asked to be repeated minnie  year, alst one 2017 N/A 2018    Per[facility]io [facility]eter [facility] ENDO    mohs      PROSTATECTOMY      RECTAL SURGERY N/A 2014    SIGMOIDOSCOPY-FLEXIBLE Left 2015    Performed by Abraham Hong [facility] at [facility] END[facility]    TAMIS (TRANSANAL MINIMALLY INVASIVE SURGERY) N/A 2014    Performed by Milo Ansari MD at [facility] OR 2ND FLR    THYMECTOMY N/A 2013    Performed by Pablo Butler MD at [facility] OR 2ND FLR    TONSILLECTOMY      TUMOR REMOVAL      ULTRASOUND-ENDOSCOPIC-LOWER N/A 2014    Performed by James Griffin MD at [facility] ENDO (2ND FLR)       Family History   Problem Relation Age of Onset    Diabetes Father     Cataracts Mother     Amblyopia Neg Hx     Blindness Neg Hx     Cancer Neg Hx     Glaucoma Neg Hx     Hypertension Neg Hx     Macular degeneration Neg Hx     Retinal detachment Neg Hx     Strabismus Neg Hx     Stroke Neg Hx     Thyroid disease Neg Hx     Melanoma Neg Hx        Social History     Socioeconomic History    Marital status: Single     Spouse name: Not on file    Number of children: Not on file    Years of education: Not on file    Highest education level: Not on file   Social Needs    Financial resource strain: Not on file    Food insecurity - worry: Not on file    Food insecurity - inability: Not on file    Transportation needs - medical: Not on file    Transportation needs - non-medical: Not on file   Occupational History    Not on file   Tobacco Use    Smoking status: Former Smoker     Packs/day: 1.00     Years: 15.00     Pack years: 15.00     Last attempt to quit: 1969     Years since quittin.0    Smokeless tobacco: Never Used   Substance and Sexual Activity    Alcohol use: Yes     Alcohol/week: 1.8 oz     Types: 3 Cans of beer per week     Comment: socially    Drug use: No    Sexual activity: Not on file   Other Topics Concern     Not on file   Social History Narrative    Not on file       Vitals:    02/04/19 1150   Temp: 98 °F (36.7 °C)       Physical Exam   Constitutional: No distress.   Slightly chronically ill   HENT:   Head: Normocephalic and atraumatic.   Eyes: Pupils are equal, round, and reactive to light. No scleral icterus.   Neck: Normal range of motion. Neck supple. No tracheal deviation present.   Cardiovascular: Normal rate, regular rhythm, normal heart sounds and intact distal pulses.   Pulmonary/Chest: Effort normal.   Abdominal: Soft. Bowel sounds are normal. A hernia (Reducible right inguinal hernia) is present.   Well-healed lower midline incision   Musculoskeletal: Normal range of motion. He exhibits no edema.   Neurological: He is alert.   Skin: Skin is warm. Capillary refill takes less than 2 seconds.   Psychiatric: He has a normal mood and affect. His behavior is normal. Judgment and thought content normal.   Vitals reviewed.      Assessment & Plan:    reducible right inguinal hernia    Open right inguinal hernia repair with mesh    The rationale for using mesh was discussed    Risk of hernia repair includes infection, bleeding, mesh infection, testicular atrophy, and pain or numbness in the groin that may be long-lasting.

## 2019-02-07 ENCOUNTER — OUTSIDE PLACE OF SERVICE (OUTPATIENT)
Dept: ADMINISTRATIVE | Facility: OTHER | Age: 79
End: 2019-02-07
Payer: MEDICARE

## 2019-02-07 PROCEDURE — 66984 XCAPSL CTRC RMVL W/O ECP: CPT | Mod: 79,LT,, | Performed by: OPHTHALMOLOGY

## 2019-02-07 PROCEDURE — 66984 PR REMOVAL, CATARACT, W/INSRT INTRAOC LENS, W/O ENDO CYCLO: ICD-10-PCS | Mod: 79,LT,, | Performed by: OPHTHALMOLOGY

## 2019-02-08 ENCOUNTER — OFFICE VISIT (OUTPATIENT)
Dept: OPHTHALMOLOGY | Facility: CLINIC | Age: 79
End: 2019-02-08
Payer: MEDICARE

## 2019-02-08 DIAGNOSIS — Z98.890 POST-OPERATIVE STATE: Primary | ICD-10-CM

## 2019-02-08 DIAGNOSIS — Z98.42 CATARACT EXTRACTION STATUS, LEFT: ICD-10-CM

## 2019-02-08 PROCEDURE — 99999 PR PBB SHADOW E&M-EST. PATIENT-LVL I: CPT | Mod: PBBFAC,HCNC,, | Performed by: OPHTHALMOLOGY

## 2019-02-08 PROCEDURE — 99999 PR PBB SHADOW E&M-EST. PATIENT-LVL I: ICD-10-PCS | Mod: PBBFAC,HCNC,, | Performed by: OPHTHALMOLOGY

## 2019-02-08 PROCEDURE — 99024 PR POST-OP FOLLOW-UP VISIT: ICD-10-PCS | Mod: HCNC,S$GLB,, | Performed by: OPHTHALMOLOGY

## 2019-02-08 PROCEDURE — 99024 POSTOP FOLLOW-UP VISIT: CPT | Mod: HCNC,S$GLB,, | Performed by: OPHTHALMOLOGY

## 2019-02-08 NOTE — PROGRESS NOTES
HPI     Post-op Evaluation      Additional comments: 1 day PCIOL OS              Comments     The patient states his left eye is doing well and he denies any ocular   pain at this time.  100% drop compliance     Originally from [employer] school in [place].  Duck gurpreet    1. PCIOL OD +21.0 SN60WF / CDE: 12.47 / 1-3-19 (topical, viscoat)  PCIOL OS 2/7/19  2. Chalazion removal OD 12/6/18/ blepharotomy for lid abscess      OS- DUREZOL QID, POLY QID, KETO QID  OU CLEAR EYES QID, GENTEAL GEL QHS          Last edited by Kathy Castro on 2/8/2019  1:08 PM. (History)            Assessment /Plan     For exam results, see Encounter Report.      ICD-10-CM ICD-9-CM    1. Post-operative state Z98.890 V45.89    2. Cataract extraction status, left Z98.42 V45.61        PO Day 1 S/P Phaco/IOL  left eye  Doing well.    Use durezol qd OD until next visit.   Use Durezol QID OS until 2/14 then reduce to tid   Ketorolac until 2/14  Polytrim  4 x day until 2/14  Reinstructed in importance of absolute compliance with Post-OP instructions including medications, shield at bedtime, and limitation of activities. Follow up appointments in approximately one and six weeks or call immediately for increased pain, redness or vision loss.

## 2019-02-13 NOTE — PRE ADMISSION SCREENING
Pre op instructions reviewed with patient per phone:    To confirm, Your surgeon has instructed you:  Surgery is scheduled 2/26/2019 at 9:15 am.      Please report to [facility] 1st floor main lobby by 7:45 am.   Pre admit office to call afternoon prior to surgery with final arrival time      INSTRUCTIONS IMPORTANT!!!  ¨ Do not eat, drink, or smoke after 12 midnight-including water. OK to brush teeth, no gum, candy or mints!    ¨ Take only these medicines with a small swallow of water-morning of surgery.  metoprolol    ____  Do not wear makeup, including mascara.  ____  No powder, lotions or creams to surgical area.  ____  Please remove all jewelry, including piercings and leave at home.  ____  No money or valuables needed. Please leave at home.  ____  Please bring identification and insurance information to hospital.  ____  If going home the same day, arrange for a ride home. You will not be able to   drive if Anesthesia was used.  ____  Children, under 12 years old, must remain in the waiting room with an adult.  They are not allowed in patient areas.  ____  Wear loose fitting clothing. Allow for dressings, bandages.  ____  Stop Aspirin, Ibuprofen, Motrin and Aleve at least 5-7 days before surgery, unless otherwise instructed by your doctor, or the nurse.   You MAY use Tylenol/acetaminophen until day of surgery.  ____  If you take diabetic medication, do not take am of surgery unless instructed by   Doctor.  ____ Stop taking any Fish Oil supplement or any Vitamins that contain Vitamin E at least 5 days prior to surgery.          Bathing Instructions-- The night before surgery and the morning prior to coming to the hospital:   -Do not shave the surgical area.   -Shower and wash your hair and body as usual with anti-bacterial  soap and shampoo.   -Rinse your hair and body completely.   -Use one packet of hibiclens to wash the surgical site (using your hand) gently for 5 minutes.  Do not  scrub you skin too hard.   -Do not use hibiclens on your head, face, or genitals.   -Do not wash with anti-bacterial soap after you use the hibiclens.   -Rinse your body thoroughly.   -Dry with clean, soft towel.  Do not use lotion, cream, deodorant, or powders on   the surgical site.    Use antibacterial soap in place of hibiclens if your surgery is on the head, face or genitals.         Surgical Site Infection    Prevention of surgical site infections:     -Keep incisions clean and dry.   -Do not soak/submerge incisions in water until completely healed.   -Do not apply lotions, powders, creams, or deodorants to site.   -Always make sure hands are cleaned with antibacterial soap/ alcohol-based   prior to touching the surgical site.  (This includes doctors, nurses, staff, and yourself.)    Signs and symptoms:   -Redness and pain around the area where you had surgery   -Drainage of cloudy fluid from your surgical wound   -Fever over 100.4  I have read or had read and explained to me, and understand the above information.

## 2019-02-14 ENCOUNTER — LAB VISIT (OUTPATIENT)
Dept: LAB | Facility: HOSPITAL | Age: 79
End: 2019-02-14
Attending: INTERNAL MEDICINE
Payer: MEDICARE

## 2019-02-14 ENCOUNTER — OFFICE VISIT (OUTPATIENT)
Dept: DERMATOLOGY | Facility: CLINIC | Age: 79
End: 2019-02-14
Payer: MEDICARE

## 2019-02-14 DIAGNOSIS — K76.0 FATTY LIVER: ICD-10-CM

## 2019-02-14 DIAGNOSIS — C88.0 WALDENSTROM'S MACROGLOBULINEMIA: ICD-10-CM

## 2019-02-14 DIAGNOSIS — D51.8 OTHER VITAMIN B12 DEFICIENCY ANEMIA: ICD-10-CM

## 2019-02-14 DIAGNOSIS — Z85.828 HISTORY OF SKIN CANCER: ICD-10-CM

## 2019-02-14 DIAGNOSIS — Z85.048 HISTORY OF RECTAL CANCER: ICD-10-CM

## 2019-02-14 DIAGNOSIS — L21.9 SEBORRHEIC DERMATITIS OF SCALP: ICD-10-CM

## 2019-02-14 DIAGNOSIS — L71.9 ROSACEA: Primary | ICD-10-CM

## 2019-02-14 LAB
ALBUMIN SERPL BCP-MCNC: 2.9 G/DL
ALP SERPL-CCNC: 265 U/L
ALT SERPL W/O P-5'-P-CCNC: 13 U/L
ANION GAP SERPL CALC-SCNC: 14 MMOL/L
AST SERPL-CCNC: 25 U/L
BASOPHILS # BLD AUTO: 0.04 K/UL
BASOPHILS NFR BLD: 0.5 %
BILIRUB SERPL-MCNC: 0.6 MG/DL
BUN SERPL-MCNC: 15 MG/DL
CALCIUM SERPL-MCNC: 9.6 MG/DL
CHLORIDE SERPL-SCNC: 101 MMOL/L
CO2 SERPL-SCNC: 25 MMOL/L
CREAT SERPL-MCNC: 1.1 MG/DL
DIFFERENTIAL METHOD: ABNORMAL
EOSINOPHIL # BLD AUTO: 0.3 K/UL
EOSINOPHIL NFR BLD: 3.4 %
ERYTHROCYTE [DISTWIDTH] IN BLOOD BY AUTOMATED COUNT: 13.1 %
EST. GFR  (AFRICAN AMERICAN): >60 ML/MIN/1.73 M^2
EST. GFR  (NON AFRICAN AMERICAN): >60 ML/MIN/1.73 M^2
GLUCOSE SERPL-MCNC: 101 MG/DL
HCT VFR BLD AUTO: 35.7 %
HGB BLD-MCNC: 11.3 G/DL
IGA SERPL-MCNC: 40 MG/DL
IGG SERPL-MCNC: 285 MG/DL
IGM SERPL-MCNC: 2131 MG/DL
IMM GRANULOCYTES # BLD AUTO: 0.02 K/UL
IMM GRANULOCYTES NFR BLD AUTO: 0.3 %
LDH SERPL L TO P-CCNC: 308 U/L
LYMPHOCYTES # BLD AUTO: 1.7 K/UL
LYMPHOCYTES NFR BLD: 21.6 %
MCH RBC QN AUTO: 29.7 PG
MCHC RBC AUTO-ENTMCNC: 31.7 G/DL
MCV RBC AUTO: 94 FL
MONOCYTES # BLD AUTO: 0.7 K/UL
MONOCYTES NFR BLD: 9.3 %
NEUTROPHILS # BLD AUTO: 5 K/UL
NEUTROPHILS NFR BLD: 65.2 %
NRBC BLD-RTO: 0 /100 WBC
PLATELET # BLD AUTO: 349 K/UL
PMV BLD AUTO: 9.9 FL
POTASSIUM SERPL-SCNC: 4.6 MMOL/L
PROT SERPL-MCNC: 7.1 G/DL
RBC # BLD AUTO: 3.81 M/UL
SODIUM SERPL-SCNC: 140 MMOL/L
WBC # BLD AUTO: 7.67 K/UL

## 2019-02-14 PROCEDURE — 86334 IMMUNOFIX E-PHORESIS SERUM: CPT | Mod: 26,HCNC,, | Performed by: PATHOLOGY

## 2019-02-14 PROCEDURE — 84165 PROTEIN E-PHORESIS SERUM: CPT | Mod: HCNC

## 2019-02-14 PROCEDURE — 80053 COMPREHEN METABOLIC PANEL: CPT | Mod: HCNC

## 2019-02-14 PROCEDURE — 85025 COMPLETE CBC W/AUTO DIFF WBC: CPT | Mod: HCNC

## 2019-02-14 PROCEDURE — 84165 PROTEIN E-PHORESIS SERUM: CPT | Mod: 26,HCNC,, | Performed by: PATHOLOGY

## 2019-02-14 PROCEDURE — 86334 IMMUNOFIX E-PHORESIS SERUM: CPT | Mod: HCNC

## 2019-02-14 PROCEDURE — 1101F PT FALLS ASSESS-DOCD LE1/YR: CPT | Mod: HCNC,CPTII,S$GLB, | Performed by: PHYSICIAN ASSISTANT

## 2019-02-14 PROCEDURE — 99999 PR PBB SHADOW E&M-EST. PATIENT-LVL II: ICD-10-PCS | Mod: PBBFAC,HCNC,, | Performed by: PHYSICIAN ASSISTANT

## 2019-02-14 PROCEDURE — 99213 OFFICE O/P EST LOW 20 MIN: CPT | Mod: HCNC,S$GLB,, | Performed by: PHYSICIAN ASSISTANT

## 2019-02-14 PROCEDURE — 83520 IMMUNOASSAY QUANT NOS NONAB: CPT | Mod: HCNC

## 2019-02-14 PROCEDURE — 36415 COLL VENOUS BLD VENIPUNCTURE: CPT | Mod: HCNC

## 2019-02-14 PROCEDURE — 99213 PR OFFICE/OUTPT VISIT, EST, LEVL III, 20-29 MIN: ICD-10-PCS | Mod: HCNC,S$GLB,, | Performed by: PHYSICIAN ASSISTANT

## 2019-02-14 PROCEDURE — 99999 PR PBB SHADOW E&M-EST. PATIENT-LVL II: CPT | Mod: PBBFAC,HCNC,, | Performed by: PHYSICIAN ASSISTANT

## 2019-02-14 PROCEDURE — 1101F PR PT FALLS ASSESS DOC 0-1 FALLS W/OUT INJ PAST YR: ICD-10-PCS | Mod: HCNC,CPTII,S$GLB, | Performed by: PHYSICIAN ASSISTANT

## 2019-02-14 PROCEDURE — 82784 ASSAY IGA/IGD/IGG/IGM EACH: CPT | Mod: 59,HCNC

## 2019-02-14 PROCEDURE — 83615 LACTATE (LD) (LDH) ENZYME: CPT | Mod: HCNC

## 2019-02-14 PROCEDURE — 86334 PATHOLOGIST INTERPRETATION IFE: ICD-10-PCS | Mod: 26,HCNC,, | Performed by: PATHOLOGY

## 2019-02-14 PROCEDURE — 84165 PATHOLOGIST INTERPRETATION SPE: ICD-10-PCS | Mod: 26,HCNC,, | Performed by: PATHOLOGY

## 2019-02-14 RX ORDER — METRONIDAZOLE 7.5 MG/G
CREAM TOPICAL
Qty: 45 G | Refills: 5 | Status: SHIPPED | OUTPATIENT
Start: 2019-02-14 | End: 2019-06-17

## 2019-02-14 RX ORDER — CLOBETASOL PROPIONATE 0.46 MG/ML
SOLUTION TOPICAL
Qty: 50 ML | Refills: 0 | Status: SHIPPED | OUTPATIENT
Start: 2019-02-14 | End: 2019-11-11 | Stop reason: SDUPTHER

## 2019-02-14 NOTE — PROGRESS NOTES
Subjective:       Patient ID:  Homero Tanner is a 78 y.o. male who presents for   Chief Complaint   Patient presents with    Follow-up     pt would like refills      Hx rosacea, Seb derm, AKs, and SCCIS of the left parietal scalp (s/p Mohs on 4/1/15), last seen by Dr. Rai 7/20/17. Here today for f/u of seb derm and rosacea. Needs refills of meds.   For rosacea, using metronidazole 0.75% cream 1-2 times daily, well controlled.    For seb derm of scalp, using ketoconazole shampoo twice weekly, clobetasol qd prn flares, well controlled.          Review of Systems   Constitutional: Negative for fever and chills.   Gastrointestinal: Negative for nausea and vomiting.   Skin: Positive for activity-related sunscreen use and wears hat. Negative for itching, rash, sun sensitivity, daily sunscreen use and recent sunburn.   Hematologic/Lymphatic: Does not bruise/bleed easily.        Objective:    Physical Exam   Constitutional: He appears well-developed and well-nourished. No distress.   Neurological: He is alert and oriented to person, place, and time. He is not disoriented.   Psychiatric: He has a normal mood and affect.   Skin:   Areas Examined (abnormalities noted in diagram):   Scalp / Hair Palpated and Inspected  Head / Face Inspection Performed  Neck Inspection Performed  Chest / Axilla Inspection Performed  RUE Inspected  LUE Inspection Performed  Nails and Digits Inspection Performed              Diagram Legend     Erythematous scaling macule/papule c/w actinic keratosis       Vascular papule c/w angioma      Pigmented verrucoid papule/plaque c/w seborrheic keratosis      Yellow umbilicated papule c/w sebaceous hyperplasia      Irregularly shaped tan macule c/w lentigo     1-2 mm smooth white papules consistent with Milia      Movable subcutaneous cyst with punctum c/w epidermal inclusion cyst      Subcutaneous movable cyst c/w pilar cyst      Firm pink to brown papule c/w dermatofibroma      Pedunculated  fleshy papule(s) c/w skin tag(s)      Evenly pigmented macule c/w junctional nevus     Mildly variegated pigmented, slightly irregular-bordered macule c/w mildly atypical nevus      Flesh colored to evenly pigmented papule c/w intradermal nevus       Pink pearly papule/plaque c/w basal cell carcinoma      Erythematous hyperkeratotic cursted plaque c/w SCC      Surgical scar with no sign of skin cancer recurrence      Open and closed comedones      Inflammatory papules and pustules      Verrucoid papule consistent consistent with wart     Erythematous eczematous patches and plaques     Dystrophic onycholytic nail with subungual debris c/w onychomycosis     Umbilicated papule    Erythematous-base heme-crusted tan verrucoid plaque consistent with inflamed seborrheic keratosis     Erythematous Silvery Scaling Plaque c/w Psoriasis     See annotation      Assessment / Plan:        Rosacea  -     metronidazole 0.75% (METROCREAM) 0.75 % Crea; AAA of face bid  Dispense: 45 g; Refill: 5  Well controlled, Refill above med. Continue gentle skin care and encouraged daily sunscreen w/spf 30.    Seborrheic dermatitis of scalp  -     clobetasol (TEMOVATE) 0.05 % external solution; AAA of scalp 1-2 times daily prn flares. Strong steroid- do not apply to face or folds of skin  Dispense: 50 mL; Refill: 0  Well controlled, continue ketoconazole shampoo twice weekly for maintenance. Refill above med provided prn flares. Reviewed instructions for each medication.    History of skin cancer  Well healed scar with NER. Encouraged regular skin exams for surveillance. Next FSE recommended in July/August 2019.           Follow-up in about 3 months (around 5/14/2019) for to see Dermatology MD .

## 2019-02-15 LAB
ALBUMIN SERPL ELPH-MCNC: 3.19 G/DL
ALPHA1 GLOB SERPL ELPH-MCNC: 0.36 G/DL
ALPHA2 GLOB SERPL ELPH-MCNC: 0.84 G/DL
B-GLOBULIN SERPL ELPH-MCNC: 2 G/DL
GAMMA GLOB SERPL ELPH-MCNC: 0.31 G/DL
INTERPRETATION SERPL IFE-IMP: NORMAL
KAPPA LC SER QL IA: 1.25 MG/DL
KAPPA LC/LAMBDA SER IA: 0.53
LAMBDA LC SER QL IA: 2.36 MG/DL
PATHOLOGIST INTERPRETATION IFE: NORMAL
PATHOLOGIST INTERPRETATION SPE: NORMAL
PROT SERPL-MCNC: 6.7 G/DL

## 2019-02-18 ENCOUNTER — OFFICE VISIT (OUTPATIENT)
Dept: OPHTHALMOLOGY | Facility: CLINIC | Age: 79
End: 2019-02-18
Payer: MEDICARE

## 2019-02-18 DIAGNOSIS — Z98.890 POST-OPERATIVE STATE: Primary | ICD-10-CM

## 2019-02-18 DIAGNOSIS — Z98.42 CATARACT EXTRACTION STATUS, LEFT: ICD-10-CM

## 2019-02-18 DIAGNOSIS — Z98.41 CATARACT EXTRACTION STATUS, RIGHT: ICD-10-CM

## 2019-02-18 PROCEDURE — 99024 POSTOP FOLLOW-UP VISIT: CPT | Mod: HCNC,S$GLB,, | Performed by: OPHTHALMOLOGY

## 2019-02-18 PROCEDURE — 99999 PR PBB SHADOW E&M-EST. PATIENT-LVL I: CPT | Mod: PBBFAC,HCNC,, | Performed by: OPHTHALMOLOGY

## 2019-02-18 PROCEDURE — 99024 PR POST-OP FOLLOW-UP VISIT: ICD-10-PCS | Mod: HCNC,S$GLB,, | Performed by: OPHTHALMOLOGY

## 2019-02-18 PROCEDURE — 99999 PR PBB SHADOW E&M-EST. PATIENT-LVL I: ICD-10-PCS | Mod: PBBFAC,HCNC,, | Performed by: OPHTHALMOLOGY

## 2019-02-18 RX ORDER — DIFLUPREDNATE OPHTHALMIC 0.5 MG/ML
1 EMULSION OPHTHALMIC 3 TIMES DAILY
COMMUNITY
End: 2019-03-22 | Stop reason: ALTCHOICE

## 2019-02-18 NOTE — PROGRESS NOTES
HPI     pATIENT RETURNS FOR A 10 DAY P.O. VISIT.    1. PCIOL OD +21.0 SN60WF / CDE: 12.47 / 1-3-19 (topical, viscoat)  PCIOL OS 2/7/19  2. Chalazion removal OD 12/6/18/ blepharotomy for lid abscess      OS- DUREZOL TID   OU CLEAR EYES QID, GENTEAL GEL QHS ( DOES NOT USE CONSISTENTLY )    Last edited by Victor M Cárdenas MD on 2/18/2019 11:53 AM. (History)            Assessment /Plan     For exam results, see Encounter Report.      ICD-10-CM ICD-9-CM    1. Post-operative state Z98.890 V45.89 S/p phaco OS 2/7/19. Doing well.    2. Cataract extraction status, left Z98.42 V45.61    3. Cataract extraction status, right Z98.41 V45.61        OS: durezol tid until 2/21, bid 2/28, qd until 3/7      Return to clinic 1 month. Refract OU

## 2019-02-21 ENCOUNTER — OFFICE VISIT (OUTPATIENT)
Dept: HEMATOLOGY/ONCOLOGY | Facility: CLINIC | Age: 79
End: 2019-02-21
Payer: MEDICARE

## 2019-02-21 VITALS
TEMPERATURE: 98 F | DIASTOLIC BLOOD PRESSURE: 64 MMHG | HEART RATE: 66 BPM | HEIGHT: 70 IN | BODY MASS INDEX: 24.4 KG/M2 | SYSTOLIC BLOOD PRESSURE: 112 MMHG | WEIGHT: 170.44 LBS | OXYGEN SATURATION: 95 %

## 2019-02-21 DIAGNOSIS — J47.9 BRONCHIECTASIS WITHOUT COMPLICATION: ICD-10-CM

## 2019-02-21 DIAGNOSIS — C88.0 WALDENSTROM'S MACROGLOBULINEMIA: Primary | ICD-10-CM

## 2019-02-21 DIAGNOSIS — I70.0 AORTIC ATHEROSCLEROSIS: ICD-10-CM

## 2019-02-21 DIAGNOSIS — D51.8 OTHER VITAMIN B12 DEFICIENCY ANEMIA: ICD-10-CM

## 2019-02-21 PROCEDURE — 1101F PR PT FALLS ASSESS DOC 0-1 FALLS W/OUT INJ PAST YR: ICD-10-PCS | Mod: HCNC,CPTII,S$GLB, | Performed by: INTERNAL MEDICINE

## 2019-02-21 PROCEDURE — 3074F SYST BP LT 130 MM HG: CPT | Mod: HCNC,CPTII,S$GLB, | Performed by: INTERNAL MEDICINE

## 2019-02-21 PROCEDURE — 3074F PR MOST RECENT SYSTOLIC BLOOD PRESSURE < 130 MM HG: ICD-10-PCS | Mod: HCNC,CPTII,S$GLB, | Performed by: INTERNAL MEDICINE

## 2019-02-21 PROCEDURE — 99999 PR PBB SHADOW E&M-EST. PATIENT-LVL III: ICD-10-PCS | Mod: PBBFAC,HCNC,, | Performed by: INTERNAL MEDICINE

## 2019-02-21 PROCEDURE — 99999 PR PBB SHADOW E&M-EST. PATIENT-LVL III: CPT | Mod: PBBFAC,HCNC,, | Performed by: INTERNAL MEDICINE

## 2019-02-21 PROCEDURE — 99214 PR OFFICE/OUTPT VISIT, EST, LEVL IV, 30-39 MIN: ICD-10-PCS | Mod: HCNC,S$GLB,, | Performed by: INTERNAL MEDICINE

## 2019-02-21 PROCEDURE — 99499 UNLISTED E&M SERVICE: CPT | Mod: HCNC,S$GLB,, | Performed by: INTERNAL MEDICINE

## 2019-02-21 PROCEDURE — 3078F DIAST BP <80 MM HG: CPT | Mod: HCNC,CPTII,S$GLB, | Performed by: INTERNAL MEDICINE

## 2019-02-21 PROCEDURE — 99214 OFFICE O/P EST MOD 30 MIN: CPT | Mod: HCNC,S$GLB,, | Performed by: INTERNAL MEDICINE

## 2019-02-21 PROCEDURE — 1101F PT FALLS ASSESS-DOCD LE1/YR: CPT | Mod: HCNC,CPTII,S$GLB, | Performed by: INTERNAL MEDICINE

## 2019-02-21 PROCEDURE — 99499 RISK ADDL DX/OHS AUDIT: ICD-10-PCS | Mod: HCNC,S$GLB,, | Performed by: INTERNAL MEDICINE

## 2019-02-21 PROCEDURE — 3078F PR MOST RECENT DIASTOLIC BLOOD PRESSURE < 80 MM HG: ICD-10-PCS | Mod: HCNC,CPTII,S$GLB, | Performed by: INTERNAL MEDICINE

## 2019-02-26 ENCOUNTER — ANESTHESIA (OUTPATIENT)
Dept: SURGERY | Facility: HOSPITAL | Age: 79
End: 2019-02-26
Payer: MEDICARE

## 2019-02-26 ENCOUNTER — HOSPITAL ENCOUNTER (OUTPATIENT)
Facility: HOSPITAL | Age: 79
Discharge: HOME | End: 2019-02-26
Attending: SURGERY | Admitting: SURGERY
Payer: MEDICARE

## 2019-02-26 ENCOUNTER — ANESTHESIA EVENT (OUTPATIENT)
Dept: SURGERY | Facility: HOSPITAL | Age: 79
End: 2019-02-26
Payer: MEDICARE

## 2019-02-26 DIAGNOSIS — K40.90 NON-RECURRENT UNILATERAL INGUINAL HERNIA WITHOUT OBSTRUCTION OR GANGRENE: ICD-10-CM

## 2019-02-26 PROCEDURE — 36000706: Mod: HCNC | Performed by: SURGERY

## 2019-02-26 PROCEDURE — 49525 REPAIR ING HERNIA SLIDING: CPT | Mod: HCNC,RT,, | Performed by: SURGERY

## 2019-02-26 PROCEDURE — 37000009 HC ANESTHESIA EA ADD 15 MINS: Mod: HCNC | Performed by: SURGERY

## 2019-02-26 PROCEDURE — 63600175 PHARM REV CODE 636 W HCPCS: Mod: HCNC | Performed by: NURSE ANESTHETIST, CERTIFIED REGISTERED

## 2019-02-26 PROCEDURE — 71000033 HC RECOVERY, INTIAL HOUR: Mod: HCNC | Performed by: SURGERY

## 2019-02-26 PROCEDURE — 49525 PR REPAIR SLIDING INGUINAL HERNIA: ICD-10-PCS | Mod: HCNC,RT,, | Performed by: SURGERY

## 2019-02-26 PROCEDURE — 25000003 PHARM REV CODE 250: Mod: HCNC | Performed by: SURGERY

## 2019-02-26 PROCEDURE — C1781 MESH (IMPLANTABLE): HCPCS | Mod: HCNC | Performed by: SURGERY

## 2019-02-26 PROCEDURE — C1729 CATH, DRAINAGE: HCPCS | Mod: HCNC | Performed by: SURGERY

## 2019-02-26 PROCEDURE — 37000008 HC ANESTHESIA 1ST 15 MINUTES: Mod: HCNC | Performed by: SURGERY

## 2019-02-26 PROCEDURE — 25000003 PHARM REV CODE 250: Mod: HCNC | Performed by: NURSE ANESTHETIST, CERTIFIED REGISTERED

## 2019-02-26 PROCEDURE — 36000707: Mod: HCNC | Performed by: SURGERY

## 2019-02-26 PROCEDURE — 71000015 HC POSTOP RECOV 1ST HR: Mod: HCNC | Performed by: SURGERY

## 2019-02-26 PROCEDURE — 63600175 PHARM REV CODE 636 W HCPCS: Mod: HCNC | Performed by: STUDENT IN AN ORGANIZED HEALTH CARE EDUCATION/TRAINING PROGRAM

## 2019-02-26 DEVICE — PLUG MEDIUM: Type: IMPLANTABLE DEVICE | Site: ABDOMEN | Status: FUNCTIONAL

## 2019-02-26 RX ORDER — CEFAZOLIN SODIUM 2 G/50ML
2 SOLUTION INTRAVENOUS
Status: DISCONTINUED | OUTPATIENT
Start: 2019-02-26 | End: 2019-02-27 | Stop reason: HOSPADM

## 2019-02-26 RX ORDER — LIDOCAINE HYDROCHLORIDE 10 MG/ML
INJECTION, SOLUTION EPIDURAL; INFILTRATION; INTRACAUDAL; PERINEURAL
Status: DISCONTINUED | OUTPATIENT
Start: 2019-02-26 | End: 2019-02-26 | Stop reason: HOSPADM

## 2019-02-26 RX ORDER — ONDANSETRON 8 MG/1
8 TABLET, ORALLY DISINTEGRATING ORAL EVERY 8 HOURS PRN
Status: DISCONTINUED | OUTPATIENT
Start: 2019-02-26 | End: 2019-02-27 | Stop reason: HOSPADM

## 2019-02-26 RX ORDER — BUPIVACAINE HYDROCHLORIDE 2.5 MG/ML
INJECTION, SOLUTION EPIDURAL; INFILTRATION; INTRACAUDAL
Status: DISCONTINUED | OUTPATIENT
Start: 2019-02-26 | End: 2019-02-26 | Stop reason: HOSPADM

## 2019-02-26 RX ORDER — FENTANYL CITRATE 50 UG/ML
25 INJECTION, SOLUTION INTRAMUSCULAR; INTRAVENOUS EVERY 5 MIN PRN
Status: DISCONTINUED | OUTPATIENT
Start: 2019-02-26 | End: 2019-02-27 | Stop reason: HOSPADM

## 2019-02-26 RX ORDER — PROPOFOL 10 MG/ML
VIAL (ML) INTRAVENOUS CONTINUOUS PRN
Status: DISCONTINUED | OUTPATIENT
Start: 2019-02-26 | End: 2019-02-26

## 2019-02-26 RX ORDER — OXYCODONE AND ACETAMINOPHEN 5; 325 MG/1; MG/1
1 TABLET ORAL
Status: DISCONTINUED | OUTPATIENT
Start: 2019-02-26 | End: 2019-02-27 | Stop reason: HOSPADM

## 2019-02-26 RX ORDER — HYDROMORPHONE HYDROCHLORIDE 2 MG/ML
0.2 INJECTION, SOLUTION INTRAMUSCULAR; INTRAVENOUS; SUBCUTANEOUS EVERY 5 MIN PRN
Status: DISCONTINUED | OUTPATIENT
Start: 2019-02-26 | End: 2019-02-27 | Stop reason: HOSPADM

## 2019-02-26 RX ORDER — HYDROCODONE BITARTRATE AND ACETAMINOPHEN 5; 325 MG/1; MG/1
1 TABLET ORAL EVERY 6 HOURS PRN
Qty: 20 TABLET | Refills: 0 | Status: SHIPPED | OUTPATIENT
Start: 2019-02-26 | End: 2019-03-22

## 2019-02-26 RX ORDER — LIDOCAINE HCL/PF 100 MG/5ML
SYRINGE (ML) INTRAVENOUS
Status: DISCONTINUED | OUTPATIENT
Start: 2019-02-26 | End: 2019-02-26

## 2019-02-26 RX ORDER — ONDANSETRON 2 MG/ML
4 INJECTION INTRAMUSCULAR; INTRAVENOUS DAILY PRN
Status: DISCONTINUED | OUTPATIENT
Start: 2019-02-26 | End: 2019-02-27 | Stop reason: HOSPADM

## 2019-02-26 RX ORDER — ONDANSETRON 8 MG/1
8 TABLET, ORALLY DISINTEGRATING ORAL EVERY 8 HOURS PRN
Status: CANCELLED | OUTPATIENT
Start: 2019-02-26

## 2019-02-26 RX ORDER — SODIUM CHLORIDE 0.9 % (FLUSH) 0.9 %
3 SYRINGE (ML) INJECTION
Status: DISCONTINUED | OUTPATIENT
Start: 2019-02-26 | End: 2019-02-27 | Stop reason: HOSPADM

## 2019-02-26 RX ORDER — HYDROMORPHONE HYDROCHLORIDE 2 MG/ML
0.5 INJECTION, SOLUTION INTRAMUSCULAR; INTRAVENOUS; SUBCUTANEOUS EVERY 4 HOURS PRN
Status: CANCELLED | OUTPATIENT
Start: 2019-02-26

## 2019-02-26 RX ORDER — ONDANSETRON 2 MG/ML
INJECTION INTRAMUSCULAR; INTRAVENOUS
Status: DISCONTINUED | OUTPATIENT
Start: 2019-02-26 | End: 2019-02-26

## 2019-02-26 RX ORDER — HYDROCODONE BITARTRATE AND ACETAMINOPHEN 7.5; 325 MG/1; MG/1
1 TABLET ORAL EVERY 6 HOURS PRN
Status: DISCONTINUED | OUTPATIENT
Start: 2019-02-26 | End: 2019-02-27 | Stop reason: HOSPADM

## 2019-02-26 RX ORDER — FENTANYL CITRATE 50 UG/ML
INJECTION, SOLUTION INTRAMUSCULAR; INTRAVENOUS
Status: DISCONTINUED | OUTPATIENT
Start: 2019-02-26 | End: 2019-02-26

## 2019-02-26 RX ORDER — PROPOFOL 10 MG/ML
VIAL (ML) INTRAVENOUS
Status: DISCONTINUED | OUTPATIENT
Start: 2019-02-26 | End: 2019-02-26

## 2019-02-26 RX ORDER — SODIUM CHLORIDE, SODIUM LACTATE, POTASSIUM CHLORIDE, CALCIUM CHLORIDE 600; 310; 30; 20 MG/100ML; MG/100ML; MG/100ML; MG/100ML
INJECTION, SOLUTION INTRAVENOUS CONTINUOUS PRN
Status: DISCONTINUED | OUTPATIENT
Start: 2019-02-26 | End: 2019-02-26

## 2019-02-26 RX ORDER — HYDROCODONE BITARTRATE AND ACETAMINOPHEN 5; 325 MG/1; MG/1
1 TABLET ORAL EVERY 6 HOURS PRN
Status: DISCONTINUED | OUTPATIENT
Start: 2019-02-26 | End: 2019-02-27 | Stop reason: HOSPADM

## 2019-02-26 RX ADMIN — PROPOFOL 50 MCG/KG/MIN: 10 INJECTION, EMULSION INTRAVENOUS at 09:02

## 2019-02-26 RX ADMIN — PROPOFOL 50 MG: 10 INJECTION, EMULSION INTRAVENOUS at 09:02

## 2019-02-26 RX ADMIN — LIDOCAINE HYDROCHLORIDE 100 MG: 20 INJECTION, SOLUTION INTRAVENOUS at 09:02

## 2019-02-26 RX ADMIN — ONDANSETRON 4 MG: 2 INJECTION INTRAMUSCULAR; INTRAVENOUS at 10:02

## 2019-02-26 RX ADMIN — FENTANYL CITRATE 25 MCG: 50 INJECTION, SOLUTION INTRAMUSCULAR; INTRAVENOUS at 09:02

## 2019-02-26 RX ADMIN — ONDANSETRON 4 MG: 2 INJECTION, SOLUTION INTRAMUSCULAR; INTRAVENOUS at 09:02

## 2019-02-26 RX ADMIN — FENTANYL CITRATE 25 MCG: 50 INJECTION INTRAMUSCULAR; INTRAVENOUS at 10:02

## 2019-02-26 RX ADMIN — SODIUM CHLORIDE, SODIUM LACTATE, POTASSIUM CHLORIDE, AND CALCIUM CHLORIDE: 600; 310; 30; 20 INJECTION, SOLUTION INTRAVENOUS at 09:02

## 2019-02-26 NOTE — DISCHARGE INSTRUCTIONS
Please call for any fever, increase in pain, nausea or vomiting or redness or drainage from incision(s).    No lifting more than 30 pounds for 2 weeks    May shower with the clear plastic dressing in place and once it has been removed    You may remove the dressing on Friday morning     Remove steri strips as they begin to fall off    If you become constipated from the pain medication you can use over the counter laxatives,  Miralax or Glycolax, or Magnesium Citrate for severe constipation.          What to expect during recovery    Pain  · You will experience some level of pain after surgery.  Pain medication should help with the pain, but may not be able to eliminate it entirely.  Pain will decrease with time, and most pain will be gone by 4 to 6 weeks after surgery.  · Ice packs may help with pain and can reduce swelling.  · Your prescription pain medication may contain acetaminophen (Tylenol).  If so, you should not take additional acetaminophen (Tylenol) at the same time as your pain medication.   · Do not drive, operate machinery or power tools, or sign legal papers for 24 hours or as long as you are on your postoperative pain medication.   · Prescription pain medication should be taken only as directed.  We are not able to replace pain medication that has been lost or stolen.    Nausea/vomiting  · You may experience nausea or vomiting as a result of anesthesia or pain medication.  · If you experience severe nausea or you are unable to keep fluids down, contact your doctor.    Bleeding  · A small amount of clear or reddish drainage from the incision is normal after surgery.  · For mild bleeding from the incision, apply pressure for five minutes.  · If bleeding is severe or does not stop with pressure, contact your doctor.    Signs of infection  · Notify your doctor if you have the following signs of infection:  · Fever over 101 degrees  · Worsening redness around incsion  · Thick drainage from incision  · Foul  smell from incision  · You may experience low fever/chills, this is normal after surgery.    Other post-operative symptoms  · It is safe to take over-the-counter medications for constipation, heartburn, sleep, or itching if needed.    · You may experience light-headedness, dizziness, and sleepiness following surgery. Please do not stay alone. A responsible adult should be with you for this 24 hour period.     Activity  · Try to rest and avoid strenuous activity, but also get out of bed regularly unless your doctor has ordered strict bedrest.  · Several times every hour while you are awake, pump and flex your feet 5-6 times and do foot circles. This will help prevent blood clots.  · Several times every hour while you are awake, take 2 to 3 deep breaths and cough. If you had stomach surgery, hold a pillow or rolled towel firmly against your stomach before you cough. This will help with any pain the cough might cause.  · Do not smoke after surgery, it decreases your ability to heal and increases the risk of infection and pneumonia.    Nozin: Nasal   · Nozin reduces nasal germs to help decrease the risk of infection after surgery.  · Continue Nozin provided at discharge twice daily for 7 days or until the incision is healed.    · Place 4 drops to cotton swab tip and swab both nostril rims 6 times in each direction.  · See pamphlet for more information.     Diet  · Drink lots of fluids after surgery, unless otherwise instructed.  · You might not have much appetite at first.  Progress slowly to a normal diet unless given other specific diet instructions by your doctor.  Begin with liquids, then soup and crackers, working up to solid foods.  · Do not drink alcoholic beverages including beer for 24 hours or as long as you are on post-operative pain medication.    Follow-up after surgery  · You can contact your doctor through the patient portal using the [email or web address] or at [email or web address].  · You can also contact your  doctor at any time by calling [phone] for the [facility] on [address], or [phone] for the [facility] on [facility] [employer].  · A nurse will be calling you sometime after surgery. Do not be alarmed. This is our way of finding out how you are doing.

## 2019-02-26 NOTE — DISCHARGE SUMMARY
[facility]  Discharge Note  Short Stay    Admit Date: 2/26/2019    Discharge Date and Time: No discharge date for patient encounter.     Attending Physician: Bridger Alvarez MD     Discharge Provider: Bridger Alvarez    Diagnoses:  Active Hospital Problems    Diagnosis  POA    *Non-recurrent unilateral inguinal hernia without obstruction or gangrene [K40.90]  Yes      Resolved Hospital Problems   No resolved problems to display.       Discharged Condition: stable    Hospital Course: Patient was admitted for an outpatient procedure and tolerated the procedure well with no complications.    Open right inguinal hernia repair with a plug and patch mesh    Final Diagnoses: Same as principal problem.    Disposition: Home or Self Care    Follow up/Patient Instructions:    Medications:  Reconciled Home Medications:      Medication List      START taking these medications    HYDROcodone-acetaminophen 5-325 mg per tablet  Commonly known as:  NORCO  Take 1 tablet by mouth every 6 (six) hours as needed for Pain.     nozaseptin nasal   Commonly known as:  NOZIN  1 each by Each Nare route 2 (two) times daily. for 7 days        CHANGE how you take these medications    omeprazole 20 MG capsule  Commonly known as:  PRILOSEC  TAKE 1 CAPSULE(20 MG) BY MOUTH EVERY DAY  What changed:  See the new instructions.        CONTINUE taking these medications    aspirin 81 MG EC tablet  Commonly known as:  ECOTRIN  Take 81 mg by mouth once daily.     b complex vitamins tablet  Take by mouth. 1 tablet Oral Every day     clobetasol 0.05 % external solution  Commonly known as:  TEMOVATE  AAA of scalp 1-2 times daily prn flares. Strong steroid- do not apply to face or folds of skin     cyanocobalamin 1,000 mcg/mL injection  Give 1cc under the skin once a month     DUREZOL 0.05 % Drop ophthalmic solution  Generic drug:  difluprednate  Place 1 drop into the left eye 3 (three) times daily.     furosemide 40 MG tablet  Commonly  known as:  LASIX  Take 1 tablet (40 mg total) by mouth once daily.     leuprolide 3.75 mg injection  Commonly known as:  LUPRON  Inject 3.75 mg into the muscle every 3 (three) months.     metoprolol tartrate 100 MG tablet  Commonly known as:  LOPRESSOR  TAKE 1 TABLET(100 MG) BY MOUTH TWICE DAILY     metronidazole 0.75% 0.75 % Crea  Commonly known as:  METROCREAM  AAA of face bid          Discharge Procedure Orders   Diet general     Call MD for:  temperature >100.4     Call MD for:  persistent nausea and vomiting     Call MD for:  severe uncontrolled pain     Call MD for:  difficulty breathing, headache or visual disturbances     Call MD for:  redness, tenderness, or signs of infection (pain, swelling, redness, odor or green/yellow discharge around incision site)     Lifting restrictions   Order Comments: No lifting more than 30 pounds for 2 weeks     Remove dressing in 72 hours   Order Comments: You may shower with the clear plastic dressing in place and once it has been removed     Leave dressing on - Keep it clean, dry, and intact until clinic visit     Follow-up Information     Bridger Alvarez MD In 3 weeks.    Specialty:  General Surgery  Why:  post op appt, or as scheduled  Contact information:  [address]  [place] [address]  [phone]                   Discharge Procedure Orders (must include Diet, Follow-up, Activity):   Discharge Procedure Orders (must include Diet, Follow-up, Activity)   Diet general     Call MD for:  temperature >100.4     Call MD for:  persistent nausea and vomiting     Call MD for:  severe uncontrolled pain     Call MD for:  difficulty breathing, headache or visual disturbances     Call MD for:  redness, tenderness, or signs of infection (pain, swelling, redness, odor or green/yellow discharge around incision site)     Lifting restrictions   Order Comments: No lifting more than 30 pounds for 2 weeks     Remove dressing in 72 hours   Order Comments: You may shower with  the clear plastic dressing in place and once it has been removed     Leave dressing on - Keep it clean, dry, and intact until clinic visit

## 2019-02-26 NOTE — OP NOTE
Operative Note       Surgery Date: 2/26/2019     Surgeon(s) and Role:     * Bridger Alvarez MD - Primary    Pre-op Diagnosis:  Non-recurrent unilateral inguinal hernia without obstruction or gangrene [K40.90]    Post-op Diagnosis: Post-Op Diagnosis Codes:     * Non-recurrent unilateral inguinal hernia without obstruction or gangrene [K40.90]    Procedure(s) (LRB):  REPAIR, HERNIA, INGUINAL, SLIDING (Right)    Anesthesia: Local MAC    Procedure in Detail/Findings:    Antibiotics were given intra operatively before the incision, and sequential compression devices were placed on both legs for DVT prophylaxis  Sedation was provided by the anesthesia service.   The groin was prepped and draped in sterile fashion.  Local anesthesia was applied with a mixture of lidocaine and marcaine.      An incision was made between the anterior superior iliac spine and the pubic tubercle.  The incision was deepened through Phillips and Campers fascia with electrocautery until the aponeurosis of the external oblique was encountered. Bridge vein were divided and tied with vicryl ties. This was cleaned and the external ring was exposed.  Hemostasis was achieved in the wound.  An incision was made in the midportion of the external oblique aponeurosis in the direction of its fibers.  The ilioinguinal nerve was identified and protected throughout the dissection.  Flaps of the external oblique were developed cephalad and inferiorly.    The cord was identified.  It was gently dissected free at the pubic tubercle and encircled with a Penrose drain.  The sac was identified and carefully dissected free of the cord down to the level of the internal ring.  A cord lipoma was also dissected free of the hernia sac The vas deferens and testicular vessels were identified and protected from injury.  The sac was reduced.  A finger was passed into the peritoneal cavity and the floor of the inguinal canal assessed and found to be moderately weak.  The  femoral canal was palpated and no hernia identified.  The sac was allowed to retract into the abdomen.     A plug of ultrapro mesh was placed in the indirect hernia and secured around the cord.  A polypropylene mesh was cut to the appropriate size with a lateral opening for the spermatic cord.  Beginning at the pubic tubercle, the mesh was sutured to the inguinal ligament inferiorly with running 0 vrycril suture, and the conjoined tendon superiorly with interrupted 0 vicryl sutures.  Care was taken to assure that the mesh was placed in a tension-free fashion and that no neurovascular structures were caught in the repair.  Laterally, the tails of the mesh were crossed and the internal ring recreated, allowing for passage of the surgeons fifth fingertip.    Hemostasis was again checked.  The Penrose  drain was removed.  The external oblique aponeurosis was closed with a running suture of 0 Vicryl, taking care not to catch the ilioinguinal nerve in the suture line.  Phillips fascia was closed with interrupted 3-0 Vicryl.  The skin was closed with a running subcuticular 4-0 Monocryl suture.  The testis was gently pulled down into its anatomic position in the scrotum.  The patient tolerated the procedure well and was taken to the postanesthesia care unit in stable condition.     Estimated Blood Loss: 5 mL  Lidocaine 1% 20%  Marcaine 0.25% 30ml           Specimens (From admission, onward)    None        Implants:   Implant Name Type Inv. Item Serial No.  Lot No. LRB No. Used   PLUG MEDIUM - [id number] Mesh [employer]  [employer] [id number] Right 1              Disposition: PACU - hemodynamically stable.           Condition: Stable    Attestation:  I performed the procedure.           Discharge Note    Admit Date: 2/26/2019    Attending Physician: Bridger Alvarez MD     Discharge Physician: Bridger Alvarez MD    Final Diagnosis: Post-Op Diagnosis Codes:     * Non-recurrent unilateral inguinal hernia  without obstruction or gangrene [K40.90]    Disposition: Home or Self Care    Patient Instructions:   Current Discharge Medication List      START taking these medications    Details   HYDROcodone-acetaminophen (NORCO) 5-325 mg per tablet Take 1 tablet by mouth every 6 (six) hours as needed for Pain.  Qty: 20 tablet, Refills: 0      nozaseptin (NOZIN) nasal  1 each by Each Nare route 2 (two) times daily. for 7 days  Qty: 1 applicator, Refills: 0         CONTINUE these medications which have NOT CHANGED    Details   aspirin (ECOTRIN) 81 MG EC tablet Take 81 mg by mouth once daily.      b complex vitamins tablet Take by mouth. 1 tablet Oral Every day      furosemide (LASIX) 40 MG tablet Take 1 tablet (40 mg total) by mouth once daily.  Qty: 90 tablet, Refills: 3    Associated Diagnoses: Edema of both legs; Essential hypertension      metoprolol tartrate (LOPRESSOR) 100 MG tablet TAKE 1 TABLET(100 MG) BY MOUTH TWICE DAILY  Qty: 180 tablet, Refills: 3      omeprazole (PRILOSEC) 20 MG capsule TAKE 1 CAPSULE(20 MG) BY MOUTH EVERY DAY  Qty: 90 capsule, Refills: 3      clobetasol (TEMOVATE) 0.05 % external solution AAA of scalp 1-2 times daily prn flares. Strong steroid- do not apply to face or folds of skin  Qty: 50 mL, Refills: 0    Associated Diagnoses: Seborrheic dermatitis of scalp      cyanocobalamin 1,000 mcg/mL injection Give 1cc under the skin once a month  Qty: 10 mL, Refills: 4    Associated Diagnoses: Thymoma; Other vitamin B12 deficiency anemia      difluprednate (DUREZOL) 0.05 % Drop ophthalmic solution Place 1 drop into the left eye 3 (three) times daily.      leuprolide (LUPRON) 3.75 mg injection Inject 3.75 mg into the muscle every 3 (three) months.       metronidazole 0.75% (METROCREAM) 0.75 % Crea AAA of face bid  Qty: 45 g, Refills: 5    Associated Diagnoses: Rosacea             Discharge Procedure Orders (must include Diet, Follow-up, Activity)   Discharge Procedure Orders (must include Diet,  Follow-up, Activity)   Diet general     Call MD for:  temperature >100.4     Call MD for:  persistent nausea and vomiting     Call MD for:  severe uncontrolled pain     Call MD for:  difficulty breathing, headache or visual disturbances     Call MD for:  redness, tenderness, or signs of infection (pain, swelling, redness, odor or green/yellow discharge around incision site)     Lifting restrictions   Order Comments: No lifting more than 30 pounds for 2 weeks     Remove dressing in 72 hours   Order Comments: You may shower with the clear plastic dressing in place and once it has been removed     Leave dressing on - Keep it clean, dry, and intact until clinic visit        Discharge Date: No discharge date for patient encounter.

## 2019-02-26 NOTE — ANESTHESIA POSTPROCEDURE EVALUATION
"Anesthesia Post Evaluation    Patient: Homero Tanner    Procedure(s) Performed: Procedure(s) (LRB):  REPAIR, HERNIA, INGUINAL, SLIDING (Right)    Final Anesthesia Type: MAC  Patient location during evaluation: PACU  Patient participation: Yes- Able to Participate  Level of consciousness: awake and alert and oriented  Post-procedure vital signs: reviewed and stable  Pain management: adequate  Airway patency: patent  PONV status at discharge: No PONV  Anesthetic complications: no      Cardiovascular status: blood pressure returned to baseline  Respiratory status: unassisted and spontaneous ventilation  Hydration status: euvolemic  Follow-up not needed.        Visit Vitals  BP (!) 124/56   Pulse 62   Temp 36.4 °C (97.6 °F) (Temporal)   Resp 19   Ht 5' 10" (1.778 m)   Wt 75.1 kg (165 lb 9.1 oz)   SpO2 97%   BMI 23.76 kg/m²       Pain/Jose Score: Pain Rating Prior to Med Admin: 4 (2/26/2019 10:48 AM)  Jose Score: 10 (2/26/2019 11:00 AM)        "

## 2019-02-26 NOTE — INTERVAL H&P NOTE
The patient has been examined and the H&P has been reviewed:    I concur with the findings and no changes have occurred since H&P was written.    Anesthesia/Surgery risks, benefits and alternative options discussed and understood by patient/family.          Active Hospital Problems    Diagnosis  POA    Non-recurrent unilateral inguinal hernia without obstruction or gangrene [K40.90]  Yes      Resolved Hospital Problems   No resolved problems to display.

## 2019-02-26 NOTE — TRANSFER OF CARE
"Anesthesia Transfer of Care Note    Patient: Homero Worley Ciro    Procedure(s) Performed: Procedure(s) (LRB):  REPAIR, HERNIA, INGUINAL, SLIDING (Right)    Patient location: PACU    Anesthesia Type: MAC    Transport from OR: Transported from OR on room air with adequate spontaneous ventilation    Post pain: adequate analgesia    Post assessment: no apparent anesthetic complications and tolerated procedure well    Post vital signs: stable    Level of consciousness: awake    Nausea/Vomiting: no nausea/vomiting    Complications: none    Transfer of care protocol was followed      Last vitals:   Visit Vitals  BP (!) 117/58 (BP Location: Right arm, Patient Position: Sitting)   Pulse 62   Temp 36.3 °C (97.3 °F) (Temporal)   Resp 18   Ht 5' 10" (1.778 m)   Wt 75.1 kg (165 lb 9.1 oz)   SpO2 97%   BMI 23.76 kg/m²     "

## 2019-02-26 NOTE — PLAN OF CARE
Patient prepared for surgery. States he prefers his brother wait in the waiting room. Will lock belongings prior to surgery in pre-op lockers per patient's request.

## 2019-02-26 NOTE — ANESTHESIA PREPROCEDURE EVALUATION
02/26/2019  Homero Tanner is a 78 y.o., male.    Anesthesia Evaluation    I have reviewed the Patient Summary Reports.    I have reviewed the Nursing Notes.   I have reviewed the Medications.     Review of Systems  Anesthesia Hx:  History of prior surgery of interest to airway management or planning: Previous anesthesia: General  Denies Personal Hx of Anesthesia complications.   Hematology/Oncology:         -- Cancer in past history: Oncology Comments: Prostate cancer    Rectal adenocarcinoma     Cardiovascular:   Exercise tolerance: poor Hypertension Valvular problems/Murmurs, AI CHF PSVT (paroxysmal supraventricular tachycardia) Functional Capacity low / < 4 METS  Valvular Heart Disease: Aortic Regurgitation (AR)   Congestive Heart Failure (CHF)  Hypertension, Essential Hypertension    Pulmonary:   Asthma Pulmonary hypertension. The estimated PA systolic pressure is 41 mmHg.  Asthma:  Pulmonary Hypertension Echocardiographic Estimated Pulmonary Artery Systolic Pressure >=35 - 45    Renal/:   Chronic Renal Disease, CRI    Hepatic/GI:   GERD Liver Disease,        Physical Exam  General:  Well nourished    Airway/Jaw/Neck:  Airway Findings: Mouth Opening: Normal Tongue: Normal  General Airway Assessment: Adult  Mallampati: II  TM Distance: Normal, at least 6 cm  Jaw/Neck Findings:  Neck ROM: Normal ROM      Dental:  Dental Findings: upper partial dentures, lower partial dentures   Chest/Lungs:  Chest/Lungs Findings: Clear to auscultation, Normal Respiratory Rate     Heart/Vascular:  Heart Findings: Rate: Normal  Rhythm: Regular Rhythm        Mental Status:  Mental Status Findings:  Cooperative, Alert and Oriented         Anesthesia Plan  Type of Anesthesia, risks & benefits discussed:  Anesthesia Type:  MAC  Patient's Preference:   Intra-op Monitoring Plan: standard ASA monitors  Intra-op Monitoring  Plan Comments:   Post Op Pain Control Plan: multimodal analgesia  Post Op Pain Control Plan Comments:   Induction:   IV  Beta Blocker:  Patient is on a Beta-Blocker and has received one dose within the past 24 hours (No further documentation required).       Informed Consent: Patient understands risks and agrees with Anesthesia plan.  Questions answered. Anesthesia consent signed with patient.  ASA Score: 3     Day of Surgery Review of History & Physical: I have interviewed and examined the patient. I have reviewed the patient's H&P dated:    H&P update referred to the surgeon.         Ready For Surgery From Anesthesia Perspective.

## 2019-03-04 VITALS
HEART RATE: 64 BPM | OXYGEN SATURATION: 95 % | DIASTOLIC BLOOD PRESSURE: 60 MMHG | HEIGHT: 70 IN | SYSTOLIC BLOOD PRESSURE: 133 MMHG | RESPIRATION RATE: 11 BRPM | TEMPERATURE: 98 F | WEIGHT: 165.56 LBS | BODY MASS INDEX: 23.7 KG/M2

## 2019-03-11 ENCOUNTER — OFFICE VISIT (OUTPATIENT)
Dept: SURGERY | Facility: CLINIC | Age: 79
End: 2019-03-11
Payer: MEDICARE

## 2019-03-11 ENCOUNTER — LAB VISIT (OUTPATIENT)
Dept: LAB | Facility: HOSPITAL | Age: 79
End: 2019-03-11
Attending: INTERNAL MEDICINE
Payer: MEDICARE

## 2019-03-11 VITALS
BODY MASS INDEX: 24.55 KG/M2 | WEIGHT: 171.06 LBS | SYSTOLIC BLOOD PRESSURE: 116 MMHG | TEMPERATURE: 98 F | HEART RATE: 57 BPM | DIASTOLIC BLOOD PRESSURE: 60 MMHG

## 2019-03-11 DIAGNOSIS — K40.90 NON-RECURRENT UNILATERAL INGUINAL HERNIA WITHOUT OBSTRUCTION OR GANGRENE: Primary | ICD-10-CM

## 2019-03-11 DIAGNOSIS — C88.0 WALDENSTROM'S MACROGLOBULINEMIA: ICD-10-CM

## 2019-03-11 LAB
ALBUMIN SERPL BCP-MCNC: 3.1 G/DL
ALP SERPL-CCNC: 243 U/L
ALT SERPL W/O P-5'-P-CCNC: 16 U/L
ANION GAP SERPL CALC-SCNC: 15 MMOL/L
AST SERPL-CCNC: 29 U/L
BASOPHILS # BLD AUTO: 0.04 K/UL
BASOPHILS NFR BLD: 0.5 %
BILIRUB SERPL-MCNC: 0.6 MG/DL
BUN SERPL-MCNC: 14 MG/DL
CALCIUM SERPL-MCNC: 9.7 MG/DL
CHLORIDE SERPL-SCNC: 103 MMOL/L
CO2 SERPL-SCNC: 22 MMOL/L
CREAT SERPL-MCNC: 1.1 MG/DL
DIFFERENTIAL METHOD: ABNORMAL
EOSINOPHIL # BLD AUTO: 0.3 K/UL
EOSINOPHIL NFR BLD: 4.1 %
ERYTHROCYTE [DISTWIDTH] IN BLOOD BY AUTOMATED COUNT: 13.9 %
EST. GFR  (AFRICAN AMERICAN): >60 ML/MIN/1.73 M^2
EST. GFR  (NON AFRICAN AMERICAN): >60 ML/MIN/1.73 M^2
GLUCOSE SERPL-MCNC: 78 MG/DL
HCT VFR BLD AUTO: 34.6 %
HGB BLD-MCNC: 10.6 G/DL
IGA SERPL-MCNC: 35 MG/DL
IGG SERPL-MCNC: 264 MG/DL
IGM SERPL-MCNC: 2006 MG/DL
LYMPHOCYTES # BLD AUTO: 1.5 K/UL
LYMPHOCYTES NFR BLD: 20.5 %
MCH RBC QN AUTO: 27.7 PG
MCHC RBC AUTO-ENTMCNC: 30.6 G/DL
MCV RBC AUTO: 90 FL
MONOCYTES # BLD AUTO: 0.8 K/UL
MONOCYTES NFR BLD: 11.2 %
NEUTROPHILS # BLD AUTO: 4.7 K/UL
NEUTROPHILS NFR BLD: 63.7 %
PLATELET # BLD AUTO: 353 K/UL
PMV BLD AUTO: 9 FL
POTASSIUM SERPL-SCNC: 4.7 MMOL/L
PROT SERPL-MCNC: 6.7 G/DL
RBC # BLD AUTO: 3.83 M/UL
SODIUM SERPL-SCNC: 140 MMOL/L
WBC # BLD AUTO: 7.38 K/UL

## 2019-03-11 PROCEDURE — 80053 COMPREHEN METABOLIC PANEL: CPT | Mod: HCNC

## 2019-03-11 PROCEDURE — 99999 PR PBB SHADOW E&M-EST. PATIENT-LVL III: ICD-10-PCS | Mod: PBBFAC,HCNC,, | Performed by: SURGERY

## 2019-03-11 PROCEDURE — 84165 PROTEIN E-PHORESIS SERUM: CPT | Mod: 26,HCNC,, | Performed by: PATHOLOGY

## 2019-03-11 PROCEDURE — 85025 COMPLETE CBC W/AUTO DIFF WBC: CPT | Mod: HCNC

## 2019-03-11 PROCEDURE — 84165 PATHOLOGIST INTERPRETATION SPE: ICD-10-PCS | Mod: 26,HCNC,, | Performed by: PATHOLOGY

## 2019-03-11 PROCEDURE — 86334 IMMUNOFIX E-PHORESIS SERUM: CPT | Mod: 26,HCNC,, | Performed by: PATHOLOGY

## 2019-03-11 PROCEDURE — 83520 IMMUNOASSAY QUANT NOS NONAB: CPT | Mod: HCNC

## 2019-03-11 PROCEDURE — 86334 IMMUNOFIX E-PHORESIS SERUM: CPT | Mod: HCNC

## 2019-03-11 PROCEDURE — 82784 ASSAY IGA/IGD/IGG/IGM EACH: CPT | Mod: 59,HCNC

## 2019-03-11 PROCEDURE — 99024 PR POST-OP FOLLOW-UP VISIT: ICD-10-PCS | Mod: HCNC,S$GLB,, | Performed by: SURGERY

## 2019-03-11 PROCEDURE — 36415 COLL VENOUS BLD VENIPUNCTURE: CPT | Mod: HCNC

## 2019-03-11 PROCEDURE — 84165 PROTEIN E-PHORESIS SERUM: CPT | Mod: HCNC

## 2019-03-11 PROCEDURE — 99999 PR PBB SHADOW E&M-EST. PATIENT-LVL III: CPT | Mod: PBBFAC,HCNC,, | Performed by: SURGERY

## 2019-03-11 PROCEDURE — 99024 POSTOP FOLLOW-UP VISIT: CPT | Mod: HCNC,S$GLB,, | Performed by: SURGERY

## 2019-03-11 PROCEDURE — 86334 PATHOLOGIST INTERPRETATION IFE: ICD-10-PCS | Mod: 26,HCNC,, | Performed by: PATHOLOGY

## 2019-03-11 NOTE — PATIENT INSTRUCTIONS
There are no limits on your activity.    We will see you back as needed.    The fullness beneath the incision should go away over the next several weeks

## 2019-03-12 LAB
ALBUMIN SERPL ELPH-MCNC: 3.09 G/DL
ALPHA1 GLOB SERPL ELPH-MCNC: 0.32 G/DL
ALPHA2 GLOB SERPL ELPH-MCNC: 0.77 G/DL
B-GLOBULIN SERPL ELPH-MCNC: 1.85 G/DL
GAMMA GLOB SERPL ELPH-MCNC: 0.27 G/DL
INTERPRETATION SERPL IFE-IMP: NORMAL
KAPPA LC SER QL IA: 1.18 MG/DL
KAPPA LC/LAMBDA SER IA: 0.95
LAMBDA LC SER QL IA: 1.24 MG/DL
PATHOLOGIST INTERPRETATION IFE: NORMAL
PATHOLOGIST INTERPRETATION SPE: NORMAL
PROT SERPL-MCNC: 6.3 G/DL

## 2019-03-22 ENCOUNTER — LAB VISIT (OUTPATIENT)
Dept: LAB | Facility: HOSPITAL | Age: 79
End: 2019-03-22
Attending: INTERNAL MEDICINE
Payer: MEDICARE

## 2019-03-22 ENCOUNTER — OFFICE VISIT (OUTPATIENT)
Dept: OPHTHALMOLOGY | Facility: CLINIC | Age: 79
End: 2019-03-22
Payer: MEDICARE

## 2019-03-22 ENCOUNTER — OFFICE VISIT (OUTPATIENT)
Dept: HEMATOLOGY/ONCOLOGY | Facility: CLINIC | Age: 79
End: 2019-03-22
Payer: MEDICARE

## 2019-03-22 VITALS
BODY MASS INDEX: 23.89 KG/M2 | HEART RATE: 106 BPM | OXYGEN SATURATION: 99 % | HEIGHT: 70 IN | DIASTOLIC BLOOD PRESSURE: 72 MMHG | WEIGHT: 166.88 LBS | RESPIRATION RATE: 18 BRPM | SYSTOLIC BLOOD PRESSURE: 120 MMHG | TEMPERATURE: 98 F

## 2019-03-22 DIAGNOSIS — Z98.42 CATARACT EXTRACTION STATUS, LEFT: ICD-10-CM

## 2019-03-22 DIAGNOSIS — K76.0 FATTY LIVER: ICD-10-CM

## 2019-03-22 DIAGNOSIS — C88.0 WALDENSTROM'S MACROGLOBULINEMIA: Primary | ICD-10-CM

## 2019-03-22 DIAGNOSIS — Z98.41 CATARACT EXTRACTION STATUS, RIGHT: ICD-10-CM

## 2019-03-22 DIAGNOSIS — H01.00B BLEPHARITIS OF BOTH UPPER AND LOWER EYELID OF LEFT EYE, UNSPECIFIED TYPE: ICD-10-CM

## 2019-03-22 DIAGNOSIS — Z85.048 HISTORY OF RECTAL CANCER: ICD-10-CM

## 2019-03-22 DIAGNOSIS — I50.32 CHRONIC DIASTOLIC HEART FAILURE: ICD-10-CM

## 2019-03-22 DIAGNOSIS — D51.8 OTHER VITAMIN B12 DEFICIENCY ANEMIA: ICD-10-CM

## 2019-03-22 DIAGNOSIS — Z98.890 POST-OPERATIVE STATE: Primary | ICD-10-CM

## 2019-03-22 LAB
ALBUMIN SERPL BCP-MCNC: 3.2 G/DL
ALP SERPL-CCNC: 218 U/L
ALT SERPL W/O P-5'-P-CCNC: 13 U/L
ANION GAP SERPL CALC-SCNC: 12 MMOL/L
AST SERPL-CCNC: 25 U/L
BILIRUB SERPL-MCNC: 0.8 MG/DL
BNP SERPL-MCNC: 478 PG/ML
BUN SERPL-MCNC: 15 MG/DL
CALCIUM SERPL-MCNC: 9.7 MG/DL
CHLORIDE SERPL-SCNC: 101 MMOL/L
CO2 SERPL-SCNC: 26 MMOL/L
CREAT SERPL-MCNC: 1.1 MG/DL
EST. GFR  (AFRICAN AMERICAN): >60 ML/MIN/1.73 M^2
EST. GFR  (NON AFRICAN AMERICAN): >60 ML/MIN/1.73 M^2
GLUCOSE SERPL-MCNC: 99 MG/DL
POTASSIUM SERPL-SCNC: 5.1 MMOL/L
PROT SERPL-MCNC: 7.1 G/DL
SODIUM SERPL-SCNC: 139 MMOL/L

## 2019-03-22 PROCEDURE — 1101F PT FALLS ASSESS-DOCD LE1/YR: CPT | Mod: HCNC,CPTII,S$GLB, | Performed by: INTERNAL MEDICINE

## 2019-03-22 PROCEDURE — 99499 RISK ADDL DX/OHS AUDIT: ICD-10-PCS | Mod: HCNC,S$GLB,, | Performed by: INTERNAL MEDICINE

## 2019-03-22 PROCEDURE — 80053 COMPREHEN METABOLIC PANEL: CPT | Mod: HCNC

## 2019-03-22 PROCEDURE — 99024 PR POST-OP FOLLOW-UP VISIT: ICD-10-PCS | Mod: HCNC,S$GLB,, | Performed by: OPHTHALMOLOGY

## 2019-03-22 PROCEDURE — 99214 OFFICE O/P EST MOD 30 MIN: CPT | Mod: HCNC,S$GLB,, | Performed by: INTERNAL MEDICINE

## 2019-03-22 PROCEDURE — 3078F PR MOST RECENT DIASTOLIC BLOOD PRESSURE < 80 MM HG: ICD-10-PCS | Mod: HCNC,CPTII,S$GLB, | Performed by: INTERNAL MEDICINE

## 2019-03-22 PROCEDURE — 99214 PR OFFICE/OUTPT VISIT, EST, LEVL IV, 30-39 MIN: ICD-10-PCS | Mod: HCNC,S$GLB,, | Performed by: INTERNAL MEDICINE

## 2019-03-22 PROCEDURE — 99999 PR PBB SHADOW E&M-EST. PATIENT-LVL III: CPT | Mod: PBBFAC,HCNC,, | Performed by: INTERNAL MEDICINE

## 2019-03-22 PROCEDURE — 99499 UNLISTED E&M SERVICE: CPT | Mod: HCNC,S$GLB,, | Performed by: INTERNAL MEDICINE

## 2019-03-22 PROCEDURE — 83880 ASSAY OF NATRIURETIC PEPTIDE: CPT | Mod: HCNC

## 2019-03-22 PROCEDURE — 3074F PR MOST RECENT SYSTOLIC BLOOD PRESSURE < 130 MM HG: ICD-10-PCS | Mod: HCNC,CPTII,S$GLB, | Performed by: INTERNAL MEDICINE

## 2019-03-22 PROCEDURE — 99999 PR PBB SHADOW E&M-EST. PATIENT-LVL I: ICD-10-PCS | Mod: PBBFAC,HCNC,, | Performed by: OPHTHALMOLOGY

## 2019-03-22 PROCEDURE — 99999 PR PBB SHADOW E&M-EST. PATIENT-LVL III: ICD-10-PCS | Mod: PBBFAC,HCNC,, | Performed by: INTERNAL MEDICINE

## 2019-03-22 PROCEDURE — 99024 POSTOP FOLLOW-UP VISIT: CPT | Mod: HCNC,S$GLB,, | Performed by: OPHTHALMOLOGY

## 2019-03-22 PROCEDURE — 36415 COLL VENOUS BLD VENIPUNCTURE: CPT | Mod: HCNC

## 2019-03-22 PROCEDURE — 3074F SYST BP LT 130 MM HG: CPT | Mod: HCNC,CPTII,S$GLB, | Performed by: INTERNAL MEDICINE

## 2019-03-22 PROCEDURE — 99999 PR PBB SHADOW E&M-EST. PATIENT-LVL I: CPT | Mod: PBBFAC,HCNC,, | Performed by: OPHTHALMOLOGY

## 2019-03-22 PROCEDURE — 1101F PR PT FALLS ASSESS DOC 0-1 FALLS W/OUT INJ PAST YR: ICD-10-PCS | Mod: HCNC,CPTII,S$GLB, | Performed by: INTERNAL MEDICINE

## 2019-03-22 PROCEDURE — 3078F DIAST BP <80 MM HG: CPT | Mod: HCNC,CPTII,S$GLB, | Performed by: INTERNAL MEDICINE

## 2019-03-22 NOTE — PROGRESS NOTES
Subjective:      Patient ID: Homero Tanner is a 78 y.o. male.    Chief Complaint: Follow-up    The patient is a 78-year-old  male who presents to the [facility] today to discuss further evaluation and management recommendations for Waldenstrom's macroglobulinemia.   I have reviewed all of the patient's relevant clinical history available in the medical record and have utilized this in my evaluation and management recommendations today.  Today the patient reports that he has been having chronic fatigue and edema in his legs. His fatigue is improved since being off treatment.  He continues with follow-up with Dr. Wright with Cardiology. He has been taking his monthly vitamin B12 injections. He is off iron supplementation at this time for his history of iron deficiency anemia which has since resolved.  He is on intermittent ADT for biochemical recurrence of prostate carcinoma and is followed by a urologist outside of the [facility].  His rectal cancer is in remission at this time.  He was on velcade/rituxan/dexamethasone for treatment of Waldenstrom's macroglobulinemia with his last treatment given in Oct 2018.  He has initiated rituxan maintenance and is s/p cycle 1 in Jan 2019.  The patient is CT imaging has shown evidence of aortic atherosclerosis and bronchiectasis which is being monitored at this time.      Cancer   Pertinent negatives include no abdominal pain, arthralgias, chest pain, chills, congestion, coughing, diaphoresis, fatigue, fever, headaches, joint swelling, myalgias, nausea, rash, sore throat, vomiting or weakness.     Review of Systems   Constitutional: Negative for activity change, appetite change, chills, diaphoresis, fatigue, fever and unexpected weight change.   HENT: Negative for congestion, dental problem, ear pain, mouth sores, nosebleeds, postnasal drip, sinus pressure, sore throat, tinnitus, trouble swallowing and voice change.    Eyes: Negative for  photophobia, pain, discharge, redness, itching and visual disturbance.   Respiratory: Negative for cough, chest tightness, shortness of breath, wheezing and stridor.    Cardiovascular: Negative for chest pain, palpitations and leg swelling.   Gastrointestinal: Negative for abdominal distention, abdominal pain, anal bleeding, blood in stool, constipation, diarrhea, nausea and vomiting.   Endocrine: Negative for cold intolerance, heat intolerance, polydipsia, polyphagia and polyuria.   Genitourinary: Negative for decreased urine volume, difficulty urinating, dysuria, flank pain, frequency, hematuria and urgency.   Musculoskeletal: Negative for arthralgias, back pain, gait problem, joint swelling and myalgias.   Skin: Negative for pallor and rash.   Allergic/Immunologic: Negative for immunocompromised state.   Neurological: Negative for dizziness, syncope, weakness, light-headedness and headaches.   Hematological: Negative for adenopathy. Does not bruise/bleed easily.   Psychiatric/Behavioral: Negative for agitation and confusion. The patient is nervous/anxious.        Medication List with Changes/Refills   Current Medications    ASPIRIN (ECOTRIN) 81 MG EC TABLET    Take 81 mg by mouth once daily.    B COMPLEX VITAMINS TABLET    Take by mouth. 1 tablet Oral Every day    CLOBETASOL (TEMOVATE) 0.05 % EXTERNAL SOLUTION    AAA of scalp 1-2 times daily prn flares. Strong steroid- do not apply to face or folds of skin    CYANOCOBALAMIN 1,000 MCG/ML INJECTION    Give 1cc under the skin once a month    DIFLUPREDNATE (DUREZOL) 0.05 % DROP OPHTHALMIC SOLUTION    Place 1 drop into the left eye 3 (three) times daily.    FUROSEMIDE (LASIX) 40 MG TABLET    Take 1 tablet (40 mg total) by mouth once daily.    HYDROCODONE-ACETAMINOPHEN (NORCO) 5-325 MG PER TABLET    Take 1 tablet by mouth every 6 (six) hours as needed for Pain.    LEUPROLIDE (LUPRON) 3.75 MG INJECTION    Inject 3.75 mg into the muscle every 3 (three) months.      METOPROLOL TARTRATE (LOPRESSOR) 100 MG TABLET    TAKE 1 TABLET(100 MG) BY MOUTH TWICE DAILY    METRONIDAZOLE 0.75% (METROCREAM) 0.75 % CREA    AAA of face bid    OMEPRAZOLE (PRILOSEC) 20 MG CAPSULE    TAKE 1 CAPSULE(20 MG) BY MOUTH EVERY DAY     Review of patient's allergies indicates:   Allergen Reactions    Lipitor [atorvastatin] Other (See Comments)    Norvasc [amlodipine] Swelling       Lab: I have reviewed all of the patient's relevant lab work available in the medical record and have utilized this in my evaluation and management recommendations today    Imaging: I have reviewed all of the patient's diagnostic/imaging results available in the medical record and have utilized this in my evaluation and management recommendations today.      Objective:     Vitals:    03/22/19 0842   BP: 120/72   Pulse: 106   Resp: 18   Temp: 97.6 °F (36.4 °C)       Physical Exam   Constitutional: He is oriented to person, place, and time. He appears well-developed and well-nourished. No distress.   HENT:   Head: Normocephalic and atraumatic.   Nose: Nose normal.   Mouth/Throat: Oropharynx is clear and moist. No oropharyngeal exudate.   Eyes: EOM are normal. Pupils are equal, round, and reactive to light. No scleral icterus.   Neck: Normal range of motion. Neck supple. No tracheal deviation present. No thyromegaly present.   Cardiovascular: Normal rate, regular rhythm, normal heart sounds and intact distal pulses.   No murmur heard.  Pulmonary/Chest: Effort normal and breath sounds normal. No stridor. No respiratory distress. He has no wheezes. He has no rales. He exhibits no tenderness.   Abdominal: Soft. Bowel sounds are normal. He exhibits no distension and no mass. There is no tenderness. There is no rebound and no guarding.   Musculoskeletal: Normal range of motion. He exhibits no edema or tenderness.   Lymphadenopathy:     He has no cervical adenopathy.   Neurological: He is alert and oriented to person, place, and time.  Coordination normal.   Skin: Skin is warm. No rash noted. He is not diaphoretic. No erythema.   Psychiatric: He has a normal mood and affect. His behavior is normal. Judgment and thought content normal.   Nursing note and vitals reviewed.      Assessment:     1. Waldenstrom's macroglobulinemia  2.  Elevated LFTs/elevated bilirubin - improved  3.  Elevated creatinine - stable/improved  4.  Generalized weakness/fatigue - improved  5.  History of Alcohol abuse    Plan:   1.  I had a detailed discussion with the patient previously with regard to the results of his bone marrow aspiration biopsy done in July 2018 as well as the results of all of his lab work done in August 2018.  At this time the patient has Waldenstrom's macroglobulinemia.  His IgM quantitative level was initially greater than 4 g.  2.  I had a detailed discussion with him previously with regard to the diagnosis, prognosis and treatment of this malignancy.  We discussed the indications to begin treatment.  I was concerned about possible liver involvement by his lymphoma because of his recently abnormal/worsening LFTs.  I was also concerned about possible renal involvement because of his elevated creatinine with monoclonal chain noted in UPEP immunofixation.  3.  We discussed that his treatment options are complicated because of the above.  I have previously discussed that the best course of action would be to start him on Velcade/dexamethasone/Rituxan.  I decided to hold off on initiation of Rituxan until we get his IgM quantitative level to less than 4 g.  Therefore he was initially started on twice weekly Velcade and weekly dexamethasone at 20 mg p.o. once daily.  We decided to re-evaluate our treatment options based on clinical response and based on how he tolerates this treatment. Velcade was dose adjusted for liver function. Informed consent taken.    4.  All necessary prescriptions including for herpes zoster prophylaxis have been sent to the  patient's pharmacy previously and the patient reports compliance with this medication.  5. I have previously strongly recommended cessation of alcohol use.  He reports that he continues to drink 1 drink a day.  6.  I had a detailed discussion with the patient today with regard to the results of all of his recent lab work which shows excellent response to ongoing treatment with interval significant decrease in IgM quantitative level.  I have also previously added IV Rituxan to his treatment.  The rationale for this was discussed in detail and he expressed understanding.  We have decided to initiate Rituxan starting with cycle 3 of Velcade.  I have since transitioned Velcade to once weekly.  7. I have previously discussed with the patient with regard to my concerns with regard to his combined liver/kidney dysfunction.  I am concerned about my ability to continue treatment safely.  At this time I will continue to hold Velcade and dexamethasone.  He has previously completed 4 weekly doses of rituxan in Oct 2018.   8. At this time we have decided to proceed with maintenance rituxan q12 weeks x 4 doses starting in Jan 2019. All labs from March 2019 reviewed and look stable/improved suggesting good control of his malignancy.  He will proceed with cycle 2/4 of maintenance Rituxan in April 2019.  9.  I did discuss with IR with regard to finding noted in left lung on PET-CT scan. Recommendation is to continue with close observation.    Follow-up in 1 month with labs 1 week before clinic visit for cycle 2/4 of rituxan. He knows to call sooner for any additional questions or new problems.

## 2019-03-22 NOTE — PROGRESS NOTES
HPI     1 month PO PCIOL OU  Refraction today  No pain or discomfort  VA stable     Originally from [place]  Finished school in [place].  Duck gurpreet    1. PCIOL OD +21.0 SN60WF / CDE: 12.47 / 1-3-19 (topical, viscoat)  PCIOL OS 2/7/19  2. Chalazion removal OD 12/6/18/ blepharotomy for lid abscess      OU CLEAR EYES QID, GENTEAL GEL QHS ( DOES NOT USE CONSISTENTLY )    Last edited by Peg Luu on 3/22/2019 10:40 AM. (History)            Assessment /Plan     For exam results, see Encounter Report.      ICD-10-CM ICD-9-CM    1. Post-operative state Z98.890 V45.89 1 mth s/p phaco OU. Doing well.    2. Cataract extraction status, left Z98.42 V45.61 2/7/2019   3. Cataract extraction status, right Z98.41 V45.61 1/3/2019   4. Blepharitis of both upper and lower eyelid of left eye, unspecified type H01.00B 373.00 Use warm compresses bid OU        PO Month 1 S/P Phaco/IOL bilateral eye:   Doing Well.    Discontinue medications as per taper sheet.  Dispense PAL Rx  RTC 9 months with Dr. Vasquez

## 2019-04-04 ENCOUNTER — TELEPHONE (OUTPATIENT)
Dept: ENDOSCOPY | Facility: HOSPITAL | Age: 79
End: 2019-04-04

## 2019-04-08 ENCOUNTER — OFFICE VISIT (OUTPATIENT)
Dept: CARDIOLOGY | Facility: CLINIC | Age: 79
End: 2019-04-08
Payer: MEDICARE

## 2019-04-08 VITALS
BODY MASS INDEX: 23.77 KG/M2 | WEIGHT: 166 LBS | HEART RATE: 64 BPM | HEIGHT: 70 IN | DIASTOLIC BLOOD PRESSURE: 66 MMHG | SYSTOLIC BLOOD PRESSURE: 122 MMHG

## 2019-04-08 DIAGNOSIS — C88.0 WALDENSTROM'S MACROGLOBULINEMIA: ICD-10-CM

## 2019-04-08 DIAGNOSIS — I10 ESSENTIAL HYPERTENSION: Primary | Chronic | ICD-10-CM

## 2019-04-08 DIAGNOSIS — I47.20 VT (VENTRICULAR TACHYCARDIA): ICD-10-CM

## 2019-04-08 DIAGNOSIS — I35.1 NONRHEUMATIC AORTIC VALVE INSUFFICIENCY: Chronic | ICD-10-CM

## 2019-04-08 DIAGNOSIS — I50.32 CHRONIC DIASTOLIC HEART FAILURE: ICD-10-CM

## 2019-04-08 DIAGNOSIS — I36.1 NON-RHEUMATIC TRICUSPID VALVE INSUFFICIENCY: ICD-10-CM

## 2019-04-08 DIAGNOSIS — R74.8 ABNORMAL LIVER ENZYMES: ICD-10-CM

## 2019-04-08 DIAGNOSIS — R60.0 EDEMA OF BOTH LEGS: ICD-10-CM

## 2019-04-08 DIAGNOSIS — I27.20 PULMONARY HYPERTENSION: ICD-10-CM

## 2019-04-08 DIAGNOSIS — N18.9 CHRONIC RENAL IMPAIRMENT, UNSPECIFIED CKD STAGE: ICD-10-CM

## 2019-04-08 DIAGNOSIS — I49.3 PVC'S (PREMATURE VENTRICULAR CONTRACTIONS): ICD-10-CM

## 2019-04-08 PROCEDURE — 3078F DIAST BP <80 MM HG: CPT | Mod: HCNC,CPTII,S$GLB, | Performed by: INTERNAL MEDICINE

## 2019-04-08 PROCEDURE — 99999 PR PBB SHADOW E&M-EST. PATIENT-LVL II: ICD-10-PCS | Mod: PBBFAC,HCNC,, | Performed by: INTERNAL MEDICINE

## 2019-04-08 PROCEDURE — 3074F SYST BP LT 130 MM HG: CPT | Mod: HCNC,CPTII,S$GLB, | Performed by: INTERNAL MEDICINE

## 2019-04-08 PROCEDURE — 99213 PR OFFICE/OUTPT VISIT, EST, LEVL III, 20-29 MIN: ICD-10-PCS | Mod: HCNC,S$GLB,, | Performed by: INTERNAL MEDICINE

## 2019-04-08 PROCEDURE — 99213 OFFICE O/P EST LOW 20 MIN: CPT | Mod: HCNC,S$GLB,, | Performed by: INTERNAL MEDICINE

## 2019-04-08 PROCEDURE — 1101F PT FALLS ASSESS-DOCD LE1/YR: CPT | Mod: HCNC,CPTII,S$GLB, | Performed by: INTERNAL MEDICINE

## 2019-04-08 PROCEDURE — 3074F PR MOST RECENT SYSTOLIC BLOOD PRESSURE < 130 MM HG: ICD-10-PCS | Mod: HCNC,CPTII,S$GLB, | Performed by: INTERNAL MEDICINE

## 2019-04-08 PROCEDURE — 3078F PR MOST RECENT DIASTOLIC BLOOD PRESSURE < 80 MM HG: ICD-10-PCS | Mod: HCNC,CPTII,S$GLB, | Performed by: INTERNAL MEDICINE

## 2019-04-08 PROCEDURE — 99499 RISK ADDL DX/OHS AUDIT: ICD-10-PCS | Mod: HCNC,S$GLB,, | Performed by: INTERNAL MEDICINE

## 2019-04-08 PROCEDURE — 99499 UNLISTED E&M SERVICE: CPT | Mod: HCNC,S$GLB,, | Performed by: INTERNAL MEDICINE

## 2019-04-08 PROCEDURE — 99999 PR PBB SHADOW E&M-EST. PATIENT-LVL II: CPT | Mod: PBBFAC,HCNC,, | Performed by: INTERNAL MEDICINE

## 2019-04-08 PROCEDURE — 1101F PR PT FALLS ASSESS DOC 0-1 FALLS W/OUT INJ PAST YR: ICD-10-PCS | Mod: HCNC,CPTII,S$GLB, | Performed by: INTERNAL MEDICINE

## 2019-04-08 NOTE — PROGRESS NOTES
Subjective:    Patient ID:  Homero Tanner is a 78 y.o. male who presents for evaluation of Hypertension; Hyperlipidemia; Risk Factor Management For Atherosclerosis; and Congestive Heart Failure      HPI Mr. Tanner returns for f/u.   His current medical conditions include Waldenstrom's macroglobulinemia lymphoma, HTN, DD, PHTN, AI, PVCs, TR, dyslipidemia, prostate & rectal cancer. Nonsmoker.    Past hx pertinent for following:  Had LHC 20+ years ago, no significant blockages noted.  He has chronic abnl ecgs.   Also has had PSVT with stress testing in past.    He has h/o thymoma surgery with xrt and also had rectal polyp surgery.  - Stress MPI Aug 2017.  Echo Aug 2017 normal EF, DD, mild-mod PHTN, mild AI, LVH, mild RVE.  Echo 9/18 EF 50%, normal diastolic function, mild PHTN, LVH.  Taken off statin for abnl LFTs.  Now here.  No chest pain sxs.  Dyspnea seems resolved.  Leg edema stable, improved.  CMP much better.  No palpitations.  No dizziness.   BP stable.         Current Outpatient Medications:     aspirin (ECOTRIN) 81 MG EC tablet, Take 81 mg by mouth once daily., Disp: , Rfl:     b complex vitamins tablet, Take by mouth. 1 tablet Oral Every day, Disp: , Rfl:     clobetasol (TEMOVATE) 0.05 % external solution, AAA of scalp 1-2 times daily prn flares. Strong steroid- do not apply to face or folds of skin, Disp: 50 mL, Rfl: 0    cyanocobalamin 1,000 mcg/mL injection, Give 1cc under the skin once a month, Disp: 10 mL, Rfl: 4    furosemide (LASIX) 40 MG tablet, Take 1 tablet (40 mg total) by mouth once daily. (Patient taking differently: Take 40 mg by mouth once daily. ), Disp: 90 tablet, Rfl: 3    leuprolide (LUPRON) 3.75 mg injection, Inject 3.75 mg into the muscle every 3 (three) months. , Disp: , Rfl:     metoprolol tartrate (LOPRESSOR) 100 MG tablet, TAKE 1 TABLET(100 MG) BY MOUTH TWICE DAILY, Disp: 180 tablet, Rfl: 3    metronidazole 0.75% (METROCREAM) 0.75 % Crea, AAA of face bid, Disp: 45 g,  "Rfl: 5    omeprazole (PRILOSEC) 20 MG capsule, TAKE 1 CAPSULE(20 MG) BY MOUTH EVERY DAY (Patient taking differently: TAKE 1 CAPSULE(20 MG) BY MOUTH EVERY NIGHT), Disp: 90 capsule, Rfl: 3      Review of Systems   Constitution: Negative.   HENT: Negative.    Eyes: Negative.    Cardiovascular: Positive for leg swelling.   Respiratory: Negative.    Endocrine: Negative.    Hematologic/Lymphatic: Negative.    Skin: Negative.    Musculoskeletal: Negative.    Gastrointestinal: Negative.    Genitourinary: Negative.    Neurological: Negative.    Psychiatric/Behavioral: Negative.    Allergic/Immunologic: Negative.        /66 (BP Location: Right arm, Patient Position: Sitting, BP Method: Large (Manual))   Pulse 64 Comment: irregular  Ht 5' 10" (1.778 m)   Wt 75.3 kg (166 lb)   BMI 23.82 kg/m²     Wt Readings from Last 3 Encounters:   04/08/19 75.3 kg (166 lb)   03/22/19 75.7 kg (166 lb 14.2 oz)   03/11/19 77.6 kg (171 lb 1.2 oz)     Temp Readings from Last 3 Encounters:   03/22/19 97.6 °F (36.4 °C) (Oral)   03/11/19 97.9 °F (36.6 °C)   02/26/19 97.5 °F (36.4 °C) (Temporal)     BP Readings from Last 3 Encounters:   04/08/19 122/66   03/22/19 120/72   03/11/19 116/60     Pulse Readings from Last 3 Encounters:   04/08/19 64   03/22/19 106   03/11/19 (!) 57          Objective:    Physical Exam   Constitutional: He is oriented to person, place, and time. He appears well-developed and well-nourished.   HENT:   Head: Normocephalic.   Neck: Normal range of motion. Neck supple. Normal carotid pulses, no hepatojugular reflux and no JVD present. Carotid bruit is not present. No thyromegaly present.   Cardiovascular: Normal rate, S1 normal and S2 normal. A regularly irregular rhythm present. PMI is not displaced. Exam reveals no S3, no S4, no distant heart sounds, no friction rub, no midsystolic click and no opening snap.   No murmur heard.  Pulses:       Radial pulses are 2+ on the right side, and 2+ on the left side. "   Pulmonary/Chest: Effort normal and breath sounds normal. He has no wheezes. He has no rales.   Abdominal: Soft. Bowel sounds are normal. He exhibits no distension, no abdominal bruit, no ascites and no mass. There is no tenderness.   Musculoskeletal: He exhibits edema.   Neurological: He is alert and oriented to person, place, and time.   Skin: Skin is warm.   Psychiatric: He has a normal mood and affect. His behavior is normal.   Nursing note and vitals reviewed.      I have reviewed all pertinent labs and cardiac studies.    Component      Latest Ref Rng & Units 3/22/2019   BNP      0 - 99 pg/mL 478 (H)           Chemistry        Component Value Date/Time     03/22/2019 0955    K 5.1 03/22/2019 0955     03/22/2019 0955    CO2 26 03/22/2019 0955    BUN 15 03/22/2019 0955    CREATININE 1.1 03/22/2019 0955    GLU 99 03/22/2019 0955        Component Value Date/Time    CALCIUM 9.7 03/22/2019 0955    ALKPHOS 218 (H) 03/22/2019 0955    AST 25 03/22/2019 0955    ALT 13 03/22/2019 0955    BILITOT 0.8 03/22/2019 0955    ESTGFRAFRICA >60.0 03/22/2019 0955    EGFRNONAA >60.0 03/22/2019 0955        Lab Results   Component Value Date    WBC 7.38 03/11/2019    HGB 10.6 (L) 03/11/2019    HCT 34.6 (L) 03/11/2019    MCV 90 03/11/2019     (H) 03/11/2019     Lab Results   Component Value Date    TSH 2.0 04/23/2008     Lab Results   Component Value Date    HGBA1C 5.2 06/06/2016     Lab Results   Component Value Date    CHOL 160 08/21/2018    CHOL 164 06/06/2016    CHOL 123 05/08/2013      Lab Results   Component Value Date    HDL 65 08/21/2018    HDL 79 (H) 06/06/2016    HDL 60 05/08/2013     Lab Results   Component Value Date    LDLCALC 80.0 08/21/2018    LDLCALC 69.4 06/06/2016    LDLCALC 50.0 (L) 05/08/2013     Lab Results   Component Value Date    TRIG 75 08/21/2018    TRIG 78 06/06/2016    TRIG 64 05/08/2013     Lab Results   Component Value Date    CHOLHDL 40.6 08/21/2018    CHOLHDL 48.2 06/06/2016     CHOLHDL 48.8 05/08/2013           Assessment:       Stable CV conditions on current medical tx,  Significant improvement in LFT values.    1. Essential hypertension    2. Nonrheumatic aortic valve insufficiency    3. VT (ventricular tachycardia)    4. Chronic renal impairment, unspecified CKD stage    5. Waldenstrom's macroglobulinemia    6. Abnormal liver enzymes    7. Chronic diastolic heart failure    8. PVC's (premature ventricular contractions)    9. Edema of both legs    10. Non-rheumatic tricuspid valve insufficiency    11. Pulmonary hypertension         Plan:             Continue current medical tx.  Cardiac diet.  F/u 4 months with CMP, BNP.

## 2019-04-11 ENCOUNTER — LAB VISIT (OUTPATIENT)
Dept: LAB | Facility: HOSPITAL | Age: 79
End: 2019-04-11
Attending: INTERNAL MEDICINE
Payer: MEDICARE

## 2019-04-11 DIAGNOSIS — K76.0 FATTY LIVER: ICD-10-CM

## 2019-04-11 DIAGNOSIS — Z85.048 HISTORY OF RECTAL CANCER: ICD-10-CM

## 2019-04-11 DIAGNOSIS — C88.0 WALDENSTROM'S MACROGLOBULINEMIA: ICD-10-CM

## 2019-04-11 DIAGNOSIS — D51.8 OTHER VITAMIN B12 DEFICIENCY ANEMIA: ICD-10-CM

## 2019-04-11 LAB
ALBUMIN SERPL BCP-MCNC: 3.1 G/DL (ref 3.5–5.2)
ALP SERPL-CCNC: 206 U/L (ref 55–135)
ALT SERPL W/O P-5'-P-CCNC: 14 U/L (ref 10–44)
ANION GAP SERPL CALC-SCNC: 12 MMOL/L (ref 8–16)
AST SERPL-CCNC: 22 U/L (ref 10–40)
BASOPHILS # BLD AUTO: 0.04 K/UL (ref 0–0.2)
BASOPHILS NFR BLD: 0.7 % (ref 0–1.9)
BILIRUB SERPL-MCNC: 0.6 MG/DL (ref 0.1–1)
BUN SERPL-MCNC: 17 MG/DL (ref 8–23)
CALCIUM SERPL-MCNC: 9.9 MG/DL (ref 8.7–10.5)
CHLORIDE SERPL-SCNC: 103 MMOL/L (ref 95–110)
CO2 SERPL-SCNC: 25 MMOL/L (ref 23–29)
CREAT SERPL-MCNC: 1.3 MG/DL (ref 0.5–1.4)
CRP SERPL-MCNC: 2.4 MG/L (ref 0–8.2)
DIFFERENTIAL METHOD: ABNORMAL
EOSINOPHIL # BLD AUTO: 0.2 K/UL (ref 0–0.5)
EOSINOPHIL NFR BLD: 3.8 % (ref 0–8)
ERYTHROCYTE [DISTWIDTH] IN BLOOD BY AUTOMATED COUNT: 15 % (ref 11.5–14.5)
EST. GFR  (AFRICAN AMERICAN): >60 ML/MIN/1.73 M^2
EST. GFR  (NON AFRICAN AMERICAN): 53 ML/MIN/1.73 M^2
GLUCOSE SERPL-MCNC: 100 MG/DL (ref 70–110)
HCT VFR BLD AUTO: 33.9 % (ref 40–54)
HGB BLD-MCNC: 10.5 G/DL (ref 14–18)
IMM GRANULOCYTES # BLD AUTO: 0.01 K/UL (ref 0–0.04)
IMM GRANULOCYTES NFR BLD AUTO: 0.2 % (ref 0–0.5)
IRON SERPL-MCNC: 53 UG/DL (ref 45–160)
LYMPHOCYTES # BLD AUTO: 2.1 K/UL (ref 1–4.8)
LYMPHOCYTES NFR BLD: 36 % (ref 18–48)
MCH RBC QN AUTO: 28.4 PG (ref 27–31)
MCHC RBC AUTO-ENTMCNC: 31 G/DL (ref 32–36)
MCV RBC AUTO: 92 FL (ref 82–98)
MONOCYTES # BLD AUTO: 0.8 K/UL (ref 0.3–1)
MONOCYTES NFR BLD: 14.6 % (ref 4–15)
NEUTROPHILS # BLD AUTO: 2.6 K/UL (ref 1.8–7.7)
NEUTROPHILS NFR BLD: 44.9 % (ref 38–73)
NRBC BLD-RTO: 0 /100 WBC
PLATELET # BLD AUTO: 241 K/UL (ref 150–350)
PMV BLD AUTO: 10.1 FL (ref 9.2–12.9)
POTASSIUM SERPL-SCNC: 4.8 MMOL/L (ref 3.5–5.1)
PROT SERPL-MCNC: 6.9 G/DL (ref 6–8.4)
RBC # BLD AUTO: 3.7 M/UL (ref 4.6–6.2)
SATURATED IRON: 17 % (ref 20–50)
SODIUM SERPL-SCNC: 140 MMOL/L (ref 136–145)
TOTAL IRON BINDING CAPACITY: 312 UG/DL (ref 250–450)
TRANSFERRIN SERPL-MCNC: 211 MG/DL (ref 200–375)
WBC # BLD AUTO: 5.75 K/UL (ref 3.9–12.7)

## 2019-04-11 PROCEDURE — 82728 ASSAY OF FERRITIN: CPT | Mod: HCNC

## 2019-04-11 PROCEDURE — 86334 IMMUNOFIX E-PHORESIS SERUM: CPT | Mod: 26,HCNC,, | Performed by: PATHOLOGY

## 2019-04-11 PROCEDURE — 83520 IMMUNOASSAY QUANT NOS NONAB: CPT | Mod: 59,HCNC

## 2019-04-11 PROCEDURE — 80053 COMPREHEN METABOLIC PANEL: CPT | Mod: HCNC

## 2019-04-11 PROCEDURE — 82784 ASSAY IGA/IGD/IGG/IGM EACH: CPT | Mod: HCNC

## 2019-04-11 PROCEDURE — 84165 PROTEIN E-PHORESIS SERUM: CPT | Mod: 26,HCNC,, | Performed by: PATHOLOGY

## 2019-04-11 PROCEDURE — 83540 ASSAY OF IRON: CPT | Mod: HCNC

## 2019-04-11 PROCEDURE — 84165 PROTEIN E-PHORESIS SERUM: CPT | Mod: HCNC

## 2019-04-11 PROCEDURE — 86140 C-REACTIVE PROTEIN: CPT | Mod: HCNC

## 2019-04-11 PROCEDURE — 85025 COMPLETE CBC W/AUTO DIFF WBC: CPT | Mod: HCNC

## 2019-04-11 PROCEDURE — 86334 IMMUNOFIX E-PHORESIS SERUM: CPT | Mod: HCNC

## 2019-04-11 PROCEDURE — 84165 PATHOLOGIST INTERPRETATION SPE: ICD-10-PCS | Mod: 26,HCNC,, | Performed by: PATHOLOGY

## 2019-04-11 PROCEDURE — 86334 PATHOLOGIST INTERPRETATION IFE: ICD-10-PCS | Mod: 26,HCNC,, | Performed by: PATHOLOGY

## 2019-04-11 PROCEDURE — 36415 COLL VENOUS BLD VENIPUNCTURE: CPT | Mod: HCNC

## 2019-04-12 LAB
ALBUMIN SERPL ELPH-MCNC: 3.39 G/DL (ref 3.35–5.55)
ALPHA1 GLOB SERPL ELPH-MCNC: 0.28 G/DL (ref 0.17–0.41)
ALPHA2 GLOB SERPL ELPH-MCNC: 0.73 G/DL (ref 0.43–0.99)
B-GLOBULIN SERPL ELPH-MCNC: 1.92 G/DL (ref 0.5–1.1)
FERRITIN SERPL-MCNC: 43 NG/ML (ref 20–300)
GAMMA GLOB SERPL ELPH-MCNC: 0.28 G/DL (ref 0.67–1.58)
IGA SERPL-MCNC: 32 MG/DL (ref 40–350)
IGG SERPL-MCNC: 257 MG/DL (ref 650–1600)
IGM SERPL-MCNC: 1970 MG/DL (ref 50–300)
INTERPRETATION SERPL IFE-IMP: NORMAL
PATHOLOGIST INTERPRETATION IFE: NORMAL
PATHOLOGIST INTERPRETATION SPE: NORMAL
PROT SERPL-MCNC: 6.6 G/DL (ref 6–8.4)

## 2019-04-16 LAB
KAPPA LC SER QL IA: 1.39 MG/DL (ref 0.33–1.94)
KAPPA LC/LAMBDA SER IA: 0.74 (ref 0.26–1.65)
LAMBDA LC SER QL IA: 1.87 MG/DL (ref 0.57–2.63)

## 2019-04-18 ENCOUNTER — OFFICE VISIT (OUTPATIENT)
Dept: HEMATOLOGY/ONCOLOGY | Facility: CLINIC | Age: 79
End: 2019-04-18
Payer: MEDICARE

## 2019-04-18 ENCOUNTER — INFUSION (OUTPATIENT)
Dept: INFUSION THERAPY | Facility: HOSPITAL | Age: 79
End: 2019-04-18
Attending: INTERNAL MEDICINE
Payer: MEDICARE

## 2019-04-18 VITALS — DIASTOLIC BLOOD PRESSURE: 64 MMHG | SYSTOLIC BLOOD PRESSURE: 110 MMHG | HEART RATE: 48 BPM

## 2019-04-18 VITALS
WEIGHT: 170 LBS | BODY MASS INDEX: 24.34 KG/M2 | HEART RATE: 56 BPM | HEIGHT: 70 IN | RESPIRATION RATE: 18 BRPM | SYSTOLIC BLOOD PRESSURE: 122 MMHG | TEMPERATURE: 98 F | DIASTOLIC BLOOD PRESSURE: 69 MMHG | OXYGEN SATURATION: 92 %

## 2019-04-18 DIAGNOSIS — C88.0 WALDENSTROM'S MACROGLOBULINEMIA: Primary | ICD-10-CM

## 2019-04-18 DIAGNOSIS — C88.0 WALDENSTROM MACROGLOBULINEMIA: Primary | ICD-10-CM

## 2019-04-18 PROCEDURE — 3074F PR MOST RECENT SYSTOLIC BLOOD PRESSURE < 130 MM HG: ICD-10-PCS | Mod: HCNC,CPTII,S$GLB, | Performed by: INTERNAL MEDICINE

## 2019-04-18 PROCEDURE — 63600175 PHARM REV CODE 636 W HCPCS: Mod: HCNC | Performed by: INTERNAL MEDICINE

## 2019-04-18 PROCEDURE — 1101F PT FALLS ASSESS-DOCD LE1/YR: CPT | Mod: HCNC,CPTII,S$GLB, | Performed by: INTERNAL MEDICINE

## 2019-04-18 PROCEDURE — 99499 UNLISTED E&M SERVICE: CPT | Mod: HCNC,S$GLB,, | Performed by: INTERNAL MEDICINE

## 2019-04-18 PROCEDURE — 96367 TX/PROPH/DG ADDL SEQ IV INF: CPT | Mod: HCNC

## 2019-04-18 PROCEDURE — 3078F PR MOST RECENT DIASTOLIC BLOOD PRESSURE < 80 MM HG: ICD-10-PCS | Mod: HCNC,CPTII,S$GLB, | Performed by: INTERNAL MEDICINE

## 2019-04-18 PROCEDURE — 99999 PR PBB SHADOW E&M-EST. PATIENT-LVL III: CPT | Mod: PBBFAC,HCNC,, | Performed by: INTERNAL MEDICINE

## 2019-04-18 PROCEDURE — S0028 INJECTION, FAMOTIDINE, 20 MG: HCPCS | Mod: HCNC | Performed by: INTERNAL MEDICINE

## 2019-04-18 PROCEDURE — 99499 RISK ADDL DX/OHS AUDIT: ICD-10-PCS | Mod: HCNC,S$GLB,, | Performed by: INTERNAL MEDICINE

## 2019-04-18 PROCEDURE — 3074F SYST BP LT 130 MM HG: CPT | Mod: HCNC,CPTII,S$GLB, | Performed by: INTERNAL MEDICINE

## 2019-04-18 PROCEDURE — 1101F PR PT FALLS ASSESS DOC 0-1 FALLS W/OUT INJ PAST YR: ICD-10-PCS | Mod: HCNC,CPTII,S$GLB, | Performed by: INTERNAL MEDICINE

## 2019-04-18 PROCEDURE — 99215 PR OFFICE/OUTPT VISIT, EST, LEVL V, 40-54 MIN: ICD-10-PCS | Mod: 25,HCNC,S$GLB, | Performed by: INTERNAL MEDICINE

## 2019-04-18 PROCEDURE — 3078F DIAST BP <80 MM HG: CPT | Mod: HCNC,CPTII,S$GLB, | Performed by: INTERNAL MEDICINE

## 2019-04-18 PROCEDURE — 96415 CHEMO IV INFUSION ADDL HR: CPT | Mod: HCNC

## 2019-04-18 PROCEDURE — 99215 OFFICE O/P EST HI 40 MIN: CPT | Mod: 25,HCNC,S$GLB, | Performed by: INTERNAL MEDICINE

## 2019-04-18 PROCEDURE — 96413 CHEMO IV INFUSION 1 HR: CPT | Mod: HCNC

## 2019-04-18 PROCEDURE — 99999 PR PBB SHADOW E&M-EST. PATIENT-LVL III: ICD-10-PCS | Mod: PBBFAC,HCNC,, | Performed by: INTERNAL MEDICINE

## 2019-04-18 PROCEDURE — 25000003 PHARM REV CODE 250: Mod: HCNC | Performed by: INTERNAL MEDICINE

## 2019-04-18 RX ORDER — ACETAMINOPHEN 325 MG/1
650 TABLET ORAL
Status: COMPLETED | OUTPATIENT
Start: 2019-04-18 | End: 2019-04-18

## 2019-04-18 RX ORDER — FAMOTIDINE 20 MG/50ML
20 INJECTION, SOLUTION INTRAVENOUS
Status: COMPLETED | OUTPATIENT
Start: 2019-04-18 | End: 2019-04-18

## 2019-04-18 RX ORDER — MEPERIDINE HYDROCHLORIDE 50 MG/ML
25 INJECTION INTRAMUSCULAR; INTRAVENOUS; SUBCUTANEOUS
Status: CANCELLED | OUTPATIENT
Start: 2019-04-18

## 2019-04-18 RX ORDER — ACETAMINOPHEN 325 MG/1
650 TABLET ORAL
Status: CANCELLED | OUTPATIENT
Start: 2019-04-18

## 2019-04-18 RX ORDER — SODIUM CHLORIDE 0.9 % (FLUSH) 0.9 %
10 SYRINGE (ML) INJECTION
Status: CANCELLED | OUTPATIENT
Start: 2019-04-18

## 2019-04-18 RX ORDER — FAMOTIDINE 10 MG/ML
20 INJECTION INTRAVENOUS
Status: DISCONTINUED | OUTPATIENT
Start: 2019-04-18 | End: 2019-04-18

## 2019-04-18 RX ORDER — HEPARIN 100 UNIT/ML
500 SYRINGE INTRAVENOUS
Status: CANCELLED | OUTPATIENT
Start: 2019-04-18

## 2019-04-18 RX ORDER — FAMOTIDINE 10 MG/ML
20 INJECTION INTRAVENOUS
Status: CANCELLED | OUTPATIENT
Start: 2019-04-18

## 2019-04-18 RX ADMIN — SODIUM CHLORIDE: 9 INJECTION, SOLUTION INTRAVENOUS at 10:04

## 2019-04-18 RX ADMIN — RITUXIMAB 731 MG: 10 INJECTION, SOLUTION INTRAVENOUS at 10:04

## 2019-04-18 RX ADMIN — ACETAMINOPHEN 650 MG: 325 TABLET ORAL at 10:04

## 2019-04-18 RX ADMIN — FAMOTIDINE 20 MG: 20 INJECTION, SOLUTION INTRAVENOUS at 10:04

## 2019-04-18 RX ADMIN — DIPHENHYDRAMINE HYDROCHLORIDE 50 MG: 50 INJECTION, SOLUTION INTRAMUSCULAR; INTRAVENOUS at 10:04

## 2019-04-18 NOTE — PLAN OF CARE
Problem: Adult Inpatient Plan of Care  Goal: Plan of Care Review  Outcome: Ongoing (interventions implemented as appropriate)  Pt stated, I feel much better since I started this treatment

## 2019-04-18 NOTE — PROGRESS NOTES
Subjective:      Patient ID: Homero Tanner is a 78 y.o. male.    Chief Complaint: Follow-up    The patient is a 78-year-old  male who presents to the Hematology Oncology Clinic today to discuss further evaluation and management recommendations for Waldenstrom's macroglobulinemia.   I have reviewed all of the patient's relevant clinical history available in the medical record and have utilized this in my evaluation and management recommendations today.  Today the patient reports that he has been having chronic fatigue and edema in his legs. His fatigue is improved since being off treatment.  He continues with follow-up with Dr. Wright with Cardiology. He has been taking his monthly vitamin B12 injections. He is off iron supplementation at this time for his history of iron deficiency anemia which has since resolved.  He is on intermittent ADT for biochemical recurrence of prostate carcinoma and is followed by a urologist outside of the [facility].  His rectal cancer is in remission at this time.  He was on velcade/rituxan/dexamethasone for treatment of Waldenstrom's macroglobulinemia with his last treatment given in Oct 2018.  He has initiated rituxan maintenance and is s/p cycle 1 in Jan 2019. He is scheduled for cycle 2 today.  The patient is CT imaging has shown evidence of aortic atherosclerosis and bronchiectasis which is being monitored at this time.    Cancer   Pertinent negatives include no abdominal pain, arthralgias, chest pain, chills, congestion, coughing, diaphoresis, fatigue, fever, headaches, joint swelling, myalgias, nausea, rash, sore throat, vomiting or weakness.     Review of Systems   Constitutional: Negative for activity change, appetite change, chills, diaphoresis, fatigue, fever and unexpected weight change.   HENT: Negative for congestion, dental problem, ear pain, mouth sores, nosebleeds, postnasal drip, sinus pressure, sore throat, tinnitus, trouble swallowing and voice  change.    Eyes: Negative for photophobia, pain, discharge, redness, itching and visual disturbance.   Respiratory: Negative for cough, chest tightness, shortness of breath, wheezing and stridor.    Cardiovascular: Negative for chest pain, palpitations and leg swelling.   Gastrointestinal: Negative for abdominal distention, abdominal pain, anal bleeding, blood in stool, constipation, diarrhea, nausea and vomiting.   Endocrine: Negative for cold intolerance, heat intolerance, polydipsia, polyphagia and polyuria.   Genitourinary: Negative for decreased urine volume, difficulty urinating, dysuria, flank pain, frequency, hematuria and urgency.   Musculoskeletal: Negative for arthralgias, back pain, gait problem, joint swelling and myalgias.   Skin: Negative for pallor and rash.   Allergic/Immunologic: Negative for immunocompromised state.   Neurological: Negative for dizziness, syncope, weakness, light-headedness and headaches.   Hematological: Negative for adenopathy. Does not bruise/bleed easily.   Psychiatric/Behavioral: Negative for agitation and confusion. The patient is nervous/anxious.        Medication List with Changes/Refills   Current Medications    ASPIRIN (ECOTRIN) 81 MG EC TABLET    Take 81 mg by mouth once daily.    B COMPLEX VITAMINS TABLET    Take by mouth. 1 tablet Oral Every day    CLOBETASOL (TEMOVATE) 0.05 % EXTERNAL SOLUTION    AAA of scalp 1-2 times daily prn flares. Strong steroid- do not apply to face or folds of skin    CYANOCOBALAMIN 1,000 MCG/ML INJECTION    Give 1cc under the skin once a month    FUROSEMIDE (LASIX) 40 MG TABLET    Take 1 tablet (40 mg total) by mouth once daily.    LEUPROLIDE (LUPRON) 3.75 MG INJECTION    Inject 3.75 mg into the muscle every 3 (three) months.     METOPROLOL TARTRATE (LOPRESSOR) 100 MG TABLET    TAKE 1 TABLET(100 MG) BY MOUTH TWICE DAILY    METRONIDAZOLE 0.75% (METROCREAM) 0.75 % CREA    AAA of face bid    OMEPRAZOLE (PRILOSEC) 20 MG CAPSULE    TAKE 1  CAPSULE(20 MG) BY MOUTH EVERY DAY     Review of patient's allergies indicates:   Allergen Reactions    Lipitor [atorvastatin] Other (See Comments)    Norvasc [amlodipine] Swelling       Lab: I have reviewed all of the patient's relevant lab work available in the medical record and have utilized this in my evaluation and management recommendations today    Imaging: I have reviewed all of the patient's diagnostic/imaging results available in the medical record and have utilized this in my evaluation and management recommendations today.      Objective:     Vitals:    04/18/19 0842   BP: 122/69   Pulse: (!) 56   Resp: 18   Temp: 97.7 °F (36.5 °C)       Physical Exam   Constitutional: He is oriented to person, place, and time. He appears well-developed and well-nourished. No distress.   HENT:   Head: Normocephalic and atraumatic.   Nose: Nose normal.   Mouth/Throat: Oropharynx is clear and moist. No oropharyngeal exudate.   Eyes: Pupils are equal, round, and reactive to light. EOM are normal. No scleral icterus.   Neck: Normal range of motion. Neck supple. No tracheal deviation present. No thyromegaly present.   Cardiovascular: Normal rate, regular rhythm, normal heart sounds and intact distal pulses.   No murmur heard.  Pulmonary/Chest: Effort normal and breath sounds normal. No stridor. No respiratory distress. He has no wheezes. He has no rales. He exhibits no tenderness.   Abdominal: Soft. Bowel sounds are normal. He exhibits no distension and no mass. There is no tenderness. There is no rebound and no guarding.   Musculoskeletal: Normal range of motion. He exhibits no edema or tenderness.   Lymphadenopathy:     He has no cervical adenopathy.   Neurological: He is alert and oriented to person, place, and time. Coordination normal.   Skin: Skin is warm. No rash noted. He is not diaphoretic. No erythema.   Psychiatric: He has a normal mood and affect. His behavior is normal. Judgment and thought content normal.    Nursing note and vitals reviewed.      Assessment:     1. Waldenstrom's macroglobulinemia  2.  Elevated LFTs/elevated bilirubin - improved  3.  Elevated creatinine - stable/improved  4.  Generalized weakness/fatigue - improved  5.  History of Alcohol abuse    Plan:   1.  I had a detailed discussion with the patient previously with regard to the results of his bone marrow aspiration biopsy done in July 2018 as well as the results of all of his lab work done in August 2018.  At this time the patient has Waldenstrom's macroglobulinemia.  His IgM quantitative level was initially greater than 4 g.  2.  I had a detailed discussion with him previously with regard to the diagnosis, prognosis and treatment of this malignancy.  We discussed the indications to begin treatment.  I was concerned about possible liver involvement by his lymphoma because of his recently abnormal/worsening LFTs.  I was also concerned about possible renal involvement because of his elevated creatinine with monoclonal chain noted in [facility] immunofixation.  3.  We discussed that his treatment options are complicated because of the above.  I have previously discussed that the best course of action would be to start him on Velcade/dexamethasone/Rituxan.  I decided to hold off on initiation of Rituxan until we get his IgM quantitative level to less than 4 g.  Therefore he was initially started on twice weekly Velcade and weekly dexamethasone at 20 mg p.o. once daily.  We decided to re-evaluate our treatment options based on clinical response and based on how he tolerates this treatment. Velcade was dose adjusted for liver function. Informed consent taken.    4.  All necessary prescriptions including for herpes zoster prophylaxis have been sent to the patient's pharmacy previously and the patient reports compliance with this medication.  5. I have previously strongly recommended cessation of alcohol use.  He reports that he continues to drink 1 drink a  day.  6.  I had a detailed discussion with the patient today with regard to the results of all of his recent lab work which shows excellent response to ongoing treatment with interval significant decrease in IgM quantitative level.  I have also previously added IV Rituxan to his treatment.  The rationale for this was discussed in detail and he expressed understanding.  We have decided to initiate Rituxan starting with cycle 3 of Velcade.  I have since transitioned Velcade to once weekly.  7. I have previously discussed with the patient with regard to my concerns with regard to his combined liver/kidney dysfunction.  I am concerned about my ability to continue treatment safely.  At this time I will continue to hold Velcade and dexamethasone.  He has previously completed 4 weekly doses of rituxan in Oct 2018.   8. At this time we have decided to proceed with maintenance rituxan q12 weeks x 4 doses starting in Jan 2019. All labs from March 2019 reviewed and look stable/improved suggesting good control of his malignancy.  He will proceed with cycle 2/4 of maintenance Rituxan in April 2019.  9.  I did discuss with IR with regard to finding noted in left lung on PET-CT scan. Recommendation is to continue with close observation. I will repeat PET/CT in the next 1-2 weeks for follow up.    Follow-up in 1 month with labs 1 week before clinic visit. He knows to call sooner for any additional questions or new problems.

## 2019-04-18 NOTE — DISCHARGE INSTRUCTIONS
[facility]  [address]  [address]  [phone] phone     [phone] fax  Hours of Operation: Monday- Friday 8:00am- 5:00pm  After hours phone  [phone]  Hematology / Oncology Physicians on call      Dr. Bridger Duke      Please call with any concerns regarding your appointment today.

## 2019-04-22 ENCOUNTER — OFFICE VISIT (OUTPATIENT)
Dept: URGENT CARE | Facility: CLINIC | Age: 79
End: 2019-04-22
Payer: MEDICARE

## 2019-04-22 VITALS
SYSTOLIC BLOOD PRESSURE: 140 MMHG | BODY MASS INDEX: 24.67 KG/M2 | WEIGHT: 171.94 LBS | DIASTOLIC BLOOD PRESSURE: 80 MMHG | TEMPERATURE: 98 F

## 2019-04-22 DIAGNOSIS — H61.23 BILATERAL IMPACTED CERUMEN: Primary | ICD-10-CM

## 2019-04-22 PROCEDURE — 99214 OFFICE O/P EST MOD 30 MIN: CPT | Mod: HCNC,S$GLB,, | Performed by: NURSE PRACTITIONER

## 2019-04-22 PROCEDURE — 1101F PR PT FALLS ASSESS DOC 0-1 FALLS W/OUT INJ PAST YR: ICD-10-PCS | Mod: HCNC,CPTII,S$GLB, | Performed by: NURSE PRACTITIONER

## 2019-04-22 PROCEDURE — 1101F PT FALLS ASSESS-DOCD LE1/YR: CPT | Mod: HCNC,CPTII,S$GLB, | Performed by: NURSE PRACTITIONER

## 2019-04-22 PROCEDURE — 99214 PR OFFICE/OUTPT VISIT, EST, LEVL IV, 30-39 MIN: ICD-10-PCS | Mod: HCNC,S$GLB,, | Performed by: NURSE PRACTITIONER

## 2019-04-22 PROCEDURE — 3079F PR MOST RECENT DIASTOLIC BLOOD PRESSURE 80-89 MM HG: ICD-10-PCS | Mod: HCNC,CPTII,S$GLB, | Performed by: NURSE PRACTITIONER

## 2019-04-22 PROCEDURE — 99999 PR PBB SHADOW E&M-EST. PATIENT-LVL III: ICD-10-PCS | Mod: PBBFAC,HCNC,, | Performed by: NURSE PRACTITIONER

## 2019-04-22 PROCEDURE — 3079F DIAST BP 80-89 MM HG: CPT | Mod: HCNC,CPTII,S$GLB, | Performed by: NURSE PRACTITIONER

## 2019-04-22 PROCEDURE — 3077F SYST BP >= 140 MM HG: CPT | Mod: HCNC,CPTII,S$GLB, | Performed by: NURSE PRACTITIONER

## 2019-04-22 PROCEDURE — 99999 PR PBB SHADOW E&M-EST. PATIENT-LVL III: CPT | Mod: PBBFAC,HCNC,, | Performed by: NURSE PRACTITIONER

## 2019-04-22 PROCEDURE — 3077F PR MOST RECENT SYSTOLIC BLOOD PRESSURE >= 140 MM HG: ICD-10-PCS | Mod: HCNC,CPTII,S$GLB, | Performed by: NURSE PRACTITIONER

## 2019-04-22 RX ORDER — SODIUM CHLORIDE 9 MG/ML
INJECTION, SOLUTION INTRAVENOUS CONTINUOUS
Status: CANCELLED | OUTPATIENT
Start: 2019-04-22

## 2019-04-22 RX ORDER — SODIUM CHLORIDE 0.9 % (FLUSH) 0.9 %
10 SYRINGE (ML) INJECTION
Status: CANCELLED | OUTPATIENT
Start: 2019-04-22

## 2019-04-22 NOTE — PATIENT INSTRUCTIONS
Impacted Earwax    Impacted earwax is a buildup of the natural wax in the ear (cerumen). Impacted earwax is very common. It can cause symptoms such as hearing loss. It can also stop a doctor doing an exam of your ear.  Understanding earwax  Tiny glands in your ear make substances that combine with dead skin cells to form earwax. Earwax helps protect your ear canal from water, dirt, infection, and injury. Over time, earwax travels from the inner part of your ear canal to the entrance of the canal. Then it falls away naturally. But in some cases, it cant travel to the entrance of the canal. This may be because of a health condition or objects put in the ear. With age, earwax tends to become harder and less fluid. Older adults are more likely to have problems with earwax buildup.  What causes impacted earwax?  Earwax can build up because of many health conditions. Some cause a physical blockage. Others cause too much earwax to be made. Health conditions that can cause earwax buildup include:  · Bony blockage in the ear (osteoma or exostoses)  · Infections, such as swimmers ear (external otitis)  · Skin disease, such as eczema  · Autoimmune diseases, such as lupus  · A narrowed ear canal from birth, chronic inflammation, or injury  · Too much earwax because of injury  · Too much earwax because of  water in the ear canal  Objects repeatedly placed in the ear can also cause impacted earwax. For example, putting cotton swabs in the ear may push the wax deeper into the ear. Over time, this may cause blockage. Hearing aids, swimming plugs, and swim molds can cause the same problem when used again and again.  In some cases, the cause of impacted earwax is not known.  Symptoms of impacted earwax  Excess earwax usually does not cause any symptoms, unless there is a large amount of buildup. Then it may cause symptoms such as:  · Hearing loss  · Earache  · Sense of ear fullness  · Itching in the ear  · Dizziness  · Ringing in  the ears  · Cough  Treatment for impacted earwax  If you dont have symptoms, you may not need treatment. Often the earwax goes away on its own with time. If you have symptoms, you may have 1 or more treatments such as:  · Ear drops. These help to soften the earwax. This helps it leave the ear over time.  · Rinsing (irrigation) of the ear canal with water. This is done in a doctors office.  · Removal of the earwax with small tools. This is also done in a doctors office.  In rare cases, some treatments for earwax removal may cause complications such as:  · Swimmers ear (otitis external)  · Earache  · Short-term hearing loss  · Dizziness  · Water trapped in the ear canal  · Hole in the eardrum  · Ringing in the ears  · Bleeding from the ear  Talk with your health care provider about which risks apply most to you.  Dont use these at home  Health care providers do not advise use of ear candles or ear vacuum kits. These methods are not shown to work.   Preventing impacted earwax  You may not be able to prevent impacted earwax if you have a health condition that causes it, such as eczema. In other cases, you may be able to prevent earwax buildup by:  · Using ear drops once a week  · Having routine cleaning of the ear about every 6 months  · Not using cotton swabs in the ear  When to call the health care provider  Call your health care provider right away if you have severe symptoms after earwax removal. These may include bleeding or severe ear pain.   Date Last Reviewed: 3/19/2015  © 3156-6203 [employer]. [address] [place] [address], [place] [address]. All rights reserved. This information is not intended as a substitute for professional medical care. Always follow your healthcare professional's instructions.

## 2019-04-22 NOTE — PROGRESS NOTES
Subjective:       Patient ID: Homero Tanner is a 78 y.o. male.    Chief Complaint: Cerumen Impaction    Pt is a 78 year old male to clinic today with complaints of cerumen impaction to bilateral ears that began 1 week ago.     Review of Systems   Constitutional: Negative for chills, diaphoresis, fatigue and fever.   HENT: Negative for ear pain (fullness).    Skin: Negative for rash.   Neurological: Negative for dizziness, light-headedness and headaches.       Objective:      Physical Exam   Constitutional: He is oriented to person, place, and time. He appears well-developed and well-nourished. No distress.   HENT:   Head: Normocephalic.   Right Ear: External ear normal.   Left Ear: External ear normal.   Nose: Nose normal.   Bilateral cerumen impactions     Neurological: He is alert and oriented to person, place, and time.   Skin: Skin is warm and dry. No rash noted. He is not diaphoretic.   Psychiatric: He has a normal mood and affect. His speech is normal and behavior is normal. Thought content normal.   Nursing note and vitals reviewed.      Assessment:       1. Bilateral impacted cerumen        Plan:   Bilateral impacted cerumen      Recommend OTC Debrox.    Follow prescribed treatment plan as directed.  Stay hydrated and rest.  Report to ER if symptoms worsen.  Follow up with PCP in 2-3 days or sooner if symptoms do not improve.

## 2019-04-23 ENCOUNTER — ANESTHESIA (OUTPATIENT)
Dept: ENDOSCOPY | Facility: HOSPITAL | Age: 79
End: 2019-04-23
Payer: MEDICARE

## 2019-04-23 ENCOUNTER — HOSPITAL ENCOUNTER (OUTPATIENT)
Facility: HOSPITAL | Age: 79
Discharge: HOME | End: 2019-04-23
Attending: INTERNAL MEDICINE | Admitting: INTERNAL MEDICINE
Payer: MEDICARE

## 2019-04-23 ENCOUNTER — ANESTHESIA EVENT (OUTPATIENT)
Dept: ENDOSCOPY | Facility: HOSPITAL | Age: 79
End: 2019-04-23
Payer: MEDICARE

## 2019-04-23 DIAGNOSIS — K62.1 RECTAL POLYP: ICD-10-CM

## 2019-04-23 DIAGNOSIS — K63.5 POLYP OF COLON, UNSPECIFIED PART OF COLON, UNSPECIFIED TYPE: Primary | ICD-10-CM

## 2019-04-23 PROCEDURE — 88305 TISSUE SPECIMEN TO PATHOLOGY - SURGERY: ICD-10-PCS | Mod: 26,HCNC,, | Performed by: PATHOLOGY

## 2019-04-23 PROCEDURE — 88305 TISSUE EXAM BY PATHOLOGIST: CPT | Mod: 26,HCNC,, | Performed by: PATHOLOGY

## 2019-04-23 PROCEDURE — 25000003 PHARM REV CODE 250: Mod: HCNC | Performed by: NURSE ANESTHETIST, CERTIFIED REGISTERED

## 2019-04-23 PROCEDURE — 37000009 HC ANESTHESIA EA ADD 15 MINS: Mod: HCNC | Performed by: INTERNAL MEDICINE

## 2019-04-23 PROCEDURE — 88305 TISSUE EXAM BY PATHOLOGIST: CPT | Mod: HCNC | Performed by: PATHOLOGY

## 2019-04-23 PROCEDURE — 45331 SIGMOIDOSCOPY AND BIOPSY: CPT | Mod: PT,HCNC,, | Performed by: INTERNAL MEDICINE

## 2019-04-23 PROCEDURE — 45331 PR SIGMOIDOSCOPY,BIOPSY: ICD-10-PCS | Mod: PT,HCNC,, | Performed by: INTERNAL MEDICINE

## 2019-04-23 PROCEDURE — 45331 SIGMOIDOSCOPY AND BIOPSY: CPT | Mod: HCNC | Performed by: INTERNAL MEDICINE

## 2019-04-23 PROCEDURE — 27201012 HC FORCEPS, HOT/COLD, DISP: Mod: HCNC | Performed by: INTERNAL MEDICINE

## 2019-04-23 PROCEDURE — 37000008 HC ANESTHESIA 1ST 15 MINUTES: Mod: HCNC | Performed by: INTERNAL MEDICINE

## 2019-04-23 PROCEDURE — 25000003 PHARM REV CODE 250: Mod: HCNC | Performed by: INTERNAL MEDICINE

## 2019-04-23 PROCEDURE — 63600175 PHARM REV CODE 636 W HCPCS: Mod: HCNC | Performed by: NURSE ANESTHETIST, CERTIFIED REGISTERED

## 2019-04-23 RX ORDER — SODIUM CHLORIDE, SODIUM LACTATE, POTASSIUM CHLORIDE, CALCIUM CHLORIDE 600; 310; 30; 20 MG/100ML; MG/100ML; MG/100ML; MG/100ML
INJECTION, SOLUTION INTRAVENOUS CONTINUOUS
Status: DISCONTINUED | OUTPATIENT
Start: 2019-04-23 | End: 2019-04-23 | Stop reason: HOSPADM

## 2019-04-23 RX ORDER — PROPOFOL 10 MG/ML
VIAL (ML) INTRAVENOUS
Status: DISCONTINUED | OUTPATIENT
Start: 2019-04-23 | End: 2019-04-23

## 2019-04-23 RX ORDER — LIDOCAINE HYDROCHLORIDE 10 MG/ML
INJECTION INFILTRATION; PERINEURAL
Status: DISCONTINUED | OUTPATIENT
Start: 2019-04-23 | End: 2019-04-23

## 2019-04-23 RX ADMIN — PROPOFOL 10 MG: 10 INJECTION, EMULSION INTRAVENOUS at 08:04

## 2019-04-23 RX ADMIN — PROPOFOL 20 MG: 10 INJECTION, EMULSION INTRAVENOUS at 08:04

## 2019-04-23 RX ADMIN — LIDOCAINE HYDROCHLORIDE 50 MG: 10 INJECTION, SOLUTION INFILTRATION; PERINEURAL at 08:04

## 2019-04-23 RX ADMIN — SODIUM CHLORIDE, SODIUM LACTATE, POTASSIUM CHLORIDE, AND CALCIUM CHLORIDE: .6; .31; .03; .02 INJECTION, SOLUTION INTRAVENOUS at 07:04

## 2019-04-23 RX ADMIN — PROPOFOL 80 MG: 10 INJECTION, EMULSION INTRAVENOUS at 08:04

## 2019-04-23 NOTE — ANESTHESIA PREPROCEDURE EVALUATION
04/23/2019  Homero aTnner is a 78 y.o., male.    Anesthesia Evaluation    I have reviewed the Patient Summary Reports.    I have reviewed the Nursing Notes.   I have reviewed the Medications.     Review of Systems  Anesthesia Hx:  No problems with previous Anesthesia  Denies Family Hx of Anesthesia complications.   Denies Personal Hx of Anesthesia complications.   Social:  Former Smoker, Social Alcohol Use    Hematology/Oncology:         -- Anemia: Oncology Comments: Rectal adenocarcinoma  Prostate cancer  Lymphoma  SCC    EENT/Dental:   Cataract   Cardiovascular:   Hypertension Valvular problems/Murmurs, AI Denies MI.   Denies CABG/stent.  CHF hyperlipidemia ECG has been reviewed. ekg 1/2019:  Normal sinus rhythm 75  Premature ventricular complexes  Low voltage QRS  Nonspecific ST and T wave abnormality  Abnormal ECG    Echo 9/2018:   1 - Concentric hypertrophy.     2 - No wall motion abnormalities.     3 - Low normal to mildly depressed left ventricular systolic function (EF 50-55%).     4 - Normal left ventricular diastolic function.     5 - Normal right ventricular systolic function .     6 - Pulmonary hypertension. The estimated PA systolic pressure is 41 mmHg.     7 - Trivial to mild aortic regurgitation.     8 - Mild tricuspid regurgitation.     9 - Trivial pulmonic regurgitation   Pulmonary:   Denies COPD. Asthma  Denies Sleep Apnea. pulm htn   Renal/:   Chronic Renal Disease, CRI    Hepatic/GI:   Bowel Prep. GERD Liver Disease, Denies Hepatitis. Fatty liver   Musculoskeletal:   Arthritis     Neurological:   Denies CVA. Denies Seizures.    Endocrine:  Endocrine Normal        Physical Exam  General:  Well nourished    Airway/Jaw/Neck:  Airway Findings: Mouth Opening: Normal Tongue: Normal  General Airway Assessment: Adult  Mallampati: II      Dental:  Dental Findings: Upper Dentures, Lower  Dentures   Chest/Lungs:  Chest/Lungs Findings: Clear to auscultation, Normal Respiratory Rate     Heart/Vascular:  Heart Findings: Rate: Normal  Rhythm: Regular Rhythm             Anesthesia Plan  Type of Anesthesia, risks & benefits discussed:  Anesthesia Type:  MAC  Patient's Preference:   Intra-op Monitoring Plan: standard ASA monitors  Intra-op Monitoring Plan Comments:   Post Op Pain Control Plan:   Post Op Pain Control Plan Comments:   Induction:   IV  Beta Blocker:  Patient is on a Beta-Blocker and has received one dose within the past 24 hours (No further documentation required).       Informed Consent: Patient understands risks and agrees with Anesthesia plan.  Questions answered. Anesthesia consent signed with patient.  ASA Score: 2     Day of Surgery Review of History & Physical: I have interviewed and examined the patient. I have reviewed the patient's H&P dated:  There are no significant changes.  H&P update referred to the surgeon.         Ready For Surgery From Anesthesia Perspective.

## 2019-04-23 NOTE — TRANSFER OF CARE
"Anesthesia Transfer of Care Note    Patient: Homero Worley Ciro    Procedure(s) Performed: Procedure(s) (LRB):  SIGMOIDOSCOPY, FLEXIBLE (Left)    Patient location: PACU    Anesthesia Type: MAC    Transport from OR: Transported from OR on room air with adequate spontaneous ventilation    Post pain: adequate analgesia    Post assessment: tolerated procedure well and no apparent anesthetic complications    Post vital signs: stable    Level of consciousness: awake    Nausea/Vomiting: no nausea/vomiting    Complications: none    Transfer of care protocol was followed      Last vitals:   Visit Vitals  /81 (BP Location: Left arm, Patient Position: Lying)   Pulse 81   Temp 36.3 °C (97.3 °F) (Oral)   Resp 20   Ht 5' 10" (1.778 m)   Wt 76.2 kg (168 lb)   SpO2 99%   BMI 24.11 kg/m²     "
Statement Selected

## 2019-04-23 NOTE — ANESTHESIA RELEASE NOTE
"Anesthesia Release from PACU Note    Patient: Homero Maseau    Procedure(s) Performed: Procedure(s) (LRB):  SIGMOIDOSCOPY, FLEXIBLE (Left)    Anesthesia type: MAC    Post pain: Adequate analgesia    Post assessment: no apparent anesthetic complications, tolerated procedure well and no evidence of recall    Last Vitals:   Visit Vitals  BP (!) 103/57   Pulse 60   Temp 36.3 °C (97.3 °F) (Oral)   Resp 17   Ht 5' 10" (1.778 m)   Wt 76.2 kg (168 lb)   SpO2 99%   BMI 24.11 kg/m²       Post vital signs: stable    Level of consciousness: awake    Nausea/Vomiting: no nausea/no vomiting    Complications: none    Airway Patency: patent    Respiratory: unassisted, spontaneous ventilation, room air    Cardiovascular: stable and blood pressure at baseline    Hydration: euvolemic  "

## 2019-04-23 NOTE — DISCHARGE INSTRUCTIONS
Colonoscopy     A camera attached to a flexible tube with a viewing lens is used to take video pictures.     Colonoscopy is a test to view the inside of your lower digestive tract (colon and rectum). Sometimes it can show the last part of the small intestine (ileum). During the test, small pieces of tissue may be removed for testing. This is called a biopsy. Small growths, such as polyps, may also be removed.   Why is colonoscopy done?  The test is done to help look for colon cancer. And it can help find the source of abdominal pain, bleeding, and changes in bowel habits. It may be needed once a year, depending on factors such as your:  · Age  · Health history  · Family health history  · Symptoms  · Results from any prior colonoscopy  Risks and possible complications  These include:  · Bleeding               · A puncture or tear in the colon   · Risks of anesthesia  · A cancer lesion not being seen  Getting ready   To prepare for the test:  · Talk with your healthcare provider about the risks of the test (see below). Also ask your healthcare provider about alternatives to the test.  · Tell your healthcare provider about any medicines you take. Also tell him or her about any health conditions you may have.  · Make sure your rectum and colon are empty for the test. Follow the diet and bowel prep instructions exactly. If you dont, the test may need to be rescheduled.  · Plan for a friend or family member to drive you home after the test.     Colonoscopy provides an inside view of the entire colon.     You may discuss the results with your doctor right away or at a future visit.  During the test   The test is usually done in the hospital on an outpatient basis. This means you go home the same day. The procedure takes about 30 minutes. During that time:  · You are given relaxing (sedating) medicine through an IV line. You may be drowsy, or fully asleep.  · The healthcare provider will first give you a physical exam to  check for anal and rectal problems.  · Then the anus is lubricated and the scope inserted.  · If you are awake, you may have a feeling similar to needing to have a bowel movement. You may also feel pressure as air is pumped into the colon. Its OK to pass gas during the procedure.  · Biopsy, polyp removal, or other treatments may be done during the test.  After the test   You may have gas right after the test. It can help to try to pass it to help prevent later bloating. Your healthcare provider may discuss the results with you right away. Or you may need to schedule a follow-up visit to talk about the results. After the test, you can go back to your normal eating and other activities. You may be tired from the sedation and need to rest for a few hours.  Date Last Reviewed: 11/1/2016 © 2000-2017 [employer]. [address] [place] [address], [place] [address]. All rights reserved. This information is not intended as a substitute for professional medical care. Always follow your healthcare professional's instructions.

## 2019-04-23 NOTE — H&P
History & Physical - Short Stay  Gastroenterology      SUBJECTIVE:     Procedure: Flexible Sigmoidoscopy    Chief Complaint/Indication for Procedure: colon polyp    History of Present Illness:  Patient is a 78 y.o. male with rectal polyp s/p emr coming for surveillance .    PTA Medications   Medication Sig    b complex vitamins tablet Take by mouth. 1 tablet Oral Every day    clobetasol (TEMOVATE) 0.05 % external solution AAA of scalp 1-2 times daily prn flares. Strong steroid- do not apply to face or folds of skin    cyanocobalamin 1,000 mcg/mL injection Give 1cc under the skin once a month    furosemide (LASIX) 40 MG tablet Take 1 tablet (40 mg total) by mouth once daily. (Patient taking differently: Take 40 mg by mouth once daily. )    metoprolol tartrate (LOPRESSOR) 100 MG tablet TAKE 1 TABLET(100 MG) BY MOUTH TWICE DAILY    metronidazole 0.75% (METROCREAM) 0.75 % Crea AAA of face bid    omeprazole (PRILOSEC) 20 MG capsule TAKE 1 CAPSULE(20 MG) BY MOUTH EVERY DAY (Patient taking differently: TAKE 1 CAPSULE(20 MG) BY MOUTH EVERY NIGHT)    aspirin (ECOTRIN) 81 MG EC tablet Take 81 mg by mouth once daily.    leuprolide (LUPRON) 3.75 mg injection Inject 3.75 mg into the muscle every 3 (three) months.        Review of patient's allergies indicates:   Allergen Reactions    Lipitor [atorvastatin] Other (See Comments)    Norvasc [amlodipine] Swelling        Past Medical History:   Diagnosis Date    Abnormal ECG 6/24/2013    Aortic insufficiency 6/24/2013    Aortic valve disorder 6/25/2013    Arthritis     Asthma     as a child    Benign neoplasm of cecum 2/27/2017    Benign neoplasm of transverse colon 2/27/2017    Cataract     CHF (congestive heart failure)     Pt states He's never been told this    Encounter for blood transfusion     GERD (gastroesophageal reflux disease)     History of colon polyps     Hypertension     Iron deficiency anemia 10/26/2015    Lymphoma     waldenstrom's  macroglobulinemia    Mixed hyperlipidemia 6/24/2013    Prostate cancer     PSVT (paroxysmal supraventricular tachycardia) 6/24/2013    Pulmonary hypertension 6/13/2016    Rectal adenocarcinoma     Rectal cancer 5/1/2018    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    left parietal scalp    Thymoma     TR (tricuspid regurgitation) 6/13/2016     Past Surgical History:   Procedure Laterality Date    biopsy for chest mass      BIOPSY, WITH CT GUIDANCE Right 1/3/2013    Performed by Huang Vargas MD at [facility] OR 2ND FLR    Biopsy-bone marrow Left 7/11/2018    Performed by Charanjit Dacosta MD at [facility] OR    CATARACT EXTRACTION W/  INTRAOCULAR LENS IMPLANT Right 01/03/2019    CATARACT EXTRACTION W/  INTRAOCULAR LENS IMPLANT Left 02/07/2019    COLON SURGERY      COLONOSCOPY Left 2/27/2017    Performed by Abraham Hong MD at [facility] ENDO    Colonoscopy N/A 10/26/2015    Performed by Abraham Hong MD at [facility] ENDO    COLONOSCOPY N/A 6/26/2014    Performed by James Griffin MD at [facility] ENDO (2ND FLR)    COLONOSCOPY N/A 5/7/2014    Performed by Jaime Ash MD at [facility] ENDO    COLONOSCOPY/hybrid APC or a EndoRotor device N/A 6/27/2018    Performed by Rao Medeiros MD at [facility] ENDO (2ND FLR)    EGD (ESOPHAGOGASTRODUODENOSCOPY) N/A 5/7/2014    Performed by Jaime Ash MD at [facility] ENDO    EXAM UNDER ANESTHESIA N/A 10/15/2014    Performed by Milo Ansari MD at [facility] OR 2ND FLR    HERNIA REPAIR Right 02/2018    Inguinal, open with mesh    hixtory of rectal cancer. Colonsocopy asked to be repeated minnie  year, alst one 2/2017 N/A 5/1/2018    Performed by Patricio Streeter MD at [facility] ENDO    mohs      PROSTATECTOMY      RECTAL SURGERY N/A 09/2014    REPAIR, HERNIA, INGUINAL, SLIDING Right 2/26/2019    Performed by Bridger Alvarez MD at [facility] OR    SIGMOIDOSCOPY-FLEXIBLE Left 12/7/2015    Performed by Abraham Hong MD at [facility] ENDO    SKIN BIOPSY      TAMIS (TRANSANAL MINIMALLY INVASIVE  SURGERY) N/A 2014    Performed by Milo Ansari MD at [facility] OR 2ND FLR    THYMECTOMY N/A 2013    Performed by Pablo Butler MD at [facility] OR 2ND FLR    TONSILLECTOMY      TUMOR REMOVAL      ULTRASOUND-ENDOSCOPIC-LOWER N/A 2014    Performed by James Griffin MD at [facility] ENDO (2ND FLR)     Family History   Problem Relation Age of Onset    Diabetes Father     Cataracts Mother     Amblyopia Neg Hx     Blindness Neg Hx     Cancer Neg Hx     Glaucoma Neg Hx     Hypertension Neg Hx     Macular degeneration Neg Hx     Retinal detachment Neg Hx     Strabismus Neg Hx     Stroke Neg Hx     Thyroid disease Neg Hx     Melanoma Neg Hx      Social History     Tobacco Use    Smoking status: Former Smoker     Packs/day: 1.00     Years: 15.00     Pack years: 15.00     Last attempt to quit: 1969     Years since quittin.2    Smokeless tobacco: Never Used   Substance Use Topics    Alcohol use: Yes     Alcohol/week: 1.8 oz     Types: 3 Cans of beer per week     Comment: socially, NO ALCOHOL 72 HOURS PRIOR TO SX    Drug use: No            OBJECTIVE:     Vital Signs (Most Recent)  Temp: 97.3 °F (36.3 °C) (19)  Pulse: 81 (19)  Resp: 20 (19)  BP: 130/81 (19)  SpO2: 99 % (19)        ASSESSMENT/PLAN:     Patient is a 78 y.o. male with rectal polyp s/p emr coming for surveillance .    Plan: Flexible Sigmoidoscopy    Anesthesia Plan: Moderate Sedation    ASA Grade: ASA 2 - Patient with mild systemic disease with no functional limitations

## 2019-04-23 NOTE — ANESTHESIA POSTPROCEDURE EVALUATION
Anesthesia Post Evaluation    Patient: Homero Worley Ciro    Procedure(s) Performed: Procedure(s) (LRB):  SIGMOIDOSCOPY, FLEXIBLE (Left)    Final Anesthesia Type: MAC  Patient location during evaluation: PACU  Patient participation: Yes- Able to Participate  Level of consciousness: awake  Post-procedure vital signs: reviewed and stable  Pain management: adequate  Airway patency: patent  PONV status at discharge: No PONV  Anesthetic complications: no      Cardiovascular status: blood pressure returned to baseline and hemodynamically stable  Respiratory status: unassisted, room air and spontaneous ventilation  Hydration status: euvolemic  Follow-up not needed.          Vitals Value Taken Time   /57 4/23/2019  8:52 AM   Temp 36.3 °C (97.3 °F) 4/23/2019  7:21 AM   Pulse 60 4/23/2019  8:52 AM   Resp 17 4/23/2019  8:52 AM   SpO2 99 % 4/23/2019  8:52 AM         No case tracking events are documented in the log.      Pain/Jose Score: No data recorded

## 2019-04-23 NOTE — [employer] PATIENT INSTRUCTIONS
Discharge Summary/Instructions after an Endoscopic Procedure  Patient Name: Homero Tanner  Patient MRN: [id number]  Patient YOB: 1940 Tuesday, April 23, 2019 Ranjit Will MD  RESTRICTIONS:  During your procedure today, you received medications for sedation.  These   medications may affect your judgment, balance and coordination.  Therefore,   for 24 hours, you have the following restrictions:   - DO NOT drive a car, operate machinery, make legal/financial decisions,   sign important papers or drink alcohol.    ACTIVITY:  Today: no heavy lifting, straining or running due to procedural   sedation/anesthesia.  The following day: return to full activity including work.  DIET:  Eat and drink normally unless instructed otherwise.     TREATMENT FOR COMMON SIDE EFFECTS:  - Mild abdominal pain, nausea, belching, bloating or excessive gas:  rest,   eat lightly and use a heating pad.  - Sore Throat: treat with throat lozenges and/or gargle with warm salt   water.  - Because air was used during the procedure, expelling large amounts of air   from your rectum or belching is normal.  - If a bowel prep was taken, you may not have a bowel movement for 1-3 days.    This is normal.  SYMPTOMS TO WATCH FOR AND REPORT TO YOUR PHYSICIAN:  1. Abdominal pain or bloating, other than gas cramps.  2. Chest pain.  3. Back pain.  4. Signs of infection such as: chills or fever occurring within 24 hours   after the procedure.  5. Rectal bleeding, which would show as bright red, maroon, or black stools.   (A tablespoon of blood from the rectum is not serious, especially if   hemorrhoids are present.)  6. Vomiting.  7. Weakness or dizziness.  GO DIRECTLY TO THE NEAREST EMERGENCY ROOM IF YOU HAVE ANY OF THE FOLLOWING:      Difficulty breathing              Chills and/or fever over 101 F   Persistent vomiting and/or vomiting blood   Severe abdominal pain   Severe chest pain   Black, tarry stools   Bleeding- more than one  tablespoon   Any other symptom or condition that you feel may need urgent attention  Your doctor recommends these additional instructions:  If any biopsies were taken, your doctors clinic will contact you in 1 to 2   weeks with any results.  - Discharge patient to home.   - Resume previous diet.   - Continue present medications.   - Patient has a contact number available for emergencies.  The signs and   symptoms of potential delayed complications were discussed with the   patient.  Return to normal activities tomorrow.  Written discharge   instructions were provided to the patient.   - Await pathology results.   - Repeat colonoscopy in 1 year for surveillance given history of colon   cancer.  For questions, problems or results please call your physician Ranjit Will MD at Work:  [phone]  If you have any questions about the above instructions, call the GI   department at [phone] or call the endoscopy unit at [phone]   from 7am until 3 pm.  [facility] [place], EMERGENCY ROOM PHONE NUMBER:   [phone]  IF A COMPLICATION OR EMERGENCY SITUATION ARISES AND YOU ARE UNABLE TO REACH   YOUR PHYSICIAN - GO DIRECTLY TO THE EMERGENCY ROOM.  I have read or have had read to me these discharge instructions for my   procedure and have received a written copy.  I understand these   instructions and will follow-up with my physician if I have any questions.     __________________________________       _____________________________________  Nurse Signature                                          Patient/Designated   Responsible Party Signature  Ranjit Will MD  4/23/2019 8:56:45 AM  This report has been verified and signed electronically.  [employer]

## 2019-04-24 VITALS
TEMPERATURE: 97 F | OXYGEN SATURATION: 98 % | HEART RATE: 54 BPM | HEIGHT: 70 IN | RESPIRATION RATE: 18 BRPM | SYSTOLIC BLOOD PRESSURE: 121 MMHG | DIASTOLIC BLOOD PRESSURE: 67 MMHG | WEIGHT: 168 LBS | BODY MASS INDEX: 24.05 KG/M2

## 2019-04-26 ENCOUNTER — TELEPHONE (OUTPATIENT)
Dept: RADIOLOGY | Facility: HOSPITAL | Age: 79
End: 2019-04-26

## 2019-04-29 ENCOUNTER — HOSPITAL ENCOUNTER (OUTPATIENT)
Dept: RADIOLOGY | Facility: HOSPITAL | Age: 79
Discharge: HOME | End: 2019-04-29
Attending: INTERNAL MEDICINE
Payer: MEDICARE

## 2019-04-29 ENCOUNTER — TELEPHONE (OUTPATIENT)
Dept: ENDOSCOPY | Facility: HOSPITAL | Age: 79
End: 2019-04-29

## 2019-04-29 DIAGNOSIS — C88.0 WALDENSTROM MACROGLOBULINEMIA: ICD-10-CM

## 2019-04-29 PROCEDURE — 78815 PET IMAGE W/CT SKULL-THIGH: CPT | Mod: TC,HCNC

## 2019-04-29 PROCEDURE — A9552 F18 FDG: HCPCS | Mod: HCNC

## 2019-04-29 PROCEDURE — 78815 NM PET CT ROUTINE: ICD-10-PCS | Mod: 26,HCNC,PS, | Performed by: RADIOLOGY

## 2019-04-29 PROCEDURE — 78815 PET IMAGE W/CT SKULL-THIGH: CPT | Mod: 26,HCNC,PS, | Performed by: RADIOLOGY

## 2019-04-29 NOTE — TELEPHONE ENCOUNTER
----- Message from Ranjit Will MD sent at 4/26/2019  2:53 PM CDT -----  Benign biopsy. Recommend as per procedure note.

## 2019-04-30 ENCOUNTER — TELEPHONE (OUTPATIENT)
Dept: ENDOSCOPY | Facility: HOSPITAL | Age: 79
End: 2019-04-30

## 2019-04-30 NOTE — TELEPHONE ENCOUNTER
----- Message from Rao Medeiros MD sent at 3/25/2019  8:07 AM CDT -----  We can take care of scheduling this at the  campus.    Johanna- please schedule flex sig in  with one of the [facility] MDs.      ----- Message -----  From: Radu Weaver MD  Sent: 3/22/2019   2:15 PM  To: Lisbeth Linares MA, Randi Min LPN, #    Yes I think he might want to get this done in [place]. Can you help with that? Anything I need to do? Thanks!  ----- Message -----  From: Lisbeth Linares MA  Sent: 3/22/2019   2:12 PM  To: Radu Weaver MD, Randi Min LPN, #    Is it possible for patient to get scheduled in ?  ----- Message -----  From: Radu Weaver MD  Sent: 3/22/2019   9:17 AM  To: Rao Medeiros MD, Waldemar [facility] Staff Taj Mcbride, #    Hello Dr. Medeiros. I think he is overdue for his flexible sigmoidoscopy. I told him your office will contact him to schedule when possible. Thank you.

## 2019-05-09 ENCOUNTER — LAB VISIT (OUTPATIENT)
Dept: LAB | Facility: HOSPITAL | Age: 79
End: 2019-05-09
Attending: INTERNAL MEDICINE
Payer: MEDICARE

## 2019-05-09 DIAGNOSIS — C88.0 WALDENSTROM MACROGLOBULINEMIA: ICD-10-CM

## 2019-05-09 LAB
ALBUMIN SERPL BCP-MCNC: 3.1 G/DL (ref 3.5–5.2)
ALP SERPL-CCNC: 153 U/L (ref 55–135)
ALT SERPL W/O P-5'-P-CCNC: 7 U/L (ref 10–44)
ANION GAP SERPL CALC-SCNC: 11 MMOL/L (ref 8–16)
AST SERPL-CCNC: 21 U/L (ref 10–40)
BASOPHILS # BLD AUTO: 0.03 K/UL (ref 0–0.2)
BASOPHILS NFR BLD: 0.5 % (ref 0–1.9)
BILIRUB SERPL-MCNC: 0.5 MG/DL (ref 0.1–1)
BUN SERPL-MCNC: 15 MG/DL (ref 8–23)
CALCIUM SERPL-MCNC: 9.1 MG/DL (ref 8.7–10.5)
CHLORIDE SERPL-SCNC: 104 MMOL/L (ref 95–110)
CO2 SERPL-SCNC: 24 MMOL/L (ref 23–29)
CREAT SERPL-MCNC: 1.3 MG/DL (ref 0.5–1.4)
DIFFERENTIAL METHOD: ABNORMAL
EOSINOPHIL # BLD AUTO: 0.7 K/UL (ref 0–0.5)
EOSINOPHIL NFR BLD: 11.6 % (ref 0–8)
ERYTHROCYTE [DISTWIDTH] IN BLOOD BY AUTOMATED COUNT: 15.2 % (ref 11.5–14.5)
EST. GFR  (AFRICAN AMERICAN): >60 ML/MIN/1.73 M^2
EST. GFR  (NON AFRICAN AMERICAN): 53 ML/MIN/1.73 M^2
GLUCOSE SERPL-MCNC: 119 MG/DL (ref 70–110)
HCT VFR BLD AUTO: 32.9 % (ref 40–54)
HGB BLD-MCNC: 10 G/DL (ref 14–18)
IGA SERPL-MCNC: 32 MG/DL (ref 40–350)
IGG SERPL-MCNC: 252 MG/DL (ref 650–1600)
IGM SERPL-MCNC: 1797 MG/DL (ref 50–300)
IMM GRANULOCYTES # BLD AUTO: 0.02 K/UL (ref 0–0.04)
IMM GRANULOCYTES NFR BLD AUTO: 0.3 % (ref 0–0.5)
LYMPHOCYTES # BLD AUTO: 1.6 K/UL (ref 1–4.8)
LYMPHOCYTES NFR BLD: 25.5 % (ref 18–48)
MCH RBC QN AUTO: 27.2 PG (ref 27–31)
MCHC RBC AUTO-ENTMCNC: 30.4 G/DL (ref 32–36)
MCV RBC AUTO: 89 FL (ref 82–98)
MONOCYTES # BLD AUTO: 0.7 K/UL (ref 0.3–1)
MONOCYTES NFR BLD: 10.6 % (ref 4–15)
NEUTROPHILS # BLD AUTO: 3.3 K/UL (ref 1.8–7.7)
NEUTROPHILS NFR BLD: 51.8 % (ref 38–73)
NRBC BLD-RTO: 0 /100 WBC
PLATELET # BLD AUTO: 216 K/UL (ref 150–350)
PMV BLD AUTO: 9.5 FL (ref 9.2–12.9)
POTASSIUM SERPL-SCNC: 4.4 MMOL/L (ref 3.5–5.1)
PROT SERPL-MCNC: 6.3 G/DL (ref 6–8.4)
RBC # BLD AUTO: 3.68 M/UL (ref 4.6–6.2)
SODIUM SERPL-SCNC: 139 MMOL/L (ref 136–145)
WBC # BLD AUTO: 6.39 K/UL (ref 3.9–12.7)

## 2019-05-09 PROCEDURE — 83520 IMMUNOASSAY QUANT NOS NONAB: CPT | Mod: 59,HCNC

## 2019-05-09 PROCEDURE — 82784 ASSAY IGA/IGD/IGG/IGM EACH: CPT | Mod: 59,HCNC

## 2019-05-09 PROCEDURE — 86334 PATHOLOGIST INTERPRETATION IFE: ICD-10-PCS | Mod: 26,HCNC,, | Performed by: PATHOLOGY

## 2019-05-09 PROCEDURE — 84165 PROTEIN E-PHORESIS SERUM: CPT | Mod: 26,HCNC,, | Performed by: PATHOLOGY

## 2019-05-09 PROCEDURE — 80053 COMPREHEN METABOLIC PANEL: CPT | Mod: HCNC

## 2019-05-09 PROCEDURE — 85025 COMPLETE CBC W/AUTO DIFF WBC: CPT | Mod: HCNC

## 2019-05-09 PROCEDURE — 84165 PROTEIN E-PHORESIS SERUM: CPT | Mod: HCNC

## 2019-05-09 PROCEDURE — 36415 COLL VENOUS BLD VENIPUNCTURE: CPT | Mod: HCNC

## 2019-05-09 PROCEDURE — 86334 IMMUNOFIX E-PHORESIS SERUM: CPT | Mod: 26,HCNC,, | Performed by: PATHOLOGY

## 2019-05-09 PROCEDURE — 84165 PATHOLOGIST INTERPRETATION SPE: ICD-10-PCS | Mod: 26,HCNC,, | Performed by: PATHOLOGY

## 2019-05-09 PROCEDURE — 86334 IMMUNOFIX E-PHORESIS SERUM: CPT | Mod: HCNC

## 2019-05-10 LAB
ALBUMIN SERPL ELPH-MCNC: 3.3 G/DL (ref 3.35–5.55)
ALPHA1 GLOB SERPL ELPH-MCNC: 0.27 G/DL (ref 0.17–0.41)
ALPHA2 GLOB SERPL ELPH-MCNC: 0.67 G/DL (ref 0.43–0.99)
B-GLOBULIN SERPL ELPH-MCNC: 1.6 G/DL (ref 0.5–1.1)
GAMMA GLOB SERPL ELPH-MCNC: 0.26 G/DL (ref 0.67–1.58)
INTERPRETATION SERPL IFE-IMP: NORMAL
KAPPA LC SER QL IA: 1.25 MG/DL (ref 0.33–1.94)
KAPPA LC/LAMBDA SER IA: 0.74 (ref 0.26–1.65)
LAMBDA LC SER QL IA: 1.69 MG/DL (ref 0.57–2.63)
PATHOLOGIST INTERPRETATION IFE: NORMAL
PATHOLOGIST INTERPRETATION SPE: NORMAL
PROT SERPL-MCNC: 6.1 G/DL (ref 6–8.4)

## 2019-05-16 ENCOUNTER — OFFICE VISIT (OUTPATIENT)
Dept: HEMATOLOGY/ONCOLOGY | Facility: CLINIC | Age: 79
End: 2019-05-16
Payer: MEDICARE

## 2019-05-16 VITALS
SYSTOLIC BLOOD PRESSURE: 110 MMHG | BODY MASS INDEX: 23.83 KG/M2 | DIASTOLIC BLOOD PRESSURE: 63 MMHG | WEIGHT: 166.44 LBS | HEART RATE: 58 BPM | TEMPERATURE: 98 F | OXYGEN SATURATION: 97 % | HEIGHT: 70 IN

## 2019-05-16 DIAGNOSIS — C88.0 WALDENSTROM MACROGLOBULINEMIA: Primary | ICD-10-CM

## 2019-05-16 DIAGNOSIS — Z85.048 HISTORY OF RECTAL CANCER: ICD-10-CM

## 2019-05-16 DIAGNOSIS — D51.8 OTHER VITAMIN B12 DEFICIENCY ANEMIA: ICD-10-CM

## 2019-05-16 DIAGNOSIS — K76.0 FATTY LIVER: ICD-10-CM

## 2019-05-16 PROCEDURE — 99499 UNLISTED E&M SERVICE: CPT | Mod: HCNC,S$GLB,, | Performed by: INTERNAL MEDICINE

## 2019-05-16 PROCEDURE — 3074F SYST BP LT 130 MM HG: CPT | Mod: HCNC,CPTII,S$GLB, | Performed by: INTERNAL MEDICINE

## 2019-05-16 PROCEDURE — 3078F DIAST BP <80 MM HG: CPT | Mod: HCNC,CPTII,S$GLB, | Performed by: INTERNAL MEDICINE

## 2019-05-16 PROCEDURE — 99999 PR PBB SHADOW E&M-EST. PATIENT-LVL III: CPT | Mod: PBBFAC,HCNC,, | Performed by: INTERNAL MEDICINE

## 2019-05-16 PROCEDURE — 1101F PT FALLS ASSESS-DOCD LE1/YR: CPT | Mod: HCNC,CPTII,S$GLB, | Performed by: INTERNAL MEDICINE

## 2019-05-16 PROCEDURE — 1101F PR PT FALLS ASSESS DOC 0-1 FALLS W/OUT INJ PAST YR: ICD-10-PCS | Mod: HCNC,CPTII,S$GLB, | Performed by: INTERNAL MEDICINE

## 2019-05-16 PROCEDURE — 99999 PR PBB SHADOW E&M-EST. PATIENT-LVL III: ICD-10-PCS | Mod: PBBFAC,HCNC,, | Performed by: INTERNAL MEDICINE

## 2019-05-16 PROCEDURE — 3074F PR MOST RECENT SYSTOLIC BLOOD PRESSURE < 130 MM HG: ICD-10-PCS | Mod: HCNC,CPTII,S$GLB, | Performed by: INTERNAL MEDICINE

## 2019-05-16 PROCEDURE — 99214 PR OFFICE/OUTPT VISIT, EST, LEVL IV, 30-39 MIN: ICD-10-PCS | Mod: HCNC,S$GLB,, | Performed by: INTERNAL MEDICINE

## 2019-05-16 PROCEDURE — 99214 OFFICE O/P EST MOD 30 MIN: CPT | Mod: HCNC,S$GLB,, | Performed by: INTERNAL MEDICINE

## 2019-05-16 PROCEDURE — 99499 RISK ADDL DX/OHS AUDIT: ICD-10-PCS | Mod: HCNC,S$GLB,, | Performed by: INTERNAL MEDICINE

## 2019-05-16 PROCEDURE — 3078F PR MOST RECENT DIASTOLIC BLOOD PRESSURE < 80 MM HG: ICD-10-PCS | Mod: HCNC,CPTII,S$GLB, | Performed by: INTERNAL MEDICINE

## 2019-05-16 NOTE — PROGRESS NOTES
Subjective:      Patient ID: Homero Tanner is a 78 y.o. male.    Chief Complaint: Lymphoma    The patient is a 78-year-old  male who presents to the Hematology Oncology Clinic today to discuss further evaluation and management recommendations for Waldenstrom's macroglobulinemia.   I have reviewed all of the patient's relevant clinical history available in the medical record and have utilized this in my evaluation and management recommendations today.  Today the patient reports that he has been having chronic fatigue and edema in his legs. His fatigue is improved since being off treatment.  He continues with follow-up with Dr. Wright with Cardiology. He has been taking his monthly vitamin B12 injections. He is off iron supplementation at this time for his history of iron deficiency anemia which has since resolved.  He is on intermittent ADT for biochemical recurrence of prostate carcinoma and is followed by a urologist outside of the [facility].  His rectal cancer is in remission at this time.  He was on velcade/rituxan/dexamethasone for treatment of Waldenstrom's macroglobulinemia with his last treatment given in Oct 2018.  He has initiated rituxan maintenance and is s/p cycle 1 in Jan 2019. He is s/p 2 cycles at this time.  The patient is CT imaging has shown evidence of aortic atherosclerosis and bronchiectasis which is being monitored at this time.    Cancer   Pertinent negatives include no abdominal pain, arthralgias, chest pain, chills, congestion, coughing, diaphoresis, fatigue, fever, headaches, joint swelling, myalgias, nausea, rash, sore throat, vomiting or weakness.     Review of Systems   Constitutional: Negative for activity change, appetite change, chills, diaphoresis, fatigue, fever and unexpected weight change.   HENT: Negative for congestion, dental problem, ear pain, mouth sores, nosebleeds, postnasal drip, sinus pressure, sore throat, tinnitus, trouble swallowing and voice  change.    Eyes: Negative for photophobia, pain, discharge, redness, itching and visual disturbance.   Respiratory: Negative for cough, chest tightness, shortness of breath, wheezing and stridor.    Cardiovascular: Negative for chest pain, palpitations and leg swelling.   Gastrointestinal: Negative for abdominal distention, abdominal pain, anal bleeding, blood in stool, constipation, diarrhea, nausea and vomiting.   Endocrine: Negative for cold intolerance, heat intolerance, polydipsia, polyphagia and polyuria.   Genitourinary: Negative for decreased urine volume, difficulty urinating, dysuria, flank pain, frequency, hematuria and urgency.   Musculoskeletal: Negative for arthralgias, back pain, gait problem, joint swelling and myalgias.   Skin: Negative for pallor and rash.   Allergic/Immunologic: Negative for immunocompromised state.   Neurological: Negative for dizziness, syncope, weakness, light-headedness and headaches.   Hematological: Negative for adenopathy. Does not bruise/bleed easily.   Psychiatric/Behavioral: Negative for agitation and confusion. The patient is nervous/anxious.        Medication List with Changes/Refills   Current Medications    ASPIRIN (ECOTRIN) 81 MG EC TABLET    Take 81 mg by mouth once daily.    B COMPLEX VITAMINS TABLET    Take by mouth. 1 tablet Oral Every day    CLOBETASOL (TEMOVATE) 0.05 % EXTERNAL SOLUTION    AAA of scalp 1-2 times daily prn flares. Strong steroid- do not apply to face or folds of skin    CYANOCOBALAMIN 1,000 MCG/ML INJECTION    Give 1cc under the skin once a month    FUROSEMIDE (LASIX) 40 MG TABLET    Take 1 tablet (40 mg total) by mouth once daily.    LEUPROLIDE (LUPRON) 3.75 MG INJECTION    Inject 3.75 mg into the muscle every 3 (three) months.     METOPROLOL TARTRATE (LOPRESSOR) 100 MG TABLET    TAKE 1 TABLET(100 MG) BY MOUTH TWICE DAILY    METRONIDAZOLE 0.75% (METROCREAM) 0.75 % CREA    AAA of face bid    OMEPRAZOLE (PRILOSEC) 20 MG CAPSULE    TAKE 1  CAPSULE(20 MG) BY MOUTH EVERY DAY     Review of patient's allergies indicates:   Allergen Reactions    Lipitor [atorvastatin] Other (See Comments)    Norvasc [amlodipine] Swelling       Lab: I have reviewed all of the patient's relevant lab work available in the medical record and have utilized this in my evaluation and management recommendations today    Imaging: I have reviewed all of the patient's diagnostic/imaging results available in the medical record and have utilized this in my evaluation and management recommendations today.      Objective:     Vitals:    05/16/19 1030   BP: 110/63   Pulse: (!) 58   Temp: 98 °F (36.7 °C)       Physical Exam   Constitutional: He is oriented to person, place, and time. He appears well-developed and well-nourished. No distress.   HENT:   Head: Normocephalic and atraumatic.   Nose: Nose normal.   Mouth/Throat: Oropharynx is clear and moist. No oropharyngeal exudate.   Eyes: Pupils are equal, round, and reactive to light. EOM are normal. No scleral icterus.   Neck: Normal range of motion. Neck supple. No tracheal deviation present. No thyromegaly present.   Cardiovascular: Normal rate, regular rhythm, normal heart sounds and intact distal pulses.   No murmur heard.  Pulmonary/Chest: Effort normal and breath sounds normal. No stridor. No respiratory distress. He has no wheezes. He has no rales. He exhibits no tenderness.   Abdominal: Soft. Bowel sounds are normal. He exhibits no distension and no mass. There is no tenderness. There is no rebound and no guarding.   Musculoskeletal: Normal range of motion. He exhibits no edema or tenderness.   Lymphadenopathy:     He has no cervical adenopathy.   Neurological: He is alert and oriented to person, place, and time. Coordination normal.   Skin: Skin is warm. No rash noted. He is not diaphoretic. No erythema.   Psychiatric: He has a normal mood and affect. His behavior is normal. Judgment and thought content normal.   Nursing note  and vitals reviewed.      Assessment:     1. Waldenstrom's macroglobulinemia  2.  Elevated LFTs/elevated bilirubin - improved  3.  Elevated creatinine - stable/improved  4.  Generalized weakness/fatigue - improved  5.  History of Alcohol abuse    Plan:   1.  I had a detailed discussion with the patient previously with regard to the results of his bone marrow aspiration biopsy done in July 2018 as well as the results of all of his lab work done in August 2018.  At this time the patient has Waldenstrom's macroglobulinemia.  His IgM quantitative level was initially greater than 4 g.  2.  I had a detailed discussion with him previously with regard to the diagnosis, prognosis and treatment of this malignancy.  We discussed the indications to begin treatment.  I was concerned about possible liver involvement by his lymphoma because of his recently abnormal/worsening LFTs.  I was also concerned about possible renal involvement because of his elevated creatinine with monoclonal chain noted in UPEP immunofixation.  3.  We discussed that his treatment options are complicated because of the above.  I have previously discussed that the best course of action would be to start him on Velcade/dexamethasone/Rituxan.  I decided to hold off on initiation of Rituxan until we get his IgM quantitative level to less than 4 g.  Therefore he was initially started on twice weekly Velcade and weekly dexamethasone at 20 mg p.o. once daily.  We decided to re-evaluate our treatment options based on clinical response and based on how he tolerates this treatment. Velcade was dose adjusted for liver function. Informed consent taken.    4.  All necessary prescriptions including for herpes zoster prophylaxis have been sent to the patient's pharmacy previously and the patient reports compliance with this medication.  5. I have previously strongly recommended cessation of alcohol use.  He reports that he continues to drink 1 drink a day.  6.  I had a  detailed discussion with the patient today with regard to the results of all of his recent lab work which shows excellent response to ongoing treatment with interval significant decrease in IgM quantitative level.  I have also previously added IV Rituxan to his treatment.  The rationale for this was discussed in detail and he expressed understanding.  We have decided to initiate Rituxan starting with cycle 3 of Velcade.  I have since transitioned Velcade to once weekly.  7. I have previously discussed with the patient with regard to my concerns with regard to his combined liver/kidney dysfunction.  I am concerned about my ability to continue treatment safely.  At this time I will continue to hold Velcade and dexamethasone.  He has previously completed 4 weekly doses of rituxan in Oct 2018.   8. At this time we have decided to proceed with maintenance rituxan q12 weeks x 4 doses starting in Jan 2019. All labs from May 2019 reviewed and look stable/improved suggesting good control of his malignancy.  He did proceed with cycle 2/4 of maintenance Rituxan in April 2019.    Follow-up in 2 month with labs 1 week before clinic visit for cycle 3/4 of maintenance rituxan. He knows to call sooner for any additional questions or new problems.

## 2019-06-16 DIAGNOSIS — L71.9 ROSACEA: ICD-10-CM

## 2019-06-17 RX ORDER — METOPROLOL TARTRATE 100 MG/1
TABLET ORAL
Qty: 180 TABLET | Refills: 3 | Status: SHIPPED | OUTPATIENT
Start: 2019-06-17 | End: 2020-06-29 | Stop reason: SDUPTHER

## 2019-06-17 RX ORDER — METRONIDAZOLE 7.5 MG/G
CREAM TOPICAL
Qty: 45 G | Refills: 3 | Status: SHIPPED | OUTPATIENT
Start: 2019-06-17 | End: 2020-03-30 | Stop reason: SDUPTHER

## 2019-07-08 ENCOUNTER — TELEPHONE (OUTPATIENT)
Dept: HEMATOLOGY/ONCOLOGY | Facility: CLINIC | Age: 79
End: 2019-07-08

## 2019-07-08 DIAGNOSIS — C61 PROSTATE CANCER: Primary | ICD-10-CM

## 2019-07-12 ENCOUNTER — LAB VISIT (OUTPATIENT)
Dept: LAB | Facility: HOSPITAL | Age: 79
End: 2019-07-12
Attending: INTERNAL MEDICINE
Payer: MEDICARE

## 2019-07-12 DIAGNOSIS — C88.0 WALDENSTROM MACROGLOBULINEMIA: ICD-10-CM

## 2019-07-12 DIAGNOSIS — Z85.048 HISTORY OF RECTAL CANCER: ICD-10-CM

## 2019-07-12 DIAGNOSIS — C61 PROSTATE CANCER: ICD-10-CM

## 2019-07-12 DIAGNOSIS — D51.8 OTHER VITAMIN B12 DEFICIENCY ANEMIA: ICD-10-CM

## 2019-07-12 DIAGNOSIS — K76.0 FATTY LIVER: ICD-10-CM

## 2019-07-12 LAB
ALBUMIN SERPL BCP-MCNC: 3.6 G/DL (ref 3.5–5.2)
ALP SERPL-CCNC: 234 U/L (ref 55–135)
ALT SERPL W/O P-5'-P-CCNC: 20 U/L (ref 10–44)
ANION GAP SERPL CALC-SCNC: 13 MMOL/L (ref 8–16)
AST SERPL-CCNC: 29 U/L (ref 10–40)
BASOPHILS # BLD AUTO: 0.04 K/UL (ref 0–0.2)
BASOPHILS NFR BLD: 0.4 % (ref 0–1.9)
BILIRUB SERPL-MCNC: 0.7 MG/DL (ref 0.1–1)
BUN SERPL-MCNC: 29 MG/DL (ref 8–23)
CALCIUM SERPL-MCNC: 9.2 MG/DL (ref 8.7–10.5)
CHLORIDE SERPL-SCNC: 100 MMOL/L (ref 95–110)
CO2 SERPL-SCNC: 27 MMOL/L (ref 23–29)
COMPLEXED PSA SERPL-MCNC: 1.6 NG/ML (ref 0–4)
CREAT SERPL-MCNC: 1.4 MG/DL (ref 0.5–1.4)
CRP SERPL-MCNC: 2.7 MG/L (ref 0–8.2)
DIFFERENTIAL METHOD: ABNORMAL
EOSINOPHIL # BLD AUTO: 1.6 K/UL (ref 0–0.5)
EOSINOPHIL NFR BLD: 15.6 % (ref 0–8)
ERYTHROCYTE [DISTWIDTH] IN BLOOD BY AUTOMATED COUNT: 14.3 % (ref 11.5–14.5)
EST. GFR  (AFRICAN AMERICAN): 56 ML/MIN/1.73 M^2
EST. GFR  (NON AFRICAN AMERICAN): 48 ML/MIN/1.73 M^2
FERRITIN SERPL-MCNC: 22 NG/ML (ref 20–300)
GLUCOSE SERPL-MCNC: 89 MG/DL (ref 70–110)
HCT VFR BLD AUTO: 35.3 % (ref 40–54)
HGB BLD-MCNC: 11.2 G/DL (ref 14–18)
IGA SERPL-MCNC: 37 MG/DL (ref 40–350)
IGG SERPL-MCNC: 238 MG/DL (ref 650–1600)
IGM SERPL-MCNC: 1780 MG/DL (ref 50–300)
IRON SERPL-MCNC: 57 UG/DL (ref 45–160)
LYMPHOCYTES # BLD AUTO: 2.5 K/UL (ref 1–4.8)
LYMPHOCYTES NFR BLD: 24.6 % (ref 18–48)
MCH RBC QN AUTO: 27.9 PG (ref 27–31)
MCHC RBC AUTO-ENTMCNC: 31.7 G/DL (ref 32–36)
MCV RBC AUTO: 88 FL (ref 82–98)
MONOCYTES # BLD AUTO: 1.2 K/UL (ref 0.3–1)
MONOCYTES NFR BLD: 12.2 % (ref 4–15)
NEUTROPHILS # BLD AUTO: 4.7 K/UL (ref 1.8–7.7)
NEUTROPHILS NFR BLD: 47.2 % (ref 38–73)
PLATELET # BLD AUTO: 232 K/UL (ref 150–350)
PMV BLD AUTO: 10.3 FL (ref 9.2–12.9)
POTASSIUM SERPL-SCNC: 4.7 MMOL/L (ref 3.5–5.1)
PROT SERPL-MCNC: 7.2 G/DL (ref 6–8.4)
RBC # BLD AUTO: 4.01 M/UL (ref 4.6–6.2)
SATURATED IRON: 14 % (ref 20–50)
SODIUM SERPL-SCNC: 140 MMOL/L (ref 136–145)
TOTAL IRON BINDING CAPACITY: 417 UG/DL (ref 250–450)
TRANSFERRIN SERPL-MCNC: 282 MG/DL (ref 200–375)
WBC # BLD AUTO: 10 K/UL (ref 3.9–12.7)

## 2019-07-12 PROCEDURE — 83520 IMMUNOASSAY QUANT NOS NONAB: CPT | Mod: 59,HCNC

## 2019-07-12 PROCEDURE — 82728 ASSAY OF FERRITIN: CPT | Mod: HCNC

## 2019-07-12 PROCEDURE — 84153 ASSAY OF PSA TOTAL: CPT | Mod: HCNC

## 2019-07-12 PROCEDURE — 84165 PATHOLOGIST INTERPRETATION SPE: ICD-10-PCS | Mod: 26,HCNC,, | Performed by: PATHOLOGY

## 2019-07-12 PROCEDURE — 36415 COLL VENOUS BLD VENIPUNCTURE: CPT | Mod: HCNC

## 2019-07-12 PROCEDURE — 82784 ASSAY IGA/IGD/IGG/IGM EACH: CPT | Mod: HCNC

## 2019-07-12 PROCEDURE — 84165 PROTEIN E-PHORESIS SERUM: CPT | Mod: HCNC

## 2019-07-12 PROCEDURE — 83540 ASSAY OF IRON: CPT | Mod: HCNC

## 2019-07-12 PROCEDURE — 86140 C-REACTIVE PROTEIN: CPT | Mod: HCNC

## 2019-07-12 PROCEDURE — 84165 PROTEIN E-PHORESIS SERUM: CPT | Mod: 26,HCNC,, | Performed by: PATHOLOGY

## 2019-07-12 PROCEDURE — 85025 COMPLETE CBC W/AUTO DIFF WBC: CPT | Mod: HCNC

## 2019-07-12 PROCEDURE — 80053 COMPREHEN METABOLIC PANEL: CPT | Mod: HCNC

## 2019-07-15 LAB
ALBUMIN SERPL ELPH-MCNC: 3.82 G/DL (ref 3.35–5.55)
ALPHA1 GLOB SERPL ELPH-MCNC: 0.3 G/DL (ref 0.17–0.41)
ALPHA2 GLOB SERPL ELPH-MCNC: 0.79 G/DL (ref 0.43–0.99)
B-GLOBULIN SERPL ELPH-MCNC: 1.8 G/DL (ref 0.5–1.1)
GAMMA GLOB SERPL ELPH-MCNC: 0.29 G/DL (ref 0.67–1.58)
KAPPA LC SER QL IA: 1.61 MG/DL (ref 0.33–1.94)
KAPPA LC/LAMBDA SER IA: 0.88 (ref 0.26–1.65)
LAMBDA LC SER QL IA: 1.82 MG/DL (ref 0.57–2.63)
PATHOLOGIST INTERPRETATION SPE: NORMAL
PROT SERPL-MCNC: 7 G/DL (ref 6–8.4)

## 2019-07-18 ENCOUNTER — OFFICE VISIT (OUTPATIENT)
Dept: HEMATOLOGY/ONCOLOGY | Facility: CLINIC | Age: 79
End: 2019-07-18
Payer: MEDICARE

## 2019-07-18 VITALS
DIASTOLIC BLOOD PRESSURE: 68 MMHG | SYSTOLIC BLOOD PRESSURE: 126 MMHG | HEIGHT: 70 IN | BODY MASS INDEX: 24.05 KG/M2 | TEMPERATURE: 97 F | WEIGHT: 168 LBS | HEART RATE: 52 BPM | OXYGEN SATURATION: 98 %

## 2019-07-18 DIAGNOSIS — C61 PROSTATE CANCER: ICD-10-CM

## 2019-07-18 DIAGNOSIS — D51.8 OTHER VITAMIN B12 DEFICIENCY ANEMIA: ICD-10-CM

## 2019-07-18 DIAGNOSIS — C88.0 WALDENSTROM MACROGLOBULINEMIA: Primary | ICD-10-CM

## 2019-07-18 DIAGNOSIS — D50.9 IRON DEFICIENCY ANEMIA, UNSPECIFIED IRON DEFICIENCY ANEMIA TYPE: ICD-10-CM

## 2019-07-18 DIAGNOSIS — D50.0 IRON DEFICIENCY ANEMIA DUE TO CHRONIC BLOOD LOSS: ICD-10-CM

## 2019-07-18 PROCEDURE — 99999 PR PBB SHADOW E&M-EST. PATIENT-LVL III: ICD-10-PCS | Mod: PBBFAC,HCNC,, | Performed by: INTERNAL MEDICINE

## 2019-07-18 PROCEDURE — 99215 OFFICE O/P EST HI 40 MIN: CPT | Mod: 25,HCNC,S$GLB, | Performed by: INTERNAL MEDICINE

## 2019-07-18 PROCEDURE — 99499 UNLISTED E&M SERVICE: CPT | Mod: HCNC,S$GLB,, | Performed by: INTERNAL MEDICINE

## 2019-07-18 PROCEDURE — 99999 PR PBB SHADOW E&M-EST. PATIENT-LVL III: CPT | Mod: PBBFAC,HCNC,, | Performed by: INTERNAL MEDICINE

## 2019-07-18 PROCEDURE — 3078F DIAST BP <80 MM HG: CPT | Mod: HCNC,CPTII,S$GLB, | Performed by: INTERNAL MEDICINE

## 2019-07-18 PROCEDURE — 1101F PT FALLS ASSESS-DOCD LE1/YR: CPT | Mod: HCNC,CPTII,S$GLB, | Performed by: INTERNAL MEDICINE

## 2019-07-18 PROCEDURE — 3074F SYST BP LT 130 MM HG: CPT | Mod: HCNC,CPTII,S$GLB, | Performed by: INTERNAL MEDICINE

## 2019-07-18 PROCEDURE — 99215 PR OFFICE/OUTPT VISIT, EST, LEVL V, 40-54 MIN: ICD-10-PCS | Mod: 25,HCNC,S$GLB, | Performed by: INTERNAL MEDICINE

## 2019-07-18 PROCEDURE — 3078F PR MOST RECENT DIASTOLIC BLOOD PRESSURE < 80 MM HG: ICD-10-PCS | Mod: HCNC,CPTII,S$GLB, | Performed by: INTERNAL MEDICINE

## 2019-07-18 PROCEDURE — 3074F PR MOST RECENT SYSTOLIC BLOOD PRESSURE < 130 MM HG: ICD-10-PCS | Mod: HCNC,CPTII,S$GLB, | Performed by: INTERNAL MEDICINE

## 2019-07-18 PROCEDURE — 1101F PR PT FALLS ASSESS DOC 0-1 FALLS W/OUT INJ PAST YR: ICD-10-PCS | Mod: HCNC,CPTII,S$GLB, | Performed by: INTERNAL MEDICINE

## 2019-07-18 PROCEDURE — 99499 RISK ADDL DX/OHS AUDIT: ICD-10-PCS | Mod: HCNC,S$GLB,, | Performed by: INTERNAL MEDICINE

## 2019-07-18 RX ORDER — HEPARIN 100 UNIT/ML
500 SYRINGE INTRAVENOUS
Status: CANCELLED | OUTPATIENT
Start: 2019-07-31

## 2019-07-18 RX ORDER — SODIUM CHLORIDE 0.9 % (FLUSH) 0.9 %
10 SYRINGE (ML) INJECTION
Status: CANCELLED | OUTPATIENT
Start: 2019-07-18

## 2019-07-18 RX ORDER — ACETAMINOPHEN 325 MG/1
650 TABLET ORAL
Status: CANCELLED | OUTPATIENT
Start: 2019-07-18

## 2019-07-18 RX ORDER — SODIUM CHLORIDE 0.9 % (FLUSH) 0.9 %
10 SYRINGE (ML) INJECTION
Status: CANCELLED | OUTPATIENT
Start: 2019-07-31

## 2019-07-18 RX ORDER — SODIUM CHLORIDE 0.9 % (FLUSH) 0.9 %
10 SYRINGE (ML) INJECTION
Status: CANCELLED | OUTPATIENT
Start: 2019-07-22

## 2019-07-18 RX ORDER — FAMOTIDINE 10 MG/ML
20 INJECTION INTRAVENOUS
Status: CANCELLED | OUTPATIENT
Start: 2019-07-18

## 2019-07-18 RX ORDER — HEPARIN 100 UNIT/ML
500 SYRINGE INTRAVENOUS
Status: CANCELLED | OUTPATIENT
Start: 2019-07-22

## 2019-07-18 RX ORDER — HEPARIN 100 UNIT/ML
500 SYRINGE INTRAVENOUS
Status: CANCELLED | OUTPATIENT
Start: 2019-07-18

## 2019-07-18 RX ORDER — MEPERIDINE HYDROCHLORIDE 50 MG/ML
25 INJECTION INTRAMUSCULAR; INTRAVENOUS; SUBCUTANEOUS
Status: CANCELLED | OUTPATIENT
Start: 2019-07-18

## 2019-07-18 NOTE — PROGRESS NOTES
Subjective:      Patient ID: Homero Tanner is a 79 y.o. male.    Chief Complaint: Follow-up    The patient is a 79-year-old  male who presents to the Hematology Oncology Clinic today to discuss further evaluation and management recommendations for Waldenstrom's macroglobulinemia.   I have reviewed all of the patient's relevant clinical history available in the medical record and have utilized this in my evaluation and management recommendations today.  Today the patient reports that he has been having chronic fatigue and edema in his legs. His fatigue is improved since being off treatment.  He continues with follow-up with Dr. Wright with Cardiology. He has been taking his monthly vitamin B12 injections. He is off iron supplementation at this time for his history of iron deficiency anemia which has since resolved.  He is on intermittent ADT for biochemical recurrence of prostate carcinoma and is followed by a urologist outside of the [facility].  His rectal cancer is in remission at this time.  He was on velcade/rituxan/dexamethasone for treatment of Waldenstrom's macroglobulinemia with his last treatment given in Oct 2018.  He has initiated rituxan maintenance and is s/p cycle 1 in Jan 2019. He is s/p 2 cycles at this time.  The patient is CT imaging has shown evidence of aortic atherosclerosis and bronchiectasis which is being monitored at this time.    Cancer   Pertinent negatives include no abdominal pain, arthralgias, chest pain, chills, congestion, coughing, diaphoresis, fatigue, fever, headaches, joint swelling, myalgias, nausea, rash, sore throat, vomiting or weakness.     Review of Systems   Constitutional: Negative for activity change, appetite change, chills, diaphoresis, fatigue, fever and unexpected weight change.   HENT: Negative for congestion, dental problem, ear pain, mouth sores, nosebleeds, postnasal drip, sinus pressure, sore throat, tinnitus, trouble swallowing and voice  change.    Eyes: Negative for photophobia, pain, discharge, redness, itching and visual disturbance.   Respiratory: Negative for cough, chest tightness, shortness of breath, wheezing and stridor.    Cardiovascular: Negative for chest pain, palpitations and leg swelling.   Gastrointestinal: Negative for abdominal distention, abdominal pain, anal bleeding, blood in stool, constipation, diarrhea, nausea and vomiting.   Endocrine: Negative for cold intolerance, heat intolerance, polydipsia, polyphagia and polyuria.   Genitourinary: Negative for decreased urine volume, difficulty urinating, dysuria, flank pain, frequency, hematuria and urgency.   Musculoskeletal: Negative for arthralgias, back pain, gait problem, joint swelling and myalgias.   Skin: Negative for pallor and rash.   Allergic/Immunologic: Negative for immunocompromised state.   Neurological: Negative for dizziness, syncope, weakness, light-headedness and headaches.   Hematological: Negative for adenopathy. Does not bruise/bleed easily.   Psychiatric/Behavioral: Negative for agitation and confusion. The patient is nervous/anxious.        Medication List with Changes/Refills   Current Medications    ASPIRIN (ECOTRIN) 81 MG EC TABLET    Take 81 mg by mouth once daily.    B COMPLEX VITAMINS TABLET    Take by mouth. 1 tablet Oral Every day    CLOBETASOL (TEMOVATE) 0.05 % EXTERNAL SOLUTION    AAA of scalp 1-2 times daily prn flares. Strong steroid- do not apply to face or folds of skin    CYANOCOBALAMIN 1,000 MCG/ML INJECTION    Give 1cc under the skin once a month    FUROSEMIDE (LASIX) 40 MG TABLET    Take 1 tablet (40 mg total) by mouth once daily.    LEUPROLIDE (LUPRON) 3.75 MG INJECTION    Inject 3.75 mg into the muscle every 3 (three) months.     METOPROLOL TARTRATE (LOPRESSOR) 100 MG TABLET    TAKE 1 TABLET(100 MG) BY MOUTH TWICE DAILY    METRONIDAZOLE 0.75% (METROCREAM) 0.75 % CREA    APPLY TO AFFECTED AREA ON FACE TWICE DAILY    OMEPRAZOLE (PRILOSEC) 20  MG CAPSULE    TAKE 1 CAPSULE(20 MG) BY MOUTH EVERY DAY     Review of patient's allergies indicates:   Allergen Reactions    Lipitor [atorvastatin] Other (See Comments)    Norvasc [amlodipine] Swelling       Lab: I have reviewed all of the patient's relevant lab work available in the medical record and have utilized this in my evaluation and management recommendations today    Imaging: I have reviewed all of the patient's diagnostic/imaging results available in the medical record and have utilized this in my evaluation and management recommendations today.      Objective:     Vitals:    07/18/19 0802   BP: 126/68   Pulse: (!) 52   Temp: 97 °F (36.1 °C)       Physical Exam   Constitutional: He is oriented to person, place, and time. He appears well-developed and well-nourished. No distress.   HENT:   Head: Normocephalic and atraumatic.   Nose: Nose normal.   Mouth/Throat: Oropharynx is clear and moist. No oropharyngeal exudate.   Eyes: Pupils are equal, round, and reactive to light. EOM are normal. No scleral icterus.   Neck: Normal range of motion. Neck supple. No tracheal deviation present. No thyromegaly present.   Cardiovascular: Normal rate, regular rhythm, normal heart sounds and intact distal pulses.   No murmur heard.  Pulmonary/Chest: Effort normal and breath sounds normal. No stridor. No respiratory distress. He has no wheezes. He has no rales. He exhibits no tenderness.   Abdominal: Soft. Bowel sounds are normal. He exhibits no distension and no mass. There is no tenderness. There is no rebound and no guarding.   Musculoskeletal: Normal range of motion. He exhibits no edema or tenderness.   Lymphadenopathy:     He has no cervical adenopathy.   Neurological: He is alert and oriented to person, place, and time. Coordination normal.   Skin: Skin is warm. No rash noted. He is not diaphoretic. No erythema.   Psychiatric: He has a normal mood and affect. His behavior is normal. Judgment and thought content  normal.   Nursing note and vitals reviewed.      Assessment:     1. Waldenstrom's macroglobulinemia  2.  Elevated LFTs/elevated bilirubin - improved  3.  Elevated creatinine - stable/improved  4.  Generalized weakness/fatigue - improved  5.  History of Alcohol abuse    Plan:   1.  I had a detailed discussion with the patient previously with regard to the results of his bone marrow aspiration biopsy done in July 2018 as well as the results of all of his lab work done in August 2018.  At this time the patient has Waldenstrom's macroglobulinemia.  His IgM quantitative level was initially greater than 4 g.  2.  I had a detailed discussion with him previously with regard to the diagnosis, prognosis and treatment of this malignancy.  We discussed the indications to begin treatment.  I was concerned about possible liver involvement by his lymphoma because of his recently abnormal/worsening LFTs.  I was also concerned about possible renal involvement because of his elevated creatinine with monoclonal chain noted in UPEP immunofixation.  3.  We discussed that his treatment options are complicated because of the above.  I have previously discussed that the best course of action would be to start him on Velcade/dexamethasone/Rituxan.  I decided to hold off on initiation of Rituxan until we get his IgM quantitative level to less than 4 g.  Therefore he was initially started on twice weekly Velcade and weekly dexamethasone at 20 mg p.o. once daily.  We decided to re-evaluate our treatment options based on clinical response and based on how he tolerates this treatment. Velcade was dose adjusted for liver function. Informed consent taken.    4.  All necessary prescriptions including for herpes zoster prophylaxis have been sent to the patient's pharmacy previously and the patient reports compliance with this medication.  5. I have previously strongly recommended cessation of alcohol use.  He reports that he continues to drink 1  drink a day.  6.  I had a detailed discussion with the patient today with regard to the results of all of his recent lab work which shows excellent response to ongoing treatment with interval significant decrease in IgM quantitative level.  I have also previously added IV Rituxan to his treatment.  The rationale for this was discussed in detail and he expressed understanding.  We have decided to initiate Rituxan starting with cycle 3 of Velcade.  I have since transitioned Velcade to once weekly.  7. I have previously discussed with the patient with regard to my concerns with regard to his combined liver/kidney dysfunction.  I am concerned about my ability to continue treatment safely.  At this time I will continue to hold Velcade and dexamethasone.  He has previously completed 4 weekly doses of rituxan in Oct 2018.   8. At this time we have decided to proceed with maintenance rituxan q12 weeks x 4 doses starting in Jan 2019. All labs from July 2019 reviewed and look stable/improved suggesting good control of his malignancy.  He will proceed with cycle 3/4 of maintenance Rituxan in July 2019.  9. He will be scheduled for 2 weekly doses of IV injectafer for treatment of iron deficiency. I will have him follow up with GI to discuss possible EGD/small bowel video capsule. He is uptodate with colonoscopy.    Follow-up in 3 months with labs 1 week before clinic visit for cycle 4/4 of maintenance rituxan. He knows to call sooner for any additional questions or new problems.

## 2019-07-23 ENCOUNTER — INFUSION (OUTPATIENT)
Dept: INFUSION THERAPY | Facility: HOSPITAL | Age: 79
End: 2019-07-23
Attending: INTERNAL MEDICINE
Payer: MEDICARE

## 2019-07-23 VITALS
OXYGEN SATURATION: 96 % | TEMPERATURE: 97 F | DIASTOLIC BLOOD PRESSURE: 66 MMHG | HEART RATE: 52 BPM | RESPIRATION RATE: 20 BRPM | SYSTOLIC BLOOD PRESSURE: 124 MMHG

## 2019-07-23 DIAGNOSIS — C88.0 WALDENSTROM'S MACROGLOBULINEMIA: Primary | ICD-10-CM

## 2019-07-23 PROCEDURE — 25000003 PHARM REV CODE 250: Mod: HCNC | Performed by: INTERNAL MEDICINE

## 2019-07-23 PROCEDURE — 96415 CHEMO IV INFUSION ADDL HR: CPT | Mod: HCNC

## 2019-07-23 PROCEDURE — S0028 INJECTION, FAMOTIDINE, 20 MG: HCPCS | Mod: HCNC | Performed by: INTERNAL MEDICINE

## 2019-07-23 PROCEDURE — 96413 CHEMO IV INFUSION 1 HR: CPT | Mod: HCNC

## 2019-07-23 PROCEDURE — 96367 TX/PROPH/DG ADDL SEQ IV INF: CPT | Mod: HCNC

## 2019-07-23 PROCEDURE — 63600175 PHARM REV CODE 636 W HCPCS: Mod: HCNC | Performed by: INTERNAL MEDICINE

## 2019-07-23 PROCEDURE — 96375 TX/PRO/DX INJ NEW DRUG ADDON: CPT | Mod: HCNC

## 2019-07-23 RX ORDER — ACETAMINOPHEN 325 MG/1
650 TABLET ORAL
Status: COMPLETED | OUTPATIENT
Start: 2019-07-23 | End: 2019-07-23

## 2019-07-23 RX ORDER — FAMOTIDINE 10 MG/ML
20 INJECTION INTRAVENOUS
Status: COMPLETED | OUTPATIENT
Start: 2019-07-23 | End: 2019-07-23

## 2019-07-23 RX ADMIN — SODIUM CHLORIDE: 0.9 INJECTION, SOLUTION INTRAVENOUS at 09:07

## 2019-07-23 RX ADMIN — DIPHENHYDRAMINE HYDROCHLORIDE 50 MG: 50 INJECTION, SOLUTION INTRAMUSCULAR; INTRAVENOUS at 09:07

## 2019-07-23 RX ADMIN — ACETAMINOPHEN 650 MG: 325 TABLET ORAL at 09:07

## 2019-07-23 RX ADMIN — FAMOTIDINE 20 MG: 10 INJECTION, SOLUTION INTRAVENOUS at 09:07

## 2019-07-23 RX ADMIN — RITUXIMAB 728 MG: 10 INJECTION, SOLUTION INTRAVENOUS at 09:07

## 2019-07-23 NOTE — PLAN OF CARE
Problem: Adult Inpatient Plan of Care  Goal: Plan of Care Review  Outcome: Ongoing (interventions implemented as appropriate)  Pr stated feeling weak and tired

## 2019-07-23 NOTE — DISCHARGE INSTRUCTIONS
[facility]  [address] [facility]  [address]  [phone] phone     [phone] fax  Hours of Operation: Monday- Friday 8:00am- 5:00pm  After hours phone  [phone]  Hematology / Oncology Physicians on call      Dr. Bridger Duke      Please call with any concerns regarding your appointment today.    FALL PREVENTION   Falls often occur due to slipping, tripping or losing your balance. Here are ways to reduce your risk of falling again.   Was there anything that caused your fall that can be fixed, removed or replaced?   Make your home safe by keeping walkways clear of objects you may trip over.   Use non-slip pads under rugs.   Do not walk in poorly lit areas.   Do not stand on chairs or wobbly ladders.   Use caution when reaching overhead or looking upward. This position can cause a loss of balance.   Be sure your shoes fit properly, have non-slip bottoms and are in good condition.   Be cautious when going up and down stairs, curbs, and when walking on uneven sidewalks.   If your balance is poor, consider using a cane or walker.   If your fall was related to alcohol use, stop or limit alcohol intake.   If your fall was related to use of sleeping medicines, talk to your doctor about this. You may need to reduce your dosage at bedtime if you awaken during the night to go to the bathroom.   To reduce the need for nighttime bathroom trips:   Avoid drinking fluids for several hours before going to bed   Empty your bladder before going to bed   Men can keep a urinal at the bedside   © 2402-6433 [employer], [address] [place] [address]. All rights reserved. This information is not intended as a substitute for professional medical care. Always follow your healthcare professional's instructions.  FALL PREVENTION   Falls often occur due to slipping, tripping or losing your balance. Here are ways to reduce your  risk of falling again.   Was there anything that caused your fall that can be fixed, removed or replaced?   Make your home safe by keeping walkways clear of objects you may trip over.   Use non-slip pads under rugs.   Do not walk in poorly lit areas.   Do not stand on chairs or wobbly ladders.   Use caution when reaching overhead or looking upward. This position can cause a loss of balance.   Be sure your shoes fit properly, have non-slip bottoms and are in good condition.   Be cautious when going up and down stairs, curbs, and when walking on uneven sidewalks.   If your balance is poor, consider using a cane or walker.   If your fall was related to alcohol use, stop or limit alcohol intake.   If your fall was related to use of sleeping medicines, talk to your doctor about this. You may need to reduce your dosage at bedtime if you awaken during the night to go to the bathroom.   To reduce the need for nighttime bathroom trips:   Avoid drinking fluids for several hours before going to bed   Empty your bladder before going to bed   Men can keep a urinal at the bedside   © 1733-1716 [facility], [address] [place] [address]. All rights reserved. This information is not intended as a substitute for professional medical care. Always follow your healthcare professional's instructions.  FALL PREVENTION   Falls often occur due to slipping, tripping or losing your balance. Here are ways to reduce your risk of falling again.   Was there anything that caused your fall that can be fixed, removed or replaced?   Make your home safe by keeping walkways clear of objects you may trip over.   Use non-slip pads under rugs.   Do not walk in poorly lit areas.   Do not stand on chairs or wobbly ladders.   Use caution when reaching overhead or looking upward. This position can cause a loss of balance.   Be sure your shoes fit properly, have non-slip bottoms and are in good condition.   Be cautious when going up and down  stairs, curbs, and when walking on uneven sidewalks.   If your balance is poor, consider using a cane or walker.   If your fall was related to alcohol use, stop or limit alcohol intake.   If your fall was related to use of sleeping medicines, talk to your doctor about this. You may need to reduce your dosage at bedtime if you awaken during the night to go to the bathroom.   To reduce the need for nighttime bathroom trips:   Avoid drinking fluids for several hours before going to bed   Empty your bladder before going to bed   Men can keep a urinal at the bedside   © 5083-8452 [employer] [facility], [address] [place] [address], [place] [address]. All rights reserved. This information is not intended as a substitute for professional medical care. Always follow your healthcare professional's instructions.

## 2019-07-30 ENCOUNTER — INFUSION (OUTPATIENT)
Dept: INFUSION THERAPY | Facility: HOSPITAL | Age: 79
End: 2019-07-30
Attending: INTERNAL MEDICINE
Payer: MEDICARE

## 2019-07-30 ENCOUNTER — OFFICE VISIT (OUTPATIENT)
Dept: GASTROENTEROLOGY | Facility: CLINIC | Age: 79
End: 2019-07-30
Payer: MEDICARE

## 2019-07-30 VITALS
WEIGHT: 171.31 LBS | HEIGHT: 70 IN | DIASTOLIC BLOOD PRESSURE: 80 MMHG | BODY MASS INDEX: 24.52 KG/M2 | HEART RATE: 40 BPM | SYSTOLIC BLOOD PRESSURE: 142 MMHG

## 2019-07-30 VITALS
TEMPERATURE: 97 F | SYSTOLIC BLOOD PRESSURE: 199 MMHG | OXYGEN SATURATION: 96 % | DIASTOLIC BLOOD PRESSURE: 73 MMHG | HEART RATE: 45 BPM

## 2019-07-30 DIAGNOSIS — D50.9 IRON DEFICIENCY ANEMIA, UNSPECIFIED IRON DEFICIENCY ANEMIA TYPE: Primary | ICD-10-CM

## 2019-07-30 PROCEDURE — 1101F PT FALLS ASSESS-DOCD LE1/YR: CPT | Mod: HCNC,CPTII,S$GLB, | Performed by: PHYSICIAN ASSISTANT

## 2019-07-30 PROCEDURE — 3077F PR MOST RECENT SYSTOLIC BLOOD PRESSURE >= 140 MM HG: ICD-10-PCS | Mod: HCNC,CPTII,S$GLB, | Performed by: PHYSICIAN ASSISTANT

## 2019-07-30 PROCEDURE — 3079F DIAST BP 80-89 MM HG: CPT | Mod: HCNC,CPTII,S$GLB, | Performed by: PHYSICIAN ASSISTANT

## 2019-07-30 PROCEDURE — 99214 PR OFFICE/OUTPT VISIT, EST, LEVL IV, 30-39 MIN: ICD-10-PCS | Mod: HCNC,S$GLB,, | Performed by: PHYSICIAN ASSISTANT

## 2019-07-30 PROCEDURE — 99999 PR PBB SHADOW E&M-EST. PATIENT-LVL III: CPT | Mod: PBBFAC,HCNC,, | Performed by: PHYSICIAN ASSISTANT

## 2019-07-30 PROCEDURE — 99999 PR PBB SHADOW E&M-EST. PATIENT-LVL III: ICD-10-PCS | Mod: PBBFAC,HCNC,, | Performed by: PHYSICIAN ASSISTANT

## 2019-07-30 PROCEDURE — 96365 THER/PROPH/DIAG IV INF INIT: CPT | Mod: HCNC

## 2019-07-30 PROCEDURE — 3077F SYST BP >= 140 MM HG: CPT | Mod: HCNC,CPTII,S$GLB, | Performed by: PHYSICIAN ASSISTANT

## 2019-07-30 PROCEDURE — 63600175 PHARM REV CODE 636 W HCPCS: Mod: HCNC,JG | Performed by: INTERNAL MEDICINE

## 2019-07-30 PROCEDURE — 99214 OFFICE O/P EST MOD 30 MIN: CPT | Mod: HCNC,S$GLB,, | Performed by: PHYSICIAN ASSISTANT

## 2019-07-30 PROCEDURE — 3079F PR MOST RECENT DIASTOLIC BLOOD PRESSURE 80-89 MM HG: ICD-10-PCS | Mod: HCNC,CPTII,S$GLB, | Performed by: PHYSICIAN ASSISTANT

## 2019-07-30 PROCEDURE — 1101F PR PT FALLS ASSESS DOC 0-1 FALLS W/OUT INJ PAST YR: ICD-10-PCS | Mod: HCNC,CPTII,S$GLB, | Performed by: PHYSICIAN ASSISTANT

## 2019-07-30 RX ADMIN — SODIUM CHLORIDE: 9 INJECTION, SOLUTION INTRAVENOUS at 10:07

## 2019-07-30 RX ADMIN — FERRIC CARBOXYMALTOSE INJECTION 750 MG: 50 INJECTION, SOLUTION INTRAVENOUS at 10:07

## 2019-07-30 NOTE — PATIENT INSTRUCTIONS
Ferric carboxymaltose injection  What is this medicine?  FERRIC CARBOXYMALTOSE (ferr-ik car-box-ee-mol-toes) is an iron complex. Iron is used to make healthy red blood cells, which carry oxygen and nutrients throughout the body. This medicine is used to treat anemia in people with chronic kidney disease or people who cannot take iron by mouth.  How should I use this medicine?  This medicine is for infusion into a vein. It is given by a health care professional in a hospital or clinic setting.  Talk to your pediatrician regarding the use of this medicine in children. Special care may be needed.  What side effects may I notice from receiving this medicine?  Side effects that you should report to your doctor or health care professional as soon as possible:  · allergic reactions like skin rash, itching or hives, swelling of the face, lips, or tongue -breathing problems  · changes in blood pressure  · feeling faint or lightheaded, falls  · flushing, sweating, or hot feelings  Side effects that usually do not require medical attention (Report these to your doctor or health care professional if they continue or are bothersome.):  · changes in taste  · constipation  · dizziness  · headache  · nausea  · pain, redness, or irritation at site where injected  · vomiting  What may interact with this medicine?  Do not take this medicine with any of the following medications:  · deferoxamine  · dimercaprol  · other iron products  This medicine may also interact with the following medications:  · chloramphenicol  · deferasirox  What if I miss a dose?  It is important not to miss your dose. Call your doctor or health care professional if you are unable to keep an appointment.  Where should I keep my medicine?  This drug is given in a hospital or clinic and will not be stored at home.  What should I tell my health care provider before I take this medicine?  They need to know if you have any of these conditions:  · anemia not caused  by low iron levels  · high levels of iron in the blood  · liver disease  · an unusual or allergic reaction to iron, other medicines, foods, dyes, or preservatives  · pregnant or trying to get pregnant  · breast-feeding  What should I watch for while using this medicine?  Visit your doctor or health care professional regularly. Tell your doctor if your symptoms do not start to get better or if they get worse. You may need blood work done while you are taking this medicine.  You may need to follow a special diet. Talk to your doctor. Foods that contain iron include: whole grains/cereals, dried fruits, beans, or peas, leafy green vegetables, and organ meats (liver, kidney).  NOTE:This sheet is a summary. It may not cover all possible information. If you have questions about this medicine, talk to your doctor, pharmacist, or health care provider. Copyright© 2017 [employer]        Iron-Deficiency Anemia (Adult)  Red blood cells carry oxygen to the tissues of your body. Anemia is a condition in which you have too few red blood cells. You need iron to make red cells. Anemia makes you feel tired and run down. When anemia becomes severe, your skin becomes pale. You may feel short of breath after physical activity. Other symptoms include:  · Headaches  · Dizziness  · Leg cramps with physical activity  · Drowsiness  · Restless legs  Your anemia is caused by not having enough iron in your body. This may be because of:  · Loss of blood. This can be caused by heavy menstrual periods. It can also be caused by bleeding from the stomach or intestines.  · Poor diet. You may not be eating enough foods that contain iron.  · Inability to absorb iron from the foods you eat  · Pregnancy  If your blood count is low enough, your healthcare provider may prescribe an iron supplement. It usually takes about 2 to 3 months of treatment with iron supplements to correct anemia. Severe cases of anemia need a blood transfusion to quickly ease  symptoms and deliver more oxygen to the cells.  Home care  Follow these guidelines when caring for yourself at home:  · Eat foods high in iron. This will boost the amount of iron stored in your body. It is a natural way to build up the number of blood cells. Good sources of iron include beef, liver, spinach and other dark green leafy vegetables, whole grains, beans, and nuts.  · Do not overexert yourself.  · Talk with your healthcare provider before traveling by air or traveling to high altitudes.  Follow-up care  Follow up with your healthcare provider in 2 months, or as advised. This is to have another red blood cell count to be sure your anemia has been fixed.  When to seek medical advice  Call your healthcare provider right away if any of these occur:  · Shortness of breath or chest pain  · Dizziness or fainting  · Vomiting blood or passing red or black-colored stool   Date Last Reviewed: 2/25/2016  © 8893-6635 [employer], [employer]. [address] [place] [address], [place] [address]. All rights reserved. This information is not intended as a substitute for professional medical care. Always follow your healthcare professional's instructions.

## 2019-07-30 NOTE — LETTER
July 31, 2019      Radu Weaver MD  [address]  [place] [address]           [facility]  [address] [place] [id number]  Phone: [phone]  Fax: [phone]          Patient: Homero Tanner   MR Number: [id number]   YOB: 1940   Date of Visit: 7/30/2019       Dear Dr. Radu Weaver:    Thank you for referring Homero Tanner to me for evaluation. Attached you will find relevant portions of my assessment and plan of care.    If you have questions, please do not hesitate to call me. I look forward to following Homero Tanner along with you.    Sincerely,    APRYL Yoderosure  CC:  No Recipients    If you would like to receive this communication electronically, please contact [email or web address] or [phone] to request more information on [employer] Link access.    For providers and/or their staff who would like to refer a patient to [facility], please contact us through our one-stop-shop provider referral line, [facility], at [phone].    If you feel you have received this communication in error or would no longer like to receive these types of communications, please e-mail [email or web address]

## 2019-07-30 NOTE — PROGRESS NOTES
Subjective:      Patient ID: Homero Tanner is a 79 y.o. male.    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 He was last seen by Lizzette Rutledge last year. He is here today following referral from Dr. Weaver for anemia. He has received iron infusions. He denies nausea, vomiting, abdominal pain, change in appetite, weight loss, change in bowel habits, hematochezia or melena. The patient takes Omeprazole daily. He has a history of rectal polyps. His last flex sig was April 2019. A one year repeat was recommended.      Review of Systems  As per HPI.     Objective:     Physical Exam   Constitutional: He is oriented to person, place, and time. He appears well-developed and well-nourished. No distress.   HENT:   Head: Normocephalic and atraumatic.   Eyes: EOM are normal.   Cardiovascular: Normal rate and regular rhythm.   Pulmonary/Chest: Effort normal and breath sounds normal. No respiratory distress. He has no wheezes.   Abdominal: Soft. Bowel sounds are normal. He exhibits no distension. There is no tenderness.   Neurological: He is alert and oriented to person, place, and time. No cranial nerve deficit. Gait abnormal.   Skin: He is not diaphoretic.   Psychiatric: His behavior is normal.       Assessment:     1. Iron deficiency anemia, unspecified iron deficiency anemia type        Plan:     GI bleed unlikely given the stability but will check EGD since he has a history of esophageal ulcer.   Continue daily PPI.     Follow up if symptoms worsen or fail to improve.    Thank you for the opportunity to participate in the care of this patient.   Donovan Crawford PA-C.

## 2019-07-30 NOTE — DISCHARGE INSTRUCTIONS
[facility]  [address] [facility]  [address]  [phone] phone     [phone] fax  Hours of Operation: Monday- Friday 8:00am- 5:00pm  After hours phone  [phone]  Hematology / Oncology Physicians on call      Dr. Bridger Duke      Please call with any concerns regarding your appointment today.    FALL PREVENTION   Falls often occur due to slipping, tripping or losing your balance. Here are ways to reduce your risk of falling again.   Was there anything that caused your fall that can be fixed, removed or replaced?   Make your home safe by keeping walkways clear of objects you may trip over.   Use non-slip pads under rugs.   Do not walk in poorly lit areas.   Do not stand on chairs or wobbly ladders.   Use caution when reaching overhead or looking upward. This position can cause a loss of balance.   Be sure your shoes fit properly, have non-slip bottoms and are in good condition.   Be cautious when going up and down stairs, curbs, and when walking on uneven sidewalks.   If your balance is poor, consider using a cane or walker.   If your fall was related to alcohol use, stop or limit alcohol intake.   If your fall was related to use of sleeping medicines, talk to your doctor about this. You may need to reduce your dosage at bedtime if you awaken during the night to go to the bathroom.   To reduce the need for nighttime bathroom trips:   Avoid drinking fluids for several hours before going to bed   Empty your bladder before going to bed   Men can keep a urinal at the bedside   © 7946-5308 Jaskaran Shah, [address] [place] [address], [place] [address]. All rights reserved. This information is not intended as a substitute for professional medical care. Always follow your healthcare professional's instructions.

## 2019-07-30 NOTE — PLAN OF CARE
Problem: Adult Inpatient Plan of Care  Goal: Plan of Care Review  Outcome: Ongoing (interventions implemented as appropriate)  Patient concerned about IV insertion due to thin skin on arms.

## 2019-08-06 ENCOUNTER — INFUSION (OUTPATIENT)
Dept: INFUSION THERAPY | Facility: HOSPITAL | Age: 79
End: 2019-08-06
Attending: INTERNAL MEDICINE
Payer: MEDICARE

## 2019-08-06 VITALS
TEMPERATURE: 98 F | SYSTOLIC BLOOD PRESSURE: 116 MMHG | RESPIRATION RATE: 16 BRPM | HEART RATE: 48 BPM | OXYGEN SATURATION: 99 % | DIASTOLIC BLOOD PRESSURE: 64 MMHG

## 2019-08-06 DIAGNOSIS — D50.9 IRON DEFICIENCY ANEMIA, UNSPECIFIED IRON DEFICIENCY ANEMIA TYPE: Primary | ICD-10-CM

## 2019-08-06 PROCEDURE — 96365 THER/PROPH/DIAG IV INF INIT: CPT | Mod: HCNC

## 2019-08-06 PROCEDURE — 63600175 PHARM REV CODE 636 W HCPCS: Mod: HCNC | Performed by: INTERNAL MEDICINE

## 2019-08-06 RX ADMIN — FERRIC CARBOXYMALTOSE INJECTION 750 MG: 50 INJECTION, SOLUTION INTRAVENOUS at 10:08

## 2019-08-06 NOTE — PLAN OF CARE
"Problem: Adult Inpatient Plan of Care  Goal: Plan of Care Review  Outcome: Ongoing (interventions implemented as appropriate)  Pt states " I am doing pretty good, just a little tired"      "

## 2019-08-06 NOTE — DISCHARGE INSTRUCTIONS
[facility]  [address] [facility]  [address]  [phone] phone     [phone] fax  Hours of Operation: Monday- Friday 8:00am- 5:00pm  After hours phone  [phone]  Hematology / Oncology Physicians on call      Dr. Bridger Duke      Please call with any concerns regarding your appointment today.    FALL PREVENTION   Falls often occur due to slipping, tripping or losing your balance. Here are ways to reduce your risk of falling again.   Was there anything that caused your fall that can be fixed, removed or replaced?   Make your home safe by keeping walkways clear of objects you may trip over.   Use non-slip pads under rugs.   Do not walk in poorly lit areas.   Do not stand on chairs or wobbly ladders.   Use caution when reaching overhead or looking upward. This position can cause a loss of balance.   Be sure your shoes fit properly, have non-slip bottoms and are in good condition.   Be cautious when going up and down stairs, curbs, and when walking on uneven sidewalks.   If your balance is poor, consider using a cane or walker.   If your fall was related to alcohol use, stop or limit alcohol intake.   If your fall was related to use of sleeping medicines, talk to your doctor about this. You may need to reduce your dosage at bedtime if you awaken during the night to go to the bathroom.   To reduce the need for nighttime bathroom trips:   Avoid drinking fluids for several hours before going to bed   Empty your bladder before going to bed   Men can keep a urinal at the bedside   © 0054-8577 Jaskaran Shah, [address] [place] [address], [place] [address]. All rights reserved. This information is not intended as a substitute for professional medical care. Always follow your healthcare professional's instructions.

## 2019-08-06 NOTE — PLAN OF CARE
Problem: Adult Inpatient Plan of Care  Goal: Patient-Specific Goal (Individualization)  Outcome: Ongoing (interventions implemented as appropriate)  Pt likes feet up, tv, and blanket.

## 2019-08-08 ENCOUNTER — LAB VISIT (OUTPATIENT)
Dept: LAB | Facility: HOSPITAL | Age: 79
End: 2019-08-08
Attending: INTERNAL MEDICINE
Payer: MEDICARE

## 2019-08-08 DIAGNOSIS — I50.32 CHRONIC DIASTOLIC HEART FAILURE: ICD-10-CM

## 2019-08-08 LAB
ALBUMIN SERPL BCP-MCNC: 3.5 G/DL (ref 3.5–5.2)
ALP SERPL-CCNC: 251 U/L (ref 55–135)
ALT SERPL W/O P-5'-P-CCNC: 16 U/L (ref 10–44)
ANION GAP SERPL CALC-SCNC: 9 MMOL/L (ref 8–16)
AST SERPL-CCNC: 20 U/L (ref 10–40)
BILIRUB SERPL-MCNC: 0.7 MG/DL (ref 0.1–1)
BNP SERPL-MCNC: 439 PG/ML (ref 0–99)
BUN SERPL-MCNC: 21 MG/DL (ref 8–23)
CALCIUM SERPL-MCNC: 9.2 MG/DL (ref 8.7–10.5)
CHLORIDE SERPL-SCNC: 103 MMOL/L (ref 95–110)
CO2 SERPL-SCNC: 26 MMOL/L (ref 23–29)
CREAT SERPL-MCNC: 1.3 MG/DL (ref 0.5–1.4)
EST. GFR  (AFRICAN AMERICAN): >60 ML/MIN/1.73 M^2
EST. GFR  (NON AFRICAN AMERICAN): 52.3 ML/MIN/1.73 M^2
GLUCOSE SERPL-MCNC: 95 MG/DL (ref 70–110)
POTASSIUM SERPL-SCNC: 4.9 MMOL/L (ref 3.5–5.1)
PROT SERPL-MCNC: 6.8 G/DL (ref 6–8.4)
SODIUM SERPL-SCNC: 138 MMOL/L (ref 136–145)

## 2019-08-08 PROCEDURE — 36415 COLL VENOUS BLD VENIPUNCTURE: CPT | Mod: HCNC

## 2019-08-08 PROCEDURE — 83880 ASSAY OF NATRIURETIC PEPTIDE: CPT | Mod: HCNC

## 2019-08-08 PROCEDURE — 80053 COMPREHEN METABOLIC PANEL: CPT | Mod: HCNC

## 2019-08-09 DIAGNOSIS — I10 ESSENTIAL HYPERTENSION, MALIGNANT: ICD-10-CM

## 2019-08-09 RX ORDER — HYDRALAZINE HYDROCHLORIDE 25 MG/1
TABLET, FILM COATED ORAL
Qty: 180 TABLET | Refills: 3 | Status: SHIPPED | OUTPATIENT
Start: 2019-08-09 | End: 2019-08-12 | Stop reason: ALTCHOICE

## 2019-08-12 ENCOUNTER — OFFICE VISIT (OUTPATIENT)
Dept: CARDIOLOGY | Facility: CLINIC | Age: 79
End: 2019-08-12
Payer: MEDICARE

## 2019-08-12 VITALS
HEART RATE: 56 BPM | WEIGHT: 168.44 LBS | BODY MASS INDEX: 24.11 KG/M2 | HEIGHT: 70 IN | DIASTOLIC BLOOD PRESSURE: 74 MMHG | SYSTOLIC BLOOD PRESSURE: 144 MMHG

## 2019-08-12 DIAGNOSIS — I70.0 AORTIC ATHEROSCLEROSIS: ICD-10-CM

## 2019-08-12 DIAGNOSIS — R94.31 ABNORMAL ECG: Chronic | ICD-10-CM

## 2019-08-12 DIAGNOSIS — I49.3 PVC'S (PREMATURE VENTRICULAR CONTRACTIONS): ICD-10-CM

## 2019-08-12 DIAGNOSIS — C88.0 WALDENSTROM'S MACROGLOBULINEMIA: ICD-10-CM

## 2019-08-12 DIAGNOSIS — I10 ESSENTIAL HYPERTENSION: Chronic | ICD-10-CM

## 2019-08-12 DIAGNOSIS — I47.20 VT (VENTRICULAR TACHYCARDIA): ICD-10-CM

## 2019-08-12 DIAGNOSIS — I27.20 PULMONARY HYPERTENSION: ICD-10-CM

## 2019-08-12 DIAGNOSIS — N18.9 CHRONIC RENAL IMPAIRMENT, UNSPECIFIED CKD STAGE: ICD-10-CM

## 2019-08-12 DIAGNOSIS — I35.1 NONRHEUMATIC AORTIC VALVE INSUFFICIENCY: Chronic | ICD-10-CM

## 2019-08-12 DIAGNOSIS — R74.8 ABNORMAL LIVER ENZYMES: ICD-10-CM

## 2019-08-12 DIAGNOSIS — I36.1 NON-RHEUMATIC TRICUSPID VALVE INSUFFICIENCY: Primary | ICD-10-CM

## 2019-08-12 DIAGNOSIS — I50.32 CHRONIC DIASTOLIC HEART FAILURE: ICD-10-CM

## 2019-08-12 DIAGNOSIS — R60.0 EDEMA OF BOTH LEGS: ICD-10-CM

## 2019-08-12 DIAGNOSIS — E78.2 MIXED HYPERLIPIDEMIA: ICD-10-CM

## 2019-08-12 PROCEDURE — 3078F PR MOST RECENT DIASTOLIC BLOOD PRESSURE < 80 MM HG: ICD-10-PCS | Mod: HCNC,CPTII,S$GLB, | Performed by: INTERNAL MEDICINE

## 2019-08-12 PROCEDURE — 99499 RISK ADDL DX/OHS AUDIT: ICD-10-PCS | Mod: HCNC,S$GLB,, | Performed by: INTERNAL MEDICINE

## 2019-08-12 PROCEDURE — 1101F PR PT FALLS ASSESS DOC 0-1 FALLS W/OUT INJ PAST YR: ICD-10-PCS | Mod: HCNC,CPTII,S$GLB, | Performed by: INTERNAL MEDICINE

## 2019-08-12 PROCEDURE — 99999 PR PBB SHADOW E&M-EST. PATIENT-LVL III: ICD-10-PCS | Mod: PBBFAC,HCNC,, | Performed by: INTERNAL MEDICINE

## 2019-08-12 PROCEDURE — 99214 OFFICE O/P EST MOD 30 MIN: CPT | Mod: HCNC,S$GLB,, | Performed by: INTERNAL MEDICINE

## 2019-08-12 PROCEDURE — 3077F SYST BP >= 140 MM HG: CPT | Mod: HCNC,CPTII,S$GLB, | Performed by: INTERNAL MEDICINE

## 2019-08-12 PROCEDURE — 3078F DIAST BP <80 MM HG: CPT | Mod: HCNC,CPTII,S$GLB, | Performed by: INTERNAL MEDICINE

## 2019-08-12 PROCEDURE — 99499 UNLISTED E&M SERVICE: CPT | Mod: HCNC,S$GLB,, | Performed by: INTERNAL MEDICINE

## 2019-08-12 PROCEDURE — 1101F PT FALLS ASSESS-DOCD LE1/YR: CPT | Mod: HCNC,CPTII,S$GLB, | Performed by: INTERNAL MEDICINE

## 2019-08-12 PROCEDURE — 99214 PR OFFICE/OUTPT VISIT, EST, LEVL IV, 30-39 MIN: ICD-10-PCS | Mod: HCNC,S$GLB,, | Performed by: INTERNAL MEDICINE

## 2019-08-12 PROCEDURE — 99999 PR PBB SHADOW E&M-EST. PATIENT-LVL III: CPT | Mod: PBBFAC,HCNC,, | Performed by: INTERNAL MEDICINE

## 2019-08-12 PROCEDURE — 3077F PR MOST RECENT SYSTOLIC BLOOD PRESSURE >= 140 MM HG: ICD-10-PCS | Mod: HCNC,CPTII,S$GLB, | Performed by: INTERNAL MEDICINE

## 2019-08-12 NOTE — PROGRESS NOTES
Subjective:    Patient ID:  Homero Tanner is a 79 y.o. male who presents for evaluation of Hypertension; Hyperlipidemia; Congestive Heart Failure; Risk Factor Management For Atherosclerosis; and Valvular Heart Disease      HPI Mr. Tanner returns for f/u.   His current medical conditions include Waldenstrom's macroglobulinemia lymphoma, HTN, DD, PHTN, AI, PVCs, TR, dyslipidemia, prostate & rectal cancer. Nonsmoker.    Past hx pertinent for following:  Had LHC 20+ years ago, no significant blockages noted.  He has chronic abnl ecgs.   Also has had PSVT with stress testing in past.    He has h/o thymoma surgery with xrt and also had rectal polyp surgery.  - Stress MPI Aug 2017.  Echo Aug 2017 normal EF, DD, mild-mod PHTN, mild AI, LVH, mild RVE.  Echo 9/18 EF 50%, normal diastolic function, mild PHTN, LVH.  Taken off statin for abnl LFTs.  Now here.  No anginal sxs.  CHF is stable.  Denies dyspnea, pnd/orthopnea.  Stable leg edema, much improved from a year ago.  Waldenstrom's lymphoma, stable, on maintenance tx.  LFTs are significantly improved, only AP remains elevated and is stable.  Weight unchanged.  HTN controlled.  Pt states runs 120's/60's.  No associated sxs.  Compliant with meds.  Palpitations/PVCs stable. On metoprolol, not bothersome.  Lipids stable, last check.        Current Outpatient Medications:     aspirin (ECOTRIN) 81 MG EC tablet, Take 81 mg by mouth once daily., Disp: , Rfl:     b complex vitamins tablet, Take by mouth. 1 tablet Oral Every day, Disp: , Rfl:     clobetasol (TEMOVATE) 0.05 % external solution, AAA of scalp 1-2 times daily prn flares. Strong steroid- do not apply to face or folds of skin, Disp: 50 mL, Rfl: 0    cyanocobalamin 1,000 mcg/mL injection, Give 1cc under the skin once a month, Disp: 10 mL, Rfl: 4    furosemide (LASIX) 40 MG tablet, Take 1 tablet (40 mg total) by mouth once daily. (Patient taking differently: Take 40 mg by mouth once daily. ), Disp: 90 tablet,  "Rfl: 3    hydrALAZINE (APRESOLINE) 25 MG tablet, TAKE 1 TABLET BY MOUTH AT MID-DAY AND IN THE EVENING, Disp: 180 tablet, Rfl: 3    leuprolide (LUPRON) 3.75 mg injection, Inject 3.75 mg into the muscle every 3 (three) months. , Disp: , Rfl:     metoprolol tartrate (LOPRESSOR) 100 MG tablet, TAKE 1 TABLET(100 MG) BY MOUTH TWICE DAILY, Disp: 180 tablet, Rfl: 3    metronidazole 0.75% (METROCREAM) 0.75 % Crea, APPLY TO AFFECTED AREA ON FACE TWICE DAILY, Disp: 45 g, Rfl: 3    omeprazole (PRILOSEC) 20 MG capsule, TAKE 1 CAPSULE(20 MG) BY MOUTH EVERY DAY (Patient taking differently: TAKE 1 CAPSULE(20 MG) BY MOUTH EVERY NIGHT), Disp: 90 capsule, Rfl: 3    Review of Systems   Constitution: Negative.   HENT: Negative.    Eyes: Negative.    Cardiovascular: Positive for leg swelling.   Respiratory: Negative.    Endocrine: Negative.    Hematologic/Lymphatic: Negative.    Skin: Negative.    Musculoskeletal: Negative.    Gastrointestinal: Negative.    Genitourinary: Negative.    Neurological: Negative.    Psychiatric/Behavioral: Negative.    Allergic/Immunologic: Negative.        BP (!) 144/74 (BP Location: Right arm, Patient Position: Sitting, BP Method: Large (Manual))   Pulse (!) 56   Ht 5' 10" (1.778 m)   Wt 76.4 kg (168 lb 6.9 oz)   BMI 24.17 kg/m²     Wt Readings from Last 3 Encounters:   08/12/19 76.4 kg (168 lb 6.9 oz)   07/30/19 77.7 kg (171 lb 4.8 oz)   07/18/19 76.2 kg (167 lb 15.9 oz)     Temp Readings from Last 3 Encounters:   08/06/19 97.8 °F (36.6 °C)   07/30/19 97.3 °F (36.3 °C)   07/23/19 97 °F (36.1 °C)     BP Readings from Last 3 Encounters:   08/12/19 (!) 144/74   08/06/19 116/64   07/30/19 (!) 199/73     Pulse Readings from Last 3 Encounters:   08/12/19 (!) 56   08/06/19 (!) 48   07/30/19 (!) 45          Objective:    Physical Exam   Constitutional: He is oriented to person, place, and time. He appears well-developed and well-nourished.   HENT:   Head: Normocephalic.   Neck: Normal range of motion. " Neck supple. Normal carotid pulses, no hepatojugular reflux and no JVD present. Carotid bruit is not present. No thyromegaly present.   Cardiovascular: Normal rate, S1 normal and S2 normal. A regularly irregular rhythm present. PMI is not displaced. Exam reveals no S3, no S4, no distant heart sounds, no friction rub, no midsystolic click and no opening snap.   No murmur heard.  Pulses:       Radial pulses are 2+ on the right side, and 2+ on the left side.   Pulmonary/Chest: Effort normal and breath sounds normal. He has no wheezes. He has no rales.   Abdominal: Soft. Bowel sounds are normal. He exhibits no distension, no abdominal bruit, no ascites and no mass. There is no tenderness.   Musculoskeletal: He exhibits edema.   Neurological: He is alert and oriented to person, place, and time.   Skin: Skin is warm.   Psychiatric: He has a normal mood and affect. His behavior is normal.   Nursing note and vitals reviewed.      I have reviewed all pertinent labs and cardiac studies.    Lab Results   Component Value Date    CHOL 160 08/21/2018    CHOL 164 06/06/2016    CHOL 123 05/08/2013     Lab Results   Component Value Date    HDL 65 08/21/2018    HDL 79 (H) 06/06/2016    HDL 60 05/08/2013     Lab Results   Component Value Date    LDLCALC 80.0 08/21/2018    LDLCALC 69.4 06/06/2016    LDLCALC 50.0 (L) 05/08/2013     Lab Results   Component Value Date    TRIG 75 08/21/2018    TRIG 78 06/06/2016    TRIG 64 05/08/2013     Lab Results   Component Value Date    CHOLHDL 40.6 08/21/2018    CHOLHDL 48.2 06/06/2016    CHOLHDL 48.8 05/08/2013             Chemistry        Component Value Date/Time     08/08/2019 0825    K 4.9 08/08/2019 0825     08/08/2019 0825    CO2 26 08/08/2019 0825    BUN 21 08/08/2019 0825    CREATININE 1.3 08/08/2019 0825    GLU 95 08/08/2019 0825        Component Value Date/Time    CALCIUM 9.2 08/08/2019 0825    ALKPHOS 251 (H) 08/08/2019 0825    AST 20 08/08/2019 0825    ALT 16 08/08/2019 0825     BILITOT 0.7 08/08/2019 0825    ESTGFRAFRICA >60.0 08/08/2019 0825    EGFRNONAA 52.3 (A) 08/08/2019 0825        Component      Latest Ref Rng & Units 8/8/2019   BNP      0 - 99 pg/mL 439 (H)           Assessment:       1. Non-rheumatic tricuspid valve insufficiency    2. VT (ventricular tachycardia)    3. Waldenstrom's macroglobulinemia    4. PVC's (premature ventricular contractions)    5. Pulmonary hypertension    6. Mixed hyperlipidemia    7. Essential hypertension    8. Edema of both legs    9. Chronic diastolic heart failure    10. Chronic renal impairment, unspecified CKD stage    11. Aortic atherosclerosis    12. Nonrheumatic aortic valve insufficiency    13. Abnormal ECG    14. Abnormal liver enzymes         Plan:             Stable CV conditions on current medical tx.  Reviewed tests/labs with pt and above medical conditions in detail and formulated tx plan.  Continue current meds.  Will take Hydralazine off med list since he states he no longer takes it.  Cardiac diet.  Elevate legs prn.  Daily walking exercises.  Diet for lipids.  Monitor LFTs in future.  Metoprolol for HTN/ectopy/h/o VT.  Stable abnl ecg.  F/u 6 months with CMP + BNP.

## 2019-08-13 RX ORDER — OMEPRAZOLE 20 MG/1
CAPSULE, DELAYED RELEASE ORAL
Qty: 90 CAPSULE | Refills: 0 | Status: SHIPPED | OUTPATIENT
Start: 2019-08-13 | End: 2019-12-02 | Stop reason: SDUPTHER

## 2019-08-22 ENCOUNTER — TELEPHONE (OUTPATIENT)
Dept: ADMINISTRATIVE | Facility: HOSPITAL | Age: 79
End: 2019-08-22

## 2019-08-22 NOTE — TELEPHONE ENCOUNTER
Contacted patient to schedule follow up with PCP. Patient states that he will not schedule an appointment with her PCP at this time. [place]

## 2019-10-07 DIAGNOSIS — C61 PROSTATE CANCER: Primary | ICD-10-CM

## 2019-10-09 ENCOUNTER — LAB VISIT (OUTPATIENT)
Dept: LAB | Facility: HOSPITAL | Age: 79
End: 2019-10-09
Attending: INTERNAL MEDICINE
Payer: MEDICARE

## 2019-10-09 DIAGNOSIS — C88.0 WALDENSTROM MACROGLOBULINEMIA: ICD-10-CM

## 2019-10-09 DIAGNOSIS — D51.8 OTHER VITAMIN B12 DEFICIENCY ANEMIA: ICD-10-CM

## 2019-10-09 DIAGNOSIS — D50.0 IRON DEFICIENCY ANEMIA DUE TO CHRONIC BLOOD LOSS: ICD-10-CM

## 2019-10-09 DIAGNOSIS — C61 PROSTATE CANCER: ICD-10-CM

## 2019-10-09 LAB
ALBUMIN SERPL BCP-MCNC: 3.6 G/DL (ref 3.5–5.2)
ALP SERPL-CCNC: 341 U/L (ref 55–135)
ALT SERPL W/O P-5'-P-CCNC: 22 U/L (ref 10–44)
ANION GAP SERPL CALC-SCNC: 12 MMOL/L (ref 8–16)
AST SERPL-CCNC: 26 U/L (ref 10–40)
BASOPHILS # BLD AUTO: 0.09 K/UL (ref 0–0.2)
BASOPHILS NFR BLD: 1 % (ref 0–1.9)
BILIRUB SERPL-MCNC: 1.4 MG/DL (ref 0.1–1)
BUN SERPL-MCNC: 20 MG/DL (ref 8–23)
CALCIUM SERPL-MCNC: 9.9 MG/DL (ref 8.7–10.5)
CHLORIDE SERPL-SCNC: 103 MMOL/L (ref 95–110)
CO2 SERPL-SCNC: 25 MMOL/L (ref 23–29)
COMPLEXED PSA SERPL-MCNC: 1.1 NG/ML (ref 0–4)
CREAT SERPL-MCNC: 1.4 MG/DL (ref 0.5–1.4)
CRP SERPL-MCNC: 6.4 MG/L (ref 0–8.2)
DIFFERENTIAL METHOD: ABNORMAL
EOSINOPHIL # BLD AUTO: 0.4 K/UL (ref 0–0.5)
EOSINOPHIL NFR BLD: 5.1 % (ref 0–8)
ERYTHROCYTE [DISTWIDTH] IN BLOOD BY AUTOMATED COUNT: 16.2 % (ref 11.5–14.5)
EST. GFR  (AFRICAN AMERICAN): 55 ML/MIN/1.73 M^2
EST. GFR  (NON AFRICAN AMERICAN): 48 ML/MIN/1.73 M^2
FERRITIN SERPL-MCNC: 499 NG/ML (ref 20–300)
GLUCOSE SERPL-MCNC: 97 MG/DL (ref 70–110)
HCT VFR BLD AUTO: 41.7 % (ref 40–54)
HGB BLD-MCNC: 13.1 G/DL (ref 14–18)
IMM GRANULOCYTES # BLD AUTO: 0.05 K/UL (ref 0–0.04)
IMM GRANULOCYTES NFR BLD AUTO: 0.6 % (ref 0–0.5)
IRON SERPL-MCNC: 93 UG/DL (ref 45–160)
LYMPHOCYTES # BLD AUTO: 3 K/UL (ref 1–4.8)
LYMPHOCYTES NFR BLD: 34.3 % (ref 18–48)
MCH RBC QN AUTO: 28.7 PG (ref 27–31)
MCHC RBC AUTO-ENTMCNC: 31.4 G/DL (ref 32–36)
MCV RBC AUTO: 91 FL (ref 82–98)
MONOCYTES # BLD AUTO: 0.9 K/UL (ref 0.3–1)
MONOCYTES NFR BLD: 10 % (ref 4–15)
NEUTROPHILS # BLD AUTO: 4.2 K/UL (ref 1.8–7.7)
NEUTROPHILS NFR BLD: 49 % (ref 38–73)
NRBC BLD-RTO: 0 /100 WBC
PLATELET # BLD AUTO: 293 K/UL (ref 150–350)
PMV BLD AUTO: 9.7 FL (ref 9.2–12.9)
POTASSIUM SERPL-SCNC: 4.9 MMOL/L (ref 3.5–5.1)
PROT SERPL-MCNC: 6.8 G/DL (ref 6–8.4)
RBC # BLD AUTO: 4.57 M/UL (ref 4.6–6.2)
SATURATED IRON: 35 % (ref 20–50)
SODIUM SERPL-SCNC: 140 MMOL/L (ref 136–145)
TOTAL IRON BINDING CAPACITY: 266 UG/DL (ref 250–450)
TRANSFERRIN SERPL-MCNC: 180 MG/DL (ref 200–375)
WBC # BLD AUTO: 8.66 K/UL (ref 3.9–12.7)

## 2019-10-09 PROCEDURE — 84165 PROTEIN E-PHORESIS SERUM: CPT | Mod: HCNC

## 2019-10-09 PROCEDURE — 82784 ASSAY IGA/IGD/IGG/IGM EACH: CPT | Mod: 59,HCNC

## 2019-10-09 PROCEDURE — 84165 PATHOLOGIST INTERPRETATION SPE: ICD-10-PCS | Mod: 26,HCNC,, | Performed by: PATHOLOGY

## 2019-10-09 PROCEDURE — 86334 PATHOLOGIST INTERPRETATION IFE: ICD-10-PCS | Mod: 26,HCNC,, | Performed by: PATHOLOGY

## 2019-10-09 PROCEDURE — 86140 C-REACTIVE PROTEIN: CPT | Mod: HCNC

## 2019-10-09 PROCEDURE — 86334 IMMUNOFIX E-PHORESIS SERUM: CPT | Mod: HCNC

## 2019-10-09 PROCEDURE — 82728 ASSAY OF FERRITIN: CPT | Mod: HCNC

## 2019-10-09 PROCEDURE — 84153 ASSAY OF PSA TOTAL: CPT | Mod: HCNC

## 2019-10-09 PROCEDURE — 83540 ASSAY OF IRON: CPT | Mod: HCNC

## 2019-10-09 PROCEDURE — 86334 IMMUNOFIX E-PHORESIS SERUM: CPT | Mod: 26,HCNC,, | Performed by: PATHOLOGY

## 2019-10-09 PROCEDURE — 83520 IMMUNOASSAY QUANT NOS NONAB: CPT | Mod: HCNC

## 2019-10-09 PROCEDURE — 84165 PROTEIN E-PHORESIS SERUM: CPT | Mod: 26,HCNC,, | Performed by: PATHOLOGY

## 2019-10-09 PROCEDURE — 80053 COMPREHEN METABOLIC PANEL: CPT | Mod: HCNC

## 2019-10-09 PROCEDURE — 36415 COLL VENOUS BLD VENIPUNCTURE: CPT | Mod: HCNC

## 2019-10-10 LAB
ALBUMIN SERPL ELPH-MCNC: 3.77 G/DL (ref 3.35–5.55)
ALPHA1 GLOB SERPL ELPH-MCNC: 0.38 G/DL (ref 0.17–0.41)
ALPHA2 GLOB SERPL ELPH-MCNC: 0.92 G/DL (ref 0.43–0.99)
B-GLOBULIN SERPL ELPH-MCNC: 1.18 G/DL (ref 0.5–1.1)
GAMMA GLOB SERPL ELPH-MCNC: 0.24 G/DL (ref 0.67–1.58)
IGA SERPL-MCNC: 37 MG/DL (ref 40–350)
IGG SERPL-MCNC: 250 MG/DL (ref 650–1600)
IGM SERPL-MCNC: 999 MG/DL (ref 50–300)
INTERPRETATION SERPL IFE-IMP: NORMAL
KAPPA LC SER QL IA: 1.34 MG/DL (ref 0.33–1.94)
KAPPA LC/LAMBDA SER IA: 0.92 (ref 0.26–1.65)
LAMBDA LC SER QL IA: 1.46 MG/DL (ref 0.57–2.63)
PATHOLOGIST INTERPRETATION IFE: NORMAL
PATHOLOGIST INTERPRETATION SPE: NORMAL
PROT SERPL-MCNC: 6.5 G/DL (ref 6–8.4)

## 2019-10-16 ENCOUNTER — OFFICE VISIT (OUTPATIENT)
Dept: HEMATOLOGY/ONCOLOGY | Facility: CLINIC | Age: 79
End: 2019-10-16
Payer: MEDICARE

## 2019-10-16 VITALS
BODY MASS INDEX: 23.39 KG/M2 | WEIGHT: 163.38 LBS | HEART RATE: 87 BPM | DIASTOLIC BLOOD PRESSURE: 78 MMHG | OXYGEN SATURATION: 99 % | SYSTOLIC BLOOD PRESSURE: 124 MMHG | RESPIRATION RATE: 18 BRPM | TEMPERATURE: 97 F | HEIGHT: 70 IN

## 2019-10-16 DIAGNOSIS — Z85.048 HISTORY OF RECTAL CANCER: ICD-10-CM

## 2019-10-16 DIAGNOSIS — C61 PROSTATE CANCER: ICD-10-CM

## 2019-10-16 DIAGNOSIS — D51.8 OTHER VITAMIN B12 DEFICIENCY ANEMIA: ICD-10-CM

## 2019-10-16 DIAGNOSIS — D50.0 IRON DEFICIENCY ANEMIA DUE TO CHRONIC BLOOD LOSS: ICD-10-CM

## 2019-10-16 DIAGNOSIS — K76.0 FATTY LIVER: ICD-10-CM

## 2019-10-16 DIAGNOSIS — C88.0 WALDENSTROM MACROGLOBULINEMIA: Primary | ICD-10-CM

## 2019-10-16 PROCEDURE — 99214 OFFICE O/P EST MOD 30 MIN: CPT | Mod: HCNC,S$GLB,, | Performed by: INTERNAL MEDICINE

## 2019-10-16 PROCEDURE — 99999 PR PBB SHADOW E&M-EST. PATIENT-LVL III: CPT | Mod: PBBFAC,HCNC,, | Performed by: INTERNAL MEDICINE

## 2019-10-16 PROCEDURE — 99499 RISK ADDL DX/OHS AUDIT: ICD-10-PCS | Mod: HCNC,S$GLB,, | Performed by: INTERNAL MEDICINE

## 2019-10-16 PROCEDURE — 3078F DIAST BP <80 MM HG: CPT | Mod: HCNC,CPTII,S$GLB, | Performed by: INTERNAL MEDICINE

## 2019-10-16 PROCEDURE — 3074F PR MOST RECENT SYSTOLIC BLOOD PRESSURE < 130 MM HG: ICD-10-PCS | Mod: HCNC,CPTII,S$GLB, | Performed by: INTERNAL MEDICINE

## 2019-10-16 PROCEDURE — 99499 UNLISTED E&M SERVICE: CPT | Mod: HCNC,S$GLB,, | Performed by: INTERNAL MEDICINE

## 2019-10-16 PROCEDURE — 3074F SYST BP LT 130 MM HG: CPT | Mod: HCNC,CPTII,S$GLB, | Performed by: INTERNAL MEDICINE

## 2019-10-16 PROCEDURE — 99214 PR OFFICE/OUTPT VISIT, EST, LEVL IV, 30-39 MIN: ICD-10-PCS | Mod: HCNC,S$GLB,, | Performed by: INTERNAL MEDICINE

## 2019-10-16 PROCEDURE — 1101F PT FALLS ASSESS-DOCD LE1/YR: CPT | Mod: HCNC,CPTII,S$GLB, | Performed by: INTERNAL MEDICINE

## 2019-10-16 PROCEDURE — 99999 PR PBB SHADOW E&M-EST. PATIENT-LVL III: ICD-10-PCS | Mod: PBBFAC,HCNC,, | Performed by: INTERNAL MEDICINE

## 2019-10-16 PROCEDURE — 1101F PR PT FALLS ASSESS DOC 0-1 FALLS W/OUT INJ PAST YR: ICD-10-PCS | Mod: HCNC,CPTII,S$GLB, | Performed by: INTERNAL MEDICINE

## 2019-10-16 PROCEDURE — 3078F PR MOST RECENT DIASTOLIC BLOOD PRESSURE < 80 MM HG: ICD-10-PCS | Mod: HCNC,CPTII,S$GLB, | Performed by: INTERNAL MEDICINE

## 2019-10-16 NOTE — PROGRESS NOTES
Subjective:      Patient ID: Homero Tanner is a 79 y.o. male.    Chief Complaint: Follow-up    The patient is a 79-year-old  male who presents to the Hematology Oncology Clinic today to discuss further evaluation and management recommendations for Waldenstrom's macroglobulinemia.   I have reviewed all of the patient's relevant clinical history available in the medical record and have utilized this in my evaluation and management recommendations today.  Today the patient reports that he has been having chronic fatigue and edema in his legs. His fatigue is improved since being off treatment.  He continues with follow-up with Dr. Wright with Cardiology. He has been taking his monthly vitamin B12 injections. He is off iron supplementation at this time for his history of iron deficiency anemia which has since resolved.  He is on intermittent ADT for biochemical recurrence of prostate carcinoma and is followed by a urologist outside of the [facility].  His rectal cancer is in remission at this time.  He was on velcade/rituxan/dexamethasone for treatment of Waldenstrom's macroglobulinemia with his last treatment given in Oct 2018.  He has initiated rituxan maintenance and is s/p cycle 1 in Jan 2019. He is s/p 3/4 cycles at this time.  The patient is CT imaging has shown evidence of aortic atherosclerosis and bronchiectasis which is being monitored at this time.    Cancer   Pertinent negatives include no abdominal pain, arthralgias, chest pain, chills, congestion, coughing, diaphoresis, fatigue, fever, headaches, joint swelling, myalgias, nausea, rash, sore throat, vomiting or weakness.   Follow-up   Pertinent negatives include no abdominal pain, arthralgias, chest pain, chills, congestion, coughing, diaphoresis, fatigue, fever, headaches, joint swelling, myalgias, nausea, rash, sore throat, vomiting or weakness.     Review of Systems   Constitutional: Negative for activity change, appetite change,  chills, diaphoresis, fatigue, fever and unexpected weight change.   HENT: Negative for congestion, dental problem, ear pain, mouth sores, nosebleeds, postnasal drip, sinus pressure, sore throat, tinnitus, trouble swallowing and voice change.    Eyes: Negative for photophobia, pain, discharge, redness, itching and visual disturbance.   Respiratory: Negative for cough, chest tightness, shortness of breath, wheezing and stridor.    Cardiovascular: Negative for chest pain, palpitations and leg swelling.   Gastrointestinal: Negative for abdominal distention, abdominal pain, anal bleeding, blood in stool, constipation, diarrhea, nausea and vomiting.   Endocrine: Negative for cold intolerance, heat intolerance, polydipsia, polyphagia and polyuria.   Genitourinary: Negative for decreased urine volume, difficulty urinating, dysuria, flank pain, frequency, hematuria and urgency.   Musculoskeletal: Negative for arthralgias, back pain, gait problem, joint swelling and myalgias.   Skin: Negative for pallor and rash.   Allergic/Immunologic: Negative for immunocompromised state.   Neurological: Negative for dizziness, syncope, weakness, light-headedness and headaches.   Hematological: Negative for adenopathy. Does not bruise/bleed easily.   Psychiatric/Behavioral: Negative for agitation and confusion. The patient is nervous/anxious.        Medication List with Changes/Refills   Current Medications    ASPIRIN (ECOTRIN) 81 MG EC TABLET    Take 81 mg by mouth once daily.    B COMPLEX VITAMINS TABLET    Take by mouth. 1 tablet Oral Every day    CLOBETASOL (TEMOVATE) 0.05 % EXTERNAL SOLUTION    AAA of scalp 1-2 times daily prn flares. Strong steroid- do not apply to face or folds of skin    CYANOCOBALAMIN 1,000 MCG/ML INJECTION    Give 1cc under the skin once a month    FUROSEMIDE (LASIX) 40 MG TABLET    Take 1 tablet (40 mg total) by mouth once daily.    LEUPROLIDE (LUPRON) 3.75 MG INJECTION    Inject 3.75 mg into the muscle every 3  (three) months.     METOPROLOL TARTRATE (LOPRESSOR) 100 MG TABLET    TAKE 1 TABLET(100 MG) BY MOUTH TWICE DAILY    METRONIDAZOLE 0.75% (METROCREAM) 0.75 % CREA    APPLY TO AFFECTED AREA ON FACE TWICE DAILY    OMEPRAZOLE (PRILOSEC) 20 MG CAPSULE    TAKE 1 CAPSULE(20 MG) BY MOUTH EVERY DAY     Review of patient's allergies indicates:   Allergen Reactions    Lipitor [atorvastatin] Other (See Comments)    Norvasc [amlodipine] Swelling       Lab: I have reviewed all of the patient's relevant lab work available in the medical record and have utilized this in my evaluation and management recommendations today    Imaging: I have reviewed all of the patient's diagnostic/imaging results available in the medical record and have utilized this in my evaluation and management recommendations today.      Objective:     Vitals:    10/16/19 0749   BP: 124/78   Pulse: 87   Resp: 18   Temp: 97 °F (36.1 °C)       Physical Exam   Constitutional: He is oriented to person, place, and time. He appears well-developed and well-nourished. No distress.   HENT:   Head: Normocephalic and atraumatic.   Nose: Nose normal.   Mouth/Throat: Oropharynx is clear and moist. No oropharyngeal exudate.   Eyes: Pupils are equal, round, and reactive to light. EOM are normal. No scleral icterus.   Neck: Normal range of motion. Neck supple. No tracheal deviation present. No thyromegaly present.   Cardiovascular: Normal rate, regular rhythm, normal heart sounds and intact distal pulses.   No murmur heard.  Pulmonary/Chest: Effort normal and breath sounds normal. No stridor. No respiratory distress. He has no wheezes. He has no rales. He exhibits no tenderness.   Abdominal: Soft. Bowel sounds are normal. He exhibits no distension and no mass. There is no tenderness. There is no rebound and no guarding.   Musculoskeletal: Normal range of motion. He exhibits no edema or tenderness.   Lymphadenopathy:     He has no cervical adenopathy.   Neurological: He is alert  and oriented to person, place, and time. Coordination normal.   Skin: Skin is warm. No rash noted. He is not diaphoretic. No erythema.   Psychiatric: He has a normal mood and affect. His behavior is normal. Judgment and thought content normal.   Nursing note and vitals reviewed.      Assessment:     1. Waldenstrom's macroglobulinemia  2.  Elevated LFTs/elevated bilirubin - improved  3.  Elevated creatinine - stable/improved  4.  Generalized weakness/fatigue - improved  5.  History of Alcohol abuse    Plan:   1.  I had a detailed discussion with the patient previously with regard to the results of his bone marrow aspiration biopsy done in July 2018 as well as the results of all of his lab work done in August 2018.  At this time the patient has Waldenstrom's macroglobulinemia.  His IgM quantitative level was initially greater than 4 g.  2.  I had a detailed discussion with him previously with regard to the diagnosis, prognosis and treatment of this malignancy.  We discussed the indications to begin treatment.  I was concerned about possible liver involvement by his lymphoma because of his recently abnormal/worsening LFTs.  I was also concerned about possible renal involvement because of his elevated creatinine with monoclonal chain noted in UPEP immunofixation.  3.  We discussed that his treatment options are complicated because of the above.  I have previously discussed that the best course of action would be to start him on Velcade/dexamethasone/Rituxan.  I decided to hold off on initiation of Rituxan until we get his IgM quantitative level to less than 4 g.  Therefore he was initially started on twice weekly Velcade and weekly dexamethasone at 20 mg p.o. once daily.  We decided to re-evaluate our treatment options based on clinical response and based on how he tolerates this treatment. Velcade was dose adjusted for liver function. Informed consent taken.    4.  All necessary prescriptions including for herpes  zoster prophylaxis have been sent to the patient's pharmacy previously and the patient reports compliance with this medication.  5. I have previously strongly recommended cessation of alcohol use.  He reports that he continues to drink 1 drink a day.  6.  I had a detailed discussion with the patient today with regard to the results of all of his recent lab work which shows excellent response to ongoing treatment with interval significant decrease in IgM quantitative level.  I have also previously added IV Rituxan to his treatment.  The rationale for this was discussed in detail and he expressed understanding.  We have decided to initiate Rituxan starting with cycle 3 of Velcade.  I have since transitioned Velcade to once weekly.  7. I have previously discussed with the patient with regard to my concerns with regard to his combined liver/kidney dysfunction.  I am concerned about my ability to continue treatment safely.  At this time I will continue to hold Velcade and dexamethasone.  He has previously completed 4 weekly doses of rituxan in Oct 2018.   8. At this time we have decided to proceed with maintenance rituxan q12 weeks x 4 doses starting in Jan 2019. All labs from July 2019 reviewed and look stable/improved suggesting good control of his malignancy.  He will proceed with cycle 4/4 of maintenance Rituxan when approved next week.  9. He has completed 2 weekly doses of IV injectafer for treatment of iron deficiency. He will proceed with EGD and possibly small bowel video capsule as scheduled. He is uptodate with colonoscopy.  10.  He will get the flu vaccine at an outside pharmacy as it is currently unavailable here in the office.    Follow-up in 3 months with labs 1 week before clinic visit. He knows to call sooner for any additional questions or new problems.

## 2019-10-21 ENCOUNTER — INFUSION (OUTPATIENT)
Dept: INFUSION THERAPY | Facility: HOSPITAL | Age: 79
End: 2019-10-21
Attending: INTERNAL MEDICINE
Payer: MEDICARE

## 2019-10-21 VITALS
HEIGHT: 70 IN | WEIGHT: 163.38 LBS | DIASTOLIC BLOOD PRESSURE: 66 MMHG | SYSTOLIC BLOOD PRESSURE: 114 MMHG | OXYGEN SATURATION: 98 % | TEMPERATURE: 97 F | BODY MASS INDEX: 23.39 KG/M2 | RESPIRATION RATE: 18 BRPM | HEART RATE: 55 BPM

## 2019-10-21 DIAGNOSIS — C88.0 WALDENSTROM'S MACROGLOBULINEMIA: Primary | ICD-10-CM

## 2019-10-21 PROCEDURE — G0008 ADMIN INFLUENZA VIRUS VAC: HCPCS | Mod: HCNC,,, | Performed by: INTERNAL MEDICINE

## 2019-10-21 PROCEDURE — G0008 FLU VACCINE - HIGH DOSE (65+) PRESERVATIVE FREE IM: ICD-10-PCS | Mod: HCNC,,, | Performed by: INTERNAL MEDICINE

## 2019-10-21 PROCEDURE — 90662 FLU VACCINE - HIGH DOSE (65+) PRESERVATIVE FREE IM: ICD-10-PCS | Mod: HCNC,,, | Performed by: INTERNAL MEDICINE

## 2019-10-21 PROCEDURE — G0008 ADMIN INFLUENZA VIRUS VAC: HCPCS | Mod: PBBFAC

## 2019-10-21 PROCEDURE — 63600175 PHARM REV CODE 636 W HCPCS: Mod: HCNC | Performed by: INTERNAL MEDICINE

## 2019-10-21 PROCEDURE — 90662 IIV NO PRSV INCREASED AG IM: CPT | Mod: HCNC,,, | Performed by: INTERNAL MEDICINE

## 2019-10-21 PROCEDURE — 96415 CHEMO IV INFUSION ADDL HR: CPT | Mod: HCNC

## 2019-10-21 PROCEDURE — 25000003 PHARM REV CODE 250: Mod: HCNC | Performed by: INTERNAL MEDICINE

## 2019-10-21 PROCEDURE — 96413 CHEMO IV INFUSION 1 HR: CPT | Mod: HCNC

## 2019-10-21 PROCEDURE — S0028 INJECTION, FAMOTIDINE, 20 MG: HCPCS | Mod: HCNC | Performed by: INTERNAL MEDICINE

## 2019-10-21 PROCEDURE — 96375 TX/PRO/DX INJ NEW DRUG ADDON: CPT | Mod: HCNC

## 2019-10-21 PROCEDURE — 96367 TX/PROPH/DG ADDL SEQ IV INF: CPT | Mod: HCNC

## 2019-10-21 PROCEDURE — 90662 IIV NO PRSV INCREASED AG IM: CPT | Mod: PBBFAC

## 2019-10-21 RX ORDER — SODIUM CHLORIDE 0.9 % (FLUSH) 0.9 %
10 SYRINGE (ML) INJECTION
Status: CANCELLED | OUTPATIENT
Start: 2019-10-21

## 2019-10-21 RX ORDER — FAMOTIDINE 10 MG/ML
20 INJECTION INTRAVENOUS
Status: COMPLETED | OUTPATIENT
Start: 2019-10-21 | End: 2019-10-21

## 2019-10-21 RX ORDER — ACETAMINOPHEN 325 MG/1
650 TABLET ORAL
Status: COMPLETED | OUTPATIENT
Start: 2019-10-21 | End: 2019-10-21

## 2019-10-21 RX ORDER — MEPERIDINE HYDROCHLORIDE 50 MG/ML
25 INJECTION INTRAMUSCULAR; INTRAVENOUS; SUBCUTANEOUS
Status: CANCELLED | OUTPATIENT
Start: 2019-10-21

## 2019-10-21 RX ORDER — FAMOTIDINE 10 MG/ML
20 INJECTION INTRAVENOUS
Status: CANCELLED | OUTPATIENT
Start: 2019-10-21

## 2019-10-21 RX ORDER — HEPARIN 100 UNIT/ML
500 SYRINGE INTRAVENOUS
Status: CANCELLED | OUTPATIENT
Start: 2019-10-21

## 2019-10-21 RX ORDER — ACETAMINOPHEN 325 MG/1
650 TABLET ORAL
Status: CANCELLED | OUTPATIENT
Start: 2019-10-21

## 2019-10-21 RX ADMIN — DIPHENHYDRAMINE HYDROCHLORIDE 50 MG: 50 INJECTION INTRAMUSCULAR; INTRAVENOUS at 09:10

## 2019-10-21 RX ADMIN — RITUXIMAB 716 MG: 10 INJECTION, SOLUTION INTRAVENOUS at 10:10

## 2019-10-21 RX ADMIN — FAMOTIDINE 20 MG: 10 INJECTION INTRAVENOUS at 09:10

## 2019-10-21 RX ADMIN — SODIUM CHLORIDE: 0.9 INJECTION, SOLUTION INTRAVENOUS at 09:10

## 2019-10-21 RX ADMIN — ACETAMINOPHEN 650 MG: 325 TABLET ORAL at 09:10

## 2019-10-21 NOTE — DISCHARGE INSTRUCTIONS
[facility]  [address] [facility]  [address]  [phone] phone     [phone] fax  Hours of Operation: Monday- Friday 8:00am- 5:00pm  After hours phone  [phone]  Hematology / Oncology Physicians on call      Dr. Bridger Duke      Please call with any concerns regarding your appointment today.    FALL PREVENTION   Falls often occur due to slipping, tripping or losing your balance. Here are ways to reduce your risk of falling again.   Was there anything that caused your fall that can be fixed, removed or replaced?   Make your home safe by keeping walkways clear of objects you may trip over.   Use non-slip pads under rugs.   Do not walk in poorly lit areas.   Do not stand on chairs or wobbly ladders.   Use caution when reaching overhead or looking upward. This position can cause a loss of balance.   Be sure your shoes fit properly, have non-slip bottoms and are in good condition.   Be cautious when going up and down stairs, curbs, and when walking on uneven sidewalks.   If your balance is poor, consider using a cane or walker.   If your fall was related to alcohol use, stop or limit alcohol intake.   If your fall was related to use of sleeping medicines, talk to your doctor about this. You may need to reduce your dosage at bedtime if you awaken during the night to go to the bathroom.   To reduce the need for nighttime bathroom trips:   Avoid drinking fluids for several hours before going to bed   Empty your bladder before going to bed   Men can keep a urinal at the bedside   © 5866-2051 Jaskaran Shah, [address] [place] [id number]. All rights reserved. This information is not intended as a substitute for professional medical care. Always follow your healthcare professional's instructions.  WAYS TO HELP PREVENT INFECTION         WASH YOUR HANDS OFTEN DURING THE DAY, ESPECIALLY BEFORE YOU EAT, AFTER  USING THE BATHROOM, AND AFTER TOUCHING ANIMALS     STAY AWAY FROM PEOPLE WHO HAVE ILLNESSES YOU CAN CATCH; SUCH AS COLDS, FLU, CHICKEN POX     TRY TO AVOID CROWDS     STAY AWAY FROM CHILDREN WHO RECENTLY HAVE RECEIVED LIVE VIRUS VACCINES     MAINTAIN GOOD MOUTH CARE     DO NOT SQUEEZE OR SCRATCH PIMPLES     CLEAN CUTS & SCRAPES RIGHT AWAY AND DAILY UNTIL HEALED WITH WARM WATER, SOAP & AN ANTISEPTIC     AVOID CONTACT WITH LITTER BOXES, BIRD CAGES, & FISH TANKS     AVOID STANDING WATER, IE., BIRD BATHS, FLOWER POTS/VASES, OR HUMIDIFIERS     WEAR GLOVES WHEN GARDENING OR CLEANING UP AFTER OTHERS, ESPECIALLY BABIES & SMALL CHILDREN     DO NOT EAT RAW FISH, SEAFOOD, MEAT, OR EGGS

## 2019-10-21 NOTE — NURSING
Pt tolerated Rituxan well. No adverse reaction noted. Pt education reinforced on possible side effects, what to expect, and when to call . Pt verbalized understanding.  IV flushed w/ NS and D/C per protocol.

## 2019-10-22 ENCOUNTER — HOSPITAL ENCOUNTER (OUTPATIENT)
Facility: HOSPITAL | Age: 79
Discharge: HOME | End: 2019-10-22
Attending: INTERNAL MEDICINE | Admitting: INTERNAL MEDICINE
Payer: MEDICARE

## 2019-10-22 ENCOUNTER — ANESTHESIA EVENT (OUTPATIENT)
Dept: ENDOSCOPY | Facility: HOSPITAL | Age: 79
End: 2019-10-22
Payer: MEDICARE

## 2019-10-22 ENCOUNTER — ANESTHESIA (OUTPATIENT)
Dept: ENDOSCOPY | Facility: HOSPITAL | Age: 79
End: 2019-10-22
Payer: MEDICARE

## 2019-10-22 DIAGNOSIS — D50.9 IDA (IRON DEFICIENCY ANEMIA): Primary | ICD-10-CM

## 2019-10-22 PROCEDURE — 63600175 PHARM REV CODE 636 W HCPCS: Mod: HCNC | Performed by: NURSE ANESTHETIST, CERTIFIED REGISTERED

## 2019-10-22 PROCEDURE — 37000008 HC ANESTHESIA 1ST 15 MINUTES: Mod: HCNC | Performed by: INTERNAL MEDICINE

## 2019-10-22 PROCEDURE — 43235 EGD DIAGNOSTIC BRUSH WASH: CPT | Mod: HCNC | Performed by: INTERNAL MEDICINE

## 2019-10-22 PROCEDURE — 63600175 PHARM REV CODE 636 W HCPCS: Mod: HCNC | Performed by: INTERNAL MEDICINE

## 2019-10-22 PROCEDURE — 43235 EGD DIAGNOSTIC BRUSH WASH: CPT | Mod: HCNC,,, | Performed by: INTERNAL MEDICINE

## 2019-10-22 PROCEDURE — 43235 PR EGD, FLEX, DIAGNOSTIC: ICD-10-PCS | Mod: HCNC,,, | Performed by: INTERNAL MEDICINE

## 2019-10-22 RX ORDER — PROPOFOL 10 MG/ML
VIAL (ML) INTRAVENOUS
Status: DISCONTINUED | OUTPATIENT
Start: 2019-10-22 | End: 2019-10-22

## 2019-10-22 RX ORDER — SODIUM CHLORIDE, SODIUM LACTATE, POTASSIUM CHLORIDE, CALCIUM CHLORIDE 600; 310; 30; 20 MG/100ML; MG/100ML; MG/100ML; MG/100ML
INJECTION, SOLUTION INTRAVENOUS CONTINUOUS
Status: DISCONTINUED | OUTPATIENT
Start: 2019-10-22 | End: 2019-10-22 | Stop reason: HOSPADM

## 2019-10-22 RX ADMIN — PROPOFOL 50 MG: 10 INJECTION, EMULSION INTRAVENOUS at 07:10

## 2019-10-22 RX ADMIN — PROPOFOL 50 MG: 10 INJECTION, EMULSION INTRAVENOUS at 08:10

## 2019-10-22 RX ADMIN — SODIUM CHLORIDE, SODIUM LACTATE, POTASSIUM CHLORIDE, AND CALCIUM CHLORIDE: 600; 310; 30; 20 INJECTION, SOLUTION INTRAVENOUS at 07:10

## 2019-10-22 NOTE — [employer] PATIENT INSTRUCTIONS
Discharge Summary/Instructions after an Endoscopic Procedure  Patient Name: Homero Tanner  Patient MRN: [id number]  Patient YOB: 1940 Tuesday, October 22, 2019 Mariela Greer MD  RESTRICTIONS:  During your procedure today, you received medications for sedation.  These   medications may affect your judgment, balance and coordination.  Therefore,   for 24 hours, you have the following restrictions:   - DO NOT drive a car, operate machinery, make legal/financial decisions,   sign important papers or drink alcohol.    ACTIVITY:  Today: no heavy lifting, straining or running due to procedural   sedation/anesthesia.  The following day: return to full activity including work.  DIET:  Eat and drink normally unless instructed otherwise.     TREATMENT FOR COMMON SIDE EFFECTS:  - Mild abdominal pain, nausea, belching, bloating or excessive gas:  rest,   eat lightly and use a heating pad.  - Sore Throat: treat with throat lozenges and/or gargle with warm salt   water.  - Because air was used during the procedure, expelling large amounts of air   from your rectum or belching is normal.  - If a bowel prep was taken, you may not have a bowel movement for 1-3 days.    This is normal.  SYMPTOMS TO WATCH FOR AND REPORT TO YOUR PHYSICIAN:  1. Abdominal pain or bloating, other than gas cramps.  2. Chest pain.  3. Back pain.  4. Signs of infection such as: chills or fever occurring within 24 hours   after the procedure.  5. Rectal bleeding, which would show as bright red, maroon, or black stools.   (A tablespoon of blood from the rectum is not serious, especially if   hemorrhoids are present.)  6. Vomiting.  7. Weakness or dizziness.  GO DIRECTLY TO THE NEAREST EMERGENCY ROOM IF YOU HAVE ANY OF THE FOLLOWING:      Difficulty breathing              Chills and/or fever over 101 F   Persistent vomiting and/or vomiting blood   Severe abdominal pain   Severe chest pain   Black, tarry stools   Bleeding- more than one  tablespoon   Any other symptom or condition that you feel may need urgent attention  Your doctor recommends these additional instructions:  If any biopsies were taken, your doctors clinic will contact you in 1 to 2   weeks with any results.  - Patient has a contact number available for emergencies.  The signs and   symptoms of potential delayed complications were discussed with the   patient.  Return to normal activities tomorrow.  Written discharge   instructions were provided to the patient.   - Discharge patient to home (via wheelchair).   - Resume previous diet today.   - Continue present medications.   - Return to GI office as needed.  For questions, problems or results please call your physician Mariela Greer MD at Work:  [phone]  If you have any questions about the above instructions, call the GI   department at [phone] or call the endoscopy unit at [phone]   from 7am until 3 pm.  [facility] [place], EMERGENCY ROOM PHONE NUMBER:   [phone]  IF A COMPLICATION OR EMERGENCY SITUATION ARISES AND YOU ARE UNABLE TO REACH   YOUR PHYSICIAN - GO DIRECTLY TO THE EMERGENCY ROOM.  I have read or have had read to me these discharge instructions for my   procedure and have received a written copy.  I understand these   instructions and will follow-up with my physician if I have any questions.     __________________________________       _____________________________________  Nurse Signature                                          Patient/Designated   Responsible Party Signature  MD Mariela Faye MD  10/22/2019 8:11:55 AM  This report has been verified and signed electronically.  [employer]

## 2019-10-22 NOTE — ANESTHESIA RELEASE NOTE
"Anesthesia Release from PACU Note    Patient: Homero Tanner    Procedure(s) Performed: Procedure(s) (LRB):  ESOPHAGOGASTRODUODENOSCOPY (EGD) (N/A)    Anesthesia type: MAC    Post pain: Adequate analgesia    Post assessment: no apparent anesthetic complications and tolerated procedure well    Last Vitals:   Visit Vitals  BP (!) 146/81 (BP Location: Left arm, Patient Position: Lying)   Pulse (!) 58   Temp 36.8 °C (98.2 °F) (Skin)   Resp 17   Ht 5' 10" (1.778 m)   Wt 75.3 kg (166 lb)   SpO2 100%   BMI 23.82 kg/m²       Post vital signs: stable    Level of consciousness: awake, alert  and oriented    Nausea/Vomiting: no nausea/no vomiting    Complications: none    Airway Patency: patent    Respiratory: unassisted, spontaneous ventilation, room air    Cardiovascular: stable and blood pressure at baseline    Hydration: euvolemic  "

## 2019-10-22 NOTE — TRANSFER OF CARE
"Anesthesia Transfer of Care Note    Patient: Homero Worley Ciro    Procedure(s) Performed: Procedure(s) (LRB):  ESOPHAGOGASTRODUODENOSCOPY (EGD) (N/A)    Patient location: PACU    Anesthesia Type: MAC    Transport from OR: Transported from OR on room air with adequate spontaneous ventilation    Post pain: adequate analgesia    Post assessment: no apparent anesthetic complications and tolerated procedure well    Post vital signs: stable    Level of consciousness: awake, alert and oriented    Nausea/Vomiting: no nausea/vomiting    Complications: none    Transfer of care protocol was followed      Last vitals:   Visit Vitals  BP (!) 146/81 (BP Location: Left arm, Patient Position: Lying)   Pulse (!) 58   Temp 36.8 °C (98.2 °F) (Skin)   Resp 17   Ht 5' 10" (1.778 m)   Wt 75.3 kg (166 lb)   SpO2 100%   BMI 23.82 kg/m²     "

## 2019-10-22 NOTE — H&P
PRE PROCEDURE H&P    Patient Name: Homero Tanner  MRN: [id number]  : 1940  Date of Procedure:  10/22/2019  Referring Physician: Donovan Crawford PA-C  Primary Physician: Solomon Cortés MD  Procedure Physician: Mariela Greer MD       Planned Procedure: EGD  Diagnosis: NASIMA      Chief Complaint: Same as above    HPI: Patient is an 79 y.o. male is here for the above. Has h/o Waldenstrom's.        Past Medical History:   Past Medical History:   Diagnosis Date    Abnormal ECG 2013    Aortic insufficiency 2013    Aortic valve disorder 2013    Arthritis     Asthma     as a child    Benign neoplasm of cecum 2017    Benign neoplasm of transverse colon 2017    Cataract     CHF (congestive heart failure)     Pt states He's never been told this    Encounter for blood transfusion     GERD (gastroesophageal reflux disease)     History of colon polyps     Hypertension     Iron deficiency anemia 10/26/2015    Lymphoma     waldenstrom's macroglobulinemia    Mixed hyperlipidemia 2013    Prostate cancer     PSVT (paroxysmal supraventricular tachycardia) 2013    Pulmonary hypertension 2016    Rectal adenocarcinoma     Rectal cancer 2018    SCC (squamous cell carcinoma) 2015    left parietal scalp    Thymoma     TR (tricuspid regurgitation) 2016        Past Surgical History:  Past Surgical History:   Procedure Laterality Date    biopsy for chest mass      BONE MARROW BIOPSY Left 2018    Procedure: Biopsy-bone marrow;  Surgeon: Charanjit Dacosta MD;  [facility] OR;  Service: General;  Laterality: Left;    CATARACT EXTRACTION W/  INTRAOCULAR LENS IMPLANT Right 2019    CATARACT EXTRACTION W/  INTRAOCULAR LENS IMPLANT Left 2019    COLON SURGERY      COLONOSCOPY N/A 10/26/2015    Procedure: Colonoscopy;  Surgeon: Abraham Hong MD;  Location: [facility];  Service: Endoscopy;  Laterality: N/A;    COLONOSCOPY Left 2017     Procedure: COLONOSCOPY;  Surgeon: Abraham Hong MD;  Location: [facility] ENDO;  Service: Endoscopy;  Laterality: Left;    COLONOSCOPY N/A 5/1/2018    Procedure: hixtory of rectal cancer. Colonsocopy asked to be repeated minnie  year, alst one 2/2017;  Surgeon: Patricio Streeter MD;  Location: [facility] ENDO;  Service: Endoscopy;  Laterality: N/A;    COLONOSCOPY N/A 6/27/2018    Procedure: COLONOSCOPY/hybrid APC or a EndoRotor device;  Surgeon: Rao Meediros MD;  [facility] ENDO (2ND FLR);  Service: Endoscopy;  Laterality: N/A;    FLEXIBLE SIGMOIDOSCOPY Left 4/23/2019    Procedure: SIGMOIDOSCOPY, FLEXIBLE;  Surgeon: Ranjit Will MD;  Location: [facility] ENDO;  Service: Endoscopy;  Laterality: Left;    HERNIA REPAIR Right 02/2018    Inguinal, open with mesh    mohs      PROSTATECTOMY      RECTAL SURGERY N/A 09/2014    REPAIR OF SLIDING INGUINAL HERNIA Right 2/26/2019    Procedure: REPAIR, HERNIA, INGUINAL, SLIDING;  Surgeon: Bridger Alvarez MD;  Location: [facility] OR;  Service: General;  Laterality: Right;    SKIN BIOPSY      TONSILLECTOMY      TUMOR REMOVAL          Home Medications:  Prior to Admission medications    Medication Sig Start Date End Date Taking? Authorizing Provider   aspirin (ECOTRIN) 81 MG EC tablet Take 81 mg by mouth once daily.    Historical Provider, MD   b complex vitamins tablet Take by mouth. 1 tablet Oral Every day    Historical Provider, MD   clobetasol (TEMOVATE) 0.05 % external solution AAA of scalp 1-2 times daily prn flares. Strong steroid- do not apply to face or folds of skin 2/14/19   Flori Tello PA-C   cyanocobalamin 1,000 mcg/mL injection Give 1cc under the skin once a month 8/14/18   Radu Weaver MD   furosemide (LASIX) 40 MG tablet Take 1 tablet (40 mg total) by mouth once daily.  Patient taking differently: Take 40 mg by mouth once daily.  1/8/19 1/8/20  Cristhian Wright MD   leuprolide (LUPRON) 3.75 mg injection Inject 3.75 mg into the muscle every 3  (three) months.     Historical Provider, MD   metoprolol tartrate (LOPRESSOR) 100 MG tablet TAKE 1 TABLET(100 MG) BY MOUTH TWICE DAILY 19   Solomon Cortés MD   metronidazole 0.75% (METROCREAM) 0.75 % Crea APPLY TO AFFECTED AREA ON FACE TWICE DAILY 19   Nellie Rai MD   omeprazole (PRILOSEC) 20 MG capsule TAKE 1 CAPSULE(20 MG) BY MOUTH EVERY DAY 19   Solomon Cortés MD        Allergies:  Review of patient's allergies indicates:   Allergen Reactions    Lipitor [atorvastatin] Other (See Comments)    Norvasc [amlodipine] Swelling        Social History:   Social History     Socioeconomic History    Marital status: Single     Spouse name: Not on file    Number of children: Not on file    Years of education: Not on file    Highest education level: Not on file   Occupational History    Not on file   Social Needs    Financial resource strain: Not on file    Food insecurity:     Worry: Not on file     Inability: Not on file    Transportation needs:     Medical: Not on file     Non-medical: Not on file   Tobacco Use    Smoking status: Former Smoker     Packs/day: 1.00     Years: 15.00     Pack years: 15.00     Last attempt to quit: 1969     Years since quittin.7    Smokeless tobacco: Never Used   Substance and Sexual Activity    Alcohol use: Yes     Alcohol/week: 3.0 standard drinks     Types: 3 Cans of beer per week     Comment: socially, NO ALCOHOL 72 HOURS PRIOR TO SX    Drug use: No    Sexual activity: Not on file   Lifestyle    Physical activity:     Days per week: Not on file     Minutes per session: Not on file    Stress: Not on file   Relationships    Social connections:     Talks on phone: Not on file     Gets together: Not on file     Attends Taoism service: Not on file     Active member of club or organization: Not on file     Attends meetings of clubs or organizations: Not on file     Relationship status: Not on file   Other Topics Concern    Not on file    Social History Narrative    Not on file       Family History:  Family History   Problem Relation Age of Onset    Diabetes Father     Cataracts Mother     Amblyopia Neg Hx     Blindness Neg Hx     Cancer Neg Hx     Glaucoma Neg Hx     Hypertension Neg Hx     Macular degeneration Neg Hx     Retinal detachment Neg Hx     Strabismus Neg Hx     Stroke Neg Hx     Thyroid disease Neg Hx     Melanoma Neg Hx        ROS: No acute cardiac events, no acute respiratory complaints.     Physical Exam (all patients):    There were no vitals taken for this visit.  Lungs: Clear to auscultation bilaterally, respirations unlabored  Heart: Regular rate and rhythm, S1 and S2 normal, no obvious murmurs  Abdomen:         Soft, non-tender, bowel sounds normal, no masses, no organomegaly    Lab Results   Component Value Date    WBC 8.66 10/09/2019    MCV 91 10/09/2019    RDW 16.2 (H) 10/09/2019     10/09/2019    INR 1.2 08/30/2018    GLU 97 10/09/2019    HGBA1C 5.2 06/06/2016    BUN 20 10/09/2019     10/09/2019    K 4.9 10/09/2019     10/09/2019        SEDATION PLAN: per anesthesia      History reviewed, vital signs satisfactory, cardiopulmonary status satisfactory, sedation options, risks and plans have been discussed with the patient  All their questions were answered and the patient agrees to the sedation procedures as planned and the patient is deemed an appropriate candidate for the sedation as planned.    Procedure explained to patient, informed consent obtained and placed in chart.    Mariela Greer  10/22/2019  6:47 AM

## 2019-10-22 NOTE — ANESTHESIA POSTPROCEDURE EVALUATION
Anesthesia Post Evaluation    Patient: Homero Tanner    Procedure(s) Performed: Procedure(s) (LRB):  ESOPHAGOGASTRODUODENOSCOPY (EGD) (N/A)    Final Anesthesia Type: MAC  Patient location during evaluation: PACU  Patient participation: Yes- Able to Participate  Level of consciousness: awake and alert and awake  Post-procedure vital signs: reviewed and stable  Pain management: adequate  Airway patency: patent  PONV status at discharge: No PONV  Anesthetic complications: no      Cardiovascular status: blood pressure returned to baseline and stable  Respiratory status: unassisted, spontaneous ventilation and room air  Hydration status: euvolemic  Follow-up not needed.          Vitals Value Taken Time   /81 10/22/2019  7:12 AM   Temp 36.8 °C (98.2 °F) 10/22/2019  7:12 AM   Pulse 58 10/22/2019  7:12 AM   Resp 17 10/22/2019  7:12 AM   SpO2 100 % 10/22/2019  7:12 AM         No case tracking events are documented in the log.      Pain/Jose Score: No data recorded

## 2019-10-22 NOTE — PLAN OF CARE
Dr Greer came to bedside and discussed findings. NO N/V,  no abdominal pain, no GI bleeding, and vitals stable.  Pt discharged from unit.

## 2019-10-22 NOTE — DISCHARGE INSTRUCTIONS

## 2019-10-22 NOTE — ANESTHESIA PREPROCEDURE EVALUATION
10/22/2019  Homero Tanner is a 79 y.o., male.    Anesthesia Evaluation    I have reviewed the Patient Summary Reports.    I have reviewed the Nursing Notes.   I have reviewed the Medications.     Review of Systems  Anesthesia Hx:  No problems with previous Anesthesia  Neg history of prior surgery. Denies Family Hx of Anesthesia complications.   Denies Personal Hx of Anesthesia complications.   Hematology/Oncology:  Hematology Normal   Oncology Normal     EENT/Dental:EENT/Dental Normal   Cardiovascular:   Hypertension CHF    Pulmonary:   Asthma    Renal/:   Chronic Renal Disease    Hepatic/GI:   GERD Liver Disease,    Musculoskeletal:  Musculoskeletal Normal    Neurological:  Neurology Normal    Endocrine:  Endocrine Normal    Dermatological:  Skin Normal    Psych:  Psychiatric Normal           Physical Exam  General:  Well nourished    Airway/Jaw/Neck:  Airway Findings: Mouth Opening: Normal Tongue: Normal  General Airway Assessment: Adult  Mallampati: II  TM Distance: Normal, at least 6 cm        Eyes/Ears/Nose:  EYES/EARS/NOSE FINDINGS: Normal   Dental:  Dental Findings: Upper Dentures, Lower partial dentures   Chest/Lungs:  Chest/Lungs Clear    Heart/Vascular:  Heart Findings: Normal Heart murmur: negative    Abdomen:  Abdomen Findings: Normal    Musculoskeletal:  Musculoskeletal Findings: Normal   Skin:  Skin Findings: Normal    Mental Status:  Mental Status Findings: Normal        Anesthesia Plan  Type of Anesthesia, risks & benefits discussed:  Anesthesia Type:  MAC  Patient's Preference:   Intra-op Monitoring Plan: standard ASA monitors  Intra-op Monitoring Plan Comments:   Post Op Pain Control Plan: multimodal analgesia  Post Op Pain Control Plan Comments:   Induction:   IV  Beta Blocker:  Patient is on a Beta-Blocker and has received one dose within the past 24 hours (No further  documentation required).       Informed Consent: Patient understands risks and agrees with Anesthesia plan.  Questions answered. Anesthesia consent signed with patient.  ASA Score: 3     Day of Surgery Review of History & Physical: I have interviewed and examined the patient. I have reviewed the patient's H&P dated:            Ready For Surgery From Anesthesia Perspective.

## 2019-10-22 NOTE — TRANSFER OF CARE
"Anesthesia Transfer of Care Note    Patient: Homero Worley Ciro    Procedure(s) Performed: Procedure(s) (LRB):  ESOPHAGOGASTRODUODENOSCOPY (EGD) (N/A)    Patient location: PACU    Anesthesia Type: MAC    Transport from OR: Transported from OR on room air with adequate spontaneous ventilation    Post pain: adequate analgesia    Post assessment: no apparent anesthetic complications and tolerated procedure well    Post vital signs: stable    Level of consciousness: sedated    Nausea/Vomiting: no nausea/vomiting    Complications: none    Transfer of care protocol was followed      Last vitals:   Visit Vitals  BP (!) 146/81 (BP Location: Left arm, Patient Position: Lying)   Pulse (!) 58   Temp 36.8 °C (98.2 °F) (Skin)   Resp 17   Ht 5' 10" (1.778 m)   Wt 75.3 kg (166 lb)   SpO2 100%   BMI 23.82 kg/m²     "

## 2019-10-23 VITALS
WEIGHT: 166 LBS | DIASTOLIC BLOOD PRESSURE: 81 MMHG | SYSTOLIC BLOOD PRESSURE: 146 MMHG | HEART RATE: 58 BPM | BODY MASS INDEX: 23.77 KG/M2 | HEIGHT: 70 IN | RESPIRATION RATE: 17 BRPM | OXYGEN SATURATION: 100 % | TEMPERATURE: 98 F

## 2019-11-04 DIAGNOSIS — L71.9 ROSACEA: ICD-10-CM

## 2019-11-04 RX ORDER — METRONIDAZOLE 7.5 MG/G
CREAM TOPICAL
Qty: 45 G | Refills: 3 | Status: CANCELLED | OUTPATIENT
Start: 2019-11-04

## 2019-11-11 ENCOUNTER — OFFICE VISIT (OUTPATIENT)
Dept: DERMATOLOGY | Facility: CLINIC | Age: 79
End: 2019-11-11
Payer: MEDICARE

## 2019-11-11 DIAGNOSIS — Z85.828 HISTORY OF SKIN CANCER: ICD-10-CM

## 2019-11-11 DIAGNOSIS — L57.0 ACTINIC KERATOSES: ICD-10-CM

## 2019-11-11 DIAGNOSIS — L90.5 SCAR CONDITIONS/SKIN FIBROSIS: Primary | ICD-10-CM

## 2019-11-11 DIAGNOSIS — D48.5 NEOPLASM OF UNCERTAIN BEHAVIOR OF SKIN: ICD-10-CM

## 2019-11-11 DIAGNOSIS — L71.9 ROSACEA: ICD-10-CM

## 2019-11-11 DIAGNOSIS — Z12.83 SCREENING, MALIGNANT NEOPLASM, SKIN: ICD-10-CM

## 2019-11-11 DIAGNOSIS — L21.9 SEBORRHEIC DERMATITIS OF SCALP: ICD-10-CM

## 2019-11-11 PROCEDURE — 99213 OFFICE O/P EST LOW 20 MIN: CPT | Mod: 25,HCNC,S$GLB, | Performed by: DERMATOLOGY

## 2019-11-11 PROCEDURE — 1101F PT FALLS ASSESS-DOCD LE1/YR: CPT | Mod: HCNC,CPTII,S$GLB, | Performed by: DERMATOLOGY

## 2019-11-11 PROCEDURE — 88305 TISSUE EXAM BY PATHOLOGIST: CPT | Mod: HCNC | Performed by: PATHOLOGY

## 2019-11-11 PROCEDURE — 17003 DESTRUCT PREMALG LES 2-14: CPT | Mod: HCNC,S$GLB,, | Performed by: DERMATOLOGY

## 2019-11-11 PROCEDURE — 17000 PR DESTRUCTION(LASER SURGERY,CRYOSURGERY,CHEMOSURGERY),PREMALIGNANT LESIONS,FIRST LESION: ICD-10-PCS | Mod: 59,HCNC,S$GLB, | Performed by: DERMATOLOGY

## 2019-11-11 PROCEDURE — 88305 TISSUE EXAM BY PATHOLOGIST: ICD-10-PCS | Mod: 26,HCNC,, | Performed by: PATHOLOGY

## 2019-11-11 PROCEDURE — 1101F PR PT FALLS ASSESS DOC 0-1 FALLS W/OUT INJ PAST YR: ICD-10-PCS | Mod: HCNC,CPTII,S$GLB, | Performed by: DERMATOLOGY

## 2019-11-11 PROCEDURE — 17000 DESTRUCT PREMALG LESION: CPT | Mod: 59,HCNC,S$GLB, | Performed by: DERMATOLOGY

## 2019-11-11 PROCEDURE — 88305 TISSUE EXAM BY PATHOLOGIST: CPT | Mod: 26,HCNC,, | Performed by: PATHOLOGY

## 2019-11-11 PROCEDURE — 17003 DESTRUCTION, PREMALIGNANT LESIONS; SECOND THROUGH 14 LESIONS: ICD-10-PCS | Mod: HCNC,S$GLB,, | Performed by: DERMATOLOGY

## 2019-11-11 PROCEDURE — 11102 PR TANGENTIAL BIOPSY, SKIN, SINGLE LESION: ICD-10-PCS | Mod: HCNC,S$GLB,, | Performed by: DERMATOLOGY

## 2019-11-11 PROCEDURE — 99999 PR PBB SHADOW E&M-EST. PATIENT-LVL II: ICD-10-PCS | Mod: PBBFAC,HCNC,, | Performed by: DERMATOLOGY

## 2019-11-11 PROCEDURE — 99213 PR OFFICE/OUTPT VISIT, EST, LEVL III, 20-29 MIN: ICD-10-PCS | Mod: 25,HCNC,S$GLB, | Performed by: DERMATOLOGY

## 2019-11-11 PROCEDURE — 99999 PR PBB SHADOW E&M-EST. PATIENT-LVL II: CPT | Mod: PBBFAC,HCNC,, | Performed by: DERMATOLOGY

## 2019-11-11 PROCEDURE — 11102 TANGNTL BX SKIN SINGLE LES: CPT | Mod: HCNC,S$GLB,, | Performed by: DERMATOLOGY

## 2019-11-11 RX ORDER — CLOBETASOL PROPIONATE 0.46 MG/ML
SOLUTION TOPICAL
Qty: 50 ML | Refills: 4 | Status: SHIPPED | OUTPATIENT
Start: 2019-11-11 | End: 2021-01-29 | Stop reason: SDUPTHER

## 2019-11-11 RX ORDER — KETOCONAZOLE 20 MG/ML
SHAMPOO, SUSPENSION TOPICAL
Qty: 120 ML | Refills: 5 | Status: SHIPPED | OUTPATIENT
Start: 2019-11-11 | End: 2021-01-29 | Stop reason: SDUPTHER

## 2019-11-11 RX ORDER — KETOCONAZOLE 20 MG/ML
SHAMPOO, SUSPENSION TOPICAL
COMMUNITY
End: 2019-11-11 | Stop reason: SDUPTHER

## 2019-11-11 RX ORDER — CLOBETASOL PROPIONATE 0.46 MG/ML
SOLUTION TOPICAL
Qty: 50 ML | Refills: 0 | Status: SHIPPED | OUTPATIENT
Start: 2019-11-11 | End: 2019-11-11 | Stop reason: SDUPTHER

## 2019-11-11 NOTE — PROGRESS NOTES
Subjective:       Patient ID:  Homero Tanner is a 79 y.o. male who presents for   Chief Complaint   Patient presents with    Spot     on scalp for several years pt states it itches and pt is using shampoo to help with itching      Hx rosacea, seb derm, AKs, and SCCIS of the left parietal scalp (s/p Mohs on 4/1/15), last seen by DANE Tello on 2/14/19. He c/o several crusted, pruritic areas of the scalp. No tx tried.    For rosacea, using metronidazole 0.75% cream 1-2 times daily, well controlled.     For seb derm of scalp, using ketoconazole shampoo twice weekly, clobetasol qd prn flares, well controlled.      Review of Systems   Constitutional: Negative for fever and chills.   Gastrointestinal: Negative for nausea and vomiting.   Skin: Negative for daily sunscreen use, activity-related sunscreen use and recent sunburn.   Hematologic/Lymphatic: Does not bruise/bleed easily.        Objective:    Physical Exam   Constitutional: He appears well-developed and well-nourished. No distress.   Neurological: He is alert and oriented to person, place, and time. He is not disoriented.   Psychiatric: He has a normal mood and affect.   Skin:   Areas Examined (abnormalities noted in diagram):   Head / Face Inspection Performed  Neck Inspection Performed  Chest / Axilla Inspection Performed  Abdomen Inspection Performed  Back Inspection Performed  RUE Inspected  LUE Inspection Performed  RLE Inspected  LLE Inspection Performed  Nails and Digits Inspection Performed                   Diagram Legend     Erythematous scaling macule/papule c/w actinic keratosis       Vascular papule c/w angioma      Pigmented verrucoid papule/plaque c/w seborrheic keratosis      Yellow umbilicated papule c/w sebaceous hyperplasia      Irregularly shaped tan macule c/w lentigo     1-2 mm smooth white papules consistent with Milia      Movable subcutaneous cyst with punctum c/w epidermal inclusion cyst      Subcutaneous movable cyst c/w pilar  cyst      Firm pink to brown papule c/w dermatofibroma      Pedunculated fleshy papule(s) c/w skin tag(s)      Evenly pigmented macule c/w junctional nevus     Mildly variegated pigmented, slightly irregular-bordered macule c/w mildly atypical nevus      Flesh colored to evenly pigmented papule c/w intradermal nevus       Pink pearly papule/plaque c/w basal cell carcinoma      Erythematous hyperkeratotic cursted plaque c/w SCC      Surgical scar with no sign of skin cancer recurrence      Open and closed comedones      Inflammatory papules and pustules      Verrucoid papule consistent consistent with wart     Erythematous eczematous patches and plaques     Dystrophic onycholytic nail with subungual debris c/w onychomycosis     Umbilicated papule    Erythematous-base heme-crusted tan verrucoid plaque consistent with inflamed seborrheic keratosis     Erythematous Silvery Scaling Plaque c/w Psoriasis     See annotation                Assessment / Plan:      Pathology Orders:     Normal Orders This Visit    Specimen to Pathology, Dermatology     Questions:    Procedure Type:  Dermatology and skin neoplasms    Number of Specimens:  1    ------------------------:  -------------------------    Spec 1 Procedure:  Biopsy    Spec 1 Clinical Impression:  r/o SCC    Spec 1 Source:  anterior vertex scalp        Scar conditions/skin fibrosis  Screening, malignant neoplasm, skin  History of skin cancer  Scar of the left parietal scalp, hx of NMSC.  No evidence of recurrence on physical exam today.  Continue routine skin surveillance. Daily sunscreen advised.    Seborrheic dermatitis of scalp  -     ketoconazole (NIZORAL) 2 % shampoo; Apply to scalp 1-2 times/week, lather, let sit 5 minutes, then rinse  Dispense: 120 mL; Refill: 5  -     clobetasol (TEMOVATE) 0.05 % external solution; AAA of scalp 1-2 times daily prn flares. Strong steroid- do not apply to face or folds of skin  Dispense: 50 mL; Refill: 4    Rosacea  Continue  [facility]    Actinic keratoses  Cryosurgery Procedure Note    The patient is informed of the precancerous quality and need for treatment of these lesions. After risks, benefits and alternatives explained, including blistering, pain, hyper- and hypopigmentation, patient verbally consents to cryotherapy to precancerous lesions. Liquid nitrogen cryosurgery is applied to the 3 actinic keratoses, as detailed in the physical exam, to produce a freeze injury. The patient is aware that blisters may form and is instructed on wound care with gentle cleansing and use of vaseline ointment to keep moist until healed. The patient is supplied a handout on cryosurgery and is instructed to call if lesions do not completely resolve.      Neoplasm of uncertain behavior of skin  -     Specimen to Pathology, Dermatology  -     Shave biopsy(-ies) done of 1 site(s).   Patient informed to call for results within 2 weeks if have not received notification via telephone call or Health system           Follow up for call for results.     PROCEDURE NOTE - SHAVE BIOPSY   Location: see above    After risk, benefits, and alternatives were discussed with the patient, the patient agrees to the procedure by verbal informed consent.  The area(s) were cleansed with alcohol. 2 cc of lidocaine 1% with epinephrine was injected for local anesthesia into each lesion(s).  A sharp dermablade was used to remove part or all of the lesion(s).  The specimen(s) will be sent for tissue pathology.  Hemostasis was obtained with aluminum chloride and/or hyfrecation.  The area(s) were dressed with vaseline ointment and bandaged.  The patient tolerated the procedure well without adverse events.  Wound care instructions were given to the patient on the AVS.  The patient will be notified of pathology results once available. Results will also be available in Epic.

## 2019-11-11 NOTE — PATIENT INSTRUCTIONS
CRYOSURGERY      Your doctor has used a method called cryosurgery to treat your skin condition. Cryosurgery refers to the use of very cold substances to treat a variety of skin conditions such as warts, pre-skin cancers, molluscum contagiosum, sun spots, and several benign growths. The substance we use in cryosurgery is liquid nitrogen and is so cold (-195 degrees Celsius) that is burns when administered.     Following treatment in the office, the skin may immediately burn and become red. You may find the area around the lesion is affected as well. It is sometimes necessary to treat not only the lesion, but a small area of the surrounding normal skin to achieve a good response.     A blister, and even a blood filled blister, may form after treatment.   This is a normal response. If the blister is painful, it is acceptable to sterilize a needle and with rubbing alcohol and gently pop the blister. It is important that you gently wash the area with soap and warm water as the blister fluid may contain wart virus if a wart was treated. Do no remove the roof of the blister.     The area treated can take anywhere from 1-3 weeks to heal. Healing time depends on the kind of skin lesion treated, the location, and how aggressively the lesion was treated. It is recommended that the areas treated are covered with Vaseline or bacitracin ointment and a band-aid. If a band-aid is not practical, just ointment applied several times per day will do. Keeping these areas moist will speed the healing time.    Treatment with liquid nitrogen can leave a scar. In dark skin, it may be a light or dark scar, in light skin it may be a white or pink scar. These will generally fade with time.    If you have any concerns after your treatment, please feel free to call the office.         [facility] DERMATOLOGY  [address] [address] [facility] [address]  Dept: [phone]  Dept Fax: [phone]     Shave Biopsy Wound  Care    Your doctor has performed a shave biopsy today.  A band aid and vaseline ointment has been placed over the site.  This should remain in place for 24 hours.  It is recommended that you keep the area dry for the first 24 hours.  After 24 hours, you may remove the band aid and wash the area with warm soap and water and apply Vaseline jelly.  Many patients prefer to use Neosporin or Bacitracin ointment.  This is acceptable; however, know that you can develop an allergy to this medication even if you have used it safely for years.  It is important to keep the area moist.  Letting it dry out and get air slows healing time, and will worsen the scar.  Band aid is optional after first 24 hours.      If you notice increasing redness, tenderness, pain, or yellow drainage at the biopsy site, please notify your doctor.  These are signs of an infection.    If your biopsy site is bleeding, apply firm pressure for 15 minutes straight.  Repeat for another 15 minutes, if it is still bleeding.   If the surgical site continues to bleed, then please contact your doctor.      [facility] - [facility]   DERMATOLOGY  [address]   [facility] [place] [address]   Dept: [phone]   Dept Fax: [phone]

## 2019-11-19 LAB
FINAL PATHOLOGIC DIAGNOSIS: NORMAL
GROSS: NORMAL

## 2019-11-20 ENCOUNTER — PATIENT MESSAGE (OUTPATIENT)
Dept: DERMATOLOGY | Facility: CLINIC | Age: 79
End: 2019-11-20

## 2019-12-02 RX ORDER — OMEPRAZOLE 20 MG/1
20 CAPSULE, DELAYED RELEASE ORAL DAILY
Qty: 90 CAPSULE | Refills: 3 | Status: SHIPPED | OUTPATIENT
Start: 2019-12-02 | End: 2020-12-30 | Stop reason: SDUPTHER

## 2019-12-05 ENCOUNTER — OFFICE VISIT (OUTPATIENT)
Dept: DERMATOLOGY | Facility: CLINIC | Age: 79
End: 2019-12-05
Payer: MEDICARE

## 2019-12-05 DIAGNOSIS — C44.42 SQUAMOUS CELL CARCINOMA OF SCALP: Primary | ICD-10-CM

## 2019-12-05 DIAGNOSIS — L57.0 ACTINIC KERATOSES: ICD-10-CM

## 2019-12-05 PROCEDURE — 1159F PR MEDICATION LIST DOCUMENTED IN MEDICAL RECORD: ICD-10-PCS | Mod: HCNC,S$GLB,, | Performed by: DERMATOLOGY

## 2019-12-05 PROCEDURE — 99213 PR OFFICE/OUTPT VISIT, EST, LEVL III, 20-29 MIN: ICD-10-PCS | Mod: 25,HCNC,S$GLB, | Performed by: DERMATOLOGY

## 2019-12-05 PROCEDURE — 99999 PR PBB SHADOW E&M-EST. PATIENT-LVL III: ICD-10-PCS | Mod: PBBFAC,HCNC,, | Performed by: DERMATOLOGY

## 2019-12-05 PROCEDURE — 17000 DESTRUCT PREMALG LESION: CPT | Mod: HCNC,S$GLB,, | Performed by: DERMATOLOGY

## 2019-12-05 PROCEDURE — 1126F AMNT PAIN NOTED NONE PRSNT: CPT | Mod: HCNC,S$GLB,, | Performed by: DERMATOLOGY

## 2019-12-05 PROCEDURE — 99213 OFFICE O/P EST LOW 20 MIN: CPT | Mod: 25,HCNC,S$GLB, | Performed by: DERMATOLOGY

## 2019-12-05 PROCEDURE — 1159F MED LIST DOCD IN RCRD: CPT | Mod: HCNC,S$GLB,, | Performed by: DERMATOLOGY

## 2019-12-05 PROCEDURE — 99999 PR PBB SHADOW E&M-EST. PATIENT-LVL III: CPT | Mod: PBBFAC,HCNC,, | Performed by: DERMATOLOGY

## 2019-12-05 PROCEDURE — 1101F PT FALLS ASSESS-DOCD LE1/YR: CPT | Mod: HCNC,CPTII,S$GLB, | Performed by: DERMATOLOGY

## 2019-12-05 PROCEDURE — 1126F PR PAIN SEVERITY QUANTIFIED, NO PAIN PRESENT: ICD-10-PCS | Mod: HCNC,S$GLB,, | Performed by: DERMATOLOGY

## 2019-12-05 PROCEDURE — 1101F PR PT FALLS ASSESS DOC 0-1 FALLS W/OUT INJ PAST YR: ICD-10-PCS | Mod: HCNC,CPTII,S$GLB, | Performed by: DERMATOLOGY

## 2019-12-05 PROCEDURE — 17000 PR DESTRUCTION(LASER SURGERY,CRYOSURGERY,CHEMOSURGERY),PREMALIGNANT LESIONS,FIRST LESION: ICD-10-PCS | Mod: HCNC,S$GLB,, | Performed by: DERMATOLOGY

## 2019-12-05 NOTE — PROGRESS NOTES
Subjective:       Patient ID:  Homero Tanner is a 79 y.o. male who presents for   Chief Complaint   Patient presents with    Spot     f/u      SCC of the vertex scalp (pt states he has not heard from Dr. Purcell's office) and NMSC of the left parietal scalp, seb derm and rosacea, last seen on 11/11/19.     He currently uses ketoconazole shampoo every other day and clobetasol solution prn.  + improvement.      For rosacea, he uses metrocream bid.       Review of Systems   Constitutional: Negative for fever and chills.   Gastrointestinal: Negative for nausea and vomiting.   Skin: Negative for daily sunscreen use, activity-related sunscreen use and recent sunburn.   Hematologic/Lymphatic: Does not bruise/bleed easily.        Objective:    Physical Exam   Constitutional: He appears well-developed and well-nourished. No distress.   Neurological: He is alert and oriented to person, place, and time. He is not disoriented.   Psychiatric: He has a normal mood and affect.   Skin:   Areas Examined (abnormalities noted in diagram):   Scalp / Hair Palpated and Inspected  Head / Face Inspection Performed  Neck Inspection Performed  Chest / Axilla Inspection Performed  Abdomen Inspection Performed  Back Inspection Performed  RUE Inspected  LUE Inspection Performed  Nails and Digits Inspection Performed              Diagram Legend     Erythematous scaling macule/papule c/w actinic keratosis       Vascular papule c/w angioma      Pigmented verrucoid papule/plaque c/w seborrheic keratosis      Yellow umbilicated papule c/w sebaceous hyperplasia      Irregularly shaped tan macule c/w lentigo     1-2 mm smooth white papules consistent with Milia      Movable subcutaneous cyst with punctum c/w epidermal inclusion cyst      Subcutaneous movable cyst c/w pilar cyst      Firm pink to brown papule c/w dermatofibroma      Pedunculated fleshy papule(s) c/w skin tag(s)      Evenly pigmented macule c/w junctional nevus     Mildly  variegated pigmented, slightly irregular-bordered macule c/w mildly atypical nevus      Flesh colored to evenly pigmented papule c/w intradermal nevus       Pink pearly papule/plaque c/w basal cell carcinoma      Erythematous hyperkeratotic cursted plaque c/w SCC      Surgical scar with no sign of skin cancer recurrence      Open and closed comedones      Inflammatory papules and pustules      Verrucoid papule consistent consistent with wart     Erythematous eczematous patches and plaques     Dystrophic onycholytic nail with subungual debris c/w onychomycosis     Umbilicated papule    Erythematous-base heme-crusted tan verrucoid plaque consistent with inflamed seborrheic keratosis     Erythematous Silvery Scaling Plaque c/w Psoriasis     See annotation      Assessment / Plan:        Squamous cell carcinoma scalp  Called Dr. Purcell's office who states has not received referral.  Referral info sent today, discussed with pt that if he does not hear from Dr. Purcell's office by tomorrow, then he should call them directly The patient acknowledged understanding and pt given info for Dr. Purcell's office.     Actinic keratoses  Cryosurgery Procedure Note    The patient is informed of the precancerous quality and need for treatment of these lesions. After risks, benefits and alternatives explained, including blistering, pain, hyper- and hypopigmentation, patient verbally consents to cryotherapy to precancerous lesions. Liquid nitrogen cryosurgery is applied to the 1 actinic keratoses, as detailed in the physical exam, to produce a freeze injury. The patient is aware that blisters may form and is instructed on wound care with gentle cleansing and use of vaseline ointment to keep moist until healed. The patient is supplied a handout on cryosurgery and is instructed to call if lesions do not completely resolve.             Follow up in about 6 months (around 6/5/2020).

## 2019-12-05 NOTE — PATIENT INSTRUCTIONS

## 2020-01-10 DIAGNOSIS — D49.89 THYMOMA: ICD-10-CM

## 2020-01-10 DIAGNOSIS — D51.8 OTHER VITAMIN B12 DEFICIENCY ANEMIA: ICD-10-CM

## 2020-01-10 RX ORDER — CYANOCOBALAMIN 1000 UG/ML
INJECTION, SOLUTION INTRAMUSCULAR; SUBCUTANEOUS
Qty: 10 ML | Refills: 4 | Status: SHIPPED | OUTPATIENT
Start: 2020-01-10 | End: 2021-01-25

## 2020-01-15 DIAGNOSIS — I10 ESSENTIAL HYPERTENSION: Primary | ICD-10-CM

## 2020-01-28 ENCOUNTER — LAB VISIT (OUTPATIENT)
Dept: LAB | Facility: HOSPITAL | Age: 80
End: 2020-01-28
Attending: INTERNAL MEDICINE
Payer: MEDICARE

## 2020-01-28 DIAGNOSIS — I50.32 CHRONIC DIASTOLIC HEART FAILURE: ICD-10-CM

## 2020-01-28 LAB
ALBUMIN SERPL BCP-MCNC: 3.9 G/DL (ref 3.5–5.2)
ALP SERPL-CCNC: 272 U/L (ref 55–135)
ALT SERPL W/O P-5'-P-CCNC: 29 U/L (ref 10–44)
ANION GAP SERPL CALC-SCNC: 9 MMOL/L (ref 8–16)
AST SERPL-CCNC: 24 U/L (ref 10–40)
BILIRUB SERPL-MCNC: 0.7 MG/DL (ref 0.1–1)
BNP SERPL-MCNC: 144 PG/ML (ref 0–99)
BUN SERPL-MCNC: 25 MG/DL (ref 8–23)
CALCIUM SERPL-MCNC: 9.3 MG/DL (ref 8.7–10.5)
CHLORIDE SERPL-SCNC: 102 MMOL/L (ref 95–110)
CO2 SERPL-SCNC: 29 MMOL/L (ref 23–29)
CREAT SERPL-MCNC: 1.5 MG/DL (ref 0.5–1.4)
EST. GFR  (AFRICAN AMERICAN): 50.8 ML/MIN/1.73 M^2
EST. GFR  (NON AFRICAN AMERICAN): 44 ML/MIN/1.73 M^2
GLUCOSE SERPL-MCNC: 94 MG/DL (ref 70–110)
POTASSIUM SERPL-SCNC: 5.3 MMOL/L (ref 3.5–5.1)
PROT SERPL-MCNC: 6.6 G/DL (ref 6–8.4)
SODIUM SERPL-SCNC: 140 MMOL/L (ref 136–145)

## 2020-01-28 PROCEDURE — 36415 COLL VENOUS BLD VENIPUNCTURE: CPT | Mod: HCNC

## 2020-01-28 PROCEDURE — 80053 COMPREHEN METABOLIC PANEL: CPT | Mod: HCNC

## 2020-01-28 PROCEDURE — 83880 ASSAY OF NATRIURETIC PEPTIDE: CPT | Mod: HCNC

## 2020-02-10 ENCOUNTER — HOSPITAL ENCOUNTER (OUTPATIENT)
Dept: CARDIOLOGY | Facility: HOSPITAL | Age: 80
Discharge: HOME | End: 2020-02-10
Attending: INTERNAL MEDICINE
Payer: MEDICARE

## 2020-02-10 ENCOUNTER — OFFICE VISIT (OUTPATIENT)
Dept: CARDIOLOGY | Facility: CLINIC | Age: 80
End: 2020-02-10
Payer: MEDICARE

## 2020-02-10 VITALS
OXYGEN SATURATION: 98 % | HEART RATE: 54 BPM | WEIGHT: 186.5 LBS | DIASTOLIC BLOOD PRESSURE: 78 MMHG | SYSTOLIC BLOOD PRESSURE: 158 MMHG | HEIGHT: 70 IN | BODY MASS INDEX: 26.7 KG/M2

## 2020-02-10 DIAGNOSIS — D50.0 IRON DEFICIENCY ANEMIA DUE TO CHRONIC BLOOD LOSS: ICD-10-CM

## 2020-02-10 DIAGNOSIS — I50.32 CHRONIC DIASTOLIC HEART FAILURE: ICD-10-CM

## 2020-02-10 DIAGNOSIS — I10 ESSENTIAL HYPERTENSION: ICD-10-CM

## 2020-02-10 DIAGNOSIS — R74.8 ABNORMAL LIVER ENZYMES: ICD-10-CM

## 2020-02-10 DIAGNOSIS — C61 PROSTATE CANCER: ICD-10-CM

## 2020-02-10 DIAGNOSIS — I49.3 PVC'S (PREMATURE VENTRICULAR CONTRACTIONS): ICD-10-CM

## 2020-02-10 DIAGNOSIS — I27.20 PULMONARY HYPERTENSION: ICD-10-CM

## 2020-02-10 DIAGNOSIS — D49.89 THYMOMA: Primary | ICD-10-CM

## 2020-02-10 DIAGNOSIS — D89.2 PARAPROTEINEMIA: ICD-10-CM

## 2020-02-10 DIAGNOSIS — E78.2 MIXED HYPERLIPIDEMIA: ICD-10-CM

## 2020-02-10 DIAGNOSIS — D51.8 OTHER VITAMIN B12 DEFICIENCY ANEMIA: ICD-10-CM

## 2020-02-10 DIAGNOSIS — C88.0 WALDENSTROM'S MACROGLOBULINEMIA: ICD-10-CM

## 2020-02-10 DIAGNOSIS — I10 ESSENTIAL HYPERTENSION: Chronic | ICD-10-CM

## 2020-02-10 DIAGNOSIS — I35.1 NONRHEUMATIC AORTIC VALVE INSUFFICIENCY: Chronic | ICD-10-CM

## 2020-02-10 DIAGNOSIS — I47.20 VT (VENTRICULAR TACHYCARDIA): ICD-10-CM

## 2020-02-10 DIAGNOSIS — N18.9 CHRONIC RENAL IMPAIRMENT, UNSPECIFIED CKD STAGE: ICD-10-CM

## 2020-02-10 DIAGNOSIS — I36.1 NONRHEUMATIC TRICUSPID VALVE REGURGITATION: ICD-10-CM

## 2020-02-10 DIAGNOSIS — R60.0 EDEMA OF BOTH LEGS: ICD-10-CM

## 2020-02-10 DIAGNOSIS — Z85.048 HISTORY OF RECTAL CANCER: ICD-10-CM

## 2020-02-10 DIAGNOSIS — R94.31 ABNORMAL ECG: Primary | Chronic | ICD-10-CM

## 2020-02-10 PROCEDURE — 99499 UNLISTED E&M SERVICE: CPT | Mod: HCNC,S$GLB,, | Performed by: INTERNAL MEDICINE

## 2020-02-10 PROCEDURE — 99499 RISK ADDL DX/OHS AUDIT: ICD-10-PCS | Mod: HCNC,S$GLB,, | Performed by: INTERNAL MEDICINE

## 2020-02-10 PROCEDURE — 93010 EKG 12-LEAD: ICD-10-PCS | Mod: HCNC,,, | Performed by: INTERNAL MEDICINE

## 2020-02-10 PROCEDURE — 3077F PR MOST RECENT SYSTOLIC BLOOD PRESSURE >= 140 MM HG: ICD-10-PCS | Mod: HCNC,CPTII,S$GLB, | Performed by: INTERNAL MEDICINE

## 2020-02-10 PROCEDURE — 3078F PR MOST RECENT DIASTOLIC BLOOD PRESSURE < 80 MM HG: ICD-10-PCS | Mod: HCNC,CPTII,S$GLB, | Performed by: INTERNAL MEDICINE

## 2020-02-10 PROCEDURE — 99999 PR PBB SHADOW E&M-EST. PATIENT-LVL III: ICD-10-PCS | Mod: PBBFAC,HCNC,, | Performed by: INTERNAL MEDICINE

## 2020-02-10 PROCEDURE — 1159F MED LIST DOCD IN RCRD: CPT | Mod: HCNC,S$GLB,, | Performed by: INTERNAL MEDICINE

## 2020-02-10 PROCEDURE — 1101F PR PT FALLS ASSESS DOC 0-1 FALLS W/OUT INJ PAST YR: ICD-10-PCS | Mod: HCNC,CPTII,S$GLB, | Performed by: INTERNAL MEDICINE

## 2020-02-10 PROCEDURE — 93005 ELECTROCARDIOGRAM TRACING: CPT | Mod: HCNC

## 2020-02-10 PROCEDURE — 1101F PT FALLS ASSESS-DOCD LE1/YR: CPT | Mod: HCNC,CPTII,S$GLB, | Performed by: INTERNAL MEDICINE

## 2020-02-10 PROCEDURE — 1159F PR MEDICATION LIST DOCUMENTED IN MEDICAL RECORD: ICD-10-PCS | Mod: HCNC,S$GLB,, | Performed by: INTERNAL MEDICINE

## 2020-02-10 PROCEDURE — 93010 ELECTROCARDIOGRAM REPORT: CPT | Mod: HCNC,,, | Performed by: INTERNAL MEDICINE

## 2020-02-10 PROCEDURE — 99999 PR PBB SHADOW E&M-EST. PATIENT-LVL III: CPT | Mod: PBBFAC,HCNC,, | Performed by: INTERNAL MEDICINE

## 2020-02-10 PROCEDURE — 1126F PR PAIN SEVERITY QUANTIFIED, NO PAIN PRESENT: ICD-10-PCS | Mod: HCNC,S$GLB,, | Performed by: INTERNAL MEDICINE

## 2020-02-10 PROCEDURE — 99214 OFFICE O/P EST MOD 30 MIN: CPT | Mod: HCNC,S$GLB,, | Performed by: INTERNAL MEDICINE

## 2020-02-10 PROCEDURE — 3077F SYST BP >= 140 MM HG: CPT | Mod: HCNC,CPTII,S$GLB, | Performed by: INTERNAL MEDICINE

## 2020-02-10 PROCEDURE — 1126F AMNT PAIN NOTED NONE PRSNT: CPT | Mod: HCNC,S$GLB,, | Performed by: INTERNAL MEDICINE

## 2020-02-10 PROCEDURE — 3078F DIAST BP <80 MM HG: CPT | Mod: HCNC,CPTII,S$GLB, | Performed by: INTERNAL MEDICINE

## 2020-02-10 PROCEDURE — 99214 PR OFFICE/OUTPT VISIT, EST, LEVL IV, 30-39 MIN: ICD-10-PCS | Mod: HCNC,S$GLB,, | Performed by: INTERNAL MEDICINE

## 2020-02-10 NOTE — PROGRESS NOTES
Subjective:    Patient ID:  Homero Tanner is a 79 y.o. male who presents for evaluation of Congestive Heart Failure; Hypertension; Hyperlipidemia; Risk Factor Management For Atherosclerosis; and Valvular Heart Disease        HPI Pt presents for f/u.  His current medical conditions include Waldenstrom's macroglobulinemia lymphoma, HTN, DD, PHTN, AI, PVCs, TR, dyslipidemia, prostate & rectal cancer. Nonsmoker.    Past hx pertinent for following:  Had LHC 20+ years ago, no significant blockages noted.  He has chronic abnl ecgs.   Also has had PSVT with stress testing in past.    He has h/o thymoma surgery with xrt and also had rectal polyp surgery.  - Stress MPI Aug 2017.  Echo Aug 2017 normal EF, DD, mild-mod PHTN, mild AI, LVH, mild RVE.  Echo 9/18 EF 50%, normal diastolic function, mild PHTN, LVH.  Taken off statin for abnl LFTs.  Now here.  CHF stable.    No chest pain sxs.  No unusual dyspnea.  No pnd/orthopnea.  , improved.  HTN overall controlled, variable.  LFTs stable.  ecg today NSR, pac, nonspecific st abnl.  No acute changes.  No palpitations.  No dizziness.  Weight up some over last 6 months.   Lipids controlled.       Current Outpatient Medications:     aspirin (ECOTRIN) 81 MG EC tablet, Take 81 mg by mouth once daily., Disp: , Rfl:     b complex vitamins tablet, Take by mouth. 1 tablet Oral Every day, Disp: , Rfl:     clobetasol (TEMOVATE) 0.05 % external solution, AAA of scalp 1-2 times daily prn flares. Strong steroid- do not apply to face or folds of skin, Disp: 50 mL, Rfl: 4    cyanocobalamin 1,000 mcg/mL injection, Give 1cc under the skin once a month, Disp: 10 mL, Rfl: 4    ketoconazole (NIZORAL) 2 % shampoo, Apply to scalp 1-2 times/week, lather, let sit 5 minutes, then rinse, Disp: 120 mL, Rfl: 5    leuprolide (LUPRON) 3.75 mg injection, Inject 3.75 mg into the muscle every 3 (three) months. , Disp: , Rfl:     metoprolol tartrate (LOPRESSOR) 100 MG tablet, TAKE 1 TABLET(100  "MG) BY MOUTH TWICE DAILY, Disp: 180 tablet, Rfl: 3    metronidazole 0.75% (METROCREAM) 0.75 % Crea, APPLY TO AFFECTED AREA ON FACE TWICE DAILY, Disp: 45 g, Rfl: 3    omeprazole (PRILOSEC) 20 MG capsule, Take 1 capsule (20 mg total) by mouth once daily., Disp: 90 capsule, Rfl: 3    furosemide (LASIX) 40 MG tablet, Take 1 tablet (40 mg total) by mouth once daily. (Patient taking differently: Take 40 mg by mouth once daily. ), Disp: 90 tablet, Rfl: 3      Review of Systems   Constitution: Positive for weight gain.   HENT: Negative.    Eyes: Negative.    Cardiovascular: Positive for leg swelling.   Respiratory: Negative.    Endocrine: Negative.    Hematologic/Lymphatic: Negative.    Skin: Negative.    Musculoskeletal: Negative.    Gastrointestinal: Negative.    Genitourinary: Negative.    Neurological: Negative.    Psychiatric/Behavioral: Negative.    Allergic/Immunologic: Negative.        BP (!) 158/78 (BP Location: Right arm, Patient Position: Sitting, BP Method: Large (Manual))   Pulse (!) 54   Ht 5' 10" (1.778 m)   Wt 84.6 kg (186 lb 8.2 oz)   SpO2 98%   BMI 26.76 kg/m²       Wt Readings from Last 3 Encounters:   02/10/20 84.6 kg (186 lb 8.2 oz)   10/22/19 75.3 kg (166 lb)   10/21/19 74.1 kg (163 lb 5.8 oz)     Temp Readings from Last 3 Encounters:   10/22/19 98.2 °F (36.8 °C) (Skin)   10/21/19 97.3 °F (36.3 °C)   10/16/19 97 °F (36.1 °C) (Oral)     BP Readings from Last 3 Encounters:   02/10/20 (!) 158/78   10/22/19 (!) 146/81   10/21/19 114/66     Pulse Readings from Last 3 Encounters:   02/10/20 (!) 54   10/22/19 (!) 58   10/21/19 (!) 55          Objective:    Physical Exam   Constitutional: He is oriented to person, place, and time. He appears well-developed and well-nourished.   HENT:   Head: Normocephalic.   Neck: Normal range of motion. Neck supple. Normal carotid pulses, no hepatojugular reflux and no JVD present. Carotid bruit is not present. No thyromegaly present.   Cardiovascular: Normal rate, " regular rhythm, S1 normal and S2 normal. PMI is not displaced. Exam reveals no S3, no S4, no distant heart sounds, no friction rub, no midsystolic click and no opening snap.   No murmur heard.  Pulses:       Radial pulses are 2+ on the right side, and 2+ on the left side.   Pulmonary/Chest: Effort normal and breath sounds normal. He has no wheezes. He has no rales.   Abdominal: Soft. Bowel sounds are normal. He exhibits no distension, no abdominal bruit, no ascites and no mass. There is no tenderness.   Musculoskeletal: He exhibits edema.   Neurological: He is alert and oriented to person, place, and time.   Skin: Skin is warm.   Psychiatric: He has a normal mood and affect. His behavior is normal.   Nursing note and vitals reviewed.      I have reviewed all pertinent labs and cardiac studies.    Component      Latest Ref Rng & Units 1/28/2020   BNP      0 - 99 pg/mL 144 (H)           Chemistry        Component Value Date/Time     01/28/2020 0919    K 5.3 (H) 01/28/2020 0919     01/28/2020 0919    CO2 29 01/28/2020 0919    BUN 25 (H) 01/28/2020 0919    CREATININE 1.5 (H) 01/28/2020 0919    GLU 94 01/28/2020 0919        Component Value Date/Time    CALCIUM 9.3 01/28/2020 0919    ALKPHOS 272 (H) 01/28/2020 0919    AST 24 01/28/2020 0919    ALT 29 01/28/2020 0919    BILITOT 0.7 01/28/2020 0919    ESTGFRAFRICA 50.8 (A) 01/28/2020 0919    EGFRNONAA 44.0 (A) 01/28/2020 0919          Lab Results   Component Value Date    CHOL 160 08/21/2018    CHOL 164 06/06/2016    CHOL 123 05/08/2013     Lab Results   Component Value Date    HDL 65 08/21/2018    HDL 79 (H) 06/06/2016    HDL 60 05/08/2013     Lab Results   Component Value Date    LDLCALC 80.0 08/21/2018    LDLCALC 69.4 06/06/2016    LDLCALC 50.0 (L) 05/08/2013     Lab Results   Component Value Date    TRIG 75 08/21/2018    TRIG 78 06/06/2016    TRIG 64 05/08/2013     Lab Results   Component Value Date    CHOLHDL 40.6 08/21/2018    CHOLHDL 48.2 06/06/2016     CHOLHDL 48.8 05/08/2013             Assessment:       1. Abnormal ECG    2. Abnormal liver enzymes    3. Waldenstrom's macroglobulinemia    4. VT (ventricular tachycardia)    5. Nonrheumatic tricuspid valve regurgitation    6. PVC's (premature ventricular contractions)    7. Pulmonary hypertension    8. Mixed hyperlipidemia    9. Essential hypertension    10. Edema of both legs    11. Chronic renal impairment, unspecified CKD stage    12. Chronic diastolic heart failure    13. Nonrheumatic aortic valve insufficiency         Plan:             Stable cardiovascular conditions at present time on current medical treatment.  Reviewed all tests and above medical conditions with patient in detail and formulated treatment plan.  Continue optimal medical treatment for cardiovascular conditions.  Cardiac low salt diet discussed.  Daily exercise encouraged, goal 30 +  minutes aerobic exercise as tolerated.  Maintaining healthy weight and weight loss goals (if needed) were discussed in clinic.  No changes in meds today.  Elevate legs, low salt diet for dependent edema.  F/u with heme/onc.  Watch his weight, needs to be mindful of diet.  F/u in 6 months with labs.

## 2020-02-18 ENCOUNTER — LAB VISIT (OUTPATIENT)
Dept: LAB | Facility: HOSPITAL | Age: 80
End: 2020-02-18
Attending: INTERNAL MEDICINE
Payer: MEDICARE

## 2020-02-18 ENCOUNTER — OFFICE VISIT (OUTPATIENT)
Dept: HEMATOLOGY/ONCOLOGY | Facility: CLINIC | Age: 80
End: 2020-02-18
Payer: MEDICARE

## 2020-02-18 ENCOUNTER — OFFICE VISIT (OUTPATIENT)
Dept: OPHTHALMOLOGY | Facility: CLINIC | Age: 80
End: 2020-02-18
Payer: MEDICARE

## 2020-02-18 VITALS
SYSTOLIC BLOOD PRESSURE: 139 MMHG | HEART RATE: 54 BPM | BODY MASS INDEX: 25.91 KG/M2 | WEIGHT: 181 LBS | TEMPERATURE: 97 F | HEIGHT: 70 IN | DIASTOLIC BLOOD PRESSURE: 69 MMHG | OXYGEN SATURATION: 97 % | RESPIRATION RATE: 18 BRPM

## 2020-02-18 DIAGNOSIS — D49.89 THYMOMA: ICD-10-CM

## 2020-02-18 DIAGNOSIS — H43.393 VITREOUS FLOATERS OF BOTH EYES: Primary | ICD-10-CM

## 2020-02-18 DIAGNOSIS — D51.8 OTHER VITAMIN B12 DEFICIENCY ANEMIA: ICD-10-CM

## 2020-02-18 DIAGNOSIS — Z87.898 HISTORY OF THYMOMA: ICD-10-CM

## 2020-02-18 DIAGNOSIS — Z85.048 HISTORY OF RECTAL CANCER: ICD-10-CM

## 2020-02-18 DIAGNOSIS — C88.0 WALDENSTROM'S MACROGLOBULINEMIA: ICD-10-CM

## 2020-02-18 DIAGNOSIS — C61 PROSTATE CANCER: ICD-10-CM

## 2020-02-18 DIAGNOSIS — Z96.1 PSEUDOPHAKIA OF BOTH EYES: ICD-10-CM

## 2020-02-18 DIAGNOSIS — D89.2 PARAPROTEINEMIA: ICD-10-CM

## 2020-02-18 DIAGNOSIS — Z85.048 HISTORY OF RECTAL CANCER: Primary | ICD-10-CM

## 2020-02-18 DIAGNOSIS — D50.0 IRON DEFICIENCY ANEMIA DUE TO CHRONIC BLOOD LOSS: ICD-10-CM

## 2020-02-18 LAB
ALBUMIN SERPL BCP-MCNC: 3.6 G/DL (ref 3.5–5.2)
ALP SERPL-CCNC: 409 U/L (ref 55–135)
ALT SERPL W/O P-5'-P-CCNC: 72 U/L (ref 10–44)
ANION GAP SERPL CALC-SCNC: 13 MMOL/L (ref 8–16)
AST SERPL-CCNC: 35 U/L (ref 10–40)
BASOPHILS # BLD AUTO: 0.03 K/UL (ref 0–0.2)
BASOPHILS NFR BLD: 0.4 % (ref 0–1.9)
BILIRUB SERPL-MCNC: 2.3 MG/DL (ref 0.1–1)
BUN SERPL-MCNC: 25 MG/DL (ref 8–23)
CALCIUM SERPL-MCNC: 9.5 MG/DL (ref 8.7–10.5)
CEA SERPL-MCNC: 2.6 NG/ML (ref 0–5)
CHLORIDE SERPL-SCNC: 99 MMOL/L (ref 95–110)
CO2 SERPL-SCNC: 24 MMOL/L (ref 23–29)
COMPLEXED PSA SERPL-MCNC: 7.7 NG/ML (ref 0–4)
CREAT SERPL-MCNC: 1.5 MG/DL (ref 0.5–1.4)
DIFFERENTIAL METHOD: ABNORMAL
EOSINOPHIL # BLD AUTO: 0.2 K/UL (ref 0–0.5)
EOSINOPHIL NFR BLD: 2.5 % (ref 0–8)
ERYTHROCYTE [DISTWIDTH] IN BLOOD BY AUTOMATED COUNT: 13.8 % (ref 11.5–14.5)
EST. GFR  (AFRICAN AMERICAN): 51 ML/MIN/1.73 M^2
EST. GFR  (NON AFRICAN AMERICAN): 44 ML/MIN/1.73 M^2
FERRITIN SERPL-MCNC: 349 NG/ML (ref 20–300)
GLUCOSE SERPL-MCNC: 96 MG/DL (ref 70–110)
HCT VFR BLD AUTO: 41.9 % (ref 40–54)
HGB BLD-MCNC: 13.9 G/DL (ref 14–18)
IMM GRANULOCYTES # BLD AUTO: 0.04 K/UL (ref 0–0.04)
IMM GRANULOCYTES NFR BLD AUTO: 0.5 % (ref 0–0.5)
IRON SERPL-MCNC: 49 UG/DL (ref 45–160)
LYMPHOCYTES # BLD AUTO: 2 K/UL (ref 1–4.8)
LYMPHOCYTES NFR BLD: 24.8 % (ref 18–48)
MCH RBC QN AUTO: 30.6 PG (ref 27–31)
MCHC RBC AUTO-ENTMCNC: 33.2 G/DL (ref 32–36)
MCV RBC AUTO: 92 FL (ref 82–98)
MONOCYTES # BLD AUTO: 1.4 K/UL (ref 0.3–1)
MONOCYTES NFR BLD: 17.8 % (ref 4–15)
NEUTROPHILS # BLD AUTO: 4.4 K/UL (ref 1.8–7.7)
NEUTROPHILS NFR BLD: 54.5 % (ref 38–73)
NRBC BLD-RTO: 0 /100 WBC
PLATELET # BLD AUTO: 197 K/UL (ref 150–350)
PMV BLD AUTO: 10 FL (ref 9.2–12.9)
POTASSIUM SERPL-SCNC: 4.8 MMOL/L (ref 3.5–5.1)
PROT SERPL-MCNC: 6.9 G/DL (ref 6–8.4)
RBC # BLD AUTO: 4.54 M/UL (ref 4.6–6.2)
SATURATED IRON: 18 % (ref 20–50)
SODIUM SERPL-SCNC: 136 MMOL/L (ref 136–145)
TOTAL IRON BINDING CAPACITY: 271 UG/DL (ref 250–450)
TRANSFERRIN SERPL-MCNC: 183 MG/DL (ref 200–375)
VIT B12 SERPL-MCNC: 843 PG/ML (ref 210–950)
WBC # BLD AUTO: 8.08 K/UL (ref 3.9–12.7)

## 2020-02-18 PROCEDURE — 82378 CARCINOEMBRYONIC ANTIGEN: CPT | Mod: HCNC

## 2020-02-18 PROCEDURE — 36415 COLL VENOUS BLD VENIPUNCTURE: CPT | Mod: HCNC

## 2020-02-18 PROCEDURE — 99999 PR PBB SHADOW E&M-EST. PATIENT-LVL III: CPT | Mod: PBBFAC,HCNC,, | Performed by: INTERNAL MEDICINE

## 2020-02-18 PROCEDURE — 1101F PT FALLS ASSESS-DOCD LE1/YR: CPT | Mod: HCNC,CPTII,S$GLB, | Performed by: INTERNAL MEDICINE

## 2020-02-18 PROCEDURE — 99999 PR PBB SHADOW E&M-EST. PATIENT-LVL I: ICD-10-PCS | Mod: PBBFAC,HCNC,, | Performed by: OPTOMETRIST

## 2020-02-18 PROCEDURE — 92014 COMPRE OPH EXAM EST PT 1/>: CPT | Mod: HCNC,S$GLB,, | Performed by: OPTOMETRIST

## 2020-02-18 PROCEDURE — 1101F PR PT FALLS ASSESS DOC 0-1 FALLS W/OUT INJ PAST YR: ICD-10-PCS | Mod: HCNC,CPTII,S$GLB, | Performed by: INTERNAL MEDICINE

## 2020-02-18 PROCEDURE — 3075F PR MOST RECENT SYSTOLIC BLOOD PRESS GE 130-139MM HG: ICD-10-PCS | Mod: HCNC,CPTII,S$GLB, | Performed by: INTERNAL MEDICINE

## 2020-02-18 PROCEDURE — 3078F DIAST BP <80 MM HG: CPT | Mod: HCNC,CPTII,S$GLB, | Performed by: INTERNAL MEDICINE

## 2020-02-18 PROCEDURE — 99999 PR PBB SHADOW E&M-EST. PATIENT-LVL III: ICD-10-PCS | Mod: PBBFAC,HCNC,, | Performed by: INTERNAL MEDICINE

## 2020-02-18 PROCEDURE — 84165 PATHOLOGIST INTERPRETATION SPE: ICD-10-PCS | Mod: 26,HCNC,, | Performed by: PATHOLOGY

## 2020-02-18 PROCEDURE — 1126F PR PAIN SEVERITY QUANTIFIED, NO PAIN PRESENT: ICD-10-PCS | Mod: HCNC,S$GLB,, | Performed by: INTERNAL MEDICINE

## 2020-02-18 PROCEDURE — 82728 ASSAY OF FERRITIN: CPT | Mod: HCNC

## 2020-02-18 PROCEDURE — 99499 UNLISTED E&M SERVICE: CPT | Mod: HCNC,S$GLB,, | Performed by: INTERNAL MEDICINE

## 2020-02-18 PROCEDURE — 84153 ASSAY OF PSA TOTAL: CPT | Mod: HCNC

## 2020-02-18 PROCEDURE — 82607 VITAMIN B-12: CPT | Mod: HCNC

## 2020-02-18 PROCEDURE — 3075F SYST BP GE 130 - 139MM HG: CPT | Mod: HCNC,CPTII,S$GLB, | Performed by: INTERNAL MEDICINE

## 2020-02-18 PROCEDURE — 92014 PR EYE EXAM, EST PATIENT,COMPREHESV: ICD-10-PCS | Mod: HCNC,S$GLB,, | Performed by: OPTOMETRIST

## 2020-02-18 PROCEDURE — 84165 PROTEIN E-PHORESIS SERUM: CPT | Mod: 26,HCNC,, | Performed by: PATHOLOGY

## 2020-02-18 PROCEDURE — 84165 PROTEIN E-PHORESIS SERUM: CPT | Mod: HCNC

## 2020-02-18 PROCEDURE — 83540 ASSAY OF IRON: CPT | Mod: HCNC

## 2020-02-18 PROCEDURE — 1159F MED LIST DOCD IN RCRD: CPT | Mod: HCNC,S$GLB,, | Performed by: INTERNAL MEDICINE

## 2020-02-18 PROCEDURE — 1159F PR MEDICATION LIST DOCUMENTED IN MEDICAL RECORD: ICD-10-PCS | Mod: HCNC,S$GLB,, | Performed by: INTERNAL MEDICINE

## 2020-02-18 PROCEDURE — 3078F PR MOST RECENT DIASTOLIC BLOOD PRESSURE < 80 MM HG: ICD-10-PCS | Mod: HCNC,CPTII,S$GLB, | Performed by: INTERNAL MEDICINE

## 2020-02-18 PROCEDURE — 99215 OFFICE O/P EST HI 40 MIN: CPT | Mod: HCNC,S$GLB,, | Performed by: INTERNAL MEDICINE

## 2020-02-18 PROCEDURE — 83520 IMMUNOASSAY QUANT NOS NONAB: CPT | Mod: HCNC

## 2020-02-18 PROCEDURE — 99215 PR OFFICE/OUTPT VISIT, EST, LEVL V, 40-54 MIN: ICD-10-PCS | Mod: HCNC,S$GLB,, | Performed by: INTERNAL MEDICINE

## 2020-02-18 PROCEDURE — 80053 COMPREHEN METABOLIC PANEL: CPT | Mod: HCNC

## 2020-02-18 PROCEDURE — 99999 PR PBB SHADOW E&M-EST. PATIENT-LVL I: CPT | Mod: PBBFAC,HCNC,, | Performed by: OPTOMETRIST

## 2020-02-18 PROCEDURE — 1126F AMNT PAIN NOTED NONE PRSNT: CPT | Mod: HCNC,S$GLB,, | Performed by: INTERNAL MEDICINE

## 2020-02-18 PROCEDURE — 99499 RISK ADDL DX/OHS AUDIT: ICD-10-PCS | Mod: HCNC,S$GLB,, | Performed by: INTERNAL MEDICINE

## 2020-02-18 PROCEDURE — 85025 COMPLETE CBC W/AUTO DIFF WBC: CPT | Mod: HCNC

## 2020-02-18 NOTE — PROGRESS NOTES
HPI     PTs last visit was 3/22/19 with MGM for po PCIOL OD. PTs last exam was   11/20/18 with DNL.   PCIOL OD +21.0 SN60WF / CDE: 12.47 / 1-3-19 (topical, viscoat)  PCIOL OS 2/7/19Chalazion removal OD 12/6/18/ blepharotomy for lid abscess    HPI    Any vision changes since last exam: no   Eye pain: no  Other ocular symptoms: occasional headaches, occasional floaters OU, no   changes    Do you wear currently wear glasses or contacts? gls    Interested in contacts today? no    Do you plan on getting new glasses today? If needed             Last edited by Georgette Crandall MA on 2/18/2020  9:40 AM. (History)            Assessment /Plan     For exam results, see Encounter Report.    Vitreous floaters of both eyes  The nature of floaters was discussed with the patient in detail in lay terms.  Signs and symptoms of retinal detachment were discussed with patient in detail.   Return to clinic as soon as possible (same day) if you notice any new floaters, flashes of light, curtain/veil over your vision from any direction, or any change in vision.    Pseudophakia of both eyes  Stable OU    RTC 1 yr for dilated eye exam or PRN if any problems.   Discussed above and answered questions.

## 2020-02-18 NOTE — PROGRESS NOTES
Subjective:       Patient ID: Homero Tanner is a 79 y.o. male.    Chief Complaint: Follow-up    HPI This is a 77  year-old gentleman who comes for follow up of her previously diagnosed thymoma, rectal cancer, prostate cancer, b12 deficiency. And Waldestrom;s macroglobulinemia.    He is known to us because of  A history of   previously treated prostate cancer. He was diagnosed around 2004 and treated   with surgery. He relapsed by PSA and was treated with local radiation therapy.   He had further elevation of the PSA and started intermittent LHRH   analogues through the office of his urologist outside our clinic  he receives the injections at his urologist office.  He says he hs been of Lupron x close to 2 years   A CBC done on   November 2012 was reported showing a hemoglobin of 12.7. As part of workup his   primary care physician ordered a serum ferritin and total iron binding   capacity, which were unremarkable. Serum folate was normal at 5.5. Vitamin B12   was low at 151. He then started supplemental B12 injection, which  he is still   getting.   Also, as part of the workup for the anemia, serum protein electrophoresis   showed a paraprotein band that was quantified at 1.03 g and identified as IgM.   The patient was then referred to me. I ordered lab tests, which included a   thoracic spine view looking for lytic lesions. The radiologist reported that there   were no lytic lesions but there was a mass in the center of the chest  . This was followed by chest x-rays and CT scans. The   CT scan showed a large mediastinal mass. A CT-guided biopsy at [facility] was read   as showing a thymoma.   He underwent surgery with Dr. Levi Butler at [facility]. The thymoma was involving the pericardium so he received   postoperative radiation therapy.   He finished the radiation therapy and has   remained with without evaluable disease in regards to his thymoma.      A routine lab tests done a few  months later showed that there has been a dramatic   drop in his hemoglobin, which was now 7.8 with a low serum ferritin of 8   suggesting iron deficiency anemia.   Stools were frankly Hemoccult positive.   In regards to his past history, the patient underwent a colonoscopy within our   system on 02/28/2012. The patient was found to have a sessile polyp in the   rectum. An endoscopic removal could not be done.   The patient was referred to another specialist in [facility] for resection since it seemed to   be arising in the setting of the radiation therapy field for his prostate   cancer.   The pathology report from that 2/2012 endoscopy was that of a tubular adenoma.   The patient was then seen in consultation by GI doctor outside the clinic. ,giana Arellano. The patient then underwent resection of the   lesion. The pathology report was the same.   The patient underwent a colonoscopy at [facility] on   05/07/2014, as part of the work up for his recently diagnosed iron deficiency.. It was done by Dr. Jaime Ash. Dr. Ash found a mass in the   rectal area. It was partially excised. Pathology was once again that of   tubulovillous adenoma with dysplasia.   The area of involvement coincided with the radiation therapy field for the prostate   cancer.   The patient had removal of the villous adenoma in [facility] by Dr Ansari.   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   Following the excision he had 2 episodes of rectal bleeding. He ended up having another surgery to control the bleeing and to do a re-excision of the area where the minimally invasive cancer was found.  Pathology revealed no residual cancer.  There has been no more bleeding. He feels fine  He also injects himself with b12 due low b12 levels.          Because of continuous raise of the monoclonal spike in the SPEP, a bone marrow was done 7/2018 which was read as being consistent with a  lymphoblastic lymphoma ( MWaldestrom;s macroglobulinemia)>  He was treated with Dr Weaver with Velcade/dex/Rituxan achieving a good biochemical response.  He was given 4 additional treatments of maintenance Rituxan with the last dose around 4 months ago,    ALLERGIES: see Med card  MEDICATIONS: See MedCard.   PREVIOUS SURGERIES: Prostatectomy around the year 2000 or so, rectal surgery   for removal of tubular adenoma.x2, and another surgery for a minimally invasive rectal cancer 10/2014 Tonsillectomy, removal of thymoma.   SOCIAL HISTORY: Single with two children. Lives in [place]. He smoked   until age 31, approximately a pack a day for 15 years. He is a retired   .   FAMILY HISTORY: No cancer. Father had diabetes. Father had heart disease.   PAST MEDICAL HISTORY:   1. Anemia.   2. 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   3. History of treated prostate cancer.   4. Mild anemia.   5. Iron deficiency anemia.   6. High blood pressure.   7. Elevated cholesterol.   8. Recurrent villous adenoma of the rectum.   9. Reflux.   10. Vitamin B12 deficiency.   11. Status post resection of thymoma of the anterior mediastinum.   12. Skin cancer scalp  13-hx of treated prostate cancer  Review of Systems   Constitutional: Negative.  Negative for fatigue.   Eyes: Negative.    Cardiovascular: Negative.  Negative for chest pain.   Gastrointestinal: Negative for abdominal pain and nausea.   Genitourinary: Negative.  Negative for hematuria.   Musculoskeletal: Negative.    Skin: Negative.    Neurological: Negative.    Psychiatric/Behavioral: Negative.        Objective:      Physical Exam   Constitutional: He is oriented to person, place, and time. He appears well-developed and well-nourished.   HENT:   Head: Normocephalic.   Right Ear: Tympanic membrane, external ear and ear canal normal.   Left Ear: Tympanic membrane, external ear and ear canal normal.   Nose: Nose normal. Right sinus  exhibits no maxillary sinus tenderness and no frontal sinus tenderness. Left sinus exhibits no maxillary sinus tenderness and no frontal sinus tenderness.   Mouth/Throat: No oropharyngeal exudate.   Teeth look normal.  Gums look normal.   Eyes: Pupils are equal, round, and reactive to light. Conjunctivae and lids are normal.   Neck: Trachea normal and normal range of motion. Neck supple. No thyroid mass and no thyromegaly present.   No crepitus.   Cardiovascular: Normal rate, regular rhythm, S1 normal, S2 normal, normal heart sounds and normal pulses. Exam reveals no gallop.   No murmur heard.     Carotid exam normal  Skin temperature in extremities is normal  No edema of extremities   Pulmonary/Chest: Effort normal. No accessory muscle usage. No respiratory distress. He has no wheezes. He has no rales.   Diaphragmatic movement normal.   Abdominal: Soft. Bowel sounds are normal. He exhibits no distension and no mass. There is no splenomegaly or hepatomegaly. There is no tenderness. There is no rebound and no guarding.   Musculoskeletal: Normal range of motion. He exhibits no edema.        Right hand: Normal.        Left hand: Normal.   Lymphadenopathy:     He has no cervical adenopathy.     He has no axillary adenopathy.        Right: No inguinal and no supraclavicular adenopathy present.        Left: No inguinal and no supraclavicular adenopathy present.   Neurological: He is alert and oriented to person, place, and time.       Skin: Skin is warm and dry. No abrasion, no bruising, no ecchymosis, no lesion, no petechiae and no rash noted. No cyanosis or erythema. Nails show no clubbing.   Psychiatric: He has a normal mood and affect. His behavior is normal.       Wt Readings from Last 3 Encounters:   02/18/20 82.1 kg (181 lb)   02/10/20 84.6 kg (186 lb 8.2 oz)   10/22/19 75.3 kg (166 lb)     Temp Readings from Last 3 Encounters:   02/18/20 97.2 °F (36.2 °C) (Oral)   10/22/19 98.2 °F (36.8 °C) (Skin)   10/21/19 97.3  °F (36.3 °C)     BP Readings from Last 3 Encounters:   02/18/20 139/69   02/10/20 (!) 158/78   10/22/19 (!) 146/81     Pulse Readings from Last 3 Encounters:   02/18/20 (!) 54   02/10/20 (!) 54   10/22/19 (!) 58       Assessment:       1. History of rectal cancer    2. Prostate cancer    3. History of thymoma    4. Other vitamin B12 deficiency anemia    5. Waldenstrom's macroglobulinemia        Plan:       Lab Results   Component Value Date    WBC 8.08 02/18/2020    HGB 13.9 (L) 02/18/2020    HCT 41.9 02/18/2020    MCV 92 02/18/2020     02/18/2020       Lab Results   Component Value Date    CREATININE 1.5 (H) 02/18/2020     Lab Results   Component Value Date    ALT 72 (H) 02/18/2020    AST 35 02/18/2020    GGT 1,086 (H) 08/30/2018    ALKPHOS 409 (H) 02/18/2020    BILITOT 2.3 (H) 02/18/2020        SPEP/free light chains/PSA CEA pending  See me in a week

## 2020-02-19 LAB
ALBUMIN SERPL ELPH-MCNC: 3.77 G/DL (ref 3.35–5.55)
ALPHA1 GLOB SERPL ELPH-MCNC: 0.38 G/DL (ref 0.17–0.41)
ALPHA2 GLOB SERPL ELPH-MCNC: 0.84 G/DL (ref 0.43–0.99)
B-GLOBULIN SERPL ELPH-MCNC: 1.16 G/DL (ref 0.5–1.1)
GAMMA GLOB SERPL ELPH-MCNC: 0.24 G/DL (ref 0.67–1.58)
KAPPA LC SER QL IA: 1.35 MG/DL (ref 0.33–1.94)
KAPPA LC/LAMBDA SER IA: 0.85 (ref 0.26–1.65)
LAMBDA LC SER QL IA: 1.59 MG/DL (ref 0.57–2.63)
PATHOLOGIST INTERPRETATION SPE: NORMAL
PROT SERPL-MCNC: 6.4 G/DL (ref 6–8.4)

## 2020-02-26 ENCOUNTER — OFFICE VISIT (OUTPATIENT)
Dept: HEMATOLOGY/ONCOLOGY | Facility: CLINIC | Age: 80
End: 2020-02-26
Payer: MEDICARE

## 2020-02-26 VITALS
SYSTOLIC BLOOD PRESSURE: 148 MMHG | OXYGEN SATURATION: 97 % | BODY MASS INDEX: 25.98 KG/M2 | WEIGHT: 181.44 LBS | DIASTOLIC BLOOD PRESSURE: 67 MMHG | HEIGHT: 70 IN | RESPIRATION RATE: 18 BRPM | HEART RATE: 52 BPM | TEMPERATURE: 98 F

## 2020-02-26 DIAGNOSIS — R79.89 ELEVATED LIVER FUNCTION TESTS: ICD-10-CM

## 2020-02-26 DIAGNOSIS — C88.0 WALDENSTROM'S MACROGLOBULINEMIA: ICD-10-CM

## 2020-02-26 DIAGNOSIS — R74.8 ABNORMAL LEVELS OF OTHER SERUM ENZYMES: ICD-10-CM

## 2020-02-26 DIAGNOSIS — C61 PROSTATE CANCER: Primary | ICD-10-CM

## 2020-02-26 PROCEDURE — 3078F DIAST BP <80 MM HG: CPT | Mod: HCNC,CPTII,S$GLB, | Performed by: INTERNAL MEDICINE

## 2020-02-26 PROCEDURE — 1126F PR PAIN SEVERITY QUANTIFIED, NO PAIN PRESENT: ICD-10-PCS | Mod: HCNC,S$GLB,, | Performed by: INTERNAL MEDICINE

## 2020-02-26 PROCEDURE — 1159F PR MEDICATION LIST DOCUMENTED IN MEDICAL RECORD: ICD-10-PCS | Mod: HCNC,S$GLB,, | Performed by: INTERNAL MEDICINE

## 2020-02-26 PROCEDURE — 99999 PR PBB SHADOW E&M-EST. PATIENT-LVL IV: ICD-10-PCS | Mod: PBBFAC,HCNC,, | Performed by: INTERNAL MEDICINE

## 2020-02-26 PROCEDURE — 1101F PR PT FALLS ASSESS DOC 0-1 FALLS W/OUT INJ PAST YR: ICD-10-PCS | Mod: HCNC,CPTII,S$GLB, | Performed by: INTERNAL MEDICINE

## 2020-02-26 PROCEDURE — 1159F MED LIST DOCD IN RCRD: CPT | Mod: HCNC,S$GLB,, | Performed by: INTERNAL MEDICINE

## 2020-02-26 PROCEDURE — 1101F PT FALLS ASSESS-DOCD LE1/YR: CPT | Mod: HCNC,CPTII,S$GLB, | Performed by: INTERNAL MEDICINE

## 2020-02-26 PROCEDURE — 1126F AMNT PAIN NOTED NONE PRSNT: CPT | Mod: HCNC,S$GLB,, | Performed by: INTERNAL MEDICINE

## 2020-02-26 PROCEDURE — 99215 PR OFFICE/OUTPT VISIT, EST, LEVL V, 40-54 MIN: ICD-10-PCS | Mod: HCNC,S$GLB,, | Performed by: INTERNAL MEDICINE

## 2020-02-26 PROCEDURE — 3077F SYST BP >= 140 MM HG: CPT | Mod: HCNC,CPTII,S$GLB, | Performed by: INTERNAL MEDICINE

## 2020-02-26 PROCEDURE — 99499 UNLISTED E&M SERVICE: CPT | Mod: HCNC,S$GLB,, | Performed by: INTERNAL MEDICINE

## 2020-02-26 PROCEDURE — 3077F PR MOST RECENT SYSTOLIC BLOOD PRESSURE >= 140 MM HG: ICD-10-PCS | Mod: HCNC,CPTII,S$GLB, | Performed by: INTERNAL MEDICINE

## 2020-02-26 PROCEDURE — 3078F PR MOST RECENT DIASTOLIC BLOOD PRESSURE < 80 MM HG: ICD-10-PCS | Mod: HCNC,CPTII,S$GLB, | Performed by: INTERNAL MEDICINE

## 2020-02-26 PROCEDURE — 99499 RISK ADDL DX/OHS AUDIT: ICD-10-PCS | Mod: HCNC,S$GLB,, | Performed by: INTERNAL MEDICINE

## 2020-02-26 PROCEDURE — 99215 OFFICE O/P EST HI 40 MIN: CPT | Mod: HCNC,S$GLB,, | Performed by: INTERNAL MEDICINE

## 2020-02-26 PROCEDURE — 99999 PR PBB SHADOW E&M-EST. PATIENT-LVL IV: CPT | Mod: PBBFAC,HCNC,, | Performed by: INTERNAL MEDICINE

## 2020-02-26 NOTE — PROGRESS NOTES
Subjective:       Patient ID: Homero Tanner is a 79 y.o. male.    Chief Complaint: Follow-up    HPI This is a 77  year-old gentleman who comes for follow up of her previously diagnosed thymoma, rectal cancer, prostate cancer, b12 deficiency. And Waldestrom;s macroglobulinemia.     He is known to us because of  A history of   previously treated prostate cancer. He was diagnosed around 2004 and treated   with surgery. He relapsed by PSA and was treated with local radiation therapy.   He had further elevation of the PSA and started intermittent LHRH   analogues through the office of his urologist outside our clinic  he receives the injections at his urologist office.  He says he hs been of Lupron x close to 2 years   A CBC done on   November 2012 was reported showing a hemoglobin of 12.7. As part of workup his   primary care physician ordered a serum ferritin and total iron binding   capacity, which were unremarkable. Serum folate was normal at 5.5. Vitamin B12   was low at 151. He then started supplemental B12 injection, which  he is still   getting.   Also, as part of the workup for the anemia, serum protein electrophoresis   showed a paraprotein band that was quantified at 1.03 g and identified as IgM.   The patient was then referred to me. I ordered lab tests, which included a   thoracic spine view looking for lytic lesions. The radiologist reported that there   were no lytic lesions but there was a mass in the center of the chest  . This was followed by chest x-rays and CT scans. The   CT scan showed a large mediastinal mass. A CT-guided biopsy at [facility] was read   as showing a thymoma.   He underwent surgery with Dr. Levi Butler at [facility]. The thymoma was involving the pericardium so he received   postoperative radiation therapy.   He finished the radiation therapy and has   remained with without evaluable disease in regards to his thymoma.      A routine lab tests done a few  months later showed that there has been a dramatic   drop in his hemoglobin, which was now 7.8 with a low serum ferritin of 8   suggesting iron deficiency anemia.   Stools were frankly Hemoccult positive.   In regards to his past history, the patient underwent a colonoscopy within our   system on 02/28/2012. The patient was found to have a sessile polyp in the   rectum. An endoscopic removal could not be done.   The patient was referred to another specialist in [facility] for resection since it seemed to   be arising in the setting of the radiation therapy field for his prostate   cancer.   The pathology report from that 2/2012 endoscopy was that of a tubular adenoma.   The patient was then seen in consultation by GI doctor outside the clinic. ,giana Arellano. The patient then underwent resection of the   lesion. The pathology report was the same.   The patient underwent a colonoscopy at [facility] on   05/07/2014, as part of the work up for his recently diagnosed iron deficiency.. It was done by Dr. Jaime Ash. Dr. Ash found a mass in the   rectal area. It was partially excised. Pathology was once again that of   tubulovillous adenoma with dysplasia.   The area of involvement coincided with the radiation therapy field for the prostate   cancer.   The patient had removal of the villous adenoma in [facility] by Dr Ansari.   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   Following the excision he had 2 episodes of rectal bleeding. He ended up having another surgery to control the bleeing and to do a re-excision of the area where the minimally invasive cancer was found.  Pathology revealed no residual cancer.  There has been no more bleeding. He feels fine  He also injects himself with b12 due low b12 levels.          Because of continuous raise of the monoclonal spike in the SPEP, a bone marrow was done 7/2018 which was read as being consistent with a  lymphoblastic lymphoma ( MWaldestrom;s macroglobulinemia)>  He was treated with Dr Weaver with Velcade/dex/Rituxan achieving a good biochemical response.  He was given 4 additional treatments of maintenance Rituxan with the last dose around 4 months ago,    He comes to go over the most recent lab tests;    ALLERGIES: see Med card  MEDICATIONS: See MedCard.   PREVIOUS SURGERIES: Prostatectomy around the year 2000 or so, rectal surgery   for removal of tubular adenoma.x2, and another surgery for a minimally invasive rectal cancer 10/2014 Tonsillectomy, removal of thymoma.   SOCIAL HISTORY: Single with two children. Lives in [place]. He smoked   until age 31, approximately a pack a day for 15 years. He is a retired   .   FAMILY HISTORY: No cancer. Father had diabetes. Father had heart disease.   PAST MEDICAL HISTORY:   1. Anemia.   2. 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   3. History of treated prostate cancer.   4. Mild anemia.   5. Iron deficiency anemia.   6. High blood pressure.   7. Elevated cholesterol.   8. Recurrent villous adenoma of the rectum.   9. Reflux.   10. Vitamin B12 deficiency.   11. Status post resection of thymoma of the anterior mediastinum.   12. Skin cancer scalp  13-hx of treated prostate cancer  Review of Systems   Constitutional: Negative.  Negative for fatigue.   Eyes: Negative.    Cardiovascular: Negative.  Negative for chest pain.   Gastrointestinal: Negative for abdominal pain and nausea.   Genitourinary: Negative.  Negative for hematuria.   Musculoskeletal: Negative.    Skin: Negative.    Neurological: Negative.    Psychiatric/Behavioral: Negative.        Objective:      Physical Exam   Constitutional: He is oriented to person, place, and time. He appears well-developed and well-nourished.   HENT:   Head: Normocephalic.   Right Ear: Tympanic membrane and ear canal normal.   Left Ear: Tympanic membrane and ear canal normal.   Nose: Nose  normal. Right sinus exhibits no maxillary sinus tenderness and no frontal sinus tenderness. Left sinus exhibits no maxillary sinus tenderness and no frontal sinus tenderness.   Mouth/Throat: No oropharyngeal exudate.   Teeth look normal.  Gums look normal.   Eyes: Pupils are equal, round, and reactive to light. Conjunctivae and lids are normal.   Neck: Trachea normal and normal range of motion. Neck supple. No thyroid mass and no thyromegaly present.   No crepitus.   Cardiovascular: Normal rate, S1 normal, S2 normal and normal pulses. Exam reveals no gallop.   No murmur heard.     Carotid exam normal  Skin temperature in extremities is normal  No edema of extremities   Pulmonary/Chest: Effort normal. No accessory muscle usage. No respiratory distress. He has no wheezes. He has no rales.   Diaphragmatic movement normal.   Abdominal: He exhibits no distension and no mass. There is no splenomegaly or hepatomegaly. There is no tenderness. There is no rebound and no guarding.   Musculoskeletal: Normal range of motion. He exhibits no edema.        Right hand: Normal.        Left hand: Normal.   Lymphadenopathy:     He has no cervical adenopathy.     He has no axillary adenopathy.        Right: No inguinal and no supraclavicular adenopathy present.        Left: No inguinal and no supraclavicular adenopathy present.   Neurological: He is alert and oriented to person, place, and time.       Skin: Skin is warm and dry. No abrasion, no bruising, no ecchymosis, no lesion, no petechiae and no rash noted. No cyanosis or erythema. Nails show no clubbing.   Psychiatric: He has a normal mood and affect. His behavior is normal.       Wt Readings from Last 3 Encounters:   02/26/20 82.3 kg (181 lb 7 oz)   02/18/20 82.1 kg (181 lb)   02/10/20 84.6 kg (186 lb 8.2 oz)     Temp Readings from Last 3 Encounters:   02/26/20 98.2 °F (36.8 °C) (Oral)   02/18/20 97.2 °F (36.2 °C) (Oral)   10/22/19 98.2 °F (36.8 °C) (Skin)     BP Readings from  Last 3 Encounters:   02/26/20 (!) 148/67   02/18/20 139/69   02/10/20 (!) 158/78     Pulse Readings from Last 3 Encounters:   02/26/20 (!) 52   02/18/20 (!) 54   02/10/20 (!) 54       Assessment:       1. Prostate cancer    2. Waldenstrom's macroglobulinemia    3. Elevated liver function tests    4. Abnormal levels of other serum enzymes         Plan:       Lab Results   Component Value Date    WBC 8.08 02/18/2020    HGB 13.9 (L) 02/18/2020    HCT 41.9 02/18/2020    MCV 92 02/18/2020     02/18/2020       Lab Results   Component Value Date    CREATININE 1.5 (H) 02/18/2020     Lab Results   Component Value Date    ALT 72 (H) 02/18/2020    AST 35 02/18/2020    GGT 1,086 (H) 08/30/2018    ALKPHOS 409 (H) 02/18/2020    BILITOT 2.3 (H) 02/18/2020        DX PSA 7.7  Lab Results   Component Value Date    CEA 2.6 02/18/2020           SPEp shows the monoclonal spike is down to 0.74. Gr    1-prostate ca: He has been off Lupron.he says he will contact his urologist to discuss restarting. Repeat PSA in a month as x his request  2-elevated LFT: says he has been drinkinG 4 beers a week. No other alcohol. Asked to stop drinking completely. Return in 4 weeks with a LFT, direct bilirubin, GGT and alkaline phosphatAse isozymes.  If the remain high, we will discuss repeating Ct/PET and liver biopsy. diffenntial includes involvement of the liver by lymphoma but I would expect the monoclonal spike to be higher if that was the case.  3-Waldeestrom;s: see above  Repeat SPEp/free light chains/cbc  and quantitative immunoglobulins in 4 weeks    Complex visit requiring addresing multiple complicated medical issues

## 2020-03-20 ENCOUNTER — LAB VISIT (OUTPATIENT)
Dept: LAB | Facility: HOSPITAL | Age: 80
End: 2020-03-20
Attending: INTERNAL MEDICINE
Payer: MEDICARE

## 2020-03-20 DIAGNOSIS — C88.0 WALDENSTROM'S MACROGLOBULINEMIA: ICD-10-CM

## 2020-03-20 DIAGNOSIS — C61 PROSTATE CANCER: ICD-10-CM

## 2020-03-20 DIAGNOSIS — R79.89 ELEVATED LIVER FUNCTION TESTS: ICD-10-CM

## 2020-03-20 DIAGNOSIS — R74.8 ABNORMAL LEVELS OF OTHER SERUM ENZYMES: ICD-10-CM

## 2020-03-20 LAB
ALBUMIN SERPL BCP-MCNC: 3.8 G/DL (ref 3.5–5.2)
ALP SERPL-CCNC: 399 U/L (ref 55–135)
ALT SERPL W/O P-5'-P-CCNC: 43 U/L (ref 10–44)
ANION GAP SERPL CALC-SCNC: 10 MMOL/L (ref 8–16)
AST SERPL-CCNC: 30 U/L (ref 10–40)
BASOPHILS # BLD AUTO: 0.06 K/UL (ref 0–0.2)
BASOPHILS NFR BLD: 0.7 % (ref 0–1.9)
BILIRUB DIRECT SERPL-MCNC: 0.5 MG/DL (ref 0.1–0.3)
BILIRUB SERPL-MCNC: 1 MG/DL (ref 0.1–1)
BUN SERPL-MCNC: 31 MG/DL (ref 8–23)
CALCIUM SERPL-MCNC: 9.7 MG/DL (ref 8.7–10.5)
CHLORIDE SERPL-SCNC: 104 MMOL/L (ref 95–110)
CO2 SERPL-SCNC: 26 MMOL/L (ref 23–29)
COMPLEXED PSA SERPL-MCNC: 18.1 NG/ML (ref 0–4)
CREAT SERPL-MCNC: 1.3 MG/DL (ref 0.5–1.4)
DIFFERENTIAL METHOD: NORMAL
EOSINOPHIL # BLD AUTO: 0.5 K/UL (ref 0–0.5)
EOSINOPHIL NFR BLD: 6.2 % (ref 0–8)
ERYTHROCYTE [DISTWIDTH] IN BLOOD BY AUTOMATED COUNT: 13.7 % (ref 11.5–14.5)
EST. GFR  (AFRICAN AMERICAN): >60 ML/MIN/1.73 M^2
EST. GFR  (NON AFRICAN AMERICAN): 52 ML/MIN/1.73 M^2
GGT SERPL-CCNC: 226 U/L (ref 8–55)
GLUCOSE SERPL-MCNC: 99 MG/DL (ref 70–110)
HCT VFR BLD AUTO: 44.1 % (ref 40–54)
HGB BLD-MCNC: 14.4 G/DL (ref 14–18)
IGA SERPL-MCNC: 37 MG/DL (ref 40–350)
IGG SERPL-MCNC: 234 MG/DL (ref 650–1600)
IGM SERPL-MCNC: 812 MG/DL (ref 50–300)
IMM GRANULOCYTES # BLD AUTO: 0.04 K/UL (ref 0–0.04)
IMM GRANULOCYTES NFR BLD AUTO: 0.5 % (ref 0–0.5)
LYMPHOCYTES # BLD AUTO: 2.8 K/UL (ref 1–4.8)
LYMPHOCYTES NFR BLD: 34 % (ref 18–48)
MCH RBC QN AUTO: 30.7 PG (ref 27–31)
MCHC RBC AUTO-ENTMCNC: 32.7 G/DL (ref 32–36)
MCV RBC AUTO: 94 FL (ref 82–98)
MONOCYTES # BLD AUTO: 0.9 K/UL (ref 0.3–1)
MONOCYTES NFR BLD: 11 % (ref 4–15)
NEUTROPHILS # BLD AUTO: 4 K/UL (ref 1.8–7.7)
NEUTROPHILS NFR BLD: 48.1 % (ref 38–73)
NRBC BLD-RTO: 0 /100 WBC
PLATELET # BLD AUTO: 280 K/UL (ref 150–350)
PMV BLD AUTO: 9.8 FL (ref 9.2–12.9)
POTASSIUM SERPL-SCNC: 5.2 MMOL/L (ref 3.5–5.1)
PROT SERPL-MCNC: 6.8 G/DL (ref 6–8.4)
RBC # BLD AUTO: 4.69 M/UL (ref 4.6–6.2)
SODIUM SERPL-SCNC: 140 MMOL/L (ref 136–145)
WBC # BLD AUTO: 8.24 K/UL (ref 3.9–12.7)

## 2020-03-20 PROCEDURE — 83520 IMMUNOASSAY QUANT NOS NONAB: CPT | Mod: HCNC

## 2020-03-20 PROCEDURE — 36415 COLL VENOUS BLD VENIPUNCTURE: CPT | Mod: HCNC

## 2020-03-20 PROCEDURE — 85025 COMPLETE CBC W/AUTO DIFF WBC: CPT | Mod: HCNC

## 2020-03-20 PROCEDURE — 82977 ASSAY OF GGT: CPT | Mod: HCNC

## 2020-03-20 PROCEDURE — 84153 ASSAY OF PSA TOTAL: CPT | Mod: HCNC

## 2020-03-20 PROCEDURE — 80053 COMPREHEN METABOLIC PANEL: CPT | Mod: HCNC

## 2020-03-20 PROCEDURE — 84080 ASSAY ALKALINE PHOSPHATASES: CPT | Mod: HCNC

## 2020-03-20 PROCEDURE — 84075 ASSAY ALKALINE PHOSPHATASE: CPT | Mod: HCNC,91

## 2020-03-20 PROCEDURE — 84165 PROTEIN E-PHORESIS SERUM: CPT | Mod: HCNC

## 2020-03-20 PROCEDURE — 82248 BILIRUBIN DIRECT: CPT | Mod: HCNC

## 2020-03-20 PROCEDURE — 84165 PATHOLOGIST INTERPRETATION SPE: ICD-10-PCS | Mod: 26,HCNC,, | Performed by: PATHOLOGY

## 2020-03-20 PROCEDURE — 82784 ASSAY IGA/IGD/IGG/IGM EACH: CPT | Mod: 59,HCNC

## 2020-03-20 PROCEDURE — 84165 PROTEIN E-PHORESIS SERUM: CPT | Mod: 26,HCNC,, | Performed by: PATHOLOGY

## 2020-03-23 LAB
ALBUMIN SERPL ELPH-MCNC: 3.92 G/DL (ref 3.35–5.55)
ALPHA1 GLOB SERPL ELPH-MCNC: 0.33 G/DL (ref 0.17–0.41)
ALPHA2 GLOB SERPL ELPH-MCNC: 0.79 G/DL (ref 0.43–0.99)
B-GLOBULIN SERPL ELPH-MCNC: 1.11 G/DL (ref 0.5–1.1)
GAMMA GLOB SERPL ELPH-MCNC: 0.26 G/DL (ref 0.67–1.58)
KAPPA LC SER QL IA: 1.42 MG/DL (ref 0.33–1.94)
KAPPA LC/LAMBDA SER IA: 0.95 (ref 0.26–1.65)
LAMBDA LC SER QL IA: 1.49 MG/DL (ref 0.57–2.63)
PATHOLOGIST INTERPRETATION SPE: NORMAL
PROT SERPL-MCNC: 6.4 G/DL (ref 6–8.4)

## 2020-03-25 LAB
ALP BONE CFR SERPL: 27 % (ref 28–66)
ALP INTEST CFR SERPL: 0 % (ref 1–24)
ALP LIVER CFR SERPL: 73 % (ref 25–69)
ALP PLAC CFR SERPL: 0 %
ALP SERPL-CCNC: 356 U/L (ref 35–144)

## 2020-03-30 DIAGNOSIS — L71.9 ROSACEA: ICD-10-CM

## 2020-03-30 RX ORDER — METRONIDAZOLE 7.5 MG/G
CREAM TOPICAL
Qty: 45 G | Refills: 3 | Status: SHIPPED | OUTPATIENT
Start: 2020-03-30

## 2020-04-20 DIAGNOSIS — R60.0 EDEMA OF BOTH LEGS: ICD-10-CM

## 2020-04-20 DIAGNOSIS — I10 ESSENTIAL HYPERTENSION: Chronic | ICD-10-CM

## 2020-04-20 RX ORDER — FUROSEMIDE 40 MG/1
40 TABLET ORAL DAILY
Qty: 30 TABLET | Refills: 11 | Status: SHIPPED | OUTPATIENT
Start: 2020-04-20 | End: 2021-09-13

## 2020-05-13 ENCOUNTER — LAB VISIT (OUTPATIENT)
Dept: LAB | Facility: HOSPITAL | Age: 80
End: 2020-05-13
Attending: INTERNAL MEDICINE
Payer: MEDICARE

## 2020-05-13 ENCOUNTER — OFFICE VISIT (OUTPATIENT)
Dept: HEMATOLOGY/ONCOLOGY | Facility: CLINIC | Age: 80
End: 2020-05-13
Payer: MEDICARE

## 2020-05-13 VITALS
SYSTOLIC BLOOD PRESSURE: 164 MMHG | BODY MASS INDEX: 26.2 KG/M2 | HEIGHT: 70 IN | OXYGEN SATURATION: 96 % | HEART RATE: 63 BPM | DIASTOLIC BLOOD PRESSURE: 79 MMHG | TEMPERATURE: 99 F | WEIGHT: 183 LBS

## 2020-05-13 DIAGNOSIS — C88.0 WALDENSTROM'S MACROGLOBULINEMIA: ICD-10-CM

## 2020-05-13 DIAGNOSIS — R79.89 ELEVATED LIVER FUNCTION TESTS: Primary | ICD-10-CM

## 2020-05-13 DIAGNOSIS — C61 PROSTATE CANCER: ICD-10-CM

## 2020-05-13 DIAGNOSIS — R74.9 ABNORMAL SERUM ENZYME LEVEL, UNSPECIFIED: ICD-10-CM

## 2020-05-13 DIAGNOSIS — R79.89 ELEVATED LIVER FUNCTION TESTS: ICD-10-CM

## 2020-05-13 LAB
ALBUMIN SERPL BCP-MCNC: 4 G/DL (ref 3.5–5.2)
ALP SERPL-CCNC: 289 U/L (ref 55–135)
ALT SERPL W/O P-5'-P-CCNC: 75 U/L (ref 10–44)
ANION GAP SERPL CALC-SCNC: 11 MMOL/L (ref 8–16)
AST SERPL-CCNC: 80 U/L (ref 10–40)
BASOPHILS # BLD AUTO: 0.06 K/UL (ref 0–0.2)
BASOPHILS NFR BLD: 0.6 % (ref 0–1.9)
BILIRUB SERPL-MCNC: 1.1 MG/DL (ref 0.1–1)
BUN SERPL-MCNC: 22 MG/DL (ref 8–23)
CALCIUM SERPL-MCNC: 9.2 MG/DL (ref 8.7–10.5)
CHLORIDE SERPL-SCNC: 105 MMOL/L (ref 95–110)
CO2 SERPL-SCNC: 25 MMOL/L (ref 23–29)
COMPLEXED PSA SERPL-MCNC: 9.1 NG/ML (ref 0–4)
CREAT SERPL-MCNC: 1.1 MG/DL (ref 0.5–1.4)
DIFFERENTIAL METHOD: ABNORMAL
EOSINOPHIL # BLD AUTO: 0.2 K/UL (ref 0–0.5)
EOSINOPHIL NFR BLD: 2.4 % (ref 0–8)
ERYTHROCYTE [DISTWIDTH] IN BLOOD BY AUTOMATED COUNT: 14.1 % (ref 11.5–14.5)
EST. GFR  (AFRICAN AMERICAN): >60 ML/MIN/1.73 M^2
EST. GFR  (NON AFRICAN AMERICAN): >60 ML/MIN/1.73 M^2
GGT SERPL-CCNC: 209 U/L (ref 8–55)
GLUCOSE SERPL-MCNC: 111 MG/DL (ref 70–110)
HCT VFR BLD AUTO: 41.3 % (ref 40–54)
HGB BLD-MCNC: 13.4 G/DL (ref 14–18)
IMM GRANULOCYTES # BLD AUTO: 0.02 K/UL (ref 0–0.04)
IMM GRANULOCYTES NFR BLD AUTO: 0.2 % (ref 0–0.5)
LYMPHOCYTES # BLD AUTO: 2.5 K/UL (ref 1–4.8)
LYMPHOCYTES NFR BLD: 24.2 % (ref 18–48)
MCH RBC QN AUTO: 30.1 PG (ref 27–31)
MCHC RBC AUTO-ENTMCNC: 32.4 G/DL (ref 32–36)
MCV RBC AUTO: 93 FL (ref 82–98)
MONOCYTES # BLD AUTO: 1.1 K/UL (ref 0.3–1)
MONOCYTES NFR BLD: 11 % (ref 4–15)
NEUTROPHILS # BLD AUTO: 6.3 K/UL (ref 1.8–7.7)
NEUTROPHILS NFR BLD: 61.8 % (ref 38–73)
NRBC BLD-RTO: 0 /100 WBC
PLATELET # BLD AUTO: 216 K/UL (ref 150–350)
PMV BLD AUTO: 10.5 FL (ref 9.2–12.9)
POTASSIUM SERPL-SCNC: 4.9 MMOL/L (ref 3.5–5.1)
PROT SERPL-MCNC: 6.8 G/DL (ref 6–8.4)
RBC # BLD AUTO: 4.45 M/UL (ref 4.6–6.2)
SODIUM SERPL-SCNC: 141 MMOL/L (ref 136–145)
WBC # BLD AUTO: 10.21 K/UL (ref 3.9–12.7)

## 2020-05-13 PROCEDURE — 99999 PR PBB SHADOW E&M-EST. PATIENT-LVL III: CPT | Mod: PBBFAC,HCNC,, | Performed by: INTERNAL MEDICINE

## 2020-05-13 PROCEDURE — 3078F DIAST BP <80 MM HG: CPT | Mod: HCNC,CPTII,S$GLB, | Performed by: INTERNAL MEDICINE

## 2020-05-13 PROCEDURE — 84165 PROTEIN E-PHORESIS SERUM: CPT | Mod: 26,HCNC,, | Performed by: PATHOLOGY

## 2020-05-13 PROCEDURE — 84165 PATHOLOGIST INTERPRETATION SPE: ICD-10-PCS | Mod: 26,HCNC,, | Performed by: PATHOLOGY

## 2020-05-13 PROCEDURE — 1126F PR PAIN SEVERITY QUANTIFIED, NO PAIN PRESENT: ICD-10-PCS | Mod: HCNC,S$GLB,, | Performed by: INTERNAL MEDICINE

## 2020-05-13 PROCEDURE — 1159F MED LIST DOCD IN RCRD: CPT | Mod: HCNC,S$GLB,, | Performed by: INTERNAL MEDICINE

## 2020-05-13 PROCEDURE — 99999 PR PBB SHADOW E&M-EST. PATIENT-LVL III: ICD-10-PCS | Mod: PBBFAC,HCNC,, | Performed by: INTERNAL MEDICINE

## 2020-05-13 PROCEDURE — 84153 ASSAY OF PSA TOTAL: CPT | Mod: HCNC

## 2020-05-13 PROCEDURE — 1159F PR MEDICATION LIST DOCUMENTED IN MEDICAL RECORD: ICD-10-PCS | Mod: HCNC,S$GLB,, | Performed by: INTERNAL MEDICINE

## 2020-05-13 PROCEDURE — 1101F PT FALLS ASSESS-DOCD LE1/YR: CPT | Mod: HCNC,CPTII,S$GLB, | Performed by: INTERNAL MEDICINE

## 2020-05-13 PROCEDURE — 82977 ASSAY OF GGT: CPT | Mod: HCNC

## 2020-05-13 PROCEDURE — 99499 UNLISTED E&M SERVICE: CPT | Mod: HCNC,S$GLB,, | Performed by: INTERNAL MEDICINE

## 2020-05-13 PROCEDURE — 99214 OFFICE O/P EST MOD 30 MIN: CPT | Mod: HCNC,S$GLB,, | Performed by: INTERNAL MEDICINE

## 2020-05-13 PROCEDURE — 3077F SYST BP >= 140 MM HG: CPT | Mod: HCNC,CPTII,S$GLB, | Performed by: INTERNAL MEDICINE

## 2020-05-13 PROCEDURE — 3077F PR MOST RECENT SYSTOLIC BLOOD PRESSURE >= 140 MM HG: ICD-10-PCS | Mod: HCNC,CPTII,S$GLB, | Performed by: INTERNAL MEDICINE

## 2020-05-13 PROCEDURE — 99499 RISK ADDL DX/OHS AUDIT: ICD-10-PCS | Mod: HCNC,S$GLB,, | Performed by: INTERNAL MEDICINE

## 2020-05-13 PROCEDURE — 80053 COMPREHEN METABOLIC PANEL: CPT | Mod: HCNC

## 2020-05-13 PROCEDURE — 83520 IMMUNOASSAY QUANT NOS NONAB: CPT | Mod: 59,HCNC

## 2020-05-13 PROCEDURE — 1101F PR PT FALLS ASSESS DOC 0-1 FALLS W/OUT INJ PAST YR: ICD-10-PCS | Mod: HCNC,CPTII,S$GLB, | Performed by: INTERNAL MEDICINE

## 2020-05-13 PROCEDURE — 84165 PROTEIN E-PHORESIS SERUM: CPT | Mod: HCNC

## 2020-05-13 PROCEDURE — 85025 COMPLETE CBC W/AUTO DIFF WBC: CPT | Mod: HCNC

## 2020-05-13 PROCEDURE — 1126F AMNT PAIN NOTED NONE PRSNT: CPT | Mod: HCNC,S$GLB,, | Performed by: INTERNAL MEDICINE

## 2020-05-13 PROCEDURE — 36415 COLL VENOUS BLD VENIPUNCTURE: CPT | Mod: HCNC

## 2020-05-13 PROCEDURE — 3078F PR MOST RECENT DIASTOLIC BLOOD PRESSURE < 80 MM HG: ICD-10-PCS | Mod: HCNC,CPTII,S$GLB, | Performed by: INTERNAL MEDICINE

## 2020-05-13 PROCEDURE — 99214 PR OFFICE/OUTPT VISIT, EST, LEVL IV, 30-39 MIN: ICD-10-PCS | Mod: HCNC,S$GLB,, | Performed by: INTERNAL MEDICINE

## 2020-05-13 NOTE — PROGRESS NOTES
This visit was performed via Telephone.  Time spent 14 minutes    Chief Complaint   Patient presents with   • Follow-up         History of Present Illness :     The patient had a telephone visit done for follow-up on chronic medical problems.  He has had a cough and shortness of breathing since January.  He was treated with steroids.  He feels heaviness in the chest when he lies down.  He denies any wheezing.  He has never been a smoker.  Blood sugar numbers have been good.  The patient is not been checking his blood pressure.  He does have a monitor and will try to check it.  He has never been checked for sleep apnea.  He has no history of asthma or COPD    Review of Systems:    Denies any chest pain but does feel breathing issues when he lies down    Current medications and allergies discussed with patient and changes made as applicable    Physical Examination:    Patient performed vitals blood sugar is 112    Alert and oriented times 3.    No cough    Assessment and Plan:     1. Pure hypercholesterolemia  The patient is on Lipitor.  Will continue the same medication.  We will recheck labs and see him back in 3 months    2. Type 2 diabetes mellitus with diabetic polyneuropathy, with long-term current use of insulin (CMS/[facility])  Glycemic control is good.  Continue same regimen.    3. Essential hypertension  Encouraged to check blood pressure at home    The patient does have coughing and dyspnea at rest.  He also has trouble sleeping at night.  Differential diagnosis includes atypical presentation of coronary artery disease, prolonged COVID-19 infection, bronchial asthma and sleep apnea.  We discussed the plan in detail with the patient.  We will ask him to get a coronavirus test done [facility].  If that comes back negative then we will order a cardiology evaluation, a pulmonary evaluation and possibly a sleep study.          Follow-up 3 months     Pt scheduled procedure for 5/1/18. Colonoscopy prep instructions sent via myhub. Included were suprep instructions and Low fiber diet.

## 2020-05-13 NOTE — PROGRESS NOTES
Subjective:       Patient ID: Homero Tanner is a 79 y.o. male.    Chief Complaint: No chief complaint on file.    HPI  This is a 77  year-old gentleman who comes for follow up of her previously diagnosed thymoma, rectal cancer, prostate cancer, b12 deficiency. And Waldestrom;s macroglobulinemia.     He is known to us because of  A history of   previously treated prostate cancer. He was diagnosed around 2004 and treated   with surgery. He relapsed by PSA and was treated with local radiation therapy.   He had further elevation of the PSA and started intermittent LHRH   analogues through the office of his urologist outside our clinic  he receives the injections at his urologist office.  He says he hs been of Lupron x close to 2 years   A CBC done on   November 2012 was reported showing a hemoglobin of 12.7. As part of workup his   primary care physician ordered a serum ferritin and total iron binding   capacity, which were unremarkable. Serum folate was normal at 5.5. Vitamin B12   was low at 151. He then started supplemental B12 injection, which  he is still   getting.   Also, as part of the workup for the anemia, serum protein electrophoresis   showed a paraprotein band that was quantified at 1.03 g and identified as IgM.   The patient was then referred to me. I ordered lab tests, which included a   thoracic spine view looking for lytic lesions. The radiologist reported that there   were no lytic lesions but there was a mass in the center of the chest  . This was followed by chest x-rays and CT scans. The   CT scan showed a large mediastinal mass. A CT-guided biopsy at [facility] was read   as showing a thymoma.   He underwent surgery with Dr. Levi Butler at [facility]. The thymoma was involving the pericardium so he received   postoperative radiation therapy.   He finished the radiation therapy and has   remained with without evaluable disease in regards to his thymoma.      A routine lab  tests done a few months later showed that there has been a dramatic   drop in his hemoglobin, which was now 7.8 with a low serum ferritin of 8   suggesting iron deficiency anemia.   Stools were frankly Hemoccult positive.   In regards to his past history, the patient underwent a colonoscopy within our   system on 02/28/2012. The patient was found to have a sessile polyp in the   rectum. An endoscopic removal could not be done.   The patient was referred to another specialist in [facility] for resection since it seemed to   be arising in the setting of the radiation therapy field for his prostate   cancer.   The pathology report from that 2/2012 endoscopy was that of a tubular adenoma.   The patient was then seen in consultation by GI doctor outside the clinic. ,giana Arellano. The patient then underwent resection of the   lesion. The pathology report was the same.   The patient underwent a colonoscopy at [facility] on   05/07/2014, as part of the work up for his recently diagnosed iron deficiency.. It was done by Dr. Jaime Ash. Dr. Ash found a mass in the   rectal area. It was partially excised. Pathology was once again that of   tubulovillous adenoma with dysplasia.   The area of involvement coincided with the radiation therapy field for the prostate   cancer.   The patient had removal of the villous adenoma in [facility] by Dr Ansari.   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   Following the excision he had 2 episodes of rectal bleeding. He ended up having another surgery to control the bleeing and to do a re-excision of the area where the minimally invasive cancer was found.  Pathology revealed no residual cancer.  There has been no more bleeding. He feels fine  He also injects himself with b12 due low b12 levels.          Because of continuous raise of the monoclonal spike in the SPEP, a bone marrow was done 7/2018 which was read as being consistent  with a lymphoblastic lymphoma (  Waldestrom;s macroglobulinemia)>  He was treated with Dr Weaver with Velcade/dex/Rituxan achieving a good biochemical response.  He was given 4 additional treatments of maintenance Rituxan with the last dose around nov 2019    ALLERGIES: see Med card  MEDICATIONS: See MedCard.   PREVIOUS SURGERIES: Prostatectomy around the year 2000 or so, rectal surgery   for removal of tubular adenoma.x2, and another surgery for a minimally invasive rectal cancer 10/2014 Tonsillectomy, removal of thymoma.   SOCIAL HISTORY: Single with two children. Lives in [place]. He smoked   until age 31, approximately a pack a day for 15 years. He is a retired   .   FAMILY HISTORY: No cancer. Father had diabetes. Father had heart disease.   PAST MEDICAL HISTORY:   1. Anemia.   2. 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   3. History of treated prostate cancer.   4. Mild anemia.   5. Iron deficiency anemia.   6. High blood pressure.   7. Elevated cholesterol.   8. Recurrent villous adenoma of the rectum.   9. Reflux.   10. Vitamin B12 deficiency.   11. Status post resection of thymoma of the anterior mediastinum.   12. Skin cancer scalp  13-hx of treated prostate cancer  14-hx of WSaldestrom's macroglobulinemia  Review of Systems   Constitutional: Negative.  Negative for fatigue.   Eyes: Negative.    Cardiovascular: Negative.  Negative for chest pain.   Gastrointestinal: Negative for abdominal pain and nausea.   Genitourinary: Negative.  Negative for hematuria.   Musculoskeletal: Negative.    Skin: Negative.    Neurological: Negative.    Psychiatric/Behavioral: Negative.        Objective:      Physical Exam   Constitutional: He is oriented to person, place, and time. He appears well-developed and well-nourished.   HENT:   Head: Normocephalic.   Right Ear: Tympanic membrane, external ear and ear canal normal.   Left Ear: Tympanic membrane, external ear and ear canal  normal.   Nose: Nose normal. Right sinus exhibits no maxillary sinus tenderness and no frontal sinus tenderness. Left sinus exhibits no maxillary sinus tenderness and no frontal sinus tenderness.   Mouth/Throat: No oropharyngeal exudate.   Teeth look normal.  Gums look normal.   Eyes: Pupils are equal, round, and reactive to light. Conjunctivae and lids are normal.   Neck: Trachea normal and normal range of motion. Neck supple. No thyroid mass and no thyromegaly present.   No crepitus.   Cardiovascular: Normal rate, regular rhythm, S1 normal, S2 normal, normal heart sounds and normal pulses. Exam reveals no gallop.   No murmur heard.     Carotid exam normal  Skin temperature in extremities is normal  No edema of extremities   Pulmonary/Chest: Effort normal. No accessory muscle usage. No respiratory distress. He has no wheezes. He has no rales.   Diaphragmatic movement normal.   Abdominal: Soft. Bowel sounds are normal. He exhibits no distension and no mass. There is no splenomegaly or hepatomegaly. There is no tenderness. There is no rebound and no guarding.   Musculoskeletal: Normal range of motion. He exhibits no edema.        Right hand: Normal.        Left hand: Normal.   Lymphadenopathy:     He has no cervical adenopathy.     He has no axillary adenopathy.        Right: No inguinal and no supraclavicular adenopathy present.        Left: No inguinal and no supraclavicular adenopathy present.   Neurological: He is alert and oriented to person, place, and time.       Skin: Skin is warm and dry. No abrasion, no bruising, no ecchymosis, no lesion, no petechiae and no rash noted. No cyanosis or erythema. Nails show no clubbing.   Psychiatric: He has a normal mood and affect. His behavior is normal.       Wt Readings from Last 3 Encounters:   05/13/20 83 kg (182 lb 15.7 oz)   02/26/20 82.3 kg (181 lb 7 oz)   02/18/20 82.1 kg (181 lb)     Temp Readings from Last 3 Encounters:   05/13/20 98.8 °F (37.1 °C)   02/26/20  98.2 °F (36.8 °C) (Oral)   02/18/20 97.2 °F (36.2 °C) (Oral)     BP Readings from Last 3 Encounters:   05/13/20 (!) 164/79   02/26/20 (!) 148/67   02/18/20 139/69     Pulse Readings from Last 3 Encounters:   05/13/20 63   02/26/20 (!) 52   02/18/20 (!) 54       Assessment:       1. Elevated liver function tests    2. Waldenstrom's macroglobulinemia    3. Prostate cancer    4. Abnormal serum enzyme level, unspecified         Plan:       Lab Results   Component Value Date    WBC 8.24 03/20/2020    HGB 14.4 03/20/2020    HCT 44.1 03/20/2020    MCV 94 03/20/2020     03/20/2020       Lab Results   Component Value Date    CREATININE 1.3 03/20/2020     Lab Results   Component Value Date    IRON 49 02/18/2020    TIBC 271 02/18/2020    FERRITIN 349 (H) 02/18/2020     Lab Results   Component Value Date    ALT 43 03/20/2020    AST 30 03/20/2020     (H) 03/20/2020    ALKPHOS 399 (H) 03/20/2020    BILITOT 1.0 03/20/2020      ( normal 8 to 25).  Alkaline phosphase izoenzymes show mostly liver fraction  He stopped drinking alcohol completely    He will be asked to have blood drawn today for a cbc/cmp/SPEp/free light chains/dx PSA/GGT  Will let him know results and decide then on next visit.

## 2020-05-14 LAB
ALBUMIN SERPL ELPH-MCNC: 4.08 G/DL (ref 3.35–5.55)
ALPHA1 GLOB SERPL ELPH-MCNC: 0.51 G/DL (ref 0.17–0.41)
ALPHA2 GLOB SERPL ELPH-MCNC: 0.7 G/DL (ref 0.43–0.99)
B-GLOBULIN SERPL ELPH-MCNC: 0.99 G/DL (ref 0.5–1.1)
GAMMA GLOB SERPL ELPH-MCNC: 0.23 G/DL (ref 0.67–1.58)
KAPPA LC SER QL IA: 1.26 MG/DL (ref 0.33–1.94)
KAPPA LC/LAMBDA SER IA: 0.96 (ref 0.26–1.65)
LAMBDA LC SER QL IA: 1.31 MG/DL (ref 0.57–2.63)
PATHOLOGIST INTERPRETATION SPE: NORMAL
PROT SERPL-MCNC: 6.5 G/DL (ref 6–8.4)

## 2020-05-18 DIAGNOSIS — R79.89 ELEVATED LIVER FUNCTION TESTS: Primary | ICD-10-CM

## 2020-05-18 DIAGNOSIS — C88.0 WALDENSTROM'S MACROGLOBULINEMIA: ICD-10-CM

## 2020-05-18 DIAGNOSIS — Z85.048 HISTORY OF RECTAL CANCER: ICD-10-CM

## 2020-05-18 DIAGNOSIS — D89.2 PARAPROTEINEMIA: ICD-10-CM

## 2020-05-18 DIAGNOSIS — R97.20 ELEVATED PROSTATE SPECIFIC ANTIGEN (PSA): ICD-10-CM

## 2020-05-18 DIAGNOSIS — R74.9 ABNORMAL SERUM ENZYME LEVEL, UNSPECIFIED: ICD-10-CM

## 2020-05-28 ENCOUNTER — TELEPHONE (OUTPATIENT)
Dept: ENDOSCOPY | Facility: HOSPITAL | Age: 80
End: 2020-05-28

## 2020-05-28 NOTE — TELEPHONE ENCOUNTER
Pt called to schedule GI consult to have annual flex sig completed. Transferred pt to appt line. caren

## 2020-05-29 ENCOUNTER — PATIENT OUTREACH (OUTPATIENT)
Dept: ADMINISTRATIVE | Facility: OTHER | Age: 80
End: 2020-05-29

## 2020-05-29 ENCOUNTER — OFFICE VISIT (OUTPATIENT)
Dept: GASTROENTEROLOGY | Facility: CLINIC | Age: 80
End: 2020-05-29
Payer: MEDICARE

## 2020-05-29 VITALS
HEIGHT: 70 IN | OXYGEN SATURATION: 97 % | SYSTOLIC BLOOD PRESSURE: 160 MMHG | BODY MASS INDEX: 26.14 KG/M2 | HEART RATE: 54 BPM | DIASTOLIC BLOOD PRESSURE: 78 MMHG | WEIGHT: 182.56 LBS

## 2020-05-29 DIAGNOSIS — Z86.010 HISTORY OF COLON POLYPS: Primary | ICD-10-CM

## 2020-05-29 DIAGNOSIS — Z85.048 HISTORY OF RECTAL CANCER: ICD-10-CM

## 2020-05-29 PROCEDURE — 1101F PR PT FALLS ASSESS DOC 0-1 FALLS W/OUT INJ PAST YR: ICD-10-PCS | Mod: HCNC,CPTII,S$GLB, | Performed by: PHYSICIAN ASSISTANT

## 2020-05-29 PROCEDURE — 1126F PR PAIN SEVERITY QUANTIFIED, NO PAIN PRESENT: ICD-10-PCS | Mod: HCNC,S$GLB,, | Performed by: PHYSICIAN ASSISTANT

## 2020-05-29 PROCEDURE — 3078F PR MOST RECENT DIASTOLIC BLOOD PRESSURE < 80 MM HG: ICD-10-PCS | Mod: HCNC,CPTII,S$GLB, | Performed by: PHYSICIAN ASSISTANT

## 2020-05-29 PROCEDURE — 1159F PR MEDICATION LIST DOCUMENTED IN MEDICAL RECORD: ICD-10-PCS | Mod: HCNC,S$GLB,, | Performed by: PHYSICIAN ASSISTANT

## 2020-05-29 PROCEDURE — 3077F SYST BP >= 140 MM HG: CPT | Mod: HCNC,CPTII,S$GLB, | Performed by: PHYSICIAN ASSISTANT

## 2020-05-29 PROCEDURE — 99213 PR OFFICE/OUTPT VISIT, EST, LEVL III, 20-29 MIN: ICD-10-PCS | Mod: HCNC,S$GLB,, | Performed by: PHYSICIAN ASSISTANT

## 2020-05-29 PROCEDURE — 3078F DIAST BP <80 MM HG: CPT | Mod: HCNC,CPTII,S$GLB, | Performed by: PHYSICIAN ASSISTANT

## 2020-05-29 PROCEDURE — 1126F AMNT PAIN NOTED NONE PRSNT: CPT | Mod: HCNC,S$GLB,, | Performed by: PHYSICIAN ASSISTANT

## 2020-05-29 PROCEDURE — 1101F PT FALLS ASSESS-DOCD LE1/YR: CPT | Mod: HCNC,CPTII,S$GLB, | Performed by: PHYSICIAN ASSISTANT

## 2020-05-29 PROCEDURE — 99999 PR PBB SHADOW E&M-EST. PATIENT-LVL IV: ICD-10-PCS | Mod: PBBFAC,HCNC,, | Performed by: PHYSICIAN ASSISTANT

## 2020-05-29 PROCEDURE — 3077F PR MOST RECENT SYSTOLIC BLOOD PRESSURE >= 140 MM HG: ICD-10-PCS | Mod: HCNC,CPTII,S$GLB, | Performed by: PHYSICIAN ASSISTANT

## 2020-05-29 PROCEDURE — 1159F MED LIST DOCD IN RCRD: CPT | Mod: HCNC,S$GLB,, | Performed by: PHYSICIAN ASSISTANT

## 2020-05-29 PROCEDURE — 99999 PR PBB SHADOW E&M-EST. PATIENT-LVL IV: CPT | Mod: PBBFAC,HCNC,, | Performed by: PHYSICIAN ASSISTANT

## 2020-05-29 PROCEDURE — 99213 OFFICE O/P EST LOW 20 MIN: CPT | Mod: HCNC,S$GLB,, | Performed by: PHYSICIAN ASSISTANT

## 2020-05-29 RX ORDER — SODIUM, POTASSIUM,MAG SULFATES 17.5-3.13G
SOLUTION, RECONSTITUTED, ORAL ORAL
Qty: 354 ML | Refills: 0 | Status: ON HOLD | OUTPATIENT
Start: 2020-05-29 | End: 2020-06-22 | Stop reason: HOSPADM

## 2020-05-29 NOTE — PROGRESS NOTES
Subjective:      Patient ID: Homero Tanner is a 79 y.o. male.    Chief Complaint: Flexible Sigmoidoscopy    HPI  The patient has a history of anemia and alcohol related fatty liver disease. He was last seen 07/2019 for iron deficiency anemia. At that time, EGD was recommended due to h/o esophageal ulcer. EGD was unremarkable.   Today he is here to discuss colon surveillance. He had a flex sig 04/23/19 which revealed post-polypectomy scan. A repeat colonoscopy in one year was recommended. The patient denies hematochezia, melena, change in bowel habits, weight loss, change in appetite, abdominal pain, nausea or vomiting. Of note, the patient's mother is still living at 97 yo.     Available Lower GI history:     Flex sig 04/2019 - post-polypectomy scar in the rectum.   - Path: Scant superficial fragments colonic mucosa  Flex sig 06/2018 - 25 mm polyp in the rectum removed with mucosal resection - 6 month repeat was recommended.    - Path: Tubulovillous adenoma  Colonoscopy 05/2018 - four polyps removed. - flex sig for ESD recommended and repeat colonoscopy in 1 year.    - Path: tubular adenoma  Colonoscopy 02/2017 - four polyps removed - repeat colonoscopy in 1 year.    - Path: tubular adenoma; rectal polyp with normal tissue and hyperplastic tissue  Flex sig 12/2015 - on 15 mm polyp in rectum - repeat colonoscopy in 1 year.   - Path: Tubular adenoma with high-grade dysplasia  Colonoscopy 10/2015 - six polyps - one was 25 mm in recto-sigmoid, biopsied and tattooed.    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   Colonoscopy 05/2014 - rectum tumor, non-bleeding AVM, diverticulosis   - Path: Tubulovillous adenoma with high grade dysplasia  Flex sig 02/2012 - 9 mm polyp in rectum, biopsied.  Flex sig 01/2012 - 6 mm polyp in rectum, biopsied     - Path: tubular adenoma  Colonoscopy 10/2011 - three polyps removed - rec flex sig in 3 months   - Path: tubular adenoma      Review of Systems  As per HPI.     Objective:     Physical Exam   Constitutional: He is oriented to person, place, and time. He appears well-developed and well-nourished. No distress.   HENT:   Head: Normocephalic and atraumatic.   Eyes: EOM are normal.   Cardiovascular: Normal rate and regular rhythm.   Pulmonary/Chest: Effort normal and breath sounds normal. No respiratory distress. He has no wheezes.   Abdominal: Soft. Bowel sounds are normal. He exhibits no distension. There is no tenderness.   Neurological: He is alert and oriented to person, place, and time. No cranial nerve deficit.   Skin: He is not diaphoretic.   Psychiatric: His behavior is normal.       Assessment:     1. History of colon polyps    2. History of rectal cancer        Plan:     1. Colonoscopy.   The risks, benefits, alternatives and possible complications of the above procedure(s) were discussed with the patient to include but not limited to infection, bleeding, heart complications, respiratory complications, or perforation which may require surgical intervention. The patient's questions were answered. The patient verbally reported understanding of the discussion.  2. Further recommendations after above.   3. Follow up with PCP as previously scheduled.     Medications Ordered This Encounter   Medications    SUPREP BOWEL PREP KIT 17.5-3.13-1.6 gram SolR     Sig: Use as directed     Dispense:  354 mL     Refill:  0       Orders Placed This Encounter   Procedures    COVID-19 Routine Screening       Follow up if  symptoms worsen or fail to improve.    Thank you for the opportunity to participate in the care of this patient.   Donovan Crawford PA-C.

## 2020-05-29 NOTE — PROGRESS NOTES
Chart reviewed.   Immunizations: Triggered Imm Registry     Orders placed: n/a  Upcoming appts to satisfy MONICA topics: n/a

## 2020-05-29 NOTE — PROGRESS NOTES
COVID Screening     1. Have you had a fever in the last 7 days or have you used fever reducing medicines for a fever in the last 7 days?  no    2. Are you experiencing shortness of breath, cough, muscle aches, loss of taste or loss of smell?  no    3. Are you residing with anyone who has tested positive for Covid?  no    If answered yes to any of the above questions, the pt must be scheduled for an appointment with their PCP.    A message also needs to be sent to the endoscopist to ensure the patient gets rescheduled at a later date.     ENDO screening    1. Have you been admitted overnight to the hospital in the past 3 months? no   If yes, schedule an appointment with PCP before scheduling endoscopic procedure.     2. Have you had a stent placed in the last 12 months? no   If yes, for a screening visit, cancel and message the ordering provider.  The patient will need a new order when the time is appropriate.     3. Have you had a stroke or heart attack in the past 6 months? no   If yes, cancel and refer patient to ordering provider for clearance, also message ordering provider to inform.     4. Have you had any chest pain in the past 3 months? no   If yes, Have you been evaluated by your PCP and/or cardiologist and it was determined to not be heart related? not applicable   If No, Pt needs to be seen by PCP or Cardiologist .  Pt can be scheduled once clearance obtained by either of those providers.     5. Do you take prescription weight loss medications?  no   If yes, must stop for 2 weeks prior to procedure.     6. Have you been diagnosed with diverticulitis within the past 3 months? no   If yes, must have been seen by GI within the last 3 months, if not schedule with GI [facility].    If pt has been seen by GI, schedule procedure 8-12 weeks post antibiotic treatment.     7. Are you on Dialysis? no  If yes, schedule procedure for the day AFTER dialysis.  Appt time should be 9am or later, patient arrival time is 2 hours  "prior.  Nulytely or    miralax prep for all patients with kidney disease.     8. Are you diabetic?  no   If yes, schedule morning appt. Advise pt to hold all diabetic meds day of procedure.     9. If pt is older than 80 years of age and HAS NOT been seen by GI or PCP within the last 6 months, needs appt with GI [facility].   If pt has been seen by the GI provider or PCP within the past 6  months AND meets criteria, schedule procedure AND send message to the endoscopist.     10. Is patient on a "high risk" medication (blood thinner/antiplatelet agent)?  no   If yes, has cardiac clearance been obtained within the last 60 days? N/A   If no, a new clearance needs to be obtained.       I have reviewed the last colonoscopy for recommendations regarding next procedure bowel prep.  yes  I have reviewed medications and allergies.  yes  I have verified the pharmacy information and appropriate prep sent if needed. yes  Prep instructions have been mailed or sent to portal per patient request. no  Handed to pt while in the clinic  If answers yes to any of the following, schedule at [place] ONLY. If No, OK for either location.     Is BMI over 45? No   Any complaints of chest pain, new onset or at rest? no  Does pt have an AICD? no  Is there a diagnosis of heart failure? no  Does patient have an insulin pump? no  If procedure for esophageal banding? no      "

## 2020-06-09 ENCOUNTER — OFFICE VISIT (OUTPATIENT)
Dept: DERMATOLOGY | Facility: CLINIC | Age: 80
End: 2020-06-09
Payer: MEDICARE

## 2020-06-09 DIAGNOSIS — Z12.83 SCREENING, MALIGNANT NEOPLASM, SKIN: ICD-10-CM

## 2020-06-09 DIAGNOSIS — L90.5 SCAR CONDITIONS/SKIN FIBROSIS: Primary | ICD-10-CM

## 2020-06-09 DIAGNOSIS — L57.0 ACTINIC KERATOSES: ICD-10-CM

## 2020-06-09 DIAGNOSIS — D48.5 NEOPLASM OF UNCERTAIN BEHAVIOR OF SKIN: ICD-10-CM

## 2020-06-09 DIAGNOSIS — L71.9 ROSACEA: ICD-10-CM

## 2020-06-09 DIAGNOSIS — Z85.828 HISTORY OF SKIN CANCER: ICD-10-CM

## 2020-06-09 PROCEDURE — 1101F PT FALLS ASSESS-DOCD LE1/YR: CPT | Mod: HCNC,CPTII,S$GLB, | Performed by: DERMATOLOGY

## 2020-06-09 PROCEDURE — 99999 PR PBB SHADOW E&M-EST. PATIENT-LVL III: CPT | Mod: PBBFAC,HCNC,, | Performed by: DERMATOLOGY

## 2020-06-09 PROCEDURE — 11102 PR TANGENTIAL BIOPSY, SKIN, SINGLE LESION: ICD-10-PCS | Mod: HCNC,S$GLB,, | Performed by: DERMATOLOGY

## 2020-06-09 PROCEDURE — 1101F PR PT FALLS ASSESS DOC 0-1 FALLS W/OUT INJ PAST YR: ICD-10-PCS | Mod: HCNC,CPTII,S$GLB, | Performed by: DERMATOLOGY

## 2020-06-09 PROCEDURE — 88305 TISSUE EXAM BY PATHOLOGIST: ICD-10-PCS | Mod: 26,HCNC,, | Performed by: PATHOLOGY

## 2020-06-09 PROCEDURE — 88305 TISSUE EXAM BY PATHOLOGIST: CPT | Mod: HCNC | Performed by: PATHOLOGY

## 2020-06-09 PROCEDURE — 88305 TISSUE EXAM BY PATHOLOGIST: CPT | Mod: 26,HCNC,, | Performed by: PATHOLOGY

## 2020-06-09 PROCEDURE — 1159F MED LIST DOCD IN RCRD: CPT | Mod: HCNC,S$GLB,, | Performed by: DERMATOLOGY

## 2020-06-09 PROCEDURE — 1126F AMNT PAIN NOTED NONE PRSNT: CPT | Mod: HCNC,S$GLB,, | Performed by: DERMATOLOGY

## 2020-06-09 PROCEDURE — 11102 TANGNTL BX SKIN SINGLE LES: CPT | Mod: HCNC,S$GLB,, | Performed by: DERMATOLOGY

## 2020-06-09 PROCEDURE — 1126F PR PAIN SEVERITY QUANTIFIED, NO PAIN PRESENT: ICD-10-PCS | Mod: HCNC,S$GLB,, | Performed by: DERMATOLOGY

## 2020-06-09 PROCEDURE — 1159F PR MEDICATION LIST DOCUMENTED IN MEDICAL RECORD: ICD-10-PCS | Mod: HCNC,S$GLB,, | Performed by: DERMATOLOGY

## 2020-06-09 PROCEDURE — 99213 PR OFFICE/OUTPT VISIT, EST, LEVL III, 20-29 MIN: ICD-10-PCS | Mod: 25,HCNC,S$GLB, | Performed by: DERMATOLOGY

## 2020-06-09 PROCEDURE — 99999 PR PBB SHADOW E&M-EST. PATIENT-LVL III: ICD-10-PCS | Mod: PBBFAC,HCNC,, | Performed by: DERMATOLOGY

## 2020-06-09 PROCEDURE — 99213 OFFICE O/P EST LOW 20 MIN: CPT | Mod: 25,HCNC,S$GLB, | Performed by: DERMATOLOGY

## 2020-06-09 RX ORDER — ERYTHROMYCIN 20 MG/G
GEL TOPICAL
Qty: 60 G | Refills: 3 | Status: SHIPPED | OUTPATIENT
Start: 2020-06-09 | End: 2022-07-12

## 2020-06-09 NOTE — PROGRESS NOTES
Subjective:       Patient ID:  Homero Tanner is a 79 y.o. 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      He currently uses ketoconazole shampoo every other day and clobetasol solution prn.  + improvement.       For rosacea, he uses metrocream bid.      Review of Systems   Constitutional: Negative for fever and chills.   Gastrointestinal: Negative for nausea and vomiting.   Skin: Negative for daily sunscreen use, activity-related sunscreen use and recent sunburn.   Hematologic/Lymphatic: Does not bruise/bleed easily.        Objective:    Physical Exam   Constitutional: He appears well-developed and well-nourished. No distress.   Neurological: He is alert and oriented to person, place, and time. He is not disoriented.   Psychiatric: He has a normal mood and affect.   Skin:   Areas Examined (abnormalities noted in diagram):   Scalp / Hair Palpated and Inspected  Head / Face Inspection Performed  Neck Inspection Performed  Chest / Axilla Inspection Performed  Abdomen Inspection Performed  Back Inspection Performed  RUE Inspected  LUE Inspection Performed  Nails and Digits Inspection Performed                   Diagram Legend     Erythematous scaling macule/papule c/w actinic keratosis       Vascular papule c/w angioma      Pigmented verrucoid papule/plaque c/w seborrheic keratosis      Yellow umbilicated papule c/w sebaceous hyperplasia      Irregularly shaped tan macule c/w lentigo     1-2 mm smooth white papules consistent with Milia      Movable subcutaneous cyst with punctum c/w epidermal inclusion cyst      Subcutaneous movable cyst c/w pilar cyst      Firm pink to brown papule c/w dermatofibroma      Pedunculated fleshy papule(s) c/w skin tag(s)      Evenly pigmented macule c/w junctional nevus     Mildly variegated pigmented,  slightly irregular-bordered macule c/w mildly atypical nevus      Flesh colored to evenly pigmented papule c/w intradermal nevus       Pink pearly papule/plaque c/w basal cell carcinoma      Erythematous hyperkeratotic cursted plaque c/w SCC      Surgical scar with no sign of skin cancer recurrence      Open and closed comedones      Inflammatory papules and pustules      Verrucoid papule consistent consistent with wart     Erythematous eczematous patches and plaques     Dystrophic onycholytic nail with subungual debris c/w onychomycosis     Umbilicated papule    Erythematous-base heme-crusted tan verrucoid plaque consistent with inflamed seborrheic keratosis     Erythematous Silvery Scaling Plaque c/w Psoriasis     See annotation                Assessment / Plan:      Pathology Orders:     Normal Orders This Visit    Specimen to Pathology, Dermatology     Comments:    Number of Specimens:->1  ------------------------->-------------------------  Spec 1 Procedure:->Biopsy  Spec 1 Clinical Impression:->AK vs. SCC  Spec 1 Source:->midline parietal scalp    Questions:    Procedure Type:  Dermatology and skin neoplasms    Number of Specimens:  1    ------------------------:  -------------------------    Spec 1 Procedure:  Biopsy    Spec 1 Clinical Impression:  AK vs. SCC    Spec 1 Source:  midline parietal scalp    Clinical Information:  see above        Scar conditions/skin fibrosis  Screening, malignant neoplasm, skin  History of skin cancer  Scar of the vertex scalp, left parietal scalp, hx of NMSC.  No evidence of recurrence on physical exam today.  Continue routine skin surveillance. Daily sunscreen advised.    Rosacea  -     erythromycin with ethanoL (EMGEL) 2 % gel; AAA of face bid.  For rosacea/redness.  Dispense: 60 g; Refill: 3    Actinic keratoses  Several lesions of the scalp, cryo unavailable.  Will notify pt once available again.      Neoplasm of uncertain behavior of skin  -     Specimen to Pathology,  Dermatology  -     Shave biopsy(-ies) done of 1 site(s).   Patient informed to call for results within 2 weeks if have not received notification via telephone call or MyChart           Follow up in about 2 weeks (around 6/23/2020) for f/u cryo.

## 2020-06-09 NOTE — PATIENT INSTRUCTIONS
Shave Biopsy Wound Care    Your doctor has performed a shave biopsy today.  A band aid and vaseline ointment has been placed over the site.  This should remain in place for 24 hours.  It is recommended that you keep the area dry for the first 24 hours.  After 24 hours, you may remove the band aid and wash the area with warm soap and water and apply Vaseline jelly.  Many patients prefer to use Neosporin or Bacitracin ointment.  This is acceptable; however, know that you can develop an allergy to this medication even if you have used it safely for years.  It is important to keep the area moist.  Letting it dry out and get air slows healing time, and will worsen the scar.  Band aid is optional after first 24 hours.      If you notice increasing redness, tenderness, pain, or yellow drainage at the biopsy site, please notify your doctor.  These are signs of an infection.    If your biopsy site is bleeding, apply firm pressure for 15 minutes straight.  Repeat for another 15 minutes, if it is still bleeding.   If the surgical site continues to bleed, then please contact your doctor.      [facility]   DERMATOLOGY  [address]   [place] [address]   Dept: [phone]   Dept Fax: [phone]

## 2020-06-11 LAB
FINAL PATHOLOGIC DIAGNOSIS: NORMAL
GROSS: NORMAL
MICROSCOPIC EXAM: NORMAL

## 2020-06-17 NOTE — PRE ADMISSION SCREENING
PAT call completed and patient educated on the bowel prep, clear liquid diet and procedure instructions. Medical history discussed and patient informed of arrival times 0730 and 2nd prep dose 0400. Pt will be accompanied by Jennifer and is made aware of limited-visitor policy, and is made aware that  is to remain during entire visit. All questions and concerns addressed. Informed to take AM medications of Metoprolol. NPO after 2nd bowel prep. Patient verbalizes understanding of teaching and all instructions. Pre-procedure Covid testing 06/19/2020 at the [facility]. Patient aware.

## 2020-06-19 ENCOUNTER — ANESTHESIA EVENT (OUTPATIENT)
Dept: ENDOSCOPY | Facility: HOSPITAL | Age: 80
End: 2020-06-19
Payer: MEDICARE

## 2020-06-19 ENCOUNTER — LAB VISIT (OUTPATIENT)
Dept: OTOLARYNGOLOGY | Facility: CLINIC | Age: 80
End: 2020-06-19
Payer: MEDICARE

## 2020-06-19 DIAGNOSIS — Z86.010 HISTORY OF COLON POLYPS: ICD-10-CM

## 2020-06-19 DIAGNOSIS — Z85.048 HISTORY OF RECTAL CANCER: ICD-10-CM

## 2020-06-19 PROBLEM — Z86.0100 HISTORY OF COLON POLYPS: Chronic | Status: ACTIVE | Noted: 2017-02-25

## 2020-06-19 PROCEDURE — U0003 INFECTIOUS AGENT DETECTION BY NUCLEIC ACID (DNA OR RNA); SEVERE ACUTE RESPIRATORY SYNDROME CORONAVIRUS 2 (SARS-COV-2) (CORONAVIRUS DISEASE [COVID-19]), AMPLIFIED PROBE TECHNIQUE, MAKING USE OF HIGH THROUGHPUT TECHNOLOGIES AS DESCRIBED BY CMS-2020-01-R: HCPCS | Mod: HCNC

## 2020-06-19 NOTE — ANESTHESIA PREPROCEDURE EVALUATION
06/19/2020  Homero Tanner is a 79 y.o., male.    Anesthesia Evaluation    I have reviewed the Patient Summary Reports.    I have reviewed the Nursing Notes. I have reviewed the NPO Status.   I have reviewed the Medications.     Review of Systems  Anesthesia Hx:  No problems with previous Anesthesia    Social:  Former Smoker, Social Alcohol Use    Hematology/Oncology:         -- Cancer in past history: Oncology Comments: SCCA, Prostate CA, Rectal CA, Lymphoma.     Cardiovascular:   Hypertension Valvular problems/Murmurs, AI CHF ECG has been reviewed. Compensated CHF, EF 50%. Mild AI. Disorder of Cardiac Rhythm, Premature Atrial Contraction, frequent, documented on holter, Premature Ventricular Contraction (PVC), frequent, documented on holter, Ventricular Tachycardia, past history    Pulmonary:   Asthma mild  Pulmonary Hypertension Echocardiographic Estimated Pulmonary Artery Systolic Pressure >=35 - 45    Renal/:   Chronic Renal Disease, CRI    Hepatic/GI:   Bowel Prep. GERD        Physical Exam  General:  Well nourished    Airway/Jaw/Neck:  Airway Findings: Mouth Opening: Normal Tongue: Normal  General Airway Assessment: Adult  Mallampati: II  TM Distance: Normal, at least 6 cm  Jaw/Neck Findings:  Neck ROM: Normal ROM  Neck Findings:     Eyes/Ears/Nose:  Eyes/Ears/Nose Findings:    Dental:  Dental Findings: Upper Dentures, Lower partial dentures   Chest/Lungs:  Chest/Lungs Findings: Clear to auscultation, Normal Respiratory Rate     Heart/Vascular:  Heart Findings: Rate: Normal  Rhythm: Regular Rhythm  Sounds: Normal  Heart murmur: negative Vascular Findings: Normal    Abdomen:  Abdomen Findings: Normal    Musculoskeletal:  Musculoskeletal Findings: Normal   Skin:  Skin Findings: Normal    Mental Status:  Mental Status Findings:  Alert and Oriented         Anesthesia Plan  Type of Anesthesia,  risks & benefits discussed:  Anesthesia Type:  general  Patient's Preference:   Intra-op Monitoring Plan: standard ASA monitors  Intra-op Monitoring Plan Comments:   Post Op Pain Control Plan: per primary service following discharge from PACU  Post Op Pain Control Plan Comments:   Induction:   IV  Beta Blocker:  Patient is not currently on a Beta-Blocker (No further documentation required).       Informed Consent: Patient understands risks and agrees with Anesthesia plan.  Questions answered. Anesthesia consent signed with patient.  ASA Score: 3     Day of Surgery Review of History & Physical:    H&P update referred to the surgeon.         Ready For Surgery From Anesthesia Perspective.

## 2020-06-20 LAB — SARS-COV-2 RNA RESP QL NAA+PROBE: NOT DETECTED

## 2020-06-22 ENCOUNTER — HOSPITAL ENCOUNTER (OUTPATIENT)
Facility: HOSPITAL | Age: 80
Discharge: HOME | End: 2020-06-22
Attending: INTERNAL MEDICINE | Admitting: INTERNAL MEDICINE
Payer: MEDICARE

## 2020-06-22 ENCOUNTER — ANESTHESIA (OUTPATIENT)
Dept: ENDOSCOPY | Facility: HOSPITAL | Age: 80
End: 2020-06-22
Payer: MEDICARE

## 2020-06-22 VITALS
BODY MASS INDEX: 25.25 KG/M2 | WEIGHT: 176.38 LBS | OXYGEN SATURATION: 99 % | HEART RATE: 45 BPM | SYSTOLIC BLOOD PRESSURE: 119 MMHG | RESPIRATION RATE: 18 BRPM | HEIGHT: 70 IN | TEMPERATURE: 98 F | DIASTOLIC BLOOD PRESSURE: 60 MMHG

## 2020-06-22 DIAGNOSIS — Z85.048 HISTORY OF RECTAL CANCER: Primary | ICD-10-CM

## 2020-06-22 DIAGNOSIS — Z12.11 ENCOUNTER FOR COLONOSCOPY DUE TO HISTORY OF ADENOMATOUS COLONIC POLYPS: ICD-10-CM

## 2020-06-22 DIAGNOSIS — Z86.010 ENCOUNTER FOR COLONOSCOPY DUE TO HISTORY OF ADENOMATOUS COLONIC POLYPS: ICD-10-CM

## 2020-06-22 PROBLEM — Z86.0101 ENCOUNTER FOR COLONOSCOPY DUE TO HISTORY OF ADENOMATOUS COLONIC POLYPS: Status: ACTIVE | Noted: 2020-06-22

## 2020-06-22 PROCEDURE — 45385 COLONOSCOPY W/LESION REMOVAL: CPT | Mod: HCNC | Performed by: INTERNAL MEDICINE

## 2020-06-22 PROCEDURE — 88305 TISSUE EXAM BY PATHOLOGIST: ICD-10-PCS | Mod: 26,HCNC,, | Performed by: PATHOLOGY

## 2020-06-22 PROCEDURE — 37000008 HC ANESTHESIA 1ST 15 MINUTES: Mod: HCNC | Performed by: INTERNAL MEDICINE

## 2020-06-22 PROCEDURE — 45385 PR COLONOSCOPY,REMV LESN,SNARE: ICD-10-PCS | Mod: PT,HCNC,, | Performed by: INTERNAL MEDICINE

## 2020-06-22 PROCEDURE — 88305 TISSUE EXAM BY PATHOLOGIST: CPT | Mod: HCNC | Performed by: PATHOLOGY

## 2020-06-22 PROCEDURE — 88305 TISSUE EXAM BY PATHOLOGIST: CPT | Mod: 26,HCNC,, | Performed by: PATHOLOGY

## 2020-06-22 PROCEDURE — 63600175 PHARM REV CODE 636 W HCPCS: Mod: HCNC | Performed by: NURSE ANESTHETIST, CERTIFIED REGISTERED

## 2020-06-22 PROCEDURE — 45380 COLONOSCOPY AND BIOPSY: CPT | Mod: 59,HCNC | Performed by: INTERNAL MEDICINE

## 2020-06-22 PROCEDURE — 27201089 HC SNARE, DISP (ANY): Mod: HCNC | Performed by: INTERNAL MEDICINE

## 2020-06-22 PROCEDURE — D9220A PRA ANESTHESIA: Mod: PT,HCNC,CRNA, | Performed by: NURSE ANESTHETIST, CERTIFIED REGISTERED

## 2020-06-22 PROCEDURE — D9220A PRA ANESTHESIA: Mod: PT,HCNC,ANES, | Performed by: ANESTHESIOLOGY

## 2020-06-22 PROCEDURE — 45380 PR COLONOSCOPY,BIOPSY: ICD-10-PCS | Mod: 59,HCNC,, | Performed by: INTERNAL MEDICINE

## 2020-06-22 PROCEDURE — 25000003 PHARM REV CODE 250: Mod: HCNC | Performed by: NURSE ANESTHETIST, CERTIFIED REGISTERED

## 2020-06-22 PROCEDURE — 45385 COLONOSCOPY W/LESION REMOVAL: CPT | Mod: PT,HCNC,, | Performed by: INTERNAL MEDICINE

## 2020-06-22 PROCEDURE — 37000009 HC ANESTHESIA EA ADD 15 MINS: Mod: HCNC | Performed by: INTERNAL MEDICINE

## 2020-06-22 PROCEDURE — D9220A PRA ANESTHESIA: ICD-10-PCS | Mod: PT,HCNC,CRNA, | Performed by: NURSE ANESTHETIST, CERTIFIED REGISTERED

## 2020-06-22 PROCEDURE — D9220A PRA ANESTHESIA: ICD-10-PCS | Mod: PT,HCNC,ANES, | Performed by: ANESTHESIOLOGY

## 2020-06-22 PROCEDURE — 45380 COLONOSCOPY AND BIOPSY: CPT | Mod: 59,HCNC,, | Performed by: INTERNAL MEDICINE

## 2020-06-22 PROCEDURE — 27201012 HC FORCEPS, HOT/COLD, DISP: Mod: HCNC | Performed by: INTERNAL MEDICINE

## 2020-06-22 PROCEDURE — 63600175 PHARM REV CODE 636 W HCPCS: Mod: HCNC | Performed by: ANESTHESIOLOGY

## 2020-06-22 RX ORDER — LIDOCAINE HYDROCHLORIDE 20 MG/ML
INJECTION, SOLUTION EPIDURAL; INFILTRATION; INTRACAUDAL; PERINEURAL
Status: DISCONTINUED | OUTPATIENT
Start: 2020-06-22 | End: 2020-06-22

## 2020-06-22 RX ORDER — PROPOFOL 10 MG/ML
VIAL (ML) INTRAVENOUS
Status: DISCONTINUED | OUTPATIENT
Start: 2020-06-22 | End: 2020-06-22

## 2020-06-22 RX ORDER — LIDOCAINE HYDROCHLORIDE 10 MG/ML
0.5 INJECTION, SOLUTION EPIDURAL; INFILTRATION; INTRACAUDAL; PERINEURAL ONCE
Status: DISCONTINUED | OUTPATIENT
Start: 2020-06-22 | End: 2020-06-22 | Stop reason: HOSPADM

## 2020-06-22 RX ORDER — SODIUM CHLORIDE, SODIUM LACTATE, POTASSIUM CHLORIDE, CALCIUM CHLORIDE 600; 310; 30; 20 MG/100ML; MG/100ML; MG/100ML; MG/100ML
INJECTION, SOLUTION INTRAVENOUS CONTINUOUS
Status: DISCONTINUED | OUTPATIENT
Start: 2020-06-22 | End: 2020-06-22 | Stop reason: HOSPADM

## 2020-06-22 RX ADMIN — PROPOFOL 40 MG: 10 INJECTION, EMULSION INTRAVENOUS at 08:06

## 2020-06-22 RX ADMIN — LIDOCAINE HYDROCHLORIDE 100 MG: 20 INJECTION, SOLUTION EPIDURAL; INFILTRATION; INTRACAUDAL; PERINEURAL at 08:06

## 2020-06-22 RX ADMIN — SODIUM CHLORIDE, SODIUM LACTATE, POTASSIUM CHLORIDE, AND CALCIUM CHLORIDE: 600; 310; 30; 20 INJECTION, SOLUTION INTRAVENOUS at 08:06

## 2020-06-22 RX ADMIN — PROPOFOL 20 MG: 10 INJECTION, EMULSION INTRAVENOUS at 08:06

## 2020-06-22 RX ADMIN — PROPOFOL 120 MG: 10 INJECTION, EMULSION INTRAVENOUS at 08:06

## 2020-06-22 RX ADMIN — PROPOFOL 50 MG: 10 INJECTION, EMULSION INTRAVENOUS at 08:06

## 2020-06-22 NOTE — [employer] PATIENT INSTRUCTIONS
Discharge Summary/Instructions after an Endoscopic Procedure  Patient Name: Homero Tanner  Patient MRN: [id number]  Patient YOB: 1940 Monday, June 22, 2020  Yu Wells MD  RESTRICTIONS:  During your procedure today, you received medications for sedation.  These   medications may affect your judgment, balance and coordination.  Therefore,   for 24 hours, you have the following restrictions:   - DO NOT drive a car, operate machinery, make legal/financial decisions,   sign important papers or drink alcohol.    ACTIVITY:  Today: no heavy lifting, straining or running due to procedural   sedation/anesthesia.  The following day: return to full activity including work.  DIET:  Eat and drink normally unless instructed otherwise.     TREATMENT FOR COMMON SIDE EFFECTS:  - Mild abdominal pain, nausea, belching, bloating or excessive gas:  rest,   eat lightly and use a heating pad.  - Sore Throat: treat with throat lozenges and/or gargle with warm salt   water.  - Because air was used during the procedure, expelling large amounts of air   from your rectum or belching is normal.  - If a bowel prep was taken, you may not have a bowel movement for 1-3 days.    This is normal.  SYMPTOMS TO WATCH FOR AND REPORT TO YOUR PHYSICIAN:  1. Abdominal pain or bloating, other than gas cramps.  2. Chest pain.  3. Back pain.  4. Signs of infection such as: chills or fever occurring within 24 hours   after the procedure.  5. Rectal bleeding, which would show as bright red, maroon, or black stools.   (A tablespoon of blood from the rectum is not serious, especially if   hemorrhoids are present.)  6. Vomiting.  7. Weakness or dizziness.  GO DIRECTLY TO THE NEAREST EMERGENCY ROOM IF YOU HAVE ANY OF THE FOLLOWING:      Difficulty breathing              Chills and/or fever over 101 F   Persistent vomiting and/or vomiting blood   Severe abdominal pain   Severe chest pain   Black, tarry stools   Bleeding- more than one  tablespoon   Any other symptom or condition that you feel may need urgent attention  Your doctor recommends these additional instructions:  If any biopsies were taken, your doctors clinic will contact you in 1 to 2   weeks with any results.  - Discharge patient to home (with escort).   - Resume previous diet.   - Await pathology results.   - Continue present medications.   - Repeat colonoscopy in 3 - 5 years for surveillance based on pathology   results.   - Patient has a contact number available for emergencies.  The signs and   symptoms of potential delayed complications were discussed with the   patient.  Return to normal activities tomorrow.  Written discharge   instructions were provided to the patient.  For questions, problems or results please call your physician Yu Wells MD at Work:  [phone]  If you have any questions about the above instructions, call the GI   department at [phone] or call the endoscopy unit at [phone]   from 7am until 3 pm.  [facility] [place], EMERGENCY ROOM PHONE NUMBER:   [phone]  IF A COMPLICATION OR EMERGENCY SITUATION ARISES AND YOU ARE UNABLE TO REACH   YOUR PHYSICIAN - GO DIRECTLY TO THE EMERGENCY ROOM.  I have read or have had read to me these discharge instructions for my   procedure and have received a written copy.  I understand these   instructions and will follow-up with my physician if I have any questions.     __________________________________       _____________________________________  Nurse Signature                                          Patient/Designated   Responsible Party Signature  MD Yu De La Torre MD  6/22/2020 9:05:06 AM  This report has been verified and signed electronically.  [employer]

## 2020-06-22 NOTE — DISCHARGE INSTRUCTIONS
Colonoscopy     A camera attached to a flexible tube with a viewing lens is used to take video pictures.     Colonoscopy is a test to view the inside of your lower digestive tract (colon and rectum). Sometimes it can show the last part of the small intestine (ileum). During the test, small pieces of tissue may be removed for testing. This is called a biopsy. Small growths, such as polyps, may also be removed.   Why is colonoscopy done?  The test is done to help look for colon cancer. And it can help find the source of abdominal pain, bleeding, and changes in bowel habits. It may be needed once a year, depending on factors such as your:  · Age  · Health history  · Family health history  · Symptoms  · Results from any prior colonoscopy  Risks and possible complications  These include:  · Bleeding               · A puncture or tear in the colon   · Risks of anesthesia  · A cancer lesion not being seen  Getting ready   To prepare for the test:  · Talk with your healthcare provider about the risks of the test (see below). Also ask your healthcare provider about alternatives to the test.  · Tell your healthcare provider about any medicines you take. Also tell him or her about any health conditions you may have.  · Make sure your rectum and colon are empty for the test. Follow the diet and bowel prep instructions exactly. If you dont, the test may need to be rescheduled.  · Plan for a friend or family member to drive you home after the test.     Colonoscopy provides an inside view of the entire colon.     You may discuss the results with your doctor right away or at a future visit.  During the test   The test is usually done in the hospital on an outpatient basis. This means you go home the same day. The procedure takes about 30 minutes. During that time:  · You are given relaxing (sedating) medicine through an IV line. You may be drowsy, or fully asleep.  · The healthcare provider will first give you a physical  exam to check for anal and rectal problems.  · Then the anus is lubricated and the scope inserted.  · If you are awake, you may have a feeling similar to needing to have a bowel movement. You may also feel pressure as air is pumped into the colon. Its OK to pass gas during the procedure.  · Biopsy, polyp removal, or other treatments may be done during the test.  After the test   You may have gas right after the test. It can help to try to pass it to help prevent later bloating. Your healthcare provider may discuss the results with you right away. Or you may need to schedule a follow-up visit to talk about the results. After the test, you can go back to your normal eating and other activities. You may be tired from the sedation and need to rest for a few hours.  Date Last Reviewed: 11/1/2016  © 5577-8233 [employer]. [address] [place] [address]. All rights reserved. This information is not intended as a substitute for professional medical care. Always follow your healthcare professional's instructions.      Understanding Colon and Rectal Polyps    The colon (also called the large intestine) is a muscular tube that forms the last part of the digestive tract. It absorbs water and stores food waste. The colon is about 4 to 6 feet long. The rectum is the last 6 inches of the colon. The colon and rectum have a smooth lining composed of millions of cells. Changes in these cells can lead to growths in the colon that can become cancerous and should be removed. Multiple tests are available to screen for colon cancer, but the colonoscopy is the most recommended test. During colonoscopy, these polyps can be removed. How often you need this test depends on many things including your condition, your family history, symptoms, and what the findings were at the previous colonoscopy.   When the colon lining changes  Changes that happen in the cells that line the colon or rectum can lead to growths called  polyps. Over a period of years, polyps can turn cancerous. Removing polyps early may prevent cancer from ever forming.  Polyps  Polyps are fleshy clumps of tissue that form on the lining of the colon or rectum. Small polyps are usually benign (not cancerous). However, over time, cells in a polyp can change and become cancerous. Certain types of polyps known as adenomatous polyps are premalignant. The risk for invasive cancer increases with the size of the polyp and certain cell and gene features. This means that they can become cancerous if they're not removed. Hyperplastic polyps are benign. They can grow quite large and not turn cancerous.   Cancer  Almost all colorectal cancers start when polyp cells begin growing abnormally. As a cancerous tumor grows, it may involve more and more of the colon or rectum. In time, cancer can also grow beyond the colon or rectum and spread to nearby organs or to glands called lymph nodes. The cells can also travel to other parts of the body. This is known as metastasis. The earlier a cancerous tumor is removed, the better the chance of preventing its spread.    Date Last Reviewed: 8/1/2016  © 4301-3312 [employer]. [address] [place] [address]. All rights reserved. This information is not intended as a substitute for professional medical care. Always follow your healthcare professional's instructions.        Diverticulosis    Diverticulosis means that small pouches have formed in the wall of your large intestine (colon). Most often, this problem causes no symptoms and is common as people age. But the pouches in the colon are at risk of becoming infected. When this happens, the condition is called diverticulitis. Although most people with diverticulosis never develop diverticulitis, it is still not uncommon. Rectal bleeding can also occur and in less common situations, a type of colon inflammation called colitis.  While most people do not have symptoms,  some people with diverticulosis may have:  · Abdominal cramps and pain  · Bloating  · Constipation  · Change in bowel habits  Causes  The exact cause of diverticulosis (and diverticulitis) has not been proved, but a few things are associated with the condition:  · Low-fiber diet  · Constipation  · Lack of exercise  Your healthcare provider will talk with you about how to manage your condition. Diet changes may be all that are needed to help control diverticulosis and prevent progression to diverticulitis. If you develop diverticulitis, you will likely need other treatments.  Home care  You may be told to take fiber supplements daily. Fiber adds bulk to the stool so that it passes through the colon more easily. Stool softeners may be recommended. You may also be given medications for pain relief. Be sure to take all medications as directed.  In the past, people were told to avoid corn, nuts, and seeds. This is no longer necessary.  Follow these guidelines when caring for yourself at home:  · Eat unprocessed foods that are high in fiber. Whole grains, fruits, and vegetables are good choices.  · Drink 6 to 8 glasses of water every day unless your healthcare provider has you limit how much fluid you should have.  · Watch for changes in your bowel movements. Tell your provider if you notice any changes.  · Begin an exercise program. Ask your provider how to get started. Generally, walking is the best.  · Get plenty of rest and sleep.  Follow-up care  Follow up with your healthcare provider, or as advised. Regular visits may be needed to check on your health. Sometimes special procedures such as colonoscopy, are needed after an episode of diverticulitis or blooding. Be sure to keep all your appointments.  If a stool sample was taken, or cultures were done, you should be told if they are positive, or if your treatment needs to be changed. You can call as directed for the results.  If X-rays were done, a radiologist will  look at them. You will be told if there is a change in your treatment.  If antibiotics were prescribed, be sure to finish them all.  When to seek medical advice  Call your healthcare provider right away if any of these occur:  · Fever of 100.4°F (38°C) or higher, or as directed by your healthcare provider  · Severe cramps in the lower left side of the abdomen or pain that is getting worse  · Tenderness in the lower left side of the abdomen or worsening pain throughout the abdomen  · Diarrhea or constipation that doesn't get better within 24 hours  · Nausea and vomiting  · Bleeding from the rectum  Call 911  Call emergency services if any of the following occur:  · Trouble breathing  · Confusion  · Very drowsy or trouble awakening  · Fainting or loss of consciousness  · Rapid heart rate  · Chest pain  Date Last Reviewed: 12/30/2015  © 3234-0870 [employer]. [address] [place] [address]. All rights reserved. This information is not intended as a substitute for professional medical care. Always follow your healthcare professional's instructions.

## 2020-06-22 NOTE — TRANSFER OF CARE
"Anesthesia Transfer of Care Note    Patient: Homero Worley Ciro    Procedure(s) Performed: Procedure(s) (LRB):  COLONOSCOPY (N/A)    Patient location: PACU    Anesthesia Type: general    Transport from OR: Transported from OR on 2-3 L/min O2 by NC with adequate spontaneous ventilation    Post pain: adequate analgesia    Post assessment: no apparent anesthetic complications    Post vital signs: stable    Level of consciousness: awake and alert    Nausea/Vomiting: no nausea/vomiting    Complications: none    Transfer of care protocol was followed      Last vitals:   Visit Vitals  BP (!) 177/73 (BP Location: Right arm, Patient Position: Sitting)   Pulse (!) 50   Temp 36.8 °C (98.2 °F) (Temporal)   Resp 18   Ht 5' 10" (1.778 m)   Wt 80 kg (176 lb 5.9 oz)   SpO2 98%   BMI 25.31 kg/m²     "

## 2020-06-22 NOTE — PLAN OF CARE
Pt is AAOx4. VSS. NAD. IV discontinued. Denies pain or nausea. Denies pain or nausea. Discharge instructions given, verbalized understanding. Discharged home with family.

## 2020-06-22 NOTE — ANESTHESIA POSTPROCEDURE EVALUATION
Anesthesia Post Evaluation    Patient: Homero Worley Ciro    Procedure(s) Performed: Procedure(s) (LRB):  COLONOSCOPY (N/A)    Final Anesthesia Type: general    Patient location during evaluation: PACU  Patient participation: Yes- Able to Participate  Level of consciousness: awake and alert and oriented  Post-procedure vital signs: reviewed and stable  Pain management: adequate  Airway patency: patent    PONV status at discharge: No PONV  Anesthetic complications: no      Cardiovascular status: blood pressure returned to baseline, stable and hemodynamically stable  Respiratory status: unassisted  Hydration status: euvolemic  Follow-up not needed.          Vitals Value Taken Time   /62 06/22/20 0921   Temp 36.4 °C (97.5 °F) 06/22/20 0855   Pulse 46 06/22/20 0921   Resp 17 06/22/20 0921   SpO2 98 % 06/22/20 0921   Vitals shown include unvalidated device data.      Event Time   Out of Recovery 09:26:00         Pain/Jose Score: Jose Score: 10 (6/22/2020  9:15 AM)

## 2020-06-22 NOTE — INTERVAL H&P NOTE
The patient has been examined and the H&P has been reviewed:    I concur with the findings and no changes have occurred since H&P was written.    Anesthesia/Surgery risks, benefits and alternative options discussed and understood by patient/family.          Active Hospital Problems    Diagnosis  POA    Encounter for colonoscopy due to history of adenomatous colonic polyps [Z12.11, Z86.010]  Not Applicable    Premature atrial contractions [I49.1]  Yes      Resolved Hospital Problems   No resolved problems to display.

## 2020-06-25 LAB
FINAL PATHOLOGIC DIAGNOSIS: NORMAL
GROSS: NORMAL

## 2020-06-25 NOTE — PROGRESS NOTES
AN staff: inform patient the single polyp removed was precancerous but benign. Recommend repeat colonoscopy in 5 years. Place in recall.

## 2020-06-29 RX ORDER — METOPROLOL TARTRATE 100 MG/1
TABLET ORAL
Qty: 180 TABLET | Refills: 3 | Status: SHIPPED | OUTPATIENT
Start: 2020-06-29 | End: 2020-09-28 | Stop reason: ALTCHOICE

## 2020-07-09 ENCOUNTER — OFFICE VISIT (OUTPATIENT)
Dept: INTERNAL MEDICINE | Facility: CLINIC | Age: 80
End: 2020-07-09
Payer: MEDICARE

## 2020-07-09 VITALS
SYSTOLIC BLOOD PRESSURE: 120 MMHG | BODY MASS INDEX: 26.26 KG/M2 | DIASTOLIC BLOOD PRESSURE: 80 MMHG | WEIGHT: 183.44 LBS | TEMPERATURE: 98 F | HEIGHT: 70 IN | HEART RATE: 68 BPM

## 2020-07-09 DIAGNOSIS — Z00.00 ROUTINE GENERAL MEDICAL EXAMINATION AT HEALTH CARE FACILITY: ICD-10-CM

## 2020-07-09 DIAGNOSIS — I70.0 AORTIC ATHEROSCLEROSIS: ICD-10-CM

## 2020-07-09 DIAGNOSIS — C61 PROSTATE CANCER: ICD-10-CM

## 2020-07-09 DIAGNOSIS — E78.2 MIXED HYPERLIPIDEMIA: ICD-10-CM

## 2020-07-09 DIAGNOSIS — Z87.898 HISTORY OF THYMOMA: ICD-10-CM

## 2020-07-09 DIAGNOSIS — C88.0 WALDENSTROM'S MACROGLOBULINEMIA: ICD-10-CM

## 2020-07-09 DIAGNOSIS — Z85.048 HISTORY OF RECTAL CANCER: ICD-10-CM

## 2020-07-09 DIAGNOSIS — J47.9 BRONCHIECTASIS WITHOUT COMPLICATION: ICD-10-CM

## 2020-07-09 PROBLEM — K57.30 DIVERTICULOSIS OF LARGE INTESTINE WITHOUT HEMORRHAGE: Chronic | Status: RESOLVED | Noted: 2017-02-27 | Resolved: 2020-07-09

## 2020-07-09 PROBLEM — K80.20 GALLSTONES: Status: RESOLVED | Noted: 2018-03-28 | Resolved: 2020-07-09

## 2020-07-09 PROBLEM — Z86.0101 ENCOUNTER FOR COLONOSCOPY DUE TO HISTORY OF ADENOMATOUS COLONIC POLYPS: Status: RESOLVED | Noted: 2020-06-22 | Resolved: 2020-07-09

## 2020-07-09 PROBLEM — Z12.11 ENCOUNTER FOR COLONOSCOPY DUE TO HISTORY OF ADENOMATOUS COLONIC POLYPS: Status: RESOLVED | Noted: 2020-06-22 | Resolved: 2020-07-09

## 2020-07-09 PROBLEM — Z86.010 ENCOUNTER FOR COLONOSCOPY DUE TO HISTORY OF ADENOMATOUS COLONIC POLYPS: Status: RESOLVED | Noted: 2020-06-22 | Resolved: 2020-07-09

## 2020-07-09 PROBLEM — I47.20 VT (VENTRICULAR TACHYCARDIA): Status: RESOLVED | Noted: 2018-09-18 | Resolved: 2020-07-09

## 2020-07-09 PROCEDURE — 99999 PR PBB SHADOW E&M-EST. PATIENT-LVL IV: ICD-10-PCS | Mod: PBBFAC,HCNC,, | Performed by: INTERNAL MEDICINE

## 2020-07-09 PROCEDURE — 3074F PR MOST RECENT SYSTOLIC BLOOD PRESSURE < 130 MM HG: ICD-10-PCS | Mod: HCNC,CPTII,S$GLB, | Performed by: INTERNAL MEDICINE

## 2020-07-09 PROCEDURE — 99999 PR PBB SHADOW E&M-EST. PATIENT-LVL IV: CPT | Mod: PBBFAC,HCNC,, | Performed by: INTERNAL MEDICINE

## 2020-07-09 PROCEDURE — 99499 RISK ADDL DX/OHS AUDIT: ICD-10-PCS | Mod: HCNC,S$GLB,, | Performed by: INTERNAL MEDICINE

## 2020-07-09 PROCEDURE — 99397 PR PREVENTIVE VISIT,EST,65 & OVER: ICD-10-PCS | Mod: HCNC,S$GLB,, | Performed by: INTERNAL MEDICINE

## 2020-07-09 PROCEDURE — 3079F PR MOST RECENT DIASTOLIC BLOOD PRESSURE 80-89 MM HG: ICD-10-PCS | Mod: HCNC,CPTII,S$GLB, | Performed by: INTERNAL MEDICINE

## 2020-07-09 PROCEDURE — 3079F DIAST BP 80-89 MM HG: CPT | Mod: HCNC,CPTII,S$GLB, | Performed by: INTERNAL MEDICINE

## 2020-07-09 PROCEDURE — 99397 PER PM REEVAL EST PAT 65+ YR: CPT | Mod: HCNC,S$GLB,, | Performed by: INTERNAL MEDICINE

## 2020-07-09 PROCEDURE — 3074F SYST BP LT 130 MM HG: CPT | Mod: HCNC,CPTII,S$GLB, | Performed by: INTERNAL MEDICINE

## 2020-07-09 PROCEDURE — 99499 UNLISTED E&M SERVICE: CPT | Mod: HCNC,S$GLB,, | Performed by: INTERNAL MEDICINE

## 2020-07-09 NOTE — PROGRESS NOTES
Subjective:       Patient ID: Homero Tanner is a 79 y.o. male.    Chief Complaint: Annual Exam    HPI Patient is a 79-year-old male presenting today for updated physical exam review of chronic health issues.  Patient has history of hyperlipidemia, prostate cancer, waldenstroms macroglobulinemia, rectal cancer, malignant thymoma, aortic stenosis and chronic bronchiectasis.    He indicates he has been doing well.  He has hernia repair last January after seeing me.  He states that went well he had no complications.  He continues to follow with his [facility] doctor as well as with GI and Urology.  His PSAs have been stable he continues on Lupron for his prostate cancer at this point.  He has annual screening colonoscopy.  Continue to monitor for any recurrence of rectal cancer.  He had his last colonoscopy a couple weeks ago.  He follows with Dr. Ken for his walled drums and he is following with his urologist for the prostate cancer.    He notes no issues with his breathing at this time.  He states he is stable and doing well overall from that capacity.    We talked about the potential for a genetics consultation.  I find his history interesting that he has had 4 different cancers.  He was not aware that there is the option to talk to a .  We did discuss it and he is going to think about and let me know if he wants to me with the  chest today doing academic exercise on that.    Review of Systems   Constitutional: Negative for fever and unexpected weight change.   HENT: Negative for hearing loss, postnasal drip and rhinorrhea.    Eyes: Negative for pain and visual disturbance.   Respiratory: Negative for cough, shortness of breath and wheezing.    Cardiovascular: Negative for chest pain and palpitations.   Gastrointestinal: Negative for constipation, diarrhea, nausea and vomiting.   Genitourinary: Negative for dysuria and hematuria.   Musculoskeletal: Positive for arthralgias. Negative for  "back pain, myalgias and neck stiffness.   Skin: Negative for pallor and rash.   Neurological: Negative for seizures, syncope and headaches.   Hematological: Negative for adenopathy.   Psychiatric/Behavioral: Negative for dysphoric mood. The patient is not nervous/anxious.        Objective:   /80   Pulse 68   Temp 97.6 °F (36.4 °C)   Ht 5' 10" (1.778 m)   Wt 83.2 kg (183 lb 6.8 oz)   BMI 26.32 kg/m²      Physical Exam  Vitals signs reviewed.   Constitutional:       General: He is not in acute distress.     Appearance: He is well-developed.   HENT:      Head: Normocephalic and atraumatic.      Right Ear: Tympanic membrane and ear canal normal.      Left Ear: Tympanic membrane and ear canal normal.   Eyes:      Pupils: Pupils are equal, round, and reactive to light.   Neck:      Musculoskeletal: Normal range of motion and neck supple.      Thyroid: No thyromegaly.      Vascular: No JVD.   Cardiovascular:      Rate and Rhythm: Normal rate and regular rhythm.      Heart sounds: Normal heart sounds. No murmur. No friction rub. No gallop.    Pulmonary:      Effort: Pulmonary effort is normal.      Breath sounds: Normal breath sounds. No wheezing or rales.   Abdominal:      General: Bowel sounds are normal. There is no distension.      Palpations: Abdomen is soft.      Tenderness: There is no abdominal tenderness. There is no guarding or rebound.   Musculoskeletal: Normal range of motion.   Lymphadenopathy:      Cervical: No cervical adenopathy.   Skin:     General: Skin is warm and dry.      Findings: No rash.   Neurological:      General: No focal deficit present.      Mental Status: He is alert and oriented to person, place, and time.      Cranial Nerves: No cranial nerve deficit.      Deep Tendon Reflexes: Reflexes are normal and symmetric.   Psychiatric:         Mood and Affect: Mood normal.         Judgment: Judgment normal.             Assessment:       1. Routine general medical examination at [facility] " facility    2. Mixed hyperlipidemia    3. Prostate cancer    4. Waldenstrom's macroglobulinemia    5. History of rectal cancer    6. History of thymoma    7. Aortic atherosclerosis    8. Bronchiectasis without complication        Plan:   No problem-specific Assessment & Plan notes found for this encounter.    Routine general medical examination at health care facility  Comments:  Focus on good health habits, low fat diet, regular exercise, seatbelt use, sunscreen use    Mixed hyperlipidemia  Comments:  update labs  Orders:  -     Lipid Panel; Future; Expected date: 07/14/2020    Prostate cancer  Comments:  Doing well by report. No evidence of recurrence.  Follows with DR. Gina Her's macroglobulinemia  Comments:  Following with DR. Montaño. Stable over time    History of rectal cancer  Comments:  Continues regular annual colonoscopies for surveillance    History of thymoma  Comments:  NO issues    Aortic atherosclerosis  Comments:  Continue asa, bp control    Bronchiectasis without complication  Comments:  Stable. No issues at this time.          Follow up in about 6 months (around 1/9/2021).

## 2020-07-09 NOTE — PATIENT INSTRUCTIONS
Low-Salt Diet  This diet removes foods that are high in salt. It also limits the amount of salt you use when cooking. It is most often used for people with high blood pressure, edema (fluid retention), and kidney, liver, or heart disease.  Table salt contains the mineral sodium. Your body needs sodium to work normally. But too much sodium can make your health problems worse. Your healthcare provider is recommending a low-salt (also called low-sodium) diet for you. Your total daily allowance of salt is 1,500 to 2,300 milligrams (mg). It is less than 1 teaspoon of table salt. This means you can have only about 500 to 700 mg of sodium at each meal. People with certain health problems should limit salt intake to the lower end of the recommended range.    When you cook, dont add much salt. If you can cook without using salt, even better. Dont add salt to your food at the table.  When shopping, read food labels. Salt is often called sodium on the label. Choose foods that are salt-free, low salt, or very low salt. Note that foods with reduced salt may not lower your salt intake enough.    Beans, potatoes, and pasta  Ok: Dry beans, split peas, lentils, potatoes, rice, macaroni, pasta, spaghetti without added salt  Avoid: Potato chips, tortilla chips, and similar products  Breads and cereals  Ok: Low-sodium breads, rolls, cereals, and cakes; low-salt crackers, matzo crackers  Avoid: Salted crackers, pretzels, popcorn, Italian toast, pancakes, muffins  Dairy  Ok: Milk, chocolate milk, hot chocolate mix, low-salt cheeses, and yogurt  Avoid: Processed cheese and cheese spreads; Roquefort, Camembert, and cottage cheese; buttermilk, instant breakfast drink  Desserts  Ok: Ice cream, frozen yogurt, juice bars, gelatin, cookies and pies, sugar, honey, jelly, hard candy  Avoid: Most pies, cakes and cookies prepared or processed with salt; instant pudding  Drinks  Ok: Tea, coffee, fizzy (carbonated) drinks, juices  Avoid: Flavored  coffees, electrolyte replacement drinks, sports drinks  Meats  Ok: All fresh meat, fish, poultry, low-salt tuna, eggs, egg substitute  Avoid: Smoked, pickled, brine-cured, or salted meats and fish. This includes sim, chipped beef, corned beef, hot dogs, deli meats, ham, kosher meats, salt pork, sausage, canned tuna, salted codfish, smoked salmon, herring, sardines, or anchovies.  Seasonings and spices  Ok: Most seasonings are okay. Good substitutes for salt include: fresh herb blends, hot sauce, lemon, garlic, najera, vinegar, dry mustard, parsley, cilantro, horseradish, tomato paste, regular margarine, mayonnaise, unsalted butter, cream cheese, vegetable oil, cream, low-salt salad dressing and gravy.  Avoid: Regular ketchup, relishes, pickles, soy sauce, teriyaki sauce, Worcestershire sauce, BBQ sauce, tartar sauce, meat tenderizer, chili sauce, regular gravy, regular salad dressing, salted butter  Soups  Ok: Low-salt soups and broths made with allowed foods  Avoid: Bouillon cubes, soups with smoked or salted meats, regular soup and broth  Vegetables  Ok: Most vegetables are okay; also low-salt tomato and vegetable juices  Avoid: Sauerkraut and other brine-soaked vegetables; pickles and other pickled vegetables; tomato juice, olives  Date Last Reviewed: 8/1/2016 © 2000-2017 [employer]. [address] [place] [id number]. All rights reserved. This information is not intended as a substitute for professional medical care. Always follow your healthcare professional's instructions.        You are a candidate to receive the new shingles vaccination.  Due to medicare rules we can not give it to you in the clinic.  You will need to get it at the pharmacy.  You can check with your local pharmacy to arrange to get the vaccination.  It is a 2 shot series.  You will get the first shot and then a second shot between 2 and 6 months later.

## 2020-07-14 ENCOUNTER — PROCEDURE VISIT (OUTPATIENT)
Dept: DERMATOLOGY | Facility: CLINIC | Age: 80
End: 2020-07-14
Payer: MEDICARE

## 2020-07-14 ENCOUNTER — LAB VISIT (OUTPATIENT)
Dept: LAB | Facility: HOSPITAL | Age: 80
End: 2020-07-14
Attending: INTERNAL MEDICINE
Payer: MEDICARE

## 2020-07-14 DIAGNOSIS — Z12.83 SCREENING, MALIGNANT NEOPLASM, SKIN: Primary | ICD-10-CM

## 2020-07-14 DIAGNOSIS — L90.5 SCAR CONDITIONS/SKIN FIBROSIS: ICD-10-CM

## 2020-07-14 DIAGNOSIS — L57.0 ACTINIC KERATOSES: ICD-10-CM

## 2020-07-14 DIAGNOSIS — D48.5 NEOPLASM OF UNCERTAIN BEHAVIOR OF SKIN: ICD-10-CM

## 2020-07-14 DIAGNOSIS — E78.2 MIXED HYPERLIPIDEMIA: ICD-10-CM

## 2020-07-14 LAB
CHOLEST SERPL-MCNC: 259 MG/DL (ref 120–199)
CHOLEST/HDLC SERPL: 3.5 {RATIO} (ref 2–5)
HDLC SERPL-MCNC: 73 MG/DL (ref 40–75)
HDLC SERPL: 28.2 % (ref 20–50)
LDLC SERPL CALC-MCNC: 164.2 MG/DL (ref 63–159)
NONHDLC SERPL-MCNC: 186 MG/DL
TRIGL SERPL-MCNC: 109 MG/DL (ref 30–150)

## 2020-07-14 PROCEDURE — 88305 TISSUE EXAM BY PATHOLOGIST: CPT | Mod: HCNC | Performed by: PATHOLOGY

## 2020-07-14 PROCEDURE — 17000 PR DESTRUCTION(LASER SURGERY,CRYOSURGERY,CHEMOSURGERY),PREMALIGNANT LESIONS,FIRST LESION: ICD-10-PCS | Mod: 59,HCNC,S$GLB, | Performed by: DERMATOLOGY

## 2020-07-14 PROCEDURE — 17000 DESTRUCT PREMALG LESION: CPT | Mod: 59,HCNC,S$GLB, | Performed by: DERMATOLOGY

## 2020-07-14 PROCEDURE — 99213 OFFICE O/P EST LOW 20 MIN: CPT | Mod: 25,HCNC,S$GLB, | Performed by: DERMATOLOGY

## 2020-07-14 PROCEDURE — 17003 DESTRUCT PREMALG LES 2-14: CPT | Mod: HCNC,S$GLB,, | Performed by: DERMATOLOGY

## 2020-07-14 PROCEDURE — 99213 PR OFFICE/OUTPT VISIT, EST, LEVL III, 20-29 MIN: ICD-10-PCS | Mod: 25,HCNC,S$GLB, | Performed by: DERMATOLOGY

## 2020-07-14 PROCEDURE — 80061 LIPID PANEL: CPT | Mod: HCNC

## 2020-07-14 PROCEDURE — 17003 DESTRUCTION, PREMALIGNANT LESIONS; SECOND THROUGH 14 LESIONS: ICD-10-PCS | Mod: HCNC,S$GLB,, | Performed by: DERMATOLOGY

## 2020-07-14 PROCEDURE — 11102 PR TANGENTIAL BIOPSY, SKIN, SINGLE LESION: ICD-10-PCS | Mod: HCNC,S$GLB,, | Performed by: DERMATOLOGY

## 2020-07-14 PROCEDURE — 36415 COLL VENOUS BLD VENIPUNCTURE: CPT | Mod: HCNC

## 2020-07-14 PROCEDURE — 88305 TISSUE EXAM BY PATHOLOGIST: CPT | Mod: 26,HCNC,, | Performed by: PATHOLOGY

## 2020-07-14 PROCEDURE — 88305 TISSUE EXAM BY PATHOLOGIST: ICD-10-PCS | Mod: 26,HCNC,, | Performed by: PATHOLOGY

## 2020-07-14 PROCEDURE — 11102 TANGNTL BX SKIN SINGLE LES: CPT | Mod: HCNC,S$GLB,, | Performed by: DERMATOLOGY

## 2020-07-14 NOTE — PATIENT INSTRUCTIONS
Shave Biopsy Wound Care    Your doctor has performed a shave biopsy today.  A band aid and vaseline ointment has been placed over the site.  This should remain in place for 24 hours.  It is recommended that you keep the area dry for the first 24 hours.  After 24 hours, you may remove the band aid and wash the area with warm soap and water and apply Vaseline jelly.  Many patients prefer to use Neosporin or Bacitracin ointment.  This is acceptable; however, know that you can develop an allergy to this medication even if you have used it safely for years.  It is important to keep the area moist.  Letting it dry out and get air slows healing time, and will worsen the scar.  Band aid is optional after first 24 hours.      If you notice increasing redness, tenderness, pain, or yellow drainage at the biopsy site, please notify your doctor.  These are signs of an infection.    If your biopsy site is bleeding, apply firm pressure for 15 minutes straight.  Repeat for another 15 minutes, if it is still bleeding.   If the surgical site continues to bleed, then please contact your doctor.      [facility] - [facility]   DERMATOLOGY  [address]   [place] [address]   Dept: [phone]   Dept Fax: [phone]

## 2020-07-14 NOTE — PROGRESS NOTES
Subjective:       Patient ID:  Homero Tanner is a 79 y.o. male who presents for   Chief Complaint   Patient presents with    Actinic Keratosis     f/u for cryo     SCC of the vertex scalp (s/p Mohs in 12/2019 per patient), NMSC of the left parietal scalp, AK of the midline parietal scalp (s/p biopsy on 6/9/20), seb derm and rosacea, last seen on 6/9/20. He is here today for f/u tx for AK of the midline parietal scalp and other AK's of the scalp.       Review of Systems   Constitutional: Negative for fever and chills.   Gastrointestinal: Negative for nausea and vomiting.   Skin: Positive for activity-related sunscreen use. Negative for daily sunscreen use and recent sunburn.   Hematologic/Lymphatic: Does not bruise/bleed easily.        Objective:    Physical Exam   Constitutional: He appears well-developed and well-nourished. No distress.   Neurological: He is alert and oriented to person, place, and time. He is not disoriented.   Psychiatric: He has a normal mood and affect.   Skin:   Areas Examined (abnormalities noted in diagram):   Scalp / Hair Palpated and Inspected  Head / Face Inspection Performed  Neck Inspection Performed  RUE Inspected  LUE Inspection Performed  Nails and Digits Inspection Performed              Diagram Legend     Erythematous scaling macule/papule c/w actinic keratosis      Surgical scar with no sign of skin cancer recurrence             Assessment / Plan:      Pathology Orders:     Normal Orders This Visit    Specimen to Pathology, Dermatology     Questions:    Procedure Type: Dermatology and skin neoplasms    Number of Specimens: 1    ------------------------: -------------------------    Spec 1 Procedure: Biopsy    Spec 1 Clinical Impression: blue nevus vs. MM vs. other    Spec 1 Source: left parietal scalp    Clinical Information: see above        Screening, malignant neoplasm, skin  Scar conditions/skin fibrosis  Scar of the vertex scalp and left parietal scalp, hx of NMSC.   No evidence of recurrence on physical exam today.  Continue routine skin surveillance. Daily sunscreen advised.    Actinic keratoses  Cryosurgery Procedure Note    The patient is informed of the precancerous quality and need for treatment of these lesions. After risks, benefits and alternatives explained, including blistering, pain, hyper- and hypopigmentation, patient verbally consents to cryotherapy to precancerous lesions. Liquid nitrogen cryosurgery is applied to the 5 actinic keratoses, as detailed in the physical exam, to produce a freeze injury. The patient is aware that blisters may form and is instructed on wound care with gentle cleansing and use of vaseline ointment to keep moist until healed. The patient is supplied a handout on cryosurgery and is instructed to call if lesions do not completely resolve.    Neoplasm of uncertain behavior of skin  -     Specimen to Pathology, Dermatology  -     Shave biopsy(-ies) done of 1 site(s).   Patient informed to call for results within 2 weeks if have not [facility]         Follow up for call for results.     PROCEDURE NOTE - SHAVE BIOPSY   Location: see above    After risk, benefits, and alternatives were discussed with the patient, the patient agrees to the procedure by verbal informed consent.  The area(s) were cleansed with alcohol. 2 cc of lidocaine 1% with epinephrine was injected for local anesthesia into each lesion(s).  A sharp dermablade was used to remove part or all of the lesion(s).  The specimen(s) will be sent for tissue pathology.  Hemostasis was obtained with aluminum chloride and/or hyfrecation.  The area(s) were dressed with vaseline ointment and bandaged.  The patient tolerated the procedure well without adverse events.  Wound care instructions were given to the patient on the AVS.  The patient will be notified of pathology results once available. Results will also be available in Epic.

## 2020-07-15 ENCOUNTER — PATIENT MESSAGE (OUTPATIENT)
Dept: INTERNAL MEDICINE | Facility: CLINIC | Age: 80
End: 2020-07-15

## 2020-07-15 RX ORDER — ROSUVASTATIN CALCIUM 10 MG/1
10 TABLET, COATED ORAL DAILY
Qty: 90 TABLET | Refills: 3 | Status: SHIPPED | OUTPATIENT
Start: 2020-07-15 | End: 2021-08-12

## 2020-07-17 LAB
FINAL PATHOLOGIC DIAGNOSIS: NORMAL
GROSS: NORMAL
MICROSCOPIC EXAM: NORMAL

## 2020-08-20 ENCOUNTER — LAB VISIT (OUTPATIENT)
Dept: LAB | Facility: HOSPITAL | Age: 80
End: 2020-08-20
Attending: INTERNAL MEDICINE
Payer: MEDICARE

## 2020-08-20 DIAGNOSIS — Z85.048 HISTORY OF RECTAL CANCER: ICD-10-CM

## 2020-08-20 DIAGNOSIS — R74.9 ABNORMAL SERUM ENZYME LEVEL, UNSPECIFIED: ICD-10-CM

## 2020-08-20 DIAGNOSIS — D89.2 PARAPROTEINEMIA: ICD-10-CM

## 2020-08-20 DIAGNOSIS — R79.89 ELEVATED LIVER FUNCTION TESTS: ICD-10-CM

## 2020-08-20 DIAGNOSIS — R97.20 ELEVATED PROSTATE SPECIFIC ANTIGEN (PSA): ICD-10-CM

## 2020-08-20 DIAGNOSIS — C88.0 WALDENSTROM'S MACROGLOBULINEMIA: ICD-10-CM

## 2020-08-20 LAB
ALBUMIN SERPL BCP-MCNC: 3.7 G/DL (ref 3.5–5.2)
ALP SERPL-CCNC: 206 U/L (ref 55–135)
ALT SERPL W/O P-5'-P-CCNC: 34 U/L (ref 10–44)
ANION GAP SERPL CALC-SCNC: 8 MMOL/L (ref 8–16)
AST SERPL-CCNC: 35 U/L (ref 10–40)
BASOPHILS # BLD AUTO: 0.05 K/UL (ref 0–0.2)
BASOPHILS NFR BLD: 0.6 % (ref 0–1.9)
BILIRUB SERPL-MCNC: 0.9 MG/DL (ref 0.1–1)
BUN SERPL-MCNC: 27 MG/DL (ref 8–23)
CALCIUM SERPL-MCNC: 9.1 MG/DL (ref 8.7–10.5)
CHLORIDE SERPL-SCNC: 107 MMOL/L (ref 95–110)
CO2 SERPL-SCNC: 25 MMOL/L (ref 23–29)
COMPLEXED PSA SERPL-MCNC: 3.7 NG/ML (ref 0–4)
CREAT SERPL-MCNC: 1.2 MG/DL (ref 0.5–1.4)
DIFFERENTIAL METHOD: ABNORMAL
EOSINOPHIL # BLD AUTO: 0.6 K/UL (ref 0–0.5)
EOSINOPHIL NFR BLD: 7.3 % (ref 0–8)
ERYTHROCYTE [DISTWIDTH] IN BLOOD BY AUTOMATED COUNT: 14.4 % (ref 11.5–14.5)
EST. GFR  (AFRICAN AMERICAN): >60 ML/MIN/1.73 M^2
EST. GFR  (NON AFRICAN AMERICAN): 57.2 ML/MIN/1.73 M^2
GGT SERPL-CCNC: 182 U/L (ref 8–55)
GLUCOSE SERPL-MCNC: 97 MG/DL (ref 70–110)
HCT VFR BLD AUTO: 40.6 % (ref 40–54)
HGB BLD-MCNC: 12.5 G/DL (ref 14–18)
IGA SERPL-MCNC: 34 MG/DL (ref 40–350)
IGG SERPL-MCNC: 209 MG/DL (ref 650–1600)
IGM SERPL-MCNC: 469 MG/DL (ref 50–300)
IMM GRANULOCYTES # BLD AUTO: 0.02 K/UL (ref 0–0.04)
IMM GRANULOCYTES NFR BLD AUTO: 0.2 % (ref 0–0.5)
LYMPHOCYTES # BLD AUTO: 2.6 K/UL (ref 1–4.8)
LYMPHOCYTES NFR BLD: 30.7 % (ref 18–48)
MCH RBC QN AUTO: 29.8 PG (ref 27–31)
MCHC RBC AUTO-ENTMCNC: 30.8 G/DL (ref 32–36)
MCV RBC AUTO: 97 FL (ref 82–98)
MONOCYTES # BLD AUTO: 0.8 K/UL (ref 0.3–1)
MONOCYTES NFR BLD: 9.3 % (ref 4–15)
NEUTROPHILS # BLD AUTO: 4.3 K/UL (ref 1.8–7.7)
NEUTROPHILS NFR BLD: 51.9 % (ref 38–73)
NRBC BLD-RTO: 0 /100 WBC
PLATELET # BLD AUTO: 206 K/UL (ref 150–350)
PMV BLD AUTO: 11 FL (ref 9.2–12.9)
POTASSIUM SERPL-SCNC: 5.2 MMOL/L (ref 3.5–5.1)
PROT SERPL-MCNC: 6.2 G/DL (ref 6–8.4)
RBC # BLD AUTO: 4.19 M/UL (ref 4.6–6.2)
SODIUM SERPL-SCNC: 140 MMOL/L (ref 136–145)
WBC # BLD AUTO: 8.3 K/UL (ref 3.9–12.7)

## 2020-08-20 PROCEDURE — 82784 ASSAY IGA/IGD/IGG/IGM EACH: CPT | Mod: 59,HCNC

## 2020-08-20 PROCEDURE — 84165 PROTEIN E-PHORESIS SERUM: CPT | Mod: HCNC

## 2020-08-20 PROCEDURE — 85025 COMPLETE CBC W/AUTO DIFF WBC: CPT | Mod: HCNC

## 2020-08-20 PROCEDURE — 80053 COMPREHEN METABOLIC PANEL: CPT | Mod: HCNC

## 2020-08-20 PROCEDURE — 82977 ASSAY OF GGT: CPT | Mod: HCNC

## 2020-08-20 PROCEDURE — 84153 ASSAY OF PSA TOTAL: CPT | Mod: HCNC

## 2020-08-20 PROCEDURE — 84165 PROTEIN E-PHORESIS SERUM: CPT | Mod: 26,HCNC,, | Performed by: PATHOLOGY

## 2020-08-20 PROCEDURE — 36415 COLL VENOUS BLD VENIPUNCTURE: CPT | Mod: HCNC,PO

## 2020-08-20 PROCEDURE — 84165 PATHOLOGIST INTERPRETATION SPE: ICD-10-PCS | Mod: 26,HCNC,, | Performed by: PATHOLOGY

## 2020-08-20 PROCEDURE — 83520 IMMUNOASSAY QUANT NOS NONAB: CPT | Mod: HCNC

## 2020-08-21 LAB
ALBUMIN SERPL ELPH-MCNC: 3.85 G/DL (ref 3.35–5.55)
ALPHA1 GLOB SERPL ELPH-MCNC: 0.29 G/DL (ref 0.17–0.41)
ALPHA2 GLOB SERPL ELPH-MCNC: 0.69 G/DL (ref 0.43–0.99)
B-GLOBULIN SERPL ELPH-MCNC: 0.84 G/DL (ref 0.5–1.1)
GAMMA GLOB SERPL ELPH-MCNC: 0.24 G/DL (ref 0.67–1.58)
KAPPA LC SER QL IA: 1.72 MG/DL (ref 0.33–1.94)
KAPPA LC/LAMBDA SER IA: 1.23 (ref 0.26–1.65)
LAMBDA LC SER QL IA: 1.4 MG/DL (ref 0.57–2.63)
PATHOLOGIST INTERPRETATION SPE: NORMAL
PROT SERPL-MCNC: 5.9 G/DL (ref 6–8.4)

## 2020-08-24 ENCOUNTER — OFFICE VISIT (OUTPATIENT)
Dept: HEMATOLOGY/ONCOLOGY | Facility: CLINIC | Age: 80
End: 2020-08-24
Payer: MEDICARE

## 2020-08-24 VITALS
BODY MASS INDEX: 26.26 KG/M2 | HEART RATE: 51 BPM | TEMPERATURE: 97 F | OXYGEN SATURATION: 96 % | SYSTOLIC BLOOD PRESSURE: 127 MMHG | DIASTOLIC BLOOD PRESSURE: 64 MMHG | RESPIRATION RATE: 17 BRPM | WEIGHT: 183.44 LBS | HEIGHT: 70 IN

## 2020-08-24 DIAGNOSIS — R79.89 ELEVATED LIVER FUNCTION TESTS: Primary | ICD-10-CM

## 2020-08-24 DIAGNOSIS — C61 PROSTATE CANCER: ICD-10-CM

## 2020-08-24 DIAGNOSIS — D50.0 IRON DEFICIENCY ANEMIA DUE TO CHRONIC BLOOD LOSS: ICD-10-CM

## 2020-08-24 DIAGNOSIS — D89.2 PARAPROTEINEMIA: ICD-10-CM

## 2020-08-24 DIAGNOSIS — K76.0 FATTY LIVER: ICD-10-CM

## 2020-08-24 DIAGNOSIS — C88.0 WALDENSTROM MACROGLOBULINEMIA: ICD-10-CM

## 2020-08-24 PROCEDURE — 1159F PR MEDICATION LIST DOCUMENTED IN MEDICAL RECORD: ICD-10-PCS | Mod: HCNC,S$GLB,, | Performed by: INTERNAL MEDICINE

## 2020-08-24 PROCEDURE — 99999 PR PBB SHADOW E&M-EST. PATIENT-LVL III: ICD-10-PCS | Mod: PBBFAC,HCNC,, | Performed by: INTERNAL MEDICINE

## 2020-08-24 PROCEDURE — 99214 PR OFFICE/OUTPT VISIT, EST, LEVL IV, 30-39 MIN: ICD-10-PCS | Mod: HCNC,S$GLB,, | Performed by: INTERNAL MEDICINE

## 2020-08-24 PROCEDURE — 1126F PR PAIN SEVERITY QUANTIFIED, NO PAIN PRESENT: ICD-10-PCS | Mod: HCNC,S$GLB,, | Performed by: INTERNAL MEDICINE

## 2020-08-24 PROCEDURE — 1159F MED LIST DOCD IN RCRD: CPT | Mod: HCNC,S$GLB,, | Performed by: INTERNAL MEDICINE

## 2020-08-24 PROCEDURE — 1126F AMNT PAIN NOTED NONE PRSNT: CPT | Mod: HCNC,S$GLB,, | Performed by: INTERNAL MEDICINE

## 2020-08-24 PROCEDURE — 99499 UNLISTED E&M SERVICE: CPT | Mod: HCNC,S$GLB,, | Performed by: INTERNAL MEDICINE

## 2020-08-24 PROCEDURE — 3078F DIAST BP <80 MM HG: CPT | Mod: HCNC,CPTII,S$GLB, | Performed by: INTERNAL MEDICINE

## 2020-08-24 PROCEDURE — 99214 OFFICE O/P EST MOD 30 MIN: CPT | Mod: HCNC,S$GLB,, | Performed by: INTERNAL MEDICINE

## 2020-08-24 PROCEDURE — 99499 RISK ADDL DX/OHS AUDIT: ICD-10-PCS | Mod: HCNC,S$GLB,, | Performed by: INTERNAL MEDICINE

## 2020-08-24 PROCEDURE — 1101F PT FALLS ASSESS-DOCD LE1/YR: CPT | Mod: HCNC,CPTII,S$GLB, | Performed by: INTERNAL MEDICINE

## 2020-08-24 PROCEDURE — 3078F PR MOST RECENT DIASTOLIC BLOOD PRESSURE < 80 MM HG: ICD-10-PCS | Mod: HCNC,CPTII,S$GLB, | Performed by: INTERNAL MEDICINE

## 2020-08-24 PROCEDURE — 99999 PR PBB SHADOW E&M-EST. PATIENT-LVL III: CPT | Mod: PBBFAC,HCNC,, | Performed by: INTERNAL MEDICINE

## 2020-08-24 PROCEDURE — 3074F PR MOST RECENT SYSTOLIC BLOOD PRESSURE < 130 MM HG: ICD-10-PCS | Mod: HCNC,CPTII,S$GLB, | Performed by: INTERNAL MEDICINE

## 2020-08-24 PROCEDURE — 1101F PR PT FALLS ASSESS DOC 0-1 FALLS W/OUT INJ PAST YR: ICD-10-PCS | Mod: HCNC,CPTII,S$GLB, | Performed by: INTERNAL MEDICINE

## 2020-08-24 PROCEDURE — 3074F SYST BP LT 130 MM HG: CPT | Mod: HCNC,CPTII,S$GLB, | Performed by: INTERNAL MEDICINE

## 2020-08-24 NOTE — PROGRESS NOTES
Subjective:       Patient ID: Homero Tanner is a 80 y.o. male.    Chief Complaint: No chief complaint on file.    HPI This is a 80  year-old gentleman who comes for follow up of her previously diagnosed thymoma, rectal cancer, prostate cancer, b12 deficiency. And Waldestrom;s macroglobulinemia.     He is known to us because of  a history of   previously treated prostate cancer. He was diagnosed around 2004 and treated   with surgery. He relapsed by PSA and was treated with local radiation therapy.   He had further elevation of the PSA and started intermittent LHRH   analogues through the office of his urologist outside our clinic  He receives the injections at his urologist office.     A CBC done on   November 2012 was reported showing a hemoglobin of 12.7. As part of workup his   primary care physician ordered a serum ferritin and total iron binding   capacity, which were unremarkable. Serum folate was normal at 5.5. Vitamin B12   was low at 151. He then started supplemental B12 injection, which  he is still   getting.   Also, as part of the workup for the anemia, serum protein electrophoresis   showed a paraprotein band that was quantified at 1.03 g and identified as IgM.   The patient was then referred to me. I ordered lab tests, which included a   thoracic spine view looking for lytic lesions. The radiologist reported that there   were no lytic lesions but there was a mass in the center of the chest  . This was followed by chest x-rays and CT scans. The   CT scan showed a large mediastinal mass. A CT-guided biopsy at [facility] was read   as showing a thymoma.   He underwent surgery with Dr. Levi Butler at [facility]. The thymoma was involving the pericardium so he received   postoperative radiation therapy.   He finished the radiation therapy and has   remained with without evaluable disease in regards to his thymoma.      A routine lab tests done a few months later showed that there  has been a dramatic   drop in his hemoglobin, which was now 7.8 with a low serum ferritin of 8   suggesting iron deficiency anemia.   Stools were frankly Hemoccult positive.   In regards to his past history, the patient underwent a colonoscopy within our   system on 02/28/2012. The patient was found to have a sessile polyp in the   rectum. An endoscopic removal could not be done.   The patient was referred to another specialist in [facility] for resection since it seemed to   be arising in the setting of the radiation therapy field for his prostate   cancer.   The pathology report from that 2/2012 endoscopy was that of a tubular adenoma.   The patient was then seen in consultation by GI doctor outside the clinic. , Dr. Holly Arellano. The patient then underwent resection of the   lesion. The pathology report was the same.   The patient underwent a colonoscopy at [facility] on   05/07/2014, as part of the work up for his   diagnosed iron deficiency.. It was done by Dr. Jaime Ash. Dr. Ash found a mass in the   rectal area. It was partially excised. Pathology was once again that of   tubulovillous adenoma with dysplasia.   The area of involvement coincided with the radiation therapy field for the prostate   cancer.   The patient had removal of the villous adenoma in [facility] by Dr Ansari.   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   Following the excision he had 2 episodes of rectal bleeding. He ended up having another surgery to control the bleeing and to do a re-excision of the area where the minimally invasive cancer was found.  Pathology revealed no residual cancer.  There has been no more bleeding. He feels fine  He also injects himself with b12 due low b12 levels.          Because of continuous raise of the monoclonal spike in the SPEP, a bone marrow was done 7/2018 which was read as being consistent with a lymphoblastic lymphoma (  Waldestrom;s  macroglobulinemia)>  He was treated with Dr Weaver with Velcade/dex/Rituxan achieving a good biochemical response.  He was given 4 additional treatments of maintenance Rituxan with the last dose around nov 2019    ALLERGIES: see Med card  MEDICATIONS: See MedCard.   PREVIOUS SURGERIES: Prostatectomy around the year 2000 or so, rectal surgery   for removal of tubular adenoma.x2, and another surgery for a minimally invasive rectal cancer 10/2014 Tonsillectomy, removal of thymoma.   SOCIAL HISTORY: Single with two children. Lives in [place]. He smoked   until age 31, approximately a pack a day for 15 years. He is a retired   .   FAMILY HISTORY: No cancer. Father had diabetes. Father had heart disease.   PAST MEDICAL HISTORY:   1. Anemia.   2. 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   3. History of treated prostate cancer.   4. Mild anemia.   5. Iron deficiency anemia.   6. High blood pressure.   7. Elevated cholesterol.   8. Recurrent villous adenoma of the rectum.   9. Reflux.   10. Vitamin B12 deficiency.   11. Status post resection of thymoma of the anterior mediastinum.   12. Skin cancer scalp  13-hx of treated prostate cancer  14-hx of Waldestrom's macroglobulinemia  Review of Systems   Constitutional: Negative.  Negative for fatigue.   Eyes: Negative.    Cardiovascular: Negative.  Negative for chest pain.   Gastrointestinal: Negative for abdominal pain and nausea.   Genitourinary: Negative.  Negative for hematuria.   Musculoskeletal: Negative.    Integumentary:  Negative.   Neurological: Negative.    Psychiatric/Behavioral: Negative.          Objective:      Physical Exam  Constitutional:       Appearance: He is well-developed.   HENT:      Head: Normocephalic.      Right Ear: Tympanic membrane, ear canal and external ear normal.      Left Ear: Tympanic membrane, ear canal and external ear normal.      Nose: Nose normal.      Right Sinus: No maxillary sinus tenderness  or frontal sinus tenderness.      Left Sinus: No maxillary sinus tenderness or frontal sinus tenderness.      Mouth/Throat:      Pharynx: No oropharyngeal exudate.   Eyes:      General: Lids are normal.      Conjunctiva/sclera: Conjunctivae normal.      Pupils: Pupils are equal, round, and reactive to light.   Neck:      Musculoskeletal: Normal range of motion and neck supple.      Thyroid: No thyroid mass or thyromegaly.      Trachea: Trachea normal.      Comments: No crepitus.  Cardiovascular:      Rate and Rhythm: Normal rate and regular rhythm.      Pulses: Normal pulses.      Heart sounds: Normal heart sounds, S1 normal and S2 normal. No murmur. No gallop.       Comments:    Carotid exam normal  Skin temperature in extremities is normal  No edema of extremities  Pulmonary:      Effort: Pulmonary effort is normal. No accessory muscle usage or respiratory distress.      Breath sounds: No wheezing or rales.   Abdominal:      General: Bowel sounds are normal. There is no distension.      Palpations: Abdomen is soft. There is no hepatomegaly, splenomegaly or mass.      Tenderness: There is no abdominal tenderness. There is no guarding or rebound.   Musculoskeletal: Normal range of motion.      Right hand: Normal.      Left hand: Normal.   Lymphadenopathy:      Cervical: No cervical adenopathy.      Upper Body:      Right upper body: No supraclavicular adenopathy.      Left upper body: No supraclavicular adenopathy.   Skin:     General: Skin is warm and dry.      Findings: No abrasion, bruising, ecchymosis, erythema, lesion, petechiae or rash.      Nails: There is no clubbing.     Neurological:      Mental Status: He is alert and oriented to person, place, and time.      Comments:     Psychiatric:         Behavior: Behavior normal.         Wt Readings from Last 3 Encounters:   08/24/20 83.2 kg (183 lb 6.8 oz)   07/09/20 83.2 kg (183 lb 6.8 oz)   06/22/20 80 kg (176 lb 5.9 oz)     Temp Readings from Last 3 Encounters:    08/24/20 97.3 °F (36.3 °C)   07/09/20 97.6 °F (36.4 °C)   06/22/20 97.5 °F (36.4 °C) (Temporal)     BP Readings from Last 3 Encounters:   08/24/20 127/64   07/09/20 120/80   06/22/20 119/60     Pulse Readings from Last 3 Encounters:   08/24/20 (!) 51   07/09/20 68   06/22/20 (!) 45       Assessment:       1. Elevated liver function tests    2. Prostate cancer    3. Paraproteinemia    4. Iron deficiency anemia due to chronic blood loss    5. Waldenstrom macroglobulinemia    6. Fatty liver        Plan:       Lab Results   Component Value Date    WBC 8.30 08/20/2020    HGB 12.5 (L) 08/20/2020    HCT 40.6 08/20/2020    MCV 97 08/20/2020     08/20/2020       Lab Results   Component Value Date    CREATININE 1.2 08/20/2020     Lab Results   Component Value Date    ALT 34 08/20/2020    AST 35 08/20/2020     (H) 08/20/2020    ALKPHOS 206 (H) 08/20/2020    BILITOT 0.9 08/20/2020       SPEp shows a spike of 0.43 gr down from   0.59 gr  Free light chains OK  Total IG M 469 ( normal ). Values lower than before  PSA 3.7 down from 9.1  He seems to be doping well in regards to all the above issues.  See us back in 4 months with a cbc/cmp/free light chains, SPEP. Dx PSA and GGt

## 2020-09-27 ENCOUNTER — PATIENT OUTREACH (OUTPATIENT)
Dept: ADMINISTRATIVE | Facility: OTHER | Age: 80
End: 2020-09-27

## 2020-09-28 ENCOUNTER — OFFICE VISIT (OUTPATIENT)
Dept: CARDIOLOGY | Facility: CLINIC | Age: 80
End: 2020-09-28
Payer: MEDICARE

## 2020-09-28 ENCOUNTER — IMMUNIZATION (OUTPATIENT)
Dept: PHARMACY | Facility: CLINIC | Age: 80
End: 2020-09-28
Payer: MEDICARE

## 2020-09-28 VITALS
WEIGHT: 186.06 LBS | HEIGHT: 70 IN | OXYGEN SATURATION: 95 % | DIASTOLIC BLOOD PRESSURE: 80 MMHG | BODY MASS INDEX: 26.64 KG/M2 | HEART RATE: 48 BPM | SYSTOLIC BLOOD PRESSURE: 124 MMHG | RESPIRATION RATE: 16 BRPM

## 2020-09-28 DIAGNOSIS — I27.20 PULMONARY HYPERTENSION: Chronic | ICD-10-CM

## 2020-09-28 DIAGNOSIS — N18.9 CHRONIC RENAL IMPAIRMENT, UNSPECIFIED CKD STAGE: ICD-10-CM

## 2020-09-28 DIAGNOSIS — C88.0 WALDENSTROM'S MACROGLOBULINEMIA: ICD-10-CM

## 2020-09-28 DIAGNOSIS — I10 ESSENTIAL HYPERTENSION: Chronic | ICD-10-CM

## 2020-09-28 DIAGNOSIS — I50.32 CHRONIC DIASTOLIC HEART FAILURE: ICD-10-CM

## 2020-09-28 DIAGNOSIS — I35.1 NONRHEUMATIC AORTIC VALVE INSUFFICIENCY: Chronic | ICD-10-CM

## 2020-09-28 DIAGNOSIS — R00.1 SINUS BRADYCARDIA: Primary | ICD-10-CM

## 2020-09-28 DIAGNOSIS — E78.2 MIXED HYPERLIPIDEMIA: ICD-10-CM

## 2020-09-28 DIAGNOSIS — I49.1 PREMATURE ATRIAL CONTRACTIONS: ICD-10-CM

## 2020-09-28 PROCEDURE — 3079F PR MOST RECENT DIASTOLIC BLOOD PRESSURE 80-89 MM HG: ICD-10-PCS | Mod: HCNC,CPTII,S$GLB, | Performed by: INTERNAL MEDICINE

## 2020-09-28 PROCEDURE — 99499 UNLISTED E&M SERVICE: CPT | Mod: HCNC,S$GLB,, | Performed by: INTERNAL MEDICINE

## 2020-09-28 PROCEDURE — 3079F DIAST BP 80-89 MM HG: CPT | Mod: HCNC,CPTII,S$GLB, | Performed by: INTERNAL MEDICINE

## 2020-09-28 PROCEDURE — 3074F PR MOST RECENT SYSTOLIC BLOOD PRESSURE < 130 MM HG: ICD-10-PCS | Mod: HCNC,CPTII,S$GLB, | Performed by: INTERNAL MEDICINE

## 2020-09-28 PROCEDURE — 99999 PR PBB SHADOW E&M-EST. PATIENT-LVL III: CPT | Mod: PBBFAC,HCNC,, | Performed by: INTERNAL MEDICINE

## 2020-09-28 PROCEDURE — 1101F PT FALLS ASSESS-DOCD LE1/YR: CPT | Mod: HCNC,CPTII,S$GLB, | Performed by: INTERNAL MEDICINE

## 2020-09-28 PROCEDURE — 3074F SYST BP LT 130 MM HG: CPT | Mod: HCNC,CPTII,S$GLB, | Performed by: INTERNAL MEDICINE

## 2020-09-28 PROCEDURE — 99999 PR PBB SHADOW E&M-EST. PATIENT-LVL III: ICD-10-PCS | Mod: PBBFAC,HCNC,, | Performed by: INTERNAL MEDICINE

## 2020-09-28 PROCEDURE — 99214 PR OFFICE/OUTPT VISIT, EST, LEVL IV, 30-39 MIN: ICD-10-PCS | Mod: HCNC,S$GLB,, | Performed by: INTERNAL MEDICINE

## 2020-09-28 PROCEDURE — 1126F PR PAIN SEVERITY QUANTIFIED, NO PAIN PRESENT: ICD-10-PCS | Mod: HCNC,S$GLB,, | Performed by: INTERNAL MEDICINE

## 2020-09-28 PROCEDURE — 1159F MED LIST DOCD IN RCRD: CPT | Mod: HCNC,S$GLB,, | Performed by: INTERNAL MEDICINE

## 2020-09-28 PROCEDURE — 99214 OFFICE O/P EST MOD 30 MIN: CPT | Mod: HCNC,S$GLB,, | Performed by: INTERNAL MEDICINE

## 2020-09-28 PROCEDURE — 1159F PR MEDICATION LIST DOCUMENTED IN MEDICAL RECORD: ICD-10-PCS | Mod: HCNC,S$GLB,, | Performed by: INTERNAL MEDICINE

## 2020-09-28 PROCEDURE — 1101F PR PT FALLS ASSESS DOC 0-1 FALLS W/OUT INJ PAST YR: ICD-10-PCS | Mod: HCNC,CPTII,S$GLB, | Performed by: INTERNAL MEDICINE

## 2020-09-28 PROCEDURE — 1126F AMNT PAIN NOTED NONE PRSNT: CPT | Mod: HCNC,S$GLB,, | Performed by: INTERNAL MEDICINE

## 2020-09-28 PROCEDURE — 99499 RISK ADDL DX/OHS AUDIT: ICD-10-PCS | Mod: HCNC,S$GLB,, | Performed by: INTERNAL MEDICINE

## 2020-09-28 RX ORDER — METOPROLOL SUCCINATE 100 MG/1
100 TABLET, EXTENDED RELEASE ORAL DAILY
Qty: 30 TABLET | Refills: 11 | Status: SHIPPED | OUTPATIENT
Start: 2020-09-28 | End: 2021-09-28

## 2020-09-28 NOTE — PROGRESS NOTES
Subjective:    Patient ID:  Homero Tanner is a 80 y.o. male who presents for evaluation of Hypertension, Hyperlipidemia, Risk Factor Management For Atherosclerosis, and Congestive Heart Failure      HPI  Pt presents for f/u.  His current medical conditions include Waldenstrom's macroglobulinemia lymphoma, HTN, DD, PHTN, AI, PVCs, TR, dyslipidemia, prostate & rectal cancer. Nonsmoker.    Past hx pertinent for following:  Had LHC 20+ years ago, no significant blockages noted.  He has chronic abnl ecgs.   Also has had PSVT with stress testing in past.    He has h/o thymoma surgery with xrt and also had rectal polyp surgery.  - Stress MPI Aug 2017.  Echo Aug 2017 normal EF, DD, mild-mod PHTN, mild AI, LVH, mild RVE.  Echo 9/18 EF 50%, normal diastolic function, mild PHTN, LVH.  Taken off statin for abnl LFTs.  ecg 2/10/20 NSR, pac, nonspecific st abnl.   Now here.  No angina.  No active CHF sxs.  Denies dyspnea, pnd/orthopnea.  Stable leg edema.  HTN controlled.   No palpitations.  No dizziness.  PCP started Rosuvastatin for elevated lipids.  Takes Lasix 40 mg three days/week.         Current Outpatient Medications:     aspirin (ECOTRIN) 81 MG EC tablet, Take 81 mg by mouth once daily., Disp: , Rfl:     b complex vitamins tablet, Take by mouth. 1 tablet Oral Every day, Disp: , Rfl:     clobetasol (TEMOVATE) 0.05 % external solution, AAA of scalp 1-2 times daily prn flares. Strong steroid- do not apply to face or folds of skin, Disp: 50 mL, Rfl: 4    cyanocobalamin 1,000 mcg/mL injection, Give 1cc under the skin once a month, Disp: 10 mL, Rfl: 4    erythromycin with ethanoL (EMGEL) 2 % gel, AAA of face bid.  For rosacea/redness., Disp: 60 g, Rfl: 3    furosemide (LASIX) 40 MG tablet, Take 1 tablet (40 mg total) by mouth once daily., Disp: 30 tablet, Rfl: 11    ketoconazole (NIZORAL) 2 % shampoo, Apply to scalp 1-2 times/week, lather, let sit 5 minutes, then rinse, Disp: 120 mL, Rfl: 5    leuprolide  "(LUPRON) 3.75 mg injection, Inject 3.75 mg into the muscle every 3 (three) months. , Disp: , Rfl:     metoprolol succinate (TOPROL-XL) 100 MG 24 hr tablet, Take 1 tablet (100 mg total) by mouth once daily., Disp: 30 tablet, Rfl: 11    metronidazole 0.75% (METROCREAM) 0.75 % Crea, APPLY TO AFFECTED AREA ON FACE TWICE DAILY, Disp: 45 g, Rfl: 3    omeprazole (PRILOSEC) 20 MG capsule, Take 1 capsule (20 mg total) by mouth once daily., Disp: 90 capsule, Rfl: 3    rosuvastatin (CRESTOR) 10 MG tablet, Take 1 tablet (10 mg total) by mouth once daily., Disp: 90 tablet, Rfl: 3      Review of Systems   Constitution: Negative.   HENT: Negative.    Eyes: Negative.    Cardiovascular: Positive for leg swelling.   Respiratory: Negative.    Endocrine: Negative.    Hematologic/Lymphatic: Negative.    Skin: Negative.    Musculoskeletal: Positive for arthritis and joint pain.   Gastrointestinal: Negative.    Genitourinary: Negative.    Neurological: Negative.    Psychiatric/Behavioral: Negative.    Allergic/Immunologic: Negative.        /80 (BP Location: Left arm, Patient Position: Sitting, BP Method: Large (Manual))   Pulse (!) 48   Resp 16   Ht 5' 10" (1.778 m)   Wt 84.4 kg (186 lb 1.1 oz)   SpO2 95%   BMI 26.70 kg/m²       Wt Readings from Last 3 Encounters:   09/28/20 84.4 kg (186 lb 1.1 oz)   08/24/20 83.2 kg (183 lb 6.8 oz)   07/09/20 83.2 kg (183 lb 6.8 oz)     Temp Readings from Last 3 Encounters:   08/24/20 97.3 °F (36.3 °C)   07/09/20 97.6 °F (36.4 °C)   06/22/20 97.5 °F (36.4 °C) (Temporal)     BP Readings from Last 3 Encounters:   09/28/20 124/80   08/24/20 127/64   07/09/20 120/80     Pulse Readings from Last 3 Encounters:   09/28/20 (!) 48   08/24/20 (!) 51   07/09/20 68            Objective:    Physical Exam   Constitutional: He is oriented to person, place, and time. He appears well-developed and well-nourished.   HENT:   Head: Normocephalic.   Neck: Normal range of motion. Neck supple. Normal carotid " pulses, no hepatojugular reflux and no JVD present. Carotid bruit is not present. No thyromegaly present.   Cardiovascular: S1 normal and S2 normal. A regularly irregular rhythm present. Bradycardia present. PMI is not displaced. Exam reveals no S3, no S4, no distant heart sounds, no friction rub, no midsystolic click and no opening snap.   No murmur heard.  Pulses:       Radial pulses are 2+ on the right side and 2+ on the left side.   Pulmonary/Chest: Effort normal and breath sounds normal. He has no wheezes. He has no rales.   Abdominal: Soft. Bowel sounds are normal. He exhibits no distension, no abdominal bruit, no ascites and no mass. There is no abdominal tenderness.   Musculoskeletal:         General: Edema present.   Neurological: He is alert and oriented to person, place, and time.   Skin: Skin is warm.   Psychiatric: He has a normal mood and affect. His behavior is normal.   Nursing note and vitals reviewed.      I have reviewed all pertinent labs and cardiac studies.      Chemistry        Component Value Date/Time     08/20/2020 1031    K 5.2 (H) 08/20/2020 1031     08/20/2020 1031    CO2 25 08/20/2020 1031    BUN 27 (H) 08/20/2020 1031    CREATININE 1.2 08/20/2020 1031    GLU 97 08/20/2020 1031        Component Value Date/Time    CALCIUM 9.1 08/20/2020 1031    ALKPHOS 206 (H) 08/20/2020 1031    AST 35 08/20/2020 1031    ALT 34 08/20/2020 1031    BILITOT 0.9 08/20/2020 1031    ESTGFRAFRICA >60.0 08/20/2020 1031    EGFRNONAA 57.2 (A) 08/20/2020 1031        Lab Results   Component Value Date    HGBA1C 5.2 06/06/2016     Lab Results   Component Value Date    CHOL 259 (H) 07/14/2020    CHOL 160 08/21/2018    CHOL 164 06/06/2016     Lab Results   Component Value Date    HDL 73 07/14/2020    HDL 65 08/21/2018    HDL 79 (H) 06/06/2016     Lab Results   Component Value Date    LDLCALC 164.2 (H) 07/14/2020    LDLCALC 80.0 08/21/2018    LDLCALC 69.4 06/06/2016     Lab Results   Component Value Date     TRIG 109 07/14/2020    TRIG 75 08/21/2018    TRIG 78 06/06/2016     Lab Results   Component Value Date    CHOLHDL 28.2 07/14/2020    CHOLHDL 40.6 08/21/2018    CHOLHDL 48.2 06/06/2016     Lab Results   Component Value Date    TSH 2.0 04/23/2008           Assessment:       1. Sinus bradycardia    2. Premature atrial contractions    3. Chronic renal impairment, unspecified CKD stage    4. Nonrheumatic aortic valve insufficiency    5. Essential hypertension    6. Pulmonary hypertension    7. Chronic diastolic heart failure    8. Mixed hyperlipidemia    9. Waldenstrom's macroglobulinemia         Plan:               Stable cardiovascular conditions at present time on current medical treatment.  HR on low side.  Stop Metoprolol tartrate 100 mg bid.  Start Toprol xl 100 mg qd.  CHF compensated on current med regimen.  Reviewed all tests and above medical conditions with patient in detail and formulated treatment plan.  Continue optimal medical treatment for cardiovascular conditions.  Cardiac low salt diet discussed.  Daily exercise encouraged, goal 30 +  minutes aerobic exercise as tolerated.  Maintaining healthy weight and weight loss goals (if needed) were discussed in clinic.  Continue statin.  F/u in 3 weeks.

## 2020-10-26 ENCOUNTER — OFFICE VISIT (OUTPATIENT)
Dept: CARDIOLOGY | Facility: CLINIC | Age: 80
End: 2020-10-26
Payer: MEDICARE

## 2020-10-26 VITALS
WEIGHT: 183.44 LBS | RESPIRATION RATE: 16 BRPM | OXYGEN SATURATION: 95 % | DIASTOLIC BLOOD PRESSURE: 70 MMHG | HEIGHT: 70 IN | SYSTOLIC BLOOD PRESSURE: 150 MMHG | BODY MASS INDEX: 26.26 KG/M2 | HEART RATE: 58 BPM

## 2020-10-26 DIAGNOSIS — I27.20 PULMONARY HYPERTENSION: Chronic | ICD-10-CM

## 2020-10-26 DIAGNOSIS — C88.0 WALDENSTROM'S MACROGLOBULINEMIA: ICD-10-CM

## 2020-10-26 DIAGNOSIS — I10 ESSENTIAL HYPERTENSION: Chronic | ICD-10-CM

## 2020-10-26 DIAGNOSIS — E78.2 MIXED HYPERLIPIDEMIA: ICD-10-CM

## 2020-10-26 DIAGNOSIS — I49.1 PREMATURE ATRIAL CONTRACTIONS: ICD-10-CM

## 2020-10-26 DIAGNOSIS — N18.9 CHRONIC RENAL IMPAIRMENT, UNSPECIFIED CKD STAGE: ICD-10-CM

## 2020-10-26 DIAGNOSIS — I50.32 CHRONIC DIASTOLIC HEART FAILURE: ICD-10-CM

## 2020-10-26 DIAGNOSIS — R00.1 SINUS BRADYCARDIA: Primary | ICD-10-CM

## 2020-10-26 DIAGNOSIS — R74.8 ABNORMAL LIVER ENZYMES: ICD-10-CM

## 2020-10-26 DIAGNOSIS — I35.1 NONRHEUMATIC AORTIC VALVE INSUFFICIENCY: Chronic | ICD-10-CM

## 2020-10-26 PROCEDURE — 99999 PR PBB SHADOW E&M-EST. PATIENT-LVL III: ICD-10-PCS | Mod: PBBFAC,HCNC,, | Performed by: INTERNAL MEDICINE

## 2020-10-26 PROCEDURE — 1126F AMNT PAIN NOTED NONE PRSNT: CPT | Mod: HCNC,S$GLB,, | Performed by: INTERNAL MEDICINE

## 2020-10-26 PROCEDURE — 99214 OFFICE O/P EST MOD 30 MIN: CPT | Mod: HCNC,S$GLB,, | Performed by: INTERNAL MEDICINE

## 2020-10-26 PROCEDURE — 1126F PR PAIN SEVERITY QUANTIFIED, NO PAIN PRESENT: ICD-10-PCS | Mod: HCNC,S$GLB,, | Performed by: INTERNAL MEDICINE

## 2020-10-26 PROCEDURE — 1101F PT FALLS ASSESS-DOCD LE1/YR: CPT | Mod: HCNC,CPTII,S$GLB, | Performed by: INTERNAL MEDICINE

## 2020-10-26 PROCEDURE — 3077F PR MOST RECENT SYSTOLIC BLOOD PRESSURE >= 140 MM HG: ICD-10-PCS | Mod: HCNC,CPTII,S$GLB, | Performed by: INTERNAL MEDICINE

## 2020-10-26 PROCEDURE — 99499 RISK ADDL DX/OHS AUDIT: ICD-10-PCS | Mod: HCNC,S$GLB,, | Performed by: INTERNAL MEDICINE

## 2020-10-26 PROCEDURE — 3077F SYST BP >= 140 MM HG: CPT | Mod: HCNC,CPTII,S$GLB, | Performed by: INTERNAL MEDICINE

## 2020-10-26 PROCEDURE — 99499 UNLISTED E&M SERVICE: CPT | Mod: HCNC,S$GLB,, | Performed by: INTERNAL MEDICINE

## 2020-10-26 PROCEDURE — 1159F PR MEDICATION LIST DOCUMENTED IN MEDICAL RECORD: ICD-10-PCS | Mod: HCNC,S$GLB,, | Performed by: INTERNAL MEDICINE

## 2020-10-26 PROCEDURE — 1159F MED LIST DOCD IN RCRD: CPT | Mod: HCNC,S$GLB,, | Performed by: INTERNAL MEDICINE

## 2020-10-26 PROCEDURE — 99214 PR OFFICE/OUTPT VISIT, EST, LEVL IV, 30-39 MIN: ICD-10-PCS | Mod: HCNC,S$GLB,, | Performed by: INTERNAL MEDICINE

## 2020-10-26 PROCEDURE — 1101F PR PT FALLS ASSESS DOC 0-1 FALLS W/OUT INJ PAST YR: ICD-10-PCS | Mod: HCNC,CPTII,S$GLB, | Performed by: INTERNAL MEDICINE

## 2020-10-26 PROCEDURE — 3078F PR MOST RECENT DIASTOLIC BLOOD PRESSURE < 80 MM HG: ICD-10-PCS | Mod: HCNC,CPTII,S$GLB, | Performed by: INTERNAL MEDICINE

## 2020-10-26 PROCEDURE — 99999 PR PBB SHADOW E&M-EST. PATIENT-LVL III: CPT | Mod: PBBFAC,HCNC,, | Performed by: INTERNAL MEDICINE

## 2020-10-26 PROCEDURE — 3078F DIAST BP <80 MM HG: CPT | Mod: HCNC,CPTII,S$GLB, | Performed by: INTERNAL MEDICINE

## 2020-10-26 NOTE — PROGRESS NOTES
Subjective:    Patient ID:  Homero Tanner is a 80 y.o. male who presents for evaluation of Hypertension, Hyperlipidemia, Congestive Heart Failure, and Palpitations        HPI Pt presents for f/u.  His current medical conditions include Waldenstrom's macroglobulinemia lymphoma, HTN, DD, CRI, PHTN, AI, PVCs, TR, dyslipidemia, prostate & rectal cancer. Nonsmoker.    Past hx pertinent for following:  Had LHC 20+ years ago, no significant blockages noted.  He has chronic abnl ecgs.   Also has had PSVT with stress testing in past.    He has h/o thymoma surgery with xrt and also had rectal polyp surgery.  - Stress MPI Aug 2017.  Echo Aug 2017 normal EF, DD, mild-mod PHTN, mild AI, LVH, mild RVE.  Echo 9/18 EF 50%, normal diastolic function, mild PHTN, LVH.  Taken off statin for abnl LFTs.  ecg 2/10/20 NSR, pac, nonspecific st abnl.   Now here.  Pt seen last month and due to bradycardia, his Metoprolol tartrate 100 mg bid was changed to lower dose of Toprol xl 100 mg qd.  PCP restarted Rosuvastatin for lipid control.  No chest pain sxs.  No CHF sxs.  Denies dyspnea.  Stable leg edema, mild.    Takes lasix 3 days/week.  Stable palpitations, controlled.  No dizziness.   BP overall controlled on current meds.  CRI stable.       Current Outpatient Medications:     aspirin (ECOTRIN) 81 MG EC tablet, Take 81 mg by mouth once daily., Disp: , Rfl:     b complex vitamins tablet, Take by mouth. 1 tablet Oral Every day, Disp: , Rfl:     clobetasol (TEMOVATE) 0.05 % external solution, AAA of scalp 1-2 times daily prn flares. Strong steroid- do not apply to face or folds of skin, Disp: 50 mL, Rfl: 4    cyanocobalamin 1,000 mcg/mL injection, Give 1cc under the skin once a month, Disp: 10 mL, Rfl: 4    erythromycin with ethanoL (EMGEL) 2 % gel, AAA of face bid.  For rosacea/redness., Disp: 60 g, Rfl: 3    furosemide (LASIX) 40 MG tablet, Take 1 tablet (40 mg total) by mouth once daily., Disp: 30 tablet, Rfl: 11     "ketoconazole (NIZORAL) 2 % shampoo, Apply to scalp 1-2 times/week, lather, let sit 5 minutes, then rinse, Disp: 120 mL, Rfl: 5    leuprolide (LUPRON) 3.75 mg injection, Inject 3.75 mg into the muscle every 3 (three) months. , Disp: , Rfl:     metoprolol succinate (TOPROL-XL) 100 MG 24 hr tablet, Take 1 tablet (100 mg total) by mouth once daily., Disp: 30 tablet, Rfl: 11    metronidazole 0.75% (METROCREAM) 0.75 % Crea, APPLY TO AFFECTED AREA ON FACE TWICE DAILY, Disp: 45 g, Rfl: 3    omeprazole (PRILOSEC) 20 MG capsule, Take 1 capsule (20 mg total) by mouth once daily., Disp: 90 capsule, Rfl: 3    rosuvastatin (CRESTOR) 10 MG tablet, Take 1 tablet (10 mg total) by mouth once daily., Disp: 90 tablet, Rfl: 3      Review of Systems   Constitution: Negative.   HENT: Negative.    Eyes: Negative.    Cardiovascular: Positive for leg swelling and palpitations.   Respiratory: Negative.    Endocrine: Negative.    Hematologic/Lymphatic: Negative.    Skin: Negative.    Musculoskeletal: Negative.    Gastrointestinal: Negative.    Genitourinary: Negative.    Neurological: Negative.    Psychiatric/Behavioral: Negative.    Allergic/Immunologic: Negative.        BP (!) 150/70 (BP Location: Left arm, Patient Position: Sitting, BP Method: Large (Manual))   Pulse (!) 58   Resp 16   Ht 5' 10" (1.778 m)   Wt 83.2 kg (183 lb 6.8 oz)   SpO2 95%   BMI 26.32 kg/m²     Wt Readings from Last 3 Encounters:   10/26/20 83.2 kg (183 lb 6.8 oz)   09/28/20 84.4 kg (186 lb 1.1 oz)   08/24/20 83.2 kg (183 lb 6.8 oz)     Temp Readings from Last 3 Encounters:   08/24/20 97.3 °F (36.3 °C)   07/09/20 97.6 °F (36.4 °C)   06/22/20 97.5 °F (36.4 °C) (Temporal)     BP Readings from Last 3 Encounters:   10/26/20 (!) 150/70   09/28/20 124/80   08/24/20 127/64     Pulse Readings from Last 3 Encounters:   10/26/20 (!) 58   09/28/20 (!) 48   08/24/20 (!) 51          Objective:    Physical Exam   Constitutional: He is oriented to person, place, and time. " He appears well-developed and well-nourished.   HENT:   Head: Normocephalic.   Neck: Normal range of motion. Neck supple. Normal carotid pulses, no hepatojugular reflux and no JVD present. Carotid bruit is not present. No thyromegaly present.   Cardiovascular: S1 normal and S2 normal. A regularly irregular rhythm present. Bradycardia present. PMI is not displaced. Exam reveals no S3, no S4, no distant heart sounds, no friction rub, no midsystolic click and no opening snap.   No murmur heard.  Pulses:       Radial pulses are 2+ on the right side and 2+ on the left side.   Pulmonary/Chest: Effort normal and breath sounds normal. He has no wheezes. He has no rales.   Abdominal: Soft. Bowel sounds are normal. He exhibits no distension, no abdominal bruit, no ascites and no mass. There is no abdominal tenderness.   Musculoskeletal:         General: Edema (trace edema B LE) present.   Neurological: He is alert and oriented to person, place, and time.   Skin: Skin is warm.   Psychiatric: He has a normal mood and affect. His behavior is normal.   Nursing note and vitals reviewed.      I have reviewed all pertinent labs and cardiac studies.      Chemistry        Component Value Date/Time     08/20/2020 1031    K 5.2 (H) 08/20/2020 1031     08/20/2020 1031    CO2 25 08/20/2020 1031    BUN 27 (H) 08/20/2020 1031    CREATININE 1.2 08/20/2020 1031    GLU 97 08/20/2020 1031        Component Value Date/Time    CALCIUM 9.1 08/20/2020 1031    ALKPHOS 206 (H) 08/20/2020 1031    AST 35 08/20/2020 1031    ALT 34 08/20/2020 1031    BILITOT 0.9 08/20/2020 1031    ESTGFRAFRICA >60.0 08/20/2020 1031    EGFRNONAA 57.2 (A) 08/20/2020 1031        Lab Results   Component Value Date    WBC 8.30 08/20/2020    HGB 12.5 (L) 08/20/2020    HCT 40.6 08/20/2020    MCV 97 08/20/2020     08/20/2020       Lab Results   Component Value Date    HGBA1C 5.2 06/06/2016     Lab Results   Component Value Date    CHOL 259 (H) 07/14/2020     CHOL 160 08/21/2018    CHOL 164 06/06/2016     Lab Results   Component Value Date    HDL 73 07/14/2020    HDL 65 08/21/2018    HDL 79 (H) 06/06/2016     Lab Results   Component Value Date    LDLCALC 164.2 (H) 07/14/2020    LDLCALC 80.0 08/21/2018    LDLCALC 69.4 06/06/2016     Lab Results   Component Value Date    TRIG 109 07/14/2020    TRIG 75 08/21/2018    TRIG 78 06/06/2016     Lab Results   Component Value Date    CHOLHDL 28.2 07/14/2020    CHOLHDL 40.6 08/21/2018    CHOLHDL 48.2 06/06/2016           Assessment:       1. Sinus bradycardia    2. Premature atrial contractions    3. Chronic renal impairment, unspecified CKD stage    4. Waldenstrom's macroglobulinemia    5. Abnormal liver enzymes    6. Chronic diastolic heart failure    7. Mixed hyperlipidemia    8. Nonrheumatic aortic valve insufficiency    9. Pulmonary hypertension    10. Essential hypertension         Plan:             Stable cardiovascular conditions at present time on current medical treatment.  Continue Toprol xl 100 mg qd.  Monitor bradycardia.  CRI stable.  No changes in meds today.   Recheck lipids -- continue statin tx.   Reviewed all tests and above medical conditions with patient in detail and formulated treatment plan.  Continue optimal medical treatment for cardiovascular conditions.  Cardiac low salt diet discussed.  Daily exercise encouraged, goal 30 +  minutes aerobic exercise as tolerated.  Maintaining healthy weight and weight loss goals (if needed) were discussed in clinic.  F/u in 6 months.

## 2020-10-30 ENCOUNTER — LAB VISIT (OUTPATIENT)
Dept: LAB | Facility: HOSPITAL | Age: 80
End: 2020-10-30
Attending: INTERNAL MEDICINE
Payer: MEDICARE

## 2020-10-30 DIAGNOSIS — E78.2 MIXED HYPERLIPIDEMIA: ICD-10-CM

## 2020-10-30 LAB
ALBUMIN SERPL BCP-MCNC: 3.7 G/DL (ref 3.5–5.2)
ALP SERPL-CCNC: 476 U/L (ref 55–135)
ALT SERPL W/O P-5'-P-CCNC: 53 U/L (ref 10–44)
ANION GAP SERPL CALC-SCNC: 11 MMOL/L (ref 8–16)
AST SERPL-CCNC: 31 U/L (ref 10–40)
BILIRUB SERPL-MCNC: 0.8 MG/DL (ref 0.1–1)
BUN SERPL-MCNC: 19 MG/DL (ref 8–23)
CALCIUM SERPL-MCNC: 9 MG/DL (ref 8.7–10.5)
CHLORIDE SERPL-SCNC: 103 MMOL/L (ref 95–110)
CHOLEST SERPL-MCNC: 173 MG/DL (ref 120–199)
CHOLEST/HDLC SERPL: 2.8 {RATIO} (ref 2–5)
CO2 SERPL-SCNC: 26 MMOL/L (ref 23–29)
CREAT SERPL-MCNC: 1.2 MG/DL (ref 0.5–1.4)
EST. GFR  (AFRICAN AMERICAN): >60 ML/MIN/1.73 M^2
EST. GFR  (NON AFRICAN AMERICAN): 57.2 ML/MIN/1.73 M^2
GLUCOSE SERPL-MCNC: 87 MG/DL (ref 70–110)
HDLC SERPL-MCNC: 61 MG/DL (ref 40–75)
HDLC SERPL: 35.3 % (ref 20–50)
LDLC SERPL CALC-MCNC: 93.4 MG/DL (ref 63–159)
NONHDLC SERPL-MCNC: 112 MG/DL
POTASSIUM SERPL-SCNC: 4.8 MMOL/L (ref 3.5–5.1)
PROT SERPL-MCNC: 6.3 G/DL (ref 6–8.4)
SODIUM SERPL-SCNC: 140 MMOL/L (ref 136–145)
TRIGL SERPL-MCNC: 93 MG/DL (ref 30–150)

## 2020-10-30 PROCEDURE — 80061 LIPID PANEL: CPT | Mod: HCNC

## 2020-10-30 PROCEDURE — 80053 COMPREHEN METABOLIC PANEL: CPT | Mod: HCNC

## 2020-10-30 PROCEDURE — 36415 COLL VENOUS BLD VENIPUNCTURE: CPT | Mod: HCNC

## 2020-11-01 ENCOUNTER — TELEPHONE (OUTPATIENT)
Dept: CARDIOLOGY | Facility: CLINIC | Age: 80
End: 2020-11-01

## 2020-11-01 NOTE — TELEPHONE ENCOUNTER
Please call pt.  Lipids much better, improved by about 75 points.  Continue current meds and f/u next appt.    Dr Wright

## 2020-11-02 NOTE — TELEPHONE ENCOUNTER
Attempted to contact pt about labs, left vm      Please call pt.  Lipids much better, improved by about 75 points.  Continue current meds and f/u next appt.     Dr Wright

## 2020-12-14 ENCOUNTER — LAB VISIT (OUTPATIENT)
Dept: LAB | Facility: HOSPITAL | Age: 80
End: 2020-12-14
Attending: INTERNAL MEDICINE
Payer: MEDICARE

## 2020-12-14 DIAGNOSIS — R79.89 ELEVATED LIVER FUNCTION TESTS: ICD-10-CM

## 2020-12-14 DIAGNOSIS — D89.2 PARAPROTEINEMIA: ICD-10-CM

## 2020-12-14 DIAGNOSIS — C61 PROSTATE CANCER: ICD-10-CM

## 2020-12-14 DIAGNOSIS — K76.0 FATTY LIVER: ICD-10-CM

## 2020-12-14 DIAGNOSIS — C88.0 WALDENSTROM MACROGLOBULINEMIA: ICD-10-CM

## 2020-12-14 LAB
ALBUMIN SERPL BCP-MCNC: 4.1 G/DL (ref 3.5–5.2)
ALP SERPL-CCNC: 258 U/L (ref 55–135)
ALT SERPL W/O P-5'-P-CCNC: 46 U/L (ref 10–44)
ANION GAP SERPL CALC-SCNC: 9 MMOL/L (ref 8–16)
AST SERPL-CCNC: 32 U/L (ref 10–40)
BASOPHILS # BLD AUTO: 0.07 K/UL (ref 0–0.2)
BASOPHILS NFR BLD: 0.6 % (ref 0–1.9)
BILIRUB SERPL-MCNC: 0.9 MG/DL (ref 0.1–1)
BUN SERPL-MCNC: 26 MG/DL (ref 8–23)
CALCIUM SERPL-MCNC: 9.4 MG/DL (ref 8.7–10.5)
CHLORIDE SERPL-SCNC: 103 MMOL/L (ref 95–110)
CO2 SERPL-SCNC: 27 MMOL/L (ref 23–29)
CREAT SERPL-MCNC: 1.4 MG/DL (ref 0.5–1.4)
DIFFERENTIAL METHOD: ABNORMAL
EOSINOPHIL # BLD AUTO: 0.8 K/UL (ref 0–0.5)
EOSINOPHIL NFR BLD: 6.9 % (ref 0–8)
ERYTHROCYTE [DISTWIDTH] IN BLOOD BY AUTOMATED COUNT: 13.5 % (ref 11.5–14.5)
EST. GFR  (AFRICAN AMERICAN): 55 ML/MIN/1.73 M^2
EST. GFR  (NON AFRICAN AMERICAN): 47 ML/MIN/1.73 M^2
GGT SERPL-CCNC: 223 U/L (ref 8–55)
GLUCOSE SERPL-MCNC: 97 MG/DL (ref 70–110)
HCT VFR BLD AUTO: 40.6 % (ref 40–54)
HGB BLD-MCNC: 13 G/DL (ref 14–18)
IMM GRANULOCYTES # BLD AUTO: 0.03 K/UL (ref 0–0.04)
IMM GRANULOCYTES NFR BLD AUTO: 0.3 % (ref 0–0.5)
LYMPHOCYTES # BLD AUTO: 3.2 K/UL (ref 1–4.8)
LYMPHOCYTES NFR BLD: 29 % (ref 18–48)
MCH RBC QN AUTO: 30.5 PG (ref 27–31)
MCHC RBC AUTO-ENTMCNC: 32 G/DL (ref 32–36)
MCV RBC AUTO: 95 FL (ref 82–98)
MONOCYTES # BLD AUTO: 0.9 K/UL (ref 0.3–1)
MONOCYTES NFR BLD: 8.7 % (ref 4–15)
NEUTROPHILS # BLD AUTO: 5.9 K/UL (ref 1.8–7.7)
NEUTROPHILS NFR BLD: 54.5 % (ref 38–73)
NRBC BLD-RTO: 0 /100 WBC
PLATELET # BLD AUTO: 222 K/UL (ref 150–350)
PMV BLD AUTO: 10.1 FL (ref 9.2–12.9)
POTASSIUM SERPL-SCNC: 5.1 MMOL/L (ref 3.5–5.1)
PROT SERPL-MCNC: 6.7 G/DL (ref 6–8.4)
RBC # BLD AUTO: 4.26 M/UL (ref 4.6–6.2)
SODIUM SERPL-SCNC: 139 MMOL/L (ref 136–145)
WBC # BLD AUTO: 10.85 K/UL (ref 3.9–12.7)

## 2020-12-14 PROCEDURE — 83520 IMMUNOASSAY QUANT NOS NONAB: CPT | Mod: 59,HCNC

## 2020-12-14 PROCEDURE — 84165 PROTEIN E-PHORESIS SERUM: CPT | Mod: HCNC

## 2020-12-14 PROCEDURE — 85025 COMPLETE CBC W/AUTO DIFF WBC: CPT | Mod: HCNC

## 2020-12-14 PROCEDURE — 84153 ASSAY OF PSA TOTAL: CPT | Mod: HCNC

## 2020-12-14 PROCEDURE — 82977 ASSAY OF GGT: CPT | Mod: HCNC

## 2020-12-14 PROCEDURE — 36415 COLL VENOUS BLD VENIPUNCTURE: CPT | Mod: HCNC

## 2020-12-14 PROCEDURE — 80053 COMPREHEN METABOLIC PANEL: CPT | Mod: HCNC

## 2020-12-14 PROCEDURE — 84165 PATHOLOGIST INTERPRETATION SPE: ICD-10-PCS | Mod: 26,HCNC,, | Performed by: PATHOLOGY

## 2020-12-14 PROCEDURE — 84165 PROTEIN E-PHORESIS SERUM: CPT | Mod: 26,HCNC,, | Performed by: PATHOLOGY

## 2020-12-15 LAB
ALBUMIN SERPL ELPH-MCNC: 4.19 G/DL (ref 3.35–5.55)
ALPHA1 GLOB SERPL ELPH-MCNC: 0.33 G/DL (ref 0.17–0.41)
ALPHA2 GLOB SERPL ELPH-MCNC: 0.74 G/DL (ref 0.43–0.99)
B-GLOBULIN SERPL ELPH-MCNC: 0.84 G/DL (ref 0.5–1.1)
COMPLEXED PSA SERPL-MCNC: 2.8 NG/ML (ref 0–4)
GAMMA GLOB SERPL ELPH-MCNC: 0.3 G/DL (ref 0.67–1.58)
KAPPA LC SER QL IA: 1.82 MG/DL (ref 0.33–1.94)
KAPPA LC/LAMBDA SER IA: 1.44 (ref 0.26–1.65)
LAMBDA LC SER QL IA: 1.26 MG/DL (ref 0.57–2.63)
PATHOLOGIST INTERPRETATION SPE: NORMAL
PROT SERPL-MCNC: 6.4 G/DL (ref 6–8.4)

## 2020-12-16 ENCOUNTER — TELEPHONE (OUTPATIENT)
Dept: SURGERY | Facility: CLINIC | Age: 80
End: 2020-12-16

## 2020-12-16 ENCOUNTER — OFFICE VISIT (OUTPATIENT)
Dept: HEMATOLOGY/ONCOLOGY | Facility: CLINIC | Age: 80
End: 2020-12-16
Payer: MEDICARE

## 2020-12-16 VITALS
DIASTOLIC BLOOD PRESSURE: 85 MMHG | SYSTOLIC BLOOD PRESSURE: 137 MMHG | BODY MASS INDEX: 26.45 KG/M2 | HEART RATE: 64 BPM | HEIGHT: 70 IN | TEMPERATURE: 98 F | WEIGHT: 184.75 LBS | OXYGEN SATURATION: 98 %

## 2020-12-16 DIAGNOSIS — C88.0 WALDENSTROM'S MACROGLOBULINEMIA: ICD-10-CM

## 2020-12-16 DIAGNOSIS — C61 PROSTATE CANCER: ICD-10-CM

## 2020-12-16 DIAGNOSIS — D51.8 OTHER VITAMIN B12 DEFICIENCY ANEMIA: ICD-10-CM

## 2020-12-16 DIAGNOSIS — Z85.048 HISTORY OF RECTAL CANCER: Primary | ICD-10-CM

## 2020-12-16 DIAGNOSIS — K76.0 FATTY LIVER: ICD-10-CM

## 2020-12-16 PROCEDURE — 99499 RISK ADDL DX/OHS AUDIT: ICD-10-PCS | Mod: HCNC,S$GLB,, | Performed by: INTERNAL MEDICINE

## 2020-12-16 PROCEDURE — 3075F PR MOST RECENT SYSTOLIC BLOOD PRESS GE 130-139MM HG: ICD-10-PCS | Mod: HCNC,CPTII,S$GLB, | Performed by: INTERNAL MEDICINE

## 2020-12-16 PROCEDURE — 1126F PR PAIN SEVERITY QUANTIFIED, NO PAIN PRESENT: ICD-10-PCS | Mod: HCNC,S$GLB,, | Performed by: INTERNAL MEDICINE

## 2020-12-16 PROCEDURE — 99215 PR OFFICE/OUTPT VISIT, EST, LEVL V, 40-54 MIN: ICD-10-PCS | Mod: HCNC,S$GLB,, | Performed by: INTERNAL MEDICINE

## 2020-12-16 PROCEDURE — 99499 UNLISTED E&M SERVICE: CPT | Mod: HCNC,S$GLB,, | Performed by: INTERNAL MEDICINE

## 2020-12-16 PROCEDURE — 99999 PR PBB SHADOW E&M-EST. PATIENT-LVL III: CPT | Mod: PBBFAC,HCNC,, | Performed by: INTERNAL MEDICINE

## 2020-12-16 PROCEDURE — 1126F AMNT PAIN NOTED NONE PRSNT: CPT | Mod: HCNC,S$GLB,, | Performed by: INTERNAL MEDICINE

## 2020-12-16 PROCEDURE — 99999 PR PBB SHADOW E&M-EST. PATIENT-LVL III: ICD-10-PCS | Mod: PBBFAC,HCNC,, | Performed by: INTERNAL MEDICINE

## 2020-12-16 PROCEDURE — 1159F PR MEDICATION LIST DOCUMENTED IN MEDICAL RECORD: ICD-10-PCS | Mod: HCNC,S$GLB,, | Performed by: INTERNAL MEDICINE

## 2020-12-16 PROCEDURE — 1101F PR PT FALLS ASSESS DOC 0-1 FALLS W/OUT INJ PAST YR: ICD-10-PCS | Mod: HCNC,CPTII,S$GLB, | Performed by: INTERNAL MEDICINE

## 2020-12-16 PROCEDURE — 1101F PT FALLS ASSESS-DOCD LE1/YR: CPT | Mod: HCNC,CPTII,S$GLB, | Performed by: INTERNAL MEDICINE

## 2020-12-16 PROCEDURE — 99215 OFFICE O/P EST HI 40 MIN: CPT | Mod: HCNC,S$GLB,, | Performed by: INTERNAL MEDICINE

## 2020-12-16 PROCEDURE — 1159F MED LIST DOCD IN RCRD: CPT | Mod: HCNC,S$GLB,, | Performed by: INTERNAL MEDICINE

## 2020-12-16 PROCEDURE — 3079F DIAST BP 80-89 MM HG: CPT | Mod: HCNC,CPTII,S$GLB, | Performed by: INTERNAL MEDICINE

## 2020-12-16 PROCEDURE — 3079F PR MOST RECENT DIASTOLIC BLOOD PRESSURE 80-89 MM HG: ICD-10-PCS | Mod: HCNC,CPTII,S$GLB, | Performed by: INTERNAL MEDICINE

## 2020-12-16 PROCEDURE — 3288F FALL RISK ASSESSMENT DOCD: CPT | Mod: HCNC,CPTII,S$GLB, | Performed by: INTERNAL MEDICINE

## 2020-12-16 PROCEDURE — 3075F SYST BP GE 130 - 139MM HG: CPT | Mod: HCNC,CPTII,S$GLB, | Performed by: INTERNAL MEDICINE

## 2020-12-16 PROCEDURE — 3288F PR FALLS RISK ASSESSMENT DOCUMENTED: ICD-10-PCS | Mod: HCNC,CPTII,S$GLB, | Performed by: INTERNAL MEDICINE

## 2020-12-16 NOTE — TELEPHONE ENCOUNTER
Called the pt in ref: scheduling a follow up appt with Dr Alvarez, no ans left message via voicemail to call back and call back number was provided.

## 2020-12-16 NOTE — PROGRESS NOTES
Subjective:       Patient ID: Homero Tanner is a 80 y.o. male.    Chief Complaint: No chief complaint on file.    HPI This is a 80  year-old gentleman who comes for follow up of her previously diagnosed thymoma, rectal cancer, prostate cancer, b12 deficiency. And Waldestrom;s macroglobulinemia.     He is known to us because of  a history of   previously treated prostate cancer. He was diagnosed around 2004 and treated   with surgery. He relapsed by PSA and was treated with local radiation therapy.   He had further elevation of the PSA and started intermittent LHRH   analogues through the office of his urologist outside our clinic  He receives the injections at his urologist office.     A CBC done on   November 2012 was reported showing a hemoglobin of 12.7. As part of workup his   primary care physician ordered a serum ferritin and total iron binding   capacity, which were unremarkable. Serum folate was normal at 5.5. Vitamin B12   was low at 151. He then started supplemental B12 injection, which  he is still   getting.   Also, as part of the workup for the anemia, serum protein electrophoresis   showed a paraprotein band that was quantified at 1.03 g and identified as IgM.   The patient was then referred to me. I ordered lab tests, which included a   thoracic spine view looking for lytic lesions. The radiologist reported that there   were no lytic lesions but there was a mass in the center of the chest  . This was followed by chest x-rays and CT scans. The   CT scan showed a large mediastinal mass. A CT-guided biopsy at [facility] was read   as showing a thymoma.   He underwent surgery with Dr. Levi Butler at [facility]. The thymoma was involving the pericardium so he received   postoperative radiation therapy.   He finished the radiation therapy and has   remained with without evaluable disease in regards to his thymoma.      A routine lab tests done a few months later showed that there  has been a dramatic   drop in his hemoglobin, which was now 7.8 with a low serum ferritin of 8   suggesting iron deficiency anemia.   Stools were frankly Hemoccult positive.   In regards to his past history, the patient underwent a colonoscopy within our   system on 02/28/2012. The patient was found to have a sessile polyp in the   rectum. An endoscopic removal could not be done.   The patient was referred to another specialist in [facility] for resection since it seemed to   be arising in the setting of the radiation therapy field for his prostate   cancer.   The pathology report from that 2/2012 endoscopy was that of a tubular adenoma.   The patient was then seen in consultation by GI doctor outside the clinic. , Dr. Holly Arellano. The patient then underwent resection of the   lesion. The pathology report was the same.   The patient underwent a colonoscopy at [facility] on   05/07/2014, as part of the work up for his   diagnosed iron deficiency.. It was done by Dr. Jaime Ash. Dr. Ash found a mass in the   rectal area. It was partially excised. Pathology was once again that of   tubulovillous adenoma with dysplasia.   The area of involvement coincided with the radiation therapy field for the prostate   cancer.   The patient had removal of the villous adenoma in [facility] by Dr Ansari.   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   Following the excision he had 2 episodes of rectal bleeding. He ended up having another surgery to control the bleeing and to do a re-excision of the area where the minimally invasive cancer was found.  Pathology revealed no residual cancer.  There has been no more bleeding. He feels fine  He also injects himself with b12 due low b12 levels.          Because of continuous raise of the monoclonal spike in the SPEP, a bone marrow was done 7/2018 which was read as being consistent with a lymphoblastic lymphoma (  Waldestrom;s  macroglobulinemia)>  He was treated with Dr Weaver with Velcade/dex/Rituxan achieving a good biochemical response.  He was given 4 additional treatments of maintenance Rituxan with the last dose around nov 2019    ALLERGIES: see Med card  MEDICATIONS: See MedCard.   PREVIOUS SURGERIES: Prostatectomy around the year 2000 or so, rectal surgery   for removal of tubular adenoma.x2, and another surgery for a minimally invasive rectal cancer 10/2014 Tonsillectomy, removal of thymoma.   SOCIAL HISTORY: Single with two children. Lives in [place]. He smoked   until age 31, approximately a pack a day for 15 years. He is a retired   .   FAMILY HISTORY: No cancer. Father had diabetes. Father had heart disease.   PAST MEDICAL HISTORY:   1. Anemia.   2. 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   3. History of treated prostate cancer.   4. Mild anemia.   5. Iron deficiency anemia.   6. High blood pressure.   7. Elevated cholesterol.   8. Recurrent villous adenoma of the rectum.   9. Reflux.   10. Vitamin B12 deficiency.   11. Status post resection of thymoma of the anterior mediastinum.   12. Skin cancer scalp  13-hx of treated prostate cancer  14-hx of Waldestrom's macroglobulinemia  Review of Systems   Gastrointestinal:        Feels he might have a recurrence of hernia RLQ         Objective:      Physical Exam  Constitutional:       Appearance: He is well-developed.   HENT:      Head: Normocephalic.      Nose: Nose normal.      Right Sinus: No maxillary sinus tenderness or frontal sinus tenderness.      Left Sinus: No maxillary sinus tenderness or frontal sinus tenderness.      Mouth/Throat:      Pharynx: No oropharyngeal exudate.   Eyes:      General: Lids are normal.      Conjunctiva/sclera: Conjunctivae normal.      Pupils: Pupils are equal, round, and reactive to light.   Neck:      Musculoskeletal: Normal range of motion and neck supple.      Thyroid: No thyroid mass or  thyromegaly.      Trachea: Trachea normal.      Comments: No crepitus.  Cardiovascular:      Rate and Rhythm: Normal rate and regular rhythm.      Pulses: Normal pulses.      Heart sounds: Normal heart sounds, S1 normal and S2 normal. No murmur. No gallop.       Comments:    Carotid exam normal  Skin temperature in extremities is normal  No edema of extremities  Pulmonary:      Effort: Pulmonary effort is normal. No accessory muscle usage or respiratory distress.      Breath sounds: No wheezing or rales.   Abdominal:      General: Bowel sounds are normal. There is no distension.      Palpations: Abdomen is soft. There is no hepatomegaly, splenomegaly or mass.      Tenderness: There is no abdominal tenderness. There is no guarding or rebound.      Comments: Probable hernia right LQ   Musculoskeletal: Normal range of motion.      Right hand: Normal.      Left hand: Normal.   Lymphadenopathy:      Cervical: No cervical adenopathy.      Upper Body:      Right upper body: No supraclavicular adenopathy.      Left upper body: No supraclavicular adenopathy.   Skin:     General: Skin is warm and dry.      Findings: No abrasion, bruising, ecchymosis, erythema, lesion, petechiae or rash.      Nails: There is no clubbing.     Neurological:      Mental Status: He is alert and oriented to person, place, and time.      Comments:     Psychiatric:         Behavior: Behavior normal.         Assessment:       1. History of rectal cancer    2. Prostate cancer    3. Waldenstrom's macroglobulinemia    4. Other vitamin B12 deficiency anemia    5. Fatty liver        Plan:       Lab Results   Component Value Date    WBC 10.85 12/14/2020    HGB 13.0 (L) 12/14/2020    HCT 40.6 12/14/2020    MCV 95 12/14/2020     12/14/2020       Lab Results   Component Value Date    CREATININE 1.4 12/14/2020     Lab Results   Component Value Date    ALT 46 (H) 12/14/2020    AST 32 12/14/2020     (H) 12/14/2020    ALKPHOS 258 (H) 12/14/2020     BILITOT 0.9 12/14/2020     SPEp shows a spike of 0.11 gr, down from 90.41.  Free light chains OK.  Seems stable from the prostate cancer, Waldestrom point of view  PSA 2.8  Needs to see general surgery regarding his probable hernia.  Will make referral.  Continue Lupron at office off his outside urologist.  See us back in 4 months with a cbc/cmp/dx PSA, SPEp

## 2020-12-16 NOTE — TELEPHONE ENCOUNTER
----- Message from Karishma Dumont sent at 12/16/2020  2:24 PM CST -----  Regarding: pt  .Type:  Patient Returning Call    Who Called:pt   Who Left Message for Patient:nurse   Does the patient know what this is regarding?:unsure   Would the patient rather a call back or a response via [facility]? Call back   Best Call Back Number:.[phone] ([place])   Additional Information:         Thank You ,   Karishma Dumont

## 2020-12-16 NOTE — PROGRESS NOTES
Subjective:       Patient ID: Homero Tanner is a 80 y.o. male.    Chief Complaint: No chief complaint on file.    HPI  Review of Systems      Objective:      Physical Exam    Assessment:       1. History of rectal cancer    2. Prostate cancer    3. Waldenstrom's macroglobulinemia    4. Other vitamin B12 deficiency anemia    5. Fatty liver        Plan:

## 2020-12-22 ENCOUNTER — PATIENT OUTREACH (OUTPATIENT)
Dept: ADMINISTRATIVE | Facility: OTHER | Age: 80
End: 2020-12-22

## 2020-12-22 NOTE — PROGRESS NOTES
Health Maintenance Due   Topic Date Due    Shingles Vaccine (2 of 3) 06/08/2010    TETANUS VACCINE  03/09/2017     Updates were requested from care everywhere.  Chart was reviewed for overdue Proactive [facility] Encounters ([facility]) topics (CRS, Breast Cancer Screening, Eye exam)  Health Maintenance has been updated.  LINKS immunization registry triggered.  LINKS not responding.

## 2020-12-28 ENCOUNTER — OFFICE VISIT (OUTPATIENT)
Dept: SURGERY | Facility: CLINIC | Age: 80
End: 2020-12-28
Payer: MEDICARE

## 2020-12-28 VITALS
BODY MASS INDEX: 26.22 KG/M2 | SYSTOLIC BLOOD PRESSURE: 153 MMHG | WEIGHT: 182.75 LBS | TEMPERATURE: 98 F | HEART RATE: 57 BPM | DIASTOLIC BLOOD PRESSURE: 80 MMHG

## 2020-12-28 DIAGNOSIS — D17.1 LIPOMA OF ABDOMINAL WALL: Primary | ICD-10-CM

## 2020-12-28 PROCEDURE — 1159F MED LIST DOCD IN RCRD: CPT | Mod: HCNC,S$GLB,, | Performed by: SURGERY

## 2020-12-28 PROCEDURE — 3288F FALL RISK ASSESSMENT DOCD: CPT | Mod: HCNC,CPTII,S$GLB, | Performed by: SURGERY

## 2020-12-28 PROCEDURE — 99999 PR PBB SHADOW E&M-EST. PATIENT-LVL III: CPT | Mod: PBBFAC,HCNC,, | Performed by: SURGERY

## 2020-12-28 PROCEDURE — 1126F AMNT PAIN NOTED NONE PRSNT: CPT | Mod: HCNC,S$GLB,, | Performed by: SURGERY

## 2020-12-28 PROCEDURE — 99999 PR PBB SHADOW E&M-EST. PATIENT-LVL III: ICD-10-PCS | Mod: PBBFAC,HCNC,, | Performed by: SURGERY

## 2020-12-28 PROCEDURE — 3288F PR FALLS RISK ASSESSMENT DOCUMENTED: ICD-10-PCS | Mod: HCNC,CPTII,S$GLB, | Performed by: SURGERY

## 2020-12-28 PROCEDURE — 1101F PT FALLS ASSESS-DOCD LE1/YR: CPT | Mod: HCNC,CPTII,S$GLB, | Performed by: SURGERY

## 2020-12-28 PROCEDURE — 3077F PR MOST RECENT SYSTOLIC BLOOD PRESSURE >= 140 MM HG: ICD-10-PCS | Mod: HCNC,CPTII,S$GLB, | Performed by: SURGERY

## 2020-12-28 PROCEDURE — 99212 OFFICE O/P EST SF 10 MIN: CPT | Mod: HCNC,S$GLB,, | Performed by: SURGERY

## 2020-12-28 PROCEDURE — 3079F DIAST BP 80-89 MM HG: CPT | Mod: HCNC,CPTII,S$GLB, | Performed by: SURGERY

## 2020-12-28 PROCEDURE — 3077F SYST BP >= 140 MM HG: CPT | Mod: HCNC,CPTII,S$GLB, | Performed by: SURGERY

## 2020-12-28 PROCEDURE — 3079F PR MOST RECENT DIASTOLIC BLOOD PRESSURE 80-89 MM HG: ICD-10-PCS | Mod: HCNC,CPTII,S$GLB, | Performed by: SURGERY

## 2020-12-28 PROCEDURE — 1126F PR PAIN SEVERITY QUANTIFIED, NO PAIN PRESENT: ICD-10-PCS | Mod: HCNC,S$GLB,, | Performed by: SURGERY

## 2020-12-28 PROCEDURE — 1101F PR PT FALLS ASSESS DOC 0-1 FALLS W/OUT INJ PAST YR: ICD-10-PCS | Mod: HCNC,CPTII,S$GLB, | Performed by: SURGERY

## 2020-12-28 PROCEDURE — 99212 PR OFFICE/OUTPT VISIT, EST, LEVL II, 10-19 MIN: ICD-10-PCS | Mod: HCNC,S$GLB,, | Performed by: SURGERY

## 2020-12-28 PROCEDURE — 1159F PR MEDICATION LIST DOCUMENTED IN MEDICAL RECORD: ICD-10-PCS | Mod: HCNC,S$GLB,, | Performed by: SURGERY

## 2020-12-28 NOTE — PATIENT INSTRUCTIONS
You have a small benign fatty tumor call the lipoma.  Below was some information about it.    If it is enlarging please make a follow-up appointment to see me      Understanding a Lipoma    A lipoma is a benign lump under the skin thats made of fat. Its not cancer. It feels soft like rubber when you press it, and in most cases it doesnt hurt. Some people have more than one. A lipoma grows slowly over time and doesnt cause many problems. Lipomas occur most often in adults from ages 40 to 60, and more often in men.  How to say it  Ly-POH-maria dolores   What causes a lipoma?  The cause is not yet known. Experts are still learning more. It may be partly caused by a problem in a gene. They can run in families. Familial multiple lipomatosis is when 2 or more family members have many lipomas.  Symptoms of a lipoma  The main symptom of a lipoma is a soft lump under the skin that doesnt hurt unless it is pressing on a nerve. It may be small, around 1/4 inch across. Or it may be larger, up to 4 inches across or more.  There are different kinds of lipomas. The most common kind occurs under the skin of the shoulders, chest, back, belly, or under the arms. In some cases, a lipoma can occur on the legs. In rare cases, one may occur deeper in the body or in a muscle.  Treatment for a lipoma  In most cases, a lipoma doesnt need treatment. Your healthcare provider may look at it during regular checkups to see if it changes.  But if the lipoma is painful or you want it removed for cosmetic reasons, it can be removed with surgery. The surgery is called excision. The lipoma will most likely not grow back after surgery. During surgery, the area around the lipoma is numbed. If you have a deep lipoma, you may need medicine called regional anesthesia to numb a larger area. Or you may need medicine called general anesthesia to put you to sleep during the procedure. Then the doctor makes a cut over the area of the lipoma. He or she removes the  lump of fat. The cut is then closed with stitches.  Possible complications of a lipoma  A large lipoma inside the body can press on organs, nerves, or other tissues and cause problems. For example, it can cause problems with breathing or digestion.  Living with a lipoma  Your healthcare provider may look at the lipoma during regular checkups to see if it changes or is causing problems.  When to call your healthcare provider  Call your healthcare provider right away if you have any of these:  · Lipoma that grows quickly, causes pain, or feels hard  · Growth of new lipomas   Date Last Reviewed: 5/1/2016  © 9639-9404 [employer]. [address] [place] [address], [place] [address]. All rights reserved. This information is not intended as a substitute for professional medical care. Always follow your healthcare professional's instructions.

## 2020-12-28 NOTE — PROGRESS NOTES
Patient ID: Homero Tanner is a 80 y.o. male.    Recurrent right inguinal hernia    Chief Complaint: Hernia      HPI:  Patient was sent for evaluation of a recurrent right inguinal hernia.  He underwent an open plug and patch repair of a right inguinal hernia in February 2019.  He noticed a fullness in the area.  Does not cause him any pain is or discomfort      Review of Systems   Constitutional: Negative.    HENT: Negative.    Eyes: Negative.    Respiratory: Negative.    Cardiovascular: Negative.    Gastrointestinal:        Fullness in the area of the hernia repair otherwise negative   Endocrine: Negative.    Genitourinary: Negative.    Musculoskeletal: Negative.    Skin: Negative.    Allergic/Immunologic: Negative.    Neurological: Negative.    Hematological: Negative.    Psychiatric/Behavioral: Negative.        Current Outpatient Medications   Medication Sig Dispense Refill    aspirin (ECOTRIN) 81 MG EC tablet Take 81 mg by mouth once daily.      b complex vitamins tablet Take by mouth. 1 tablet Oral Every day      clobetasol (TEMOVATE) 0.05 % external solution AAA of scalp 1-2 times daily prn flares. Strong steroid- do not apply to face or folds of skin 50 mL 4    cyanocobalamin 1,000 mcg/mL injection Give 1cc under the skin once a month 10 mL 4    erythromycin with ethanoL (EMGEL) 2 % gel AAA of face bid.  For rosacea/redness. 60 g 3    furosemide (LASIX) 40 MG tablet Take 1 tablet (40 mg total) by mouth once daily. 30 tablet 11    ketoconazole (NIZORAL) 2 % shampoo Apply to scalp 1-2 times/week, lather, let sit 5 minutes, then rinse 120 mL 5    leuprolide (LUPRON) 3.75 mg injection Inject 3.75 mg into the muscle every 3 (three) months.       metoprolol succinate (TOPROL-XL) 100 MG 24 hr tablet Take 1 tablet (100 mg total) by mouth once daily. 30 tablet 11    metronidazole 0.75% (METROCREAM) 0.75 % Crea APPLY TO AFFECTED AREA ON FACE TWICE DAILY 45 g 3    omeprazole (PRILOSEC) 20 MG capsule  Take 1 capsule (20 mg total) by mouth once daily. 90 capsule 3    rosuvastatin (CRESTOR) 10 MG tablet Take 1 tablet (10 mg total) by mouth once daily. 90 tablet 3     No current facility-administered medications for this visit.        Review of patient's allergies indicates:   Allergen Reactions    Lipitor [atorvastatin] Other (See Comments)    Norvasc [amlodipine] Swelling       Past Medical History:   Diagnosis Date    Abnormal ECG 6/24/2013    Aortic insufficiency 6/24/2013    Arthritis     Asthma     as a child    Benign neoplasm of cecum 2/27/2017    Benign neoplasm of transverse colon 2/27/2017    Cataract     CHF (congestive heart failure)     Pt states He's never been told this    Diverticulosis of large intestine without hemorrhage 2/27/2017    Encounter for blood transfusion     Frequent PVCs     Gallstones 3/28/2018    By Us done 3/21/2018    GERD (gastroesophageal reflux disease)     History of colon polyps     Hypertension     Iron deficiency anemia 10/26/2015    Lymphoma     waldenstrom's macroglobulinemia    Mixed hyperlipidemia 6/24/2013    Premature atrial contractions     Prostate cancer     PSVT (paroxysmal supraventricular tachycardia) 6/24/2013    Pulmonary hypertension 6/13/2016    PVC's (premature ventricular contractions) 6/13/2016    Rectal adenocarcinoma     Rectal cancer 5/1/2018    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    left parietal scalp    Thymoma     TR (tricuspid regurgitation) 6/13/2016    Tubular adenoma of colon 06/2020    VT (ventricular tachycardia) 9/18/2018       Past Surgical History:   Procedure Laterality Date    biopsy for chest mass      BONE MARROW BIOPSY Left 7/11/2018    Procedure: Biopsy-bone marrow;  Surgeon: Charanjit Dacosta MD;  Location[facility];  Service: General;  Laterality: Left;    CATARACT EXTRACTION W/  INTRAOCULAR LENS IMPLANT Right 01/03/2019    CATARACT EXTRACTION W/  INTRAOCULAR LENS IMPLANT Left 02/07/2019     COLON SURGERY      COLONOSCOPY N/A 10/26/2015    Procedure: Colonoscopy;  Surgeon: Abraham Hong MD;  Location: [facility] ENDO;  Service: Endoscopy;  Laterality: N/A;    COLONOSCOPY Left 2/27/2017    Procedure: COLONOSCOPY;  Surgeon: Abraham Hnog MD;  Location: [facility] ENDO;  Service: Endoscopy;  Laterality: Left;    COLONOSCOPY N/A 5/1/2018    Procedure: hixtory of rectal cancer. Colonsocopy asked to be repeated minnie  year, alst one 2/2017;  Surgeon: Patricio Streeter MD;  Location: [facility];  Service: Endoscopy;  Laterality: N/A;    COLONOSCOPY N/A 6/27/2018    Procedure: COLONOSCOPY/hybrid APC or a EndoRotor device;  Surgeon: Rao Medeiros MD;  Location: [facility] ([address] [place] [address]);  Service: Endoscopy;  Laterality: N/A;    COLONOSCOPY N/A 6/22/2020    Procedure: COLONOSCOPY;  Surgeon: Yu Wells MD;  Location: [facility];  Service: Endoscopy;  Laterality: N/A;    ESOPHAGOGASTRODUODENOSCOPY N/A 10/22/2019    Procedure: ESOPHAGOGASTRODUODENOSCOPY (EGD);  Surgeon: Mariela Greer MD;  Location: [facility];  Service: Endoscopy;  Laterality: N/A;    FLEXIBLE SIGMOIDOSCOPY Left 4/23/2019    Procedure: SIGMOIDOSCOPY, FLEXIBLE;  Surgeon: Ranjit Will MD;  Location: [facility];  Service: Endoscopy;  Laterality: Left;    HERNIA REPAIR Right 02/2018    Inguinal, open with mesh    mohs      PROSTATECTOMY      RECTAL SURGERY N/A 09/2014    REPAIR OF SLIDING INGUINAL HERNIA Right 2/26/2019    Procedure: REPAIR, HERNIA, INGUINAL, SLIDING;  Surgeon: Bridger Alvarez MD;  Location: [facility];  Service: General;  Laterality: Right;    SKIN BIOPSY      TONSILLECTOMY      TUMOR REMOVAL         Family History   Problem Relation Age of Onset    Diabetes Father     Cataracts Mother     Amblyopia Neg Hx     Blindness Neg Hx     Cancer Neg Hx     Glaucoma Neg Hx     Hypertension Neg Hx     Macular degeneration Neg Hx     Retinal detachment Neg Hx     Strabismus Neg Hx     Stroke Neg Hx     Thyroid disease  Neg Hx     Melanoma Neg Hx        Social History     Socioeconomic History    Marital status: Single     Spouse name: Not on file    Number of children: Not on file    Years of education: Not on file    Highest education level: Not on file   Occupational History    Not on file   Social Needs    Financial resource strain: Not on file    Food insecurity     Worry: Not on file     Inability: Not on file    Transportation needs     Medical: Not on file     Non-medical: Not on file   Tobacco Use    Smoking status: Former Smoker     Packs/day: 1.00     Years: 15.00     Pack years: 15.00     Quit date: 1969     Years since quittin.9    Smokeless tobacco: Never Used   Substance and Sexual Activity    Alcohol use: Yes     Alcohol/week: 3.0 standard drinks     Types: 3 Cans of beer per week     Comment: socially, NO ALCOHOL 72 HOURS PRIOR TO SX    Drug use: No    Sexual activity: Not on file   Lifestyle    Physical activity     Days per week: Not on file     Minutes per session: Not on file    Stress: Not on file   Relationships    Social connections     Talks on phone: Not on file     Gets together: Not on file     Attends Church service: Not on file     Active member of club or organization: Not on file     Attends meetings of clubs or organizations: Not on file     Relationship status: Not on file   Other Topics Concern    Not on file   Social History Narrative    Not on file       Vitals:    20 0959   BP: (!) 153/80   Pulse: (!) 57   Temp: 98.1 °F (36.7 °C)       Physical Exam  Vitals signs reviewed.   Constitutional:       Appearance: Normal appearance.   HENT:      Mouth/Throat:      Mouth: Mucous membranes are dry.   Cardiovascular:      Rate and Rhythm: Normal rate and regular rhythm.   Pulmonary:      Effort: Pulmonary effort is normal.      Breath sounds: Normal breath sounds.   Abdominal:      General: Abdomen is flat. Bowel sounds are normal.      Palpations: Abdomen is soft.  There is mass (Below the right inguinal skin incision approximately 3 x 5 cm).      Comments: Well-healed incisions.    There is no evidence of a recurrent of the inguinal hernia.  Just below the inguinal hernia incision there is a 5 x 3 cm soft mobile mass consistent with a lipoma   Neurological:      Mental Status: He is alert.         Assessment & Plan:     no evidence of a recurrent hernia.    Lipoma just inferiorly to the inguinal incision.  No need for surgical intervention as is asymptomatic.    Ask the patient to notify us if it enlarges

## 2020-12-30 RX ORDER — OMEPRAZOLE 20 MG/1
20 CAPSULE, DELAYED RELEASE ORAL DAILY
Qty: 90 CAPSULE | Refills: 3 | Status: SHIPPED | OUTPATIENT
Start: 2020-12-30 | End: 2022-01-20

## 2021-01-04 ENCOUNTER — IMMUNIZATION (OUTPATIENT)
Dept: INTERNAL MEDICINE | Facility: CLINIC | Age: 81
End: 2021-01-04
Payer: MEDICARE

## 2021-01-04 DIAGNOSIS — Z23 NEED FOR VACCINATION: ICD-10-CM

## 2021-01-04 PROCEDURE — 91300 COVID-19, MRNA, LNP-S, PF, 30 MCG/0.3 ML DOSE VACCINE: CPT | Mod: PBBFAC | Performed by: FAMILY MEDICINE

## 2021-01-25 ENCOUNTER — IMMUNIZATION (OUTPATIENT)
Dept: INTERNAL MEDICINE | Facility: CLINIC | Age: 81
End: 2021-01-25
Payer: MEDICARE

## 2021-01-25 DIAGNOSIS — Z23 NEED FOR VACCINATION: Primary | ICD-10-CM

## 2021-01-25 PROCEDURE — 91300 COVID-19, MRNA, LNP-S, PF, 30 MCG/0.3 ML DOSE VACCINE: CPT | Mod: PBBFAC | Performed by: FAMILY MEDICINE

## 2021-01-25 PROCEDURE — 0002A COVID-19, MRNA, LNP-S, PF, 30 MCG/0.3 ML DOSE VACCINE: CPT | Mod: PBBFAC | Performed by: FAMILY MEDICINE

## 2021-01-29 DIAGNOSIS — L21.9 SEBORRHEIC DERMATITIS OF SCALP: ICD-10-CM

## 2021-02-01 RX ORDER — CLOBETASOL PROPIONATE 0.46 MG/ML
SOLUTION TOPICAL
Qty: 50 ML | Refills: 4 | Status: SHIPPED | OUTPATIENT
Start: 2021-02-01 | End: 2022-03-16

## 2021-02-01 RX ORDER — KETOCONAZOLE 20 MG/ML
SHAMPOO, SUSPENSION TOPICAL
Qty: 120 ML | Refills: 5 | Status: SHIPPED | OUTPATIENT
Start: 2021-02-01 | End: 2022-05-05

## 2021-04-12 ENCOUNTER — LAB VISIT (OUTPATIENT)
Dept: LAB | Facility: HOSPITAL | Age: 81
End: 2021-04-12
Attending: INTERNAL MEDICINE
Payer: MEDICARE

## 2021-04-12 ENCOUNTER — PATIENT MESSAGE (OUTPATIENT)
Dept: HEMATOLOGY/ONCOLOGY | Facility: CLINIC | Age: 81
End: 2021-04-12

## 2021-04-12 DIAGNOSIS — C88.0 WALDENSTROM'S MACROGLOBULINEMIA: ICD-10-CM

## 2021-04-12 DIAGNOSIS — D50.0 IRON DEFICIENCY ANEMIA DUE TO CHRONIC BLOOD LOSS: Primary | ICD-10-CM

## 2021-04-12 DIAGNOSIS — C61 PROSTATE CANCER: ICD-10-CM

## 2021-04-12 DIAGNOSIS — D50.0 IRON DEFICIENCY ANEMIA DUE TO CHRONIC BLOOD LOSS: ICD-10-CM

## 2021-04-12 LAB
ALBUMIN SERPL BCP-MCNC: 4.2 G/DL (ref 3.5–5.2)
ALP SERPL-CCNC: 221 U/L (ref 55–135)
ALT SERPL W/O P-5'-P-CCNC: 33 U/L (ref 10–44)
ANION GAP SERPL CALC-SCNC: 9 MMOL/L (ref 8–16)
AST SERPL-CCNC: 33 U/L (ref 10–40)
BASOPHILS # BLD AUTO: 0.05 K/UL (ref 0–0.2)
BASOPHILS NFR BLD: 0.4 % (ref 0–1.9)
BILIRUB SERPL-MCNC: 0.7 MG/DL (ref 0.1–1)
BUN SERPL-MCNC: 16 MG/DL (ref 8–23)
CALCIUM SERPL-MCNC: 9.6 MG/DL (ref 8.7–10.5)
CHLORIDE SERPL-SCNC: 103 MMOL/L (ref 95–110)
CO2 SERPL-SCNC: 27 MMOL/L (ref 23–29)
COMPLEXED PSA SERPL-MCNC: 10 NG/ML (ref 0–4)
CREAT SERPL-MCNC: 1.3 MG/DL (ref 0.5–1.4)
DIFFERENTIAL METHOD: ABNORMAL
EOSINOPHIL # BLD AUTO: 1.1 K/UL (ref 0–0.5)
EOSINOPHIL NFR BLD: 8.9 % (ref 0–8)
ERYTHROCYTE [DISTWIDTH] IN BLOOD BY AUTOMATED COUNT: 13.2 % (ref 11.5–14.5)
EST. GFR  (AFRICAN AMERICAN): 60 ML/MIN/1.73 M^2
EST. GFR  (NON AFRICAN AMERICAN): 52 ML/MIN/1.73 M^2
GLUCOSE SERPL-MCNC: 112 MG/DL (ref 70–110)
HCT VFR BLD AUTO: 41.8 % (ref 40–54)
HGB BLD-MCNC: 13.6 G/DL (ref 14–18)
IMM GRANULOCYTES # BLD AUTO: 0.04 K/UL (ref 0–0.04)
IMM GRANULOCYTES NFR BLD AUTO: 0.3 % (ref 0–0.5)
LYMPHOCYTES # BLD AUTO: 3.2 K/UL (ref 1–4.8)
LYMPHOCYTES NFR BLD: 25.1 % (ref 18–48)
MCH RBC QN AUTO: 30 PG (ref 27–31)
MCHC RBC AUTO-ENTMCNC: 32.5 G/DL (ref 32–36)
MCV RBC AUTO: 92 FL (ref 82–98)
MONOCYTES # BLD AUTO: 0.9 K/UL (ref 0.3–1)
MONOCYTES NFR BLD: 7.2 % (ref 4–15)
NEUTROPHILS # BLD AUTO: 7.4 K/UL (ref 1.8–7.7)
NEUTROPHILS NFR BLD: 58.1 % (ref 38–73)
NRBC BLD-RTO: 0 /100 WBC
PLATELET # BLD AUTO: 224 K/UL (ref 150–450)
PMV BLD AUTO: 9.9 FL (ref 9.2–12.9)
POTASSIUM SERPL-SCNC: 5.3 MMOL/L (ref 3.5–5.1)
PROT SERPL-MCNC: 6.8 G/DL (ref 6–8.4)
RBC # BLD AUTO: 4.53 M/UL (ref 4.6–6.2)
SODIUM SERPL-SCNC: 139 MMOL/L (ref 136–145)
WBC # BLD AUTO: 12.64 K/UL (ref 3.9–12.7)

## 2021-04-12 PROCEDURE — 84153 ASSAY OF PSA TOTAL: CPT | Performed by: NURSE PRACTITIONER

## 2021-04-12 PROCEDURE — 80053 COMPREHEN METABOLIC PANEL: CPT | Performed by: NURSE PRACTITIONER

## 2021-04-12 PROCEDURE — 84165 PROTEIN E-PHORESIS SERUM: CPT | Performed by: NURSE PRACTITIONER

## 2021-04-12 PROCEDURE — 84165 PATHOLOGIST INTERPRETATION SPE: ICD-10-PCS | Mod: 26,,, | Performed by: PATHOLOGY

## 2021-04-12 PROCEDURE — 84165 PROTEIN E-PHORESIS SERUM: CPT | Mod: 26,,, | Performed by: PATHOLOGY

## 2021-04-12 PROCEDURE — 36415 COLL VENOUS BLD VENIPUNCTURE: CPT | Performed by: NURSE PRACTITIONER

## 2021-04-12 PROCEDURE — 85025 COMPLETE CBC W/AUTO DIFF WBC: CPT | Performed by: NURSE PRACTITIONER

## 2021-04-13 LAB
ALBUMIN SERPL ELPH-MCNC: 4.21 G/DL (ref 3.35–5.55)
ALPHA1 GLOB SERPL ELPH-MCNC: 0.34 G/DL (ref 0.17–0.41)
ALPHA2 GLOB SERPL ELPH-MCNC: 0.77 G/DL (ref 0.43–0.99)
B-GLOBULIN SERPL ELPH-MCNC: 0.83 G/DL (ref 0.5–1.1)
GAMMA GLOB SERPL ELPH-MCNC: 0.35 G/DL (ref 0.67–1.58)
PROT SERPL-MCNC: 6.5 G/DL (ref 6–8.4)

## 2021-04-14 ENCOUNTER — OFFICE VISIT (OUTPATIENT)
Dept: HEMATOLOGY/ONCOLOGY | Facility: CLINIC | Age: 81
End: 2021-04-14
Payer: MEDICARE

## 2021-04-14 VITALS
WEIGHT: 186.75 LBS | DIASTOLIC BLOOD PRESSURE: 60 MMHG | BODY MASS INDEX: 26.74 KG/M2 | OXYGEN SATURATION: 97 % | HEART RATE: 48 BPM | HEIGHT: 70 IN | SYSTOLIC BLOOD PRESSURE: 174 MMHG

## 2021-04-14 DIAGNOSIS — C61 PROSTATE CANCER: Primary | ICD-10-CM

## 2021-04-14 DIAGNOSIS — C88.0 WALDENSTROM'S MACROGLOBULINEMIA: ICD-10-CM

## 2021-04-14 LAB — PATHOLOGIST INTERPRETATION SPE: NORMAL

## 2021-04-14 PROCEDURE — 99214 OFFICE O/P EST MOD 30 MIN: CPT | Mod: S$GLB,,, | Performed by: INTERNAL MEDICINE

## 2021-04-14 PROCEDURE — 99214 PR OFFICE/OUTPT VISIT, EST, LEVL IV, 30-39 MIN: ICD-10-PCS | Mod: S$GLB,,, | Performed by: INTERNAL MEDICINE

## 2021-04-14 PROCEDURE — 99999 PR PBB SHADOW E&M-EST. PATIENT-LVL III: CPT | Mod: PBBFAC,,, | Performed by: INTERNAL MEDICINE

## 2021-04-14 PROCEDURE — 3288F FALL RISK ASSESSMENT DOCD: CPT | Mod: CPTII,S$GLB,, | Performed by: INTERNAL MEDICINE

## 2021-04-14 PROCEDURE — 1101F PT FALLS ASSESS-DOCD LE1/YR: CPT | Mod: CPTII,S$GLB,, | Performed by: INTERNAL MEDICINE

## 2021-04-14 PROCEDURE — 1159F MED LIST DOCD IN RCRD: CPT | Mod: S$GLB,,, | Performed by: INTERNAL MEDICINE

## 2021-04-14 PROCEDURE — 99999 PR PBB SHADOW E&M-EST. PATIENT-LVL III: ICD-10-PCS | Mod: PBBFAC,,, | Performed by: INTERNAL MEDICINE

## 2021-04-14 PROCEDURE — 99499 RISK ADDL DX/OHS AUDIT: ICD-10-PCS | Mod: S$GLB,,, | Performed by: INTERNAL MEDICINE

## 2021-04-14 PROCEDURE — 99499 UNLISTED E&M SERVICE: CPT | Mod: S$GLB,,, | Performed by: INTERNAL MEDICINE

## 2021-04-14 PROCEDURE — 1126F PR PAIN SEVERITY QUANTIFIED, NO PAIN PRESENT: ICD-10-PCS | Mod: S$GLB,,, | Performed by: INTERNAL MEDICINE

## 2021-04-14 PROCEDURE — 3288F PR FALLS RISK ASSESSMENT DOCUMENTED: ICD-10-PCS | Mod: CPTII,S$GLB,, | Performed by: INTERNAL MEDICINE

## 2021-04-14 PROCEDURE — 1101F PR PT FALLS ASSESS DOC 0-1 FALLS W/OUT INJ PAST YR: ICD-10-PCS | Mod: CPTII,S$GLB,, | Performed by: INTERNAL MEDICINE

## 2021-04-14 PROCEDURE — 1159F PR MEDICATION LIST DOCUMENTED IN MEDICAL RECORD: ICD-10-PCS | Mod: S$GLB,,, | Performed by: INTERNAL MEDICINE

## 2021-04-14 PROCEDURE — 1126F AMNT PAIN NOTED NONE PRSNT: CPT | Mod: S$GLB,,, | Performed by: INTERNAL MEDICINE

## 2021-04-21 ENCOUNTER — TELEPHONE (OUTPATIENT)
Dept: CARDIOLOGY | Facility: CLINIC | Age: 81
End: 2021-04-21

## 2021-04-21 DIAGNOSIS — I10 ESSENTIAL HYPERTENSION: Primary | ICD-10-CM

## 2021-04-22 ENCOUNTER — PES CALL (OUTPATIENT)
Dept: ADMINISTRATIVE | Facility: CLINIC | Age: 81
End: 2021-04-22

## 2021-04-26 ENCOUNTER — PES CALL (OUTPATIENT)
Dept: ADMINISTRATIVE | Facility: CLINIC | Age: 81
End: 2021-04-26

## 2021-04-26 ENCOUNTER — HOSPITAL ENCOUNTER (OUTPATIENT)
Dept: CARDIOLOGY | Facility: HOSPITAL | Age: 81
Discharge: HOME | End: 2021-04-26
Attending: INTERNAL MEDICINE
Payer: MEDICARE

## 2021-04-26 ENCOUNTER — OFFICE VISIT (OUTPATIENT)
Dept: CARDIOLOGY | Facility: CLINIC | Age: 81
End: 2021-04-26
Payer: MEDICARE

## 2021-04-26 VITALS
WEIGHT: 187 LBS | HEIGHT: 70 IN | BODY MASS INDEX: 26.77 KG/M2 | DIASTOLIC BLOOD PRESSURE: 80 MMHG | OXYGEN SATURATION: 97 % | RESPIRATION RATE: 16 BRPM | HEART RATE: 60 BPM | SYSTOLIC BLOOD PRESSURE: 168 MMHG

## 2021-04-26 DIAGNOSIS — R60.0 EDEMA OF BOTH LEGS: ICD-10-CM

## 2021-04-26 DIAGNOSIS — I49.1 PREMATURE ATRIAL CONTRACTIONS: ICD-10-CM

## 2021-04-26 DIAGNOSIS — I35.1 NONRHEUMATIC AORTIC VALVE INSUFFICIENCY: Chronic | ICD-10-CM

## 2021-04-26 DIAGNOSIS — I70.0 AORTIC ATHEROSCLEROSIS: ICD-10-CM

## 2021-04-26 DIAGNOSIS — I27.20 PULMONARY HYPERTENSION: Chronic | ICD-10-CM

## 2021-04-26 DIAGNOSIS — I50.32 CHRONIC DIASTOLIC HEART FAILURE: ICD-10-CM

## 2021-04-26 DIAGNOSIS — I10 ESSENTIAL HYPERTENSION: Primary | Chronic | ICD-10-CM

## 2021-04-26 DIAGNOSIS — E78.2 MIXED HYPERLIPIDEMIA: ICD-10-CM

## 2021-04-26 DIAGNOSIS — R94.31 ABNORMAL ECG: ICD-10-CM

## 2021-04-26 DIAGNOSIS — R00.1 SINUS BRADYCARDIA: ICD-10-CM

## 2021-04-26 DIAGNOSIS — E87.5 HYPERKALEMIA: ICD-10-CM

## 2021-04-26 DIAGNOSIS — I10 ESSENTIAL HYPERTENSION: ICD-10-CM

## 2021-04-26 DIAGNOSIS — N18.9 CHRONIC RENAL IMPAIRMENT, UNSPECIFIED CKD STAGE: ICD-10-CM

## 2021-04-26 DIAGNOSIS — C88.0 WALDENSTROM'S MACROGLOBULINEMIA: ICD-10-CM

## 2021-04-26 PROCEDURE — 1159F PR MEDICATION LIST DOCUMENTED IN MEDICAL RECORD: ICD-10-PCS | Mod: S$GLB,,, | Performed by: INTERNAL MEDICINE

## 2021-04-26 PROCEDURE — 93010 ELECTROCARDIOGRAM REPORT: CPT | Mod: ,,, | Performed by: INTERNAL MEDICINE

## 2021-04-26 PROCEDURE — 1126F PR PAIN SEVERITY QUANTIFIED, NO PAIN PRESENT: ICD-10-PCS | Mod: S$GLB,,, | Performed by: INTERNAL MEDICINE

## 2021-04-26 PROCEDURE — 99999 PR PBB SHADOW E&M-EST. PATIENT-LVL III: ICD-10-PCS | Mod: PBBFAC,,, | Performed by: INTERNAL MEDICINE

## 2021-04-26 PROCEDURE — 93005 ELECTROCARDIOGRAM TRACING: CPT

## 2021-04-26 PROCEDURE — 99999 PR PBB SHADOW E&M-EST. PATIENT-LVL III: CPT | Mod: PBBFAC,,, | Performed by: INTERNAL MEDICINE

## 2021-04-26 PROCEDURE — 99499 UNLISTED E&M SERVICE: CPT | Mod: S$GLB,,, | Performed by: INTERNAL MEDICINE

## 2021-04-26 PROCEDURE — 99214 OFFICE O/P EST MOD 30 MIN: CPT | Mod: S$GLB,,, | Performed by: INTERNAL MEDICINE

## 2021-04-26 PROCEDURE — 1159F MED LIST DOCD IN RCRD: CPT | Mod: S$GLB,,, | Performed by: INTERNAL MEDICINE

## 2021-04-26 PROCEDURE — 99499 RISK ADDL DX/OHS AUDIT: ICD-10-PCS | Mod: S$GLB,,, | Performed by: INTERNAL MEDICINE

## 2021-04-26 PROCEDURE — 93010 EKG 12-LEAD: ICD-10-PCS | Mod: ,,, | Performed by: INTERNAL MEDICINE

## 2021-04-26 PROCEDURE — 1126F AMNT PAIN NOTED NONE PRSNT: CPT | Mod: S$GLB,,, | Performed by: INTERNAL MEDICINE

## 2021-04-26 PROCEDURE — 99214 PR OFFICE/OUTPT VISIT, EST, LEVL IV, 30-39 MIN: ICD-10-PCS | Mod: S$GLB,,, | Performed by: INTERNAL MEDICINE

## 2021-04-26 RX ORDER — AMLODIPINE BESYLATE 2.5 MG/1
2.5 TABLET ORAL 2 TIMES DAILY
Qty: 60 TABLET | Refills: 11 | Status: SHIPPED | OUTPATIENT
Start: 2021-04-26 | End: 2021-06-14

## 2021-06-14 ENCOUNTER — OFFICE VISIT (OUTPATIENT)
Dept: CARDIOLOGY | Facility: CLINIC | Age: 81
End: 2021-06-14
Payer: MEDICARE

## 2021-06-14 VITALS
HEIGHT: 70 IN | HEART RATE: 58 BPM | WEIGHT: 179.88 LBS | DIASTOLIC BLOOD PRESSURE: 60 MMHG | SYSTOLIC BLOOD PRESSURE: 130 MMHG | OXYGEN SATURATION: 98 % | BODY MASS INDEX: 25.75 KG/M2

## 2021-06-14 DIAGNOSIS — I70.0 AORTIC ATHEROSCLEROSIS: ICD-10-CM

## 2021-06-14 DIAGNOSIS — E78.2 MIXED HYPERLIPIDEMIA: ICD-10-CM

## 2021-06-14 DIAGNOSIS — I49.1 PREMATURE ATRIAL CONTRACTIONS: ICD-10-CM

## 2021-06-14 DIAGNOSIS — I10 ESSENTIAL HYPERTENSION: Chronic | ICD-10-CM

## 2021-06-14 DIAGNOSIS — R00.1 SINUS BRADYCARDIA: ICD-10-CM

## 2021-06-14 DIAGNOSIS — I50.32 CHRONIC DIASTOLIC HEART FAILURE: Primary | ICD-10-CM

## 2021-06-14 DIAGNOSIS — N18.9 CHRONIC RENAL IMPAIRMENT, UNSPECIFIED CKD STAGE: ICD-10-CM

## 2021-06-14 DIAGNOSIS — I35.1 NONRHEUMATIC AORTIC VALVE INSUFFICIENCY: Chronic | ICD-10-CM

## 2021-06-14 DIAGNOSIS — I27.20 PULMONARY HYPERTENSION: Chronic | ICD-10-CM

## 2021-06-14 DIAGNOSIS — C88.0 WALDENSTROM'S MACROGLOBULINEMIA: ICD-10-CM

## 2021-06-14 DIAGNOSIS — R60.0 EDEMA OF BOTH LEGS: ICD-10-CM

## 2021-06-14 PROCEDURE — 1159F PR MEDICATION LIST DOCUMENTED IN MEDICAL RECORD: ICD-10-PCS | Mod: S$GLB,,, | Performed by: INTERNAL MEDICINE

## 2021-06-14 PROCEDURE — 1159F MED LIST DOCD IN RCRD: CPT | Mod: S$GLB,,, | Performed by: INTERNAL MEDICINE

## 2021-06-14 PROCEDURE — 99999 PR PBB SHADOW E&M-EST. PATIENT-LVL III: CPT | Mod: PBBFAC,,, | Performed by: INTERNAL MEDICINE

## 2021-06-14 PROCEDURE — 99999 PR PBB SHADOW E&M-EST. PATIENT-LVL III: ICD-10-PCS | Mod: PBBFAC,,, | Performed by: INTERNAL MEDICINE

## 2021-06-14 PROCEDURE — 99499 RISK ADDL DX/OHS AUDIT: ICD-10-PCS | Mod: S$GLB,,, | Performed by: INTERNAL MEDICINE

## 2021-06-14 PROCEDURE — 99499 UNLISTED E&M SERVICE: CPT | Mod: S$GLB,,, | Performed by: INTERNAL MEDICINE

## 2021-06-14 PROCEDURE — 1126F AMNT PAIN NOTED NONE PRSNT: CPT | Mod: S$GLB,,, | Performed by: INTERNAL MEDICINE

## 2021-06-14 PROCEDURE — 99214 PR OFFICE/OUTPT VISIT, EST, LEVL IV, 30-39 MIN: ICD-10-PCS | Mod: S$GLB,,, | Performed by: INTERNAL MEDICINE

## 2021-06-14 PROCEDURE — 99214 OFFICE O/P EST MOD 30 MIN: CPT | Mod: S$GLB,,, | Performed by: INTERNAL MEDICINE

## 2021-06-14 PROCEDURE — 1126F PR PAIN SEVERITY QUANTIFIED, NO PAIN PRESENT: ICD-10-PCS | Mod: S$GLB,,, | Performed by: INTERNAL MEDICINE

## 2021-06-14 RX ORDER — AMLODIPINE BESYLATE 2.5 MG/1
2.5 TABLET ORAL DAILY
Qty: 30 TABLET | Refills: 11
Start: 2021-06-14 | End: 2022-08-30

## 2021-08-03 ENCOUNTER — LAB VISIT (OUTPATIENT)
Dept: LAB | Facility: HOSPITAL | Age: 81
End: 2021-08-03
Attending: INTERNAL MEDICINE
Payer: MEDICARE

## 2021-08-03 DIAGNOSIS — C61 PROSTATE CANCER: ICD-10-CM

## 2021-08-03 DIAGNOSIS — C88.0 WALDENSTROM'S MACROGLOBULINEMIA: ICD-10-CM

## 2021-08-03 LAB
ALBUMIN SERPL BCP-MCNC: 4.2 G/DL (ref 3.5–5.2)
ALP SERPL-CCNC: 164 U/L (ref 55–135)
ALT SERPL W/O P-5'-P-CCNC: 33 U/L (ref 10–44)
ANION GAP SERPL CALC-SCNC: 10 MMOL/L (ref 8–16)
AST SERPL-CCNC: 40 U/L (ref 10–40)
BASOPHILS # BLD AUTO: 0.08 K/UL (ref 0–0.2)
BASOPHILS NFR BLD: 0.9 % (ref 0–1.9)
BILIRUB SERPL-MCNC: 0.6 MG/DL (ref 0.1–1)
BUN SERPL-MCNC: 20 MG/DL (ref 8–23)
CALCIUM SERPL-MCNC: 9.8 MG/DL (ref 8.7–10.5)
CHLORIDE SERPL-SCNC: 104 MMOL/L (ref 95–110)
CO2 SERPL-SCNC: 28 MMOL/L (ref 23–29)
COMPLEXED PSA SERPL-MCNC: 43.7 NG/ML (ref 0–4)
CREAT SERPL-MCNC: 1.5 MG/DL (ref 0.5–1.4)
DIFFERENTIAL METHOD: ABNORMAL
EOSINOPHIL # BLD AUTO: 0.5 K/UL (ref 0–0.5)
EOSINOPHIL NFR BLD: 5.6 % (ref 0–8)
ERYTHROCYTE [DISTWIDTH] IN BLOOD BY AUTOMATED COUNT: 13.2 % (ref 11.5–14.5)
EST. GFR  (AFRICAN AMERICAN): 50 ML/MIN/1.73 M^2
EST. GFR  (NON AFRICAN AMERICAN): 43 ML/MIN/1.73 M^2
GLUCOSE SERPL-MCNC: 102 MG/DL (ref 70–110)
HCT VFR BLD AUTO: 41.2 % (ref 40–54)
HGB BLD-MCNC: 13.5 G/DL (ref 14–18)
IMM GRANULOCYTES # BLD AUTO: 0.03 K/UL (ref 0–0.04)
IMM GRANULOCYTES NFR BLD AUTO: 0.3 % (ref 0–0.5)
LYMPHOCYTES # BLD AUTO: 2.4 K/UL (ref 1–4.8)
LYMPHOCYTES NFR BLD: 27.6 % (ref 18–48)
MCH RBC QN AUTO: 30.4 PG (ref 27–31)
MCHC RBC AUTO-ENTMCNC: 32.8 G/DL (ref 32–36)
MCV RBC AUTO: 93 FL (ref 82–98)
MONOCYTES # BLD AUTO: 0.8 K/UL (ref 0.3–1)
MONOCYTES NFR BLD: 8.8 % (ref 4–15)
NEUTROPHILS # BLD AUTO: 4.9 K/UL (ref 1.8–7.7)
NEUTROPHILS NFR BLD: 56.8 % (ref 38–73)
NRBC BLD-RTO: 0 /100 WBC
PLATELET # BLD AUTO: 209 K/UL (ref 150–450)
PMV BLD AUTO: 10.1 FL (ref 9.2–12.9)
POTASSIUM SERPL-SCNC: 5 MMOL/L (ref 3.5–5.1)
PROT SERPL-MCNC: 6.8 G/DL (ref 6–8.4)
RBC # BLD AUTO: 4.44 M/UL (ref 4.6–6.2)
SODIUM SERPL-SCNC: 142 MMOL/L (ref 136–145)
WBC # BLD AUTO: 8.6 K/UL (ref 3.9–12.7)

## 2021-08-03 PROCEDURE — 36415 COLL VENOUS BLD VENIPUNCTURE: CPT | Performed by: INTERNAL MEDICINE

## 2021-08-03 PROCEDURE — 84165 PROTEIN E-PHORESIS SERUM: CPT | Mod: 26,,, | Performed by: PATHOLOGY

## 2021-08-03 PROCEDURE — 80053 COMPREHEN METABOLIC PANEL: CPT | Performed by: INTERNAL MEDICINE

## 2021-08-03 PROCEDURE — 84165 PROTEIN E-PHORESIS SERUM: CPT | Performed by: INTERNAL MEDICINE

## 2021-08-03 PROCEDURE — 84153 ASSAY OF PSA TOTAL: CPT | Performed by: INTERNAL MEDICINE

## 2021-08-03 PROCEDURE — 85025 COMPLETE CBC W/AUTO DIFF WBC: CPT | Performed by: INTERNAL MEDICINE

## 2021-08-03 PROCEDURE — 84165 PATHOLOGIST INTERPRETATION SPE: ICD-10-PCS | Mod: 26,,, | Performed by: PATHOLOGY

## 2021-08-04 LAB
ALBUMIN SERPL ELPH-MCNC: 4.24 G/DL (ref 3.35–5.55)
ALPHA1 GLOB SERPL ELPH-MCNC: 0.3 G/DL (ref 0.17–0.41)
ALPHA2 GLOB SERPL ELPH-MCNC: 0.71 G/DL (ref 0.43–0.99)
B-GLOBULIN SERPL ELPH-MCNC: 0.73 G/DL (ref 0.5–1.1)
GAMMA GLOB SERPL ELPH-MCNC: 0.32 G/DL (ref 0.67–1.58)
PROT SERPL-MCNC: 6.3 G/DL (ref 6–8.4)

## 2021-08-05 LAB — PATHOLOGIST INTERPRETATION SPE: NORMAL

## 2021-08-09 ENCOUNTER — HOSPITAL ENCOUNTER (OUTPATIENT)
Dept: CARDIOLOGY | Facility: HOSPITAL | Age: 81
Discharge: HOME | End: 2021-08-09
Attending: INTERNAL MEDICINE
Payer: MEDICARE

## 2021-08-09 VITALS — BODY MASS INDEX: 25.62 KG/M2 | WEIGHT: 179 LBS | HEIGHT: 70 IN

## 2021-08-09 DIAGNOSIS — I50.32 CHRONIC DIASTOLIC HEART FAILURE: ICD-10-CM

## 2021-08-09 DIAGNOSIS — I10 ESSENTIAL HYPERTENSION: ICD-10-CM

## 2021-08-09 DIAGNOSIS — R00.1 SINUS BRADYCARDIA: ICD-10-CM

## 2021-08-09 DIAGNOSIS — I27.20 PULMONARY HYPERTENSION: ICD-10-CM

## 2021-08-09 DIAGNOSIS — I49.1 PREMATURE ATRIAL CONTRACTIONS: ICD-10-CM

## 2021-08-09 DIAGNOSIS — I70.0 AORTIC ATHEROSCLEROSIS: ICD-10-CM

## 2021-08-09 DIAGNOSIS — I35.1 NONRHEUMATIC AORTIC VALVE INSUFFICIENCY: ICD-10-CM

## 2021-08-09 DIAGNOSIS — R60.0 EDEMA OF BOTH LEGS: ICD-10-CM

## 2021-08-09 DIAGNOSIS — C88.0 WALDENSTROM'S MACROGLOBULINEMIA: ICD-10-CM

## 2021-08-09 DIAGNOSIS — N18.9 CHRONIC RENAL IMPAIRMENT, UNSPECIFIED CKD STAGE: ICD-10-CM

## 2021-08-09 LAB
AORTIC ROOT ANNULUS: 3.59 CM
AV INDEX (PROSTH): 0.53
AV MEAN GRADIENT: 3 MMHG
AV PEAK GRADIENT: 6 MMHG
AV VALVE AREA: 1.6 CM2
AV VELOCITY RATIO: 0.61
BSA FOR ECHO PROCEDURE: 2 M2
CV ECHO LV RWT: 0.48 CM
DOP CALC AO PEAK VEL: 1.19 M/S
DOP CALC AO VTI: 32.4 CM
DOP CALC LVOT AREA: 3 CM2
DOP CALC LVOT DIAMETER: 1.97 CM
DOP CALC LVOT PEAK VEL: 0.72 M/S
DOP CALC LVOT STROKE VOLUME: 51.97 CM3
DOP CALC RVOT PEAK VEL: 0.62 M/S
DOP CALC RVOT VTI: 13.96 CM
DOP CALCLVOT PEAK VEL VTI: 17.06 CM
E WAVE DECELERATION TIME: 200.05 MSEC
E/A RATIO: 1.13
E/E' RATIO: 5.83 M/S
ECHO LV POSTERIOR WALL: 1.31 CM (ref 0.6–1.1)
EJECTION FRACTION: 50 %
FRACTIONAL SHORTENING: 37 % (ref 28–44)
INTERVENTRICULAR SEPTUM: 1.33 CM (ref 0.6–1.1)
IVRT: 97.05 MSEC
LA MAJOR: 6.38 CM
LA MINOR: 5.56 CM
LA WIDTH: 4.24 CM
LEFT ATRIUM SIZE: 6.1 CM
LEFT ATRIUM VOLUME INDEX: 65.6 ML/M2
LEFT ATRIUM VOLUME: 130.63 CM3
LEFT INTERNAL DIMENSION IN SYSTOLE: 3.44 CM (ref 2.1–4)
LEFT VENTRICLE DIASTOLIC VOLUME INDEX: 71.86 ML/M2
LEFT VENTRICLE DIASTOLIC VOLUME: 143.01 ML
LEFT VENTRICLE MASS INDEX: 153 G/M2
LEFT VENTRICLE SYSTOLIC VOLUME INDEX: 24.6 ML/M2
LEFT VENTRICLE SYSTOLIC VOLUME: 48.92 ML
LEFT VENTRICULAR INTERNAL DIMENSION IN DIASTOLE: 5.43 CM (ref 3.5–6)
LEFT VENTRICULAR MASS: 304.66 G
LV LATERAL E/E' RATIO: 7 M/S
LV SEPTAL E/E' RATIO: 5 M/S
MV PEAK A VEL: 0.62 M/S
MV PEAK E VEL: 0.7 M/S
PISA TR MAX VEL: 3.55 M/S
PV MEAN GRADIENT: 1 MMHG
RA MAJOR: 5.16 CM
RA WIDTH: 4.08 CM
RIGHT VENTRICULAR END-DIASTOLIC DIMENSION: 3.19 CM
SINUS: 3.17 CM
STJ: 3.12 CM
TDI LATERAL: 0.1 M/S
TDI SEPTAL: 0.14 M/S
TDI: 0.12 M/S
TR MAX PG: 50 MMHG

## 2021-08-09 PROCEDURE — 93306 TTE W/DOPPLER COMPLETE: CPT

## 2021-08-09 PROCEDURE — 93225 XTRNL ECG REC<48 HRS REC: CPT

## 2021-08-09 PROCEDURE — 93227 XTRNL ECG REC<48 HR R&I: CPT | Mod: ,,, | Performed by: INTERNAL MEDICINE

## 2021-08-09 PROCEDURE — 93227 HOLTER MONITOR - 48 HOUR (CUPID ONLY): ICD-10-PCS | Mod: ,,, | Performed by: INTERNAL MEDICINE

## 2021-08-09 PROCEDURE — 93306 TTE W/DOPPLER COMPLETE: CPT | Mod: 26,,, | Performed by: INTERNAL MEDICINE

## 2021-08-09 PROCEDURE — 93306 ECHO (CUPID ONLY): ICD-10-PCS | Mod: 26,,, | Performed by: INTERNAL MEDICINE

## 2021-08-11 ENCOUNTER — OFFICE VISIT (OUTPATIENT)
Dept: HEMATOLOGY/ONCOLOGY | Facility: CLINIC | Age: 81
End: 2021-08-11
Payer: MEDICARE

## 2021-08-11 VITALS
HEART RATE: 54 BPM | HEIGHT: 70 IN | SYSTOLIC BLOOD PRESSURE: 145 MMHG | BODY MASS INDEX: 25.85 KG/M2 | DIASTOLIC BLOOD PRESSURE: 75 MMHG | TEMPERATURE: 98 F | WEIGHT: 180.56 LBS | OXYGEN SATURATION: 95 %

## 2021-08-11 DIAGNOSIS — C88.0 WALDENSTROM'S MACROGLOBULINEMIA: ICD-10-CM

## 2021-08-11 DIAGNOSIS — Z85.048 HISTORY OF RECTAL CANCER: ICD-10-CM

## 2021-08-11 DIAGNOSIS — C61 PROSTATE CANCER: Primary | ICD-10-CM

## 2021-08-11 DIAGNOSIS — Z87.898 HISTORY OF THYMOMA: ICD-10-CM

## 2021-08-11 LAB
OHS CV EVENT MONITOR DAY: 2
OHS CV HOLTER LENGTH DECIMAL HOURS: 95.98
OHS CV HOLTER LENGTH HOURS: 47
OHS CV HOLTER LENGTH MINUTES: 59
OHS CV HOLTER SINUS AVERAGE HR: 60
OHS CV HOLTER SINUS MAX HR DAY: 2
OHS CV HOLTER SINUS MAX HR HOUR MINUTES: NORMAL
OHS CV HOLTER SINUS MAX HR: 104
OHS CV HOLTER SINUS MIN HR DAY: 2
OHS CV HOLTER SINUS MIN HR HOUR MINUTE: NORMAL
OHS CV HOLTER SINUS MIN HR: 45

## 2021-08-11 PROCEDURE — 3288F FALL RISK ASSESSMENT DOCD: CPT | Mod: CPTII,S$GLB,, | Performed by: INTERNAL MEDICINE

## 2021-08-11 PROCEDURE — 3288F PR FALLS RISK ASSESSMENT DOCUMENTED: ICD-10-PCS | Mod: CPTII,S$GLB,, | Performed by: INTERNAL MEDICINE

## 2021-08-11 PROCEDURE — 99999 PR PBB SHADOW E&M-EST. PATIENT-LVL III: CPT | Mod: PBBFAC,,, | Performed by: INTERNAL MEDICINE

## 2021-08-11 PROCEDURE — 3078F PR MOST RECENT DIASTOLIC BLOOD PRESSURE < 80 MM HG: ICD-10-PCS | Mod: CPTII,S$GLB,, | Performed by: INTERNAL MEDICINE

## 2021-08-11 PROCEDURE — 99214 OFFICE O/P EST MOD 30 MIN: CPT | Mod: S$GLB,,, | Performed by: INTERNAL MEDICINE

## 2021-08-11 PROCEDURE — 1101F PT FALLS ASSESS-DOCD LE1/YR: CPT | Mod: CPTII,S$GLB,, | Performed by: INTERNAL MEDICINE

## 2021-08-11 PROCEDURE — 1126F PR PAIN SEVERITY QUANTIFIED, NO PAIN PRESENT: ICD-10-PCS | Mod: CPTII,S$GLB,, | Performed by: INTERNAL MEDICINE

## 2021-08-11 PROCEDURE — 3077F SYST BP >= 140 MM HG: CPT | Mod: CPTII,S$GLB,, | Performed by: INTERNAL MEDICINE

## 2021-08-11 PROCEDURE — 3077F PR MOST RECENT SYSTOLIC BLOOD PRESSURE >= 140 MM HG: ICD-10-PCS | Mod: CPTII,S$GLB,, | Performed by: INTERNAL MEDICINE

## 2021-08-11 PROCEDURE — 1101F PR PT FALLS ASSESS DOC 0-1 FALLS W/OUT INJ PAST YR: ICD-10-PCS | Mod: CPTII,S$GLB,, | Performed by: INTERNAL MEDICINE

## 2021-08-11 PROCEDURE — 3078F DIAST BP <80 MM HG: CPT | Mod: CPTII,S$GLB,, | Performed by: INTERNAL MEDICINE

## 2021-08-11 PROCEDURE — 99214 PR OFFICE/OUTPT VISIT, EST, LEVL IV, 30-39 MIN: ICD-10-PCS | Mod: S$GLB,,, | Performed by: INTERNAL MEDICINE

## 2021-08-11 PROCEDURE — 1159F PR MEDICATION LIST DOCUMENTED IN MEDICAL RECORD: ICD-10-PCS | Mod: CPTII,S$GLB,, | Performed by: INTERNAL MEDICINE

## 2021-08-11 PROCEDURE — 1159F MED LIST DOCD IN RCRD: CPT | Mod: CPTII,S$GLB,, | Performed by: INTERNAL MEDICINE

## 2021-08-11 PROCEDURE — 1126F AMNT PAIN NOTED NONE PRSNT: CPT | Mod: CPTII,S$GLB,, | Performed by: INTERNAL MEDICINE

## 2021-08-11 PROCEDURE — 99999 PR PBB SHADOW E&M-EST. PATIENT-LVL III: ICD-10-PCS | Mod: PBBFAC,,, | Performed by: INTERNAL MEDICINE

## 2021-08-12 RX ORDER — ROSUVASTATIN CALCIUM 10 MG/1
TABLET, COATED ORAL
Qty: 90 TABLET | Refills: 0 | Status: SHIPPED | OUTPATIENT
Start: 2021-08-12 | End: 2021-11-18

## 2021-08-16 ENCOUNTER — OFFICE VISIT (OUTPATIENT)
Dept: CARDIOLOGY | Facility: CLINIC | Age: 81
End: 2021-08-16
Payer: MEDICARE

## 2021-08-16 VITALS
DIASTOLIC BLOOD PRESSURE: 70 MMHG | HEART RATE: 48 BPM | HEIGHT: 70 IN | OXYGEN SATURATION: 97 % | BODY MASS INDEX: 25.79 KG/M2 | SYSTOLIC BLOOD PRESSURE: 146 MMHG | WEIGHT: 180.13 LBS

## 2021-08-16 DIAGNOSIS — I47.20 VT (VENTRICULAR TACHYCARDIA): ICD-10-CM

## 2021-08-16 DIAGNOSIS — R74.8 ABNORMAL LIVER ENZYMES: ICD-10-CM

## 2021-08-16 DIAGNOSIS — R60.0 EDEMA OF BOTH LEGS: ICD-10-CM

## 2021-08-16 DIAGNOSIS — R94.31 ABNORMAL ECG: Chronic | ICD-10-CM

## 2021-08-16 DIAGNOSIS — E78.2 MIXED HYPERLIPIDEMIA: ICD-10-CM

## 2021-08-16 DIAGNOSIS — I70.0 AORTIC ATHEROSCLEROSIS: ICD-10-CM

## 2021-08-16 DIAGNOSIS — I27.20 PULMONARY HYPERTENSION: Chronic | ICD-10-CM

## 2021-08-16 DIAGNOSIS — I49.3 PVC'S (PREMATURE VENTRICULAR CONTRACTIONS): Chronic | ICD-10-CM

## 2021-08-16 DIAGNOSIS — I36.1 NONRHEUMATIC TRICUSPID VALVE REGURGITATION: ICD-10-CM

## 2021-08-16 DIAGNOSIS — I49.1 PREMATURE ATRIAL CONTRACTIONS: ICD-10-CM

## 2021-08-16 DIAGNOSIS — I35.1 NONRHEUMATIC AORTIC VALVE INSUFFICIENCY: Chronic | ICD-10-CM

## 2021-08-16 DIAGNOSIS — I50.32 CHRONIC DIASTOLIC HEART FAILURE: Primary | ICD-10-CM

## 2021-08-16 DIAGNOSIS — C88.0 WALDENSTROM'S MACROGLOBULINEMIA: ICD-10-CM

## 2021-08-16 DIAGNOSIS — I47.10 PSVT (PAROXYSMAL SUPRAVENTRICULAR TACHYCARDIA): ICD-10-CM

## 2021-08-16 DIAGNOSIS — I10 ESSENTIAL HYPERTENSION: Chronic | ICD-10-CM

## 2021-08-16 DIAGNOSIS — N18.9 CHRONIC RENAL IMPAIRMENT, UNSPECIFIED CKD STAGE: ICD-10-CM

## 2021-08-16 DIAGNOSIS — I35.0 NONRHEUMATIC AORTIC VALVE STENOSIS: ICD-10-CM

## 2021-08-16 DIAGNOSIS — R00.1 SINUS BRADYCARDIA: ICD-10-CM

## 2021-08-16 PROCEDURE — 3077F PR MOST RECENT SYSTOLIC BLOOD PRESSURE >= 140 MM HG: ICD-10-PCS | Mod: CPTII,S$GLB,, | Performed by: INTERNAL MEDICINE

## 2021-08-16 PROCEDURE — 1160F PR REVIEW ALL MEDS BY PRESCRIBER/CLIN PHARMACIST DOCUMENTED: ICD-10-PCS | Mod: CPTII,S$GLB,, | Performed by: INTERNAL MEDICINE

## 2021-08-16 PROCEDURE — 1159F MED LIST DOCD IN RCRD: CPT | Mod: CPTII,S$GLB,, | Performed by: INTERNAL MEDICINE

## 2021-08-16 PROCEDURE — 3078F DIAST BP <80 MM HG: CPT | Mod: CPTII,S$GLB,, | Performed by: INTERNAL MEDICINE

## 2021-08-16 PROCEDURE — 1126F AMNT PAIN NOTED NONE PRSNT: CPT | Mod: CPTII,S$GLB,, | Performed by: INTERNAL MEDICINE

## 2021-08-16 PROCEDURE — 3077F SYST BP >= 140 MM HG: CPT | Mod: CPTII,S$GLB,, | Performed by: INTERNAL MEDICINE

## 2021-08-16 PROCEDURE — 99999 PR PBB SHADOW E&M-EST. PATIENT-LVL III: CPT | Mod: PBBFAC,,, | Performed by: INTERNAL MEDICINE

## 2021-08-16 PROCEDURE — 99214 OFFICE O/P EST MOD 30 MIN: CPT | Mod: S$GLB,,, | Performed by: INTERNAL MEDICINE

## 2021-08-16 PROCEDURE — 99999 PR PBB SHADOW E&M-EST. PATIENT-LVL III: ICD-10-PCS | Mod: PBBFAC,,, | Performed by: INTERNAL MEDICINE

## 2021-08-16 PROCEDURE — 99214 PR OFFICE/OUTPT VISIT, EST, LEVL IV, 30-39 MIN: ICD-10-PCS | Mod: S$GLB,,, | Performed by: INTERNAL MEDICINE

## 2021-08-16 PROCEDURE — 3078F PR MOST RECENT DIASTOLIC BLOOD PRESSURE < 80 MM HG: ICD-10-PCS | Mod: CPTII,S$GLB,, | Performed by: INTERNAL MEDICINE

## 2021-08-16 PROCEDURE — 1159F PR MEDICATION LIST DOCUMENTED IN MEDICAL RECORD: ICD-10-PCS | Mod: CPTII,S$GLB,, | Performed by: INTERNAL MEDICINE

## 2021-08-16 PROCEDURE — 1126F PR PAIN SEVERITY QUANTIFIED, NO PAIN PRESENT: ICD-10-PCS | Mod: CPTII,S$GLB,, | Performed by: INTERNAL MEDICINE

## 2021-08-16 PROCEDURE — 1160F RVW MEDS BY RX/DR IN RCRD: CPT | Mod: CPTII,S$GLB,, | Performed by: INTERNAL MEDICINE

## 2021-10-04 ENCOUNTER — PATIENT MESSAGE (OUTPATIENT)
Dept: ADMINISTRATIVE | Facility: HOSPITAL | Age: 81
End: 2021-10-04

## 2021-11-18 RX ORDER — ROSUVASTATIN CALCIUM 10 MG/1
TABLET, COATED ORAL
Qty: 90 TABLET | Refills: 0 | Status: SHIPPED | OUTPATIENT
Start: 2021-11-18 | End: 2022-02-16

## 2021-12-02 ENCOUNTER — OFFICE VISIT (OUTPATIENT)
Dept: INTERNAL MEDICINE | Facility: CLINIC | Age: 81
End: 2021-12-02
Payer: MEDICARE

## 2021-12-02 VITALS
WEIGHT: 177.69 LBS | HEIGHT: 70 IN | TEMPERATURE: 98 F | DIASTOLIC BLOOD PRESSURE: 80 MMHG | HEART RATE: 68 BPM | BODY MASS INDEX: 25.44 KG/M2 | SYSTOLIC BLOOD PRESSURE: 132 MMHG

## 2021-12-02 DIAGNOSIS — I10 ESSENTIAL HYPERTENSION: Chronic | ICD-10-CM

## 2021-12-02 DIAGNOSIS — N18.31 CHRONIC KIDNEY DISEASE, STAGE 3A: ICD-10-CM

## 2021-12-02 DIAGNOSIS — Z85.46 HISTORY OF PROSTATE CANCER: ICD-10-CM

## 2021-12-02 DIAGNOSIS — C88.0 WALDENSTROM'S MACROGLOBULINEMIA: ICD-10-CM

## 2021-12-02 DIAGNOSIS — E78.2 MIXED HYPERLIPIDEMIA: ICD-10-CM

## 2021-12-02 DIAGNOSIS — J47.9 BRONCHIECTASIS WITHOUT COMPLICATION: ICD-10-CM

## 2021-12-02 DIAGNOSIS — Z00.00 ROUTINE GENERAL MEDICAL EXAMINATION AT HEALTH CARE FACILITY: Primary | ICD-10-CM

## 2021-12-02 DIAGNOSIS — Z85.048 HISTORY OF RECTAL CANCER: ICD-10-CM

## 2021-12-02 DIAGNOSIS — Z87.898 HISTORY OF THYMOMA: ICD-10-CM

## 2021-12-02 PROBLEM — Z78.9 ALCOHOL USE: Status: RESOLVED | Noted: 2018-09-07 | Resolved: 2021-12-02

## 2021-12-02 PROBLEM — D50.9 IDA (IRON DEFICIENCY ANEMIA): Status: RESOLVED | Noted: 2019-10-22 | Resolved: 2021-12-02

## 2021-12-02 PROBLEM — K62.1 RECTAL POLYP: Status: RESOLVED | Noted: 2019-04-23 | Resolved: 2021-12-02

## 2021-12-02 PROBLEM — R74.8 ABNORMAL LIVER ENZYMES: Status: RESOLVED | Noted: 2018-08-28 | Resolved: 2021-12-02

## 2021-12-02 PROBLEM — E87.5 HYPERKALEMIA: Status: RESOLVED | Noted: 2021-04-26 | Resolved: 2021-12-02

## 2021-12-02 PROBLEM — R00.1 SINUS BRADYCARDIA: Status: RESOLVED | Noted: 2020-09-28 | Resolved: 2021-12-02

## 2021-12-02 PROBLEM — F10.90 ALCOHOL USE: Status: RESOLVED | Noted: 2018-09-07 | Resolved: 2021-12-02

## 2021-12-02 PROCEDURE — 99999 PR PBB SHADOW E&M-EST. PATIENT-LVL IV: ICD-10-PCS | Mod: PBBFAC,HCNC,, | Performed by: INTERNAL MEDICINE

## 2021-12-02 PROCEDURE — G0008 ADMIN INFLUENZA VIRUS VAC: HCPCS | Mod: HCNC,S$GLB,, | Performed by: INTERNAL MEDICINE

## 2021-12-02 PROCEDURE — 90694 FLU VACCINE - QUADRIVALENT - ADJUVANTED: ICD-10-PCS | Mod: HCNC,S$GLB,, | Performed by: INTERNAL MEDICINE

## 2021-12-02 PROCEDURE — 90694 VACC AIIV4 NO PRSRV 0.5ML IM: CPT | Mod: HCNC,S$GLB,, | Performed by: INTERNAL MEDICINE

## 2021-12-02 PROCEDURE — 99397 PR PREVENTIVE VISIT,EST,65 & OVER: ICD-10-PCS | Mod: HCNC,S$GLB,, | Performed by: INTERNAL MEDICINE

## 2021-12-02 PROCEDURE — 99499 RISK ADDL DX/OHS AUDIT: ICD-10-PCS | Mod: S$GLB,,, | Performed by: INTERNAL MEDICINE

## 2021-12-02 PROCEDURE — G0008 FLU VACCINE - QUADRIVALENT - ADJUVANTED: ICD-10-PCS | Mod: HCNC,S$GLB,, | Performed by: INTERNAL MEDICINE

## 2021-12-02 PROCEDURE — 99499 UNLISTED E&M SERVICE: CPT | Mod: S$GLB,,, | Performed by: INTERNAL MEDICINE

## 2021-12-02 PROCEDURE — 99397 PER PM REEVAL EST PAT 65+ YR: CPT | Mod: HCNC,S$GLB,, | Performed by: INTERNAL MEDICINE

## 2021-12-02 PROCEDURE — 99999 PR PBB SHADOW E&M-EST. PATIENT-LVL IV: CPT | Mod: PBBFAC,HCNC,, | Performed by: INTERNAL MEDICINE

## 2021-12-03 ENCOUNTER — TELEPHONE (OUTPATIENT)
Dept: HEMATOLOGY/ONCOLOGY | Facility: CLINIC | Age: 81
End: 2021-12-03
Payer: MEDICARE

## 2021-12-07 ENCOUNTER — TELEPHONE (OUTPATIENT)
Dept: INTERNAL MEDICINE | Facility: CLINIC | Age: 81
End: 2021-12-07
Payer: MEDICARE

## 2021-12-07 ENCOUNTER — LAB VISIT (OUTPATIENT)
Dept: LAB | Facility: HOSPITAL | Age: 81
End: 2021-12-07
Attending: INTERNAL MEDICINE
Payer: MEDICARE

## 2021-12-07 DIAGNOSIS — I10 ESSENTIAL HYPERTENSION: Chronic | ICD-10-CM

## 2021-12-07 DIAGNOSIS — E78.2 MIXED HYPERLIPIDEMIA: ICD-10-CM

## 2021-12-07 DIAGNOSIS — K75.81 NASH (NONALCOHOLIC STEATOHEPATITIS): Primary | ICD-10-CM

## 2021-12-07 LAB
ALBUMIN SERPL BCP-MCNC: 4 G/DL (ref 3.5–5.2)
ALP SERPL-CCNC: 153 U/L (ref 55–135)
ALT SERPL W/O P-5'-P-CCNC: 19 U/L (ref 10–44)
ANION GAP SERPL CALC-SCNC: 11 MMOL/L (ref 8–16)
AST SERPL-CCNC: 25 U/L (ref 10–40)
BASOPHILS # BLD AUTO: 0.05 K/UL (ref 0–0.2)
BASOPHILS NFR BLD: 0.7 % (ref 0–1.9)
BILIRUB SERPL-MCNC: 0.9 MG/DL (ref 0.1–1)
BUN SERPL-MCNC: 20 MG/DL (ref 8–23)
CALCIUM SERPL-MCNC: 9.3 MG/DL (ref 8.7–10.5)
CHLORIDE SERPL-SCNC: 106 MMOL/L (ref 95–110)
CHOLEST SERPL-MCNC: 175 MG/DL (ref 120–199)
CHOLEST/HDLC SERPL: 2.4 {RATIO} (ref 2–5)
CO2 SERPL-SCNC: 25 MMOL/L (ref 23–29)
CREAT SERPL-MCNC: 1.4 MG/DL (ref 0.5–1.4)
DIFFERENTIAL METHOD: ABNORMAL
EOSINOPHIL # BLD AUTO: 0.8 K/UL (ref 0–0.5)
EOSINOPHIL NFR BLD: 11.4 % (ref 0–8)
ERYTHROCYTE [DISTWIDTH] IN BLOOD BY AUTOMATED COUNT: 13.2 % (ref 11.5–14.5)
EST. GFR  (AFRICAN AMERICAN): 54.5 ML/MIN/1.73 M^2
EST. GFR  (NON AFRICAN AMERICAN): 47.1 ML/MIN/1.73 M^2
GLUCOSE SERPL-MCNC: 109 MG/DL (ref 70–110)
HCT VFR BLD AUTO: 38.7 % (ref 40–54)
HDLC SERPL-MCNC: 74 MG/DL (ref 40–75)
HDLC SERPL: 42.3 % (ref 20–50)
HGB BLD-MCNC: 12.5 G/DL (ref 14–18)
IMM GRANULOCYTES # BLD AUTO: 0.02 K/UL (ref 0–0.04)
IMM GRANULOCYTES NFR BLD AUTO: 0.3 % (ref 0–0.5)
LDLC SERPL CALC-MCNC: 88.4 MG/DL (ref 63–159)
LYMPHOCYTES # BLD AUTO: 2.1 K/UL (ref 1–4.8)
LYMPHOCYTES NFR BLD: 28.8 % (ref 18–48)
MCH RBC QN AUTO: 30.8 PG (ref 27–31)
MCHC RBC AUTO-ENTMCNC: 32.3 G/DL (ref 32–36)
MCV RBC AUTO: 95 FL (ref 82–98)
MONOCYTES # BLD AUTO: 0.7 K/UL (ref 0.3–1)
MONOCYTES NFR BLD: 9.7 % (ref 4–15)
NEUTROPHILS # BLD AUTO: 3.5 K/UL (ref 1.8–7.7)
NEUTROPHILS NFR BLD: 49.1 % (ref 38–73)
NONHDLC SERPL-MCNC: 101 MG/DL
NRBC BLD-RTO: 0 /100 WBC
PLATELET # BLD AUTO: 204 K/UL (ref 150–450)
PMV BLD AUTO: 10.8 FL (ref 9.2–12.9)
POTASSIUM SERPL-SCNC: 4.9 MMOL/L (ref 3.5–5.1)
PROT SERPL-MCNC: 6.3 G/DL (ref 6–8.4)
RBC # BLD AUTO: 4.06 M/UL (ref 4.6–6.2)
SODIUM SERPL-SCNC: 142 MMOL/L (ref 136–145)
TRIGL SERPL-MCNC: 63 MG/DL (ref 30–150)
WBC # BLD AUTO: 7.12 K/UL (ref 3.9–12.7)

## 2021-12-07 PROCEDURE — 80061 LIPID PANEL: CPT | Mod: HCNC | Performed by: INTERNAL MEDICINE

## 2021-12-07 PROCEDURE — 80053 COMPREHEN METABOLIC PANEL: CPT | Mod: HCNC | Performed by: INTERNAL MEDICINE

## 2021-12-07 PROCEDURE — 36415 COLL VENOUS BLD VENIPUNCTURE: CPT | Mod: HCNC,PO | Performed by: INTERNAL MEDICINE

## 2021-12-07 PROCEDURE — 85025 COMPLETE CBC W/AUTO DIFF WBC: CPT | Mod: HCNC | Performed by: INTERNAL MEDICINE

## 2021-12-10 ENCOUNTER — OFFICE VISIT (OUTPATIENT)
Dept: HEMATOLOGY/ONCOLOGY | Facility: CLINIC | Age: 81
End: 2021-12-10
Payer: MEDICARE

## 2021-12-10 VITALS
HEIGHT: 70 IN | WEIGHT: 179 LBS | BODY MASS INDEX: 25.62 KG/M2 | HEART RATE: 65 BPM | DIASTOLIC BLOOD PRESSURE: 80 MMHG | OXYGEN SATURATION: 97 % | TEMPERATURE: 98 F | SYSTOLIC BLOOD PRESSURE: 130 MMHG

## 2021-12-10 DIAGNOSIS — Z87.898 HISTORY OF THYMOMA: ICD-10-CM

## 2021-12-10 DIAGNOSIS — D51.8 OTHER VITAMIN B12 DEFICIENCY ANEMIA: ICD-10-CM

## 2021-12-10 DIAGNOSIS — R74.8 ELEVATED ALKALINE PHOSPHATASE LEVEL: ICD-10-CM

## 2021-12-10 DIAGNOSIS — Z85.46 HISTORY OF PROSTATE CANCER: ICD-10-CM

## 2021-12-10 DIAGNOSIS — C88.0 WALDENSTROM'S MACROGLOBULINEMIA: ICD-10-CM

## 2021-12-10 DIAGNOSIS — Z85.048 HISTORY OF RECTAL CANCER: ICD-10-CM

## 2021-12-10 DIAGNOSIS — R73.03 PREDIABETES: ICD-10-CM

## 2021-12-10 PROCEDURE — 99999 PR PBB SHADOW E&M-EST. PATIENT-LVL IV: CPT | Mod: PBBFAC,HCNC,, | Performed by: INTERNAL MEDICINE

## 2021-12-10 PROCEDURE — 99499 RISK ADDL DX/OHS AUDIT: ICD-10-PCS | Mod: HCNC,S$GLB,, | Performed by: INTERNAL MEDICINE

## 2021-12-10 PROCEDURE — 99215 OFFICE O/P EST HI 40 MIN: CPT | Mod: HCNC,S$GLB,, | Performed by: INTERNAL MEDICINE

## 2021-12-10 PROCEDURE — 99499 UNLISTED E&M SERVICE: CPT | Mod: HCNC,S$GLB,, | Performed by: INTERNAL MEDICINE

## 2021-12-10 PROCEDURE — 99215 PR OFFICE/OUTPT VISIT, EST, LEVL V, 40-54 MIN: ICD-10-PCS | Mod: HCNC,S$GLB,, | Performed by: INTERNAL MEDICINE

## 2021-12-10 PROCEDURE — 99999 PR PBB SHADOW E&M-EST. PATIENT-LVL IV: ICD-10-PCS | Mod: PBBFAC,HCNC,, | Performed by: INTERNAL MEDICINE

## 2021-12-14 ENCOUNTER — PATIENT OUTREACH (OUTPATIENT)
Dept: ADMINISTRATIVE | Facility: HOSPITAL | Age: 81
End: 2021-12-14
Payer: MEDICARE

## 2021-12-21 ENCOUNTER — IMMUNIZATION (OUTPATIENT)
Dept: PRIMARY CARE CLINIC | Facility: CLINIC | Age: 81
End: 2021-12-21
Payer: MEDICARE

## 2021-12-21 DIAGNOSIS — Z23 NEED FOR VACCINATION: Primary | ICD-10-CM

## 2021-12-21 PROCEDURE — 0004A COVID-19, MRNA, LNP-S, PF, 30 MCG/0.3 ML DOSE VACCINE: CPT | Mod: CV19,PBBFAC | Performed by: FAMILY MEDICINE

## 2021-12-29 ENCOUNTER — PATIENT OUTREACH (OUTPATIENT)
Dept: ADMINISTRATIVE | Facility: OTHER | Age: 81
End: 2021-12-29
Payer: MEDICARE

## 2022-01-03 ENCOUNTER — TELEPHONE (OUTPATIENT)
Dept: INTERNAL MEDICINE | Facility: CLINIC | Age: 82
End: 2022-01-03
Payer: MEDICARE

## 2022-01-03 NOTE — TELEPHONE ENCOUNTER
----- Message from Yasmeen Dumont sent at 1/3/2022 12:21 PM CST -----  Contact: [phone]  Type:  Patient Returning Call    Who Called:anne  Who Left Message for Patient:radha  Does the patient know what this is regarding?:scan  Would the patient rather a call back or a response via [facility]? Call back  Best Call Back Number:[phone]  Additional Information:

## 2022-01-03 NOTE — TELEPHONE ENCOUNTER
----- Message from Baldev Carlisle LPN sent at 1/3/2022 11:30 AM CST -----    ----- Message -----  From: Ashley Benedict, Patient Care Assistant  Sent: 12/29/2021   2:46 PM CST  To: Baldev Carlisle LPN    Hi,     I am contacting you concerning this patient. This patient was scheduled by you for a fibroscan. I had to cancel the appointment because they are only offering this service on certain days. As of now, the only days that are available for fibroscans are February 15th and 17th at anytime. Can you call this patient to get him rescheduled?     Thanks,   Ashley

## 2022-01-03 NOTE — TELEPHONE ENCOUNTER
Patient has appt on 02/15/2021 at the [facility] location for 10 am can you please schedule him anytime after that appt? I treid and it wont allow me to schedule.  Fibroscan   Thanks

## 2022-01-04 ENCOUNTER — TELEPHONE (OUTPATIENT)
Dept: HEPATOLOGY | Facility: CLINIC | Age: 82
End: 2022-01-04
Payer: MEDICARE

## 2022-01-04 NOTE — TELEPHONE ENCOUNTER
----- Message from Ashley Benedict, Patient Care Assistant sent at 1/4/2022 10:01 AM CST -----  Hey,     I got a message saying this patient needs a fibroscan but I don't know why. I didn't want to schedule this appointment unless I go through you fist. I'm unable to send the message to you. Hopefully you can see the message in his chart. If not, I have it printed out.

## 2022-01-06 NOTE — OR NURSING
Diverticulosis Pan  Colon polyp   INR is up from 1.4, but is still below goal range of 3-4, at 2.5.    Please have patient take:    6 mg tonight (Thurdsay and Friday)     5 mg Saturday, Sunday, and Monday    and call with an INR on Tuesday morning -- sooner with symptoms/concerns.  --jess.

## 2022-01-20 RX ORDER — OMEPRAZOLE 20 MG/1
CAPSULE, DELAYED RELEASE ORAL
Qty: 90 CAPSULE | Refills: 3 | Status: SHIPPED | OUTPATIENT
Start: 2022-01-20 | End: 2023-03-27

## 2022-01-20 NOTE — TELEPHONE ENCOUNTER
No new care gaps identified.  Powered by [employer]. Reference number: [id number].   1/20/2022 10:53:00 AM CST

## 2022-01-31 ENCOUNTER — PATIENT MESSAGE (OUTPATIENT)
Dept: CARDIOLOGY | Facility: CLINIC | Age: 82
End: 2022-01-31
Payer: MEDICARE

## 2022-01-31 ENCOUNTER — TELEPHONE (OUTPATIENT)
Dept: CARDIOLOGY | Facility: HOSPITAL | Age: 82
End: 2022-01-31
Payer: MEDICARE

## 2022-01-31 NOTE — TELEPHONE ENCOUNTER
Please call pt.  PSA order will need to go through his PCP or heme/onc since it is a non-cardiac test that I don't usually order.    Thanks    Dr Wright

## 2022-02-01 ENCOUNTER — PATIENT MESSAGE (OUTPATIENT)
Dept: INTERNAL MEDICINE | Facility: CLINIC | Age: 82
End: 2022-02-01
Payer: MEDICARE

## 2022-02-01 DIAGNOSIS — C61 PROSTATE CANCER: Primary | ICD-10-CM

## 2022-02-01 NOTE — TELEPHONE ENCOUNTER
Called and informed patient that he needed to go through his PCP or Hem/onc to get the PSA lab done.     Patient stated understanding and said he will try to contact them.

## 2022-02-02 ENCOUNTER — PATIENT MESSAGE (OUTPATIENT)
Dept: INTERNAL MEDICINE | Facility: CLINIC | Age: 82
End: 2022-02-02
Payer: MEDICARE

## 2022-02-02 ENCOUNTER — LAB VISIT (OUTPATIENT)
Dept: LAB | Facility: HOSPITAL | Age: 82
End: 2022-02-02
Attending: INTERNAL MEDICINE
Payer: MEDICARE

## 2022-02-02 DIAGNOSIS — I50.32 CHRONIC DIASTOLIC HEART FAILURE: ICD-10-CM

## 2022-02-02 DIAGNOSIS — C61 PROSTATE CANCER: ICD-10-CM

## 2022-02-02 DIAGNOSIS — E78.2 MIXED HYPERLIPIDEMIA: ICD-10-CM

## 2022-02-02 LAB
ALBUMIN SERPL BCP-MCNC: 3.8 G/DL (ref 3.5–5.2)
ALP SERPL-CCNC: 148 U/L (ref 55–135)
ALT SERPL W/O P-5'-P-CCNC: 16 U/L (ref 10–44)
ANION GAP SERPL CALC-SCNC: 9 MMOL/L (ref 8–16)
AST SERPL-CCNC: 23 U/L (ref 10–40)
BILIRUB SERPL-MCNC: 0.6 MG/DL (ref 0.1–1)
BUN SERPL-MCNC: 19 MG/DL (ref 8–23)
CALCIUM SERPL-MCNC: 9.6 MG/DL (ref 8.7–10.5)
CHLORIDE SERPL-SCNC: 105 MMOL/L (ref 95–110)
CHOLEST SERPL-MCNC: 187 MG/DL (ref 120–199)
CHOLEST/HDLC SERPL: 2.5 {RATIO} (ref 2–5)
CO2 SERPL-SCNC: 27 MMOL/L (ref 23–29)
COMPLEXED PSA SERPL-MCNC: 12 NG/ML (ref 0–4)
CREAT SERPL-MCNC: 1.3 MG/DL (ref 0.5–1.4)
EST. GFR  (AFRICAN AMERICAN): 59.6 ML/MIN/1.73 M^2
EST. GFR  (NON AFRICAN AMERICAN): 51.5 ML/MIN/1.73 M^2
GLUCOSE SERPL-MCNC: 110 MG/DL (ref 70–110)
HDLC SERPL-MCNC: 75 MG/DL (ref 40–75)
HDLC SERPL: 40.1 % (ref 20–50)
LDLC SERPL CALC-MCNC: 98 MG/DL (ref 63–159)
NONHDLC SERPL-MCNC: 112 MG/DL
POTASSIUM SERPL-SCNC: 5.5 MMOL/L (ref 3.5–5.1)
PROT SERPL-MCNC: 6.5 G/DL (ref 6–8.4)
SODIUM SERPL-SCNC: 141 MMOL/L (ref 136–145)
TRIGL SERPL-MCNC: 70 MG/DL (ref 30–150)

## 2022-02-02 PROCEDURE — 36415 COLL VENOUS BLD VENIPUNCTURE: CPT | Mod: HCNC | Performed by: INTERNAL MEDICINE

## 2022-02-02 PROCEDURE — 84153 ASSAY OF PSA TOTAL: CPT | Mod: HCNC | Performed by: INTERNAL MEDICINE

## 2022-02-02 PROCEDURE — 80061 LIPID PANEL: CPT | Mod: HCNC | Performed by: INTERNAL MEDICINE

## 2022-02-02 PROCEDURE — 83880 ASSAY OF NATRIURETIC PEPTIDE: CPT | Mod: HCNC | Performed by: INTERNAL MEDICINE

## 2022-02-02 PROCEDURE — 80053 COMPREHEN METABOLIC PANEL: CPT | Mod: HCNC | Performed by: INTERNAL MEDICINE

## 2022-02-03 LAB — BNP SERPL-MCNC: 240 PG/ML (ref 0–99)

## 2022-02-14 NOTE — PROGRESS NOTES
Subjective:    Patient ID:  Homero Tanner is a 81 y.o. male who presents for evaluation of Congestive Heart Failure, Valvular Heart Disease, Hyperlipidemia, and Hypertension      HPI Pt presents for eval.   His current medical conditions include diastolic CHF, Waldenstrom's macroglobulinemia lymphoma,h/o thymoma, HTN, DD, CRI, PHTN, AS, AI, PVCs, TR, dyslipidemia, prostate & rectal cancer. Nonsmoker.    Past hx pertinent for following:  Had LHC 20+ years ago, no significant blockages noted.  He has chronic abnl ecgs.   Also has had PSVT with stress testing in past.    He has h/o thymoma surgery with xrt and also had rectal polyp surgery.  - Stress MPI Aug 2017.  Echo Aug 2017 normal EF, DD, mild-mod PHTN, mild AI, LVH, mild RVE.  Echo 9/18 EF 50%, normal diastolic function, mild PHTN, LVH.  Taken off statin for abnl LFTs at one point, eventually restarted.  ECG 4/26/21 NSR, pacs, nonspecific T wave abnl.  Echo 8/21: EF 50%, DD, mild AS, mild AI, mod-severe TR, mild-mod MR, biatrial enlargement, mod PHTN,  48 hour Holter 8/21: NSR, frequent PVCs, frequent PACs, nonsustained AT.  Now here.  CHF is stable.  No concerning dyspnea, pnd/orthopnea.  No angina.  HTN is controlled.  No palpitation sxs.  Weight is stable.  Active.  Some exercise.   on 2/22 labs.  Lipids controlled on statin tx.  Renal function stable.  K+ intermittently elevated.  Compliant w meds.        Past Medical History:   Diagnosis Date    Abnormal ECG 6/24/2013    Adenomatous rectal polyp 12/7/2015    Aortic insufficiency 6/24/2013    Arthritis     Asthma     as a child    Benign neoplasm of cecum 2/27/2017    Benign neoplasm of transverse colon 2/27/2017    Cataract     CHF (congestive heart failure)     Pt states He's never been told this    Diverticulosis of large intestine without hemorrhage 2/27/2017    Encounter for blood transfusion     Frequent PVCs     Gallstones 3/28/2018    By Us done 3/21/2018    GERD  (gastroesophageal reflux disease)     History of colon polyps     Hypertension     Iron deficiency anemia 10/26/2015    Lymphoma     waldenstrom's macroglobulinemia    Mixed hyperlipidemia 6/24/2013    Premature atrial contractions     Prostate cancer     PSVT (paroxysmal supraventricular tachycardia) 6/24/2013    Pulmonary hypertension 6/13/2016    PVC's (premature ventricular contractions) 6/13/2016    Rectal adenocarcinoma     Rectal cancer 5/1/2018    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    left parietal scalp    Thymoma     TR (tricuspid regurgitation) 6/13/2016    Tubular adenoma of colon 06/2020    VT (ventricular tachycardia) 9/18/2018       Current Outpatient Medications:     amLODIPine (NORVASC) 2.5 MG tablet, Take 1 tablet (2.5 mg total) by mouth once daily., Disp: 30 tablet, Rfl: 11    aspirin (ECOTRIN) 81 MG EC tablet, Take 81 mg by mouth once daily., Disp: , Rfl:     b complex vitamins tablet, Take by mouth. 1 tablet Oral Every day, Disp: , Rfl:     clobetasoL (TEMOVATE) 0.05 % external solution, AAA of scalp 1-2 times daily prn flares. Strong steroid- do not apply to face or folds of skin, Disp: 50 mL, Rfl: 4    cyanocobalamin 1,000 mcg/mL injection, INJECT 1ML UNDER THE SKIN EVERY MONTH, Disp: 10 mL, Rfl: 4    erythromycin with ethanoL (EMGEL) 2 % gel, AAA of face bid.  For rosacea/redness., Disp: 60 g, Rfl: 3    furosemide (LASIX) 40 MG tablet, TAKE 1 TABLET(40 MG) BY MOUTH EVERY DAY, Disp: 30 tablet, Rfl: 11    ketoconazole (NIZORAL) 2 % shampoo, Apply to scalp 1-2 times/week, lather, let sit 5 minutes, then rinse, Disp: 120 mL, Rfl: 5    leuprolide (LUPRON) 3.75 mg injection, Inject 3.75 mg into the muscle every 3 (three) months. , Disp: , Rfl:     metoprolol succinate (TOPROL-XL) 100 MG 24 hr tablet, TAKE 1 TABLET(100 MG) BY MOUTH EVERY DAY, Disp: 30 tablet, Rfl: 11    metronidazole 0.75% (METROCREAM) 0.75 % Crea, APPLY TO AFFECTED AREA ON FACE TWICE DAILY, Disp: 45  "g, Rfl: 3    omeprazole (PRILOSEC) 20 MG capsule, TAKE 1 CAPSULE(20 MG) BY MOUTH EVERY DAY, Disp: 90 capsule, Rfl: 3    rosuvastatin (CRESTOR) 10 MG tablet, TAKE 1 TABLET(10 MG) BY MOUTH EVERY DAY, Disp: 90 tablet, Rfl: 0      Review of Systems   Constitutional: Negative.   HENT: Negative.    Eyes: Negative.    Cardiovascular: Negative.    Respiratory: Negative.    Endocrine: Negative.    Hematologic/Lymphatic: Negative.    Skin: Negative.    Musculoskeletal: Negative.    Gastrointestinal: Negative.    Genitourinary: Negative.    Neurological: Negative.    Psychiatric/Behavioral: Negative.    Allergic/Immunologic: Negative.        /64 (BP Location: Right arm, Patient Position: Sitting, BP Method: Large (Manual))   Pulse 60   Resp 16   Ht 5' 10" (1.778 m)   Wt 82.4 kg (181 lb 10.5 oz)   SpO2 98%   BMI 26.07 kg/m²     Wt Readings from Last 3 Encounters:   02/15/22 82.4 kg (181 lb 10.5 oz)   02/15/22 82.1 kg (181 lb)   12/10/21 81.2 kg (179 lb 0.2 oz)     Temp Readings from Last 3 Encounters:   12/10/21 97.5 °F (36.4 °C) (Temporal)   12/02/21 98.1 °F (36.7 °C)   08/11/21 97.6 °F (36.4 °C) (Temporal)     BP Readings from Last 3 Encounters:   02/15/22 130/64   12/10/21 130/80   12/02/21 132/80     Pulse Readings from Last 3 Encounters:   02/15/22 60   12/10/21 65   12/02/21 68          Objective:    Physical Exam  Vitals and nursing note reviewed.   Constitutional:       Appearance: He is well-developed and well-nourished.   HENT:      Head: Normocephalic.   Neck:      Thyroid: No thyromegaly.      Vascular: No carotid bruit or JVD.   Cardiovascular:      Rate and Rhythm: Normal rate. Rhythm regularly irregular.      Chest Wall: PMI is not displaced.      Pulses: No midsystolic click and no opening snap.           Radial pulses are 2+ on the right side and 2+ on the left side.      Heart sounds: S1 normal and S2 normal. Heart sounds not distant. No murmur heard.  No friction rub. No S3 or S4 sounds.  "   Pulmonary:      Effort: Pulmonary effort is normal.      Breath sounds: Normal breath sounds. No wheezing or rales.   Abdominal:      General: Bowel sounds are normal. There is no distension, ascites or abdominal bruit.      Palpations: Abdomen is soft.      Tenderness: There is no abdominal tenderness.   Musculoskeletal:      Cervical back: Normal range of motion and neck supple.      Right lower leg: Edema (trace) present.      Left lower leg: Edema (trace) present.   Skin:     General: Skin is warm.   Neurological:      Mental Status: He is alert and oriented to person, place, and time.   Psychiatric:         Mood and Affect: Mood and affect normal.         Behavior: Behavior normal.         I have reviewed all pertinent labs and cardiac studies.    Component      Latest Ref Rng & Units 2/2/2022   BNP      0 - 99 pg/mL 240 (H)         Chemistry        Component Value Date/Time     02/02/2022 0730    K 5.5 (H) 02/02/2022 0730     02/02/2022 0730    CO2 27 02/02/2022 0730    BUN 19 02/02/2022 0730    CREATININE 1.3 02/02/2022 0730     02/02/2022 0730        Component Value Date/Time    CALCIUM 9.6 02/02/2022 0730    ALKPHOS 148 (H) 02/02/2022 0730    AST 23 02/02/2022 0730    ALT 16 02/02/2022 0730    BILITOT 0.6 02/02/2022 0730    ESTGFRAFRICA 59.6 (A) 02/02/2022 0730    EGFRNONAA 51.5 (A) 02/02/2022 0730        Lab Results   Component Value Date    WBC 7.12 12/07/2021    HGB 12.5 (L) 12/07/2021    HCT 38.7 (L) 12/07/2021    MCV 95 12/07/2021     12/07/2021       Lab Results   Component Value Date    TSH 2.0 04/23/2008     Lab Results   Component Value Date    HGBA1C 5.2 06/06/2016       Lab Results   Component Value Date    CHOL 187 02/02/2022    CHOL 175 12/07/2021    CHOL 173 10/30/2020     Lab Results   Component Value Date    HDL 75 02/02/2022    HDL 74 12/07/2021    HDL 61 10/30/2020     Lab Results   Component Value Date    LDLCALC 98.0 02/02/2022    LDLCALC 88.4 12/07/2021     LDLCALC 93.4 10/30/2020     Lab Results   Component Value Date    TRIG 70 02/02/2022    TRIG 63 12/07/2021    TRIG 93 10/30/2020     Lab Results   Component Value Date    CHOLHDL 40.1 02/02/2022    CHOLHDL 42.3 12/07/2021    CHOLHDL 35.3 10/30/2020         Results for orders placed during the hospital encounter of 08/09/21    Echo    Interpretation Summary  · The left ventricle is normal in size with mild concentric hypertrophy and low normal systolic function.  · The estimated ejection fraction is 50%.  · Indeterminate left ventricular diastolic function.  · Normal right ventricular size with normal right ventricular systolic function.  · Severe left atrial enlargement.  · Moderate right atrial enlargement.  · There is mild aortic valve stenosis.  · Aortic valve area is 1.60 cm2; peak velocity is 1.19 m/s; mean gradient is 3 mmHg.  · Mild aortic regurgitation.  · Mild to moderate pulmonic regurgitation.  · Moderate to severe tricuspid regurgitation.  · Mild-to-moderate mitral regurgitation.  · There is moderate pulmonary hypertension.      No results found for this or any previous visit.        Assessment:       1. Chronic diastolic heart failure    2. Abnormal ECG    3. Aortic atherosclerosis    4. Nonrheumatic aortic valve insufficiency    5. Chronic kidney disease, stage 3a    6. Edema of both legs    7. Essential hypertension    8. Mixed hyperlipidemia    9. Premature atrial contractions    10. PSVT (paroxysmal supraventricular tachycardia)    11. PVC's (premature ventricular contractions)    12. Pulmonary hypertension    13. Sinus bradycardia    14. VT (ventricular tachycardia)    15. Nonrheumatic tricuspid valve regurgitation    16. Hyperkalemia         Plan:               Stable cardiovascular conditions at present time on current medical treatment.  Continue current CV meds.  Reviewed all tests and above medical conditions with patient in detail and formulated treatment plan.  Continue optimal medical  treatment for cardiovascular conditions.  Cardiac low salt diet advised.  Lower intake of dietary potassium.  Daily exercise encouraged, with the goal 30 +  minutes aerobic exercise as tolerated.  Maintaining healthy weight and weight loss goals (if needed) were discussed in clinic.  Need for BP control and HTN goals (if needed) were discussed and tx plan formulated.  Importance of optimal lipid control were discussed in detail as well as possible pharmacologic and lifestyle changes that may be needed.  Continue statin tx.  Monitor renal function.  F/u echo in future to monitor PAP.  No changes in meds today.  F/u in 6 months w CMP + BNP.      I have reviewed all pertinent labs and cardiac studies independently. Plans and recommendations have been formulated under my direct supervision. All questions answered and patient voiced understanding.

## 2022-02-15 ENCOUNTER — PROCEDURE VISIT (OUTPATIENT)
Dept: HEPATOLOGY | Facility: CLINIC | Age: 82
End: 2022-02-15
Attending: INTERNAL MEDICINE
Payer: MEDICARE

## 2022-02-15 ENCOUNTER — TELEPHONE (OUTPATIENT)
Dept: INTERNAL MEDICINE | Facility: CLINIC | Age: 82
End: 2022-02-15
Payer: MEDICARE

## 2022-02-15 ENCOUNTER — OFFICE VISIT (OUTPATIENT)
Dept: CARDIOLOGY | Facility: CLINIC | Age: 82
End: 2022-02-15
Payer: MEDICARE

## 2022-02-15 VITALS
WEIGHT: 181.69 LBS | BODY MASS INDEX: 26.01 KG/M2 | HEART RATE: 60 BPM | DIASTOLIC BLOOD PRESSURE: 64 MMHG | OXYGEN SATURATION: 98 % | SYSTOLIC BLOOD PRESSURE: 130 MMHG | RESPIRATION RATE: 16 BRPM | HEIGHT: 70 IN

## 2022-02-15 VITALS — WEIGHT: 181 LBS | HEIGHT: 70 IN | BODY MASS INDEX: 25.91 KG/M2

## 2022-02-15 DIAGNOSIS — I49.3 PVC'S (PREMATURE VENTRICULAR CONTRACTIONS): Chronic | ICD-10-CM

## 2022-02-15 DIAGNOSIS — I50.32 CHRONIC DIASTOLIC HEART FAILURE: Primary | ICD-10-CM

## 2022-02-15 DIAGNOSIS — I70.0 AORTIC ATHEROSCLEROSIS: ICD-10-CM

## 2022-02-15 DIAGNOSIS — I47.20 VT (VENTRICULAR TACHYCARDIA): ICD-10-CM

## 2022-02-15 DIAGNOSIS — N18.31 CHRONIC KIDNEY DISEASE, STAGE 3A: ICD-10-CM

## 2022-02-15 DIAGNOSIS — I27.20 PULMONARY HYPERTENSION: Chronic | ICD-10-CM

## 2022-02-15 DIAGNOSIS — I10 ESSENTIAL HYPERTENSION: Chronic | ICD-10-CM

## 2022-02-15 DIAGNOSIS — I35.1 NONRHEUMATIC AORTIC VALVE INSUFFICIENCY: Chronic | ICD-10-CM

## 2022-02-15 DIAGNOSIS — I47.10 PSVT (PAROXYSMAL SUPRAVENTRICULAR TACHYCARDIA): ICD-10-CM

## 2022-02-15 DIAGNOSIS — K75.81 NASH (NONALCOHOLIC STEATOHEPATITIS): ICD-10-CM

## 2022-02-15 DIAGNOSIS — R60.0 EDEMA OF BOTH LEGS: ICD-10-CM

## 2022-02-15 DIAGNOSIS — I36.1 NONRHEUMATIC TRICUSPID VALVE REGURGITATION: ICD-10-CM

## 2022-02-15 DIAGNOSIS — E78.2 MIXED HYPERLIPIDEMIA: ICD-10-CM

## 2022-02-15 DIAGNOSIS — R94.31 ABNORMAL ECG: Chronic | ICD-10-CM

## 2022-02-15 DIAGNOSIS — I49.1 PREMATURE ATRIAL CONTRACTIONS: ICD-10-CM

## 2022-02-15 DIAGNOSIS — E87.5 HYPERKALEMIA: ICD-10-CM

## 2022-02-15 DIAGNOSIS — R00.1 SINUS BRADYCARDIA: ICD-10-CM

## 2022-02-15 PROCEDURE — 1159F MED LIST DOCD IN RCRD: CPT | Mod: HCNC,CPTII,S$GLB, | Performed by: INTERNAL MEDICINE

## 2022-02-15 PROCEDURE — 1126F PR PAIN SEVERITY QUANTIFIED, NO PAIN PRESENT: ICD-10-PCS | Mod: HCNC,CPTII,S$GLB, | Performed by: INTERNAL MEDICINE

## 2022-02-15 PROCEDURE — 99999 PR PBB SHADOW E&M-EST. PATIENT-LVL III: ICD-10-PCS | Mod: PBBFAC,HCNC,, | Performed by: INTERNAL MEDICINE

## 2022-02-15 PROCEDURE — 3075F SYST BP GE 130 - 139MM HG: CPT | Mod: HCNC,CPTII,S$GLB, | Performed by: INTERNAL MEDICINE

## 2022-02-15 PROCEDURE — 99214 PR OFFICE/OUTPT VISIT, EST, LEVL IV, 30-39 MIN: ICD-10-PCS | Mod: HCNC,S$GLB,, | Performed by: INTERNAL MEDICINE

## 2022-02-15 PROCEDURE — 3078F PR MOST RECENT DIASTOLIC BLOOD PRESSURE < 80 MM HG: ICD-10-PCS | Mod: HCNC,CPTII,S$GLB, | Performed by: INTERNAL MEDICINE

## 2022-02-15 PROCEDURE — 1126F AMNT PAIN NOTED NONE PRSNT: CPT | Mod: HCNC,CPTII,S$GLB, | Performed by: INTERNAL MEDICINE

## 2022-02-15 PROCEDURE — 1160F RVW MEDS BY RX/DR IN RCRD: CPT | Mod: HCNC,CPTII,S$GLB, | Performed by: INTERNAL MEDICINE

## 2022-02-15 PROCEDURE — 99999 PR PBB SHADOW E&M-EST. PATIENT-LVL III: CPT | Mod: PBBFAC,HCNC,, | Performed by: INTERNAL MEDICINE

## 2022-02-15 PROCEDURE — 3078F DIAST BP <80 MM HG: CPT | Mod: HCNC,CPTII,S$GLB, | Performed by: INTERNAL MEDICINE

## 2022-02-15 PROCEDURE — 91200 PR LIVER ELASTOGRAPHY W/OUT IMAG W/INTERP & REPORT: ICD-10-PCS | Mod: HCNC,S$GLB,, | Performed by: NURSE PRACTITIONER

## 2022-02-15 PROCEDURE — 1159F PR MEDICATION LIST DOCUMENTED IN MEDICAL RECORD: ICD-10-PCS | Mod: HCNC,CPTII,S$GLB, | Performed by: INTERNAL MEDICINE

## 2022-02-15 PROCEDURE — 91200 LIVER ELASTOGRAPHY: CPT | Mod: HCNC,S$GLB,, | Performed by: NURSE PRACTITIONER

## 2022-02-15 PROCEDURE — 99499 UNLISTED E&M SERVICE: CPT | Mod: S$GLB,,, | Performed by: INTERNAL MEDICINE

## 2022-02-15 PROCEDURE — 3075F PR MOST RECENT SYSTOLIC BLOOD PRESS GE 130-139MM HG: ICD-10-PCS | Mod: HCNC,CPTII,S$GLB, | Performed by: INTERNAL MEDICINE

## 2022-02-15 PROCEDURE — 99214 OFFICE O/P EST MOD 30 MIN: CPT | Mod: HCNC,S$GLB,, | Performed by: INTERNAL MEDICINE

## 2022-02-15 PROCEDURE — 1160F PR REVIEW ALL MEDS BY PRESCRIBER/CLIN PHARMACIST DOCUMENTED: ICD-10-PCS | Mod: HCNC,CPTII,S$GLB, | Performed by: INTERNAL MEDICINE

## 2022-02-15 PROCEDURE — 99499 RISK ADDL DX/OHS AUDIT: ICD-10-PCS | Mod: S$GLB,,, | Performed by: INTERNAL MEDICINE

## 2022-02-15 NOTE — TELEPHONE ENCOUNTER
Patients fibroscan does not show any significant scarring or fibrosis of the liver.    This is good news.  Work on low fat diet, limit carbohydrate intake and focus on a healthy weight.  We will monitor the issue over time.    Has his urologist done a bone scan to assess for bony disease associated with his prostate cancer?

## 2022-02-15 NOTE — TELEPHONE ENCOUNTER
----- Message from Branden Pantoja sent at 2/15/2022 12:22 PM CST -----  Contact: self  Type:  Patient Returning Call    Who Called:Homero Tanner   Who Left Message for Patient:nurse  Does the patient know what this is regarding?:missed call  Would the patient rather a call back or a response via [facility]? Call back  Best Call Back Number:[phone]  Additional Information:

## 2022-02-15 NOTE — PROCEDURES
Fibroscan Procedure     Name: Homero Tanner  Date of Procedure : 02/15/2022   :: JUAN ANTONIO Soria  Diagnosis: NAFLD    Probe: M    Fibroscan readin.3 KPa    Fibrosis:F 0-1     CAP readin dB/m    Steatosis: :<S1       Interpretation:   No significant steatosis or fibrosis

## 2022-02-15 NOTE — TELEPHONE ENCOUNTER
Notified pt of results and recommendations. He verbalized understanding. He said, he is being monitored for 4 different cancers and has had bone scans. He doesn't know when was the last time he had one done. Everything should be in his records with [facility]. His urologist hasn't ordered one in about 20 years.

## 2022-02-15 NOTE — TELEPHONE ENCOUNTER
Ok.  I have reviewed the chart.  The elevated alkaline phosphatase is likely related to his underlying fatty liver disease.  I don't see any evidence of bony issues and the alk phos has been elevated since 2018 so it is not new.   It is stable over time so it is unlikely to represent any bony disease.  We will watch it over time.

## 2022-02-16 RX ORDER — ROSUVASTATIN CALCIUM 10 MG/1
TABLET, COATED ORAL
Qty: 90 TABLET | Refills: 3 | Status: SHIPPED | OUTPATIENT
Start: 2022-02-16 | End: 2023-02-20

## 2022-02-16 NOTE — TELEPHONE ENCOUNTER
Refill Routing Note   Medication(s) are not appropriate for processing by [facility] for the following reason(s):      - Allergy/Contraindication (rosuvastatin)    [facility] action(s):  Defer Medication-related problems identified: Adverse drug effects        --->Care Gap information included in message below if applicable.   Medication reconciliation completed: No   Automatic Epic Generated Protocol Data:        Requested Prescriptions   Pending Prescriptions Disp Refills    rosuvastatin (CRESTOR) 10 MG tablet [Pharmacy Med Name: ROSUVASTATIN 10MG TABLETS] 90 tablet 3     Sig: TAKE 1 TABLET(10 MG) BY MOUTH EVERY DAY       Cardiovascular:  Antilipid - Statins Passed - 2/16/2022  8:42 AM        Passed - Patient is at least 18 years old        Passed - Valid encounter within last 15 months     Recent Visits  Date Type Provider Dept   12/02/21 Office Visit Solomon Cortés MD [facility]   07/09/20 Office Visit Solomon Cortés MD [facility]   Showing recent visits within past 720 days and meeting all other requirements  Future Appointments  No visits were found meeting these conditions.  Showing future appointments within next 150 days and meeting all other requirements      Future Appointments              In 3 weeks LABORATORY, [facility] - [facility], [place]    In 3 weeks MD [facility] - Hematology And Oncology, [place]    In 6 months LABORATORY, [facility] [place] [facility] - Lab 1st Fl, [place]    In 6 months Cristhian Wright MD [facility] - Cardiology 3rd Fl, [place]                Passed - ALT is 131 or below and within 360 days     ALT   Date Value Ref Range Status   02/02/2022 16 10 - 44 U/L Final   12/07/2021 19 10 - 44 U/L Final   08/03/2021 33 10 - 44 U/L Final              Passed - AST is 119 or below and within 360 days     AST   Date Value Ref Range Status   02/02/2022 23 10 - 40 U/L Final   12/07/2021 25 10 - 40 U/L Final   08/03/2021 40 10  - 40 U/L Final              Passed - Total Cholesterol within 360 days     Lab Results   Component Value Date    CHOL 187 02/02/2022    CHOL 175 12/07/2021    CHOL 173 10/30/2020              Passed - LDL within 360 days     LDL Cholesterol   Date Value Ref Range Status   02/02/2022 98.0 63.0 - 159.0 mg/dL Final     Comment:     The National Cholesterol Education Program (NCEP) has set the  following guidelines (reference values) for LDL Cholesterol:  Optimal.......................<130 mg/dL  Borderline High...............130-159 mg/dL  High..........................160-189 mg/dL  Very High.....................>190 mg/dL              Passed - HDL within 360 days     HDL   Date Value Ref Range Status   02/02/2022 75 40 - 75 mg/dL Final     Comment:     The National Cholesterol Education Program (NCEP) has set the  following guidelines (reference values) for HDL Cholesterol:  Low...............<40 mg/dL  Optimal...........>60 mg/dL              Passed - Triglycerides within 360 days     Lab Results   Component Value Date    TRIG 70 02/02/2022    TRIG 63 12/07/2021    TRIG 93 10/30/2020                    Appointments  past 12m or future 3m with PCP    Date Provider   Last Visit   12/2/2021 Solomon Cortés MD   Next Visit   Visit date not found Solomon Cortés MD   ED visits in past 90 days: 0        Note composed:11:38 AM 02/16/2022

## 2022-02-16 NOTE — TELEPHONE ENCOUNTER
No new care gaps identified.  Powered by [employer]. Reference number: [id number].   2/16/2022 8:43:00 AM CST

## 2022-03-09 ENCOUNTER — LAB VISIT (OUTPATIENT)
Dept: LAB | Facility: HOSPITAL | Age: 82
End: 2022-03-09
Attending: INTERNAL MEDICINE
Payer: MEDICARE

## 2022-03-09 DIAGNOSIS — C88.0 WALDENSTROM'S MACROGLOBULINEMIA: ICD-10-CM

## 2022-03-09 DIAGNOSIS — Z85.048 HISTORY OF RECTAL CANCER: ICD-10-CM

## 2022-03-09 DIAGNOSIS — D51.8 OTHER VITAMIN B12 DEFICIENCY ANEMIA: ICD-10-CM

## 2022-03-09 DIAGNOSIS — R73.03 PREDIABETES: ICD-10-CM

## 2022-03-09 DIAGNOSIS — Z85.46 HISTORY OF PROSTATE CANCER: ICD-10-CM

## 2022-03-09 DIAGNOSIS — Z87.898 HISTORY OF THYMOMA: ICD-10-CM

## 2022-03-09 LAB
ALBUMIN SERPL BCP-MCNC: 4.1 G/DL (ref 3.5–5.2)
ALP SERPL-CCNC: 109 U/L (ref 55–135)
ALT SERPL W/O P-5'-P-CCNC: 12 U/L (ref 10–44)
ANION GAP SERPL CALC-SCNC: 11 MMOL/L (ref 8–16)
AST SERPL-CCNC: 26 U/L (ref 10–40)
BASOPHILS # BLD AUTO: 0.08 K/UL (ref 0–0.2)
BASOPHILS NFR BLD: 0.9 % (ref 0–1.9)
BILIRUB SERPL-MCNC: 0.7 MG/DL (ref 0.1–1)
BUN SERPL-MCNC: 20 MG/DL (ref 8–23)
CALCIUM SERPL-MCNC: 9.6 MG/DL (ref 8.7–10.5)
CHLORIDE SERPL-SCNC: 105 MMOL/L (ref 95–110)
CO2 SERPL-SCNC: 24 MMOL/L (ref 23–29)
COMPLEXED PSA SERPL-MCNC: 12.2 NG/ML (ref 0–4)
CREAT SERPL-MCNC: 1.3 MG/DL (ref 0.5–1.4)
DIFFERENTIAL METHOD: ABNORMAL
EOSINOPHIL # BLD AUTO: 1.1 K/UL (ref 0–0.5)
EOSINOPHIL NFR BLD: 12.8 % (ref 0–8)
ERYTHROCYTE [DISTWIDTH] IN BLOOD BY AUTOMATED COUNT: 13.8 % (ref 11.5–14.5)
EST. GFR  (AFRICAN AMERICAN): 60 ML/MIN/1.73 M^2
EST. GFR  (NON AFRICAN AMERICAN): 52 ML/MIN/1.73 M^2
FOLATE SERPL-MCNC: 9.4 NG/ML (ref 4–24)
GLUCOSE SERPL-MCNC: 88 MG/DL (ref 70–110)
HCT VFR BLD AUTO: 42.1 % (ref 40–54)
HGB BLD-MCNC: 13.3 G/DL (ref 14–18)
IGA SERPL-MCNC: 49 MG/DL (ref 40–350)
IGG SERPL-MCNC: 573 MG/DL (ref 650–1600)
IGM SERPL-MCNC: 390 MG/DL (ref 50–300)
IMM GRANULOCYTES # BLD AUTO: 0.03 K/UL (ref 0–0.04)
IMM GRANULOCYTES NFR BLD AUTO: 0.3 % (ref 0–0.5)
LYMPHOCYTES # BLD AUTO: 2 K/UL (ref 1–4.8)
LYMPHOCYTES NFR BLD: 23.2 % (ref 18–48)
MCH RBC QN AUTO: 30.2 PG (ref 27–31)
MCHC RBC AUTO-ENTMCNC: 31.6 G/DL (ref 32–36)
MCV RBC AUTO: 96 FL (ref 82–98)
MONOCYTES # BLD AUTO: 0.8 K/UL (ref 0.3–1)
MONOCYTES NFR BLD: 9.1 % (ref 4–15)
NEUTROPHILS # BLD AUTO: 4.6 K/UL (ref 1.8–7.7)
NEUTROPHILS NFR BLD: 53.7 % (ref 38–73)
NRBC BLD-RTO: 0 /100 WBC
PLATELET # BLD AUTO: 239 K/UL (ref 150–450)
PMV BLD AUTO: 10.2 FL (ref 9.2–12.9)
POTASSIUM SERPL-SCNC: 4.2 MMOL/L (ref 3.5–5.1)
PROT SERPL-MCNC: 7.1 G/DL (ref 6–8.4)
RBC # BLD AUTO: 4.41 M/UL (ref 4.6–6.2)
SODIUM SERPL-SCNC: 140 MMOL/L (ref 136–145)
TSH SERPL DL<=0.005 MIU/L-ACNC: 1.73 UIU/ML (ref 0.4–4)
VIT B12 SERPL-MCNC: 616 PG/ML (ref 210–950)
WBC # BLD AUTO: 8.66 K/UL (ref 3.9–12.7)

## 2022-03-09 PROCEDURE — 84443 ASSAY THYROID STIM HORMONE: CPT | Performed by: INTERNAL MEDICINE

## 2022-03-09 PROCEDURE — 85025 COMPLETE CBC W/AUTO DIFF WBC: CPT | Performed by: INTERNAL MEDICINE

## 2022-03-09 PROCEDURE — 84165 PROTEIN E-PHORESIS SERUM: CPT | Performed by: INTERNAL MEDICINE

## 2022-03-09 PROCEDURE — 84165 PATHOLOGIST INTERPRETATION SPE: ICD-10-PCS | Mod: 26,,, | Performed by: PATHOLOGY

## 2022-03-09 PROCEDURE — 86334 IMMUNOFIX E-PHORESIS SERUM: CPT | Performed by: INTERNAL MEDICINE

## 2022-03-09 PROCEDURE — 82784 ASSAY IGA/IGD/IGG/IGM EACH: CPT | Mod: 59 | Performed by: INTERNAL MEDICINE

## 2022-03-09 PROCEDURE — 36415 COLL VENOUS BLD VENIPUNCTURE: CPT | Mod: PO | Performed by: INTERNAL MEDICINE

## 2022-03-09 PROCEDURE — 84153 ASSAY OF PSA TOTAL: CPT | Performed by: INTERNAL MEDICINE

## 2022-03-09 PROCEDURE — 82607 VITAMIN B-12: CPT | Performed by: INTERNAL MEDICINE

## 2022-03-09 PROCEDURE — 82746 ASSAY OF FOLIC ACID SERUM: CPT | Performed by: INTERNAL MEDICINE

## 2022-03-09 PROCEDURE — 83921 ORGANIC ACID SINGLE QUANT: CPT | Performed by: INTERNAL MEDICINE

## 2022-03-09 PROCEDURE — 86334 PATHOLOGIST INTERPRETATION IFE: ICD-10-PCS | Mod: 26,,, | Performed by: PATHOLOGY

## 2022-03-09 PROCEDURE — 84165 PROTEIN E-PHORESIS SERUM: CPT | Mod: 26,,, | Performed by: PATHOLOGY

## 2022-03-09 PROCEDURE — 80053 COMPREHEN METABOLIC PANEL: CPT | Performed by: INTERNAL MEDICINE

## 2022-03-09 PROCEDURE — 86334 IMMUNOFIX E-PHORESIS SERUM: CPT | Mod: 26,,, | Performed by: PATHOLOGY

## 2022-03-09 PROCEDURE — 83520 IMMUNOASSAY QUANT NOS NONAB: CPT | Mod: 59 | Performed by: INTERNAL MEDICINE

## 2022-03-10 LAB
ALBUMIN SERPL ELPH-MCNC: 4.34 G/DL (ref 3.35–5.55)
ALPHA1 GLOB SERPL ELPH-MCNC: 0.29 G/DL (ref 0.17–0.41)
ALPHA2 GLOB SERPL ELPH-MCNC: 0.74 G/DL (ref 0.43–0.99)
B-GLOBULIN SERPL ELPH-MCNC: 0.75 G/DL (ref 0.5–1.1)
GAMMA GLOB SERPL ELPH-MCNC: 0.57 G/DL (ref 0.67–1.58)
INTERPRETATION SERPL IFE-IMP: NORMAL
KAPPA LC SER QL IA: 2.61 MG/DL (ref 0.33–1.94)
KAPPA LC/LAMBDA SER IA: 1.44 (ref 0.26–1.65)
LAMBDA LC SER QL IA: 1.81 MG/DL (ref 0.57–2.63)
PATHOLOGIST INTERPRETATION IFE: NORMAL
PATHOLOGIST INTERPRETATION SPE: NORMAL
PROT SERPL-MCNC: 6.7 G/DL (ref 6–8.4)

## 2022-03-10 NOTE — PROGRESS NOTES
Subjective:       Patient ID: Homero Tanner is a 81 y.o. male.    Chief Complaint: Prostate Cancer, Rectal Cancer, and Follow-up (Macroglobinemia, vit b12 def, thymoma)    Primary Oncologist/Hematologist: Dr. Lane    HPI: Mr. Tanner is an 81 year old male who comes into clinic for follow up from his history of prostate cancer, rectal cancer, b12 def, thymoma and Waldestroms macroglobulinemia.  Macroglobinemia Because of continuous rise of the monoclonal spike in the SPEP, a bone marrow was done 7/2018 which was read as being consistent with a lymphoblastic lymphoma (Waldestrom;s macroglobulinemia). He was treated with Dr. Weaver with Velcade/dex/Rituxan achieving a good biochemical response. He was given 4 additional treatments of maintenance Rituxan with the last dose around nov 2019.  Prostate cancer-He is known to us because of  a history of previously treated prostate cancer. He was diagnosed around 2004 and treated with surgery. He relapsed by PSA and was treated with local radiation therapy. He had further elevation of the PSA and started intermittent LHRH analogues through the office of his urologist outside our clinic  He receives the injections at his urologist office.  Vit b12 def-He was found to be vitamin b12 def upon workup for anemia. His iron studies were normal. Vitamin B12 was low at 151. He then started supplemental B12 injection, which  he is still getting.  Thymoma-Also, as part of the workup for the anemia, serum protein electrophoresis showed a paraprotein band that was quantified at 1.03 g and identified as IgM. He had a thoracic spine view looking for lytic lesions. The radiologist reported that there were no lytic lesions but there was a mass in the center of the chest. This was followed by chest x-rays and CT scans. The CT scan showed a large mediastinal mass. A CT-guided biopsy at [facility] was read as showing a thymoma. He underwent surgery with Dr. Levi Butler at [facility]  [place]. The thymoma was involving the pericardium so he received postoperative radiation therapy. He finished the radiation therapy and has remained with without evaluable disease in regards to his thymoma.  Rectal cancer- Patient has had previous colonoscopies that found tubular adenomas.The patient underwent a colonoscopy on 2014, as part of the work up for his diagnosed iron deficiency. It was done by Dr. Jaime Ash. Dr. Ash found a mass in the rectal area. It was partially excised. Pathology was once again that of tubulovillous adenoma with dysplasia. The area of involvement coincided with the radiation therapy field for the prostate cancer. The patient had removal of the villous adenoma by Dr Ansari. 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 Following the excision he had 2 episodes of rectal bleeding. He ended up having another surgery to control the bleeing and to do a re-excision of the area where the minimally invasive cancer was found.Pathology revealed no residual cancer.There has been no more bleeding.     Urinary frequency? No Urinary urgency? No No Fatigue? SOB? Blood in stool? Blood in urine? Bleeding elsewhere?no  Headaches?no  Chest pain?no  Fevers? Night sweats?no  Weight loss?no  N/v/d/c?no  On b12 injections? Yes.  Following up with urology for prostate cancer?yeah, just had Lupron month ago.     Social History     Socioeconomic History    Marital status: Single   Tobacco Use    Smoking status: Former Smoker     Packs/day: 1.00     Years: 15.00     Pack years: 15.00     Quit date: 1969     Years since quittin.1    Smokeless tobacco: Never Used   Substance and Sexual Activity    Alcohol use: Yes     Alcohol/week: 3.0 standard drinks     Types: 3 Cans of beer per week     Comment: socially, NO ALCOHOL 72 HOURS PRIOR TO SX    Drug use: No       Past Medical History:   Diagnosis Date    Abnormal ECG 2013    Adenomatous  rectal polyp 12/7/2015    Aortic insufficiency 6/24/2013    Arthritis     Asthma     as a child    Benign neoplasm of cecum 2/27/2017    Benign neoplasm of transverse colon 2/27/2017    Cataract     CHF (congestive heart failure)     Pt states He's never been told this    Diverticulosis of large intestine without hemorrhage 2/27/2017    Encounter for blood transfusion     Frequent PVCs     Gallstones 3/28/2018    By Us done 3/21/2018    GERD (gastroesophageal reflux disease)     History of colon polyps     Hypertension     Iron deficiency anemia 10/26/2015    Lymphoma     waldenstrom's macroglobulinemia    Mixed hyperlipidemia 6/24/2013    Premature atrial contractions     Prostate cancer     PSVT (paroxysmal supraventricular tachycardia) 6/24/2013    Pulmonary hypertension 6/13/2016    PVC's (premature ventricular contractions) 6/13/2016    Rectal adenocarcinoma     Rectal cancer 5/1/2018    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    left parietal scalp    Thymoma     TR (tricuspid regurgitation) 6/13/2016    Tubular adenoma of colon 06/2020    VT (ventricular tachycardia) 9/18/2018       Family History   Problem Relation Age of Onset    Diabetes Father     Cataracts Mother     Amblyopia Neg Hx     Blindness Neg Hx     Cancer Neg Hx     Glaucoma Neg Hx     Hypertension Neg Hx     Macular degeneration Neg Hx     Retinal detachment Neg Hx     Strabismus Neg Hx     Stroke Neg Hx     Thyroid disease Neg Hx     Melanoma Neg Hx        Past Surgical History:   Procedure Laterality Date    biopsy for chest mass      BONE MARROW BIOPSY Left 7/11/2018    Procedure: Biopsy-bone marrow;  Surgeon: Charanjit Dacosta MD;  Location: [facility];  Service: General;  Laterality: Left;    CATARACT EXTRACTION W/  INTRAOCULAR LENS IMPLANT Right 01/03/2019    CATARACT EXTRACTION W/  INTRAOCULAR LENS IMPLANT Left 02/07/2019    COLON SURGERY      COLONOSCOPY N/A 10/26/2015    Procedure:  Colonoscopy;  Surgeon: Abraham Hong MD;  Location: [facility];  Service: Endoscopy;  Laterality: N/A;    COLONOSCOPY Left 2/27/2017    Procedure: COLONOSCOPY;  Surgeon: Abraham Hong MD;  Location: [facility];  Service: Endoscopy;  Laterality: Left;    COLONOSCOPY N/A 5/1/2018    Procedure: hixtory of rectal cancer. Colonsocopy asked to be repeated minnie  year, alst one 2/2017;  Surgeon: Patricio Streeter MD;  Location: [facility];  Service: Endoscopy;  Laterality: N/A;    COLONOSCOPY N/A 6/27/2018    Procedure: COLONOSCOPY/hybrid APC or a EndoRotor device;  Surgeon: Rao Medeiros MD;  Location: [facility] ([address] [place] [address]);  Service: Endoscopy;  Laterality: N/A;    COLONOSCOPY N/A 6/22/2020    Procedure: COLONOSCOPY;  Surgeon: Yu Wells MD;  Location: [facility];  Service: Endoscopy;  Laterality: N/A;    ESOPHAGOGASTRODUODENOSCOPY N/A 10/22/2019    Procedure: ESOPHAGOGASTRODUODENOSCOPY (EGD);  Surgeon: Mariela Greer MD;  Location: [facility];  Service: Endoscopy;  Laterality: N/A;    FLEXIBLE SIGMOIDOSCOPY Left 4/23/2019    Procedure: SIGMOIDOSCOPY, FLEXIBLE;  Surgeon: Ranjit Will MD;  Location: [facility];  Service: Endoscopy;  Laterality: Left;    HERNIA REPAIR Right 02/2018    Inguinal, open with mesh    mohs      PROSTATECTOMY      RECTAL SURGERY N/A 09/2014    REPAIR OF SLIDING INGUINAL HERNIA Right 2/26/2019    Procedure: REPAIR, HERNIA, INGUINAL, SLIDING;  Surgeon: Bridger Alvarez MD;  Location: [facility];  Service: General;  Laterality: Right;    SKIN BIOPSY      TONSILLECTOMY      TUMOR REMOVAL         Review of Systems   Constitutional: Negative for activity change, appetite change, chills, diaphoresis, fatigue, fever and unexpected weight change.   HENT: Negative for congestion.    Respiratory: Negative for cough and shortness of breath.    Cardiovascular: Negative for chest pain and leg swelling.   Gastrointestinal: Negative for blood in stool, constipation, diarrhea, nausea and  vomiting.   Genitourinary: Positive for frequency. Negative for difficulty urinating, dysuria, hematuria and urgency.   Skin: Negative.    Neurological: Negative for dizziness, weakness, light-headedness, numbness and headaches.         Medication List with Changes/Refills   Current Medications    AMLODIPINE (NORVASC) 2.5 MG TABLET    Take 1 tablet (2.5 mg total) by mouth once daily.    ASPIRIN (ECOTRIN) 81 MG EC TABLET    Take 81 mg by mouth once daily.    B COMPLEX VITAMINS TABLET    Take by mouth. 1 tablet Oral Every day    CLOBETASOL (TEMOVATE) 0.05 % EXTERNAL SOLUTION    AAA of scalp 1-2 times daily prn flares. Strong steroid- do not apply to face or folds of skin    CYANOCOBALAMIN 1,000 MCG/ML INJECTION    INJECT 1ML UNDER THE SKIN EVERY MONTH    ERYTHROMYCIN WITH ETHANOL (EMGEL) 2 % GEL    AAA of face bid.  For rosacea/redness.    FUROSEMIDE (LASIX) 40 MG TABLET    TAKE 1 TABLET(40 MG) BY MOUTH EVERY DAY    KETOCONAZOLE (NIZORAL) 2 % SHAMPOO    Apply to scalp 1-2 times/week, lather, let sit 5 minutes, then rinse    LEUPROLIDE (LUPRON) 3.75 MG INJECTION    Inject 3.75 mg into the muscle every 3 (three) months.     METOPROLOL SUCCINATE (TOPROL-XL) 100 MG 24 HR TABLET    TAKE 1 TABLET(100 MG) BY MOUTH EVERY DAY    METRONIDAZOLE 0.75% (METROCREAM) 0.75 % CREA    APPLY TO AFFECTED AREA ON FACE TWICE DAILY    OMEPRAZOLE (PRILOSEC) 20 MG CAPSULE    TAKE 1 CAPSULE(20 MG) BY MOUTH EVERY DAY    ROSUVASTATIN (CRESTOR) 10 MG TABLET    TAKE 1 TABLET(10 MG) BY MOUTH EVERY DAY     Objective:     Vitals:    03/11/22 1041   BP: (!) 141/82   Pulse: 64   Temp: 97.9 °F (36.6 °C)       Physical Exam  Constitutional:       General: He is not in acute distress.     Appearance: He is not ill-appearing, toxic-appearing or diaphoretic.   HENT:      Head: Normocephalic and atraumatic.   Eyes:      Conjunctiva/sclera: Conjunctivae normal.   Cardiovascular:      Rate and Rhythm: Normal rate.      Pulses: Normal pulses.   Pulmonary:       Effort: Pulmonary effort is normal. No respiratory distress.      Breath sounds: Normal breath sounds.   Abdominal:      General: Bowel sounds are normal. There is no distension.      Tenderness: There is no abdominal tenderness.   Musculoskeletal:         General: Normal range of motion.      Right lower leg: No edema.      Left lower leg: No edema.   Skin:     General: Skin is warm and dry.      Capillary Refill: Capillary refill takes less than 2 seconds.      Coloration: Skin is not jaundiced or pale.   Neurological:      Mental Status: He is alert.      Motor: No weakness.      Gait: Gait normal.   Psychiatric:         Mood and Affect: Mood normal.         Behavior: Behavior normal.            Labs/Results:  Lab Results   Component Value Date    WBC 8.66 03/09/2022    RBC 4.41 (L) 03/09/2022    HGB 13.3 (L) 03/09/2022    HCT 42.1 03/09/2022    MCV 96 03/09/2022    MCH 30.2 03/09/2022    MCHC 31.6 (L) 03/09/2022    RDW 13.8 03/09/2022     03/09/2022    MPV 10.2 03/09/2022    GRAN 4.6 03/09/2022    GRAN 53.7 03/09/2022    LYMPH 2.0 03/09/2022    LYMPH 23.2 03/09/2022    MONO 0.8 03/09/2022    MONO 9.1 03/09/2022    EOS 1.1 (H) 03/09/2022    BASO 0.08 03/09/2022    EOSINOPHIL 12.8 (H) 03/09/2022    BASOPHIL 0.9 03/09/2022     CMP  Sodium   Date Value Ref Range Status   03/09/2022 140 136 - 145 mmol/L Final     Potassium   Date Value Ref Range Status   03/09/2022 4.2 3.5 - 5.1 mmol/L Final     Chloride   Date Value Ref Range Status   03/09/2022 105 95 - 110 mmol/L Final     CO2   Date Value Ref Range Status   03/09/2022 24 23 - 29 mmol/L Final     Glucose   Date Value Ref Range Status   03/09/2022 88 70 - 110 mg/dL Final     BUN   Date Value Ref Range Status   03/09/2022 20 8 - 23 mg/dL Final     Creatinine   Date Value Ref Range Status   03/09/2022 1.3 0.5 - 1.4 mg/dL Final     Calcium   Date Value Ref Range Status   03/09/2022 9.6 8.7 - 10.5 mg/dL Final     Total Protein   Date Value Ref Range Status    03/09/2022 7.1 6.0 - 8.4 g/dL Final     Albumin   Date Value Ref Range Status   03/09/2022 4.1 3.5 - 5.2 g/dL Final     Total Bilirubin   Date Value Ref Range Status   03/09/2022 0.7 0.1 - 1.0 mg/dL Final     Comment:     For infants and newborns, interpretation of results should be based  on gestational age, weight and in agreement with clinical  observations.    Premature Infant recommended reference ranges:  Up to 24 hours.............<8.0 mg/dL  Up to 48 hours............<12.0 mg/dL  3-5 days..................<15.0 mg/dL  6-29 days.................<15.0 mg/dL       Alkaline Phosphatase   Date Value Ref Range Status   03/09/2022 109 55 - 135 U/L Final     AST   Date Value Ref Range Status   03/09/2022 26 10 - 40 U/L Final     ALT   Date Value Ref Range Status   03/09/2022 12 10 - 44 U/L Final     Anion Gap   Date Value Ref Range Status   03/09/2022 11 8 - 16 mmol/L Final     eGFR if    Date Value Ref Range Status   03/09/2022 60 >60 mL/min/1.73 m^2 Final     eGFR if non    Date Value Ref Range Status   03/09/2022 52 (A) >60 mL/min/1.73 m^2 Final     Comment:     Calculation used to obtain the estimated glomerular filtration  rate (eGFR) is the CKD-EPI equation.         Latest Reference Range & Units 03/09/22 08:58   Folate 4.0 - 24.0 ng/mL 9.4   Vitamin B-12 210 - 950 pg/mL 616      Latest Reference Range & Units 08/03/21 10:27 02/02/22 07:30 03/09/22 08:58   PSA Diagnostic 0.00 - 4.00 ng/mL 43.7 (H) [1] 12.0 (H) [2] 12.2 (H) [3]      Latest Reference Range & Units 03/09/22 08:58   IgG 650 - 1600 mg/dL 573 (L) [1]   IgM 50 - 300 mg/dL 390 (H) [2]   IgA 40 - 350 mg/dL 49 [3]      Latest Reference Range & Units 03/09/22 08:58   Protein, Serum 6.0 - 8.4 g/dL 6.7 [1]   Albumin grams/dl 3.35 - 5.55 g/dL 4.34   Alpha-1 grams/dl 0.17 - 0.41 g/dL 0.29   Alpha-2 0.43 - 0.99 g/dL 0.74   Beta 0.50 - 1.10 g/dL 0.75   Gamma 0.67 - 1.58 g/dL 0.57 (L)   Normal total protein. Normal gamma  globulins are decreased.   Paraprotein band in beta = 0.35 g/dL (0.37 g/dL previously).   IgM lambda specific monoclonal band present.    Latest Reference Range & Units 03/09/22 08:58   [place] Free Light Chains 0.33 - 1.94 mg/dL 2.61 (H)   Lambda Free Light Chains 0.57 - 2.63 mg/dL 1.81   Kappa/Lambda FLC Ratio 0.26 - 1.65  1.44 [1]   Immunofix Interp.  SEE COMMENT [2]        TSH 0.400 - 4.000 uIU/mL 1.726      Pet Ct Scan 4/29/2019  IMPRESSION:   1. Interval resolution of the previously noted density in the region of the left lung apex.  Interval resolution of the pleural effusions.  2. Significant decrease in ascites with only a trace amount of ascites noted in the perihepatic region on the current exam.  3. No significant FDG avid lymph nodes noted on the current exam.  4. Interval development of significant soft tissue uptake in the right inguinal region which may be secondary to interval right inguinal hernia repair.    Assessment:     Problem List Items Addressed This Visit        Oncology    History of rectal cancer - Primary    B12 deficiency anemia    Waldenstrom's macroglobulinemia    History of prostate cancer       Endocrine    History of thymoma        Plan:     History of rectal cancer  --Status post resection.    --most recent colonoscopy in 2020.   --Recommended repeat in 3-5 years.    Other vitamin B12 deficiency anemia  --on b12 injections  --b12 and folate levels are within normal limits   --no need to continue   --hgb: 13.3g/dL, increased from 12.5.-stable    Waldenstrom's macroglobulinemia  --Treated with Velcade/dex/Rituxan with excellent biochemical response  --been followed with [facility]QU[facility], spep, light chains, immunoglobulins    History of prostate cancer  --Status post prostatectomy in 2004 with local relapse treated with radiation and intermittent ADT.    --Has since been followed through an outside urologist for Lupron injection.  Most recent PSA 3/9/22-12.2   --May need axumin PET scan-defer to  urologist    History of thymoma  --Status post resection and post surgery radiation.    --Stable.    Follow-Up: 6 months with labs prior     Irasema Ramos PA-C

## 2022-03-11 ENCOUNTER — OFFICE VISIT (OUTPATIENT)
Dept: HEMATOLOGY/ONCOLOGY | Facility: CLINIC | Age: 82
End: 2022-03-11
Payer: MEDICARE

## 2022-03-11 VITALS
TEMPERATURE: 98 F | HEIGHT: 70 IN | DIASTOLIC BLOOD PRESSURE: 82 MMHG | SYSTOLIC BLOOD PRESSURE: 141 MMHG | BODY MASS INDEX: 25.31 KG/M2 | HEART RATE: 64 BPM | OXYGEN SATURATION: 99 % | WEIGHT: 176.81 LBS

## 2022-03-11 DIAGNOSIS — Z85.048 HISTORY OF RECTAL CANCER: Primary | ICD-10-CM

## 2022-03-11 DIAGNOSIS — D51.8 OTHER VITAMIN B12 DEFICIENCY ANEMIA: ICD-10-CM

## 2022-03-11 DIAGNOSIS — Z85.46 HISTORY OF PROSTATE CANCER: ICD-10-CM

## 2022-03-11 DIAGNOSIS — C88.0 WALDENSTROM'S MACROGLOBULINEMIA: ICD-10-CM

## 2022-03-11 DIAGNOSIS — Z87.898 HISTORY OF THYMOMA: ICD-10-CM

## 2022-03-11 PROCEDURE — 99215 OFFICE O/P EST HI 40 MIN: CPT | Mod: S$GLB,,,

## 2022-03-11 PROCEDURE — 3079F PR MOST RECENT DIASTOLIC BLOOD PRESSURE 80-89 MM HG: ICD-10-PCS | Mod: CPTII,S$GLB,,

## 2022-03-11 PROCEDURE — 99999 PR PBB SHADOW E&M-EST. PATIENT-LVL III: ICD-10-PCS | Mod: PBBFAC,,,

## 2022-03-11 PROCEDURE — 3079F DIAST BP 80-89 MM HG: CPT | Mod: CPTII,S$GLB,,

## 2022-03-11 PROCEDURE — 3288F FALL RISK ASSESSMENT DOCD: CPT | Mod: CPTII,S$GLB,,

## 2022-03-11 PROCEDURE — 3288F PR FALLS RISK ASSESSMENT DOCUMENTED: ICD-10-PCS | Mod: CPTII,S$GLB,,

## 2022-03-11 PROCEDURE — 3077F PR MOST RECENT SYSTOLIC BLOOD PRESSURE >= 140 MM HG: ICD-10-PCS | Mod: CPTII,S$GLB,,

## 2022-03-11 PROCEDURE — 1126F PR PAIN SEVERITY QUANTIFIED, NO PAIN PRESENT: ICD-10-PCS | Mod: CPTII,S$GLB,,

## 2022-03-11 PROCEDURE — 1159F MED LIST DOCD IN RCRD: CPT | Mod: CPTII,S$GLB,,

## 2022-03-11 PROCEDURE — 99999 PR PBB SHADOW E&M-EST. PATIENT-LVL III: CPT | Mod: PBBFAC,,,

## 2022-03-11 PROCEDURE — 99499 UNLISTED E&M SERVICE: CPT | Mod: S$GLB,,,

## 2022-03-11 PROCEDURE — 1101F PR PT FALLS ASSESS DOC 0-1 FALLS W/OUT INJ PAST YR: ICD-10-PCS | Mod: CPTII,S$GLB,,

## 2022-03-11 PROCEDURE — 3077F SYST BP >= 140 MM HG: CPT | Mod: CPTII,S$GLB,,

## 2022-03-11 PROCEDURE — 1159F PR MEDICATION LIST DOCUMENTED IN MEDICAL RECORD: ICD-10-PCS | Mod: CPTII,S$GLB,,

## 2022-03-11 PROCEDURE — 1101F PT FALLS ASSESS-DOCD LE1/YR: CPT | Mod: CPTII,S$GLB,,

## 2022-03-11 PROCEDURE — 1126F AMNT PAIN NOTED NONE PRSNT: CPT | Mod: CPTII,S$GLB,,

## 2022-03-11 PROCEDURE — 99215 PR OFFICE/OUTPT VISIT, EST, LEVL V, 40-54 MIN: ICD-10-PCS | Mod: S$GLB,,,

## 2022-03-11 PROCEDURE — 99499 RISK ADDL DX/OHS AUDIT: ICD-10-PCS | Mod: S$GLB,,,

## 2022-03-15 LAB — METHYLMALONATE SERPL-SCNC: 0.24 UMOL/L

## 2022-04-04 NOTE — TRANSFER OF CARE
"Anesthesia Transfer of Care Note    Patient: Homero Tanner    Procedure(s) Performed: Procedure(s) (LRB):  COLONOSCOPY/hybrid APC or a EndoRotor device (N/A)    Patient location: [facility]    Anesthesia Type: general    Transport from OR: Transported from OR on room air with adequate spontaneous ventilation    Post pain: adequate analgesia    Post assessment: no apparent anesthetic complications    Post vital signs: stable    Level of consciousness: awake    Nausea/Vomiting: no nausea/vomiting    Complications: none    Transfer of care protocol was followed      Last vitals:   Visit Vitals  /82   Pulse 95   Temp 36.5 °C (97.7 °F) (Temporal)   Resp 18   Ht 5' 10" (1.778 m)   Wt 84.8 kg (187 lb)   SpO2 98%   BMI 26.83 kg/m²     " urinary bladder

## 2022-08-25 ENCOUNTER — LAB VISIT (OUTPATIENT)
Dept: LAB | Facility: HOSPITAL | Age: 82
End: 2022-08-25
Attending: INTERNAL MEDICINE
Payer: MEDICARE

## 2022-08-25 DIAGNOSIS — C88.0 WALDENSTROM'S MACROGLOBULINEMIA: ICD-10-CM

## 2022-08-25 DIAGNOSIS — Z85.46 HISTORY OF PROSTATE CANCER: ICD-10-CM

## 2022-08-25 DIAGNOSIS — Z85.048 HISTORY OF RECTAL CANCER: ICD-10-CM

## 2022-08-25 DIAGNOSIS — D51.8 OTHER VITAMIN B12 DEFICIENCY ANEMIA: ICD-10-CM

## 2022-08-25 DIAGNOSIS — I50.32 CHRONIC DIASTOLIC HEART FAILURE: ICD-10-CM

## 2022-08-25 DIAGNOSIS — Z87.898 HISTORY OF THYMOMA: ICD-10-CM

## 2022-08-25 LAB
ALBUMIN SERPL BCP-MCNC: 3.9 G/DL (ref 3.5–5.2)
ALBUMIN SERPL BCP-MCNC: 3.9 G/DL (ref 3.5–5.2)
ALP SERPL-CCNC: 97 U/L (ref 55–135)
ALP SERPL-CCNC: 97 U/L (ref 55–135)
ALT SERPL W/O P-5'-P-CCNC: 22 U/L (ref 10–44)
ALT SERPL W/O P-5'-P-CCNC: 22 U/L (ref 10–44)
ANION GAP SERPL CALC-SCNC: 14 MMOL/L (ref 8–16)
ANION GAP SERPL CALC-SCNC: 14 MMOL/L (ref 8–16)
AST SERPL-CCNC: 23 U/L (ref 10–40)
AST SERPL-CCNC: 23 U/L (ref 10–40)
BASOPHILS # BLD AUTO: 0.02 K/UL (ref 0–0.2)
BASOPHILS NFR BLD: 0.3 % (ref 0–1.9)
BILIRUB SERPL-MCNC: 0.9 MG/DL (ref 0.1–1)
BILIRUB SERPL-MCNC: 0.9 MG/DL (ref 0.1–1)
BNP SERPL-MCNC: 242 PG/ML (ref 0–99)
BUN SERPL-MCNC: 26 MG/DL (ref 8–23)
BUN SERPL-MCNC: 26 MG/DL (ref 8–23)
CALCIUM SERPL-MCNC: 9.5 MG/DL (ref 8.7–10.5)
CALCIUM SERPL-MCNC: 9.5 MG/DL (ref 8.7–10.5)
CHLORIDE SERPL-SCNC: 105 MMOL/L (ref 95–110)
CHLORIDE SERPL-SCNC: 105 MMOL/L (ref 95–110)
CO2 SERPL-SCNC: 23 MMOL/L (ref 23–29)
CO2 SERPL-SCNC: 23 MMOL/L (ref 23–29)
CREAT SERPL-MCNC: 1.3 MG/DL (ref 0.5–1.4)
CREAT SERPL-MCNC: 1.3 MG/DL (ref 0.5–1.4)
DIFFERENTIAL METHOD: ABNORMAL
EOSINOPHIL # BLD AUTO: 0.6 K/UL (ref 0–0.5)
EOSINOPHIL NFR BLD: 9.9 % (ref 0–8)
ERYTHROCYTE [DISTWIDTH] IN BLOOD BY AUTOMATED COUNT: 13.8 % (ref 11.5–14.5)
EST. GFR  (NO RACE VARIABLE): 55.2 ML/MIN/1.73 M^2
EST. GFR  (NO RACE VARIABLE): 55.2 ML/MIN/1.73 M^2
GLUCOSE SERPL-MCNC: 100 MG/DL (ref 70–110)
GLUCOSE SERPL-MCNC: 100 MG/DL (ref 70–110)
HCT VFR BLD AUTO: 41.1 % (ref 40–54)
HGB BLD-MCNC: 13.4 G/DL (ref 14–18)
IGA SERPL-MCNC: 51 MG/DL (ref 40–350)
IGG SERPL-MCNC: 515 MG/DL (ref 650–1600)
IGM SERPL-MCNC: 417 MG/DL (ref 50–300)
IMM GRANULOCYTES # BLD AUTO: 0.01 K/UL (ref 0–0.04)
IMM GRANULOCYTES NFR BLD AUTO: 0.2 % (ref 0–0.5)
LYMPHOCYTES # BLD AUTO: 2.1 K/UL (ref 1–4.8)
LYMPHOCYTES NFR BLD: 36.5 % (ref 18–48)
MCH RBC QN AUTO: 30.5 PG (ref 27–31)
MCHC RBC AUTO-ENTMCNC: 32.6 G/DL (ref 32–36)
MCV RBC AUTO: 94 FL (ref 82–98)
MONOCYTES # BLD AUTO: 0.7 K/UL (ref 0.3–1)
MONOCYTES NFR BLD: 11.5 % (ref 4–15)
NEUTROPHILS # BLD AUTO: 2.4 K/UL (ref 1.8–7.7)
NEUTROPHILS NFR BLD: 41.6 % (ref 38–73)
NRBC BLD-RTO: 0 /100 WBC
PLATELET # BLD AUTO: 178 K/UL (ref 150–450)
PMV BLD AUTO: 10.1 FL (ref 9.2–12.9)
POTASSIUM SERPL-SCNC: 4.6 MMOL/L (ref 3.5–5.1)
POTASSIUM SERPL-SCNC: 4.6 MMOL/L (ref 3.5–5.1)
PROT SERPL-MCNC: 6.7 G/DL (ref 6–8.4)
PROT SERPL-MCNC: 6.7 G/DL (ref 6–8.4)
RBC # BLD AUTO: 4.39 M/UL (ref 4.6–6.2)
SODIUM SERPL-SCNC: 142 MMOL/L (ref 136–145)
SODIUM SERPL-SCNC: 142 MMOL/L (ref 136–145)
VIT B12 SERPL-MCNC: 686 PG/ML (ref 210–950)
WBC # BLD AUTO: 5.84 K/UL (ref 3.9–12.7)

## 2022-08-25 PROCEDURE — 86334 PATHOLOGIST INTERPRETATION IFE: ICD-10-PCS | Mod: 26,,, | Performed by: PATHOLOGY

## 2022-08-25 PROCEDURE — 84165 PROTEIN E-PHORESIS SERUM: CPT | Mod: 26,,, | Performed by: PATHOLOGY

## 2022-08-25 PROCEDURE — 84165 PATHOLOGIST INTERPRETATION SPE: ICD-10-PCS | Mod: 26,,, | Performed by: PATHOLOGY

## 2022-08-25 PROCEDURE — 86334 IMMUNOFIX E-PHORESIS SERUM: CPT | Mod: 26,,, | Performed by: PATHOLOGY

## 2022-08-25 PROCEDURE — 83880 ASSAY OF NATRIURETIC PEPTIDE: CPT | Performed by: INTERNAL MEDICINE

## 2022-08-25 PROCEDURE — 82607 VITAMIN B-12: CPT

## 2022-08-25 PROCEDURE — 82784 ASSAY IGA/IGD/IGG/IGM EACH: CPT

## 2022-08-25 PROCEDURE — 86334 IMMUNOFIX E-PHORESIS SERUM: CPT

## 2022-08-25 PROCEDURE — 85025 COMPLETE CBC W/AUTO DIFF WBC: CPT

## 2022-08-25 PROCEDURE — 83520 IMMUNOASSAY QUANT NOS NONAB: CPT | Mod: 59

## 2022-08-25 PROCEDURE — 84165 PROTEIN E-PHORESIS SERUM: CPT

## 2022-08-25 PROCEDURE — 80053 COMPREHEN METABOLIC PANEL: CPT | Performed by: INTERNAL MEDICINE

## 2022-08-25 PROCEDURE — 36415 COLL VENOUS BLD VENIPUNCTURE: CPT

## 2022-08-26 LAB
ALBUMIN SERPL ELPH-MCNC: 4.02 G/DL (ref 3.35–5.55)
ALPHA1 GLOB SERPL ELPH-MCNC: 0.36 G/DL (ref 0.17–0.41)
ALPHA2 GLOB SERPL ELPH-MCNC: 0.79 G/DL (ref 0.43–0.99)
B-GLOBULIN SERPL ELPH-MCNC: 0.78 G/DL (ref 0.5–1.1)
GAMMA GLOB SERPL ELPH-MCNC: 0.55 G/DL (ref 0.67–1.58)
INTERPRETATION SERPL IFE-IMP: NORMAL
KAPPA LC SER QL IA: 2.74 MG/DL (ref 0.33–1.94)
KAPPA LC/LAMBDA SER IA: 1.38 (ref 0.26–1.65)
LAMBDA LC SER QL IA: 1.99 MG/DL (ref 0.57–2.63)
PROT SERPL-MCNC: 6.5 G/DL (ref 6–8.4)

## 2022-08-29 ENCOUNTER — TELEPHONE (OUTPATIENT)
Dept: CARDIOLOGY | Facility: CLINIC | Age: 82
End: 2022-08-29
Payer: MEDICARE

## 2022-08-29 DIAGNOSIS — E78.5 HYPERLIPIDEMIA, UNSPECIFIED HYPERLIPIDEMIA TYPE: Primary | ICD-10-CM

## 2022-08-30 ENCOUNTER — OFFICE VISIT (OUTPATIENT)
Dept: CARDIOLOGY | Facility: CLINIC | Age: 82
End: 2022-08-30
Payer: MEDICARE

## 2022-08-30 ENCOUNTER — OFFICE VISIT (OUTPATIENT)
Dept: HEMATOLOGY/ONCOLOGY | Facility: CLINIC | Age: 82
End: 2022-08-30
Payer: MEDICARE

## 2022-08-30 ENCOUNTER — HOSPITAL ENCOUNTER (OUTPATIENT)
Dept: CARDIOLOGY | Facility: HOSPITAL | Age: 82
Discharge: HOME | End: 2022-08-30
Attending: INTERNAL MEDICINE
Payer: MEDICARE

## 2022-08-30 VITALS
WEIGHT: 171.5 LBS | OXYGEN SATURATION: 99 % | DIASTOLIC BLOOD PRESSURE: 70 MMHG | WEIGHT: 171.31 LBS | BODY MASS INDEX: 24.55 KG/M2 | TEMPERATURE: 98 F | DIASTOLIC BLOOD PRESSURE: 58 MMHG | HEIGHT: 70 IN | HEART RATE: 54 BPM | SYSTOLIC BLOOD PRESSURE: 122 MMHG | OXYGEN SATURATION: 99 % | HEART RATE: 71 BPM | SYSTOLIC BLOOD PRESSURE: 142 MMHG | BODY MASS INDEX: 24.52 KG/M2 | HEIGHT: 70 IN

## 2022-08-30 DIAGNOSIS — N18.2 CHRONIC RENAL IMPAIRMENT, STAGE 2 (MILD): ICD-10-CM

## 2022-08-30 DIAGNOSIS — I36.1 NONRHEUMATIC TRICUSPID VALVE REGURGITATION: ICD-10-CM

## 2022-08-30 DIAGNOSIS — I49.3 PVC'S (PREMATURE VENTRICULAR CONTRACTIONS): Chronic | ICD-10-CM

## 2022-08-30 DIAGNOSIS — E78.2 MIXED HYPERLIPIDEMIA: ICD-10-CM

## 2022-08-30 DIAGNOSIS — I49.1 PREMATURE ATRIAL CONTRACTIONS: ICD-10-CM

## 2022-08-30 DIAGNOSIS — K76.0 FATTY LIVER: ICD-10-CM

## 2022-08-30 DIAGNOSIS — I27.20 PULMONARY HYPERTENSION: Chronic | ICD-10-CM

## 2022-08-30 DIAGNOSIS — Z85.46 HISTORY OF PROSTATE CANCER: ICD-10-CM

## 2022-08-30 DIAGNOSIS — N18.31 CHRONIC KIDNEY DISEASE, STAGE 3A: ICD-10-CM

## 2022-08-30 DIAGNOSIS — I50.32 CHRONIC DIASTOLIC HEART FAILURE: Primary | ICD-10-CM

## 2022-08-30 DIAGNOSIS — D50.0 IRON DEFICIENCY ANEMIA DUE TO CHRONIC BLOOD LOSS: ICD-10-CM

## 2022-08-30 DIAGNOSIS — R00.1 SINUS BRADYCARDIA: ICD-10-CM

## 2022-08-30 DIAGNOSIS — C88.0 WALDENSTROM'S MACROGLOBULINEMIA: ICD-10-CM

## 2022-08-30 DIAGNOSIS — I10 ESSENTIAL HYPERTENSION: Chronic | ICD-10-CM

## 2022-08-30 DIAGNOSIS — D49.89 THYMOMA: ICD-10-CM

## 2022-08-30 DIAGNOSIS — D89.2 PARAPROTEINEMIA: ICD-10-CM

## 2022-08-30 DIAGNOSIS — Z85.048 HISTORY OF RECTAL CANCER: Primary | ICD-10-CM

## 2022-08-30 DIAGNOSIS — R94.31 ABNORMAL ECG: Chronic | ICD-10-CM

## 2022-08-30 DIAGNOSIS — I35.1 NONRHEUMATIC AORTIC VALVE INSUFFICIENCY: Chronic | ICD-10-CM

## 2022-08-30 DIAGNOSIS — J47.9 BRONCHIECTASIS WITHOUT COMPLICATION: ICD-10-CM

## 2022-08-30 DIAGNOSIS — Z86.010 HISTORY OF COLON POLYPS: Chronic | ICD-10-CM

## 2022-08-30 DIAGNOSIS — Z12.5 ENCOUNTER FOR SCREENING FOR MALIGNANT NEOPLASM OF PROSTATE: ICD-10-CM

## 2022-08-30 DIAGNOSIS — E78.5 HYPERLIPIDEMIA, UNSPECIFIED HYPERLIPIDEMIA TYPE: ICD-10-CM

## 2022-08-30 DIAGNOSIS — I70.0 AORTIC ATHEROSCLEROSIS: ICD-10-CM

## 2022-08-30 DIAGNOSIS — I47.10 PSVT (PAROXYSMAL SUPRAVENTRICULAR TACHYCARDIA): ICD-10-CM

## 2022-08-30 DIAGNOSIS — D51.8 OTHER VITAMIN B12 DEFICIENCY ANEMIA: ICD-10-CM

## 2022-08-30 DIAGNOSIS — Z87.898 HISTORY OF THYMOMA: ICD-10-CM

## 2022-08-30 DIAGNOSIS — R60.0 EDEMA OF BOTH LEGS: ICD-10-CM

## 2022-08-30 LAB
PATHOLOGIST INTERPRETATION IFE: NORMAL
PATHOLOGIST INTERPRETATION SPE: NORMAL

## 2022-08-30 PROCEDURE — 93010 EKG 12-LEAD: ICD-10-PCS | Mod: ,,, | Performed by: STUDENT IN AN ORGANIZED HEALTH CARE EDUCATION/TRAINING PROGRAM

## 2022-08-30 PROCEDURE — 1159F MED LIST DOCD IN RCRD: CPT | Mod: CPTII,S$GLB,,

## 2022-08-30 PROCEDURE — 3288F PR FALLS RISK ASSESSMENT DOCUMENTED: ICD-10-PCS | Mod: CPTII,S$GLB,,

## 2022-08-30 PROCEDURE — 99499 RISK ADDL DX/OHS AUDIT: ICD-10-PCS | Mod: HCNC,S$GLB,, | Performed by: INTERNAL MEDICINE

## 2022-08-30 PROCEDURE — 99499 RISK ADDL DX/OHS AUDIT: ICD-10-PCS | Mod: HCNC,S$GLB,,

## 2022-08-30 PROCEDURE — 1159F PR MEDICATION LIST DOCUMENTED IN MEDICAL RECORD: ICD-10-PCS | Mod: CPTII,S$GLB,, | Performed by: INTERNAL MEDICINE

## 2022-08-30 PROCEDURE — 3288F FALL RISK ASSESSMENT DOCD: CPT | Mod: CPTII,S$GLB,,

## 2022-08-30 PROCEDURE — 99214 PR OFFICE/OUTPT VISIT, EST, LEVL IV, 30-39 MIN: ICD-10-PCS | Mod: S$GLB,,, | Performed by: INTERNAL MEDICINE

## 2022-08-30 PROCEDURE — 3078F PR MOST RECENT DIASTOLIC BLOOD PRESSURE < 80 MM HG: ICD-10-PCS | Mod: CPTII,S$GLB,, | Performed by: INTERNAL MEDICINE

## 2022-08-30 PROCEDURE — 1101F PR PT FALLS ASSESS DOC 0-1 FALLS W/OUT INJ PAST YR: ICD-10-PCS | Mod: CPTII,S$GLB,,

## 2022-08-30 PROCEDURE — 3078F PR MOST RECENT DIASTOLIC BLOOD PRESSURE < 80 MM HG: ICD-10-PCS | Mod: CPTII,S$GLB,,

## 2022-08-30 PROCEDURE — 99999 PR PBB SHADOW E&M-EST. PATIENT-LVL II: ICD-10-PCS | Mod: PBBFAC,,, | Performed by: INTERNAL MEDICINE

## 2022-08-30 PROCEDURE — 1159F MED LIST DOCD IN RCRD: CPT | Mod: CPTII,S$GLB,, | Performed by: INTERNAL MEDICINE

## 2022-08-30 PROCEDURE — 1160F RVW MEDS BY RX/DR IN RCRD: CPT | Mod: CPTII,S$GLB,, | Performed by: INTERNAL MEDICINE

## 2022-08-30 PROCEDURE — 3074F PR MOST RECENT SYSTOLIC BLOOD PRESSURE < 130 MM HG: ICD-10-PCS | Mod: CPTII,S$GLB,, | Performed by: INTERNAL MEDICINE

## 2022-08-30 PROCEDURE — 3077F PR MOST RECENT SYSTOLIC BLOOD PRESSURE >= 140 MM HG: ICD-10-PCS | Mod: CPTII,S$GLB,,

## 2022-08-30 PROCEDURE — 99214 OFFICE O/P EST MOD 30 MIN: CPT | Mod: S$GLB,,, | Performed by: INTERNAL MEDICINE

## 2022-08-30 PROCEDURE — 93005 ELECTROCARDIOGRAM TRACING: CPT

## 2022-08-30 PROCEDURE — 3078F DIAST BP <80 MM HG: CPT | Mod: CPTII,S$GLB,,

## 2022-08-30 PROCEDURE — 3078F DIAST BP <80 MM HG: CPT | Mod: CPTII,S$GLB,, | Performed by: INTERNAL MEDICINE

## 2022-08-30 PROCEDURE — 1159F PR MEDICATION LIST DOCUMENTED IN MEDICAL RECORD: ICD-10-PCS | Mod: CPTII,S$GLB,,

## 2022-08-30 PROCEDURE — 1126F AMNT PAIN NOTED NONE PRSNT: CPT | Mod: CPTII,S$GLB,,

## 2022-08-30 PROCEDURE — 1101F PT FALLS ASSESS-DOCD LE1/YR: CPT | Mod: CPTII,S$GLB,,

## 2022-08-30 PROCEDURE — 99999 PR PBB SHADOW E&M-EST. PATIENT-LVL III: CPT | Mod: PBBFAC,,,

## 2022-08-30 PROCEDURE — 99999 PR PBB SHADOW E&M-EST. PATIENT-LVL III: ICD-10-PCS | Mod: PBBFAC,,,

## 2022-08-30 PROCEDURE — 3077F SYST BP >= 140 MM HG: CPT | Mod: CPTII,S$GLB,,

## 2022-08-30 PROCEDURE — 99499 UNLISTED E&M SERVICE: CPT | Mod: HCNC,S$GLB,,

## 2022-08-30 PROCEDURE — 99215 PR OFFICE/OUTPT VISIT, EST, LEVL V, 40-54 MIN: ICD-10-PCS | Mod: S$GLB,,,

## 2022-08-30 PROCEDURE — 93010 ELECTROCARDIOGRAM REPORT: CPT | Mod: ,,, | Performed by: STUDENT IN AN ORGANIZED HEALTH CARE EDUCATION/TRAINING PROGRAM

## 2022-08-30 PROCEDURE — 3074F SYST BP LT 130 MM HG: CPT | Mod: CPTII,S$GLB,, | Performed by: INTERNAL MEDICINE

## 2022-08-30 PROCEDURE — 99215 OFFICE O/P EST HI 40 MIN: CPT | Mod: S$GLB,,,

## 2022-08-30 PROCEDURE — 99499 UNLISTED E&M SERVICE: CPT | Mod: HCNC,S$GLB,, | Performed by: INTERNAL MEDICINE

## 2022-08-30 PROCEDURE — 1126F PR PAIN SEVERITY QUANTIFIED, NO PAIN PRESENT: ICD-10-PCS | Mod: CPTII,S$GLB,,

## 2022-08-30 PROCEDURE — 1160F PR REVIEW ALL MEDS BY PRESCRIBER/CLIN PHARMACIST DOCUMENTED: ICD-10-PCS | Mod: CPTII,S$GLB,, | Performed by: INTERNAL MEDICINE

## 2022-08-30 PROCEDURE — 99999 PR PBB SHADOW E&M-EST. PATIENT-LVL II: CPT | Mod: PBBFAC,,, | Performed by: INTERNAL MEDICINE

## 2022-08-30 RX ORDER — FUROSEMIDE 40 MG/1
40 TABLET ORAL
Qty: 30 TABLET | Refills: 11
Start: 2022-08-31 | End: 2023-02-20

## 2022-08-30 RX ORDER — CYANOCOBALAMIN 1000 UG/ML
INJECTION, SOLUTION INTRAMUSCULAR; SUBCUTANEOUS
Qty: 10 ML | Refills: 4 | Status: SHIPPED | OUTPATIENT
Start: 2022-08-30

## 2022-08-30 NOTE — PROGRESS NOTES
Subjective:    Patient ID:  Homero Tanner is a 82 y.o. male who presents for evaluation of Congestive Heart Failure, Hypertension, and Hyperlipidemia        HPIPt presents for eval.   His current medical conditions include diastolic CHF, Waldenstrom's macroglobulinemia lymphoma,h/o thymoma, HTN, DD, CRI, PHTN, AS, AI, PVCs, TR, dyslipidemia, prostate & rectal cancer. Nonsmoker.    Past hx pertinent for following:  Had LHC 20+ years ago, no significant blockages noted.    He has chronic abnl ecgs.   Also has had PSVT with stress testing in past.    He has h/o thymoma surgery with xrt and also had rectal polyp surgery.  - Stress MPI Aug 2017.  Echo Aug 2017 normal EF, DD, mild-mod PHTN, mild AI, LVH, mild RVE.  Echo 9/18 EF 50%, normal diastolic function, mild PHTN, LVH.  Taken off statin for abnl LFTs at one point, eventually restarted.  Echo 8/21: EF 50%, DD, mild AS, mild AI, mod-severe TR, mild-mod MR, biatrial enlargement, mod PHTN,  48 hour Holter 8/21: NSR, frequent PVCs, frequent PACs, nonsustained AT.  Now here.  No angina.  CHF is stable.  No concerning dyspnea.  No pnd/orthopnea.  BNP elevated but stable range.  Active.  Ecg today 8/30/22 NSR, PVCs.  No acute changes.  No dizziness.  No syncope.  No bothersome palpitations.  Lipids stable.  LFTs normal now.  CRI stable on last labs.  Lipids well controlled, on statin tx.  Compliant w meds.  Minimal leg edema.      Past Medical History:   Diagnosis Date    Abnormal ECG 6/24/2013    Adenomatous rectal polyp 12/7/2015    Aortic insufficiency 6/24/2013    Arthritis     Asthma     as a child    Benign neoplasm of cecum 2/27/2017    Benign neoplasm of transverse colon 2/27/2017    Cataract     CHF (congestive heart failure)     Pt states He's never been told this    Diverticulosis of large intestine without hemorrhage 2/27/2017    Encounter for blood transfusion     Frequent PVCs     Gallstones 3/28/2018    By Us done 3/21/2018    GERD (gastroesophageal  reflux disease)     History of colon polyps     Hypertension     Iron deficiency anemia 10/26/2015    Lymphoma     waldenstrom's macroglobulinemia    Mixed hyperlipidemia 6/24/2013    Premature atrial contractions     Prostate cancer     PSVT (paroxysmal supraventricular tachycardia) 6/24/2013    Pulmonary hypertension 6/13/2016    PVC's (premature ventricular contractions) 6/13/2016    Rectal adenocarcinoma     Rectal cancer 5/1/2018    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    left parietal scalp    Thymoma     TR (tricuspid regurgitation) 6/13/2016    Tubular adenoma of colon 06/2020    VT (ventricular tachycardia) 9/18/2018     Current Outpatient Medications   Medication Instructions    aspirin (ECOTRIN) 81 mg, Oral, Daily    b complex vitamins tablet Take by mouth. 1 tablet Oral Every day    clobetasoL (TEMOVATE) 0.05 % external solution APPLY TO SCALP 1 TO 2 TIMES A DAY AS NEEDED FOR FLARES, DO NOT APPLY TO FACE OR FOLDS OF SKIN    cyanocobalamin 1,000 mcg/mL injection INJECT 1ML UNDER THE SKIN EVERY MONTH    erythromycin with ethanoL (EMGEL) 2 % gel APPLY TO THE AFFECTED AREA OF FACE TWICE DAILY FOR ROSACEA/ REDNESS    [START ON 8/31/2022] furosemide (LASIX) 40 mg, Oral, Every Mon, Wed, Fri    ketoconazole (NIZORAL) 2 % shampoo APPLY TO SCALP 1 TO 2 TIMES PER WEEK, LATHER, LET SIT 5MIN, THEN RINSE    leuprolide (LUPRON) 3.75 mg, Intramuscular, Every 3 months    metoprolol succinate (TOPROL-XL) 100 MG 24 hr tablet TAKE 1 TABLET(100 MG) BY MOUTH EVERY DAY    metronidazole 0.75% (METROCREAM) 0.75 % Crea APPLY TO AFFECTED AREA ON FACE TWICE DAILY    omeprazole (PRILOSEC) 20 MG capsule TAKE 1 CAPSULE(20 MG) BY MOUTH EVERY DAY    rosuvastatin (CRESTOR) 10 MG tablet TAKE 1 TABLET(10 MG) BY MOUTH EVERY DAY         Review of Systems   Constitutional: Positive for weight loss.   HENT: Negative.     Eyes: Negative.    Cardiovascular:  Positive for leg swelling.   Respiratory: Negative.     Endocrine: Negative.   "  Hematologic/Lymphatic: Negative.    Skin: Negative.    Musculoskeletal:  Positive for arthritis.   Gastrointestinal: Negative.    Genitourinary: Negative.    Neurological: Negative.    Psychiatric/Behavioral: Negative.     Allergic/Immunologic: Negative.         BP (!) 122/58 (BP Location: Left arm, Patient Position: Sitting, BP Method: Large (Manual))   Pulse 71   Ht 5' 10" (1.778 m)   Wt 77.8 kg (171 lb 8.3 oz)   SpO2 99%   BMI 24.61 kg/m²     Wt Readings from Last 3 Encounters:   08/30/22 77.8 kg (171 lb 8.3 oz)   03/11/22 80.2 kg (176 lb 12.9 oz)   02/15/22 82.4 kg (181 lb 10.5 oz)     Temp Readings from Last 3 Encounters:   03/11/22 97.9 °F (36.6 °C) (Temporal)   12/10/21 97.5 °F (36.4 °C) (Temporal)   12/02/21 98.1 °F (36.7 °C)     BP Readings from Last 3 Encounters:   08/30/22 (!) 122/58   03/11/22 (!) 141/82   02/15/22 130/64     Pulse Readings from Last 3 Encounters:   08/30/22 71   03/11/22 64   02/15/22 60       Objective:    Physical Exam  Vitals and nursing note reviewed.   Constitutional:       Appearance: He is well-developed.   HENT:      Head: Normocephalic.   Neck:      Thyroid: No thyromegaly.      Vascular: Normal carotid pulses. No carotid bruit or JVD.   Cardiovascular:      Rate and Rhythm: Normal rate. Rhythm regularly irregular.      Pulses:           Radial pulses are 2+ on the right side and 2+ on the left side.      Heart sounds: S1 normal and S2 normal. Heart sounds not distant. No midsystolic click and no opening snap. No murmur heard.    No friction rub. No S3 or S4 sounds.   Pulmonary:      Effort: Pulmonary effort is normal.      Breath sounds: Normal breath sounds. No wheezing or rales.   Abdominal:      General: Bowel sounds are normal. There is no distension or abdominal bruit.      Palpations: Abdomen is soft.      Tenderness: There is no abdominal tenderness.   Musculoskeletal:      Cervical back: Neck supple.      Right lower leg: Edema present.      Left lower leg: " Edema present.   Skin:     General: Skin is warm.   Neurological:      Mental Status: He is alert and oriented to person, place, and time.   Psychiatric:         Behavior: Behavior normal.       I have reviewed all pertinent labs and cardiac studies.  Component      Latest Ref Rng & Units 8/25/2022   BNP      0 - 99 pg/mL 242 (H)           Chemistry        Component Value Date/Time     08/25/2022 1028     08/25/2022 1028    K 4.6 08/25/2022 1028    K 4.6 08/25/2022 1028     08/25/2022 1028     08/25/2022 1028    CO2 23 08/25/2022 1028    CO2 23 08/25/2022 1028    BUN 26 (H) 08/25/2022 1028    BUN 26 (H) 08/25/2022 1028    CREATININE 1.3 08/25/2022 1028    CREATININE 1.3 08/25/2022 1028     08/25/2022 1028     08/25/2022 1028        Component Value Date/Time    CALCIUM 9.5 08/25/2022 1028    CALCIUM 9.5 08/25/2022 1028    ALKPHOS 97 08/25/2022 1028    ALKPHOS 97 08/25/2022 1028    AST 23 08/25/2022 1028    AST 23 08/25/2022 1028    ALT 22 08/25/2022 1028    ALT 22 08/25/2022 1028    BILITOT 0.9 08/25/2022 1028    BILITOT 0.9 08/25/2022 1028    ESTGFRAFRICA 60 03/09/2022 0858    EGFRNONAA 52 (A) 03/09/2022 0858        Lab Results   Component Value Date    WBC 5.84 08/25/2022    HGB 13.4 (L) 08/25/2022    HCT 41.1 08/25/2022    MCV 94 08/25/2022     08/25/2022       Lab Results   Component Value Date    HGBA1C 5.2 06/06/2016       Lab Results   Component Value Date    CHOL 187 02/02/2022    CHOL 175 12/07/2021    CHOL 173 10/30/2020     Lab Results   Component Value Date    HDL 75 02/02/2022    HDL 74 12/07/2021    HDL 61 10/30/2020     Lab Results   Component Value Date    LDLCALC 98.0 02/02/2022    LDLCALC 88.4 12/07/2021    LDLCALC 93.4 10/30/2020     Lab Results   Component Value Date    TRIG 70 02/02/2022    TRIG 63 12/07/2021    TRIG 93 10/30/2020     Lab Results   Component Value Date    CHOLHDL 40.1 02/02/2022    CHOLHDL 42.3 12/07/2021    CHOLHDL 35.3 10/30/2020            Assessment:       1. Chronic diastolic heart failure    2. Abnormal ECG    3. Nonrheumatic aortic valve insufficiency    4. Aortic atherosclerosis    5. Essential hypertension    6. Edema of both legs    7. Mixed hyperlipidemia    8. Premature atrial contractions    9. PSVT (paroxysmal supraventricular tachycardia)    10. PVC's (premature ventricular contractions)    11. Pulmonary hypertension    12. Sinus bradycardia    13. Nonrheumatic tricuspid valve regurgitation    14. Chronic renal impairment, stage 2 (mild)         Plan:                 Stable cardiovascular conditions at present time on current medical treatment.  Continue current CV meds.  Continue current dose of Lasix for CHF.  Reviewed all tests and above medical conditions with patient in detail and formulated treatment plan.  Continue optimal medical treatment for cardiovascular conditions.  Cardiac low salt diet advised.  Daily exercise encouraged, with the goal 30 +  minutes aerobic exercise as tolerated.  Maintaining healthy weight and weight loss goals (if needed) were discussed in clinic.  Need for BP control and HTN goals (if needed) were discussed and tx plan formulated.  Importance of optimal lipid control were discussed in detail as well as possible pharmacologic and lifestyle changes that may be needed.  Statin tx.  F/u w heme/onc on lymphoma.  F/u in 6 months w labs.      I have reviewed all pertinent labs and cardiac studies independently. Plans and recommendations have been formulated under my direct supervision. All questions answered and patient voiced understanding.

## 2022-08-30 NOTE — PROGRESS NOTES
Subjective:       Patient ID: Homero Tanner is a 82 y.o. male.    Chief Complaint: thymoma, macroglobulinemia, hx rectal cx, and Prostate Cancer    Primary Oncologist/Hematologist: Dr. Lane     HPI: Mr. Tanner is an 82 year old male who comes into clinic for follow up from his history of prostate cancer, rectal cancer, b12 def, thymoma and Waldestroms macroglobulinemia.  Macroglobinemia Because of continuous rise of the monoclonal spike in the SPEP, a bone marrow was done 7/2018 which was read as being consistent with a lymphoblastic lymphoma (Waldestrom;s macroglobulinemia). He was treated with Dr. Weaver with Velcade/dex/Rituxan achieving a good biochemical response. He was given 4 additional treatments of maintenance Rituxan with the last dose around nov 2019.  Prostate cancer-He is known to us because of  a history of previously treated prostate cancer. He was diagnosed around 2004 and treated with surgery. He relapsed by PSA and was treated with local radiation therapy. He had further elevation of the PSA and started intermittent LHRH analogues through the office of his urologist outside our clinic  He receives the injections at his urologist office.  Vit b12 def-He was found to be vitamin b12 def upon workup for anemia. His iron studies were normal. Vitamin B12 was low at 151. He then started supplemental B12 injection, which  he is still getting.  Thymoma-Also, as part of the workup for the anemia, serum protein electrophoresis showed a paraprotein band that was quantified at 1.03 g and identified as IgM. He had a thoracic spine view looking for lytic lesions. The radiologist reported that there were no lytic lesions but there was a mass in the center of the chest. This was followed by chest x-rays and CT scans. The CT scan showed a large mediastinal mass. A CT-guided biopsy at [facility] was read as showing a thymoma. He underwent surgery with Dr. Levi Butler at [facility]. The thymoma  was involving the pericardium so he received postoperative radiation therapy. He finished the radiation therapy and has remained with without evaluable disease in regards to his thymoma.  Rectal cancer- Patient has had previous colonoscopies that found tubular adenomas.The patient underwent a colonoscopy on 2014, as part of the work up for his diagnosed iron deficiency. It was done by Dr. Jaime Ash. Dr. Ash found a mass in the rectal area. It was partially excised. Pathology was once again that of tubulovillous adenoma with dysplasia. The area of involvement coincided with the radiation therapy field for the prostate cancer. The patient had removal of the villous adenoma by Dr Ansari. 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 Following the excision he had 2 episodes of rectal bleeding. He ended up having another surgery to control the bleeing and to do a re-excision of the area where the minimally invasive cancer was found.Pathology revealed no residual cancer.There has been no more bleeding.    Today: patient has been following with urology- stating his PSA has been really good. He continues with lupron injections monthly. He has been well. He denies any fatigue, sob, weight loss, n/v/d/c, fevers, night sweats. He tries to stay hydrated. He is on lasix. He continues to follow with cardiology. He denies any bowel changes.     Social History     Socioeconomic History    Marital status: Single   Tobacco Use    Smoking status: Former     Packs/day: 1.00     Years: 15.00     Pack years: 15.00     Types: Cigarettes     Quit date: 1969     Years since quittin.5    Smokeless tobacco: Never   Substance and Sexual Activity    Alcohol use: Yes     Alcohol/week: 3.0 standard drinks     Types: 3 Cans of beer per week     Comment: socially, NO ALCOHOL 72 HOURS PRIOR TO SX    Drug use: No       Past Medical History:   Diagnosis Date    Abnormal ECG 2013     Adenomatous rectal polyp 12/7/2015    Aortic insufficiency 6/24/2013    Arthritis     Asthma     as a child    Benign neoplasm of cecum 2/27/2017    Benign neoplasm of transverse colon 2/27/2017    Cataract     CHF (congestive heart failure)     Pt states He's never been told this    Diverticulosis of large intestine without hemorrhage 2/27/2017    Encounter for blood transfusion     Frequent PVCs     Gallstones 3/28/2018    By Us done 3/21/2018    GERD (gastroesophageal reflux disease)     History of colon polyps     Hypertension     Iron deficiency anemia 10/26/2015    Lymphoma     waldenstrom's macroglobulinemia    Mixed hyperlipidemia 6/24/2013    Premature atrial contractions     Prostate cancer     PSVT (paroxysmal supraventricular tachycardia) 6/24/2013    Pulmonary hypertension 6/13/2016    PVC's (premature ventricular contractions) 6/13/2016    Rectal adenocarcinoma     Rectal cancer 5/1/2018    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    left parietal scalp    Thymoma     TR (tricuspid regurgitation) 6/13/2016    Tubular adenoma of colon 06/2020    VT (ventricular tachycardia) 9/18/2018       Family History   Problem Relation Age of Onset    Diabetes Father     Cataracts Mother     Amblyopia Neg Hx     Blindness Neg Hx     Cancer Neg Hx     Glaucoma Neg Hx     Hypertension Neg Hx     Macular degeneration Neg Hx     Retinal detachment Neg Hx     Strabismus Neg Hx     Stroke Neg Hx     Thyroid disease Neg Hx     Melanoma Neg Hx        Past Surgical History:   Procedure Laterality Date    biopsy for chest mass      BONE MARROW BIOPSY Left 7/11/2018    Procedure: Biopsy-bone marrow;  Surgeon: Charanjit Dacosta MD;  Location: [facility];  Service: General;  Laterality: Left;    CATARACT EXTRACTION W/  INTRAOCULAR LENS IMPLANT Right 01/03/2019    CATARACT EXTRACTION W/  INTRAOCULAR LENS IMPLANT Left 02/07/2019    COLON SURGERY      COLONOSCOPY N/A 10/26/2015    Procedure: Colonoscopy;  Surgeon: Abraham Hong MD;   Location: [facility];  Service: Endoscopy;  Laterality: N/A;    COLONOSCOPY Left 2/27/2017    Procedure: COLONOSCOPY;  Surgeon: Abraham Hong MD;  Location: [facility];  Service: Endoscopy;  Laterality: Left;    COLONOSCOPY N/A 5/1/2018    Procedure: hixtory of rectal cancer. Colonsocopy asked to be repeated minnie  year, alst one 2/2017;  Surgeon: Patricio Streeter MD;  Location: [facility];  Service: Endoscopy;  Laterality: N/A;    COLONOSCOPY N/A 6/27/2018    Procedure: COLONOSCOPY/hybrid APC or a EndoRotor device;  Surgeon: Rao Medeiros MD;  Location: [facility]);  Service: Endoscopy;  Laterality: N/A;    COLONOSCOPY N/A 6/22/2020    Procedure: COLONOSCOPY;  Surgeon: Yu Wells MD;  Location: [facility];  Service: Endoscopy;  Laterality: N/A;    ESOPHAGOGASTRODUODENOSCOPY N/A 10/22/2019    Procedure: ESOPHAGOGASTRODUODENOSCOPY (EGD);  Surgeon: Mariela Greer MD;  Location: [facility];  Service: Endoscopy;  Laterality: N/A;    FLEXIBLE SIGMOIDOSCOPY Left 4/23/2019    Procedure: SIGMOIDOSCOPY, FLEXIBLE;  Surgeon: Ranjit Will MD;  Location: [facility];  Service: Endoscopy;  Laterality: Left;    HERNIA REPAIR Right 02/2018    Inguinal, open with mesh    mohs      PROSTATECTOMY      RECTAL SURGERY N/A 09/2014    REPAIR OF SLIDING INGUINAL HERNIA Right 2/26/2019    Procedure: REPAIR, HERNIA, INGUINAL, SLIDING;  Surgeon: Bridger Alvarez MD;  Location: [facility];  Service: General;  Laterality: Right;    SKIN BIOPSY      TONSILLECTOMY      TUMOR REMOVAL         Review of Systems   Constitutional:  Negative for activity change, appetite change, chills, diaphoresis, fatigue, fever and unexpected weight change.   HENT:  Negative for congestion, nosebleeds and rhinorrhea.    Respiratory:  Negative for cough and shortness of breath.    Cardiovascular:  Negative for chest pain and leg swelling.   Gastrointestinal:  Negative for abdominal pain, anal bleeding, blood in stool, constipation, diarrhea,  nausea and vomiting.   Genitourinary:  Negative for hematuria.   Musculoskeletal:  Negative for arthralgias and myalgias.   Skin:  Negative for color change and pallor.   Neurological:  Negative for dizziness, weakness, light-headedness, numbness and headaches.       Medication List with Changes/Refills   Current Medications    ASPIRIN (ECOTRIN) 81 MG EC TABLET    Take 81 mg by mouth once daily.    B COMPLEX VITAMINS TABLET    Take by mouth. 1 tablet Oral Every day    CLOBETASOL (TEMOVATE) 0.05 % EXTERNAL SOLUTION    APPLY TO SCALP 1 TO 2 TIMES A DAY AS NEEDED FOR FLARES, DO NOT APPLY TO FACE OR FOLDS OF SKIN    ERYTHROMYCIN WITH ETHANOL (EMGEL) 2 % GEL    APPLY TO THE AFFECTED AREA OF FACE TWICE DAILY FOR ROSACEA/ REDNESS    FUROSEMIDE (LASIX) 40 MG TABLET    Take 1 tablet (40 mg total) by mouth every Mon, Wed, Fri.    KETOCONAZOLE (NIZORAL) 2 % SHAMPOO    APPLY TO SCALP 1 TO 2 TIMES PER WEEK, LATHER, LET SIT 5MIN, THEN RINSE    LEUPROLIDE (LUPRON) 3.75 MG INJECTION    Inject 3.75 mg into the muscle every 3 (three) months.     METOPROLOL SUCCINATE (TOPROL-XL) 100 MG 24 HR TABLET    TAKE 1 TABLET(100 MG) BY MOUTH EVERY DAY    METRONIDAZOLE 0.75% (METROCREAM) 0.75 % CREA    APPLY TO AFFECTED AREA ON FACE TWICE DAILY    OMEPRAZOLE (PRILOSEC) 20 MG CAPSULE    TAKE 1 CAPSULE(20 MG) BY MOUTH EVERY DAY    ROSUVASTATIN (CRESTOR) 10 MG TABLET    TAKE 1 TABLET(10 MG) BY MOUTH EVERY DAY   Changed and/or Refilled Medications    Modified Medication Previous Medication    CYANOCOBALAMIN 1,000 MCG/ML INJECTION cyanocobalamin 1,000 mcg/mL injection       INJECT 1ML UNDER THE SKIN EVERY MONTH    INJECT 1ML UNDER THE SKIN EVERY MONTH     Objective:     Vitals:    08/30/22 1051   BP: (!) 142/70   Pulse: (!) 54   Temp: 98.2 °F (36.8 °C)       Physical Exam  Vitals reviewed.   Constitutional:       General: He is not in acute distress.     Appearance: He is not ill-appearing, toxic-appearing or diaphoretic.   HENT:      Head:  Normocephalic and atraumatic.   Eyes:      Conjunctiva/sclera: Conjunctivae normal.   Cardiovascular:      Rate and Rhythm: Bradycardia present.      Pulses: Normal pulses.   Pulmonary:      Effort: Pulmonary effort is normal.   Abdominal:      General: Bowel sounds are normal.   Musculoskeletal:      Right lower leg: No edema.      Left lower leg: No edema.   Skin:     General: Skin is warm.      Coloration: Skin is not jaundiced or pale.      Findings: No bruising, erythema, lesion or rash.   Neurological:      Mental Status: He is alert.      Motor: No weakness.      Gait: Gait normal.   Psychiatric:         Mood and Affect: Mood normal.         Behavior: Behavior normal.         Thought Content: Thought content normal.          Labs/Results:  Lab Results   Component Value Date    WBC 5.84 08/25/2022    RBC 4.39 (L) 08/25/2022    HGB 13.4 (L) 08/25/2022    HCT 41.1 08/25/2022    MCV 94 08/25/2022    MCH 30.5 08/25/2022    MCHC 32.6 08/25/2022    RDW 13.8 08/25/2022     08/25/2022    MPV 10.1 08/25/2022    GRAN 2.4 08/25/2022    GRAN 41.6 08/25/2022    LYMPH 2.1 08/25/2022    LYMPH 36.5 08/25/2022    MONO 0.7 08/25/2022    MONO 11.5 08/25/2022    EOS 0.6 (H) 08/25/2022    BASO 0.02 08/25/2022    EOSINOPHIL 9.9 (H) 08/25/2022    BASOPHIL 0.3 08/25/2022     CMP  Sodium   Date Value Ref Range Status   08/25/2022 142 136 - 145 mmol/L Final   08/25/2022 142 136 - 145 mmol/L Final     Potassium   Date Value Ref Range Status   08/25/2022 4.6 3.5 - 5.1 mmol/L Final   08/25/2022 4.6 3.5 - 5.1 mmol/L Final     Chloride   Date Value Ref Range Status   08/25/2022 105 95 - 110 mmol/L Final   08/25/2022 105 95 - 110 mmol/L Final     CO2   Date Value Ref Range Status   08/25/2022 23 23 - 29 mmol/L Final   08/25/2022 23 23 - 29 mmol/L Final     Glucose   Date Value Ref Range Status   08/25/2022 100 70 - 110 mg/dL Final   08/25/2022 100 70 - 110 mg/dL Final     BUN   Date Value Ref Range Status   08/25/2022 26 (H) 8 - 23  mg/dL Final   08/25/2022 26 (H) 8 - 23 mg/dL Final     Creatinine   Date Value Ref Range Status   08/25/2022 1.3 0.5 - 1.4 mg/dL Final   08/25/2022 1.3 0.5 - 1.4 mg/dL Final     Calcium   Date Value Ref Range Status   08/25/2022 9.5 8.7 - 10.5 mg/dL Final   08/25/2022 9.5 8.7 - 10.5 mg/dL Final     Total Protein   Date Value Ref Range Status   08/25/2022 6.7 6.0 - 8.4 g/dL Final   08/25/2022 6.7 6.0 - 8.4 g/dL Final     Albumin   Date Value Ref Range Status   08/25/2022 3.9 3.5 - 5.2 g/dL Final   08/25/2022 3.9 3.5 - 5.2 g/dL Final     Total Bilirubin   Date Value Ref Range Status   08/25/2022 0.9 0.1 - 1.0 mg/dL Final     Comment:     For infants and newborns, interpretation of results should be based  on gestational age, weight and in agreement with clinical  observations.    Premature Infant recommended reference ranges:  Up to 24 hours.............<8.0 mg/dL  Up to 48 hours............<12.0 mg/dL  3-5 days..................<15.0 mg/dL  6-29 days.................<15.0 mg/dL     08/25/2022 0.9 0.1 - 1.0 mg/dL Final     Comment:     For infants and newborns, interpretation of results should be based  on gestational age, weight and in agreement with clinical  observations.    Premature Infant recommended reference ranges:  Up to 24 hours.............<8.0 mg/dL  Up to 48 hours............<12.0 mg/dL  3-5 days..................<15.0 mg/dL  6-29 days.................<15.0 mg/dL       Alkaline Phosphatase   Date Value Ref Range Status   08/25/2022 97 55 - 135 U/L Final   08/25/2022 97 55 - 135 U/L Final     AST   Date Value Ref Range Status   08/25/2022 23 10 - 40 U/L Final   08/25/2022 23 10 - 40 U/L Final     ALT   Date Value Ref Range Status   08/25/2022 22 10 - 44 U/L Final   08/25/2022 22 10 - 44 U/L Final     Anion Gap   Date Value Ref Range Status   08/25/2022 14 8 - 16 mmol/L Final   08/25/2022 14 8 - 16 mmol/L Final     eGFR if    Date Value Ref Range Status   03/09/2022 60 >60 mL/min/1.73 m^2  Final     eGFR if non    Date Value Ref Range Status   03/09/2022 52 (A) >60 mL/min/1.73 m^2 Final     Comment:     Calculation used to obtain the estimated glomerular filtration  rate (eGFR) is the CKD-EPI equation.           Latest Reference Range & Units 08/25/22 10:28   Vitamin B-12 210 - 950 pg/mL 686      Latest Reference Range & Units 08/25/22 10:28   Protein, Serum 6.0 - 8.4 g/dL 6.5   Albumin grams/dl 3.35 - 5.55 g/dL 4.02   Alpha-1 grams/dl 0.17 - 0.41 g/dL 0.36   Alpha-2 0.43 - 0.99 g/dL 0.79   Beta 0.50 - 1.10 g/dL 0.78   Gamma 0.67 - 1.58 g/dL 0.55 (L)      Latest Reference Range & Units 08/25/22 10:28   [place] Free Light Chains 0.33 - 1.94 mg/dL 2.74 (H)   Lambda Free Light Chains 0.57 - 2.63 mg/dL 1.99   Kappa/Lambda FLC Ratio 0.26 - 1.65  1.38   Normal total protein.   Normal gamma globulins are decreased. Paraprotein band in beta = 0.18   g/dL (0.35 g/dL previously).    Latest Reference Range & Units 08/25/22 10:28   IgG 650 - 1600 mg/dL 515 (L)   IgM 50 - 300 mg/dL 417 (H)   IgA 40 - 350 mg/dL 51      Latest Reference Range & Units 03/09/22 08:58   PSA Diagnostic 0.00 - 4.00 ng/mL 12.2 (H)     Pet Ct Scan 4/29/2019  IMPRESSION:   1. Interval resolution of the previously noted density in the region of the left lung apex.  Interval resolution of the pleural effusions.  2. Significant decrease in ascites with only a trace amount of ascites noted in the perihepatic region on the current exam.  3. No significant FDG avid lymph nodes noted on the current exam.  4. Interval development of significant soft tissue uptake in the right inguinal region which may be secondary to interval right inguinal hernia repair.    Assessment:     Problem List Items Addressed This Visit          Pulmonary    Bronchiectasis without complication       Renal/    Chronic kidney disease, stage 3a       Immunology/Multi System    Paraproteinemia    Relevant Orders    Comprehensive Metabolic Panel    CBC Auto  Differential    Immunofixation Electrophoresis    Immunoglobulin Free LT Chains Blood    Immunoglobulins (IgG, IgA, IgM) Quantitative    Protein Electrophoresis, Serum       Oncology    History of rectal cancer - Primary    Relevant Orders    NM PET CT Routine FDG    B12 deficiency anemia    Relevant Medications    cyanocobalamin 1,000 mcg/mL injection    Other Relevant Orders    Vitamin B12    Folate    Iron deficiency anemia due to chronic blood loss    Waldenstrom's macroglobulinemia    Relevant Orders    NM PET CT Routine FDG    History of prostate cancer    Relevant Orders    NM PET CT Routine FDG    PSA, Screening       Endocrine    History of thymoma    Relevant Orders    NM PET CT Routine FDG       GI    History of colon polyps (Chronic)    Fatty liver     Other Visit Diagnoses       Thymoma        Relevant Medications    cyanocobalamin 1,000 mcg/mL injection    Encounter for screening for malignant neoplasm of prostate        Relevant Orders    PSA, Screening          Plan:     History of rectal cancer  --Status post resection.    --most recent colonoscopy in 2020.   --Recommended repeat in 3-5 years.     Other vitamin B12 deficiency anemia  --on b12 injections  --b12 and folate levels are within normal limits   --continue with b12 injections  --hgb: 13.4g/dL,hmt: 41.4%     Waldenstrom's macroglobulinemia  --Treated with Velcade/dex/Rituxan with excellent biochemical response  --been followed with MARQUIS, spep, light chains, immunoglobulins  --stable  --obtain pet scan   --Normal total protein. Normal gamma globulins are decreased. Paraprotein band in beta = 0.18   g/dL (0.35 g/dL previously).      History of prostate cancer  --Status post prostatectomy in 2004 with local relapse treated with radiation and intermittent ADT.    --Has since been followed through an outside urologist for Lupron injection.  Most recent PSA 3/9/22-12.2   --May need axumin PET scan-defer to urologist     History of thymoma  --Status  post resection and post surgery radiation.    --Stable since 2018 CT chest    Follow-Up: 6 months with labs prior (cbc cmp wanda light chains spep immunoglobulins vit b12 folate). Pet scan prior     Irasema Ramos PA-C  Hematology Oncology

## 2022-08-31 ENCOUNTER — OFFICE VISIT (OUTPATIENT)
Dept: URGENT CARE | Facility: CLINIC | Age: 82
End: 2022-08-31
Payer: MEDICARE

## 2022-08-31 VITALS
OXYGEN SATURATION: 99 % | RESPIRATION RATE: 18 BRPM | TEMPERATURE: 97 F | HEIGHT: 70 IN | DIASTOLIC BLOOD PRESSURE: 91 MMHG | BODY MASS INDEX: 24.34 KG/M2 | HEART RATE: 57 BPM | WEIGHT: 170 LBS | SYSTOLIC BLOOD PRESSURE: 136 MMHG

## 2022-08-31 DIAGNOSIS — S81.812A LACERATION OF LEFT LOWER EXTREMITY, INITIAL ENCOUNTER: ICD-10-CM
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
## 2022-08-31 PROCEDURE — 3075F SYST BP GE 130 - 139MM HG: CPT | Mod: CPTII,S$GLB,, | Performed by: NURSE PRACTITIONER

## 2022-08-31 PROCEDURE — 1126F AMNT PAIN NOTED NONE PRSNT: CPT | Mod: CPTII,S$GLB,, | Performed by: NURSE PRACTITIONER

## 2022-08-31 PROCEDURE — 90714 TD VACC NO PRESV 7 YRS+ IM: CPT | Mod: S$GLB,,, | Performed by: NURSE PRACTITIONER

## 2022-08-31 PROCEDURE — 99214 PR OFFICE/OUTPT VISIT, EST, LEVL IV, 30-39 MIN: ICD-10-PCS | Mod: 25,S$GLB,, | Performed by: NURSE PRACTITIONER

## 2022-08-31 PROCEDURE — 12002 RPR S/N/AX/GEN/TRNK2.6-7.5CM: CPT | Mod: S$GLB,,, | Performed by: NURSE PRACTITIONER

## 2022-08-31 PROCEDURE — 1126F PR PAIN SEVERITY QUANTIFIED, NO PAIN PRESENT: ICD-10-PCS | Mod: CPTII,S$GLB,, | Performed by: NURSE PRACTITIONER

## 2022-08-31 PROCEDURE — 90471 TD VACCINE GREATER THAN OR EQUAL TO 7YO WITH PRESERVATIVE IM: ICD-10-PCS | Mod: S$GLB,,, | Performed by: NURSE PRACTITIONER

## 2022-08-31 PROCEDURE — 90471 IMMUNIZATION ADMIN: CPT | Mod: S$GLB,,, | Performed by: NURSE PRACTITIONER

## 2022-08-31 PROCEDURE — 12002 LACERATION REPAIR: ICD-10-PCS | Mod: S$GLB,,, | Performed by: NURSE PRACTITIONER

## 2022-08-31 PROCEDURE — 3075F PR MOST RECENT SYSTOLIC BLOOD PRESS GE 130-139MM HG: ICD-10-PCS | Mod: CPTII,S$GLB,, | Performed by: NURSE PRACTITIONER

## 2022-08-31 PROCEDURE — 90714 TD VACCINE GREATER THAN OR EQUAL TO 7YO WITH PRESERVATIVE IM: ICD-10-PCS | Mod: S$GLB,,, | Performed by: NURSE PRACTITIONER

## 2022-08-31 PROCEDURE — 1159F PR MEDICATION LIST DOCUMENTED IN MEDICAL RECORD: ICD-10-PCS | Mod: CPTII,S$GLB,, | Performed by: NURSE PRACTITIONER

## 2022-08-31 PROCEDURE — 1159F MED LIST DOCD IN RCRD: CPT | Mod: CPTII,S$GLB,, | Performed by: NURSE PRACTITIONER

## 2022-08-31 PROCEDURE — 3080F DIAST BP >= 90 MM HG: CPT | Mod: CPTII,S$GLB,, | Performed by: NURSE PRACTITIONER

## 2022-08-31 PROCEDURE — 3080F PR MOST RECENT DIASTOLIC BLOOD PRESSURE >= 90 MM HG: ICD-10-PCS | Mod: CPTII,S$GLB,, | Performed by: NURSE PRACTITIONER

## 2022-08-31 PROCEDURE — 99214 OFFICE O/P EST MOD 30 MIN: CPT | Mod: 25,S$GLB,, | Performed by: NURSE PRACTITIONER

## 2022-08-31 RX ORDER — MUPIROCIN 20 MG/G
OINTMENT TOPICAL 2 TIMES DAILY
Qty: 30 G | Refills: 0 | Status: SHIPPED | OUTPATIENT
Start: 2022-08-31 | End: 2022-09-10

## 2022-08-31 RX ORDER — AMOXICILLIN AND CLAVULANATE POTASSIUM 875; 125 MG/1; MG/1
1 TABLET, FILM COATED ORAL 2 TIMES DAILY
Qty: 10 TABLET | Refills: 0 | Status: SHIPPED | OUTPATIENT
Start: 2022-08-31 | End: 2022-09-05

## 2022-08-31 RX ORDER — MUPIROCIN 20 MG/G
OINTMENT TOPICAL
Status: COMPLETED | OUTPATIENT
Start: 2022-08-31 | End: 2022-08-31

## 2022-08-31 RX ADMIN — MUPIROCIN: 20 OINTMENT TOPICAL at 11:08

## 2022-08-31 NOTE — PATIENT INSTRUCTIONS
Please keep your wound clean and dry.  Wash gently with soap and water and apply antibiotic ointment (bacitracin, neosporin, etc.) over the wound after washing. Please watch for signs of infection including: increased\spreading redness, swelling, pus-like discharge, or a fever greater than 100.4F. If you experience any of these, please contact your Primary Care Doctor or Return to the Urgent Care for a wound check.     Please follow up with your Urgent Care in 8-10 days for suture removal. Please go to the ER for any new or worsening symptoms.    If you were prescribed a narcotic or controlled medication, do not drive or operate heavy equipment or machinery while taking these medications.  You must understand that you've received an Urgent Care treatment only and that you may be released before all your medical problems are known or treated. You, the patient, will arrange for follow up care as instructed.  Follow up with your PCP or specialty clinic as directed within 2-5 days if not improved or as needed.  You can call [phone] to schedule an appointment with the appropriate provider.  If your condition worsens we recommend that you receive another evaluation at the emergency room immediately or contact your primary medical clinics after hours call service to discuss your concerns.  Please return here or go to the Emergency Department for any concerns or worsening of condition.

## 2022-08-31 NOTE — PROGRESS NOTES
"Subjective:       Patient ID: Homero Tanner is a 82 y.o. male.    Vitals:  height is 5' 10" (1.778 m) and weight is 77.1 kg (169 lb 15.6 oz). His tympanic temperature is 97.3 °F (36.3 °C). His blood pressure is 136/91 (abnormal) and his pulse is 57 (abnormal). His respiration is 18 and oxygen saturation is 99%.     Chief Complaint: Laceration    82 yr old male presenting to the Urgent Care with complaint of laceration to left lower leg prior to arrival. Dog tooth unintentional caught patient's leg while playing. Patient is not UTD with tetanus.     Laceration   The incident occurred less than 1 hour ago. The laceration is located on the Left leg. The laceration is 4 cm in size. Injury mechanism: dog bite. The pain is at a severity of 0/10. The patient is experiencing no pain. He reports no foreign bodies present. His tetanus status is out of date.     Skin:  Positive for laceration. Negative for erythema.   Neurological:  Negative for altered mental status.   Psychiatric/Behavioral:  Negative for altered mental status.      Objective:      Physical Exam   Constitutional: He is oriented to person, place, and time. He appears well-developed. He is cooperative.   HENT:   Head: Normocephalic and atraumatic. Head is without abrasion and without contusion.   Ears:   Right Ear: External ear normal.   Left Ear: External ear normal.   Eyes: Conjunctivae, EOM and lids are normal. Pupils are equal, round, and reactive to light.   Neck: Trachea normal and phonation normal. Neck supple.   Cardiovascular: Normal rate, regular rhythm and normal heart sounds.   Pulses:       Dorsalis pedis pulses are 2+ on the left side.        Posterior tibial pulses are 2+ on the left side.   Pulmonary/Chest: Effort normal and breath sounds normal. No accessory muscle usage or stridor. No respiratory distress.   Abdominal: Normal appearance.   Musculoskeletal: Normal range of motion.         General: Normal range of motion.      Left " lower leg: He exhibits tenderness and laceration. He exhibits no bony tenderness, no swelling and no deformity. No edema.        Legs:    Neurological: He is alert and oriented to person, place, and time.   Skin: Skin is warm, dry, intact and no rash. Capillary refill takes less than 2 seconds. Lacerations - lower ext.:  left lower leg No abrasion, No burn, No bruising, No erythema and No ecchymosis   Psychiatric: His speech is normal and behavior is normal. Judgment and thought content normal.   Nursing note and vitals reviewed.            Assessment:       1. Dog bite, initial encounter    2. Laceration of left lower extremity, initial encounter          Plan:         Dog bite, initial encounter  -     amoxicillin-clavulanate 875-125mg (AUGMENTIN) 875-125 mg per tablet; Take 1 tablet by mouth 2 (two) times daily. for 5 days  Dispense: 10 tablet; Refill: 0  -     (In Office Administered) Td Vaccine    Laceration of left lower extremity, initial encounter  -     mupirocin (BACTROBAN) 2 % ointment; Apply topically 2 (two) times daily. for 10 days  Dispense: 30 g; Refill: 0  -     mupirocin 2 % ointment       Patient Instructions   Please keep your wound clean and dry.  Wash gently with soap and water and apply antibiotic ointment (bacitracin, neosporin, etc.) over the wound after washing. Please watch for signs of infection including: increased\spreading redness, swelling, pus-like discharge, or a fever greater than 100.4F. If you experience any of these, please contact your Primary Care Doctor or Return to the Urgent Care for a wound check.     Please follow up with your Urgent Care in 8-10 days for suture removal. Please go to the ER for any new or worsening symptoms.    If you were prescribed a narcotic or controlled medication, do not drive or operate heavy equipment or machinery while taking these medications.  You must understand that you've received an Urgent Care treatment only and that you may be released  before all your medical problems are known or treated. You, the patient, will arrange for follow up care as instructed.  Follow up with your PCP or specialty clinic as directed within 2-5 days if not improved or as needed.  You can call [phone] to schedule an appointment with the appropriate provider.  If your condition worsens we recommend that you receive another evaluation at the emergency room immediately or contact your primary medical clinics after hours call service to discuss your concerns.  Please return here or go to the Emergency Department for any concerns or worsening of condition.

## 2022-08-31 NOTE — PROCEDURES
Laceration Repair    Date/Time: 8/31/2022 9:15 AM  Performed by: Radha Espinoza NP  Authorized by: Radha Espinoza NP   Body area: lower extremity  Location details: left lower leg  Laceration length: 4 cm  Foreign bodies: no foreign bodies  Tendon involvement: none  Nerve involvement: none  Vascular damage: no  Anesthesia: local infiltration    Anesthesia:  Local Anesthetic: lidocaine 1% without epinephrine    Patient sedated: no  Preparation: Patient was prepped and draped in the usual sterile fashion.  Irrigation solution: saline  Irrigation method: syringe  Amount of cleaning: standard  Debridement: none  Degree of undermining: none  Skin closure: Ethilon  Number of sutures: 7  Technique: simple  Approximation: close  Approximation difficulty: simple  Dressing: dressing applied and antibiotic ointment  Patient tolerance: Patient tolerated the procedure well with no immediate complications

## 2022-09-03 ENCOUNTER — TELEPHONE (OUTPATIENT)
Dept: URGENT CARE | Facility: CLINIC | Age: 82
End: 2022-09-03
Payer: MEDICARE

## 2022-09-08 ENCOUNTER — OFFICE VISIT (OUTPATIENT)
Dept: URGENT CARE | Facility: CLINIC | Age: 82
End: 2022-09-08
Payer: MEDICARE

## 2022-09-08 VITALS
BODY MASS INDEX: 24.48 KG/M2 | HEART RATE: 55 BPM | WEIGHT: 171 LBS | DIASTOLIC BLOOD PRESSURE: 65 MMHG | SYSTOLIC BLOOD PRESSURE: 143 MMHG | RESPIRATION RATE: 16 BRPM | OXYGEN SATURATION: 99 % | TEMPERATURE: 98 F | HEIGHT: 70 IN

## 2022-09-08 DIAGNOSIS — Z48.02 VISIT FOR SUTURE REMOVAL: Primary | ICD-10-CM

## 2022-09-08 PROCEDURE — 3078F PR MOST RECENT DIASTOLIC BLOOD PRESSURE < 80 MM HG: ICD-10-PCS | Mod: CPTII,S$GLB,, | Performed by: NURSE PRACTITIONER

## 2022-09-08 PROCEDURE — 1126F AMNT PAIN NOTED NONE PRSNT: CPT | Mod: CPTII,S$GLB,, | Performed by: NURSE PRACTITIONER

## 2022-09-08 PROCEDURE — 3077F PR MOST RECENT SYSTOLIC BLOOD PRESSURE >= 140 MM HG: ICD-10-PCS | Mod: CPTII,S$GLB,, | Performed by: NURSE PRACTITIONER

## 2022-09-08 PROCEDURE — 99213 OFFICE O/P EST LOW 20 MIN: CPT | Mod: S$GLB,,, | Performed by: NURSE PRACTITIONER

## 2022-09-08 PROCEDURE — 1126F PR PAIN SEVERITY QUANTIFIED, NO PAIN PRESENT: ICD-10-PCS | Mod: CPTII,S$GLB,, | Performed by: NURSE PRACTITIONER

## 2022-09-08 PROCEDURE — 1159F MED LIST DOCD IN RCRD: CPT | Mod: CPTII,S$GLB,, | Performed by: NURSE PRACTITIONER

## 2022-09-08 PROCEDURE — 3077F SYST BP >= 140 MM HG: CPT | Mod: CPTII,S$GLB,, | Performed by: NURSE PRACTITIONER

## 2022-09-08 PROCEDURE — 3078F DIAST BP <80 MM HG: CPT | Mod: CPTII,S$GLB,, | Performed by: NURSE PRACTITIONER

## 2022-09-08 PROCEDURE — 1159F PR MEDICATION LIST DOCUMENTED IN MEDICAL RECORD: ICD-10-PCS | Mod: CPTII,S$GLB,, | Performed by: NURSE PRACTITIONER

## 2022-09-08 PROCEDURE — 99213 PR OFFICE/OUTPT VISIT, EST, LEVL III, 20-29 MIN: ICD-10-PCS | Mod: S$GLB,,, | Performed by: NURSE PRACTITIONER

## 2022-09-08 NOTE — PROGRESS NOTES
"Subjective:       Patient ID: Homero Tanner is a 82 y.o. male.    Vitals:  height is 5' 10" (1.778 m) and weight is 77.6 kg (171 lb). His oral temperature is 98 °F (36.7 °C). His blood pressure is 143/65 (abnormal) and his pulse is 55 (abnormal). His respiration is 16 and oxygen saturation is 99%.     Chief Complaint: Suture / Staple Removal    Suture / Staple Removal  The sutures were placed 7 to 10 days ago. He tried oral antibiotics and regular soap and water washings since the wound repair. The treatment provided mild relief. His temperature was unmeasured prior to arrival. The temperature was taken using an axillary reading. There has been no drainage from the wound. There is no redness present. There is no swelling present. There is no pain present. He has no difficulty moving the affected extremity or digit.     Constitution: Negative for fever.   Skin:  Positive for laceration.     Objective:      Physical Exam   Constitutional: He is oriented to person, place, and time.   HENT:   Head: Normocephalic and atraumatic.   Pulmonary/Chest: Effort normal. No respiratory distress.   Abdominal: Normal appearance.   Neurological: He is alert and oriented to person, place, and time.   Skin: Skin is warm and dry.         Comments: 7 sutures removed left lower extremity. Placed on 8/31. No drainage or bleeding. Wound well approximated other than one small portion. He does have lower leg edema. Advised to keep a dressing over it. Left with nonstick dressing and wrapped with Coban   Nursing note and vitals reviewed.      Assessment:       1. Visit for suture removal          Plan:         Visit for suture removal                     "

## 2022-10-20 ENCOUNTER — PATIENT MESSAGE (OUTPATIENT)
Dept: ADMINISTRATIVE | Facility: HOSPITAL | Age: 82
End: 2022-10-20
Payer: MEDICARE

## 2022-10-31 NOTE — PROGRESS NOTES
Addended by: WILSON LAZAR on: 12/29/2020 10:05 AM     Modules accepted: Orders     Subjective:      Patient ID: Homero Tanner is a 78 y.o. male.    Chief Complaint: Cancer    The patient is a 78-year-old  male who presents to the [facility] today to discuss further evaluation and management recommendations for Waldenstrom's macroglobulinemia.   I have reviewed all of the patient's relevant clinical history available in the medical record and have utilized this in my evaluation and management recommendations today.  Today the patient reports that he has been having chronic fatigue and edema in his legs. His fatigue is improved since being off treatment.  He continues with follow-up with Dr. Wright with Cardiology. He has been taking his monthly vitamin B12 injections. He is off iron supplementation at this time for his history of iron deficiency anemia which has since resolved.  He is on intermittent ADT for biochemical recurrence of prostate carcinoma and is followed by a urologist outside of the [facility].  His rectal cancer is in remission at this time.  He was on velcade/rituxan/dexamethasone for treatment of Waldenstrom's macroglobulinemia with his last treatment given in Oct 2018.  He has initiated rituxan maintenance and is s/p cycle 1 in Jan 2019.  The patient is CT imaging has shown evidence of aortic atherosclerosis and bronchiectasis which is being monitored at this time.      Cancer   Associated symptoms include fatigue and weakness. Pertinent negatives include no abdominal pain, arthralgias, chest pain, chills, congestion, coughing, diaphoresis, fever, headaches, joint swelling, myalgias, nausea, rash, sore throat or vomiting.     Review of Systems   Constitutional: Positive for activity change, appetite change, fatigue and unexpected weight change. Negative for chills, diaphoresis and fever.   HENT: Negative for congestion, dental problem, ear pain, mouth sores, nosebleeds, postnasal drip, sinus pressure, sore throat, tinnitus, trouble swallowing  and voice change.    Eyes: Negative for photophobia, pain, discharge, redness, itching and visual disturbance.   Respiratory: Positive for shortness of breath. Negative for cough, chest tightness, wheezing and stridor.    Cardiovascular: Negative for chest pain, palpitations and leg swelling.   Gastrointestinal: Negative for abdominal distention, abdominal pain, anal bleeding, blood in stool, constipation, diarrhea, nausea and vomiting.   Endocrine: Negative for cold intolerance, heat intolerance, polydipsia, polyphagia and polyuria.   Genitourinary: Negative for decreased urine volume, difficulty urinating, dysuria, flank pain, frequency, hematuria and urgency.   Musculoskeletal: Negative for arthralgias, back pain, gait problem, joint swelling and myalgias.   Skin: Negative for pallor and rash.   Allergic/Immunologic: Negative for immunocompromised state.   Neurological: Positive for weakness. Negative for dizziness, syncope, light-headedness and headaches.   Hematological: Negative for adenopathy. Does not bruise/bleed easily.   Psychiatric/Behavioral: Negative for agitation and confusion. The patient is nervous/anxious.        Medication List with Changes/Refills   Current Medications    ASPIRIN (ECOTRIN) 81 MG EC TABLET    Take 81 mg by mouth once daily.    B COMPLEX VITAMINS TABLET    Take by mouth. 1 tablet Oral Every day    CLOBETASOL (TEMOVATE) 0.05 % EXTERNAL SOLUTION    AAA of scalp 1-2 times daily prn flares. Strong steroid- do not apply to face or folds of skin    CYANOCOBALAMIN 1,000 MCG/ML INJECTION    Give 1cc under the skin once a month    DIFLUPREDNATE (DUREZOL) 0.05 % DROP OPHTHALMIC SOLUTION    Place 1 drop into the left eye 3 (three) times daily.    FUROSEMIDE (LASIX) 40 MG TABLET    Take 1 tablet (40 mg total) by mouth once daily.    LEUPROLIDE (LUPRON) 3.75 MG INJECTION    Inject 3.75 mg into the muscle every 3 (three) months.     METOPROLOL TARTRATE (LOPRESSOR) 100 MG TABLET    TAKE 1  TABLET(100 MG) BY MOUTH TWICE DAILY    METRONIDAZOLE 0.75% (METROCREAM) 0.75 % CREA    AAA of face bid    OMEPRAZOLE (PRILOSEC) 20 MG CAPSULE    TAKE 1 CAPSULE(20 MG) BY MOUTH EVERY DAY     Review of patient's allergies indicates:   Allergen Reactions    Lipitor [atorvastatin] Other (See Comments)    Norvasc [amlodipine] Swelling       Lab: I have reviewed all of the patient's relevant lab work available in the medical record and have utilized this in my evaluation and management recommendations today    Imaging: I have reviewed all of the patient's diagnostic/imaging results available in the medical record and have utilized this in my evaluation and management recommendations today.      Objective:     Vitals:    02/21/19 0811   BP: 112/64   Pulse: 66   Temp: 97.9 °F (36.6 °C)       Physical Exam   Constitutional: He is oriented to person, place, and time. He appears well-developed and well-nourished. No distress.   HENT:   Head: Normocephalic and atraumatic.   Nose: Nose normal.   Mouth/Throat: Oropharynx is clear and moist. No oropharyngeal exudate.   Eyes: EOM are normal. Pupils are equal, round, and reactive to light. No scleral icterus.   Neck: Normal range of motion. Neck supple. No tracheal deviation present. No thyromegaly present.   Cardiovascular: Normal rate, regular rhythm, normal heart sounds and intact distal pulses.   No murmur heard.  Pulmonary/Chest: Effort normal and breath sounds normal. No stridor. No respiratory distress. He has no wheezes. He has no rales. He exhibits no tenderness.   Abdominal: Soft. Bowel sounds are normal. He exhibits no distension and no mass. There is no tenderness. There is no rebound and no guarding.   Musculoskeletal: Normal range of motion. He exhibits edema. He exhibits no tenderness.   Lymphadenopathy:     He has no cervical adenopathy.   Neurological: He is alert and oriented to person, place, and time. Coordination normal.   Skin: Skin is warm. No rash noted. He  is not diaphoretic. No erythema.   Psychiatric: He has a normal mood and affect. His behavior is normal. Judgment and thought content normal.   Nursing note and vitals reviewed.      Assessment:     1. Waldenstrom's macroglobulinemia  2.  Elevated LFTs/elevated bilirubin - improved  3.  Elevated creatinine - stable/improved  4.  Generalized weakness/fatigue - improved  5.  History of Alcohol abuse    Plan:   1.  I had a detailed discussion with the patient previously with regard to the results of his bone marrow aspiration biopsy done in July 2018 as well as the results of all of his lab work done in August 2018.  At this time the patient has Waldenstrom's macroglobulinemia.  His IgM quantitative level was initially greater than 4 g.  2.  I had a detailed discussion with him previously with regard to the diagnosis, prognosis and treatment of this malignancy.  We discussed the indications to begin treatment.  I was concerned about possible liver involvement by his lymphoma because of his recently abnormal/worsening LFTs.  I was also concerned about possible renal involvement because of his elevated creatinine with monoclonal chain noted in UPEP immunofixation.  3.  We discussed that his treatment options are complicated because of the above.  I have previously discussed that the best course of action would be to start him on Velcade/dexamethasone/Rituxan.  I decided to hold off on initiation of Rituxan until we get his IgM quantitative level to less than 4 g.  Therefore he was initially started on twice weekly Velcade and weekly dexamethasone at 20 mg p.o. once daily.  We decided to re-evaluate our treatment options based on clinical response and based on how he tolerates this treatment. Velcade was dose adjusted for liver function. Informed consent taken.    4.  All necessary prescriptions including for herpes zoster prophylaxis have been sent to the patient's pharmacy previously and the patient reports compliance  with this medication.  5. I have previously strongly recommended cessation of alcohol use.  He reports that he continues to drink 1 drink a day.  6.  I had a detailed discussion with the patient today with regard to the results of all of his recent lab work which shows excellent response to ongoing treatment with interval significant decrease in IgM quantitative level.  I have also previously added IV Rituxan to his treatment.  The rationale for this was discussed in detail and he expressed understanding.  We have decided to initiate Rituxan starting with cycle 3 of Velcade.  I have since transitioned Velcade to once weekly.  7. I have previously discussed with the patient with regard to my concerns with regard to his combined liver/kidney dysfunction.  I am concerned about my ability to continue treatment safely.  At this time I will continue to hold Velcade and dexamethasone.  He has previously completed 4 weekly doses of rituxan in Oct 2018.   8. At this time we have decided to proceed with maintenance rituxan q12 weeks x 4 doses starting in Jan 2019. All labs from Feb 2019 reviewed and look stable/improved suggesting good control of his malignancy.  He will proceed with cycle 2/4 of maintenance Rituxan in April 2019.  9.  I did discuss with IR with regard to finding noted in left lung on PET-CT scan. Recommendation is to continue with close observation.    Follow-up in 1 month with labs 1 week before clinic visit. He knows to call sooner for any additional questions or new problems.        Yes...

## 2022-11-25 ENCOUNTER — TELEPHONE (OUTPATIENT)
Dept: ADMINISTRATIVE | Facility: HOSPITAL | Age: 82
End: 2022-11-25
Payer: MEDICARE

## 2022-12-15 ENCOUNTER — OFFICE VISIT (OUTPATIENT)
Dept: DERMATOLOGY | Facility: CLINIC | Age: 82
End: 2022-12-15
Payer: MEDICARE

## 2022-12-15 DIAGNOSIS — Z12.83 SCREENING, MALIGNANT NEOPLASM, SKIN: Primary | ICD-10-CM

## 2022-12-15 DIAGNOSIS — Z85.828 HISTORY OF SKIN CANCER: ICD-10-CM

## 2022-12-15 DIAGNOSIS — L57.0 HYPERTROPHIC ACTINIC KERATOSIS: ICD-10-CM

## 2022-12-15 DIAGNOSIS — L90.5 SCAR CONDITIONS/SKIN FIBROSIS: ICD-10-CM

## 2022-12-15 PROCEDURE — 99213 PR OFFICE/OUTPT VISIT, EST, LEVL III, 20-29 MIN: ICD-10-PCS | Mod: 25,HCNC,S$GLB, | Performed by: DERMATOLOGY

## 2022-12-15 PROCEDURE — 1101F PT FALLS ASSESS-DOCD LE1/YR: CPT | Mod: HCNC,CPTII,S$GLB, | Performed by: DERMATOLOGY

## 2022-12-15 PROCEDURE — 1160F PR REVIEW ALL MEDS BY PRESCRIBER/CLIN PHARMACIST DOCUMENTED: ICD-10-PCS | Mod: HCNC,CPTII,S$GLB, | Performed by: DERMATOLOGY

## 2022-12-15 PROCEDURE — 17000 PR DESTRUCTION(LASER SURGERY,CRYOSURGERY,CHEMOSURGERY),PREMALIGNANT LESIONS,FIRST LESION: ICD-10-PCS | Mod: HCNC,S$GLB,, | Performed by: DERMATOLOGY

## 2022-12-15 PROCEDURE — 3288F PR FALLS RISK ASSESSMENT DOCUMENTED: ICD-10-PCS | Mod: HCNC,CPTII,S$GLB, | Performed by: DERMATOLOGY

## 2022-12-15 PROCEDURE — 1126F PR PAIN SEVERITY QUANTIFIED, NO PAIN PRESENT: ICD-10-PCS | Mod: HCNC,CPTII,S$GLB, | Performed by: DERMATOLOGY

## 2022-12-15 PROCEDURE — 17003 DESTRUCT PREMALG LES 2-14: CPT | Mod: HCNC,S$GLB,, | Performed by: DERMATOLOGY

## 2022-12-15 PROCEDURE — 1126F AMNT PAIN NOTED NONE PRSNT: CPT | Mod: HCNC,CPTII,S$GLB, | Performed by: DERMATOLOGY

## 2022-12-15 PROCEDURE — 3288F FALL RISK ASSESSMENT DOCD: CPT | Mod: HCNC,CPTII,S$GLB, | Performed by: DERMATOLOGY

## 2022-12-15 PROCEDURE — 17000 DESTRUCT PREMALG LESION: CPT | Mod: HCNC,S$GLB,, | Performed by: DERMATOLOGY

## 2022-12-15 PROCEDURE — 99999 PR PBB SHADOW E&M-EST. PATIENT-LVL III: ICD-10-PCS | Mod: PBBFAC,HCNC,, | Performed by: DERMATOLOGY

## 2022-12-15 PROCEDURE — 99213 OFFICE O/P EST LOW 20 MIN: CPT | Mod: 25,HCNC,S$GLB, | Performed by: DERMATOLOGY

## 2022-12-15 PROCEDURE — 1101F PR PT FALLS ASSESS DOC 0-1 FALLS W/OUT INJ PAST YR: ICD-10-PCS | Mod: HCNC,CPTII,S$GLB, | Performed by: DERMATOLOGY

## 2022-12-15 PROCEDURE — 1159F PR MEDICATION LIST DOCUMENTED IN MEDICAL RECORD: ICD-10-PCS | Mod: HCNC,CPTII,S$GLB, | Performed by: DERMATOLOGY

## 2022-12-15 PROCEDURE — 1159F MED LIST DOCD IN RCRD: CPT | Mod: HCNC,CPTII,S$GLB, | Performed by: DERMATOLOGY

## 2022-12-15 PROCEDURE — 17003 DESTRUCTION, PREMALIGNANT LESIONS; SECOND THROUGH 14 LESIONS: ICD-10-PCS | Mod: HCNC,S$GLB,, | Performed by: DERMATOLOGY

## 2022-12-15 PROCEDURE — 1160F RVW MEDS BY RX/DR IN RCRD: CPT | Mod: HCNC,CPTII,S$GLB, | Performed by: DERMATOLOGY

## 2022-12-15 PROCEDURE — 99999 PR PBB SHADOW E&M-EST. PATIENT-LVL III: CPT | Mod: PBBFAC,HCNC,, | Performed by: DERMATOLOGY

## 2022-12-15 NOTE — PATIENT INSTRUCTIONS

## 2022-12-15 NOTE — PROGRESS NOTES
Subjective:       Patient ID:  Homero Tanner is a 82 y.o. 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 He c/o several scaly patches of the scalp, no tx tried.    For rosacea, he uses emgel    For seb derm, he uses ketoconazole shampoo and clobetasol solution      Review of Systems   Constitutional:  Negative for fever and chills.   Gastrointestinal:  Negative for nausea and vomiting.   Skin:  Positive for activity-related sunscreen use. Negative for daily sunscreen use and recent sunburn.   Hematologic/Lymphatic: Does not bruise/bleed easily.      Objective:    Physical Exam   Constitutional: He appears well-developed and well-nourished. No distress.   Neurological: He is alert and oriented to person, place, and time. He is not disoriented.   Psychiatric: He has a normal mood and affect.   Skin:   Areas Examined (abnormalities noted in diagram):   Scalp / Hair Palpated and Inspected  Head / Face Inspection Performed  Neck Inspection Performed  Chest / Axilla Inspection Performed  Abdomen Inspection Performed  Back Inspection Performed  RUE Inspected  LUE Inspection Performed  Nails and Digits Inspection Performed                Diagram Legend     Erythematous scaling macule/papule c/w actinic keratosis       Vascular papule c/w angioma      Pigmented verrucoid papule/plaque c/w seborrheic keratosis      Yellow umbilicated papule c/w sebaceous hyperplasia      Irregularly shaped tan macule c/w lentigo     1-2 mm smooth white papules consistent with Milia      Movable subcutaneous cyst with punctum c/w epidermal inclusion cyst      Subcutaneous movable cyst c/w pilar cyst      Firm pink to brown papule c/w dermatofibroma      Pedunculated  fleshy papule(s) c/w skin tag(s)      Evenly pigmented macule c/w junctional nevus     Mildly variegated pigmented, slightly irregular-bordered macule c/w mildly atypical nevus      Flesh colored to evenly pigmented papule c/w intradermal nevus       Pink pearly papule/plaque c/w basal cell carcinoma      Erythematous hyperkeratotic cursted plaque c/w SCC      Surgical scar with no sign of skin cancer recurrence      Open and closed comedones      Inflammatory papules and pustules      Verrucoid papule consistent consistent with wart     Erythematous eczematous patches and plaques     Dystrophic onycholytic nail with subungual debris c/w onychomycosis     Umbilicated papule    Erythematous-base heme-crusted tan verrucoid plaque consistent with inflamed seborrheic keratosis     Erythematous Silvery Scaling Plaque c/w Psoriasis     See annotation      Assessment / Plan:        Screening, malignant neoplasm, skin  History of skin cancer  Scar conditions/skin fibrosis  Scar of the scalp x 2, hx of NMSC.  No evidence of recurrence on physical exam today.  Continue routine skin surveillance. Daily sunscreen advised.     Hypertrophic actinic keratosis  Numerous hypertrophic AK of the scalp, adjacent to prior skin cancers.  Discussed chemotherapy cream x 4 weeks vs. Cryo.  Pt opts for cryotherapy, recommend tx with efudex if fails to improve.    Cryosurgery Procedure Note    The patient is informed of the precancerous quality and need for treatment of these lesions. After risks, benefits and alternatives explained, including blistering, pain, hyper- and hypopigmentation, patient verbally consents to cryotherapy to precancerous lesions. Liquid nitrogen cryosurgery is applied to the 10 actinic keratoses, as detailed in the physical exam, to produce a freeze injury. The patient is aware that blisters may form and is instructed on wound care with gentle cleansing and use of vaseline ointment to keep moist until healed. The  patient is supplied a handout on cryosurgery and is instructed to call if lesions do not completely resolve.         Follow up in about 3 months (around 3/15/2023).

## 2023-02-07 DIAGNOSIS — Z00.00 ENCOUNTER FOR MEDICARE ANNUAL WELLNESS EXAM: ICD-10-CM

## 2023-02-09 ENCOUNTER — LAB VISIT (OUTPATIENT)
Dept: LAB | Facility: HOSPITAL | Age: 83
End: 2023-02-09
Payer: MEDICARE

## 2023-02-09 DIAGNOSIS — Z00.00 ENCOUNTER FOR MEDICARE ANNUAL WELLNESS EXAM: ICD-10-CM

## 2023-02-09 DIAGNOSIS — Z12.5 ENCOUNTER FOR SCREENING FOR MALIGNANT NEOPLASM OF PROSTATE: ICD-10-CM

## 2023-02-09 DIAGNOSIS — D89.2 PARAPROTEINEMIA: ICD-10-CM

## 2023-02-09 DIAGNOSIS — Z85.46 HISTORY OF PROSTATE CANCER: ICD-10-CM

## 2023-02-09 DIAGNOSIS — D51.8 OTHER VITAMIN B12 DEFICIENCY ANEMIA: ICD-10-CM

## 2023-02-09 LAB
ALBUMIN SERPL BCP-MCNC: 4.3 G/DL (ref 3.5–5.2)
ALP SERPL-CCNC: 117 U/L (ref 55–135)
ALT SERPL W/O P-5'-P-CCNC: 25 U/L (ref 10–44)
ANION GAP SERPL CALC-SCNC: 11 MMOL/L (ref 8–16)
AST SERPL-CCNC: 29 U/L (ref 10–40)
BASOPHILS # BLD AUTO: 0.07 K/UL (ref 0–0.2)
BASOPHILS NFR BLD: 0.8 % (ref 0–1.9)
BILIRUB SERPL-MCNC: 0.8 MG/DL (ref 0.1–1)
BUN SERPL-MCNC: 19 MG/DL (ref 8–23)
CALCIUM SERPL-MCNC: 9.5 MG/DL (ref 8.7–10.5)
CHLORIDE SERPL-SCNC: 106 MMOL/L (ref 95–110)
CO2 SERPL-SCNC: 23 MMOL/L (ref 23–29)
COMPLEXED PSA SERPL-MCNC: 25.2 NG/ML (ref 0–4)
CREAT SERPL-MCNC: 1.3 MG/DL (ref 0.5–1.4)
DIFFERENTIAL METHOD: ABNORMAL
EOSINOPHIL # BLD AUTO: 1 K/UL (ref 0–0.5)
EOSINOPHIL NFR BLD: 10.7 % (ref 0–8)
ERYTHROCYTE [DISTWIDTH] IN BLOOD BY AUTOMATED COUNT: 13.7 % (ref 11.5–14.5)
EST. GFR  (NO RACE VARIABLE): 55 ML/MIN/1.73 M^2
FOLATE SERPL-MCNC: 8.8 NG/ML (ref 4–24)
GLUCOSE SERPL-MCNC: 103 MG/DL (ref 70–110)
HCT VFR BLD AUTO: 40.2 % (ref 40–54)
HGB BLD-MCNC: 13.2 G/DL (ref 14–18)
IGA SERPL-MCNC: 69 MG/DL (ref 40–350)
IGG SERPL-MCNC: 573 MG/DL (ref 650–1600)
IGM SERPL-MCNC: 419 MG/DL (ref 50–300)
IMM GRANULOCYTES # BLD AUTO: 0.03 K/UL (ref 0–0.04)
IMM GRANULOCYTES NFR BLD AUTO: 0.3 % (ref 0–0.5)
LYMPHOCYTES # BLD AUTO: 2.5 K/UL (ref 1–4.8)
LYMPHOCYTES NFR BLD: 26.5 % (ref 18–48)
MCH RBC QN AUTO: 30.3 PG (ref 27–31)
MCHC RBC AUTO-ENTMCNC: 32.8 G/DL (ref 32–36)
MCV RBC AUTO: 92 FL (ref 82–98)
MONOCYTES # BLD AUTO: 0.8 K/UL (ref 0.3–1)
MONOCYTES NFR BLD: 8.9 % (ref 4–15)
NEUTROPHILS # BLD AUTO: 4.9 K/UL (ref 1.8–7.7)
NEUTROPHILS NFR BLD: 52.8 % (ref 38–73)
NRBC BLD-RTO: 0 /100 WBC
PLATELET # BLD AUTO: 225 K/UL (ref 150–450)
PMV BLD AUTO: 9.8 FL (ref 9.2–12.9)
POTASSIUM SERPL-SCNC: 5.4 MMOL/L (ref 3.5–5.1)
PROT SERPL-MCNC: 6.7 G/DL (ref 6–8.4)
RBC # BLD AUTO: 4.36 M/UL (ref 4.6–6.2)
SODIUM SERPL-SCNC: 140 MMOL/L (ref 136–145)
VIT B12 SERPL-MCNC: 508 PG/ML (ref 210–950)
WBC # BLD AUTO: 9.26 K/UL (ref 3.9–12.7)

## 2023-02-09 PROCEDURE — 86334 PATHOLOGIST INTERPRETATION IFE: ICD-10-PCS | Mod: 26,HCNC,, | Performed by: PATHOLOGY

## 2023-02-09 PROCEDURE — 86334 IMMUNOFIX E-PHORESIS SERUM: CPT | Mod: HCNC

## 2023-02-09 PROCEDURE — 82784 ASSAY IGA/IGD/IGG/IGM EACH: CPT | Mod: 59,HCNC

## 2023-02-09 PROCEDURE — 80053 COMPREHEN METABOLIC PANEL: CPT | Mod: HCNC

## 2023-02-09 PROCEDURE — 86334 IMMUNOFIX E-PHORESIS SERUM: CPT | Mod: 26,HCNC,, | Performed by: PATHOLOGY

## 2023-02-09 PROCEDURE — 36415 COLL VENOUS BLD VENIPUNCTURE: CPT | Mod: HCNC

## 2023-02-09 PROCEDURE — 84165 PROTEIN E-PHORESIS SERUM: CPT | Mod: 26,HCNC,, | Performed by: PATHOLOGY

## 2023-02-09 PROCEDURE — 84165 PATHOLOGIST INTERPRETATION SPE: ICD-10-PCS | Mod: 26,HCNC,, | Performed by: PATHOLOGY

## 2023-02-09 PROCEDURE — 85025 COMPLETE CBC W/AUTO DIFF WBC: CPT | Mod: HCNC

## 2023-02-09 PROCEDURE — 82607 VITAMIN B-12: CPT | Mod: HCNC

## 2023-02-09 PROCEDURE — 84165 PROTEIN E-PHORESIS SERUM: CPT | Mod: HCNC

## 2023-02-09 PROCEDURE — 82746 ASSAY OF FOLIC ACID SERUM: CPT | Mod: HCNC

## 2023-02-09 PROCEDURE — 84153 ASSAY OF PSA TOTAL: CPT | Mod: HCNC

## 2023-02-09 PROCEDURE — 83521 IG LIGHT CHAINS FREE EACH: CPT | Mod: HCNC

## 2023-02-10 ENCOUNTER — HOSPITAL ENCOUNTER (OUTPATIENT)
Dept: RADIOLOGY | Facility: HOSPITAL | Age: 83
Discharge: HOME | End: 2023-02-10
Payer: MEDICARE

## 2023-02-10 DIAGNOSIS — C88.0 WALDENSTROM'S MACROGLOBULINEMIA: ICD-10-CM

## 2023-02-10 DIAGNOSIS — Z85.46 HISTORY OF PROSTATE CANCER: ICD-10-CM

## 2023-02-10 DIAGNOSIS — Z87.898 HISTORY OF THYMOMA: ICD-10-CM

## 2023-02-10 DIAGNOSIS — Z85.048 HISTORY OF RECTAL CANCER: ICD-10-CM

## 2023-02-10 LAB
ALBUMIN SERPL ELPH-MCNC: 4.26 G/DL (ref 3.35–5.55)
ALPHA1 GLOB SERPL ELPH-MCNC: 0.3 G/DL (ref 0.17–0.41)
ALPHA2 GLOB SERPL ELPH-MCNC: 0.73 G/DL (ref 0.43–0.99)
B-GLOBULIN SERPL ELPH-MCNC: 0.78 G/DL (ref 0.5–1.1)
GAMMA GLOB SERPL ELPH-MCNC: 0.53 G/DL (ref 0.67–1.58)
INTERPRETATION SERPL IFE-IMP: NORMAL
KAPPA LC SER QL IA: 2.82 MG/DL (ref 0.33–1.94)
KAPPA LC/LAMBDA SER IA: 1.49 (ref 0.26–1.65)
LAMBDA LC SER QL IA: 1.89 MG/DL (ref 0.57–2.63)
PROT SERPL-MCNC: 6.6 G/DL (ref 6–8.4)

## 2023-02-10 PROCEDURE — 78815 PET IMAGE W/CT SKULL-THIGH: CPT | Mod: 26,HCNC,PS, | Performed by: RADIOLOGY

## 2023-02-10 PROCEDURE — 78815 NM PET CT ROUTINE: ICD-10-PCS | Mod: 26,HCNC,PS, | Performed by: RADIOLOGY

## 2023-02-10 PROCEDURE — 78815 PET IMAGE W/CT SKULL-THIGH: CPT | Mod: TC,HCNC,PS

## 2023-02-10 PROCEDURE — A9552 F18 FDG: HCPCS | Mod: HCNC

## 2023-02-13 LAB
PATHOLOGIST INTERPRETATION IFE: NORMAL
PATHOLOGIST INTERPRETATION SPE: NORMAL

## 2023-02-15 ENCOUNTER — OFFICE VISIT (OUTPATIENT)
Dept: DERMATOLOGY | Facility: CLINIC | Age: 83
End: 2023-02-15
Payer: MEDICARE

## 2023-02-15 DIAGNOSIS — L57.0 ACTINIC KERATOSES: ICD-10-CM

## 2023-02-15 DIAGNOSIS — D48.5 NEOPLASM OF UNCERTAIN BEHAVIOR OF SKIN: ICD-10-CM

## 2023-02-15 DIAGNOSIS — L90.5 SCAR CONDITIONS/SKIN FIBROSIS: Primary | ICD-10-CM

## 2023-02-15 DIAGNOSIS — Z85.828 HISTORY OF SKIN CANCER: ICD-10-CM

## 2023-02-15 DIAGNOSIS — Z12.83 SCREENING, MALIGNANT NEOPLASM, SKIN: ICD-10-CM

## 2023-02-15 PROCEDURE — 17003 DESTRUCTION, PREMALIGNANT LESIONS; SECOND THROUGH 14 LESIONS: ICD-10-PCS | Mod: S$GLB,,, | Performed by: DERMATOLOGY

## 2023-02-15 PROCEDURE — 99213 PR OFFICE/OUTPT VISIT, EST, LEVL III, 20-29 MIN: ICD-10-PCS | Mod: 25,S$GLB,, | Performed by: DERMATOLOGY

## 2023-02-15 PROCEDURE — 11102 PR TANGENTIAL BIOPSY, SKIN, SINGLE LESION: ICD-10-PCS | Mod: S$GLB,,, | Performed by: DERMATOLOGY

## 2023-02-15 PROCEDURE — 3288F FALL RISK ASSESSMENT DOCD: CPT | Mod: CPTII,S$GLB,, | Performed by: DERMATOLOGY

## 2023-02-15 PROCEDURE — 1101F PR PT FALLS ASSESS DOC 0-1 FALLS W/OUT INJ PAST YR: ICD-10-PCS | Mod: CPTII,S$GLB,, | Performed by: DERMATOLOGY

## 2023-02-15 PROCEDURE — 1159F PR MEDICATION LIST DOCUMENTED IN MEDICAL RECORD: ICD-10-PCS | Mod: CPTII,S$GLB,, | Performed by: DERMATOLOGY

## 2023-02-15 PROCEDURE — 88305 TISSUE EXAM BY PATHOLOGIST: CPT | Mod: 59 | Performed by: PATHOLOGY

## 2023-02-15 PROCEDURE — 99999 PR PBB SHADOW E&M-EST. PATIENT-LVL III: ICD-10-PCS | Mod: PBBFAC,,, | Performed by: DERMATOLOGY

## 2023-02-15 PROCEDURE — 11103 PR TANGENTIAL BIOPSY, SKIN, EA ADDTL LESION: ICD-10-PCS | Mod: S$GLB,,, | Performed by: DERMATOLOGY

## 2023-02-15 PROCEDURE — 17003 DESTRUCT PREMALG LES 2-14: CPT | Mod: S$GLB,,, | Performed by: DERMATOLOGY

## 2023-02-15 PROCEDURE — 1160F PR REVIEW ALL MEDS BY PRESCRIBER/CLIN PHARMACIST DOCUMENTED: ICD-10-PCS | Mod: CPTII,S$GLB,, | Performed by: DERMATOLOGY

## 2023-02-15 PROCEDURE — 1126F PR PAIN SEVERITY QUANTIFIED, NO PAIN PRESENT: ICD-10-PCS | Mod: CPTII,S$GLB,, | Performed by: DERMATOLOGY

## 2023-02-15 PROCEDURE — 88305 TISSUE EXAM BY PATHOLOGIST: ICD-10-PCS | Mod: 26,HCNC,, | Performed by: PATHOLOGY

## 2023-02-15 PROCEDURE — 1126F AMNT PAIN NOTED NONE PRSNT: CPT | Mod: CPTII,S$GLB,, | Performed by: DERMATOLOGY

## 2023-02-15 PROCEDURE — 1101F PT FALLS ASSESS-DOCD LE1/YR: CPT | Mod: CPTII,S$GLB,, | Performed by: DERMATOLOGY

## 2023-02-15 PROCEDURE — 11103 TANGNTL BX SKIN EA SEP/ADDL: CPT | Mod: S$GLB,,, | Performed by: DERMATOLOGY

## 2023-02-15 PROCEDURE — 11102 TANGNTL BX SKIN SINGLE LES: CPT | Mod: S$GLB,,, | Performed by: DERMATOLOGY

## 2023-02-15 PROCEDURE — 1159F MED LIST DOCD IN RCRD: CPT | Mod: CPTII,S$GLB,, | Performed by: DERMATOLOGY

## 2023-02-15 PROCEDURE — 17000 DESTRUCT PREMALG LESION: CPT | Mod: 59,S$GLB,, | Performed by: DERMATOLOGY

## 2023-02-15 PROCEDURE — 1160F RVW MEDS BY RX/DR IN RCRD: CPT | Mod: CPTII,S$GLB,, | Performed by: DERMATOLOGY

## 2023-02-15 PROCEDURE — 17000 PR DESTRUCTION(LASER SURGERY,CRYOSURGERY,CHEMOSURGERY),PREMALIGNANT LESIONS,FIRST LESION: ICD-10-PCS | Mod: 59,S$GLB,, | Performed by: DERMATOLOGY

## 2023-02-15 PROCEDURE — 3288F PR FALLS RISK ASSESSMENT DOCUMENTED: ICD-10-PCS | Mod: CPTII,S$GLB,, | Performed by: DERMATOLOGY

## 2023-02-15 PROCEDURE — 88305 TISSUE EXAM BY PATHOLOGIST: CPT | Mod: 26,HCNC,, | Performed by: PATHOLOGY

## 2023-02-15 PROCEDURE — 99213 OFFICE O/P EST LOW 20 MIN: CPT | Mod: 25,S$GLB,, | Performed by: DERMATOLOGY

## 2023-02-15 PROCEDURE — 99999 PR PBB SHADOW E&M-EST. PATIENT-LVL III: CPT | Mod: PBBFAC,,, | Performed by: DERMATOLOGY

## 2023-02-15 NOTE — PROGRESS NOTES
Subjective:       Patient ID:  Homero Tanner is a 82 y.o. male who presents for   Chief Complaint   Patient presents with    Skin Check     Here today for a skin check of scalp.Dry crusted scaly areas on scalp.     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 At last visit, he was treated with cryo at last visit for AKs of the scalp.  Pt defered efudex. + improvement.    For rosacea, he uses emgel    For seb derm, he uses ketoconazole shampoo and clobetasol solution      Review of Systems   Constitutional:  Negative for fever and chills.   Gastrointestinal:  Negative for nausea and vomiting.   Skin:  Positive for activity-related sunscreen use. Negative for daily sunscreen use and recent sunburn.   Hematologic/Lymphatic: Does not bruise/bleed easily.      Objective:    Physical Exam   Constitutional: He appears well-developed and well-nourished. No distress.   Neurological: He is alert and oriented to person, place, and time. He is not disoriented.   Psychiatric: He has a normal mood and affect.   Skin:   Areas Examined (abnormalities noted in diagram):   Scalp / Hair Palpated and Inspected  Head / Face Inspection Performed  Neck Inspection Performed  RUE Inspected  LUE Inspection Performed  Nails and Digits Inspection Performed            Diagram Legend     Erythematous scaling macule/papule c/w actinic keratosis       Vascular papule c/w angioma      Pigmented verrucoid papule/plaque c/w seborrheic keratosis      Yellow umbilicated papule c/w sebaceous hyperplasia      Irregularly shaped tan macule c/w lentigo     1-2 mm smooth white papules consistent with Milia      Movable subcutaneous cyst with punctum c/w epidermal inclusion cyst      Subcutaneous movable cyst c/w pilar cyst      Firm pink to brown papule c/w dermatofibroma      Pedunculated fleshy  papule(s) c/w skin tag(s)      Evenly pigmented macule c/w junctional nevus     Mildly variegated pigmented, slightly irregular-bordered macule c/w mildly atypical nevus      Flesh colored to evenly pigmented papule c/w intradermal nevus       Pink pearly papule/plaque c/w basal cell carcinoma      Erythematous hyperkeratotic cursted plaque c/w SCC      Surgical scar with no sign of skin cancer recurrence      Open and closed comedones      Inflammatory papules and pustules      Verrucoid papule consistent consistent with wart     Erythematous eczematous patches and plaques     Dystrophic onycholytic nail with subungual debris c/w onychomycosis     Umbilicated papule    Erythematous-base heme-crusted tan verrucoid plaque consistent with inflamed seborrheic keratosis     Erythematous Silvery Scaling Plaque c/w Psoriasis     See annotation                      Assessment / Plan:      Pathology Orders:       Normal Orders This Visit    Specimen to Pathology, Dermatology     Comments:    Number of Specimens:->2  ------------------------->-------------------------  Spec 1 Procedure:->Biopsy  Spec 1 Clinical Impression:->r/o SCC vs. HAK  Spec 1 Source:->left temple  ------------------------->-------------------------  Spec 2 Procedure:->Biopsy  Spec 2 Clinical Impression:->r/o SCC vs. HAK  Spec 2 Source:->midline parietal scalp  Release to patient->Immediate    Questions:    Procedure Type: Dermatology and skin neoplasms    Number of Specimens: 2    ------------------------: -------------------------    Spec 1 Procedure: Biopsy    Spec 1 Clinical Impression: r/o SCC vs. HAK    Spec 1 Source: left temple    ------------------------: -------------------------    Spec 2 Procedure: Biopsy    Spec 2 Clinical Impression: r/o SCC vs. HAK    Spec 2 Source: midline parietal scalp    Clinical Information: see above    Release to patient: Immediate          Scar conditions/skin fibrosis  Screening, malignant neoplasm, skin  History  of skin cancer  Scar of the scalp x 2, hx of NMSC.  No evidence of recurrence on physical exam today.  Continue routine skin surveillance. Daily sunscreen advised.    Actinic keratoses  Cryosurgery Procedure Note    The patient is informed of the precancerous quality and need for treatment of these lesions. After risks, benefits and alternatives explained, including blistering, pain, hyper- and hypopigmentation, patient verbally consents to cryotherapy to precancerous lesions. Liquid nitrogen cryosurgery is applied to the 4 actinic keratoses, as detailed in the physical exam, to produce a freeze injury. The patient is aware that blisters may form and is instructed on wound care with gentle cleansing and use of vaseline ointment to keep moist until healed. The patient is supplied a handout on cryosurgery and is instructed to call if lesions do not completely resolve.    Neoplasm of uncertain behavior of skin  -     Specimen to Pathology, Dermatology  -     Shave biopsy(-ies) done of 2 site(s).   Patient informed to call for results within 2 weeks if [facility] not [facility]               Follow up for call for results.    PROCEDURE NOTE - SHAVE BIOPSY   Location: see above    After risk, benefits, and alternatives were discussed with the patient, the patient agrees to the procedure by verbal informed consent.  The area(s) were cleansed with alcohol. 2 cc of lidocaine 1% with epinephrine was injected for local anesthesia into each lesion(s).  A sharp dermablade was used to remove part or all of the lesion(s).  The specimen(s) will be sent for tissue pathology.  Hemostasis was obtained with aluminum chloride and/or hyfrecation.  The area(s) were dressed with vaseline ointment and bandaged.  The patient tolerated the procedure well without adverse events.  Wound care instructions were given to the patient on the AVS.  The patient will be notified of pathology results once available.  Results will also be available in Epic.

## 2023-02-15 NOTE — PATIENT INSTRUCTIONS
Shave Biopsy Wound Care    Your doctor has performed a shave biopsy today.  A band aid and vaseline ointment has been placed over the site.  This should remain in place for 24 hours.  It is recommended that you keep the area dry for the first 24 hours.  After 24 hours, you may remove the band aid and wash the area with warm soap and water and apply Vaseline jelly.  Many patients prefer to use Neosporin or Bacitracin ointment.  This is acceptable; however, know that you can develop an allergy to this medication even if you have used it safely for years.  It is important to keep the area moist.  Letting it dry out and get air slows healing time, and will worsen the scar.  Band aid is optional after first 24 hours.      If you notice increasing redness, tenderness, pain, or yellow drainage at the biopsy site, please notify your doctor.  These are signs of an infection.    If your biopsy site is bleeding, apply firm pressure for 15 minutes straight.  Repeat for another 15 minutes, if it is still bleeding.   If the surgical site continues to bleed, then please contact your doctor.      [facility]   DERMATOLOGY  [address] [address]   Dept: [phone]   Dept Fax: [phone]       CRYOSURGERY      Your doctor has used a method called cryosurgery to treat your skin condition. Cryosurgery refers to the use of very cold substances to treat a variety of skin conditions such as warts, pre-skin cancers, molluscum contagiosum, sun spots, and several benign growths. The substance we use in cryosurgery is liquid nitrogen and is so cold (-195 degrees Celsius) that is burns when administered.     Following treatment in the office, the skin may immediately burn and become red. You may find the area around the lesion is affected as well. It is sometimes necessary to treat not only the lesion, but a small area of the surrounding normal skin to achieve a good response.      A blister, and even a blood filled blister, may form after treatment.   This is a normal response. If the blister is painful, it is acceptable to sterilize a needle and with rubbing alcohol and gently pop the blister. It is important that you gently wash the area with soap and warm water as the blister fluid may contain wart virus if a wart was treated. Do no remove the roof of the blister.     The area treated can take anywhere from 1-3 weeks to heal. Healing time depends on the kind of skin lesion treated, the location, and how aggressively the lesion was treated. It is recommended that the areas treated are covered with Vaseline or bacitracin ointment and a band-aid. If a band-aid is not practical, just ointment applied several times per day will do. Keeping these areas moist will speed the healing time.    Treatment with liquid nitrogen can leave a scar. In dark skin, it may be a light or dark scar, in light skin it may be a white or pink scar. These will generally fade with time.    If you have any concerns after your treatment, please feel free to call the office.         [facility] DERMATOLOGY 4TH FLOOR  [address] [address]  Dept: [phone]  Dept Fax: [phone]

## 2023-02-22 ENCOUNTER — OFFICE VISIT (OUTPATIENT)
Dept: HEMATOLOGY/ONCOLOGY | Facility: CLINIC | Age: 83
End: 2023-02-22
Payer: MEDICARE

## 2023-02-22 VITALS
SYSTOLIC BLOOD PRESSURE: 138 MMHG | OXYGEN SATURATION: 96 % | TEMPERATURE: 98 F | WEIGHT: 179.25 LBS | HEIGHT: 70 IN | DIASTOLIC BLOOD PRESSURE: 70 MMHG | HEART RATE: 61 BPM | BODY MASS INDEX: 25.66 KG/M2

## 2023-02-22 DIAGNOSIS — I70.0 AORTIC ATHEROSCLEROSIS: ICD-10-CM

## 2023-02-22 DIAGNOSIS — Z87.898 HISTORY OF THYMOMA: ICD-10-CM

## 2023-02-22 DIAGNOSIS — C88.0 WALDENSTROM'S MACROGLOBULINEMIA: ICD-10-CM

## 2023-02-22 DIAGNOSIS — I50.32 CHRONIC DIASTOLIC HEART FAILURE: ICD-10-CM

## 2023-02-22 DIAGNOSIS — N18.31 CHRONIC KIDNEY DISEASE, STAGE 3A: ICD-10-CM

## 2023-02-22 DIAGNOSIS — Z85.46 HISTORY OF PROSTATE CANCER: ICD-10-CM

## 2023-02-22 DIAGNOSIS — Z85.048 HISTORY OF RECTAL CANCER: Primary | ICD-10-CM

## 2023-02-22 DIAGNOSIS — D89.2 PARAPROTEINEMIA: ICD-10-CM

## 2023-02-22 PROBLEM — E87.5 HYPERKALEMIA: Status: RESOLVED | Noted: 2021-04-26 | Resolved: 2023-02-22

## 2023-02-22 PROBLEM — N18.9 CRI (CHRONIC RENAL INSUFFICIENCY): Status: RESOLVED | Noted: 2018-09-18 | Resolved: 2023-02-22

## 2023-02-22 PROCEDURE — 1101F PT FALLS ASSESS-DOCD LE1/YR: CPT | Mod: HCNC,CPTII,S$GLB, | Performed by: INTERNAL MEDICINE

## 2023-02-22 PROCEDURE — 3078F DIAST BP <80 MM HG: CPT | Mod: HCNC,CPTII,S$GLB, | Performed by: INTERNAL MEDICINE

## 2023-02-22 PROCEDURE — 3078F PR MOST RECENT DIASTOLIC BLOOD PRESSURE < 80 MM HG: ICD-10-PCS | Mod: HCNC,CPTII,S$GLB, | Performed by: INTERNAL MEDICINE

## 2023-02-22 PROCEDURE — 3075F SYST BP GE 130 - 139MM HG: CPT | Mod: HCNC,CPTII,S$GLB, | Performed by: INTERNAL MEDICINE

## 2023-02-22 PROCEDURE — 3288F FALL RISK ASSESSMENT DOCD: CPT | Mod: HCNC,CPTII,S$GLB, | Performed by: INTERNAL MEDICINE

## 2023-02-22 PROCEDURE — 99215 PR OFFICE/OUTPT VISIT, EST, LEVL V, 40-54 MIN: ICD-10-PCS | Mod: HCNC,S$GLB,, | Performed by: INTERNAL MEDICINE

## 2023-02-22 PROCEDURE — 1159F MED LIST DOCD IN RCRD: CPT | Mod: HCNC,CPTII,S$GLB, | Performed by: INTERNAL MEDICINE

## 2023-02-22 PROCEDURE — 1101F PR PT FALLS ASSESS DOC 0-1 FALLS W/OUT INJ PAST YR: ICD-10-PCS | Mod: HCNC,CPTII,S$GLB, | Performed by: INTERNAL MEDICINE

## 2023-02-22 PROCEDURE — 3075F PR MOST RECENT SYSTOLIC BLOOD PRESS GE 130-139MM HG: ICD-10-PCS | Mod: HCNC,CPTII,S$GLB, | Performed by: INTERNAL MEDICINE

## 2023-02-22 PROCEDURE — 1126F AMNT PAIN NOTED NONE PRSNT: CPT | Mod: HCNC,CPTII,S$GLB, | Performed by: INTERNAL MEDICINE

## 2023-02-22 PROCEDURE — 1126F PR PAIN SEVERITY QUANTIFIED, NO PAIN PRESENT: ICD-10-PCS | Mod: HCNC,CPTII,S$GLB, | Performed by: INTERNAL MEDICINE

## 2023-02-22 PROCEDURE — 1160F RVW MEDS BY RX/DR IN RCRD: CPT | Mod: HCNC,CPTII,S$GLB, | Performed by: INTERNAL MEDICINE

## 2023-02-22 PROCEDURE — 99999 PR PBB SHADOW E&M-EST. PATIENT-LVL IV: CPT | Mod: PBBFAC,,, | Performed by: INTERNAL MEDICINE

## 2023-02-22 PROCEDURE — 1159F PR MEDICATION LIST DOCUMENTED IN MEDICAL RECORD: ICD-10-PCS | Mod: HCNC,CPTII,S$GLB, | Performed by: INTERNAL MEDICINE

## 2023-02-22 PROCEDURE — 1160F PR REVIEW ALL MEDS BY PRESCRIBER/CLIN PHARMACIST DOCUMENTED: ICD-10-PCS | Mod: HCNC,CPTII,S$GLB, | Performed by: INTERNAL MEDICINE

## 2023-02-22 PROCEDURE — 99999 PR PBB SHADOW E&M-EST. PATIENT-LVL IV: ICD-10-PCS | Mod: PBBFAC,,, | Performed by: INTERNAL MEDICINE

## 2023-02-22 PROCEDURE — 99215 OFFICE O/P EST HI 40 MIN: CPT | Mod: HCNC,S$GLB,, | Performed by: INTERNAL MEDICINE

## 2023-02-22 PROCEDURE — 3288F PR FALLS RISK ASSESSMENT DOCUMENTED: ICD-10-PCS | Mod: HCNC,CPTII,S$GLB, | Performed by: INTERNAL MEDICINE

## 2023-02-22 NOTE — PROGRESS NOTES
Subjective:       Patient ID: Homero Tanner is a 82 y.o. male.    Chief Complaint: Results, Prostate Cancer, and Lymphoma    HPI:  82-year-old male history of metastatic prostate carcinoma currently being treated with Lupron by outside urology.  Thymoma Waldenstrom's macroglobulinemia, rectal cancer, and history of thymoma status post resection.  Patient returns for review ECOG status 1    Past Medical History:   Diagnosis Date    Abnormal ECG 6/24/2013    Adenomatous rectal polyp 12/7/2015    Aortic insufficiency 6/24/2013    Arthritis     Asthma     as a child    Benign neoplasm of cecum 2/27/2017    Benign neoplasm of transverse colon 2/27/2017    Cataract     CHF (congestive heart failure)     Pt states He's never been told this    Diverticulosis of large intestine without hemorrhage 2/27/2017    Encounter for blood transfusion     Frequent PVCs     Gallstones 3/28/2018    By Us done 3/21/2018    GERD (gastroesophageal reflux disease)     History of colon polyps     Hypertension     Iron deficiency anemia 10/26/2015    Lymphoma     waldenstrom's macroglobulinemia    Mixed hyperlipidemia 6/24/2013    Premature atrial contractions     Prostate cancer     PSVT (paroxysmal supraventricular tachycardia) 6/24/2013    Pulmonary hypertension 6/13/2016    PVC's (premature ventricular contractions) 6/13/2016    Rectal adenocarcinoma     Rectal cancer 5/1/2018    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    left parietal scalp    Thymoma     TR (tricuspid regurgitation) 6/13/2016    Tubular adenoma of colon 06/2020    VT (ventricular tachycardia) 9/18/2018     Family History   Problem Relation Age of Onset    Diabetes Father     Cataracts Mother     Amblyopia Neg Hx     Blindness Neg Hx     Cancer Neg Hx     Glaucoma Neg Hx     Hypertension Neg Hx     Macular degeneration Neg Hx     Retinal detachment Neg Hx     Strabismus Neg Hx     Stroke Neg Hx     Thyroid disease Neg Hx      Melanoma Neg Hx      Social History     Socioeconomic History    Marital status: Single   Tobacco Use    Smoking status: Former     Packs/day: 1.00     Years: 15.00     Pack years: 15.00     Types: Cigarettes     Quit date: 1969     Years since quittin.0    Smokeless tobacco: Never   Substance and Sexual Activity    Alcohol use: Yes     Alcohol/week: 3.0 standard drinks     Types: 3 Cans of beer per week     Comment: socially, NO ALCOHOL 72 HOURS PRIOR TO SX    Drug use: No     Past Surgical History:   Procedure Laterality Date    biopsy for chest mass      BONE MARROW BIOPSY Left 2018    Procedure: Biopsy-bone marrow;  Surgeon: Charanjit Dacosta MD;  Location: [facility];  Service: General;  Laterality: Left;    CATARACT EXTRACTION W/  INTRAOCULAR LENS IMPLANT Right 2019    CATARACT EXTRACTION W/  INTRAOCULAR LENS IMPLANT Left 2019    COLON SURGERY      COLONOSCOPY N/A 10/26/2015    Procedure: Colonoscopy;  Surgeon: Abraham Hong MD;  Location: [facility];  Service: Endoscopy;  Laterality: N/A;    COLONOSCOPY Left 2017    Procedure: COLONOSCOPY;  Surgeon: Abraham Hong MD;  Location: [facility];  Service: Endoscopy;  Laterality: Left;    COLONOSCOPY N/A 2018    Procedure: hixtory of rectal cancer. Colonsocopy asked to be repeated minnie  year, alst one 2017;  Surgeon: Patricio Streeter MD;  Location: [facility];  Service: Endoscopy;  Laterality: N/A;    COLONOSCOPY N/A 2018    Procedure: COLONOSCOPY/hybrid APC or a EndoRotor device;  Surgeon: Rao Medeiros MD;  Location: [facility] ([address] [place] [address]);  Service: Endoscopy;  Laterality: N/A;    COLONOSCOPY N/A 2020    Procedure: COLONOSCOPY;  Surgeon: Yu Wells MD;  Location: [facility];  Service: Endoscopy;  Laterality: N/A;    ESOPHAGOGASTRODUODENOSCOPY N/A 10/22/2019    Procedure: ESOPHAGOGASTRODUODENOSCOPY (EGD);  Surgeon: Mariela Greer MD;  Location: [facility];  Service: Endoscopy;  Laterality:  N/A;    FLEXIBLE SIGMOIDOSCOPY Left 4/23/2019    Procedure: SIGMOIDOSCOPY, FLEXIBLE;  Surgeon: Ranjit Will MD;  Location: [facility] ENDO;  Service: Endoscopy;  Laterality: Left;    HERNIA REPAIR Right 02/2018    Inguinal, open with mesh    mohs      PROSTATECTOMY      RECTAL SURGERY N/A 09/2014    REPAIR OF SLIDING INGUINAL HERNIA Right 2/26/2019    Procedure: REPAIR, HERNIA, INGUINAL, SLIDING;  Surgeon: Bridger Alvarez MD;  Location: [facility] OR;  Service: General;  Laterality: Right;    SKIN BIOPSY      TONSILLECTOMY      TUMOR REMOVAL         Labs:  Lab Results   Component Value Date    WBC 9.26 02/09/2023    HGB 13.2 (L) 02/09/2023    HCT 40.2 02/09/2023    MCV 92 02/09/2023     02/09/2023     BMP  Lab Results   Component Value Date     02/09/2023    K 5.4 (H) 02/09/2023     02/09/2023    CO2 23 02/09/2023    BUN 19 02/09/2023    CREATININE 1.3 02/09/2023    CALCIUM 9.5 02/09/2023    ANIONGAP 11 02/09/2023    ESTGFRAFRICA 60 03/09/2022    EGFRNONAA 52 (A) 03/09/2022     Lab Results   Component Value Date    ALT 25 02/09/2023    AST 29 02/09/2023     (H) 12/14/2020    ALKPHOS 117 02/09/2023    BILITOT 0.8 02/09/2023       Lab Results   Component Value Date    IRON 49 02/18/2020    TIBC 271 02/18/2020    FERRITIN 349 (H) 02/18/2020     Lab Results   Component Value Date    SRGSHLBY74 508 02/09/2023     Lab Results   Component Value Date    FOLATE 8.8 02/09/2023     Lab Results   Component Value Date    TSH 1.726 03/09/2022         Review of Systems   Constitutional:  Negative for activity change, appetite change, chills, diaphoresis, fatigue, fever and unexpected weight change.   HENT:  Negative for congestion, dental problem, drooling, ear discharge, ear pain, facial swelling, hearing loss, mouth sores, nosebleeds, postnasal drip, rhinorrhea, sinus pressure, sneezing, sore throat, tinnitus, trouble swallowing and voice change.    Eyes:  Negative for photophobia, pain,  discharge, redness, itching and visual disturbance.   Respiratory:  Negative for apnea, cough, choking, chest tightness, shortness of breath, wheezing and stridor.    Cardiovascular:  Negative for chest pain, palpitations and leg swelling.   Gastrointestinal:  Negative for abdominal distention, abdominal pain, anal bleeding, blood in stool, constipation, diarrhea, nausea, rectal pain and vomiting.   Endocrine: Negative for cold intolerance, heat intolerance, polydipsia, polyphagia and polyuria.   Genitourinary:  Negative for decreased urine volume, difficulty urinating, dysuria, enuresis, flank pain, frequency, genital sores, hematuria, penile discharge, penile pain, penile swelling, scrotal swelling, testicular pain and urgency.   Musculoskeletal:  Negative for arthralgias, back pain, gait problem, joint swelling, myalgias, neck pain and neck stiffness.   Skin:  Negative for color change, pallor, rash and wound.   Allergic/Immunologic: Negative for environmental allergies, food allergies and immunocompromised state.   Neurological:  Negative for dizziness, tremors, seizures, syncope, facial asymmetry, speech difficulty, weakness, light-headedness, numbness and headaches.   Hematological:  Negative for adenopathy. Does not bruise/bleed easily.   Psychiatric/Behavioral:  Negative for agitation, behavioral problems, confusion, decreased concentration, dysphoric mood, hallucinations, self-injury, sleep disturbance and suicidal ideas. The patient is not nervous/anxious and is not hyperactive.      Objective:      Physical Exam  Vitals reviewed.   Constitutional:       General: He is not in acute distress.     Appearance: He is well-developed. He is not diaphoretic.   HENT:      Head: Normocephalic.      Right Ear: External ear normal.      Left Ear: External ear normal.      Nose: Nose normal.      Right Sinus: No maxillary sinus tenderness or frontal sinus tenderness.      Left Sinus: No maxillary sinus tenderness or  frontal sinus tenderness.      Mouth/Throat:      Pharynx: No oropharyngeal exudate.   Eyes:      General: Lids are normal. No scleral icterus.        Right eye: No discharge.         Left eye: No discharge.      Extraocular Movements:      Right eye: Normal extraocular motion.      Left eye: Normal extraocular motion.      Conjunctiva/sclera:      Right eye: Right conjunctiva is not injected. No hemorrhage.     Left eye: Left conjunctiva is not injected. No hemorrhage.     Pupils: Pupils are equal, round, and reactive to light.   Neck:      Thyroid: No thyromegaly.      Vascular: No JVD.      Trachea: No tracheal deviation.   Cardiovascular:      Rate and Rhythm: Normal rate.   Pulmonary:      Effort: Pulmonary effort is normal. No respiratory distress.      Breath sounds: No stridor.   Abdominal:      General: Bowel sounds are normal.      Palpations: Abdomen is soft. There is no hepatomegaly, splenomegaly or mass.      Tenderness: There is no abdominal tenderness.   Musculoskeletal:         General: No tenderness. Normal range of motion.      Cervical back: Normal range of motion and neck supple.   Lymphadenopathy:      Head:      Right side of head: No posterior auricular or occipital adenopathy.      Left side of head: No posterior auricular or occipital adenopathy.      Cervical: No cervical adenopathy.      Right cervical: No superficial, deep or posterior cervical adenopathy.     Left cervical: No superficial, deep or posterior cervical adenopathy.      Upper Body:      Right upper body: No supraclavicular adenopathy.      Left upper body: No supraclavicular adenopathy.   Skin:     General: Skin is dry.      Findings: No erythema or rash.      Nails: There is no clubbing.   Neurological:      Mental Status: He is alert and oriented to person, place, and time.      Cranial Nerves: No cranial nerve deficit.      Coordination: Coordination normal.   Psychiatric:         Behavior: Behavior normal.          Thought Content: Thought content normal.         Judgment: Judgment normal.           Assessment:      1. History of rectal cancer    2. Chronic kidney disease, stage 3a    3. History of prostate cancer    4. Waldenstrom's macroglobulinemia    5. Paraproteinemia    6. Aortic atherosclerosis    7. Chronic diastolic heart failure    8. History of thymoma           Plan:     Recent PET scan demonstrates no evidence of active disease.  Laboratory studies are stable from from standpoint.  Will check next blood draw for PSA handled by outside urology for different malignancies would recommend that patient undergo germ line testing.  History of metastatic prostate cancer dictates this alone through NCCN guidelines.  RTC 6 months with standing labs communicate results through portal.  And communicate results of germ line testing.  Discussed above reviewed information in extensive evaluation of patient's has medical history reviewed    .  Med Onc Chart Routing      Follow up with physician    Follow up with [facility] 6 months. See AP P with standing labs that have been placed prior   Infusion scheduling note    Injection scheduling note    Labs    Imaging    Pharmacy appointment    Other referrals         Dustin Sparks Jr, MD FACP

## 2023-02-23 ENCOUNTER — TELEPHONE (OUTPATIENT)
Dept: HEMATOLOGY/ONCOLOGY | Facility: CLINIC | Age: 83
End: 2023-02-23
Payer: MEDICARE

## 2023-02-23 LAB
FINAL PATHOLOGIC DIAGNOSIS: NORMAL
Lab: NORMAL

## 2023-02-23 NOTE — TELEPHONE ENCOUNTER
MyRisk drawn by nurse. Patient tolerated well. No signs of distress noted.    [id number]   Confirmation: [id number]

## 2023-03-02 ENCOUNTER — TELEPHONE (OUTPATIENT)
Dept: PREADMISSION TESTING | Facility: HOSPITAL | Age: 83
End: 2023-03-02

## 2023-03-02 ENCOUNTER — HOSPITAL ENCOUNTER (OUTPATIENT)
Dept: PREADMISSION TESTING | Facility: HOSPITAL | Age: 83
Discharge: HOME | End: 2023-03-02
Attending: INTERNAL MEDICINE
Payer: MEDICARE

## 2023-03-02 ENCOUNTER — PATIENT MESSAGE (OUTPATIENT)
Dept: PREADMISSION TESTING | Facility: HOSPITAL | Age: 83
End: 2023-03-02

## 2023-03-02 DIAGNOSIS — Z85.048 HISTORY OF RECTAL CANCER: ICD-10-CM

## 2023-03-02 NOTE — TELEPHONE ENCOUNTER
[phone] called patient to schedule gi office visit d/t age protocol, he declined. stated he had other surgical procedures to complete prior to having his endoscopy procedures.

## 2023-03-03 ENCOUNTER — TELEPHONE (OUTPATIENT)
Dept: CARDIOLOGY | Facility: CLINIC | Age: 83
End: 2023-03-03
Payer: MEDICARE

## 2023-03-03 NOTE — TELEPHONE ENCOUNTER
MD Malia Marie MA  Caller: Homero (Today, 10:28 AM)  Defer to PCP to add PSA, noncardiac lab.      Called patient to advise per Dr. Wright      Spoke patient verbalized understanding

## 2023-03-06 ENCOUNTER — LAB VISIT (OUTPATIENT)
Dept: LAB | Facility: HOSPITAL | Age: 83
End: 2023-03-06
Attending: INTERNAL MEDICINE
Payer: MEDICARE

## 2023-03-06 DIAGNOSIS — I50.32 CHRONIC DIASTOLIC HEART FAILURE: ICD-10-CM

## 2023-03-06 LAB
ALBUMIN SERPL BCP-MCNC: 4.2 G/DL (ref 3.5–5.2)
ALP SERPL-CCNC: 104 U/L (ref 55–135)
ALT SERPL W/O P-5'-P-CCNC: 17 U/L (ref 10–44)
ANION GAP SERPL CALC-SCNC: 8 MMOL/L (ref 8–16)
AST SERPL-CCNC: 27 U/L (ref 10–40)
BILIRUB SERPL-MCNC: 0.8 MG/DL (ref 0.1–1)
BNP SERPL-MCNC: 638 PG/ML (ref 0–99)
BUN SERPL-MCNC: 31 MG/DL (ref 8–23)
CALCIUM SERPL-MCNC: 9.3 MG/DL (ref 8.7–10.5)
CHLORIDE SERPL-SCNC: 102 MMOL/L (ref 95–110)
CO2 SERPL-SCNC: 27 MMOL/L (ref 23–29)
CREAT SERPL-MCNC: 1.5 MG/DL (ref 0.5–1.4)
EST. GFR  (NO RACE VARIABLE): 46.5 ML/MIN/1.73 M^2
GLUCOSE SERPL-MCNC: 104 MG/DL (ref 70–110)
POTASSIUM SERPL-SCNC: 5.1 MMOL/L (ref 3.5–5.1)
PROT SERPL-MCNC: 6.4 G/DL (ref 6–8.4)
SODIUM SERPL-SCNC: 137 MMOL/L (ref 136–145)

## 2023-03-06 PROCEDURE — 36415 COLL VENOUS BLD VENIPUNCTURE: CPT | Mod: HCNC | Performed by: INTERNAL MEDICINE

## 2023-03-06 PROCEDURE — 80053 COMPREHEN METABOLIC PANEL: CPT | Mod: HCNC | Performed by: INTERNAL MEDICINE

## 2023-03-06 PROCEDURE — 83880 ASSAY OF NATRIURETIC PEPTIDE: CPT | Mod: HCNC | Performed by: INTERNAL MEDICINE

## 2023-03-13 ENCOUNTER — OFFICE VISIT (OUTPATIENT)
Dept: CARDIOLOGY | Facility: CLINIC | Age: 83
End: 2023-03-13
Payer: MEDICARE

## 2023-03-13 VITALS
BODY MASS INDEX: 25.91 KG/M2 | SYSTOLIC BLOOD PRESSURE: 130 MMHG | OXYGEN SATURATION: 93 % | DIASTOLIC BLOOD PRESSURE: 60 MMHG | HEIGHT: 70 IN | WEIGHT: 181 LBS | HEART RATE: 57 BPM

## 2023-03-13 DIAGNOSIS — I27.20 PULMONARY HYPERTENSION: Chronic | ICD-10-CM

## 2023-03-13 DIAGNOSIS — I70.0 AORTIC ATHEROSCLEROSIS: ICD-10-CM

## 2023-03-13 DIAGNOSIS — N18.31 CHRONIC KIDNEY DISEASE, STAGE 3A: ICD-10-CM

## 2023-03-13 DIAGNOSIS — I36.1 NONRHEUMATIC TRICUSPID VALVE REGURGITATION: ICD-10-CM

## 2023-03-13 DIAGNOSIS — R60.0 EDEMA OF BOTH LEGS: ICD-10-CM

## 2023-03-13 DIAGNOSIS — I10 ESSENTIAL HYPERTENSION: Chronic | ICD-10-CM

## 2023-03-13 DIAGNOSIS — I50.32 CHRONIC DIASTOLIC HEART FAILURE: Primary | ICD-10-CM

## 2023-03-13 DIAGNOSIS — E78.2 MIXED HYPERLIPIDEMIA: ICD-10-CM

## 2023-03-13 DIAGNOSIS — I47.10 PSVT (PAROXYSMAL SUPRAVENTRICULAR TACHYCARDIA): ICD-10-CM

## 2023-03-13 DIAGNOSIS — I49.1 PREMATURE ATRIAL CONTRACTIONS: ICD-10-CM

## 2023-03-13 DIAGNOSIS — R94.31 ABNORMAL ECG: Chronic | ICD-10-CM

## 2023-03-13 DIAGNOSIS — I35.1 NONRHEUMATIC AORTIC VALVE INSUFFICIENCY: Chronic | ICD-10-CM

## 2023-03-13 PROCEDURE — 3078F PR MOST RECENT DIASTOLIC BLOOD PRESSURE < 80 MM HG: ICD-10-PCS | Mod: HCNC,CPTII,S$GLB, | Performed by: INTERNAL MEDICINE

## 2023-03-13 PROCEDURE — 99999 PR PBB SHADOW E&M-EST. PATIENT-LVL III: CPT | Mod: PBBFAC,HCNC,, | Performed by: INTERNAL MEDICINE

## 2023-03-13 PROCEDURE — 1159F MED LIST DOCD IN RCRD: CPT | Mod: HCNC,CPTII,S$GLB, | Performed by: INTERNAL MEDICINE

## 2023-03-13 PROCEDURE — 1126F PR PAIN SEVERITY QUANTIFIED, NO PAIN PRESENT: ICD-10-PCS | Mod: HCNC,CPTII,S$GLB, | Performed by: INTERNAL MEDICINE

## 2023-03-13 PROCEDURE — 1160F RVW MEDS BY RX/DR IN RCRD: CPT | Mod: HCNC,CPTII,S$GLB, | Performed by: INTERNAL MEDICINE

## 2023-03-13 PROCEDURE — 99214 OFFICE O/P EST MOD 30 MIN: CPT | Mod: HCNC,S$GLB,, | Performed by: INTERNAL MEDICINE

## 2023-03-13 PROCEDURE — 1126F AMNT PAIN NOTED NONE PRSNT: CPT | Mod: HCNC,CPTII,S$GLB, | Performed by: INTERNAL MEDICINE

## 2023-03-13 PROCEDURE — 3075F SYST BP GE 130 - 139MM HG: CPT | Mod: HCNC,CPTII,S$GLB, | Performed by: INTERNAL MEDICINE

## 2023-03-13 PROCEDURE — 99999 PR PBB SHADOW E&M-EST. PATIENT-LVL III: ICD-10-PCS | Mod: PBBFAC,HCNC,, | Performed by: INTERNAL MEDICINE

## 2023-03-13 PROCEDURE — 1160F PR REVIEW ALL MEDS BY PRESCRIBER/CLIN PHARMACIST DOCUMENTED: ICD-10-PCS | Mod: HCNC,CPTII,S$GLB, | Performed by: INTERNAL MEDICINE

## 2023-03-13 PROCEDURE — 99214 PR OFFICE/OUTPT VISIT, EST, LEVL IV, 30-39 MIN: ICD-10-PCS | Mod: HCNC,S$GLB,, | Performed by: INTERNAL MEDICINE

## 2023-03-13 PROCEDURE — 1159F PR MEDICATION LIST DOCUMENTED IN MEDICAL RECORD: ICD-10-PCS | Mod: HCNC,CPTII,S$GLB, | Performed by: INTERNAL MEDICINE

## 2023-03-13 PROCEDURE — 3075F PR MOST RECENT SYSTOLIC BLOOD PRESS GE 130-139MM HG: ICD-10-PCS | Mod: HCNC,CPTII,S$GLB, | Performed by: INTERNAL MEDICINE

## 2023-03-13 PROCEDURE — 3078F DIAST BP <80 MM HG: CPT | Mod: HCNC,CPTII,S$GLB, | Performed by: INTERNAL MEDICINE

## 2023-03-13 RX ORDER — AMLODIPINE BESYLATE 2.5 MG/1
2.5 TABLET ORAL DAILY
Qty: 60 TABLET | Refills: 11
Start: 2023-03-13 | End: 2023-10-03

## 2023-03-13 RX ORDER — FUROSEMIDE 40 MG/1
40 TABLET ORAL
Qty: 30 TABLET | Refills: 11
Start: 2023-03-13

## 2023-03-13 RX ORDER — FUROSEMIDE 20 MG/1
TABLET ORAL
Qty: 30 TABLET | Refills: 11 | Status: SHIPPED | OUTPATIENT
Start: 2023-03-13

## 2023-03-13 NOTE — PROGRESS NOTES
Subjective:    Patient ID:  Homero Tanner is a 82 y.o. male who presents for evaluation of Congestive Heart Failure, Palpitations, Valvular Heart Disease, and Hypertension        HPIPt presents for eval.   His current medical conditions include diastolic CHF, Waldenstrom's macroglobulinemia lymphoma,h/o thymoma, HTN, DD, CRI, PHTN, AS, AI, PVCs, TR, dyslipidemia, prostate & rectal cancer.   Nonsmoker.    Past hx pertinent for following:  Had LHC 20+ years ago, no significant blockages noted.    He has chronic abnl ecgs.   Also has had PSVT with stress testing in past.    He has h/o thymoma surgery with xrt and also had rectal polyp surgery.  - Stress MPI Aug 2017.  Echo Aug 2017 normal EF, DD, mild-mod PHTN, mild AI, LVH, mild RVE.  Echo 9/18 EF 50%, normal diastolic function, mild PHTN, LVH.  Taken off statin for abnl LFTs at one point, eventually restarted.  Echo 8/21: EF 50%, DD, mild AS, mild AI, mod-severe TR, mild-mod MR, biatrial enlargement, mod PHTN,  48 hour Holter 8/21: NSR, frequent PVCs, frequent PACs, nonsustained AT.  Ecg 8/30/22 NSR, PVCs.  Now here.  No angina.  Denies Chest pain.  Minimal BACH.  No pnd/orthopnea.  HTN is controlled.  No syncope.  No bothersome palpitations.  Stable leg edema.  BNP higher.  On Lasix 40 mg 3 x/week.    Past Medical History:   Diagnosis Date    Abnormal ECG 6/24/2013    Adenomatous rectal polyp 12/7/2015    Aortic insufficiency 6/24/2013    Arthritis     Asthma     as a child    Benign neoplasm of cecum 2/27/2017    Benign neoplasm of transverse colon 2/27/2017    Cataract     CHF (congestive heart failure)     Pt states He's never been told this    Diverticulosis of large intestine without hemorrhage 2/27/2017    Encounter for blood transfusion     Frequent PVCs     Gallstones 3/28/2018    By Us done 3/21/2018    GERD (gastroesophageal reflux disease)     History of colon polyps     Hypertension     Iron deficiency anemia 10/26/2015    Lymphoma      waldenstrom's macroglobulinemia    Mixed hyperlipidemia 6/24/2013    Premature atrial contractions     Prostate cancer     PSVT (paroxysmal supraventricular tachycardia) 6/24/2013    Pulmonary hypertension 6/13/2016    PVC's (premature ventricular contractions) 6/13/2016    Rectal adenocarcinoma     Rectal cancer 5/1/2018    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    left parietal scalp    Thymoma     TR (tricuspid regurgitation) 6/13/2016    Tubular adenoma of colon 06/2020    VT (ventricular tachycardia) 9/18/2018     Current Outpatient Medications   Medication Instructions    amLODIPine (NORVASC) 2.5 MG tablet TAKE 1 TABLET(2.5 MG) BY MOUTH TWICE DAILY    aspirin (ECOTRIN) 81 mg, Oral, Daily    b complex vitamins tablet Take by mouth. 1 tablet Oral Every day    clobetasoL (TEMOVATE) 0.05 % external solution APPLY TO SCALP 1 TO 2 TIMES A DAY AS NEEDED FOR FLARES, DO NOT APPLY TO FACE OR FOLDS OF SKIN    cyanocobalamin 1,000 mcg/mL injection INJECT 1ML UNDER THE SKIN EVERY MONTH    erythromycin with ethanoL (EMGEL) 2 % gel APPLY TO THE AFFECTED AREA OF FACE TWICE DAILY FOR ROSACEA/ REDNESS    furosemide (LASIX) 40 MG tablet TAKE 1 TABLET(40 MG) BY MOUTH EVERY DAY    ketoconazole (NIZORAL) 2 % shampoo APPLY TO SCALP 1 TO 2 TIMES PER WEEK, LATHER, LET SIT 5MIN, THEN RINSE    leuprolide (LUPRON) 3.75 mg, Intramuscular, Every 3 months    metoprolol succinate (TOPROL-XL) 100 MG 24 hr tablet TAKE 1 TABLET(100 MG) BY MOUTH EVERY DAY    metronidazole 0.75% (METROCREAM) 0.75 % Crea APPLY TO AFFECTED AREA ON FACE TWICE DAILY    omeprazole (PRILOSEC) 20 MG capsule TAKE 1 CAPSULE(20 MG) BY MOUTH EVERY DAY    rosuvastatin (CRESTOR) 10 MG tablet TAKE 1 TABLET(10 MG) BY MOUTH EVERY DAY         Review of Systems   Constitutional: Negative.   HENT: Negative.     Eyes: Negative.    Cardiovascular:  Positive for dyspnea on exertion and leg swelling.   Respiratory:  Positive for shortness of breath.    Endocrine: Negative.   "  Hematologic/Lymphatic: Negative.    Skin: Negative.    Musculoskeletal:  Positive for arthritis.   Gastrointestinal: Negative.    Genitourinary: Negative.    Neurological: Negative.    Psychiatric/Behavioral: Negative.     Allergic/Immunologic: Negative.         /60   Pulse (!) 57   Ht 5' 10" (1.778 m)   Wt 82.1 kg (181 lb)   SpO2 (!) 93%   BMI 25.97 kg/m²     Wt Readings from Last 3 Encounters:   03/13/23 82.1 kg (181 lb)   02/22/23 81.3 kg (179 lb 3.7 oz)   09/08/22 77.6 kg (171 lb)     Temp Readings from Last 3 Encounters:   02/22/23 97.7 °F (36.5 °C) (Temporal)   09/08/22 98 °F (36.7 °C) (Oral)   08/31/22 97.3 °F (36.3 °C) (Tympanic)     BP Readings from Last 3 Encounters:   03/13/23 130/60   02/22/23 138/70   09/08/22 (!) 143/65     Pulse Readings from Last 3 Encounters:   03/13/23 (!) 57   02/22/23 61   09/08/22 (!) 55       Objective:    Physical Exam  Vitals and nursing note reviewed.   Constitutional:       Appearance: He is well-developed.   HENT:      Head: Normocephalic.   Neck:      Thyroid: No thyromegaly.      Vascular: Normal carotid pulses. No carotid bruit or JVD.   Cardiovascular:      Rate and Rhythm: Normal rate. Rhythm regularly irregular.      Pulses:           Radial pulses are 2+ on the right side and 2+ on the left side.      Heart sounds: S1 normal and S2 normal. Heart sounds not distant. No midsystolic click and no opening snap. No murmur heard.    No friction rub. No S3 or S4 sounds.   Pulmonary:      Effort: Pulmonary effort is normal.      Breath sounds: Normal breath sounds. No wheezing or rales.   Abdominal:      General: Bowel sounds are normal. There is no distension or abdominal bruit.      Palpations: Abdomen is soft.      Tenderness: There is no abdominal tenderness.   Musculoskeletal:      Cervical back: Neck supple.      Right lower leg: Edema present.      Left lower leg: Edema present.   Skin:     General: Skin is warm.   Neurological:      Mental Status: He is " alert and oriented to person, place, and time.   Psychiatric:         Behavior: Behavior normal.       I have reviewed all pertinent labs and cardiac studies.    Component      Latest Ref Rng & Units 3/6/2023   BNP      0 - 99 pg/mL 638 (H)         Chemistry        Component Value Date/Time     03/06/2023 0924    K 5.1 03/06/2023 0924     03/06/2023 0924    CO2 27 03/06/2023 0924    BUN 31 (H) 03/06/2023 0924    CREATININE 1.5 (H) 03/06/2023 0924     03/06/2023 0924        Component Value Date/Time    CALCIUM 9.3 03/06/2023 0924    ALKPHOS 104 03/06/2023 0924    AST 27 03/06/2023 0924    ALT 17 03/06/2023 0924    BILITOT 0.8 03/06/2023 0924    ESTGFRAFRICA 60 03/09/2022 0858    EGFRNONAA 52 (A) 03/09/2022 0858        Lab Results   Component Value Date    WBC 9.26 02/09/2023    HGB 13.2 (L) 02/09/2023    HCT 40.2 02/09/2023    MCV 92 02/09/2023     02/09/2023       Lab Results   Component Value Date    HGBA1C 5.2 06/06/2016       Lab Results   Component Value Date    CHOL 187 02/02/2022    CHOL 175 12/07/2021    CHOL 173 10/30/2020     Lab Results   Component Value Date    HDL 75 02/02/2022    HDL 74 12/07/2021    HDL 61 10/30/2020     Lab Results   Component Value Date    LDLCALC 98.0 02/02/2022    LDLCALC 88.4 12/07/2021    LDLCALC 93.4 10/30/2020     Lab Results   Component Value Date    TRIG 70 02/02/2022    TRIG 63 12/07/2021    TRIG 93 10/30/2020     Lab Results   Component Value Date    CHOLHDL 40.1 02/02/2022    CHOLHDL 42.3 12/07/2021    CHOLHDL 35.3 10/30/2020           Assessment:       1. Chronic diastolic heart failure    2. Nonrheumatic aortic valve insufficiency    3. Aortic atherosclerosis    4. Abnormal ECG    5. Essential hypertension    6. Edema of both legs    7. Chronic kidney disease, stage 3a    8. Mixed hyperlipidemia    9. Premature atrial contractions    10. Pulmonary hypertension    11. PSVT (paroxysmal supraventricular tachycardia)    12. Nonrheumatic tricuspid  valve regurgitation           Plan:             Diastolic CHF:  BNP up some. Clinically compensated.  Continue Lasix 40 mg M, W, F.  Add Lasix 20 mg on Tues, thurs.  CMP + BNP in 3 weeks.  Low salt diet.    HTN: Continue current HTN meds.    CRI: repeat labs 3 weeks.  See PCP or Nephrology if needed.    Valvular heart disease: stable.  F/u echo in future.    Lipids: statin tx.    Aortic atherosclerosis: statin tx, asa.    Leg edema: diuretics.  Low salt    PHTN: f/u echo in future.    Abnl ecg: monitor.    PACs/PVCs: asx.   Monitor.  Beta-blocker tx.      Phone review.    F/u 6 months.       I have reviewed all pertinent labs and cardiac studies independently. Plans and recommendations have been formulated under my direct supervision. All questions answered and patient voiced understanding.

## 2023-03-27 RX ORDER — OMEPRAZOLE 20 MG/1
CAPSULE, DELAYED RELEASE ORAL
Qty: 90 CAPSULE | Refills: 0 | Status: SHIPPED | OUTPATIENT
Start: 2023-03-27 | End: 2023-07-20 | Stop reason: SDUPTHER

## 2023-04-19 ENCOUNTER — PATIENT OUTREACH (OUTPATIENT)
Dept: ADMINISTRATIVE | Facility: HOSPITAL | Age: 83
End: 2023-04-19
Payer: MEDICARE

## 2023-04-19 NOTE — PROGRESS NOTES
[employer] Report: per chart review pt is overdue for PCP appt, attempted to contact pt no ans, lvm.

## 2023-04-26 ENCOUNTER — PATIENT MESSAGE (OUTPATIENT)
Dept: INTERNAL MEDICINE | Facility: CLINIC | Age: 83
End: 2023-04-26
Payer: MEDICARE

## 2023-04-27 ENCOUNTER — OFFICE VISIT (OUTPATIENT)
Dept: INTERNAL MEDICINE | Facility: CLINIC | Age: 83
End: 2023-04-27
Payer: MEDICARE

## 2023-04-27 VITALS
TEMPERATURE: 97 F | DIASTOLIC BLOOD PRESSURE: 60 MMHG | BODY MASS INDEX: 25.28 KG/M2 | SYSTOLIC BLOOD PRESSURE: 138 MMHG | WEIGHT: 176.56 LBS | OXYGEN SATURATION: 97 % | HEIGHT: 70 IN | HEART RATE: 63 BPM

## 2023-04-27 DIAGNOSIS — J47.9 BRONCHIECTASIS WITHOUT COMPLICATION: ICD-10-CM

## 2023-04-27 DIAGNOSIS — Z00.00 ROUTINE GENERAL MEDICAL EXAMINATION AT HEALTH CARE FACILITY: Primary | ICD-10-CM

## 2023-04-27 DIAGNOSIS — C61 PROSTATE CANCER METASTATIC TO BONE: ICD-10-CM

## 2023-04-27 DIAGNOSIS — Z85.79 HISTORY OF WALDENSTROM'S MACROGLOBULINEMIA: ICD-10-CM

## 2023-04-27 DIAGNOSIS — E78.2 MIXED HYPERLIPIDEMIA: ICD-10-CM

## 2023-04-27 DIAGNOSIS — I10 ESSENTIAL HYPERTENSION: Chronic | ICD-10-CM

## 2023-04-27 DIAGNOSIS — C79.51 PROSTATE CANCER METASTATIC TO BONE: ICD-10-CM

## 2023-04-27 PROCEDURE — 99999 PR PBB SHADOW E&M-EST. PATIENT-LVL III: ICD-10-PCS | Mod: PBBFAC,HCNC,, | Performed by: INTERNAL MEDICINE

## 2023-04-27 PROCEDURE — 1101F PT FALLS ASSESS-DOCD LE1/YR: CPT | Mod: HCNC,CPTII,S$GLB, | Performed by: INTERNAL MEDICINE

## 2023-04-27 PROCEDURE — 1160F PR REVIEW ALL MEDS BY PRESCRIBER/CLIN PHARMACIST DOCUMENTED: ICD-10-PCS | Mod: HCNC,CPTII,S$GLB, | Performed by: INTERNAL MEDICINE

## 2023-04-27 PROCEDURE — 99397 PER PM REEVAL EST PAT 65+ YR: CPT | Mod: HCNC,S$GLB,, | Performed by: INTERNAL MEDICINE

## 2023-04-27 PROCEDURE — 3078F DIAST BP <80 MM HG: CPT | Mod: HCNC,CPTII,S$GLB, | Performed by: INTERNAL MEDICINE

## 2023-04-27 PROCEDURE — 3288F PR FALLS RISK ASSESSMENT DOCUMENTED: ICD-10-PCS | Mod: HCNC,CPTII,S$GLB, | Performed by: INTERNAL MEDICINE

## 2023-04-27 PROCEDURE — 1101F PR PT FALLS ASSESS DOC 0-1 FALLS W/OUT INJ PAST YR: ICD-10-PCS | Mod: HCNC,CPTII,S$GLB, | Performed by: INTERNAL MEDICINE

## 2023-04-27 PROCEDURE — 99999 PR PBB SHADOW E&M-EST. PATIENT-LVL III: CPT | Mod: PBBFAC,HCNC,, | Performed by: INTERNAL MEDICINE

## 2023-04-27 PROCEDURE — 1126F AMNT PAIN NOTED NONE PRSNT: CPT | Mod: HCNC,CPTII,S$GLB, | Performed by: INTERNAL MEDICINE

## 2023-04-27 PROCEDURE — 99397 PR PREVENTIVE VISIT,EST,65 & OVER: ICD-10-PCS | Mod: HCNC,S$GLB,, | Performed by: INTERNAL MEDICINE

## 2023-04-27 PROCEDURE — 1160F RVW MEDS BY RX/DR IN RCRD: CPT | Mod: HCNC,CPTII,S$GLB, | Performed by: INTERNAL MEDICINE

## 2023-04-27 PROCEDURE — 3288F FALL RISK ASSESSMENT DOCD: CPT | Mod: HCNC,CPTII,S$GLB, | Performed by: INTERNAL MEDICINE

## 2023-04-27 PROCEDURE — 1159F MED LIST DOCD IN RCRD: CPT | Mod: HCNC,CPTII,S$GLB, | Performed by: INTERNAL MEDICINE

## 2023-04-27 PROCEDURE — 1126F PR PAIN SEVERITY QUANTIFIED, NO PAIN PRESENT: ICD-10-PCS | Mod: HCNC,CPTII,S$GLB, | Performed by: INTERNAL MEDICINE

## 2023-04-27 PROCEDURE — 3075F SYST BP GE 130 - 139MM HG: CPT | Mod: HCNC,CPTII,S$GLB, | Performed by: INTERNAL MEDICINE

## 2023-04-27 PROCEDURE — 3075F PR MOST RECENT SYSTOLIC BLOOD PRESS GE 130-139MM HG: ICD-10-PCS | Mod: HCNC,CPTII,S$GLB, | Performed by: INTERNAL MEDICINE

## 2023-04-27 PROCEDURE — 3078F PR MOST RECENT DIASTOLIC BLOOD PRESSURE < 80 MM HG: ICD-10-PCS | Mod: HCNC,CPTII,S$GLB, | Performed by: INTERNAL MEDICINE

## 2023-04-27 PROCEDURE — 1159F PR MEDICATION LIST DOCUMENTED IN MEDICAL RECORD: ICD-10-PCS | Mod: HCNC,CPTII,S$GLB, | Performed by: INTERNAL MEDICINE

## 2023-04-27 NOTE — PROGRESS NOTES
"Subjective:       Patient ID: Homero Tanner is a 82 y.o. male.    Chief Complaint: Annual Exam      HPI:  Patient is an 82-year-old male presenting today for updated physical exam review of chronic health issues.  Patient has history of hypertension, hyperlipidemia, prostate cancer with metastases history of Zaldivar's macroglobulinemia, bronchiectasis.  He indicates he has been doing well.  He continues to follow with his expertise in the heme Onc department for his prostate cancer and they continue to monitor his prostate cancer issues.  He notes no acute health issues at this time.  He has had noted bronchiectasis changes on previous imaging but his breathing has been stable he relates no acute decompensations there.    Review of Systems   Constitutional:  Negative for fever and unexpected weight change.   HENT:  Negative for hearing loss, postnasal drip and rhinorrhea.    Eyes:  Negative for pain and visual disturbance.   Respiratory:  Negative for cough, shortness of breath and wheezing.    Cardiovascular:  Negative for chest pain and palpitations.   Gastrointestinal:  Negative for constipation, diarrhea, nausea and vomiting.   Genitourinary:  Negative for dysuria and hematuria.   Musculoskeletal:  Negative for arthralgias, back pain, myalgias and neck stiffness.   Skin:  Negative for pallor and rash.   Neurological:  Negative for seizures, syncope and headaches.   Hematological:  Negative for adenopathy.   Psychiatric/Behavioral:  Negative for dysphoric mood. The patient is not nervous/anxious.      Objective:   /60   Pulse 63   Temp 97.2 °F (36.2 °C) (Tympanic)   Ht 5' 10" (1.778 m)   Wt 80.1 kg (176 lb 9.4 oz)   SpO2 97%   BMI 25.34 kg/m²      Physical Exam  Vitals reviewed.   Constitutional:       General: He is not in acute distress.     Appearance: He is well-developed.   HENT:      Head: Normocephalic and atraumatic.      Right Ear: Tympanic membrane and ear canal normal.      Left " Ear: Tympanic membrane and ear canal normal.   Eyes:      Pupils: Pupils are equal, round, and reactive to light.   Neck:      Thyroid: No thyromegaly.      Vascular: No JVD.   Cardiovascular:      Rate and Rhythm: Normal rate and regular rhythm.      Heart sounds: Normal heart sounds. No murmur heard.    No friction rub. No gallop.   Pulmonary:      Effort: Pulmonary effort is normal.      Breath sounds: Normal breath sounds. No wheezing or rales.   Abdominal:      General: Bowel sounds are normal. There is no distension.      Palpations: Abdomen is soft.      Tenderness: There is no abdominal tenderness. There is no guarding or rebound.   Musculoskeletal:         General: Normal range of motion.      Cervical back: Normal range of motion and neck supple.   Lymphadenopathy:      Cervical: No cervical adenopathy.   Skin:     General: Skin is warm and dry.      Findings: No rash.   Neurological:      General: No focal deficit present.      Mental Status: He is alert and oriented to person, place, and time.      Cranial Nerves: No cranial nerve deficit.      Deep Tendon Reflexes: Reflexes are normal and symmetric.   Psychiatric:         Mood and Affect: Mood normal.         Judgment: Judgment normal.       No visits with results within 2 Week(s) from this visit.   Latest known visit with results is:   Lab Visit on 03/06/2023   Component Date Value    BNP 03/06/2023 638 (H)     Sodium 03/06/2023 137     Potassium 03/06/2023 5.1     Chloride 03/06/2023 102     CO2 03/06/2023 27     Glucose 03/06/2023 104     BUN 03/06/2023 31 (H)     Creatinine 03/06/2023 1.5 (H)     Calcium 03/06/2023 9.3     Total Protein 03/06/2023 6.4     Albumin 03/06/2023 4.2     Total Bilirubin 03/06/2023 0.8     Alkaline Phosphatase 03/06/2023 104     AST 03/06/2023 27     ALT 03/06/2023 17     Anion Gap 03/06/2023 8     eGFR 03/06/2023 46.5 (A)        Assessment:       1. Routine general medical examination at health care facility    2.  Essential hypertension    3. Mixed hyperlipidemia    4. Prostate cancer metastatic to bone    5. History of Waldenstrom's macroglobulinemia    6. Bronchiectasis without complication        Plan:   Essential hypertension  Blood pressure is under good control.  We will continue the current regimen.  Will work on regular aerobic exercise and a low salt diet.        Hyperlipidemia  Cholesterol numbers look good.  We will continue the current regimen at this time.  Remain focused on low fat diet and high dietary fiber intake.    Update labs    Prostate cancer metastatic to bone  Continues to follow with Dr. Fuentes and Dr. Sparks.  Check psa today.  COntinues on lupron at this time.    History of Waldenstrom's macroglobulinemia  No active treatment at this time.  Monitoring with routine labs at this time.    Bronchiectasis without complication  No breathing issues, continue monitoring.  Routine general medical examination at health care facility    Essential hypertension  -     Hemoglobin A1C; Future; Expected date: 04/27/2023  -     Comprehensive Metabolic Panel; Future; Expected date: 04/27/2023    Mixed hyperlipidemia  -     Lipid Panel; Future; Expected date: 04/27/2023    Prostate cancer metastatic to bone  -     PROSTATE SPECIFIC ANTIGEN, DIAGNOSTIC; Future; Expected date: 04/27/2023    History of Waldenstrom's macroglobulinemia    Bronchiectasis without complication          Follow up in about 6 months (around 10/27/2023).

## 2023-04-27 NOTE — ASSESSMENT & PLAN NOTE
Continues to follow with Dr. Fuentes and Dr. Sparks.  Check psa today.  COntinues on lupron at this time.

## 2023-04-28 ENCOUNTER — LAB VISIT (OUTPATIENT)
Dept: LAB | Facility: HOSPITAL | Age: 83
End: 2023-04-28
Attending: INTERNAL MEDICINE
Payer: MEDICARE

## 2023-04-28 DIAGNOSIS — E78.2 MIXED HYPERLIPIDEMIA: ICD-10-CM

## 2023-04-28 DIAGNOSIS — C61 PROSTATE CANCER METASTATIC TO BONE: ICD-10-CM

## 2023-04-28 DIAGNOSIS — I10 ESSENTIAL HYPERTENSION: Chronic | ICD-10-CM

## 2023-04-28 DIAGNOSIS — C79.51 PROSTATE CANCER METASTATIC TO BONE: ICD-10-CM

## 2023-04-28 LAB
ALBUMIN SERPL BCP-MCNC: 4 G/DL (ref 3.5–5.2)
ALP SERPL-CCNC: 99 U/L (ref 55–135)
ALT SERPL W/O P-5'-P-CCNC: 20 U/L (ref 10–44)
ANION GAP SERPL CALC-SCNC: 8 MMOL/L (ref 8–16)
AST SERPL-CCNC: 30 U/L (ref 10–40)
BILIRUB SERPL-MCNC: 0.6 MG/DL (ref 0.1–1)
BUN SERPL-MCNC: 24 MG/DL (ref 8–23)
CALCIUM SERPL-MCNC: 9.8 MG/DL (ref 8.7–10.5)
CHLORIDE SERPL-SCNC: 104 MMOL/L (ref 95–110)
CHOLEST SERPL-MCNC: 189 MG/DL (ref 120–199)
CHOLEST/HDLC SERPL: 2.5 {RATIO} (ref 2–5)
CO2 SERPL-SCNC: 28 MMOL/L (ref 23–29)
COMPLEXED PSA SERPL-MCNC: 32.3 NG/ML (ref 0–4)
CREAT SERPL-MCNC: 1.3 MG/DL (ref 0.5–1.4)
EST. GFR  (NO RACE VARIABLE): 55.2 ML/MIN/1.73 M^2
ESTIMATED AVG GLUCOSE: 103 MG/DL (ref 68–131)
GLUCOSE SERPL-MCNC: 102 MG/DL (ref 70–110)
HBA1C MFR BLD: 5.2 % (ref 4–5.6)
HDLC SERPL-MCNC: 76 MG/DL (ref 40–75)
HDLC SERPL: 40.2 % (ref 20–50)
LDLC SERPL CALC-MCNC: 99.8 MG/DL (ref 63–159)
NONHDLC SERPL-MCNC: 113 MG/DL
POTASSIUM SERPL-SCNC: 5 MMOL/L (ref 3.5–5.1)
PROT SERPL-MCNC: 6.7 G/DL (ref 6–8.4)
SODIUM SERPL-SCNC: 140 MMOL/L (ref 136–145)
TRIGL SERPL-MCNC: 66 MG/DL (ref 30–150)

## 2023-04-28 PROCEDURE — 83036 HEMOGLOBIN GLYCOSYLATED A1C: CPT | Mod: HCNC | Performed by: INTERNAL MEDICINE

## 2023-04-28 PROCEDURE — 80061 LIPID PANEL: CPT | Mod: HCNC | Performed by: INTERNAL MEDICINE

## 2023-04-28 PROCEDURE — 84153 ASSAY OF PSA TOTAL: CPT | Mod: HCNC | Performed by: INTERNAL MEDICINE

## 2023-04-28 PROCEDURE — 80053 COMPREHEN METABOLIC PANEL: CPT | Mod: HCNC | Performed by: INTERNAL MEDICINE

## 2023-04-28 PROCEDURE — 36415 COLL VENOUS BLD VENIPUNCTURE: CPT | Mod: HCNC,PO | Performed by: INTERNAL MEDICINE

## 2023-05-10 ENCOUNTER — TELEPHONE (OUTPATIENT)
Dept: INTERNAL MEDICINE | Facility: CLINIC | Age: 83
End: 2023-05-10
Payer: MEDICARE

## 2023-05-10 ENCOUNTER — TELEPHONE (OUTPATIENT)
Dept: CARDIOLOGY | Facility: CLINIC | Age: 83
End: 2023-05-10

## 2023-05-10 NOTE — TELEPHONE ENCOUNTER
Pt may proceed with dental work at low to moderate CV risk.    Dr Kyle Morrow P Staff; P Bridger CIFUENTES Staff; PATRIZIA Carbajal H Staff  Caller: [facility] [phone] (Today,  8:30 AM)  Calling requesting pt clearance . States they have been sending fax over/messages and calling and no one has responded nor reached back out , pt surgery is 5/16/2023 ([id number]) . Please call back today at [phone] . Thanks/dj      [facility] is requesting clearance for this patient to have an implant delivery of lower denture on May 26th

## 2023-05-10 NOTE — TELEPHONE ENCOUNTER
----- Message from Yasmeen Dumont sent at 5/10/2023  8:30 AM CDT -----  Contact: [facility] [phone]  Calling requesting pt clearance . States they have been sending fax over/messages and calling and no one has responded nor reached back out , pt surgery is 5/16/2023 ([id number]) . Please call back today at [phone] . Thanks/pairsh

## 2023-05-10 NOTE — TELEPHONE ENCOUNTER
Yasmeen Morrow P Staff; PATRIZIA CIFUENTES Staff; PATRIZIA Carbajal H Staff  Caller: [facility] [phone] (Today,  8:30 AM)  Calling requesting pt clearance . States they have been sending fax over/messages and calling and no one has responded nor reached back out , pt surgery is 5/16/2023 ([id number]) . Please call back today at [phone] . Thanks/dj     [facility] is requesting clearance for this patient to have an implant delivery of lower denture on May 26th

## 2023-05-11 ENCOUNTER — OFFICE VISIT (OUTPATIENT)
Dept: HEMATOLOGY/ONCOLOGY | Facility: CLINIC | Age: 83
End: 2023-05-11
Payer: MEDICARE

## 2023-05-11 ENCOUNTER — LAB VISIT (OUTPATIENT)
Dept: LAB | Facility: HOSPITAL | Age: 83
End: 2023-05-11
Attending: INTERNAL MEDICINE
Payer: MEDICARE

## 2023-05-11 ENCOUNTER — OFFICE VISIT (OUTPATIENT)
Dept: DERMATOLOGY | Facility: CLINIC | Age: 83
End: 2023-05-11
Payer: MEDICARE

## 2023-05-11 VITALS
RESPIRATION RATE: 18 BRPM | OXYGEN SATURATION: 96 % | SYSTOLIC BLOOD PRESSURE: 140 MMHG | HEART RATE: 60 BPM | DIASTOLIC BLOOD PRESSURE: 73 MMHG | HEIGHT: 70 IN | WEIGHT: 172.19 LBS | BODY MASS INDEX: 24.65 KG/M2 | TEMPERATURE: 98 F

## 2023-05-11 DIAGNOSIS — N18.31 CHRONIC KIDNEY DISEASE, STAGE 3A: ICD-10-CM

## 2023-05-11 DIAGNOSIS — C79.51 PROSTATE CANCER METASTATIC TO BONE: Primary | ICD-10-CM

## 2023-05-11 DIAGNOSIS — Z85.46 HISTORY OF PROSTATE CANCER: ICD-10-CM

## 2023-05-11 DIAGNOSIS — D89.2 PARAPROTEINEMIA: ICD-10-CM

## 2023-05-11 DIAGNOSIS — T45.1X5A IMMUNODEFICIENCY DUE TO CHEMOTHERAPY: ICD-10-CM

## 2023-05-11 DIAGNOSIS — Z79.899 IMMUNODEFICIENCY DUE TO CHEMOTHERAPY: ICD-10-CM

## 2023-05-11 DIAGNOSIS — Z85.828 HISTORY OF SKIN CANCER: ICD-10-CM

## 2023-05-11 DIAGNOSIS — R23.4 ESCHAR: ICD-10-CM

## 2023-05-11 DIAGNOSIS — Z85.048 HISTORY OF RECTAL CANCER: ICD-10-CM

## 2023-05-11 DIAGNOSIS — L90.5 SCAR CONDITIONS/SKIN FIBROSIS: Primary | ICD-10-CM

## 2023-05-11 DIAGNOSIS — C61 PROSTATE CANCER METASTATIC TO BONE: Primary | ICD-10-CM

## 2023-05-11 DIAGNOSIS — L57.0 ACTINIC KERATOSES: ICD-10-CM

## 2023-05-11 DIAGNOSIS — D84.821 IMMUNODEFICIENCY DUE TO CHEMOTHERAPY: ICD-10-CM

## 2023-05-11 DIAGNOSIS — C88.0 WALDENSTROM'S MACROGLOBULINEMIA: ICD-10-CM

## 2023-05-11 DIAGNOSIS — Z85.79 HISTORY OF WALDENSTROM'S MACROGLOBULINEMIA: ICD-10-CM

## 2023-05-11 DIAGNOSIS — I10 ESSENTIAL HYPERTENSION: Chronic | ICD-10-CM

## 2023-05-11 DIAGNOSIS — I50.32 CHRONIC DIASTOLIC HEART FAILURE: ICD-10-CM

## 2023-05-11 DIAGNOSIS — E78.2 MIXED HYPERLIPIDEMIA: ICD-10-CM

## 2023-05-11 DIAGNOSIS — Z12.83 SCREENING, MALIGNANT NEOPLASM, SKIN: ICD-10-CM

## 2023-05-11 PROBLEM — Z79.69 IMMUNODEFICIENCY DUE TO CHEMOTHERAPY: Status: ACTIVE | Noted: 2023-05-11

## 2023-05-11 LAB
ALBUMIN SERPL BCP-MCNC: 4.5 G/DL (ref 3.5–5.2)
ALP SERPL-CCNC: 131 U/L (ref 55–135)
ALT SERPL W/O P-5'-P-CCNC: 18 U/L (ref 10–44)
ANION GAP SERPL CALC-SCNC: 14 MMOL/L (ref 8–16)
AST SERPL-CCNC: 23 U/L (ref 10–40)
BASOPHILS # BLD AUTO: 0.05 K/UL (ref 0–0.2)
BASOPHILS # BLD AUTO: 0.05 K/UL (ref 0–0.2)
BASOPHILS NFR BLD: 0.7 % (ref 0–1.9)
BASOPHILS NFR BLD: 0.7 % (ref 0–1.9)
BILIRUB SERPL-MCNC: 0.7 MG/DL (ref 0.1–1)
BNP SERPL-MCNC: 331 PG/ML (ref 0–99)
BUN SERPL-MCNC: 23 MG/DL (ref 8–23)
CALCIUM SERPL-MCNC: 9.3 MG/DL (ref 8.7–10.5)
CEA SERPL-MCNC: 2.9 NG/ML (ref 0–5)
CHLORIDE SERPL-SCNC: 101 MMOL/L (ref 95–110)
CO2 SERPL-SCNC: 25 MMOL/L (ref 23–29)
COMPLEXED PSA SERPL-MCNC: 59.2 NG/ML (ref 0–4)
CREAT SERPL-MCNC: 1.5 MG/DL (ref 0.5–1.4)
CRP SERPL-MCNC: 7.8 MG/L (ref 0–8.2)
DIFFERENTIAL METHOD: NORMAL
DIFFERENTIAL METHOD: NORMAL
EOSINOPHIL # BLD AUTO: 0.4 K/UL (ref 0–0.5)
EOSINOPHIL # BLD AUTO: 0.4 K/UL (ref 0–0.5)
EOSINOPHIL NFR BLD: 5 % (ref 0–8)
EOSINOPHIL NFR BLD: 5 % (ref 0–8)
ERYTHROCYTE [DISTWIDTH] IN BLOOD BY AUTOMATED COUNT: 13 % (ref 11.5–14.5)
ERYTHROCYTE [DISTWIDTH] IN BLOOD BY AUTOMATED COUNT: 13 % (ref 11.5–14.5)
EST. GFR  (NO RACE VARIABLE): 46 ML/MIN/1.73 M^2
GLUCOSE SERPL-MCNC: 116 MG/DL (ref 70–110)
HCT VFR BLD AUTO: 42.3 % (ref 40–54)
HCT VFR BLD AUTO: 42.3 % (ref 40–54)
HGB BLD-MCNC: 14 G/DL (ref 14–18)
HGB BLD-MCNC: 14 G/DL (ref 14–18)
IGA SERPL-MCNC: 74 MG/DL (ref 40–350)
IGG SERPL-MCNC: 637 MG/DL (ref 650–1600)
IGM SERPL-MCNC: 428 MG/DL (ref 50–300)
IMM GRANULOCYTES # BLD AUTO: 0.04 K/UL (ref 0–0.04)
IMM GRANULOCYTES # BLD AUTO: 0.04 K/UL (ref 0–0.04)
IMM GRANULOCYTES NFR BLD AUTO: 0.5 % (ref 0–0.5)
IMM GRANULOCYTES NFR BLD AUTO: 0.5 % (ref 0–0.5)
LDH SERPL L TO P-CCNC: 229 U/L (ref 110–260)
LYMPHOCYTES # BLD AUTO: 1.6 K/UL (ref 1–4.8)
LYMPHOCYTES # BLD AUTO: 1.6 K/UL (ref 1–4.8)
LYMPHOCYTES NFR BLD: 22.3 % (ref 18–48)
LYMPHOCYTES NFR BLD: 22.3 % (ref 18–48)
MCH RBC QN AUTO: 30.4 PG (ref 27–31)
MCH RBC QN AUTO: 30.4 PG (ref 27–31)
MCHC RBC AUTO-ENTMCNC: 33.1 G/DL (ref 32–36)
MCHC RBC AUTO-ENTMCNC: 33.1 G/DL (ref 32–36)
MCV RBC AUTO: 92 FL (ref 82–98)
MCV RBC AUTO: 92 FL (ref 82–98)
MONOCYTES # BLD AUTO: 0.6 K/UL (ref 0.3–1)
MONOCYTES # BLD AUTO: 0.6 K/UL (ref 0.3–1)
MONOCYTES NFR BLD: 8.7 % (ref 4–15)
MONOCYTES NFR BLD: 8.7 % (ref 4–15)
NEUTROPHILS # BLD AUTO: 4.6 K/UL (ref 1.8–7.7)
NEUTROPHILS # BLD AUTO: 4.6 K/UL (ref 1.8–7.7)
NEUTROPHILS NFR BLD: 62.8 % (ref 38–73)
NEUTROPHILS NFR BLD: 62.8 % (ref 38–73)
NRBC BLD-RTO: 0 /100 WBC
NRBC BLD-RTO: 0 /100 WBC
PLATELET # BLD AUTO: 277 K/UL (ref 150–450)
PLATELET # BLD AUTO: 277 K/UL (ref 150–450)
PMV BLD AUTO: 9.8 FL (ref 9.2–12.9)
PMV BLD AUTO: 9.8 FL (ref 9.2–12.9)
POTASSIUM SERPL-SCNC: 4.3 MMOL/L (ref 3.5–5.1)
PROT SERPL-MCNC: 7.2 G/DL (ref 6–8.4)
RBC # BLD AUTO: 4.61 M/UL (ref 4.6–6.2)
RBC # BLD AUTO: 4.61 M/UL (ref 4.6–6.2)
SODIUM SERPL-SCNC: 140 MMOL/L (ref 136–145)
TESTOST SERPL-MCNC: 15 NG/DL (ref 304–1227)
WBC # BLD AUTO: 7.36 K/UL (ref 3.9–12.7)
WBC # BLD AUTO: 7.36 K/UL (ref 3.9–12.7)

## 2023-05-11 PROCEDURE — 3288F FALL RISK ASSESSMENT DOCD: CPT | Mod: HCNC,CPTII,S$GLB, | Performed by: INTERNAL MEDICINE

## 2023-05-11 PROCEDURE — 84165 PROTEIN E-PHORESIS SERUM: CPT | Mod: HCNC | Performed by: INTERNAL MEDICINE

## 2023-05-11 PROCEDURE — 84165 PATHOLOGIST INTERPRETATION SPE: ICD-10-PCS | Mod: 26,HCNC,, | Performed by: PATHOLOGY

## 2023-05-11 PROCEDURE — 1126F PR PAIN SEVERITY QUANTIFIED, NO PAIN PRESENT: ICD-10-PCS | Mod: HCNC,CPTII,S$GLB, | Performed by: INTERNAL MEDICINE

## 2023-05-11 PROCEDURE — 85025 COMPLETE CBC W/AUTO DIFF WBC: CPT | Mod: HCNC | Performed by: INTERNAL MEDICINE

## 2023-05-11 PROCEDURE — 99213 OFFICE O/P EST LOW 20 MIN: CPT | Mod: 25,HCNC,S$GLB, | Performed by: DERMATOLOGY

## 2023-05-11 PROCEDURE — 83880 ASSAY OF NATRIURETIC PEPTIDE: CPT | Mod: HCNC | Performed by: INTERNAL MEDICINE

## 2023-05-11 PROCEDURE — 99214 OFFICE O/P EST MOD 30 MIN: CPT | Mod: HCNC,S$GLB,, | Performed by: INTERNAL MEDICINE

## 2023-05-11 PROCEDURE — 36415 COLL VENOUS BLD VENIPUNCTURE: CPT | Mod: HCNC | Performed by: INTERNAL MEDICINE

## 2023-05-11 PROCEDURE — 99213 PR OFFICE/OUTPT VISIT, EST, LEVL III, 20-29 MIN: ICD-10-PCS | Mod: 25,HCNC,S$GLB, | Performed by: DERMATOLOGY

## 2023-05-11 PROCEDURE — 1101F PT FALLS ASSESS-DOCD LE1/YR: CPT | Mod: HCNC,CPTII,S$GLB, | Performed by: INTERNAL MEDICINE

## 2023-05-11 PROCEDURE — 1160F PR REVIEW ALL MEDS BY PRESCRIBER/CLIN PHARMACIST DOCUMENTED: ICD-10-PCS | Mod: HCNC,CPTII,S$GLB, | Performed by: INTERNAL MEDICINE

## 2023-05-11 PROCEDURE — 99214 PR OFFICE/OUTPT VISIT, EST, LEVL IV, 30-39 MIN: ICD-10-PCS | Mod: HCNC,S$GLB,, | Performed by: INTERNAL MEDICINE

## 2023-05-11 PROCEDURE — 99999 PR PBB SHADOW E&M-EST. PATIENT-LVL IV: ICD-10-PCS | Mod: PBBFAC,HCNC,, | Performed by: INTERNAL MEDICINE

## 2023-05-11 PROCEDURE — 1159F PR MEDICATION LIST DOCUMENTED IN MEDICAL RECORD: ICD-10-PCS | Mod: HCNC,CPTII,S$GLB, | Performed by: DERMATOLOGY

## 2023-05-11 PROCEDURE — 84165 PROTEIN E-PHORESIS SERUM: CPT | Mod: 26,HCNC,, | Performed by: PATHOLOGY

## 2023-05-11 PROCEDURE — 1101F PR PT FALLS ASSESS DOC 0-1 FALLS W/OUT INJ PAST YR: ICD-10-PCS | Mod: HCNC,CPTII,S$GLB, | Performed by: INTERNAL MEDICINE

## 2023-05-11 PROCEDURE — 3078F DIAST BP <80 MM HG: CPT | Mod: HCNC,CPTII,S$GLB, | Performed by: INTERNAL MEDICINE

## 2023-05-11 PROCEDURE — 3077F PR MOST RECENT SYSTOLIC BLOOD PRESSURE >= 140 MM HG: ICD-10-PCS | Mod: HCNC,CPTII,S$GLB, | Performed by: INTERNAL MEDICINE

## 2023-05-11 PROCEDURE — 99999 PR PBB SHADOW E&M-EST. PATIENT-LVL IV: CPT | Mod: PBBFAC,HCNC,, | Performed by: INTERNAL MEDICINE

## 2023-05-11 PROCEDURE — 86140 C-REACTIVE PROTEIN: CPT | Mod: HCNC | Performed by: INTERNAL MEDICINE

## 2023-05-11 PROCEDURE — 1160F PR REVIEW ALL MEDS BY PRESCRIBER/CLIN PHARMACIST DOCUMENTED: ICD-10-PCS | Mod: HCNC,CPTII,S$GLB, | Performed by: DERMATOLOGY

## 2023-05-11 PROCEDURE — 80053 COMPREHEN METABOLIC PANEL: CPT | Mod: HCNC | Performed by: INTERNAL MEDICINE

## 2023-05-11 PROCEDURE — 82784 ASSAY IGA/IGD/IGG/IGM EACH: CPT | Mod: HCNC | Performed by: INTERNAL MEDICINE

## 2023-05-11 PROCEDURE — 3288F PR FALLS RISK ASSESSMENT DOCUMENTED: ICD-10-PCS | Mod: HCNC,CPTII,S$GLB, | Performed by: INTERNAL MEDICINE

## 2023-05-11 PROCEDURE — 1159F PR MEDICATION LIST DOCUMENTED IN MEDICAL RECORD: ICD-10-PCS | Mod: HCNC,CPTII,S$GLB, | Performed by: INTERNAL MEDICINE

## 2023-05-11 PROCEDURE — 17000 PR DESTRUCTION(LASER SURGERY,CRYOSURGERY,CHEMOSURGERY),PREMALIGNANT LESIONS,FIRST LESION: ICD-10-PCS | Mod: HCNC,S$GLB,, | Performed by: DERMATOLOGY

## 2023-05-11 PROCEDURE — 84403 ASSAY OF TOTAL TESTOSTERONE: CPT | Mod: HCNC | Performed by: INTERNAL MEDICINE

## 2023-05-11 PROCEDURE — 82378 CARCINOEMBRYONIC ANTIGEN: CPT | Mod: HCNC | Performed by: INTERNAL MEDICINE

## 2023-05-11 PROCEDURE — 17000 DESTRUCT PREMALG LESION: CPT | Mod: HCNC,S$GLB,, | Performed by: DERMATOLOGY

## 2023-05-11 PROCEDURE — 1160F RVW MEDS BY RX/DR IN RCRD: CPT | Mod: HCNC,CPTII,S$GLB, | Performed by: INTERNAL MEDICINE

## 2023-05-11 PROCEDURE — 1159F MED LIST DOCD IN RCRD: CPT | Mod: HCNC,CPTII,S$GLB, | Performed by: DERMATOLOGY

## 2023-05-11 PROCEDURE — 3078F PR MOST RECENT DIASTOLIC BLOOD PRESSURE < 80 MM HG: ICD-10-PCS | Mod: HCNC,CPTII,S$GLB, | Performed by: INTERNAL MEDICINE

## 2023-05-11 PROCEDURE — 1126F AMNT PAIN NOTED NONE PRSNT: CPT | Mod: HCNC,CPTII,S$GLB, | Performed by: INTERNAL MEDICINE

## 2023-05-11 PROCEDURE — 83615 LACTATE (LD) (LDH) ENZYME: CPT | Mod: HCNC | Performed by: INTERNAL MEDICINE

## 2023-05-11 PROCEDURE — 99999 PR PBB SHADOW E&M-EST. PATIENT-LVL III: ICD-10-PCS | Mod: PBBFAC,HCNC,, | Performed by: DERMATOLOGY

## 2023-05-11 PROCEDURE — 99999 PR PBB SHADOW E&M-EST. PATIENT-LVL III: CPT | Mod: PBBFAC,HCNC,, | Performed by: DERMATOLOGY

## 2023-05-11 PROCEDURE — 1160F RVW MEDS BY RX/DR IN RCRD: CPT | Mod: HCNC,CPTII,S$GLB, | Performed by: DERMATOLOGY

## 2023-05-11 PROCEDURE — 83521 IG LIGHT CHAINS FREE EACH: CPT | Mod: 59,HCNC | Performed by: INTERNAL MEDICINE

## 2023-05-11 PROCEDURE — 3077F SYST BP >= 140 MM HG: CPT | Mod: HCNC,CPTII,S$GLB, | Performed by: INTERNAL MEDICINE

## 2023-05-11 PROCEDURE — 84153 ASSAY OF PSA TOTAL: CPT | Mod: HCNC | Performed by: INTERNAL MEDICINE

## 2023-05-11 PROCEDURE — 1159F MED LIST DOCD IN RCRD: CPT | Mod: HCNC,CPTII,S$GLB, | Performed by: INTERNAL MEDICINE

## 2023-05-11 NOTE — PROGRESS NOTES
Subjective:      Patient ID:  Homero Tanner is a 82 y.o. male who presents for   Chief Complaint   Patient presents with    Skin Check     Full body skin exam.      SCCIS of the left temple (s/p Mohs by Dr. Purcell on 5/9/23), hypertrophic AK of the midline parietal scalp, SCC of the vertex scalp (s/p Mohs in 12/2019 per patient), NMSC of the left parietal scalp, AK of the midline parietal scalp (s/p biopsy on 6/9/20), seb derm and rosacea, last seen on 2/15/23. He is s/p Mohs surgery of the left temple 2 days ago, + bandage with heme-crust. He c/o several scaly areas of the scalp.       For rosacea, he uses emgel    For seb derm, he uses ketoconazole shampoo and clobetasol solution      Review of Systems   Constitutional:  Negative for fever and chills.   Gastrointestinal:  Negative for nausea and vomiting.   Skin:  Positive for activity-related sunscreen use. Negative for daily sunscreen use and recent sunburn.   Hematologic/Lymphatic: Does not bruise/bleed easily.     Objective:   Physical Exam   Constitutional: He appears well-developed and well-nourished. No distress.   Neurological: He is alert and oriented to person, place, and time. He is not disoriented.   Psychiatric: He has a normal mood and affect.   Skin:   Areas Examined (abnormalities noted in diagram):   Scalp / Hair Palpated and Inspected  Head / Face Inspection Performed  Neck Inspection Performed  RUE Inspected  LUE Inspection Performed  Nails and Digits Inspection Performed          Diagram Legend     Erythematous scaling macule/papule c/w actinic keratosis       Vascular papule c/w angioma      Pigmented verrucoid papule/plaque c/w seborrheic keratosis      Yellow umbilicated papule c/w sebaceous hyperplasia      Irregularly shaped tan macule c/w lentigo     1-2 mm smooth white papules consistent with Milia      Movable subcutaneous cyst with punctum c/w epidermal inclusion cyst      Subcutaneous movable cyst c/w pilar cyst      Firm  pink to brown papule c/w dermatofibroma      Pedunculated fleshy papule(s) c/w skin tag(s)      Evenly pigmented macule c/w junctional nevus     Mildly variegated pigmented, slightly irregular-bordered macule c/w mildly atypical nevus      Flesh colored to evenly pigmented papule c/w intradermal nevus       Pink pearly papule/plaque c/w basal cell carcinoma      Erythematous hyperkeratotic cursted plaque c/w SCC      Surgical scar with no sign of skin cancer recurrence      Open and closed comedones      Inflammatory papules and pustules      Verrucoid papule consistent consistent with wart     Erythematous eczematous patches and plaques     Dystrophic onycholytic nail with subungual debris c/w onychomycosis     Umbilicated papule    Erythematous-base heme-crusted tan verrucoid plaque consistent with inflamed seborrheic keratosis     Erythematous Silvery Scaling Plaque c/w Psoriasis     See annotation      Assessment / Plan:        Scar conditions/skin fibrosis  Screening, malignant neoplasm, skin  History of skin cancer  Scar of the left temple, midline parietal scalp, vertex scalp, left parietal scalp, hx of NMSC.  No evidence of recurrence on physical exam today.  Continue routine skin surveillance. Daily sunscreen advised.    Eschar  Of the left temple, bandaged removed today s/p Mohs.  Eschar noted of left temple. After risks, benefits and alternatives explained and verbal consent obtained, devitalized tissue was debrided with hydrogen peroxide. Patient tolerated well without complications. Patient was given verbal instructions to keep the wound moistened 3-4 times/day with vaseline and to cover with a bandage to prevent future eschar formation.     Actinic keratoses  Numerous skin cancers of the scalp. Several AK's of the scalp, however given proximity to Mohs procedure, will avoid treatment at this time.  Will start efudex in 3-4 months on scalp once areas has healed properly.    Cryosurgery Procedure  Note    The patient is informed of the precancerous quality and need for treatment of these lesions. After risks, benefits and alternatives explained, including blistering, pain, hyper- and hypopigmentation, patient verbally consents to cryotherapy to precancerous lesions. Liquid nitrogen cryosurgery is applied to the 1 actinic keratoses, as detailed in the physical exam, to produce a freeze injury. The patient is aware that blisters may form and is instructed on wound care with gentle cleansing and use of vaseline ointment to keep moist until healed. The patient is supplied a handout on cryosurgery and is instructed to call if lesions do not completely resolve.         Follow up in about 3 months (around 8/11/2023).

## 2023-05-11 NOTE — PROGRESS NOTES
Subjective:       Patient ID: Homero Tanner is a 82 y.o. male.    Chief Complaint: Results    HPI:  82-year-old male referred by primary physician with marked elevation of PSA patient receive Lupron injections which he can not remember exactly when he was injected 526 months ago.  Was asked to see the patient for evaluation of elevated PSA    Past Medical History:   Diagnosis Date    Abnormal ECG 6/24/2013    Adenomatous rectal polyp 12/7/2015    Aortic insufficiency 6/24/2013    Arthritis     Asthma     as a child    Benign neoplasm of cecum 2/27/2017    Benign neoplasm of transverse colon 2/27/2017    Cataract     CHF (congestive heart failure)     Pt states He's never been told this    Diverticulosis of large intestine without hemorrhage 2/27/2017    Encounter for blood transfusion     Frequent PVCs     Gallstones 3/28/2018    By Us done 3/21/2018    GERD (gastroesophageal reflux disease)     History of colon polyps     Hypertension     Iron deficiency anemia 10/26/2015    Lymphoma     waldenstrom's macroglobulinemia    Mixed hyperlipidemia 6/24/2013    Premature atrial contractions     Prostate cancer     PSVT (paroxysmal supraventricular tachycardia) 6/24/2013    Pulmonary hypertension 6/13/2016    PVC's (premature ventricular contractions) 6/13/2016    Rectal adenocarcinoma     Rectal cancer 5/1/2018    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    left parietal scalp    Thymoma     TR (tricuspid regurgitation) 6/13/2016    Tubular adenoma of colon 06/2020    VT (ventricular tachycardia) 9/18/2018     Family History   Problem Relation Age of Onset    Diabetes Father     Cataracts Mother     Amblyopia Neg Hx     Blindness Neg Hx     Cancer Neg Hx     Glaucoma Neg Hx     Hypertension Neg Hx     Macular degeneration Neg Hx     Retinal detachment Neg Hx     Strabismus Neg Hx     Stroke Neg Hx     Thyroid disease Neg Hx     Melanoma Neg Hx      Social History     Socioeconomic History    Marital status: Single    Tobacco Use    Smoking status: Former     Packs/day: 1.00     Years: 15.00     Pack years: 15.00     Types: Cigarettes     Quit date: 1969     Years since quittin.2    Smokeless tobacco: Never   Substance and Sexual Activity    Alcohol use: Yes     Alcohol/week: 3.0 standard drinks     Types: 3 Cans of beer per week     Comment: socially, NO ALCOHOL 72 HOURS PRIOR TO SX    Drug use: No     Past Surgical History:   Procedure Laterality Date    biopsy for chest mass      BONE MARROW BIOPSY Left 2018    Procedure: Biopsy-bone marrow;  Surgeon: Charanjit Dacosta MD;  Location: [facility];  Service: General;  Laterality: Left;    CATARACT EXTRACTION W/  INTRAOCULAR LENS IMPLANT Right 2019    CATARACT EXTRACTION W/  INTRAOCULAR LENS IMPLANT Left 2019    COLON SURGERY      COLONOSCOPY N/A 10/26/2015    Procedure: Colonoscopy;  Surgeon: Abraham Hong MD;  Location: [facility];  Service: Endoscopy;  Laterality: N/A;    COLONOSCOPY Left 2017    Procedure: COLONOSCOPY;  Surgeon: Abraham Hong MD;  Location: [facility];  Service: Endoscopy;  Laterality: Left;    COLONOSCOPY N/A 2018    Procedure: hixtory of rectal cancer. Colonsocopy asked to be repeated minnie  year, alst one 2017;  Surgeon: Patricio Streeter MD;  Location: [facility];  Service: Endoscopy;  Laterality: N/A;    COLONOSCOPY N/A 2018    Procedure: COLONOSCOPY/hybrid APC or a EndoRotor device;  Surgeon: Rao Medeiros MD;  Location: [facility] ([address] [place] [address]);  Service: Endoscopy;  Laterality: N/A;    COLONOSCOPY N/A 2020    Procedure: COLONOSCOPY;  Surgeon: Yu Wells MD;  Location: [facility];  Service: Endoscopy;  Laterality: N/A;    ESOPHAGOGASTRODUODENOSCOPY N/A 10/22/2019    Procedure: ESOPHAGOGASTRODUODENOSCOPY (EGD);  Surgeon: Mariela Greer MD;  Location: [facility];  Service: Endoscopy;  Laterality: N/A;    FLEXIBLE SIGMOIDOSCOPY Left 2019    Procedure: SIGMOIDOSCOPY, FLEXIBLE;  Surgeon: Ranjit Vitale  MD Suman;  Location: [facility] ENDO;  Service: Endoscopy;  Laterality: Left;    HERNIA REPAIR Right 02/2018    Inguinal, open with mesh    mohs      PROSTATECTOMY      RECTAL SURGERY N/A 09/2014    REPAIR OF SLIDING INGUINAL HERNIA Right 2/26/2019    Procedure: REPAIR, HERNIA, INGUINAL, SLIDING;  Surgeon: Bridger Alvarez MD;  Location: [facility] OR;  Service: General;  Laterality: Right;    SKIN BIOPSY      TONSILLECTOMY      TUMOR REMOVAL         Labs:  Lab Results   Component Value Date    WBC 9.26 02/09/2023    HGB 13.2 (L) 02/09/2023    HCT 40.2 02/09/2023    MCV 92 02/09/2023     02/09/2023     BMP  Lab Results   Component Value Date     04/28/2023    K 5.0 04/28/2023     04/28/2023    CO2 28 04/28/2023    BUN 24 (H) 04/28/2023    CREATININE 1.3 04/28/2023    CALCIUM 9.8 04/28/2023    ANIONGAP 8 04/28/2023    ESTGFRAFRICA 60 03/09/2022    EGFRNONAA 52 (A) 03/09/2022     Lab Results   Component Value Date    ALT 20 04/28/2023    AST 30 04/28/2023     (H) 12/14/2020    ALKPHOS 99 04/28/2023    BILITOT 0.6 04/28/2023       Lab Results   Component Value Date    IRON 49 02/18/2020    TIBC 271 02/18/2020    FERRITIN 349 (H) 02/18/2020     Lab Results   Component Value Date    VAMEQIVJ82 508 02/09/2023     Lab Results   Component Value Date    FOLATE 8.8 02/09/2023     Lab Results   Component Value Date    TSH 1.726 03/09/2022         Review of Systems   Constitutional:  Negative for activity change, appetite change, chills, diaphoresis, fatigue, fever and unexpected weight change.   HENT:  Negative for congestion, dental problem, drooling, ear discharge, ear pain, facial swelling, hearing loss, mouth sores, nosebleeds, postnasal drip, rhinorrhea, sinus pressure, sneezing, sore throat, tinnitus, trouble swallowing and voice change.    Eyes:  Negative for photophobia, pain, discharge, redness, itching and visual disturbance.   Respiratory:  Negative for apnea, cough, choking, chest tightness,  shortness of breath, wheezing and stridor.    Cardiovascular:  Negative for chest pain, palpitations and leg swelling.   Gastrointestinal:  Negative for abdominal distention, abdominal pain, anal bleeding, blood in stool, constipation, diarrhea, nausea, rectal pain and vomiting.   Endocrine: Negative for cold intolerance, heat intolerance, polydipsia, polyphagia and polyuria.   Genitourinary:  Negative for decreased urine volume, difficulty urinating, dysuria, enuresis, flank pain, frequency, genital sores, hematuria, penile discharge, penile pain, penile swelling, scrotal swelling, testicular pain and urgency.   Musculoskeletal:  Negative for arthralgias, back pain, gait problem, joint swelling, myalgias, neck pain and neck stiffness.   Skin:  Positive for wound. Negative for color change, pallor and rash.   Allergic/Immunologic: Negative for environmental allergies, food allergies and immunocompromised state.   Neurological:  Negative for dizziness, tremors, seizures, syncope, facial asymmetry, speech difficulty, weakness, light-headedness, numbness and headaches.   Hematological:  Negative for adenopathy. Does not bruise/bleed easily.   Psychiatric/Behavioral:  Negative for agitation, behavioral problems, confusion, decreased concentration, dysphoric mood, hallucinations, self-injury, sleep disturbance and suicidal ideas. The patient is not nervous/anxious and is not hyperactive.      Objective:      Physical Exam  Vitals reviewed.   Constitutional:       General: He is not in acute distress.     Appearance: He is well-developed. He is not diaphoretic.   HENT:      Head: Normocephalic.      Right Ear: External ear normal.      Left Ear: External ear normal.      Nose: Nose normal.      Right Sinus: No maxillary sinus tenderness or frontal sinus tenderness.      Left Sinus: No maxillary sinus tenderness or frontal sinus tenderness.      Mouth/Throat:      Pharynx: No oropharyngeal exudate.   Eyes:      General:  Lids are normal. No scleral icterus.        Right eye: No discharge.         Left eye: No discharge.      Extraocular Movements:      Right eye: Normal extraocular motion.      Left eye: Normal extraocular motion.      Conjunctiva/sclera:      Right eye: Right conjunctiva is not injected. No hemorrhage.     Left eye: Left conjunctiva is not injected. No hemorrhage.     Pupils: Pupils are equal, round, and reactive to light.   Neck:      Thyroid: No thyromegaly.      Vascular: No JVD.      Trachea: No tracheal deviation.   Cardiovascular:      Rate and Rhythm: Normal rate.   Pulmonary:      Effort: Pulmonary effort is normal. No respiratory distress.      Breath sounds: No stridor.   Abdominal:      General: Bowel sounds are normal.      Palpations: Abdomen is soft. There is no hepatomegaly, splenomegaly or mass.      Tenderness: There is no abdominal tenderness.   Musculoskeletal:         General: No tenderness. Normal range of motion.      Cervical back: Normal range of motion and neck supple.   Lymphadenopathy:      Head:      Right side of head: No posterior auricular or occipital adenopathy.      Left side of head: No posterior auricular or occipital adenopathy.      Cervical: No cervical adenopathy.      Right cervical: No superficial, deep or posterior cervical adenopathy.     Left cervical: No superficial, deep or posterior cervical adenopathy.      Upper Body:      Right upper body: No supraclavicular adenopathy.      Left upper body: No supraclavicular adenopathy.   Skin:     General: Skin is dry.      Findings: No erythema or rash.      Nails: There is no clubbing.   Neurological:      Mental Status: He is alert and oriented to person, place, and time.      Cranial Nerves: No cranial nerve deficit.      Coordination: Coordination normal.   Psychiatric:         Behavior: Behavior normal.         Thought Content: Thought content normal.         Judgment: Judgment normal.           Assessment:      1.  Prostate cancer metastatic to bone    2. History of prostate cancer    3. History of Waldenstrom's macroglobulinemia    4. Paraproteinemia    5. Chronic kidney disease, stage 3a    6. Essential hypertension    7. 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  Will check testosterone see if castrate resistance.  If not will need to repeat after test after Lupron injection.  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  Discussed implications of answered questions will communicate results back once testing has been completed probably with video visit        Dustin Sparks Jr, MD FACP

## 2023-05-12 LAB
ALBUMIN SERPL ELPH-MCNC: 4.22 G/DL (ref 3.35–5.55)
ALPHA1 GLOB SERPL ELPH-MCNC: 0.35 G/DL (ref 0.17–0.41)
ALPHA2 GLOB SERPL ELPH-MCNC: 0.88 G/DL (ref 0.43–0.99)
B-GLOBULIN SERPL ELPH-MCNC: 0.86 G/DL (ref 0.5–1.1)
GAMMA GLOB SERPL ELPH-MCNC: 0.59 G/DL (ref 0.67–1.58)
KAPPA LC SER QL IA: 2.91 MG/DL (ref 0.33–1.94)
KAPPA LC/LAMBDA SER IA: 1.44 (ref 0.26–1.65)
LAMBDA LC SER QL IA: 2.02 MG/DL (ref 0.57–2.63)
PROT SERPL-MCNC: 6.9 G/DL (ref 6–8.4)

## 2023-05-15 ENCOUNTER — PATIENT MESSAGE (OUTPATIENT)
Dept: INTERNAL MEDICINE | Facility: CLINIC | Age: 83
End: 2023-05-15
Payer: MEDICARE

## 2023-05-15 ENCOUNTER — TELEPHONE (OUTPATIENT)
Dept: INTERNAL MEDICINE | Facility: CLINIC | Age: 83
End: 2023-05-15
Payer: MEDICARE

## 2023-05-15 LAB — PATHOLOGIST INTERPRETATION SPE: NORMAL

## 2023-05-16 ENCOUNTER — OFFICE VISIT (OUTPATIENT)
Dept: INTERNAL MEDICINE | Facility: CLINIC | Age: 83
End: 2023-05-16
Payer: MEDICARE

## 2023-05-16 VITALS
TEMPERATURE: 97 F | OXYGEN SATURATION: 95 % | HEART RATE: 60 BPM | DIASTOLIC BLOOD PRESSURE: 68 MMHG | WEIGHT: 174.38 LBS | SYSTOLIC BLOOD PRESSURE: 134 MMHG | BODY MASS INDEX: 25.02 KG/M2

## 2023-05-16 DIAGNOSIS — Z01.818 PRE-OP EXAM: Primary | ICD-10-CM

## 2023-05-16 PROCEDURE — 1159F PR MEDICATION LIST DOCUMENTED IN MEDICAL RECORD: ICD-10-PCS | Mod: CPTII,,, | Performed by: NURSE PRACTITIONER

## 2023-05-16 PROCEDURE — 3075F PR MOST RECENT SYSTOLIC BLOOD PRESS GE 130-139MM HG: ICD-10-PCS | Mod: CPTII,,, | Performed by: NURSE PRACTITIONER

## 2023-05-16 PROCEDURE — 3078F PR MOST RECENT DIASTOLIC BLOOD PRESSURE < 80 MM HG: ICD-10-PCS | Mod: CPTII,,, | Performed by: NURSE PRACTITIONER

## 2023-05-16 PROCEDURE — 99214 PR OFFICE/OUTPT VISIT, EST, LEVL IV, 30-39 MIN: ICD-10-PCS | Mod: ,,, | Performed by: NURSE PRACTITIONER

## 2023-05-16 PROCEDURE — 1101F PT FALLS ASSESS-DOCD LE1/YR: CPT | Mod: CPTII,,, | Performed by: NURSE PRACTITIONER

## 2023-05-16 PROCEDURE — 3078F DIAST BP <80 MM HG: CPT | Mod: CPTII,,, | Performed by: NURSE PRACTITIONER

## 2023-05-16 PROCEDURE — 99999 PR PBB SHADOW E&M-EST. PATIENT-LVL III: ICD-10-PCS | Mod: PBBFAC,,, | Performed by: NURSE PRACTITIONER

## 2023-05-16 PROCEDURE — 3288F FALL RISK ASSESSMENT DOCD: CPT | Mod: CPTII,,, | Performed by: NURSE PRACTITIONER

## 2023-05-16 PROCEDURE — 99999 PR PBB SHADOW E&M-EST. PATIENT-LVL III: CPT | Mod: PBBFAC,,, | Performed by: NURSE PRACTITIONER

## 2023-05-16 PROCEDURE — 1101F PR PT FALLS ASSESS DOC 0-1 FALLS W/OUT INJ PAST YR: ICD-10-PCS | Mod: CPTII,,, | Performed by: NURSE PRACTITIONER

## 2023-05-16 PROCEDURE — 1159F MED LIST DOCD IN RCRD: CPT | Mod: CPTII,,, | Performed by: NURSE PRACTITIONER

## 2023-05-16 PROCEDURE — 1126F PR PAIN SEVERITY QUANTIFIED, NO PAIN PRESENT: ICD-10-PCS | Mod: CPTII,,, | Performed by: NURSE PRACTITIONER

## 2023-05-16 PROCEDURE — 3288F PR FALLS RISK ASSESSMENT DOCUMENTED: ICD-10-PCS | Mod: CPTII,,, | Performed by: NURSE PRACTITIONER

## 2023-05-16 PROCEDURE — 3075F SYST BP GE 130 - 139MM HG: CPT | Mod: CPTII,,, | Performed by: NURSE PRACTITIONER

## 2023-05-16 PROCEDURE — 1126F AMNT PAIN NOTED NONE PRSNT: CPT | Mod: CPTII,,, | Performed by: NURSE PRACTITIONER

## 2023-05-16 PROCEDURE — 1160F RVW MEDS BY RX/DR IN RCRD: CPT | Mod: CPTII,,, | Performed by: NURSE PRACTITIONER

## 2023-05-16 PROCEDURE — 1160F PR REVIEW ALL MEDS BY PRESCRIBER/CLIN PHARMACIST DOCUMENTED: ICD-10-PCS | Mod: CPTII,,, | Performed by: NURSE PRACTITIONER

## 2023-05-16 PROCEDURE — 99214 OFFICE O/P EST MOD 30 MIN: CPT | Mod: ,,, | Performed by: NURSE PRACTITIONER

## 2023-05-16 NOTE — PROGRESS NOTES
Subjective:     Homero Tanner is a 82 y.o. male who presents to the office today for a preoperative consultation at the request of surgeon [facility] who plans on performing dental implants on May 26. This consultation is requested for the specific conditions prompting preoperative evaluation (i.e. because of potential affect on operative risk). Planned anesthesia: IV sedation. The patient has the following known anesthesia issues:  none . Patients bleeding risk: no recent abnormal bleeding.    The following portions of the patient's history were reviewed and updated as appropriate:  Past Medical History:   Diagnosis Date    Abnormal ECG 6/24/2013    Adenomatous rectal polyp 12/7/2015    Aortic insufficiency 6/24/2013    Arthritis     Asthma     as a child    Benign neoplasm of cecum 2/27/2017    Benign neoplasm of transverse colon 2/27/2017    Cataract     CHF (congestive heart failure)     Pt states He's never been told this    Diverticulosis of large intestine without hemorrhage 2/27/2017    Encounter for blood transfusion     Frequent PVCs     Gallstones 3/28/2018    By Us done 3/21/2018    GERD (gastroesophageal reflux disease)     History of colon polyps     Hypertension     Iron deficiency anemia 10/26/2015    Lymphoma     waldenstrom's macroglobulinemia    Mixed hyperlipidemia 6/24/2013    Premature atrial contractions     Prostate cancer     PSVT (paroxysmal supraventricular tachycardia) 6/24/2013    Pulmonary hypertension 6/13/2016    PVC's (premature ventricular contractions) 6/13/2016    Rectal adenocarcinoma     Rectal cancer 5/1/2018    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    left parietal scalp    Thymoma     TR (tricuspid regurgitation) 6/13/2016    Tubular adenoma of colon 06/2020    VT (ventricular tachycardia) 9/18/2018     Past Medical History:   Diagnosis Date    Abnormal ECG 6/24/2013    Adenomatous rectal polyp 12/7/2015    Aortic insufficiency 6/24/2013     Arthritis     Asthma     as a child    Benign neoplasm of cecum 2/27/2017    Benign neoplasm of transverse colon 2/27/2017    Cataract     CHF (congestive heart failure)     Pt states He's never been told this    Diverticulosis of large intestine without hemorrhage 2/27/2017    Encounter for blood transfusion     Frequent PVCs     Gallstones 3/28/2018    By Us done 3/21/2018    GERD (gastroesophageal reflux disease)     History of colon polyps     Hypertension     Iron deficiency anemia 10/26/2015    Lymphoma     waldenstrom's macroglobulinemia    Mixed hyperlipidemia 6/24/2013    Premature atrial contractions     Prostate cancer     PSVT (paroxysmal supraventricular tachycardia) 6/24/2013    Pulmonary hypertension 6/13/2016    PVC's (premature ventricular contractions) 6/13/2016    Rectal adenocarcinoma     Rectal cancer 5/1/2018    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    left parietal scalp    Thymoma     TR (tricuspid regurgitation) 6/13/2016    Tubular adenoma of colon 06/2020    VT (ventricular tachycardia) 9/18/2018     Family History   Problem Relation Age of Onset    Diabetes Father     Cataracts Mother     Amblyopia Neg Hx     Blindness Neg Hx     Cancer Neg Hx     Glaucoma Neg Hx     Hypertension Neg Hx     Macular degeneration Neg Hx     Retinal detachment Neg Hx     Strabismus Neg Hx     Stroke Neg Hx     Thyroid disease Neg Hx     Melanoma Neg Hx      Past Surgical History:   Procedure Laterality Date    biopsy for chest mass      BONE MARROW BIOPSY Left 7/11/2018    Procedure: Biopsy-bone marrow;  Surgeon: Charanjit Dacosta MD;  Location: [facility] OR;  Service: General;  Laterality: Left;    CATARACT EXTRACTION W/  INTRAOCULAR LENS IMPLANT Right 01/03/2019    CATARACT EXTRACTION W/  INTRAOCULAR LENS IMPLANT Left 02/07/2019    COLON SURGERY      COLONOSCOPY N/A 10/26/2015    Procedure: Colonoscopy;  Surgeon: Abraham Hong MD;  Location: [facility] ENDO;  Service: Endoscopy;  Laterality: N/A;    COLONOSCOPY  Left 2017    Procedure: COLONOSCOPY;  Surgeon: Abraham Hong MD;  Location: [facility];  Service: Endoscopy;  Laterality: Left;    COLONOSCOPY N/A 2018    Procedure: hixtory of rectal cancer. Colonsocopy asked to be repeated minnie  year, alst one 2017;  Surgeon: Patricio Streeter MD;  Location: [facility];  Service: Endoscopy;  Laterality: N/A;    COLONOSCOPY N/A 2018    Procedure: COLONOSCOPY/hybrid APC or a EndoRotor device;  Surgeon: Rao Medeiros MD;  Location: [facility] (2ND FLR);  Service: Endoscopy;  Laterality: N/A;    COLONOSCOPY N/A 2020    Procedure: COLONOSCOPY;  Surgeon: Yu Wells MD;  Location: [facility];  Service: Endoscopy;  Laterality: N/A;    ESOPHAGOGASTRODUODENOSCOPY N/A 10/22/2019    Procedure: ESOPHAGOGASTRODUODENOSCOPY (EGD);  Surgeon: Mariela Greer MD;  Location: [facility];  Service: Endoscopy;  Laterality: N/A;    FLEXIBLE SIGMOIDOSCOPY Left 2019    Procedure: SIGMOIDOSCOPY, FLEXIBLE;  Surgeon: Ranjit Will MD;  Location: [facility];  Service: Endoscopy;  Laterality: Left;    HERNIA REPAIR Right 2018    Inguinal, open with mesh    mohs      PROSTATECTOMY      RECTAL SURGERY N/A 2014    REPAIR OF SLIDING INGUINAL HERNIA Right 2019    Procedure: REPAIR, HERNIA, INGUINAL, SLIDING;  Surgeon: Bridger Alvarez MD;  Location: [facility];  Service: General;  Laterality: Right;    SKIN BIOPSY      TONSILLECTOMY      TUMOR REMOVAL       Social History     Socioeconomic History    Marital status: Single   Tobacco Use    Smoking status: Former     Packs/day: 1.00     Years: 15.00     Pack years: 15.00     Types: Cigarettes     Quit date: 1969     Years since quittin.3    Smokeless tobacco: Never   Substance and Sexual Activity    Alcohol use: Yes     Alcohol/week: 3.0 standard drinks     Types: 3 Cans of beer per week     Comment: socially, NO ALCOHOL 72 HOURS PRIOR TO SX    Drug use: No     Review of patient's allergies indicates:    Allergen Reactions    1,3-butylene glycol     Lipitor [atorvastatin] Other (See Comments)    Norvasc [amlodipine] Swelling     Medication List with Changes/Refills   Current Medications    AMLODIPINE (NORVASC) 2.5 MG TABLET    Take 1 tablet (2.5 mg total) by mouth once daily.    ASPIRIN (ECOTRIN) 81 MG EC TABLET    Take 81 mg by mouth once daily.    B COMPLEX VITAMINS TABLET    Take by mouth. 1 tablet Oral Every day    CLOBETASOL (TEMOVATE) 0.05 % EXTERNAL SOLUTION    APPLY TO SCALP 1 TO 2 TIMES A DAY AS NEEDED FOR FLARES, DO NOT APPLY TO FACE OR FOLDS OF SKIN    CYANOCOBALAMIN 1,000 MCG/ML INJECTION    INJECT 1ML UNDER THE SKIN EVERY MONTH    ERYTHROMYCIN WITH ETHANOL (EMGEL) 2 % GEL    APPLY TO THE AFFECTED AREA OF FACE TWICE DAILY FOR ROSACEA/ REDNESS    FUROSEMIDE (LASIX) 20 MG TABLET    Take 20 mg on Tues, Thurs    FUROSEMIDE (LASIX) 40 MG TABLET    Take 1 tablet (40 mg total) by mouth every Mon, Wed, Fri.    LEUPROLIDE (LUPRON) 3.75 MG INJECTION    Inject 3.75 mg into the muscle every 3 (three) months.     METOPROLOL SUCCINATE (TOPROL-XL) 100 MG 24 HR TABLET    TAKE 1 TABLET(100 MG) BY MOUTH EVERY DAY    METRONIDAZOLE 0.75% (METROCREAM) 0.75 % CREA    APPLY TO AFFECTED AREA ON FACE TWICE DAILY    OMEPRAZOLE (PRILOSEC) 20 MG CAPSULE    TAKE 1 CAPSULE(20 MG) BY MOUTH EVERY DAY    ROSUVASTATIN (CRESTOR) 10 MG TABLET    TAKE 1 TABLET(10 MG) BY MOUTH EVERY DAY     Patient Active Problem List   Diagnosis    Aortic insufficiency    Essential hypertension    Paraproteinemia    Prostate cancer metastatic to bone    History of thymoma    Hyperlipidemia    Pulmonary hypertension    Chronic diastolic heart failure    B12 deficiency anemia    History of colon polyps    Iron deficiency anemia due to chronic blood loss    Elevated alkaline phosphatase level    History of Waldenstrom's macroglobulinemia    Fatty liver    Aortic atherosclerosis    Bronchiectasis without complication    Iron deficiency anemia    Premature  atrial contractions    Edema of both legs    History of prostate cancer    Chronic kidney disease, stage 3a    Immunodeficiency due to chemotherapy       Review of Systems  Review of Systems   Constitutional:  Negative for activity change, appetite change, chills, diaphoresis, fatigue, fever and unexpected weight change.   HENT: Negative.     Eyes: Negative.    Respiratory:  Negative for apnea, chest tightness, shortness of breath and stridor.    Cardiovascular:  Negative for chest pain, palpitations and leg swelling.   Gastrointestinal: Negative.    Endocrine: Negative.    Genitourinary: Negative.    Musculoskeletal:  Positive for arthralgias. Negative for myalgias.   Skin:  Positive for color change and wound. Negative for pallor and rash.   Allergic/Immunologic: Negative.    Neurological:  Negative for dizziness, facial asymmetry, light-headedness and headaches.   Hematological:  Negative for adenopathy.   Psychiatric/Behavioral:  Negative for agitation and behavioral problems.       Objective:     Vitals:    05/16/23 0900   BP: 134/68   Pulse: 60   Temp: 97 °F (36.1 °C)       Physical Exam  Vitals reviewed.   Constitutional:       General: He is not in acute distress.     Appearance: Normal appearance. He is normal weight.   HENT:      Head: Normocephalic.      Right Ear: Tympanic membrane normal.      Left Ear: Tympanic membrane normal.      Nose: Nose normal.   Eyes:      Conjunctiva/sclera: Conjunctivae normal.      Pupils: Pupils are equal, round, and reactive to light.   Cardiovascular:      Rate and Rhythm: Normal rate.      Pulses: Normal pulses.   Pulmonary:      Effort: Pulmonary effort is normal.   Abdominal:      General: Abdomen is flat.      Palpations: Abdomen is soft.   Musculoskeletal:         General: Normal range of motion.      Cervical back: Normal range of motion.   Skin:     Capillary Refill: Capillary refill takes less than 2 seconds.      Findings: Wound present.      Comments: Left scalp  incision- skin cancer removal surgery last week. No s/s of infection   Neurological:      Mental Status: He is alert and oriented to person, place, and time. Mental status is at baseline.   Psychiatric:         Mood and Affect: Mood normal.        Assessment:       Encounter Diagnoses   Name Primary?    Pre-op exam Yes    BMI 25.0-25.9,adult         Plan:     Pre-op exam    BMI 25.0-25.9,adult        I reviewed the patient's past medical, surgical, social and family history and with  physical exam findings and the proposed surgery and I make the following recommendations:     Per Dr. Wright, Pt may proceed with dental work at low to moderate CV risk.    From a pulmonary standpoint, encourage good pulmonary toilet, incentive spirometry, early ambulation to improve the pulmonary outcome. No further pulmonary workup as noted prior to surgery.     DVT prophylaxis should be per standard. Venous compression devices are recommended. Early ambulation. Patient has been educated on signs and symptoms of both DVT and pulmonary embolus and instructed on what to do if there are symptoms postop.     The patient has been instructed to take blood pressure medication the morning of surgery with a sip of water. Avoid any aspirin or anti-inflammatories between now and surgery.     If there is any further I can do to assist in the care of this patient please not hesitate to contact me. I will forward the lab results upon my receipt.    Karissa Gonzalez NP

## 2023-05-22 ENCOUNTER — PATIENT MESSAGE (OUTPATIENT)
Dept: HEMATOLOGY/ONCOLOGY | Facility: CLINIC | Age: 83
End: 2023-05-22
Payer: MEDICARE

## 2023-05-25 ENCOUNTER — OFFICE VISIT (OUTPATIENT)
Dept: HEMATOLOGY/ONCOLOGY | Facility: CLINIC | Age: 83
End: 2023-05-25
Payer: MEDICARE

## 2023-05-25 DIAGNOSIS — Z85.79 HISTORY OF WALDENSTROM'S MACROGLOBULINEMIA: ICD-10-CM

## 2023-05-25 DIAGNOSIS — C61 PROSTATE CANCER METASTATIC TO BONE: Primary | ICD-10-CM

## 2023-05-25 DIAGNOSIS — C79.51 PROSTATE CANCER METASTATIC TO BONE: Primary | ICD-10-CM

## 2023-05-25 DIAGNOSIS — I70.0 AORTIC ATHEROSCLEROSIS: ICD-10-CM

## 2023-05-25 DIAGNOSIS — Z87.898 HISTORY OF THYMOMA: ICD-10-CM

## 2023-05-25 DIAGNOSIS — D50.0 IRON DEFICIENCY ANEMIA DUE TO CHRONIC BLOOD LOSS: ICD-10-CM

## 2023-05-25 PROCEDURE — 1160F RVW MEDS BY RX/DR IN RCRD: CPT | Mod: CPTII,95,, | Performed by: INTERNAL MEDICINE

## 2023-05-25 PROCEDURE — 1159F MED LIST DOCD IN RCRD: CPT | Mod: CPTII,95,, | Performed by: INTERNAL MEDICINE

## 2023-05-25 PROCEDURE — 1159F PR MEDICATION LIST DOCUMENTED IN MEDICAL RECORD: ICD-10-PCS | Mod: CPTII,95,, | Performed by: INTERNAL MEDICINE

## 2023-05-25 PROCEDURE — 1160F PR REVIEW ALL MEDS BY PRESCRIBER/CLIN PHARMACIST DOCUMENTED: ICD-10-PCS | Mod: CPTII,95,, | Performed by: INTERNAL MEDICINE

## 2023-05-25 PROCEDURE — 99214 OFFICE O/P EST MOD 30 MIN: CPT | Mod: 95,,, | Performed by: INTERNAL MEDICINE

## 2023-05-25 PROCEDURE — 99214 PR OFFICE/OUTPT VISIT, EST, LEVL IV, 30-39 MIN: ICD-10-PCS | Mod: 95,,, | Performed by: INTERNAL MEDICINE

## 2023-05-25 NOTE — PROGRESS NOTES
Subjective:       Patient ID: Homero Tanner is a 82 y.o. male.    Chief Complaint: Results and Prostate Cancer    HPI:  82-year-old male history of metastatic prostate carcinoma reportedly has not had Lupron injections shins the end of 2022.  Rising PSA previous history of Waldenstrom's macroglobulinemia as well.  With stable findings in that regard patient returns for video visit    Past Medical History:   Diagnosis Date    Abnormal ECG 6/24/2013    Adenomatous rectal polyp 12/7/2015    Aortic insufficiency 6/24/2013    Arthritis     Asthma     as a child    Benign neoplasm of cecum 2/27/2017    Benign neoplasm of transverse colon 2/27/2017    Cataract     CHF (congestive heart failure)     Pt states He's never been told this    Diverticulosis of large intestine without hemorrhage 2/27/2017    Encounter for blood transfusion     Frequent PVCs     Gallstones 3/28/2018    By Us done 3/21/2018    GERD (gastroesophageal reflux disease)     History of colon polyps     Hypertension     Iron deficiency anemia 10/26/2015    Lymphoma     waldenstrom's macroglobulinemia    Mixed hyperlipidemia 6/24/2013    Premature atrial contractions     Prostate cancer     PSVT (paroxysmal supraventricular tachycardia) 6/24/2013    Pulmonary hypertension 6/13/2016    PVC's (premature ventricular contractions) 6/13/2016    Rectal adenocarcinoma     Rectal cancer 5/1/2018    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    left parietal scalp    Thymoma     TR (tricuspid regurgitation) 6/13/2016    Tubular adenoma of colon 06/2020    VT (ventricular tachycardia) 9/18/2018     Family History   Problem Relation Age of Onset    Diabetes Father     Cataracts Mother     Amblyopia Neg Hx     Blindness Neg Hx     Cancer Neg Hx     Glaucoma Neg Hx     Hypertension Neg Hx     Macular degeneration Neg Hx     Retinal detachment Neg Hx     Strabismus Neg Hx     Stroke Neg Hx     Thyroid disease Neg Hx     Melanoma Neg Hx      Social History      Socioeconomic History    Marital status: Single   Tobacco Use    Smoking status: Former     Packs/day: 1.00     Years: 15.00     Pack years: 15.00     Types: Cigarettes     Quit date: 1969     Years since quittin.3    Smokeless tobacco: Never   Substance and Sexual Activity    Alcohol use: Yes     Alcohol/week: 3.0 standard drinks     Types: 3 Cans of beer per week     Comment: socially, NO ALCOHOL 72 HOURS PRIOR TO SX    Drug use: No     Past Surgical History:   Procedure Laterality Date    biopsy for chest mass      BONE MARROW BIOPSY Left 2018    Procedure: Biopsy-bone marrow;  Surgeon: Charanjit Dacosta MD;  Location: [facility];  Service: General;  Laterality: Left;    CATARACT EXTRACTION W/  INTRAOCULAR LENS IMPLANT Right 2019    CATARACT EXTRACTION W/  INTRAOCULAR LENS IMPLANT Left 2019    COLON SURGERY      COLONOSCOPY N/A 10/26/2015    Procedure: Colonoscopy;  Surgeon: Abraham Hong MD;  Location: [facility];  Service: Endoscopy;  Laterality: N/A;    COLONOSCOPY Left 2017    Procedure: COLONOSCOPY;  Surgeon: Abraham Hong MD;  Location: [facility];  Service: Endoscopy;  Laterality: Left;    COLONOSCOPY N/A 2018    Procedure: hixtory of rectal cancer. Colonsocopy asked to be repeated minnie  year, alst one 2017;  Surgeon: Patricio Streeter MD;  Location: [facility];  Service: Endoscopy;  Laterality: N/A;    COLONOSCOPY N/A 2018    Procedure: COLONOSCOPY/hybrid APC or a EndoRotor device;  Surgeon: Rao Medeiros MD;  Location: [facility] ([address] [place] [address]);  Service: Endoscopy;  Laterality: N/A;    COLONOSCOPY N/A 2020    Procedure: COLONOSCOPY;  Surgeon: Yu Wells MD;  Location: [facility];  Service: Endoscopy;  Laterality: N/A;    ESOPHAGOGASTRODUODENOSCOPY N/A 10/22/2019    Procedure: ESOPHAGOGASTRODUODENOSCOPY (EGD);  Surgeon: Mariela Greer MD;  Location: [facility];  Service: Endoscopy;  Laterality: N/A;    FLEXIBLE SIGMOIDOSCOPY Left 2019     Procedure: SIGMOIDOSCOPY, FLEXIBLE;  Surgeon: Ranjit Will MD;  Location: [facility] ENDO;  Service: Endoscopy;  Laterality: Left;    HERNIA REPAIR Right 02/2018    Inguinal, open with mesh    mohs      PROSTATECTOMY      RECTAL SURGERY N/A 09/2014    REPAIR OF SLIDING INGUINAL HERNIA Right 2/26/2019    Procedure: REPAIR, HERNIA, INGUINAL, SLIDING;  Surgeon: Bridger Alvarez MD;  Location: [facility] OR;  Service: General;  Laterality: Right;    SKIN BIOPSY      TONSILLECTOMY      TUMOR REMOVAL         Labs:  Lab Results   Component Value Date    WBC 7.36 05/11/2023    WBC 7.36 05/11/2023    HGB 14.0 05/11/2023    HGB 14.0 05/11/2023    HCT 42.3 05/11/2023    HCT 42.3 05/11/2023    MCV 92 05/11/2023    MCV 92 05/11/2023     05/11/2023     05/11/2023     BMP  Lab Results   Component Value Date     05/11/2023    K 4.3 05/11/2023     05/11/2023    CO2 25 05/11/2023    BUN 23 05/11/2023    CREATININE 1.5 (H) 05/11/2023    CALCIUM 9.3 05/11/2023    ANIONGAP 14 05/11/2023    ESTGFRAFRICA 60 03/09/2022    EGFRNONAA 52 (A) 03/09/2022     Lab Results   Component Value Date    ALT 18 05/11/2023    AST 23 05/11/2023     (H) 12/14/2020    ALKPHOS 131 05/11/2023    BILITOT 0.7 05/11/2023       Lab Results   Component Value Date    IRON 49 02/18/2020    TIBC 271 02/18/2020    FERRITIN 349 (H) 02/18/2020     Lab Results   Component Value Date    YQFZCADN94 508 02/09/2023     Lab Results   Component Value Date    FOLATE 8.8 02/09/2023     Lab Results   Component Value Date    TSH 1.726 03/09/2022         Review of Systems   Psychiatric/Behavioral:  Positive for dysphoric mood. The patient is nervous/anxious.      Objective:      Physical Exam  Constitutional:       Appearance: Normal appearance.           Assessment:      1. Prostate cancer metastatic to bone    2. History of Waldenstrom's macroglobulinemia    3. Iron deficiency anemia due to chronic blood loss    4. History of thymoma    5. Aortic  atherosclerosis           Med Onc Chart Routing      Follow up with physician . Return to clinic to see me after PET PMS a scan in-person visit   Follow up with [facility]    Infusion scheduling note    Injection scheduling note    Labs    Imaging    Pharmacy appointment    Other referrals            Plan:     The patient location is:  Home  The chief complaint leading to consultation is:  Prostate cancer    Visit type: audiovisual    Face to Face time with patient: 25 minutes of total time spent on the encounter, which includes face to face time and non-face to face time preparing to see the patient (eg, review of tests), Obtaining and/or reviewing separately obtained history, Documenting clinical information in the electronic or other health record, Independently interpreting results (not separately reported) and communicating results to the patient/family/caregiver, or Care coordination (not separately reported).         Each patient to whom he or she provides medical services by telemedicine is:  (1) informed of the relationship between the physician and patient and the respective role of any other health care provider with respect to management of the patient; and (2) notified that he or she may decline to receive medical services by telemedicine and may withdraw from such care at any time.    Notes:  Reviewed information at this point would recommend that patient proceed with PMS a scan I have talked to him about the fact that with prostate carcinoma over time there is a discordance between PSA extent of disease.  Discussed implications of answered questions with him in terms of his Waldenstrom's macroglobulinemia essentially stable findings no evidence of clear progression at this point        Dustin Sparks Jr, MD FACP

## 2023-06-16 ENCOUNTER — HOSPITAL ENCOUNTER (OUTPATIENT)
Dept: RADIOLOGY | Facility: HOSPITAL | Age: 83
Discharge: HOME | End: 2023-06-16
Attending: INTERNAL MEDICINE
Payer: MEDICARE

## 2023-06-16 DIAGNOSIS — C61 PROSTATE CANCER METASTATIC TO BONE: ICD-10-CM

## 2023-06-16 DIAGNOSIS — C79.51 PROSTATE CANCER METASTATIC TO BONE: ICD-10-CM

## 2023-06-16 PROCEDURE — 78815 NM PET CT F 18 PYL PSMA, MIDTHIGH TO VERTEX: ICD-10-PCS | Mod: 26,PS,, | Performed by: RADIOLOGY

## 2023-06-16 PROCEDURE — 78815 PET IMAGE W/CT SKULL-THIGH: CPT | Mod: 26,PS,, | Performed by: RADIOLOGY

## 2023-06-16 PROCEDURE — 78815 PET IMAGE W/CT SKULL-THIGH: CPT | Mod: TC,PS

## 2023-06-19 ENCOUNTER — TELEPHONE (OUTPATIENT)
Dept: HEMATOLOGY/ONCOLOGY | Facility: CLINIC | Age: 83
End: 2023-06-19
Payer: MEDICARE

## 2023-06-19 NOTE — TELEPHONE ENCOUNTER
Returned patient phone call and scheduled earlier appointment per provider request. Verified date, time, and location with the patient. Patient verbalized understanding and had no other issues at this time.

## 2023-06-19 NOTE — TELEPHONE ENCOUNTER
Called patient to offer earlier appointment per provider request. No answer, unable to leave voicemail.

## 2023-06-19 NOTE — TELEPHONE ENCOUNTER
----- Message from Dustin Sparks MD sent at 6/19/2023  2:17 PM CDT -----  Need to double book  ----- Message -----  From: Bonita Madrigal LPN  Sent: 6/19/2023   2:00 PM CDT  To: Dustin Sparks MD    Patient called and scheduled appointment to rev scans. Patient will be out of the country 6[place]22-7[place]4. Your next available to review will be 7/10. Do you want to double book to see patient sooner? Np/Jarrell of to see patient?

## 2023-06-20 ENCOUNTER — TELEPHONE (OUTPATIENT)
Dept: HEMATOLOGY/ONCOLOGY | Facility: CLINIC | Age: 83
End: 2023-06-20
Payer: MEDICARE

## 2023-06-20 ENCOUNTER — OFFICE VISIT (OUTPATIENT)
Dept: HEMATOLOGY/ONCOLOGY | Facility: CLINIC | Age: 83
End: 2023-06-20
Payer: MEDICARE

## 2023-06-20 ENCOUNTER — PATIENT MESSAGE (OUTPATIENT)
Dept: PALLIATIVE MEDICINE | Facility: CLINIC | Age: 83
End: 2023-06-20
Payer: MEDICARE

## 2023-06-20 ENCOUNTER — TELEPHONE (OUTPATIENT)
Dept: PHARMACY | Facility: CLINIC | Age: 83
End: 2023-06-20
Payer: MEDICARE

## 2023-06-20 ENCOUNTER — DOCUMENTATION ONLY (OUTPATIENT)
Dept: PALLIATIVE MEDICINE | Facility: CLINIC | Age: 83
End: 2023-06-20
Payer: MEDICARE

## 2023-06-20 VITALS
HEIGHT: 70 IN | SYSTOLIC BLOOD PRESSURE: 140 MMHG | TEMPERATURE: 97 F | DIASTOLIC BLOOD PRESSURE: 75 MMHG | HEART RATE: 52 BPM | BODY MASS INDEX: 24.49 KG/M2 | WEIGHT: 171.06 LBS

## 2023-06-20 DIAGNOSIS — Z79.899 IMMUNODEFICIENCY DUE TO CHEMOTHERAPY: ICD-10-CM

## 2023-06-20 DIAGNOSIS — Z87.898 HISTORY OF THYMOMA: ICD-10-CM

## 2023-06-20 DIAGNOSIS — F43.23 ADJUSTMENT DISORDER WITH MIXED ANXIETY AND DEPRESSED MOOD: ICD-10-CM

## 2023-06-20 DIAGNOSIS — C79.51 PROSTATE CANCER METASTATIC TO BONE: Primary | ICD-10-CM

## 2023-06-20 DIAGNOSIS — N18.31 CHRONIC KIDNEY DISEASE, STAGE 3A: ICD-10-CM

## 2023-06-20 DIAGNOSIS — C78.02 MALIGNANT NEOPLASM METASTATIC TO BOTH LUNGS: ICD-10-CM

## 2023-06-20 DIAGNOSIS — T45.1X5A IMMUNODEFICIENCY DUE TO CHEMOTHERAPY: ICD-10-CM

## 2023-06-20 DIAGNOSIS — D84.821 IMMUNODEFICIENCY DUE TO CHEMOTHERAPY: ICD-10-CM

## 2023-06-20 DIAGNOSIS — C78.01 MALIGNANT NEOPLASM METASTATIC TO BOTH LUNGS: ICD-10-CM

## 2023-06-20 DIAGNOSIS — Z85.79 HISTORY OF WALDENSTROM'S MACROGLOBULINEMIA: ICD-10-CM

## 2023-06-20 DIAGNOSIS — C61 PROSTATE CANCER METASTATIC TO BONE: Primary | ICD-10-CM

## 2023-06-20 PROBLEM — Z85.46 HISTORY OF PROSTATE CANCER: Status: RESOLVED | Noted: 2021-12-02 | Resolved: 2023-06-20

## 2023-06-20 PROCEDURE — 1160F RVW MEDS BY RX/DR IN RCRD: CPT | Mod: CPTII,S$GLB,, | Performed by: INTERNAL MEDICINE

## 2023-06-20 PROCEDURE — 1159F MED LIST DOCD IN RCRD: CPT | Mod: CPTII,S$GLB,, | Performed by: INTERNAL MEDICINE

## 2023-06-20 PROCEDURE — 3077F PR MOST RECENT SYSTOLIC BLOOD PRESSURE >= 140 MM HG: ICD-10-PCS | Mod: CPTII,S$GLB,, | Performed by: INTERNAL MEDICINE

## 2023-06-20 PROCEDURE — 1126F AMNT PAIN NOTED NONE PRSNT: CPT | Mod: CPTII,S$GLB,, | Performed by: INTERNAL MEDICINE

## 2023-06-20 PROCEDURE — 1160F PR REVIEW ALL MEDS BY PRESCRIBER/CLIN PHARMACIST DOCUMENTED: ICD-10-PCS | Mod: CPTII,S$GLB,, | Performed by: INTERNAL MEDICINE

## 2023-06-20 PROCEDURE — 3078F PR MOST RECENT DIASTOLIC BLOOD PRESSURE < 80 MM HG: ICD-10-PCS | Mod: CPTII,S$GLB,, | Performed by: INTERNAL MEDICINE

## 2023-06-20 PROCEDURE — 3077F SYST BP >= 140 MM HG: CPT | Mod: CPTII,S$GLB,, | Performed by: INTERNAL MEDICINE

## 2023-06-20 PROCEDURE — 1159F PR MEDICATION LIST DOCUMENTED IN MEDICAL RECORD: ICD-10-PCS | Mod: CPTII,S$GLB,, | Performed by: INTERNAL MEDICINE

## 2023-06-20 PROCEDURE — 3078F DIAST BP <80 MM HG: CPT | Mod: CPTII,S$GLB,, | Performed by: INTERNAL MEDICINE

## 2023-06-20 PROCEDURE — 99999 PR PBB SHADOW E&M-EST. PATIENT-LVL V: CPT | Mod: PBBFAC,,, | Performed by: INTERNAL MEDICINE

## 2023-06-20 PROCEDURE — 99215 PR OFFICE/OUTPT VISIT, EST, LEVL V, 40-54 MIN: ICD-10-PCS | Mod: S$GLB,,, | Performed by: INTERNAL MEDICINE

## 2023-06-20 PROCEDURE — 99999 PR PBB SHADOW E&M-EST. PATIENT-LVL V: ICD-10-PCS | Mod: PBBFAC,,, | Performed by: INTERNAL MEDICINE

## 2023-06-20 PROCEDURE — 3288F PR FALLS RISK ASSESSMENT DOCUMENTED: ICD-10-PCS | Mod: CPTII,S$GLB,, | Performed by: INTERNAL MEDICINE

## 2023-06-20 PROCEDURE — 1126F PR PAIN SEVERITY QUANTIFIED, NO PAIN PRESENT: ICD-10-PCS | Mod: CPTII,S$GLB,, | Performed by: INTERNAL MEDICINE

## 2023-06-20 PROCEDURE — 1101F PT FALLS ASSESS-DOCD LE1/YR: CPT | Mod: CPTII,S$GLB,, | Performed by: INTERNAL MEDICINE

## 2023-06-20 PROCEDURE — 1101F PR PT FALLS ASSESS DOC 0-1 FALLS W/OUT INJ PAST YR: ICD-10-PCS | Mod: CPTII,S$GLB,, | Performed by: INTERNAL MEDICINE

## 2023-06-20 PROCEDURE — 99215 OFFICE O/P EST HI 40 MIN: CPT | Mod: S$GLB,,, | Performed by: INTERNAL MEDICINE

## 2023-06-20 PROCEDURE — 3288F FALL RISK ASSESSMENT DOCD: CPT | Mod: CPTII,S$GLB,, | Performed by: INTERNAL MEDICINE

## 2023-06-20 RX ORDER — ABIRATERONE 500 MG/1
1000 TABLET ORAL DAILY
Qty: 60 TABLET | Refills: 11 | Status: SHIPPED | OUTPATIENT
Start: 2023-06-20 | End: 2023-06-21

## 2023-06-20 RX ORDER — PREDNISONE 5 MG/1
5 TABLET ORAL 2 TIMES DAILY
Qty: 60 TABLET | Refills: 11 | Status: ACTIVE | OUTPATIENT
Start: 2023-06-20

## 2023-06-20 RX ORDER — PREDNISONE 5 MG/1
5 TABLET ORAL 2 TIMES DAILY
Qty: 60 TABLET | Refills: 11 | Status: SHIPPED | OUTPATIENT
Start: 2023-06-20 | End: 2023-06-20 | Stop reason: SDUPTHER

## 2023-06-20 RX ORDER — ABIRATERONE 500 MG/1
1000 TABLET ORAL DAILY
Qty: 60 TABLET | Refills: 11 | Status: SHIPPED | OUTPATIENT
Start: 2023-06-20 | End: 2023-06-20 | Stop reason: SDUPTHER

## 2023-06-20 NOTE — NURSING
1612pm: Outgoing call to pt to discuss [facility] process for oral Zytiga. Spoke with pt. Explained [facility] process to pt. Pt informed me that pt will be going on an international trip tomorrow and will not return until after 7/5. Pt instructed to call [facility] once return. Pt verbalized understanding.

## 2023-06-20 NOTE — PROGRESS NOTES
Palliative Referral Nurse Note    Nurse reached out to pt regarding scheduling, pt was aware of apt, he was offered tomorrow, pt stated he will be flying out of town tomorrow, he was offered first available Sept 28, 2023 apt and letter about what is palliative will be mailed to pt and also included in his mychart.

## 2023-06-20 NOTE — TELEPHONE ENCOUNTER
Dilma, this is JON HOBSON, clinical pharmacist with [facility] that is part of your care team.  We have begun working on your prescription that your doctor has sent us. Our next steps include:     Working with your insurance company to obtain approval for your medication  Working with you to ensure your medication is affordable     We will be calling you along the way with updates on your medication but if you have any concerns or receive information that you would like to discuss please reach us at [phone].    Welcome call outcome: Patient/caregiver reached    Patient leaves for vacation on tomorrow and returns after July 4th. He will likely start once he is back.

## 2023-06-20 NOTE — PROGRESS NOTES
Subjective:       Patient ID: Homero Tanner is a 82 y.o. male.    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  Recent PMS a scan demonstrates metastatic disease in lung as well as bone.  At this time patient presents for review scheduled to travel country for the next 2 weeks ECOG status 1 recent dose of Lupron yesterday    Past Medical History:   Diagnosis Date    Abnormal ECG 6/24/2013    Adenomatous rectal polyp 12/7/2015    Aortic insufficiency 6/24/2013    Arthritis     Asthma     as a child    Benign neoplasm of cecum 2/27/2017    Benign neoplasm of transverse colon 2/27/2017    Cataract     CHF (congestive heart failure)     Pt states He's never been told this    Diverticulosis of large intestine without hemorrhage 2/27/2017    Encounter for blood transfusion     Frequent PVCs     Gallstones 3/28/2018    By Us done 3/21/2018    GERD (gastroesophageal reflux disease)     History of colon polyps     Hypertension     Iron deficiency anemia 10/26/2015    Lymphoma     waldenstrom's macroglobulinemia    Mixed hyperlipidemia 6/24/2013    Premature atrial contractions     Prostate cancer     PSVT (paroxysmal supraventricular tachycardia) 6/24/2013    Pulmonary hypertension 6/13/2016    PVC's (premature ventricular contractions) 6/13/2016    Rectal adenocarcinoma     Rectal cancer 5/1/2018    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    left parietal scalp    Thymoma     TR (tricuspid regurgitation) 6/13/2016    Tubular adenoma of colon 06/2020    VT (ventricular tachycardia) 9/18/2018     Family History   Problem Relation Age of Onset    Diabetes Father     Cataracts Mother     Amblyopia Neg Hx     Blindness Neg Hx     Cancer Neg Hx     Glaucoma Neg Hx     Hypertension Neg Hx     Macular degeneration Neg Hx     Retinal detachment Neg Hx     Strabismus Neg Hx     Stroke Neg Hx     Thyroid disease Neg Hx     Melanoma Neg Hx       Social History     Socioeconomic History    Marital status: Single   Tobacco Use    Smoking status: Former     Packs/day: 1.00     Years: 15.00     Pack years: 15.00     Types: Cigarettes     Quit date: 1969     Years since quittin.4    Smokeless tobacco: Never   Substance and Sexual Activity    Alcohol use: Yes     Alcohol/week: 3.0 standard drinks     Types: 3 Cans of beer per week     Comment: socially, NO ALCOHOL 72 HOURS PRIOR TO SX    Drug use: No     Past Surgical History:   Procedure Laterality Date    biopsy for chest mass      BONE MARROW BIOPSY Left 2018    Procedure: Biopsy-bone marrow;  Surgeon: Charanjit Dacosta MD;  Location: [facility];  Service: General;  Laterality: Left;    CATARACT EXTRACTION W/  INTRAOCULAR LENS IMPLANT Right 2019    CATARACT EXTRACTION W/  INTRAOCULAR LENS IMPLANT Left 2019    COLON SURGERY      COLONOSCOPY N/A 10/26/2015    Procedure: Colonoscopy;  Surgeon: Abraham Hong MD;  Location: [facility];  Service: Endoscopy;  Laterality: N/A;    COLONOSCOPY Left 2017    Procedure: COLONOSCOPY;  Surgeon: Abraham Hong MD;  Location: [facility];  Service: Endoscopy;  Laterality: Left;    COLONOSCOPY N/A 2018    Procedure: hixtory of rectal cancer. Colonsocopy asked to be repeated minnie  year, alst one 2017;  Surgeon: Patricio Streeter MD;  Location: [facility];  Service: Endoscopy;  Laterality: N/A;    COLONOSCOPY N/A 2018    Procedure: COLONOSCOPY/hybrid APC or a EndoRotor device;  Surgeon: Rao Medeiros MD;  Location: [facility] ([address] [place] [address]);  Service: Endoscopy;  Laterality: N/A;    COLONOSCOPY N/A 2020    Procedure: COLONOSCOPY;  Surgeon: Yu Wells MD;  Location: [facility];  Service: Endoscopy;  Laterality: N/A;    ESOPHAGOGASTRODUODENOSCOPY N/A 10/22/2019    Procedure: ESOPHAGOGASTRODUODENOSCOPY (EGD);  Surgeon: Mariela Greer MD;  Location: [facility];  Service: Endoscopy;  Laterality: N/A;    FLEXIBLE SIGMOIDOSCOPY Left  4/23/2019    Procedure: SIGMOIDOSCOPY, FLEXIBLE;  Surgeon: Ranjit Will MD;  Location: [facility];  Service: Endoscopy;  Laterality: Left;    HERNIA REPAIR Right 02/2018    Inguinal, open with mesh    mohs      PROSTATECTOMY      RECTAL SURGERY N/A 09/2014    REPAIR OF SLIDING INGUINAL HERNIA Right 2/26/2019    Procedure: REPAIR, HERNIA, INGUINAL, SLIDING;  Surgeon: Bridger Alvarez MD;  Location: [facility] OR;  Service: General;  Laterality: Right;    SKIN BIOPSY      TONSILLECTOMY      TUMOR REMOVAL         Labs:  Lab Results   Component Value Date    WBC 7.36 05/11/2023    WBC 7.36 05/11/2023    HGB 14.0 05/11/2023    HGB 14.0 05/11/2023    HCT 42.3 05/11/2023    HCT 42.3 05/11/2023    MCV 92 05/11/2023    MCV 92 05/11/2023     05/11/2023     05/11/2023     BMP  Lab Results   Component Value Date     05/11/2023    K 4.3 05/11/2023     05/11/2023    CO2 25 05/11/2023    BUN 23 05/11/2023    CREATININE 1.5 (H) 05/11/2023    CALCIUM 9.3 05/11/2023    ANIONGAP 14 05/11/2023    ESTGFRAFRICA 60 03/09/2022    EGFRNONAA 52 (A) 03/09/2022     Lab Results   Component Value Date    ALT 18 05/11/2023    AST 23 05/11/2023     (H) 12/14/2020    ALKPHOS 131 05/11/2023    BILITOT 0.7 05/11/2023       Lab Results   Component Value Date    IRON 49 02/18/2020    TIBC 271 02/18/2020    FERRITIN 349 (H) 02/18/2020     Lab Results   Component Value Date    VLTSPQYZ01 508 02/09/2023     Lab Results   Component Value Date    FOLATE 8.8 02/09/2023     Lab Results   Component Value Date    TSH 1.726 03/09/2022         Review of Systems   Constitutional:  Negative for activity change, appetite change, chills, diaphoresis, fatigue, fever and unexpected weight change.   HENT:  Negative for congestion, dental problem, drooling, ear discharge, ear pain, facial swelling, hearing loss, mouth sores, nosebleeds, postnasal drip, rhinorrhea, sinus pressure, sneezing, sore throat, tinnitus, trouble swallowing and  voice change.    Eyes:  Negative for photophobia, pain, discharge, redness, itching and visual disturbance.   Respiratory:  Negative for apnea, cough, choking, chest tightness, shortness of breath, wheezing and stridor.    Cardiovascular:  Negative for chest pain, palpitations and leg swelling.   Gastrointestinal:  Negative for abdominal distention, abdominal pain, anal bleeding, blood in stool, constipation, diarrhea, nausea, rectal pain and vomiting.   Endocrine: Negative for cold intolerance, heat intolerance, polydipsia, polyphagia and polyuria.   Genitourinary:  Negative for decreased urine volume, difficulty urinating, dysuria, enuresis, flank pain, frequency, genital sores, hematuria, penile discharge, penile pain, penile swelling, scrotal swelling, testicular pain and urgency.   Musculoskeletal:  Negative for arthralgias, back pain, gait problem, joint swelling, myalgias, neck pain and neck stiffness.   Skin:  Negative for color change, pallor, rash and wound.   Allergic/Immunologic: Negative for environmental allergies, food allergies and immunocompromised state.   Neurological:  Negative for dizziness, tremors, seizures, syncope, facial asymmetry, speech difficulty, weakness, light-headedness, numbness and headaches.   Hematological:  Negative for adenopathy. Does not bruise/bleed easily.   Psychiatric/Behavioral:  Positive for dysphoric mood. Negative for agitation, behavioral problems, confusion, decreased concentration, hallucinations, self-injury, sleep disturbance and suicidal ideas. The patient is nervous/anxious. The patient is not hyperactive.      Objective:      Physical Exam  Vitals reviewed.   Constitutional:       General: He is not in acute distress.     Appearance: He is well-developed. He is not diaphoretic.   HENT:      Head: Normocephalic.      Right Ear: External ear normal.      Left Ear: External ear normal.      Nose: Nose normal.      Right Sinus: No maxillary sinus tenderness or  frontal sinus tenderness.      Left Sinus: No maxillary sinus tenderness or frontal sinus tenderness.      Mouth/Throat:      Pharynx: No oropharyngeal exudate.   Eyes:      General: Lids are normal. No scleral icterus.        Right eye: No discharge.         Left eye: No discharge.      Extraocular Movements:      Right eye: Normal extraocular motion.      Left eye: Normal extraocular motion.      Conjunctiva/sclera:      Right eye: Right conjunctiva is not injected. No hemorrhage.     Left eye: Left conjunctiva is not injected. No hemorrhage.     Pupils: Pupils are equal, round, and reactive to light.   Neck:      Thyroid: No thyromegaly.      Vascular: No JVD.      Trachea: No tracheal deviation.   Cardiovascular:      Rate and Rhythm: Normal rate.   Pulmonary:      Effort: Pulmonary effort is normal. No respiratory distress.      Breath sounds: No stridor.   Abdominal:      General: Bowel sounds are normal.      Palpations: Abdomen is soft. There is no hepatomegaly, splenomegaly or mass.      Tenderness: There is no abdominal tenderness.   Musculoskeletal:         General: No tenderness. Normal range of motion.      Cervical back: Normal range of motion and neck supple.   Lymphadenopathy:      Head:      Right side of head: No posterior auricular or occipital adenopathy.      Left side of head: No posterior auricular or occipital adenopathy.      Cervical: No cervical adenopathy.      Right cervical: No superficial, deep or posterior cervical adenopathy.     Left cervical: No superficial, deep or posterior cervical adenopathy.      Upper Body:      Right upper body: No supraclavicular adenopathy.      Left upper body: No supraclavicular adenopathy.   Skin:     General: Skin is dry.      Findings: No erythema or rash.      Nails: There is no clubbing.   Neurological:      Mental Status: He is alert and oriented to person, place, and time.      Cranial Nerves: No cranial nerve deficit.      Coordination:  Coordination normal.   Psychiatric:         Behavior: Behavior normal.         Thought Content: Thought content normal.         Judgment: Judgment normal.           Assessment:      1. Prostate cancer metastatic to bone    2. Adjustment disorder with mixed anxiety and depressed mood    3. History of Waldenstrom's macroglobulinemia    4. History of thymoma    5. Malignant neoplasm metastatic to both lungs    6. Immunodeficiency due to chemotherapy    7. Chronic kidney disease, stage 3a           Med Onc Chart Routing      Follow up with physician . Return to clinic after July 6 for review   Follow up with [facility]    Infusion scheduling note    Injection scheduling note    Labs    Imaging    Pharmacy appointment    Other referrals             Plan:     Extensive conversation with the patient.  Unfortunately has PMS a scan demonstrates widespread disease in lung.  He has castrate resistant prostate carcinoma testosterone level was 15 dosing of Lupron yesterday at this point case pace placed through the Oncology pathways and will try Zytiga plus prednisone.  Prior to systemic chemotherapy.  Low volume disease at present time told the patient no further therapy estimated survival is less than 2 years.  Referral has been made to palliative care consultationAdvance Care Planning consent for chemotherapyConsented the patient to the treatment plan and the patient was educated on the planned duration of the treatment and schedule of the treatment administration.  Article from up-to-date followed to him on castrate resistant prostate carcinoma and rationale for beginning treatment total time 40 minutes             Dustin Sparks Jr, MD FACP

## 2023-06-21 ENCOUNTER — PATIENT MESSAGE (OUTPATIENT)
Dept: PHARMACY | Facility: CLINIC | Age: 83
End: 2023-06-21
Payer: MEDICARE

## 2023-06-21 ENCOUNTER — SPECIALTY PHARMACY (OUTPATIENT)
Dept: PHARMACY | Facility: CLINIC | Age: 83
End: 2023-06-21
Payer: MEDICARE

## 2023-06-21 RX ORDER — ABIRATERONE ACETATE 250 MG/1
TABLET ORAL
Qty: 120 TABLET | Refills: 11 | Status: ACTIVE | OUTPATIENT
Start: 2023-06-21

## 2023-06-21 NOTE — TELEPHONE ENCOUNTER
Discussed high copay with patient. Informed him there is no tamera funding currently available. He is interested in  [facility] and requested I send him link through [facility]. I will follow up on 7/5 once he  returns from vacation.

## 2023-06-22 ENCOUNTER — TELEPHONE (OUTPATIENT)
Dept: INFUSION THERAPY | Facility: HOSPITAL | Age: 83
End: 2023-06-22
Payer: MEDICARE

## 2023-06-22 ENCOUNTER — TELEPHONE (OUTPATIENT)
Dept: HEMATOLOGY/ONCOLOGY | Facility: CLINIC | Age: 83
End: 2023-06-22
Payer: MEDICARE

## 2023-06-22 NOTE — TELEPHONE ENCOUNTER
SW attempted to reach pt to discuss recent distress score. No answer or vm option. SW will remain available.     10:19 am- Pt returned call to . Pt stated that his distress levels are fine today, and he was just anxious about the visit/results but he was heading to the airport to fly to [place] for a few weeks. Pt stated he will call  if any needs arise. SW will remain available.

## 2023-07-06 ENCOUNTER — OFFICE VISIT (OUTPATIENT)
Dept: HEMATOLOGY/ONCOLOGY | Facility: CLINIC | Age: 83
End: 2023-07-06
Payer: MEDICARE

## 2023-07-06 ENCOUNTER — LAB VISIT (OUTPATIENT)
Dept: LAB | Facility: HOSPITAL | Age: 83
End: 2023-07-06
Attending: INTERNAL MEDICINE
Payer: MEDICARE

## 2023-07-06 ENCOUNTER — PATIENT MESSAGE (OUTPATIENT)
Dept: PHARMACY | Facility: CLINIC | Age: 83
End: 2023-07-06
Payer: MEDICARE

## 2023-07-06 ENCOUNTER — SPECIALTY PHARMACY (OUTPATIENT)
Dept: PHARMACY | Facility: CLINIC | Age: 83
End: 2023-07-06
Payer: MEDICARE

## 2023-07-06 VITALS
WEIGHT: 168 LBS | TEMPERATURE: 97 F | DIASTOLIC BLOOD PRESSURE: 75 MMHG | OXYGEN SATURATION: 98 % | HEART RATE: 65 BPM | BODY MASS INDEX: 24.1 KG/M2 | SYSTOLIC BLOOD PRESSURE: 155 MMHG

## 2023-07-06 DIAGNOSIS — D84.821 IMMUNODEFICIENCY DUE TO CHEMOTHERAPY: ICD-10-CM

## 2023-07-06 DIAGNOSIS — C61 PROSTATE CANCER METASTATIC TO BONE: ICD-10-CM

## 2023-07-06 DIAGNOSIS — Z79.899 IMMUNODEFICIENCY DUE TO CHEMOTHERAPY: ICD-10-CM

## 2023-07-06 DIAGNOSIS — Z85.79 HISTORY OF WALDENSTROM'S MACROGLOBULINEMIA: ICD-10-CM

## 2023-07-06 DIAGNOSIS — C78.02 MALIGNANT NEOPLASM METASTATIC TO BOTH LUNGS: ICD-10-CM

## 2023-07-06 DIAGNOSIS — C79.51 PROSTATE CANCER METASTATIC TO BONE: Primary | ICD-10-CM

## 2023-07-06 DIAGNOSIS — C78.01 MALIGNANT NEOPLASM METASTATIC TO BOTH LUNGS: ICD-10-CM

## 2023-07-06 DIAGNOSIS — N18.31 CHRONIC KIDNEY DISEASE, STAGE 3A: ICD-10-CM

## 2023-07-06 DIAGNOSIS — I70.0 AORTIC ATHEROSCLEROSIS: ICD-10-CM

## 2023-07-06 DIAGNOSIS — C61 PROSTATE CANCER METASTATIC TO BONE: Primary | ICD-10-CM

## 2023-07-06 DIAGNOSIS — I10 ESSENTIAL HYPERTENSION: Chronic | ICD-10-CM

## 2023-07-06 DIAGNOSIS — T45.1X5A IMMUNODEFICIENCY DUE TO CHEMOTHERAPY: ICD-10-CM

## 2023-07-06 DIAGNOSIS — F43.23 ADJUSTMENT DISORDER WITH MIXED ANXIETY AND DEPRESSED MOOD: ICD-10-CM

## 2023-07-06 DIAGNOSIS — Z87.898 HISTORY OF THYMOMA: ICD-10-CM

## 2023-07-06 DIAGNOSIS — C79.51 PROSTATE CANCER METASTATIC TO BONE: ICD-10-CM

## 2023-07-06 DIAGNOSIS — E78.2 MIXED HYPERLIPIDEMIA: ICD-10-CM

## 2023-07-06 LAB
ALBUMIN SERPL BCP-MCNC: 4.3 G/DL (ref 3.5–5.2)
ALP SERPL-CCNC: 78 U/L (ref 55–135)
ALT SERPL W/O P-5'-P-CCNC: 18 U/L (ref 10–44)
ANION GAP SERPL CALC-SCNC: 12 MMOL/L (ref 8–16)
AST SERPL-CCNC: 31 U/L (ref 10–40)
BASOPHILS # BLD AUTO: 0.07 K/UL (ref 0–0.2)
BASOPHILS NFR BLD: 1.1 % (ref 0–1.9)
BILIRUB SERPL-MCNC: 0.8 MG/DL (ref 0.1–1)
BUN SERPL-MCNC: 14 MG/DL (ref 8–23)
CALCIUM SERPL-MCNC: 9.8 MG/DL (ref 8.7–10.5)
CHLORIDE SERPL-SCNC: 107 MMOL/L (ref 95–110)
CO2 SERPL-SCNC: 24 MMOL/L (ref 23–29)
COMPLEXED PSA SERPL-MCNC: 64.7 NG/ML (ref 0–4)
CREAT SERPL-MCNC: 1.3 MG/DL (ref 0.5–1.4)
DIFFERENTIAL METHOD: NORMAL
EOSINOPHIL # BLD AUTO: 0.3 K/UL (ref 0–0.5)
EOSINOPHIL NFR BLD: 5.3 % (ref 0–8)
ERYTHROCYTE [DISTWIDTH] IN BLOOD BY AUTOMATED COUNT: 13.3 % (ref 11.5–14.5)
EST. GFR  (NO RACE VARIABLE): 55 ML/MIN/1.73 M^2
GLUCOSE SERPL-MCNC: 124 MG/DL (ref 70–110)
HCT VFR BLD AUTO: 42.4 % (ref 40–54)
HGB BLD-MCNC: 14.1 G/DL (ref 14–18)
IMM GRANULOCYTES # BLD AUTO: 0.03 K/UL (ref 0–0.04)
IMM GRANULOCYTES NFR BLD AUTO: 0.5 % (ref 0–0.5)
LYMPHOCYTES # BLD AUTO: 2.2 K/UL (ref 1–4.8)
LYMPHOCYTES NFR BLD: 35.3 % (ref 18–48)
MCH RBC QN AUTO: 30.7 PG (ref 27–31)
MCHC RBC AUTO-ENTMCNC: 33.3 G/DL (ref 32–36)
MCV RBC AUTO: 92 FL (ref 82–98)
MONOCYTES # BLD AUTO: 0.6 K/UL (ref 0.3–1)
MONOCYTES NFR BLD: 8.8 % (ref 4–15)
NEUTROPHILS # BLD AUTO: 3.1 K/UL (ref 1.8–7.7)
NEUTROPHILS NFR BLD: 49 % (ref 38–73)
NRBC BLD-RTO: 0 /100 WBC
PLATELET # BLD AUTO: 218 K/UL (ref 150–450)
PMV BLD AUTO: 9.7 FL (ref 9.2–12.9)
POTASSIUM SERPL-SCNC: 5.6 MMOL/L (ref 3.5–5.1)
PROT SERPL-MCNC: 7.2 G/DL (ref 6–8.4)
RBC # BLD AUTO: 4.6 M/UL (ref 4.6–6.2)
SODIUM SERPL-SCNC: 143 MMOL/L (ref 136–145)
TESTOST SERPL-MCNC: 18 NG/DL (ref 304–1227)
WBC # BLD AUTO: 6.24 K/UL (ref 3.9–12.7)

## 2023-07-06 PROCEDURE — 1126F AMNT PAIN NOTED NONE PRSNT: CPT | Mod: HCNC,CPTII,S$GLB, | Performed by: INTERNAL MEDICINE

## 2023-07-06 PROCEDURE — 99214 OFFICE O/P EST MOD 30 MIN: CPT | Mod: HCNC,S$GLB,, | Performed by: INTERNAL MEDICINE

## 2023-07-06 PROCEDURE — 99214 PR OFFICE/OUTPT VISIT, EST, LEVL IV, 30-39 MIN: ICD-10-PCS | Mod: HCNC,S$GLB,, | Performed by: INTERNAL MEDICINE

## 2023-07-06 PROCEDURE — 1160F RVW MEDS BY RX/DR IN RCRD: CPT | Mod: HCNC,CPTII,S$GLB, | Performed by: INTERNAL MEDICINE

## 2023-07-06 PROCEDURE — 1160F PR REVIEW ALL MEDS BY PRESCRIBER/CLIN PHARMACIST DOCUMENTED: ICD-10-PCS | Mod: HCNC,CPTII,S$GLB, | Performed by: INTERNAL MEDICINE

## 2023-07-06 PROCEDURE — 3077F SYST BP >= 140 MM HG: CPT | Mod: HCNC,CPTII,S$GLB, | Performed by: INTERNAL MEDICINE

## 2023-07-06 PROCEDURE — 1101F PR PT FALLS ASSESS DOC 0-1 FALLS W/OUT INJ PAST YR: ICD-10-PCS | Mod: HCNC,CPTII,S$GLB, | Performed by: INTERNAL MEDICINE

## 2023-07-06 PROCEDURE — 85025 COMPLETE CBC W/AUTO DIFF WBC: CPT | Mod: HCNC | Performed by: INTERNAL MEDICINE

## 2023-07-06 PROCEDURE — 3077F PR MOST RECENT SYSTOLIC BLOOD PRESSURE >= 140 MM HG: ICD-10-PCS | Mod: HCNC,CPTII,S$GLB, | Performed by: INTERNAL MEDICINE

## 2023-07-06 PROCEDURE — 1159F MED LIST DOCD IN RCRD: CPT | Mod: HCNC,CPTII,S$GLB, | Performed by: INTERNAL MEDICINE

## 2023-07-06 PROCEDURE — 1101F PT FALLS ASSESS-DOCD LE1/YR: CPT | Mod: HCNC,CPTII,S$GLB, | Performed by: INTERNAL MEDICINE

## 2023-07-06 PROCEDURE — 3288F FALL RISK ASSESSMENT DOCD: CPT | Mod: HCNC,CPTII,S$GLB, | Performed by: INTERNAL MEDICINE

## 2023-07-06 PROCEDURE — 80053 COMPREHEN METABOLIC PANEL: CPT | Mod: HCNC | Performed by: INTERNAL MEDICINE

## 2023-07-06 PROCEDURE — 3288F PR FALLS RISK ASSESSMENT DOCUMENTED: ICD-10-PCS | Mod: HCNC,CPTII,S$GLB, | Performed by: INTERNAL MEDICINE

## 2023-07-06 PROCEDURE — 1126F PR PAIN SEVERITY QUANTIFIED, NO PAIN PRESENT: ICD-10-PCS | Mod: HCNC,CPTII,S$GLB, | Performed by: INTERNAL MEDICINE

## 2023-07-06 PROCEDURE — 36415 COLL VENOUS BLD VENIPUNCTURE: CPT | Mod: HCNC | Performed by: INTERNAL MEDICINE

## 2023-07-06 PROCEDURE — 84403 ASSAY OF TOTAL TESTOSTERONE: CPT | Mod: HCNC | Performed by: INTERNAL MEDICINE

## 2023-07-06 PROCEDURE — 84153 ASSAY OF PSA TOTAL: CPT | Mod: HCNC | Performed by: INTERNAL MEDICINE

## 2023-07-06 PROCEDURE — 1159F PR MEDICATION LIST DOCUMENTED IN MEDICAL RECORD: ICD-10-PCS | Mod: HCNC,CPTII,S$GLB, | Performed by: INTERNAL MEDICINE

## 2023-07-06 PROCEDURE — 99999 PR PBB SHADOW E&M-EST. PATIENT-LVL III: CPT | Mod: PBBFAC,HCNC,, | Performed by: INTERNAL MEDICINE

## 2023-07-06 PROCEDURE — 3078F DIAST BP <80 MM HG: CPT | Mod: HCNC,CPTII,S$GLB, | Performed by: INTERNAL MEDICINE

## 2023-07-06 PROCEDURE — 99999 PR PBB SHADOW E&M-EST. PATIENT-LVL III: ICD-10-PCS | Mod: PBBFAC,HCNC,, | Performed by: INTERNAL MEDICINE

## 2023-07-06 PROCEDURE — 3078F PR MOST RECENT DIASTOLIC BLOOD PRESSURE < 80 MM HG: ICD-10-PCS | Mod: HCNC,CPTII,S$GLB, | Performed by: INTERNAL MEDICINE

## 2023-07-06 NOTE — TELEPHONE ENCOUNTER
Mr. Tanner agreed to create his [facility] once he is home. He will call [facility] to confirm so that we may route abiraterone to that pharmacy.

## 2023-07-06 NOTE — TELEPHONE ENCOUNTER
Patient has created his account at [facility]. He will receive abiraterone from [facility] and pred[facility].     [facility] - [place], [place] - [address] [place] [address]   [address] [place] [address], [facility] [address]   Phone:  [phone]  Fax:  [phone]     Pt requested I follow up with him in 1 week to confirm receipt of abiraterone from [facility].

## 2023-07-06 NOTE — PROGRESS NOTES
Subjective:       Patient ID: Homero Tanner is a 82 y.o. male.    Chief Complaint: Results, Prostate Cancer, and Chemotherapy    HPI:  81-year-old male newly diagnosed metastatic prostate carcinoma with positive PMS a scan.  The patient has metastatic disease in lung as well as single isolated bone metastasis.  At this time patient has been given a three-month dose of Lupron by his urologist Dr. Fuentes  In patient returns after recent travels to your ECOG status 1    Past Medical History:   Diagnosis Date    Abnormal ECG 6/24/2013    Adenomatous rectal polyp 12/7/2015    Aortic insufficiency 6/24/2013    Arthritis     Asthma     as a child    Benign neoplasm of cecum 2/27/2017    Benign neoplasm of transverse colon 2/27/2017    Cataract     CHF (congestive heart failure)     Pt states He's never been told this    Diverticulosis of large intestine without hemorrhage 2/27/2017    Encounter for blood transfusion     Frequent PVCs     Gallstones 3/28/2018    By Us done 3/21/2018    GERD (gastroesophageal reflux disease)     History of colon polyps     Hypertension     Iron deficiency anemia 10/26/2015    Lymphoma     waldenstrom's macroglobulinemia    Mixed hyperlipidemia 6/24/2013    Premature atrial contractions     Prostate cancer     PSVT (paroxysmal supraventricular tachycardia) 6/24/2013    Pulmonary hypertension 6/13/2016    PVC's (premature ventricular contractions) 6/13/2016    Rectal adenocarcinoma     Rectal cancer 5/1/2018    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    left parietal scalp    Thymoma     TR (tricuspid regurgitation) 6/13/2016    Tubular adenoma of colon 06/2020    VT (ventricular tachycardia) 9/18/2018     Family History   Problem Relation Age of Onset    Diabetes Father     Cataracts Mother     Amblyopia Neg Hx     Blindness Neg Hx     Cancer Neg Hx     Glaucoma Neg Hx     Hypertension Neg Hx     Macular degeneration Neg Hx     Retinal detachment Neg Hx     Strabismus Neg Hx     Stroke  Neg Hx     Thyroid disease Neg Hx     Melanoma Neg Hx      Social History     Socioeconomic History    Marital status: Single   Tobacco Use    Smoking status: Former     Packs/day: 1.00     Years: 15.00     Pack years: 15.00     Types: Cigarettes     Quit date: 1969     Years since quittin.4    Smokeless tobacco: Never   Substance and Sexual Activity    Alcohol use: Yes     Alcohol/week: 3.0 standard drinks     Types: 3 Cans of beer per week     Comment: socially, NO ALCOHOL 72 HOURS PRIOR TO SX    Drug use: No     Past Surgical History:   Procedure Laterality Date    biopsy for chest mass      BONE MARROW BIOPSY Left 2018    Procedure: Biopsy-bone marrow;  Surgeon: Charanjit Dacosta MD;  Location: [facility];  Service: General;  Laterality: Left;    CATARACT EXTRACTION W/  INTRAOCULAR LENS IMPLANT Right 2019    CATARACT EXTRACTION W/  INTRAOCULAR LENS IMPLANT Left 2019    COLON SURGERY      COLONOSCOPY N/A 10/26/2015    Procedure: Colonoscopy;  Surgeon: Abraham Hong MD;  Location: [facility];  Service: Endoscopy;  Laterality: N/A;    COLONOSCOPY Left 2017    Procedure: COLONOSCOPY;  Surgeon: Abraham Hong MD;  Location: [facility];  Service: Endoscopy;  Laterality: Left;    COLONOSCOPY N/A 2018    Procedure: hixtory of rectal cancer. Colonsocopy asked to be repeated minnie  year, alst one 2017;  Surgeon: Patricio Streeter MD;  Location: [facility];  Service: Endoscopy;  Laterality: N/A;    COLONOSCOPY N/A 2018    Procedure: COLONOSCOPY/hybrid APC or a EndoRotor device;  Surgeon: Rao Medeiros MD;  Location: [facility] ([address] [place] [address]);  Service: Endoscopy;  Laterality: N/A;    COLONOSCOPY N/A 2020    Procedure: COLONOSCOPY;  Surgeon: Yu Wells MD;  Location: [facility];  Service: Endoscopy;  Laterality: N/A;    ESOPHAGOGASTRODUODENOSCOPY N/A 10/22/2019    Procedure: ESOPHAGOGASTRODUODENOSCOPY (EGD);  Surgeon: Mariela Greer MD;  Location: [facility];  Service:  Endoscopy;  Laterality: N/A;    FLEXIBLE SIGMOIDOSCOPY Left 4/23/2019    Procedure: SIGMOIDOSCOPY, FLEXIBLE;  Surgeon: Ranjit Will MD;  Location: [facility];  Service: Endoscopy;  Laterality: Left;    HERNIA REPAIR Right 02/2018    Inguinal, open with mesh    mohs      PROSTATECTOMY      RECTAL SURGERY N/A 09/2014    REPAIR OF SLIDING INGUINAL HERNIA Right 2/26/2019    Procedure: REPAIR, HERNIA, INGUINAL, SLIDING;  Surgeon: Bridger Alvarez MD;  Location: [facility];  Service: General;  Laterality: Right;    SKIN BIOPSY      TONSILLECTOMY      TUMOR REMOVAL         Labs:  Lab Results   Component Value Date    WBC 7.36 05/11/2023    WBC 7.36 05/11/2023    HGB 14.0 05/11/2023    HGB 14.0 05/11/2023    HCT 42.3 05/11/2023    HCT 42.3 05/11/2023    MCV 92 05/11/2023    MCV 92 05/11/2023     05/11/2023     05/11/2023     BMP  Lab Results   Component Value Date     05/11/2023    K 4.3 05/11/2023     05/11/2023    CO2 25 05/11/2023    BUN 23 05/11/2023    CREATININE 1.5 (H) 05/11/2023    CALCIUM 9.3 05/11/2023    ANIONGAP 14 05/11/2023    ESTGFRAFRICA 60 03/09/2022    EGFRNONAA 52 (A) 03/09/2022     Lab Results   Component Value Date    ALT 18 05/11/2023    AST 23 05/11/2023     (H) 12/14/2020    ALKPHOS 131 05/11/2023    BILITOT 0.7 05/11/2023       Lab Results   Component Value Date    IRON 49 02/18/2020    TIBC 271 02/18/2020    FERRITIN 349 (H) 02/18/2020     Lab Results   Component Value Date    ITTBJPDO15 508 02/09/2023     Lab Results   Component Value Date    FOLATE 8.8 02/09/2023     Lab Results   Component Value Date    TSH 1.726 03/09/2022         Review of Systems   Constitutional:  Negative for activity change, appetite change, chills, diaphoresis, fatigue, fever and unexpected weight change.   HENT:  Negative for congestion, dental problem, drooling, ear discharge, ear pain, facial swelling, hearing loss, mouth sores, nosebleeds, postnasal drip, rhinorrhea, sinus  pressure, sneezing, sore throat, tinnitus, trouble swallowing and voice change.    Eyes:  Negative for photophobia, pain, discharge, redness, itching and visual disturbance.   Respiratory:  Negative for apnea, cough, choking, chest tightness, shortness of breath, wheezing and stridor.    Cardiovascular:  Negative for chest pain, palpitations and leg swelling.   Gastrointestinal:  Negative for abdominal distention, abdominal pain, anal bleeding, blood in stool, constipation, diarrhea, nausea, rectal pain and vomiting.   Endocrine: Negative for cold intolerance, heat intolerance, polydipsia, polyphagia and polyuria.   Genitourinary:  Negative for decreased urine volume, difficulty urinating, dysuria, enuresis, flank pain, frequency, genital sores, hematuria, penile discharge, penile pain, penile swelling, scrotal swelling, testicular pain and urgency.   Musculoskeletal:  Negative for arthralgias, back pain, gait problem, joint swelling, myalgias, neck pain and neck stiffness.   Skin:  Negative for color change, pallor, rash and wound.   Allergic/Immunologic: Negative for environmental allergies, food allergies and immunocompromised state.   Neurological:  Negative for dizziness, tremors, seizures, syncope, facial asymmetry, speech difficulty, weakness, light-headedness, numbness and headaches.   Hematological:  Negative for adenopathy. Does not bruise/bleed easily.   Psychiatric/Behavioral:  Negative for agitation, behavioral problems, confusion, decreased concentration, dysphoric mood, hallucinations, self-injury, sleep disturbance and suicidal ideas. The patient is not nervous/anxious and is not hyperactive.      Objective:      Physical Exam  Vitals reviewed.   Constitutional:       General: He is not in acute distress.     Appearance: He is well-developed. He is not diaphoretic.   HENT:      Head: Normocephalic.      Right Ear: External ear normal.      Left Ear: External ear normal.      Nose: Nose normal.       Right Sinus: No maxillary sinus tenderness or frontal sinus tenderness.      Left Sinus: No maxillary sinus tenderness or frontal sinus tenderness.      Mouth/Throat:      Pharynx: No oropharyngeal exudate.   Eyes:      General: Lids are normal. No scleral icterus.        Right eye: No discharge.         Left eye: No discharge.      Extraocular Movements:      Right eye: Normal extraocular motion.      Left eye: Normal extraocular motion.      Conjunctiva/sclera:      Right eye: Right conjunctiva is not injected. No hemorrhage.     Left eye: Left conjunctiva is not injected. No hemorrhage.     Pupils: Pupils are equal, round, and reactive to light.   Neck:      Thyroid: No thyromegaly.      Vascular: No JVD.      Trachea: No tracheal deviation.   Cardiovascular:      Rate and Rhythm: Normal rate.   Pulmonary:      Effort: Pulmonary effort is normal. No respiratory distress.      Breath sounds: No stridor.   Abdominal:      General: Bowel sounds are normal.      Palpations: Abdomen is soft. There is no hepatomegaly, splenomegaly or mass.      Tenderness: There is no abdominal tenderness.   Musculoskeletal:         General: No tenderness. Normal range of motion.      Cervical back: Normal range of motion and neck supple.   Lymphadenopathy:      Head:      Right side of head: No posterior auricular or occipital adenopathy.      Left side of head: No posterior auricular or occipital adenopathy.      Cervical: No cervical adenopathy.      Right cervical: No superficial, deep or posterior cervical adenopathy.     Left cervical: No superficial, deep or posterior cervical adenopathy.      Upper Body:      Right upper body: No supraclavicular adenopathy.      Left upper body: No supraclavicular adenopathy.   Skin:     General: Skin is dry.      Findings: No erythema or rash.      Nails: There is no clubbing.   Neurological:      Mental Status: He is alert and oriented to person, place, and time.      Cranial Nerves: No  cranial nerve deficit.      Coordination: Coordination normal.   Psychiatric:         Behavior: Behavior normal.         Thought Content: Thought content normal.         Judgment: Judgment normal.           Assessment:      1. Prostate cancer metastatic to bone    2. Malignant neoplasm metastatic to both lungs    3. Immunodeficiency due to chemotherapy    4. Chronic kidney disease, stage 3a    5. Essential hypertension    6. History of thymoma    7. History of Waldenstrom's macroglobulinemia    8. Mixed hyperlipidemia    9. Aortic atherosclerosis           Med Onc Chart Routing      Follow up with physician 2 months. Return to clinic to see me in 2 months with CBC CMP PSA and testosterone prior   Follow up with [facility]    Infusion scheduling note    Injection scheduling note    Labs    Imaging    Pharmacy appointment    Other referrals             Plan:     Extensive conversation I have communicated with [facility].  And will communicate again that patient now is back in [place] and would like to begin Zytiga and prednisone.  In addition to Lupron dosing by Dr. Anderson.  Baseline CBC CMP PSA and testosterone today communicate through portal.  Return 2 months with similar labs for response to therapy will need to address at that time whether not Zometa should be added with single bone metastasis and S2.  And discussed implication and will discussed implications with him in this regard        Dustin Sparks Jr, MD FACP

## 2023-07-07 ENCOUNTER — TELEPHONE (OUTPATIENT)
Dept: HEMATOLOGY/ONCOLOGY | Facility: CLINIC | Age: 83
End: 2023-07-07
Payer: MEDICARE

## 2023-07-07 NOTE — NURSING
1119am: Outgoing call to pt regarding oral Zytiga and [facility]. Spoke with pt. Pt has done all required steps for [facility]. Pt instructed to contact me once pt receives oral Zytiga in mail. Pt also picking up prednisone today but instructed not to take until received Zytiga. Pt verbalized understanding.   Oncology Navigation   Intake  Date Worked: 07/07/23     Treatment  Current Status: Staging work-up             Procedures: PET scan  PET Scan Schedule Date: 06/16/23 ([facility] pylarify)                 Acuity  Stage: 2  Systemic Treatment - predicted or initiated: Oral Chemotherapy Only (+1)  Treatment Tolerability: Has not started treatment yet/treatment fully completed and side effects resolved  Hospitalization Within the Past Month: 0   Needed: 0  Support: 0  Transportation: 0  History of noncompliance/frequent no shows and cancellations: 0  Verbalizes the need for more education: 1  Navigation Acuity: 4     Follow Up  No follow-ups on file.

## 2023-07-14 ENCOUNTER — TELEPHONE (OUTPATIENT)
Dept: DERMATOLOGY | Facility: CLINIC | Age: 83
End: 2023-07-14
Payer: MEDICARE

## 2023-07-14 ENCOUNTER — TELEPHONE (OUTPATIENT)
Dept: HEMATOLOGY/ONCOLOGY | Facility: CLINIC | Age: 83
End: 2023-07-14
Payer: MEDICARE

## 2023-07-14 NOTE — TELEPHONE ENCOUNTER
----- Message from Adele Jacobo sent at 7/14/2023  3:39 PM CDT -----  Patient is requesting the nurse give him a call back concerning medication that he just received. Call back at [phone]      
General

## 2023-07-14 NOTE — TELEPHONE ENCOUNTER
Called and spoke to patient. Pt appt in August rescheduled for Monday at [facility] for 1pm

## 2023-07-14 NOTE — NURSING
1637pm: Incoming call from pt regarding received Zytiga in mail today. Pt will start Zytiga on Mon 7/17 along with Prednisone. Pt reminded to take Zytiga on empty stomach at same time everyday and take Prednisone with food. Pt verbalized understanding. Pt plan to report fup appts as scheduled.   Oncology Navigation   Intake  Date Worked: 07/14/23     Treatment  Current Status: Staging work-up             Procedures: PET scan  PET Scan Schedule Date: 06/16/23 ([facility] pylarify)                 Acuity  Stage: 2  Systemic Treatment - predicted or initiated: Oral Chemotherapy Only (+1)  Treatment Tolerability: Has not started treatment yet/treatment fully completed and side effects resolved  Hospitalization Within the Past Month: 0   Needed: 0  Support: 0  Transportation: 0  History of noncompliance/frequent no shows and cancellations: 0  Verbalizes the need for more education: 1  Navigation Acuity: 4     Follow Up  No follow-ups on file.

## 2023-07-17 ENCOUNTER — OFFICE VISIT (OUTPATIENT)
Dept: DERMATOLOGY | Facility: CLINIC | Age: 83
End: 2023-07-17
Payer: MEDICARE

## 2023-07-17 DIAGNOSIS — L21.9 SEBORRHEIC DERMATITIS OF SCALP: ICD-10-CM

## 2023-07-17 DIAGNOSIS — L98.8 EROSIVE PUSTULAR DERMATOSIS: ICD-10-CM

## 2023-07-17 DIAGNOSIS — L57.0 HYPERTROPHIC ACTINIC KERATOSIS: Primary | ICD-10-CM

## 2023-07-17 DIAGNOSIS — L71.9 ROSACEA: ICD-10-CM

## 2023-07-17 PROCEDURE — 1101F PR PT FALLS ASSESS DOC 0-1 FALLS W/OUT INJ PAST YR: ICD-10-PCS | Mod: HCNC,CPTII,S$GLB, | Performed by: DERMATOLOGY

## 2023-07-17 PROCEDURE — 1160F PR REVIEW ALL MEDS BY PRESCRIBER/CLIN PHARMACIST DOCUMENTED: ICD-10-PCS | Mod: HCNC,CPTII,S$GLB, | Performed by: DERMATOLOGY

## 2023-07-17 PROCEDURE — 3288F FALL RISK ASSESSMENT DOCD: CPT | Mod: HCNC,CPTII,S$GLB, | Performed by: DERMATOLOGY

## 2023-07-17 PROCEDURE — 1101F PT FALLS ASSESS-DOCD LE1/YR: CPT | Mod: HCNC,CPTII,S$GLB, | Performed by: DERMATOLOGY

## 2023-07-17 PROCEDURE — 99213 PR OFFICE/OUTPT VISIT, EST, LEVL III, 20-29 MIN: ICD-10-PCS | Mod: HCNC,S$GLB,, | Performed by: DERMATOLOGY

## 2023-07-17 PROCEDURE — 1159F PR MEDICATION LIST DOCUMENTED IN MEDICAL RECORD: ICD-10-PCS | Mod: HCNC,CPTII,S$GLB, | Performed by: DERMATOLOGY

## 2023-07-17 PROCEDURE — 99999 PR PBB SHADOW E&M-EST. PATIENT-LVL III: CPT | Mod: PBBFAC,HCNC,, | Performed by: DERMATOLOGY

## 2023-07-17 PROCEDURE — 99213 OFFICE O/P EST LOW 20 MIN: CPT | Mod: HCNC,S$GLB,, | Performed by: DERMATOLOGY

## 2023-07-17 PROCEDURE — 1160F RVW MEDS BY RX/DR IN RCRD: CPT | Mod: HCNC,CPTII,S$GLB, | Performed by: DERMATOLOGY

## 2023-07-17 PROCEDURE — 99999 PR PBB SHADOW E&M-EST. PATIENT-LVL III: ICD-10-PCS | Mod: PBBFAC,HCNC,, | Performed by: DERMATOLOGY

## 2023-07-17 PROCEDURE — 1126F PR PAIN SEVERITY QUANTIFIED, NO PAIN PRESENT: ICD-10-PCS | Mod: HCNC,CPTII,S$GLB, | Performed by: DERMATOLOGY

## 2023-07-17 PROCEDURE — 1126F AMNT PAIN NOTED NONE PRSNT: CPT | Mod: HCNC,CPTII,S$GLB, | Performed by: DERMATOLOGY

## 2023-07-17 PROCEDURE — 3288F PR FALLS RISK ASSESSMENT DOCUMENTED: ICD-10-PCS | Mod: HCNC,CPTII,S$GLB, | Performed by: DERMATOLOGY

## 2023-07-17 PROCEDURE — 1159F MED LIST DOCD IN RCRD: CPT | Mod: HCNC,CPTII,S$GLB, | Performed by: DERMATOLOGY

## 2023-07-17 RX ORDER — ERYTHROMYCIN 20 MG/G
GEL TOPICAL
Qty: 60 G | Refills: 3 | Status: SHIPPED | OUTPATIENT
Start: 2023-07-17

## 2023-07-17 RX ORDER — CLOBETASOL PROPIONATE 0.46 MG/ML
SOLUTION TOPICAL
Qty: 50 ML | Refills: 5 | Status: SHIPPED | OUTPATIENT
Start: 2023-07-17

## 2023-07-17 NOTE — PATIENT INSTRUCTIONS
Preventing Skin Cancer  Relaxing in the sun may feel good. But it isnt good for your skin. In fact, being exposed to the suns harmful rays is a major cause of skin cancer. This is a serious disease that can be life-threatening. People of all ages and backgrounds are at risk. But in most cases, skin cancer can be prevented.    Your Role in Prevention  You can act today to help prevent skin cancer. Start by avoiding the suns UV (ultraviolet) rays. And dont use tanning beds, which are no safer than the sun. Taking these steps can help keep you from getting skin cancer. It can also help prevent wrinkles and other sun-induced aging effects. Make sure your children also follow these safeguards. Now is the time to start taking preventive steps against skin cancer.  When You Are Outdoors  Protect your skin when you go outdoors during the day. Take precautions whenever you go out to eat, run errands by car or on foot, or do any outdoor activity. There isnt just one easy way to protect your skin. Its best to follow all of these steps:  Wear tightly woven clothing that covers your skin. Put on a wide-brimmed hat to protect your face, ears, and scalp.  Watch the clock. Try to avoid the sun between 10 a.m. and 4 p.m., when it is strongest.  Head for the shade or create your own. Use an umbrella when sitting or strolling.  Know that the suns rays can reflect off sand, water, and snow. This can harm your skin. Take extra care when you are near reflective surfaces.  Keep in mind that even when the weather is hazy or cloudy, your skin can be exposed to strong UV rays.  Shield your skin with sunscreen. Also, apply sunscreen to your childrens skin.  Tips for Using Sunscreen  To help prevent skin cancer, choose the right sunscreen and use it correctly. Try the following tips:  Choose a sunscreen that has a sun protection factor (SPF) of at least 15. For the best protection, an SPF of at least 30 is preferred. Also, choose a  sunscreen labeled broad spectrum. This will shield you from both UVA and UVB (ultraviolet A and B) rays.  If one brand irritates your skin, try another, particularly ones without fragrance.  Use a water-resistant sunscreen if swimming or sweating.  Reapply sunscreen every 2 hours. If youre active, do this more often.  Cover any sun-exposed skin, from your face to your feet. Dont forget your ears and your lips.  Know that while sunscreen helps protect you, it isnt enough. You should also wear protective clothing. And try to stay out of the sun as much as you can, especially from 10 a.m. to 4 p.m.  © 2716-4966 [employer]. [address] [place] [address], [place] [address]. All rights reserved. This information is not intended as a substitute for professional medical care. Always follow your healthcare professional's instructions.

## 2023-07-17 NOTE — PROGRESS NOTES
Subjective:      Patient ID:  Homero Tanner is a 83 y.o. male who presents for   Chief Complaint   Patient presents with    Skin Check     Skin check. S/p mohs on scalp.        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 Purcell on 5/9/23), hypertrophic AK of the midline parietal scalp, SCC of the vertex scalp (s/p Mohs in 12/2019 per patient), NMSC of the left parietal scalp, AK of the midline parietal scalp (s/p biopsy on 6/9/20), seb derm and rosacea, last seen on 5/11/23. He c/o several scaly patches of the scalp, discussed efudex at prior appt.     For rosacea, he uses emgel    For seb derm, he uses ketoconazole shampoo and clobetasol solution. Denies improvement.       Review of Systems   Constitutional:  Negative for fever and chills.   Gastrointestinal:  Negative for nausea and vomiting.   Skin:  Positive for activity-related sunscreen use. Negative for daily sunscreen use and recent sunburn.   Hematologic/Lymphatic: Does not bruise/bleed easily.     Objective:   Physical Exam   Constitutional: He appears well-developed and well-nourished. No distress.   Neurological: He is alert and oriented to person, place, and time. He is not disoriented.   Psychiatric: He has a normal mood and affect.   Skin:   Areas Examined (abnormalities noted in diagram):   Scalp / Hair Palpated and Inspected  Head / Face Inspection Performed  Neck Inspection Performed  RUE Inspected  LUE Inspection Performed  Nails and Digits Inspection Performed            Assessment / Plan:        Hypertrophic actinic keratosis  Given prognosis of one year per patient discussed with hem-onc. Pt wishes to defer treatment for efudex. Recently started on Zytiga and prednisone. Continue clobetasol solution for erosive pustular dermatosis component.     Erosive pustular dermatosis  Continue clobetasol solution pern.              Follow up in about 6 months (around  1/17/2024).

## 2023-07-20 RX ORDER — OMEPRAZOLE 20 MG/1
20 CAPSULE, DELAYED RELEASE ORAL DAILY
Qty: 90 CAPSULE | Refills: 3 | Status: SHIPPED | OUTPATIENT
Start: 2023-07-20

## 2023-07-20 NOTE — TELEPHONE ENCOUNTER
No care due was identified.  [facility] Embedded Care Due Messages. Reference number: [id number].   7/20/2023 3:17:26 PM CDT

## 2023-07-22 ENCOUNTER — PATIENT MESSAGE (OUTPATIENT)
Dept: ADMINISTRATIVE | Facility: HOSPITAL | Age: 83
End: 2023-07-22
Payer: MEDICARE

## 2023-09-08 ENCOUNTER — LAB VISIT (OUTPATIENT)
Dept: LAB | Facility: HOSPITAL | Age: 83
End: 2023-09-08
Attending: INTERNAL MEDICINE
Payer: MEDICARE

## 2023-09-08 DIAGNOSIS — Z87.898 HISTORY OF THYMOMA: ICD-10-CM

## 2023-09-08 DIAGNOSIS — F43.23 ADJUSTMENT DISORDER WITH MIXED ANXIETY AND DEPRESSED MOOD: ICD-10-CM

## 2023-09-08 DIAGNOSIS — Z85.79 HISTORY OF WALDENSTROM'S MACROGLOBULINEMIA: ICD-10-CM

## 2023-09-08 DIAGNOSIS — C61 PROSTATE CANCER METASTATIC TO BONE: ICD-10-CM

## 2023-09-08 DIAGNOSIS — C79.51 PROSTATE CANCER METASTATIC TO BONE: ICD-10-CM

## 2023-09-08 DIAGNOSIS — C78.02 MALIGNANT NEOPLASM METASTATIC TO BOTH LUNGS: ICD-10-CM

## 2023-09-08 DIAGNOSIS — C78.01 MALIGNANT NEOPLASM METASTATIC TO BOTH LUNGS: ICD-10-CM

## 2023-09-08 LAB
ALBUMIN SERPL BCP-MCNC: 4 G/DL (ref 3.5–5.2)
ALP SERPL-CCNC: 93 U/L (ref 55–135)
ALT SERPL W/O P-5'-P-CCNC: 11 U/L (ref 10–44)
ANION GAP SERPL CALC-SCNC: 15 MMOL/L (ref 8–16)
AST SERPL-CCNC: 22 U/L (ref 10–40)
BILIRUB SERPL-MCNC: 1.2 MG/DL (ref 0.1–1)
BUN SERPL-MCNC: 34 MG/DL (ref 8–23)
CALCIUM SERPL-MCNC: 9.5 MG/DL (ref 8.7–10.5)
CHLORIDE SERPL-SCNC: 97 MMOL/L (ref 95–110)
CO2 SERPL-SCNC: 28 MMOL/L (ref 23–29)
CREAT SERPL-MCNC: 1.5 MG/DL (ref 0.5–1.4)
EST. GFR  (NO RACE VARIABLE): 46 ML/MIN/1.73 M^2
GLUCOSE SERPL-MCNC: 140 MG/DL (ref 70–110)
POTASSIUM SERPL-SCNC: 4 MMOL/L (ref 3.5–5.1)
PROT SERPL-MCNC: 6.9 G/DL (ref 6–8.4)
SODIUM SERPL-SCNC: 140 MMOL/L (ref 136–145)

## 2023-09-08 PROCEDURE — 80053 COMPREHEN METABOLIC PANEL: CPT | Mod: HCNC | Performed by: INTERNAL MEDICINE

## 2023-09-08 PROCEDURE — 36415 COLL VENOUS BLD VENIPUNCTURE: CPT | Mod: HCNC | Performed by: INTERNAL MEDICINE

## 2023-09-11 ENCOUNTER — OFFICE VISIT (OUTPATIENT)
Dept: HEMATOLOGY/ONCOLOGY | Facility: CLINIC | Age: 83
End: 2023-09-11
Payer: MEDICARE

## 2023-09-11 ENCOUNTER — LAB VISIT (OUTPATIENT)
Dept: LAB | Facility: HOSPITAL | Age: 83
End: 2023-09-11
Attending: INTERNAL MEDICINE
Payer: MEDICARE

## 2023-09-11 VITALS
HEIGHT: 70 IN | RESPIRATION RATE: 16 BRPM | OXYGEN SATURATION: 98 % | BODY MASS INDEX: 23.93 KG/M2 | TEMPERATURE: 98 F | SYSTOLIC BLOOD PRESSURE: 145 MMHG | WEIGHT: 167.13 LBS | DIASTOLIC BLOOD PRESSURE: 82 MMHG | HEART RATE: 79 BPM

## 2023-09-11 DIAGNOSIS — N18.31 CHRONIC KIDNEY DISEASE, STAGE 3A: ICD-10-CM

## 2023-09-11 DIAGNOSIS — Z79.899 IMMUNODEFICIENCY DUE TO CHEMOTHERAPY: ICD-10-CM

## 2023-09-11 DIAGNOSIS — C78.01 MALIGNANT NEOPLASM METASTATIC TO BOTH LUNGS: ICD-10-CM

## 2023-09-11 DIAGNOSIS — C61 PROSTATE CANCER METASTATIC TO BONE: Primary | ICD-10-CM

## 2023-09-11 DIAGNOSIS — D84.821 IMMUNODEFICIENCY DUE TO CHEMOTHERAPY: ICD-10-CM

## 2023-09-11 DIAGNOSIS — C78.02 MALIGNANT NEOPLASM METASTATIC TO BOTH LUNGS: ICD-10-CM

## 2023-09-11 DIAGNOSIS — Z87.898 HISTORY OF THYMOMA: ICD-10-CM

## 2023-09-11 DIAGNOSIS — C79.51 PROSTATE CANCER METASTATIC TO BONE: Primary | ICD-10-CM

## 2023-09-11 DIAGNOSIS — Z85.79 HISTORY OF WALDENSTROM'S MACROGLOBULINEMIA: ICD-10-CM

## 2023-09-11 DIAGNOSIS — T45.1X5A IMMUNODEFICIENCY DUE TO CHEMOTHERAPY: ICD-10-CM

## 2023-09-11 DIAGNOSIS — Z85.46 HISTORY OF PROSTATE CANCER: ICD-10-CM

## 2023-09-11 LAB
BASOPHILS # BLD AUTO: 0.04 K/UL (ref 0–0.2)
BASOPHILS NFR BLD: 0.5 % (ref 0–1.9)
DIFFERENTIAL METHOD: ABNORMAL
EOSINOPHIL # BLD AUTO: 0 K/UL (ref 0–0.5)
EOSINOPHIL NFR BLD: 0.4 % (ref 0–8)
ERYTHROCYTE [DISTWIDTH] IN BLOOD BY AUTOMATED COUNT: 14.5 % (ref 11.5–14.5)
HCT VFR BLD AUTO: 39.6 % (ref 40–54)
HGB BLD-MCNC: 13.5 G/DL (ref 14–18)
IMM GRANULOCYTES # BLD AUTO: 0.07 K/UL (ref 0–0.04)
IMM GRANULOCYTES NFR BLD AUTO: 0.9 % (ref 0–0.5)
LYMPHOCYTES # BLD AUTO: 1.5 K/UL (ref 1–4.8)
LYMPHOCYTES NFR BLD: 18.4 % (ref 18–48)
MCH RBC QN AUTO: 31.9 PG (ref 27–31)
MCHC RBC AUTO-ENTMCNC: 34.1 G/DL (ref 32–36)
MCV RBC AUTO: 94 FL (ref 82–98)
MONOCYTES # BLD AUTO: 0.7 K/UL (ref 0.3–1)
MONOCYTES NFR BLD: 8.8 % (ref 4–15)
NEUTROPHILS # BLD AUTO: 5.8 K/UL (ref 1.8–7.7)
NEUTROPHILS NFR BLD: 71 % (ref 38–73)
NRBC BLD-RTO: 0 /100 WBC
PLATELET # BLD AUTO: 211 K/UL (ref 150–450)
PMV BLD AUTO: 9.1 FL (ref 9.2–12.9)
RBC # BLD AUTO: 4.23 M/UL (ref 4.6–6.2)
WBC # BLD AUTO: 8.21 K/UL (ref 3.9–12.7)

## 2023-09-11 PROCEDURE — 1101F PR PT FALLS ASSESS DOC 0-1 FALLS W/OUT INJ PAST YR: ICD-10-PCS | Mod: HCNC,CPTII,S$GLB, | Performed by: INTERNAL MEDICINE

## 2023-09-11 PROCEDURE — 84403 ASSAY OF TOTAL TESTOSTERONE: CPT | Mod: HCNC | Performed by: INTERNAL MEDICINE

## 2023-09-11 PROCEDURE — 99999 PR PBB SHADOW E&M-EST. PATIENT-LVL IV: ICD-10-PCS | Mod: PBBFAC,HCNC,, | Performed by: INTERNAL MEDICINE

## 2023-09-11 PROCEDURE — 3288F FALL RISK ASSESSMENT DOCD: CPT | Mod: HCNC,CPTII,S$GLB, | Performed by: INTERNAL MEDICINE

## 2023-09-11 PROCEDURE — 99999 PR PBB SHADOW E&M-EST. PATIENT-LVL IV: CPT | Mod: PBBFAC,HCNC,, | Performed by: INTERNAL MEDICINE

## 2023-09-11 PROCEDURE — 3077F SYST BP >= 140 MM HG: CPT | Mod: HCNC,CPTII,S$GLB, | Performed by: INTERNAL MEDICINE

## 2023-09-11 PROCEDURE — 1159F PR MEDICATION LIST DOCUMENTED IN MEDICAL RECORD: ICD-10-PCS | Mod: HCNC,CPTII,S$GLB, | Performed by: INTERNAL MEDICINE

## 2023-09-11 PROCEDURE — 85025 COMPLETE CBC W/AUTO DIFF WBC: CPT | Mod: HCNC | Performed by: INTERNAL MEDICINE

## 2023-09-11 PROCEDURE — 3288F PR FALLS RISK ASSESSMENT DOCUMENTED: ICD-10-PCS | Mod: HCNC,CPTII,S$GLB, | Performed by: INTERNAL MEDICINE

## 2023-09-11 PROCEDURE — 1159F MED LIST DOCD IN RCRD: CPT | Mod: HCNC,CPTII,S$GLB, | Performed by: INTERNAL MEDICINE

## 2023-09-11 PROCEDURE — 84153 ASSAY OF PSA TOTAL: CPT | Mod: HCNC | Performed by: INTERNAL MEDICINE

## 2023-09-11 PROCEDURE — 1126F PR PAIN SEVERITY QUANTIFIED, NO PAIN PRESENT: ICD-10-PCS | Mod: HCNC,CPTII,S$GLB, | Performed by: INTERNAL MEDICINE

## 2023-09-11 PROCEDURE — 99214 PR OFFICE/OUTPT VISIT, EST, LEVL IV, 30-39 MIN: ICD-10-PCS | Mod: HCNC,S$GLB,, | Performed by: INTERNAL MEDICINE

## 2023-09-11 PROCEDURE — 3079F DIAST BP 80-89 MM HG: CPT | Mod: HCNC,CPTII,S$GLB, | Performed by: INTERNAL MEDICINE

## 2023-09-11 PROCEDURE — 1101F PT FALLS ASSESS-DOCD LE1/YR: CPT | Mod: HCNC,CPTII,S$GLB, | Performed by: INTERNAL MEDICINE

## 2023-09-11 PROCEDURE — 1160F PR REVIEW ALL MEDS BY PRESCRIBER/CLIN PHARMACIST DOCUMENTED: ICD-10-PCS | Mod: HCNC,CPTII,S$GLB, | Performed by: INTERNAL MEDICINE

## 2023-09-11 PROCEDURE — 1160F RVW MEDS BY RX/DR IN RCRD: CPT | Mod: HCNC,CPTII,S$GLB, | Performed by: INTERNAL MEDICINE

## 2023-09-11 PROCEDURE — 3079F PR MOST RECENT DIASTOLIC BLOOD PRESSURE 80-89 MM HG: ICD-10-PCS | Mod: HCNC,CPTII,S$GLB, | Performed by: INTERNAL MEDICINE

## 2023-09-11 PROCEDURE — 36415 COLL VENOUS BLD VENIPUNCTURE: CPT | Mod: HCNC | Performed by: INTERNAL MEDICINE

## 2023-09-11 PROCEDURE — 1126F AMNT PAIN NOTED NONE PRSNT: CPT | Mod: HCNC,CPTII,S$GLB, | Performed by: INTERNAL MEDICINE

## 2023-09-11 PROCEDURE — 99214 OFFICE O/P EST MOD 30 MIN: CPT | Mod: HCNC,S$GLB,, | Performed by: INTERNAL MEDICINE

## 2023-09-11 PROCEDURE — 3077F PR MOST RECENT SYSTOLIC BLOOD PRESSURE >= 140 MM HG: ICD-10-PCS | Mod: HCNC,CPTII,S$GLB, | Performed by: INTERNAL MEDICINE

## 2023-09-11 RX ORDER — IBUPROFEN 800 MG/1
TABLET ORAL
COMMUNITY
Start: 2023-05-26

## 2023-09-11 RX ORDER — HYDROCODONE BITARTRATE AND ACETAMINOPHEN 7.5; 325 MG/1; MG/1
1 TABLET ORAL EVERY 6 HOURS PRN
COMMUNITY
Start: 2023-05-26 | End: 2023-10-30

## 2023-09-11 RX ORDER — CHLORHEXIDINE GLUCONATE ORAL RINSE 1.2 MG/ML
SOLUTION DENTAL
COMMUNITY
Start: 2023-05-26

## 2023-09-11 RX ORDER — AMOXICILLIN 500 MG/1
500 TABLET, FILM COATED ORAL EVERY 8 HOURS
COMMUNITY
Start: 2023-05-26 | End: 2023-10-30

## 2023-09-11 NOTE — PROGRESS NOTES
Subjective:       Patient ID: Homero Tanner is a 83 y.o. male.    Chief Complaint: Results, Prostate Cancer, and Chemotherapy    HPI:  83-year-old male history of metastatic prostate carcinoma patient is currently on Zytiga prednisone Lupron.  At this time patient returns for review patient unfortunately did not have PSA and testosterone drawn as ordered ECOG status 1    Past Medical History:   Diagnosis Date    Abnormal ECG 6/24/2013    Adenomatous rectal polyp 12/7/2015    Aortic insufficiency 6/24/2013    Arthritis     Asthma     as a child    Benign neoplasm of cecum 2/27/2017    Benign neoplasm of transverse colon 2/27/2017    Cataract     CHF (congestive heart failure)     Pt states He's never been told this    Diverticulosis of large intestine without hemorrhage 2/27/2017    Encounter for blood transfusion     Frequent PVCs     Gallstones 3/28/2018    By Us done 3/21/2018    GERD (gastroesophageal reflux disease)     History of colon polyps     Hypertension     Iron deficiency anemia 10/26/2015    Lymphoma     waldenstrom's macroglobulinemia    Mixed hyperlipidemia 6/24/2013    Premature atrial contractions     Prostate cancer     PSVT (paroxysmal supraventricular tachycardia) 6/24/2013    Pulmonary hypertension 6/13/2016    PVC's (premature ventricular contractions) 6/13/2016    Rectal adenocarcinoma     Rectal cancer 5/1/2018    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    left parietal scalp    Thymoma     TR (tricuspid regurgitation) 6/13/2016    Tubular adenoma of colon 06/2020    VT (ventricular tachycardia) 9/18/2018     Family History   Problem Relation Age of Onset    Diabetes Father     Cataracts Mother     Amblyopia Neg Hx     Blindness Neg Hx     Cancer Neg Hx     Glaucoma Neg Hx     Hypertension Neg Hx     Macular degeneration Neg Hx     Retinal detachment Neg Hx     Strabismus Neg Hx     Stroke Neg Hx     Thyroid disease Neg Hx     Melanoma Neg Hx      Social History     Socioeconomic  History    Marital status: Single   Tobacco Use    Smoking status: Former     Current packs/day: 0.00     Average packs/day: 1 pack/day for 15.0 years (15.0 ttl pk-yrs)     Types: Cigarettes     Start date: 1954     Quit date: 1969     Years since quittin.6    Smokeless tobacco: Never   Substance and Sexual Activity    Alcohol use: Yes     Alcohol/week: 3.0 standard drinks of alcohol     Types: 3 Cans of beer per week     Comment: socially, NO ALCOHOL 72 HOURS PRIOR TO SX    Drug use: No     Past Surgical History:   Procedure Laterality Date    biopsy for chest mass      BONE MARROW BIOPSY Left 2018    Procedure: Biopsy-bone marrow;  Surgeon: Charanjit Dacosta MD;  Location: [facility];  Service: General;  Laterality: Left;    CATARACT EXTRACTION W/  INTRAOCULAR LENS IMPLANT Right 2019    CATARACT EXTRACTION W/  INTRAOCULAR LENS IMPLANT Left 2019    COLON SURGERY      COLONOSCOPY N/A 10/26/2015    Procedure: Colonoscopy;  Surgeon: Abraham Hong MD;  Location: [facility];  Service: Endoscopy;  Laterality: N/A;    COLONOSCOPY Left 2017    Procedure: COLONOSCOPY;  Surgeon: Abraham Hong MD;  Location: [facility];  Service: Endoscopy;  Laterality: Left;    COLONOSCOPY N/A 2018    Procedure: hixtory of rectal cancer. Colonsocopy asked to be repeated minnie  year, alst one 2017;  Surgeon: Patricio Streeter MD;  Location: [facility];  Service: Endoscopy;  Laterality: N/A;    COLONOSCOPY N/A 2018    Procedure: COLONOSCOPY/hybrid APC or a EndoRotor device;  Surgeon: Rao Medeiros MD;  Location: [facility] ([address] [place] [address]);  Service: Endoscopy;  Laterality: N/A;    COLONOSCOPY N/A 2020    Procedure: COLONOSCOPY;  Surgeon: Yu Wells MD;  Location: [facility];  Service: Endoscopy;  Laterality: N/A;    ESOPHAGOGASTRODUODENOSCOPY N/A 10/22/2019    Procedure: ESOPHAGOGASTRODUODENOSCOPY (EGD);  Surgeon: Mariela Greer MD;  Location: [facility];  Service: Endoscopy;  Laterality: N/A;     FLEXIBLE SIGMOIDOSCOPY Left 4/23/2019    Procedure: SIGMOIDOSCOPY, FLEXIBLE;  Surgeon: Ranjit Will MD;  Location: [facility] ENDO;  Service: Endoscopy;  Laterality: Left;    HERNIA REPAIR Right 02/2018    Inguinal, open with mesh    mohs      PROSTATECTOMY      RECTAL SURGERY N/A 09/2014    REPAIR OF SLIDING INGUINAL HERNIA Right 2/26/2019    Procedure: REPAIR, HERNIA, INGUINAL, SLIDING;  Surgeon: Bridger Alvarez MD;  Location: [facility] OR;  Service: General;  Laterality: Right;    SKIN BIOPSY      TONSILLECTOMY      TUMOR REMOVAL         Labs:  Lab Results   Component Value Date    WBC 6.24 07/06/2023    HGB 14.1 07/06/2023    HCT 42.4 07/06/2023    MCV 92 07/06/2023     07/06/2023     BMP  Lab Results   Component Value Date     09/08/2023    K 4.0 09/08/2023    CL 97 09/08/2023    CO2 28 09/08/2023    BUN 34 (H) 09/08/2023    CREATININE 1.5 (H) 09/08/2023    CALCIUM 9.5 09/08/2023    ANIONGAP 15 09/08/2023    ESTGFRAFRICA 60 03/09/2022    EGFRNONAA 52 (A) 03/09/2022     Lab Results   Component Value Date    ALT 11 09/08/2023    AST 22 09/08/2023     (H) 12/14/2020    ALKPHOS 93 09/08/2023    BILITOT 1.2 (H) 09/08/2023       Lab Results   Component Value Date    IRON 49 02/18/2020    TIBC 271 02/18/2020    FERRITIN 349 (H) 02/18/2020     Lab Results   Component Value Date    BGKHPJSJ19 508 02/09/2023     Lab Results   Component Value Date    FOLATE 8.8 02/09/2023     Lab Results   Component Value Date    TSH 1.726 03/09/2022         Review of Systems   Constitutional:  Negative for activity change, appetite change, chills, diaphoresis, fatigue, fever and unexpected weight change.   HENT:  Negative for congestion, dental problem, drooling, ear discharge, ear pain, facial swelling, hearing loss, mouth sores, nosebleeds, postnasal drip, rhinorrhea, sinus pressure, sneezing, sore throat, tinnitus, trouble swallowing and voice change.    Eyes:  Negative for photophobia, pain, discharge, redness,  itching and visual disturbance.   Respiratory:  Negative for apnea, cough, choking, chest tightness, shortness of breath, wheezing and stridor.    Cardiovascular:  Negative for chest pain, palpitations and leg swelling.   Gastrointestinal:  Negative for abdominal distention, abdominal pain, anal bleeding, blood in stool, constipation, diarrhea, nausea, rectal pain and vomiting.   Endocrine: Negative for cold intolerance, heat intolerance, polydipsia, polyphagia and polyuria.   Genitourinary:  Negative for decreased urine volume, difficulty urinating, dysuria, enuresis, flank pain, frequency, genital sores, hematuria, penile discharge, penile pain, penile swelling, scrotal swelling, testicular pain and urgency.   Musculoskeletal:  Negative for arthralgias, back pain, gait problem, joint swelling, myalgias, neck pain and neck stiffness.   Skin:  Negative for color change, pallor, rash and wound.   Allergic/Immunologic: Negative for environmental allergies, food allergies and immunocompromised state.   Neurological:  Negative for dizziness, tremors, seizures, syncope, facial asymmetry, speech difficulty, weakness, light-headedness, numbness and headaches.   Hematological:  Negative for adenopathy. Does not bruise/bleed easily.   Psychiatric/Behavioral:  Positive for dysphoric mood. Negative for agitation, behavioral problems, confusion, decreased concentration, hallucinations, self-injury, sleep disturbance and suicidal ideas. The patient is nervous/anxious. The patient is not hyperactive.        Objective:      Physical Exam  Vitals reviewed.   Constitutional:       General: He is not in acute distress.     Appearance: He is well-developed. He is not diaphoretic.   HENT:      Head: Normocephalic.      Right Ear: External ear normal.      Left Ear: External ear normal.      Nose: Nose normal.      Right Sinus: No maxillary sinus tenderness or frontal sinus tenderness.      Left Sinus: No maxillary sinus tenderness or  frontal sinus tenderness.      Mouth/Throat:      Pharynx: No oropharyngeal exudate.   Eyes:      General: Lids are normal. No scleral icterus.        Right eye: No discharge.         Left eye: No discharge.      Extraocular Movements:      Right eye: Normal extraocular motion.      Left eye: Normal extraocular motion.      Conjunctiva/sclera:      Right eye: Right conjunctiva is not injected. No hemorrhage.     Left eye: Left conjunctiva is not injected. No hemorrhage.     Pupils: Pupils are equal, round, and reactive to light.   Neck:      Thyroid: No thyromegaly.      Vascular: No JVD.      Trachea: No tracheal deviation.   Cardiovascular:      Rate and Rhythm: Normal rate.   Pulmonary:      Effort: Pulmonary effort is normal. No respiratory distress.      Breath sounds: No stridor.   Abdominal:      General: Bowel sounds are normal.      Palpations: Abdomen is soft. There is no hepatomegaly, splenomegaly or mass.      Tenderness: There is no abdominal tenderness.   Musculoskeletal:         General: No tenderness. Normal range of motion.      Cervical back: Normal range of motion and neck supple.   Lymphadenopathy:      Head:      Right side of head: No posterior auricular or occipital adenopathy.      Left side of head: No posterior auricular or occipital adenopathy.      Cervical: No cervical adenopathy.      Right cervical: No superficial, deep or posterior cervical adenopathy.     Left cervical: No superficial, deep or posterior cervical adenopathy.      Upper Body:      Right upper body: No supraclavicular adenopathy.      Left upper body: No supraclavicular adenopathy.   Skin:     General: Skin is dry.      Findings: No erythema or rash.      Nails: There is no clubbing.   Neurological:      Mental Status: He is alert and oriented to person, place, and time.      Cranial Nerves: No cranial nerve deficit.      Coordination: Coordination normal.   Psychiatric:         Behavior: Behavior normal.          Thought Content: Thought content normal.         Judgment: Judgment normal.             Assessment:      1. Prostate cancer metastatic to bone    2. History of Waldenstrom's macroglobulinemia    3. Chronic kidney disease, stage 3a    4. Malignant neoplasm metastatic to both lungs    5. History of thymoma    6. Immunodeficiency due to chemotherapy           Med Onc Chart Routing      Follow up with physician    Follow up with [facility] 3 months.   Infusion scheduling note    Injection scheduling note    Labs   Scheduling:  Preferred lab:  Lab interval:  Return to clinic in 3 months CBC CMP PSA and testosterone prior   Imaging    Pharmacy appointment    Other referrals               Plan:     Will check PSA testosterone and CMP today.  If patient's PSA is stable declining will continue with current plan course of action discussed natural history of prostate carcinoma living arrangements with metastatic disease.  Talked about castrate resistant prostate carcinoma in potential systemic chemotherapy progression.  In castrate levels.  Discussed implications of answered questions with him RTC 3 months with standing labs CBC CMP PSA and testosterone prior        Dustin Sparks Jr, MD FACP

## 2023-09-12 ENCOUNTER — TELEPHONE (OUTPATIENT)
Dept: HEMATOLOGY/ONCOLOGY | Facility: CLINIC | Age: 83
End: 2023-09-12
Payer: MEDICARE

## 2023-09-12 LAB
COMPLEXED PSA SERPL-MCNC: 95.7 NG/ML (ref 0–4)
TESTOST SERPL-MCNC: <4 NG/DL (ref 304–1227)

## 2023-09-12 NOTE — TELEPHONE ENCOUNTER
----- Message from Dustin Sparks MD sent at 9/12/2023  6:26 AM CDT -----  NEED TO SEE BACK VIRTUAL OK; ISENT HIM MESSAGE

## 2023-09-13 ENCOUNTER — TELEPHONE (OUTPATIENT)
Dept: HEMATOLOGY/ONCOLOGY | Facility: CLINIC | Age: 83
End: 2023-09-13
Payer: MEDICARE

## 2023-09-13 NOTE — TELEPHONE ENCOUNTER
Returned patient phone call and scheduled virtual per MD request.  Patient verbalized understanding and had no other issues at this time.

## 2023-09-13 NOTE — TELEPHONE ENCOUNTER
----- Message from Bonita Madrigal LPN sent at 9/12/2023  4:48 PM CDT -----  Contact: Homero    ----- Message -----  From: Jennifer Tamayo  Sent: 9/12/2023   4:26 PM CDT  To: [facility]ger CIFUENTES Staff    .Type:  Patient Returning Call    Who Called:Homero  Who Left Message for Patient:Nurse  Does the patient know what this is regarding?:yes  Would the patient rather a call back or a response via [facility]? Call Back  Best Call Back Number:[phone]  Additional Information: na        Thanks   [facility]

## 2023-09-14 ENCOUNTER — OFFICE VISIT (OUTPATIENT)
Dept: HEMATOLOGY/ONCOLOGY | Facility: CLINIC | Age: 83
End: 2023-09-14
Payer: MEDICARE

## 2023-09-14 DIAGNOSIS — C78.02 MALIGNANT NEOPLASM METASTATIC TO BOTH LUNGS: ICD-10-CM

## 2023-09-14 DIAGNOSIS — C61 PROSTATE CANCER METASTATIC TO BONE: Primary | ICD-10-CM

## 2023-09-14 DIAGNOSIS — C78.01 MALIGNANT NEOPLASM METASTATIC TO BOTH LUNGS: ICD-10-CM

## 2023-09-14 DIAGNOSIS — Z87.898 HISTORY OF THYMOMA: ICD-10-CM

## 2023-09-14 DIAGNOSIS — C79.51 PROSTATE CANCER METASTATIC TO BONE: Primary | ICD-10-CM

## 2023-09-14 DIAGNOSIS — Z85.79 HISTORY OF WALDENSTROM'S MACROGLOBULINEMIA: ICD-10-CM

## 2023-09-14 PROCEDURE — 1160F PR REVIEW ALL MEDS BY PRESCRIBER/CLIN PHARMACIST DOCUMENTED: ICD-10-PCS | Mod: HCNC,CPTII,95, | Performed by: INTERNAL MEDICINE

## 2023-09-14 PROCEDURE — 99214 OFFICE O/P EST MOD 30 MIN: CPT | Mod: HCNC,95,, | Performed by: INTERNAL MEDICINE

## 2023-09-14 PROCEDURE — 99214 PR OFFICE/OUTPT VISIT, EST, LEVL IV, 30-39 MIN: ICD-10-PCS | Mod: HCNC,95,, | Performed by: INTERNAL MEDICINE

## 2023-09-14 PROCEDURE — 1159F PR MEDICATION LIST DOCUMENTED IN MEDICAL RECORD: ICD-10-PCS | Mod: HCNC,CPTII,95, | Performed by: INTERNAL MEDICINE

## 2023-09-14 PROCEDURE — 1159F MED LIST DOCD IN RCRD: CPT | Mod: HCNC,CPTII,95, | Performed by: INTERNAL MEDICINE

## 2023-09-14 PROCEDURE — 1160F RVW MEDS BY RX/DR IN RCRD: CPT | Mod: HCNC,CPTII,95, | Performed by: INTERNAL MEDICINE

## 2023-09-14 NOTE — PROGRESS NOTES
Subjective:       Patient ID: Homero Tanner is a 83 y.o. male.    Chief Complaint: Results and Prostate Cancer    HPI:  83-YEAR-OLD MALE RETURNS PATIENT HAS BEEN ON ZYTIGA FOR APPROXIMATELY 2 MONTHS WITH METASTATIC PROSTATE CARCINOMA WITH LABS DONE PRIOR WITH PSA AND TESTOSTERONE.  ECOG STATUS 1 SEEN IN VIDEO VISIT FOR REVIEW    Past Medical History:   Diagnosis Date    Abnormal ECG 6/24/2013    Adenomatous rectal polyp 12/7/2015    Aortic insufficiency 6/24/2013    Arthritis     Asthma     as a child    Benign neoplasm of cecum 2/27/2017    Benign neoplasm of transverse colon 2/27/2017    Cataract     CHF (congestive heart failure)     Pt states He's never been told this    Diverticulosis of large intestine without hemorrhage 2/27/2017    Encounter for blood transfusion     Frequent PVCs     Gallstones 3/28/2018    By Us done 3/21/2018    GERD (gastroesophageal reflux disease)     History of colon polyps     Hypertension     Iron deficiency anemia 10/26/2015    Lymphoma     waldenstrom's macroglobulinemia    Mixed hyperlipidemia 6/24/2013    Premature atrial contractions     Prostate cancer     PSVT (paroxysmal supraventricular tachycardia) 6/24/2013    Pulmonary hypertension 6/13/2016    PVC's (premature ventricular contractions) 6/13/2016    Rectal adenocarcinoma     Rectal cancer 5/1/2018    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    left parietal scalp    Thymoma     TR (tricuspid regurgitation) 6/13/2016    Tubular adenoma of colon 06/2020    VT (ventricular tachycardia) 9/18/2018     Family History   Problem Relation Age of Onset    Diabetes Father     Cataracts Mother     Amblyopia Neg Hx     Blindness Neg Hx     Cancer Neg Hx     Glaucoma Neg Hx     Hypertension Neg Hx     Macular degeneration Neg Hx     Retinal detachment Neg Hx     Strabismus Neg Hx     Stroke Neg Hx     Thyroid disease Neg Hx     Melanoma Neg Hx      Social History     Socioeconomic History    Marital status: Single   Tobacco Use     Smoking status: Former     Current packs/day: 0.00     Average packs/day: 1 pack/day for 15.0 years (15.0 ttl pk-yrs)     Types: Cigarettes     Start date: 1954     Quit date: 1969     Years since quittin.6    Smokeless tobacco: Never   Substance and Sexual Activity    Alcohol use: Yes     Alcohol/week: 3.0 standard drinks of alcohol     Types: 3 Cans of beer per week     Comment: socially, NO ALCOHOL 72 HOURS PRIOR TO SX    Drug use: No     Past Surgical History:   Procedure Laterality Date    biopsy for chest mass      BONE MARROW BIOPSY Left 2018    Procedure: Biopsy-bone marrow;  Surgeon: Charanjit Dacosta MD;  Location: [facility];  Service: General;  Laterality: Left;    CATARACT EXTRACTION W/  INTRAOCULAR LENS IMPLANT Right 2019    CATARACT EXTRACTION W/  INTRAOCULAR LENS IMPLANT Left 2019    COLON SURGERY      COLONOSCOPY N/A 10/26/2015    Procedure: Colonoscopy;  Surgeon: Abraham Hong MD;  Location: [facility];  Service: Endoscopy;  Laterality: N/A;    COLONOSCOPY Left 2017    Procedure: COLONOSCOPY;  Surgeon: Abraham Hong MD;  Location: [facility];  Service: Endoscopy;  Laterality: Left;    COLONOSCOPY N/A 2018    Procedure: hixtory of rectal cancer. Colonsocopy asked to be repeated minnie  year, alst one 2017;  Surgeon: Patricio Streeter MD;  Location: [facility];  Service: Endoscopy;  Laterality: N/A;    COLONOSCOPY N/A 2018    Procedure: COLONOSCOPY/hybrid APC or a EndoRotor device;  Surgeon: Rao Medeiros MD;  Location: [facility] ([address] [place] [address]);  Service: Endoscopy;  Laterality: N/A;    COLONOSCOPY N/A 2020    Procedure: COLONOSCOPY;  Surgeon: Yu Wells MD;  Location: [facility];  Service: Endoscopy;  Laterality: N/A;    ESOPHAGOGASTRODUODENOSCOPY N/A 10/22/2019    Procedure: ESOPHAGOGASTRODUODENOSCOPY (EGD);  Surgeon: Mariela Greer MD;  Location: [facility];  Service: Endoscopy;  Laterality: N/A;    FLEXIBLE SIGMOIDOSCOPY Left 2019     Procedure: SIGMOIDOSCOPY, FLEXIBLE;  Surgeon: Ranjit Will MD;  Location: [facility] ENDO;  Service: Endoscopy;  Laterality: Left;    HERNIA REPAIR Right 02/2018    Inguinal, open with mesh    mohs      PROSTATECTOMY      RECTAL SURGERY N/A 09/2014    REPAIR OF SLIDING INGUINAL HERNIA Right 2/26/2019    Procedure: REPAIR, HERNIA, INGUINAL, SLIDING;  Surgeon: Bridger Alvarez MD;  Location: [facility] OR;  Service: General;  Laterality: Right;    SKIN BIOPSY      TONSILLECTOMY      TUMOR REMOVAL         Labs:  Lab Results   Component Value Date    WBC 8.21 09/11/2023    HGB 13.5 (L) 09/11/2023    HCT 39.6 (L) 09/11/2023    MCV 94 09/11/2023     09/11/2023     BMP  Lab Results   Component Value Date     09/08/2023    K 4.0 09/08/2023    CL 97 09/08/2023    CO2 28 09/08/2023    BUN 34 (H) 09/08/2023    CREATININE 1.5 (H) 09/08/2023    CALCIUM 9.5 09/08/2023    ANIONGAP 15 09/08/2023    ESTGFRAFRICA 60 03/09/2022    EGFRNONAA 52 (A) 03/09/2022     Lab Results   Component Value Date    ALT 11 09/08/2023    AST 22 09/08/2023     (H) 12/14/2020    ALKPHOS 93 09/08/2023    BILITOT 1.2 (H) 09/08/2023       Lab Results   Component Value Date    IRON 49 02/18/2020    TIBC 271 02/18/2020    FERRITIN 349 (H) 02/18/2020     Lab Results   Component Value Date    EMSRQCKA81 508 02/09/2023     Lab Results   Component Value Date    FOLATE 8.8 02/09/2023     Lab Results   Component Value Date    TSH 1.726 03/09/2022         Review of Systems   Constitutional:  Positive for fatigue. Negative for activity change, appetite change, chills, diaphoresis, fever and unexpected weight change.   HENT:  Negative for congestion, dental problem, drooling, ear discharge, ear pain, facial swelling, hearing loss, mouth sores, nosebleeds, postnasal drip, rhinorrhea, sinus pressure, sneezing, sore throat, tinnitus, trouble swallowing and voice change.    Eyes:  Negative for photophobia, pain, discharge, redness, itching and visual  disturbance.   Respiratory:  Negative for apnea, cough, choking, chest tightness, shortness of breath, wheezing and stridor.    Cardiovascular:  Negative for chest pain, palpitations and leg swelling.   Gastrointestinal:  Negative for abdominal distention, abdominal pain, anal bleeding, blood in stool, constipation, diarrhea, nausea, rectal pain and vomiting.   Endocrine: Negative for cold intolerance, heat intolerance, polydipsia, polyphagia and polyuria.   Genitourinary:  Negative for decreased urine volume, difficulty urinating, dysuria, enuresis, flank pain, frequency, genital sores, hematuria, penile discharge, penile pain, penile swelling, scrotal swelling, testicular pain and urgency.   Musculoskeletal:  Negative for arthralgias, back pain, gait problem, joint swelling, myalgias, neck pain and neck stiffness.   Skin:  Negative for color change, pallor, rash and wound.   Allergic/Immunologic: Negative for environmental allergies, food allergies and immunocompromised state.   Neurological:  Positive for weakness. Negative for dizziness, tremors, seizures, syncope, facial asymmetry, speech difficulty, light-headedness, numbness and headaches.   Hematological:  Negative for adenopathy. Does not bruise/bleed easily.   Psychiatric/Behavioral:  Positive for dysphoric mood. Negative for agitation, behavioral problems, confusion, decreased concentration, hallucinations, self-injury, sleep disturbance and suicidal ideas. The patient is nervous/anxious. The patient is not hyperactive.        Objective:      Physical Exam  Constitutional:       Appearance: He is ill-appearing.             Assessment:      1. Prostate cancer metastatic to bone    2. Malignant neoplasm metastatic to both lungs    3. History of Waldenstrom's macroglobulinemia    4. History of thymoma           Med Onc Chart Routing      Follow up with physician 4 weeks. RETURN TO CLINIC TO SEE ME IN 1 MONTH WITH CBC CMP PSA AND TESTOSTERONE DAY DONE AT  LEAST 24-48 HOURS PRIOR   Follow up with [facility]    Infusion scheduling note    Injection scheduling note    Labs    Imaging    Pharmacy appointment    Other referrals               Plan:   The patient location is:  HOME  The chief complaint leading to consultation is:  PROSTATE CANCER    Visit type: audiovisual    Face to Face time with patient: 25 minutes of total time spent on the encounter, which includes face to face time and non-face to face time preparing to see the patient (eg, review of tests), Obtaining and/or reviewing separately obtained history, Documenting clinical information in the electronic or other health record, Independently interpreting results (not separately reported) and communicating results to the patient/family/caregiver, or Care coordination (not separately reported).         Each patient to whom he or she provides medical services by telemedicine is:  (1) informed of the relationship between the physician and patient and the respective role of any other health care provider with respect to management of the patient; and (2) notified that he or she may decline to receive medical services by telemedicine and may withdraw from such care at any time.    Notes:   EXTENSIVE CONVERSATION REVIEWED INFORMATION WITH HIM PATIENT'S TESTOSTERONE LEVEL IS UNDETECTABLE PSA CONTINUES TO RISE WE WILL CHECK BACK 1 ADDITIONAL LEVEL IN APPROXIMATELY 1 MONTH.  IF CONTINUES TO RISE DESPITE CASTRATE LEVELS THEN DEFINITION OF CASTRATE RESISTANT PROSTATE CANCER MET.  I HAVE SENT HIM ARTICLES FROM UP-TO-DATE CONCERNING THIS IN THE POTENTIAL FOR SYSTEMIC CHEMOTHERAPY HE CONCURS WITH THIS PLAN COURSE OF ACTION        Dustin Sparks Jr, MD FACP

## 2023-09-25 DIAGNOSIS — I49.1 PREMATURE ATRIAL CONTRACTIONS: ICD-10-CM

## 2023-09-25 DIAGNOSIS — I50.32 CHRONIC DIASTOLIC HEART FAILURE: ICD-10-CM

## 2023-09-25 DIAGNOSIS — I10 ESSENTIAL HYPERTENSION: Chronic | ICD-10-CM

## 2023-09-25 RX ORDER — METOPROLOL SUCCINATE 100 MG/1
TABLET, EXTENDED RELEASE ORAL
Qty: 30 TABLET | Refills: 11 | Status: SHIPPED | OUTPATIENT
Start: 2023-09-25

## 2023-09-28 ENCOUNTER — OFFICE VISIT (OUTPATIENT)
Dept: PALLIATIVE MEDICINE | Facility: CLINIC | Age: 83
End: 2023-09-28
Payer: MEDICARE

## 2023-09-28 VITALS
HEART RATE: 84 BPM | SYSTOLIC BLOOD PRESSURE: 131 MMHG | HEIGHT: 70 IN | WEIGHT: 161.38 LBS | DIASTOLIC BLOOD PRESSURE: 69 MMHG | BODY MASS INDEX: 23.1 KG/M2 | OXYGEN SATURATION: 98 % | TEMPERATURE: 97 F

## 2023-09-28 DIAGNOSIS — C79.51 PROSTATE CANCER METASTATIC TO BONE: Primary | ICD-10-CM

## 2023-09-28 DIAGNOSIS — Z51.5 PALLIATIVE CARE ENCOUNTER: ICD-10-CM

## 2023-09-28 DIAGNOSIS — C61 PROSTATE CANCER METASTATIC TO BONE: Primary | ICD-10-CM

## 2023-09-28 PROCEDURE — 3288F PR FALLS RISK ASSESSMENT DOCUMENTED: ICD-10-PCS | Mod: HCNC,CPTII,S$GLB,

## 2023-09-28 PROCEDURE — 1101F PT FALLS ASSESS-DOCD LE1/YR: CPT | Mod: HCNC,CPTII,S$GLB,

## 2023-09-28 PROCEDURE — 3075F SYST BP GE 130 - 139MM HG: CPT | Mod: HCNC,CPTII,S$GLB,

## 2023-09-28 PROCEDURE — 1159F MED LIST DOCD IN RCRD: CPT | Mod: HCNC,CPTII,S$GLB,

## 2023-09-28 PROCEDURE — 99499 UNLISTED E&M SERVICE: CPT | Mod: HCNC,S$GLB,,

## 2023-09-28 PROCEDURE — 3078F DIAST BP <80 MM HG: CPT | Mod: HCNC,CPTII,S$GLB,

## 2023-09-28 PROCEDURE — 99499 NO LOS: ICD-10-PCS | Mod: HCNC,S$GLB,,

## 2023-09-28 PROCEDURE — 99497 ADVNCD CARE PLAN 30 MIN: CPT | Mod: HCNC,S$GLB,,

## 2023-09-28 PROCEDURE — 3078F PR MOST RECENT DIASTOLIC BLOOD PRESSURE < 80 MM HG: ICD-10-PCS | Mod: HCNC,CPTII,S$GLB,

## 2023-09-28 PROCEDURE — 1126F AMNT PAIN NOTED NONE PRSNT: CPT | Mod: HCNC,CPTII,S$GLB,

## 2023-09-28 PROCEDURE — 99999 PR PBB SHADOW E&M-EST. PATIENT-LVL V: CPT | Mod: PBBFAC,HCNC,,

## 2023-09-28 PROCEDURE — 3288F FALL RISK ASSESSMENT DOCD: CPT | Mod: HCNC,CPTII,S$GLB,

## 2023-09-28 PROCEDURE — 99203 PR OFFICE/OUTPT VISIT, NEW, LEVL III, 30-44 MIN: ICD-10-PCS | Mod: HCNC,S$GLB,,

## 2023-09-28 PROCEDURE — 99497 PR ADVNCD CARE PLAN 30 MIN: ICD-10-PCS | Mod: HCNC,S$GLB,,

## 2023-09-28 PROCEDURE — 1159F PR MEDICATION LIST DOCUMENTED IN MEDICAL RECORD: ICD-10-PCS | Mod: HCNC,CPTII,S$GLB,

## 2023-09-28 PROCEDURE — 3075F PR MOST RECENT SYSTOLIC BLOOD PRESS GE 130-139MM HG: ICD-10-PCS | Mod: HCNC,CPTII,S$GLB,

## 2023-09-28 PROCEDURE — 99203 OFFICE O/P NEW LOW 30 MIN: CPT | Mod: HCNC,S$GLB,,

## 2023-09-28 PROCEDURE — 1126F PR PAIN SEVERITY QUANTIFIED, NO PAIN PRESENT: ICD-10-PCS | Mod: HCNC,CPTII,S$GLB,

## 2023-09-28 PROCEDURE — 1101F PR PT FALLS ASSESS DOC 0-1 FALLS W/OUT INJ PAST YR: ICD-10-PCS | Mod: HCNC,CPTII,S$GLB,

## 2023-09-28 PROCEDURE — 99999 PR PBB SHADOW E&M-EST. PATIENT-LVL V: ICD-10-PCS | Mod: PBBFAC,HCNC,,

## 2023-09-28 NOTE — PATIENT INSTRUCTIONS
You can complete your healthcare power of  and bring it back to the clinic  We have discussed you  will remain Full Code and it is ok to change your mind based on your health status.  We can refer you to [facility] for medical marijuana

## 2023-10-03 DIAGNOSIS — I10 ESSENTIAL HYPERTENSION: Chronic | ICD-10-CM

## 2023-10-03 RX ORDER — AMLODIPINE BESYLATE 2.5 MG/1
2.5 TABLET ORAL 2 TIMES DAILY
Qty: 60 TABLET | Refills: 11 | Status: SHIPPED | OUTPATIENT
Start: 2023-10-03

## 2023-10-10 ENCOUNTER — LAB VISIT (OUTPATIENT)
Dept: LAB | Facility: HOSPITAL | Age: 83
End: 2023-10-10
Attending: INTERNAL MEDICINE
Payer: MEDICARE

## 2023-10-10 DIAGNOSIS — C78.02 MALIGNANT NEOPLASM METASTATIC TO BOTH LUNGS: ICD-10-CM

## 2023-10-10 DIAGNOSIS — C61 PROSTATE CANCER METASTATIC TO BONE: ICD-10-CM

## 2023-10-10 DIAGNOSIS — C79.51 PROSTATE CANCER METASTATIC TO BONE: ICD-10-CM

## 2023-10-10 DIAGNOSIS — Z85.79 HISTORY OF WALDENSTROM'S MACROGLOBULINEMIA: ICD-10-CM

## 2023-10-10 DIAGNOSIS — Z87.898 HISTORY OF THYMOMA: ICD-10-CM

## 2023-10-10 DIAGNOSIS — C78.01 MALIGNANT NEOPLASM METASTATIC TO BOTH LUNGS: ICD-10-CM

## 2023-10-10 DIAGNOSIS — F43.23 ADJUSTMENT DISORDER WITH MIXED ANXIETY AND DEPRESSED MOOD: ICD-10-CM

## 2023-10-10 LAB
ALBUMIN SERPL BCP-MCNC: 4.1 G/DL (ref 3.5–5.2)
ALP SERPL-CCNC: 115 U/L (ref 55–135)
ALT SERPL W/O P-5'-P-CCNC: 16 U/L (ref 10–44)
ANION GAP SERPL CALC-SCNC: 16 MMOL/L (ref 8–16)
AST SERPL-CCNC: 22 U/L (ref 10–40)
BASOPHILS # BLD AUTO: 0.08 K/UL (ref 0–0.2)
BASOPHILS NFR BLD: 0.7 % (ref 0–1.9)
BILIRUB SERPL-MCNC: 1.3 MG/DL (ref 0.1–1)
BUN SERPL-MCNC: 19 MG/DL (ref 8–23)
CALCIUM SERPL-MCNC: 9.6 MG/DL (ref 8.7–10.5)
CHLORIDE SERPL-SCNC: 98 MMOL/L (ref 95–110)
CO2 SERPL-SCNC: 27 MMOL/L (ref 23–29)
COMPLEXED PSA SERPL-MCNC: 95.6 NG/ML (ref 0–4)
CREAT SERPL-MCNC: 1.4 MG/DL (ref 0.5–1.4)
DIFFERENTIAL METHOD: ABNORMAL
EOSINOPHIL # BLD AUTO: 0.1 K/UL (ref 0–0.5)
EOSINOPHIL NFR BLD: 1.2 % (ref 0–8)
ERYTHROCYTE [DISTWIDTH] IN BLOOD BY AUTOMATED COUNT: 14 % (ref 11.5–14.5)
EST. GFR  (NO RACE VARIABLE): 50 ML/MIN/1.73 M^2
GLUCOSE SERPL-MCNC: 118 MG/DL (ref 70–110)
HCT VFR BLD AUTO: 42.8 % (ref 40–54)
HGB BLD-MCNC: 14.3 G/DL (ref 14–18)
IMM GRANULOCYTES # BLD AUTO: 0.07 K/UL (ref 0–0.04)
IMM GRANULOCYTES NFR BLD AUTO: 0.6 % (ref 0–0.5)
LYMPHOCYTES # BLD AUTO: 1.1 K/UL (ref 1–4.8)
LYMPHOCYTES NFR BLD: 9.4 % (ref 18–48)
MCH RBC QN AUTO: 31.9 PG (ref 27–31)
MCHC RBC AUTO-ENTMCNC: 33.4 G/DL (ref 32–36)
MCV RBC AUTO: 96 FL (ref 82–98)
MONOCYTES # BLD AUTO: 0.9 K/UL (ref 0.3–1)
MONOCYTES NFR BLD: 7.2 % (ref 4–15)
NEUTROPHILS # BLD AUTO: 9.8 K/UL (ref 1.8–7.7)
NEUTROPHILS NFR BLD: 80.9 % (ref 38–73)
NRBC BLD-RTO: 0 /100 WBC
PLATELET # BLD AUTO: 178 K/UL (ref 150–450)
PMV BLD AUTO: 9.3 FL (ref 9.2–12.9)
POTASSIUM SERPL-SCNC: 4.7 MMOL/L (ref 3.5–5.1)
PROT SERPL-MCNC: 7.2 G/DL (ref 6–8.4)
RBC # BLD AUTO: 4.48 M/UL (ref 4.6–6.2)
SODIUM SERPL-SCNC: 141 MMOL/L (ref 136–145)
TESTOST SERPL-MCNC: <4 NG/DL (ref 304–1227)
WBC # BLD AUTO: 12.07 K/UL (ref 3.9–12.7)

## 2023-10-10 PROCEDURE — 80053 COMPREHEN METABOLIC PANEL: CPT | Mod: HCNC | Performed by: INTERNAL MEDICINE

## 2023-10-10 PROCEDURE — 84153 ASSAY OF PSA TOTAL: CPT | Mod: HCNC | Performed by: INTERNAL MEDICINE

## 2023-10-10 PROCEDURE — 84403 ASSAY OF TOTAL TESTOSTERONE: CPT | Mod: HCNC | Performed by: INTERNAL MEDICINE

## 2023-10-10 PROCEDURE — 36415 COLL VENOUS BLD VENIPUNCTURE: CPT | Mod: HCNC | Performed by: INTERNAL MEDICINE

## 2023-10-10 PROCEDURE — 85025 COMPLETE CBC W/AUTO DIFF WBC: CPT | Mod: HCNC | Performed by: INTERNAL MEDICINE

## 2023-10-11 NOTE — PROGRESS NOTES
"Palliative Medicine  Consult  Consult Requested By: Dr. Dustin Sparks  Reason for Consult: Symptom Management/Advance Care Planning/Goals of Care Discussion        SUBJECTIVE:     History of Present Illness:  Homero Tanner is a 83 y.o. 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 S/P Prostatectomy. Prior Rectal and Skin Cancer. He is receiving Lupron, Abiraterone and Prednisone. Followed by Dr. Sparks and Dr. Fuentes ([facility]). The palliative care team was consulted to assist with symptom management, advance care planning, and goals of care discussion.       Pt attended clinic alone. He was in no acute distress. Vital signs stable on room air.   I explained the role palliative care often plays in supporting patients with serious illness and their families regarding symptom management, advance care planning, and goals of care discussion.    Pt verbalized understanding.   Pt reported dyspnea on exertion, anxiety/depression/insomnia due to cancer diagnoses. He denied pain. He stated he has Norco 7.5mg but has not used it. He is interested in trying Medical Marijuana for symptom management  He continues to care for himself independently. He is . He has two adult children: daughter in [place], son in [place].     We discussed advance care planning, goals of care, and code status, Full Code vs. DNR including risks, benefits, and alternatives.   Pt stated his goal is to live for "2-3 years".  He wishes to continue cancer treatment, symptom management, maintain independence and functional status.   He plans to move in with his daughter in [place] if his health declines. He will complete his HCPOA form and return it to PM clinic  He wishes to remain Full Code and "live for as long as possible", but "continues to prepare for the obvious". His sister is the  of his will.     ANGELO TODD reviewed and summarized:      Past Medical History:   Diagnosis Date    Abnormal ECG " 6/24/2013    Adenomatous rectal polyp 12/7/2015    Aortic insufficiency 6/24/2013    Arthritis     Asthma     as a child    Benign neoplasm of cecum 2/27/2017    Benign neoplasm of transverse colon 2/27/2017    Cataract     CHF (congestive heart failure)     Pt states He's never been told this    Diverticulosis of large intestine without hemorrhage 2/27/2017    Encounter for blood transfusion     Frequent PVCs     Gallstones 3/28/2018    By Us done 3/21/2018    GERD (gastroesophageal reflux disease)     History of colon polyps     Hypertension     Iron deficiency anemia 10/26/2015    Lymphoma     waldenstrom's macroglobulinemia    Mixed hyperlipidemia 6/24/2013    Premature atrial contractions     Prostate cancer     PSVT (paroxysmal supraventricular tachycardia) 6/24/2013    Pulmonary hypertension 6/13/2016    PVC's (premature ventricular contractions) 6/13/2016    Rectal adenocarcinoma     Rectal cancer 5/1/2018    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    left parietal scalp    Thymoma     TR (tricuspid regurgitation) 6/13/2016    Tubular adenoma of colon 06/2020    VT (ventricular tachycardia) 9/18/2018     Past Surgical History:   Procedure Laterality Date    biopsy for chest mass      BONE MARROW BIOPSY Left 7/11/2018    Procedure: Biopsy-bone marrow;  Surgeon: Charanjit Dacosta MD;  Location: [facility] ER;  Service: General;  Laterality: Left;    CATARACT EXTRACTION W/  INTRAOCULAR LENS IMPLANT Right 01/03/2019    CATARACT EXTRACTION W/  INTRAOCULAR LENS IMPLANT Left 02/07/2019    COLON SURGERY      COLONOSCOPY N/A 10/26/2015    Procedure: Colonoscopy;  Surgeon: Abraham Hong MD;  Location: [facility] ENDO;  Service: Endoscopy;  Laterality: N/A;    COLONOSCOPY Left 2/27/2017    Procedure: COLONOSCOPY;  Surgeon: Abraham Hong MD;  Location: [facility] ENDO;  Service: Endoscopy;  Laterality: Left;    COLONOSCOPY N/A 5/1/2018    Procedure: hixtory of rectal cancer. Colonsocopy asked to be repeated minnie  year, alst one 2/2017;   Surgeon: Patricio Streeter MD;  Location: [facility];  Service: Endoscopy;  Laterality: N/A;    COLONOSCOPY N/A 6/27/2018    Procedure: COLONOSCOPY/hybrid APC or a EndoRotor device;  Surgeon: Rao Medeiros MD;  [facility]  Service: Endoscopy;  Laterality: N/A;    COLONOSCOPY N/A 6/22/2020    Procedure: COLONOSCOPY;  Surgeon: Yu Wells MD;  Location: [facility] ENDO;  Service: Endoscopy;  Laterality: N/A;    ESOPHAGOGASTRODUODENOSCOPY N/A 10/22/2019    Procedure: ESOPHAGOGASTRODUODENOSCOPY (EGD);  Surgeon: Mariela Greer MD;  Location: [facility];  Service: Endoscopy;  Laterality: N/A;    FLEXIBLE SIGMOIDOSCOPY Left 4/23/2019    Procedure: SIGMOIDOSCOPY, FLEXIBLE;  Surgeon: Ranjit Will MD;  Location: [facility];  Service: Endoscopy;  Laterality: Left;    HERNIA REPAIR Right 02/2018    Inguinal, open with mesh    mohs      PROSTATECTOMY      RECTAL SURGERY N/A 09/2014    REPAIR OF SLIDING INGUINAL HERNIA Right 2/26/2019    Procedure: REPAIR, HERNIA, INGUINAL, SLIDING;  Surgeon: Bridger Alvarez MD;  Location: [facility];  Service: General;  Laterality: Right;    SKIN BIOPSY      TONSILLECTOMY      TUMOR REMOVAL       Family History   Problem Relation Age of Onset    Diabetes Father     Cataracts Mother     Amblyopia Neg Hx     Blindness Neg Hx     Cancer Neg Hx     Glaucoma Neg Hx     Hypertension Neg Hx     Macular degeneration Neg Hx     Retinal detachment Neg Hx     Strabismus Neg Hx     Stroke Neg Hx     Thyroid disease Neg Hx     Melanoma Neg Hx      Review of patient's allergies indicates:   Allergen Reactions    1,3-butylene glycol     Lipitor [atorvastatin] Other (See Comments)    Norvasc [amlodipine] Swelling       Medications:    Current Outpatient Medications:     furosemide (LASIX) 20 MG tablet, Take 20 mg on Tues, Thurs, Disp: 30 tablet, Rfl: 11    abiraterone (ZYTIGA) 250 mg Tab, Take 4 tablets by mouth once daily as directed on an empty stomach., Disp: 120 tablet, Rfl:  11    amLODIPine (NORVASC) 2.5 MG tablet, TAKE 1 TABLET(2.5 MG) BY MOUTH TWICE DAILY, Disp: 60 tablet, Rfl: 11    amoxicillin (AMOXIL) 500 MG Tab, Take 500 mg by mouth every 8 (eight) hours., Disp: , Rfl:     aspirin (ECOTRIN) 81 MG EC tablet, Take 81 mg by mouth once daily., Disp: , Rfl:     b complex vitamins tablet, Take by mouth. 1 tablet Oral Every day, Disp: , Rfl:     chlorhexidine (PERIDEX) 0.12 % solution, SMARTSIG:15 Milliliter(s) By Mouth Morning-Night, Disp: , Rfl:     clobetasoL (TEMOVATE) 0.05 % external solution, APPLY TO SCALP 1 TO 2 TIMES A DAY AS NEEDED FOR FLARES, DO NOT APPLY TO FACE OR FOLDS OF SKIN, Disp: 50 mL, Rfl: 5    cyanocobalamin 1,000 mcg/mL injection, INJECT 1ML UNDER THE SKIN EVERY MONTH, Disp: 10 mL, Rfl: 4    erythromycin with ethanoL (EMGEL) 2 % gel, APPLY TO THE AFFECTED AREA OF FACE TWICE DAILY FOR ROSACEA/ REDNESS, Disp: 60 g, Rfl: 3    flu vac 2023 65up-[id number],PF, (FLUAD QUAD 2023-24,65Y UP,,PF,) 60 mcg (15 mcg x 4)/0.5 mL Syrg, Inject 0.5 mLs into the muscle once. for 1 dose, Disp: 0.5 mL, Rfl: 0    furosemide (LASIX) 40 MG tablet, Take 1 tablet (40 mg total) by mouth every Mon, Wed, Fri., Disp: 30 tablet, Rfl: 11    HYDROcodone-acetaminophen (NORCO) 7.5-325 mg per tablet, Take 1 tablet by mouth every 6 (six) hours as needed., Disp: , Rfl:     ibuprofen (ADVIL,MOTRIN) 800 MG tablet, Take by mouth., Disp: , Rfl:     leuprolide (LUPRON) 3.75 mg injection, Inject 3.75 mg into the muscle every 3 (three) months. , Disp: , Rfl:     metoprolol succinate (TOPROL-XL) 100 MG 24 hr tablet, TAKE 1 TABLET(100 MG) BY MOUTH EVERY DAY, Disp: 30 tablet, Rfl: 11    metronidazole 0.75% (METROCREAM) 0.75 % Crea, APPLY TO AFFECTED AREA ON FACE TWICE DAILY, Disp: 45 g, Rfl: 3    omeprazole (PRILOSEC) 20 MG capsule, Take 1 capsule (20 mg total) by mouth once daily., Disp: 90 capsule, Rfl: 3    predniSONE (DELTASONE) 5 MG tablet, Take 1 tablet (5 mg total) by mouth 2 (two) times daily. Take as  directed with water on an empty stomach., Disp: 60 tablet, Rfl: 11    rosuvastatin (CRESTOR) 10 MG tablet, TAKE 1 TABLET(10 MG) BY MOUTH EVERY DAY, Disp: 90 tablet, Rfl: 3    OBJECTIVE:     ROS:  Review of Systems   Constitutional:  Positive for fatigue. Negative for activity change, appetite change, fever and unexpected weight change.   HENT:  Negative for trouble swallowing and voice change.    Eyes: Negative.    Respiratory:  Positive for shortness of breath (Exertional). Negative for cough, chest tightness and wheezing.    Cardiovascular:  Negative for chest pain, palpitations and leg swelling.   Gastrointestinal:  Negative for abdominal distention, abdominal pain, constipation, diarrhea, nausea and vomiting.   Genitourinary:  Negative for difficulty urinating.   Musculoskeletal:  Negative for arthralgias, back pain, joint swelling and myalgias.   Skin:  Negative for color change, pallor, rash and wound.   Neurological:  Negative for dizziness, tremors, speech difficulty, weakness, numbness and headaches.   Psychiatric/Behavioral:  Positive for agitation (Pt believes this is due to steroids), dysphoric mood (Related to Cancer diagnoses) and sleep disturbance. Negative for behavioral problems, confusion and suicidal ideas. The patient is nervous/anxious (Related to cancer diagnoses).        Review of Symptoms      Symptom Assessment (ESAS 0-10 Scale)  Pain:  0  Dyspnea:  5  Anxiety:  5  Nausea:  0  Depression:  5  Anorexia:  0  Fatigue:  5  Insomnia:  5  Restlessness:  5  Agitation:  5     CAM / Delirium:  Negative  Constipation:  Negative  Diarrhea:  Negative    Anxiety:  Is nervous/anxious (Related to cancer diagnoses)  Constipation:  No constipation    Pain Assessment:    Location(s): none      Performance Status:  90    ECOG Performance Status ndGndrndanddndend:nd nd2nd Living Arrangements:  Lives alone    Psychosocial/Cultural:   See Palliative Psychosocial Note: Yes  Retired. . Lives alone. 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   Remains physically active  2 adult children. 1 [place], 1 [place].   1 sister, adminitrator of his will.  **Primary  to Follow**  Palliative Care  Consult: No      Advance Care Planning   Advance Directives:   Living Will: No        Oral Declaration: No    LaPOST: No    Do Not Resuscitate Status: No    Medical Power of : No      Decision Making:  Patient answered questions  Goals of Care: The patient endorses that what is most important right now is to focus on remaining as independent as possible, symptom/pain control, extending life as long as possible, even it it means sacrificing quality, and curative/life-prolongation (regardless of treatment burdens)    Accordingly, we have decided that the best plan to meet the patient's goals includes continuing with treatment.  Pt stated his goal is to live for "2-3 years".  He wishes to continue cancer treatment, symptom management, maintain independence and functional status.   He plans to move in with his daughter in [place] if his health declines. He will complete his HCPOA form and return it to PM clinic  He wishes to remain Full Code and "live for as long as possible", but "continues to prepare for the obvious". His sister is the  of his will.             Physical Exam:  Vitals: Temp: 96.6 °F (35.9 °C) (09/28/23 0929)  Pulse: 84 (09/28/23 0929)  BP: 131/69 (09/28/23 0929)  SpO2: 98 % (09/28/23 0929)  Physical Exam  Vitals and nursing note reviewed.   Constitutional:       General: He is not in acute distress.     Appearance: Normal appearance. He is normal weight. He is not ill-appearing.   HENT:      Head: Normocephalic and atraumatic.      Nose: Nose normal. No congestion or rhinorrhea.      Mouth/Throat:      Mouth: Mucous membranes are moist.      Pharynx: Oropharynx is clear. No oropharyngeal exudate or posterior oropharyngeal erythema.   Eyes:      General: No scleral " icterus.        Right eye: No discharge.         Left eye: No discharge.      Conjunctiva/sclera: Conjunctivae normal.      Pupils: Pupils are equal, round, and reactive to light.   Cardiovascular:      Rate and Rhythm: Normal rate and regular rhythm.      Pulses: Normal pulses.      Heart sounds: Normal heart sounds. No murmur heard.     No gallop.   Pulmonary:      Effort: Pulmonary effort is normal. No respiratory distress.      Breath sounds: Normal breath sounds. No stridor. No wheezing.   Chest:      Chest wall: No tenderness.   Abdominal:      General: Bowel sounds are normal. There is no distension.      Palpations: Abdomen is soft.      Tenderness: There is no abdominal tenderness.   Genitourinary:     Comments: Deferred  Musculoskeletal:         General: No swelling or tenderness. Normal range of motion.      Cervical back: Normal range of motion and neck supple.      Right lower leg: No edema.      Left lower leg: No edema.   Skin:     General: Skin is warm and dry.      Capillary Refill: Capillary refill takes less than 2 seconds.      Coloration: Skin is not jaundiced or pale.      Findings: Bruising (Arms and legs. No bleeding) present. No rash.   Neurological:      Mental Status: He is alert and oriented to person, place, and time.      Motor: No weakness.   Psychiatric:         Mood and Affect: Mood normal.         Behavior: Behavior normal.         Thought Content: Thought content normal.         Judgment: Judgment normal.         Labs and radiology data reviewed    ASSESSMENT/PLAN:   Recurrent Stage IV Prostate Cancer to Lungs and Bone.   Followed by Dr. Sparks and Dr. Fuentes ([facility]).  S/P Prostatectomy. Prior Rectal and Skin Cancer.   On Lupron, Abiraterone and Prednisone  PET 6/16/2023: 1. Extensive pulmonary metastatic disease. 2. Single osseous metastatic lesion in the sacrum at S2.  Referral to [facility] for Medical Marijuana for symptom management  2. Encounter for Palliative  "Care  -Code status: Full Code. Wants to "live as long as possible for at least 2-3 years".   -HCPOA: Not yet assigned. . Two adult children: daughter in [place], son in [place]. 1 sister in [place].   -GOC:  Continue cancer treatment, symptom management, maintain independence and functional status.   -See HPI for further details      Follow up: PRN if pt wants PM to assist with symptom management/HCPOA.      50 minutes total visit  30 minutes of total time spent on the encounter, which includes face to face time and non-face to face time preparing to see the patient (eg, review of tests), Obtaining and/or reviewing separately obtained history, Documenting clinical information in the electronic or other health record, Independently interpreting results (not separately reported) and communicating results to the patient/family/caregiver, or Care coordination (not separately reported).    20 minutes spent in discussing ACP    Signature: MAYCOL LIGHT NP    "

## 2023-10-12 ENCOUNTER — OFFICE VISIT (OUTPATIENT)
Dept: HEMATOLOGY/ONCOLOGY | Facility: CLINIC | Age: 83
End: 2023-10-12
Payer: MEDICARE

## 2023-10-12 DIAGNOSIS — Z79.899 IMMUNODEFICIENCY DUE TO CHEMOTHERAPY: ICD-10-CM

## 2023-10-12 DIAGNOSIS — C78.01 MALIGNANT NEOPLASM METASTATIC TO BOTH LUNGS: ICD-10-CM

## 2023-10-12 DIAGNOSIS — C79.51 PROSTATE CANCER METASTATIC TO BONE: Primary | ICD-10-CM

## 2023-10-12 DIAGNOSIS — N18.31 CHRONIC KIDNEY DISEASE, STAGE 3A: ICD-10-CM

## 2023-10-12 DIAGNOSIS — C61 PROSTATE CANCER METASTATIC TO BONE: Primary | ICD-10-CM

## 2023-10-12 DIAGNOSIS — D84.821 IMMUNODEFICIENCY DUE TO CHEMOTHERAPY: ICD-10-CM

## 2023-10-12 DIAGNOSIS — T45.1X5A IMMUNODEFICIENCY DUE TO CHEMOTHERAPY: ICD-10-CM

## 2023-10-12 DIAGNOSIS — C78.02 MALIGNANT NEOPLASM METASTATIC TO BOTH LUNGS: ICD-10-CM

## 2023-10-12 PROCEDURE — 1159F PR MEDICATION LIST DOCUMENTED IN MEDICAL RECORD: ICD-10-PCS | Mod: HCNC,CPTII,95, | Performed by: INTERNAL MEDICINE

## 2023-10-12 PROCEDURE — 1159F MED LIST DOCD IN RCRD: CPT | Mod: HCNC,CPTII,95, | Performed by: INTERNAL MEDICINE

## 2023-10-12 PROCEDURE — 1160F PR REVIEW ALL MEDS BY PRESCRIBER/CLIN PHARMACIST DOCUMENTED: ICD-10-PCS | Mod: HCNC,CPTII,95, | Performed by: INTERNAL MEDICINE

## 2023-10-12 PROCEDURE — 99214 OFFICE O/P EST MOD 30 MIN: CPT | Mod: HCNC,95,, | Performed by: INTERNAL MEDICINE

## 2023-10-12 PROCEDURE — 99214 PR OFFICE/OUTPT VISIT, EST, LEVL IV, 30-39 MIN: ICD-10-PCS | Mod: HCNC,95,, | Performed by: INTERNAL MEDICINE

## 2023-10-12 PROCEDURE — 1160F RVW MEDS BY RX/DR IN RCRD: CPT | Mod: HCNC,CPTII,95, | Performed by: INTERNAL MEDICINE

## 2023-10-12 NOTE — PROGRESS NOTES
Subjective:       Patient ID: Homero Tanner is a 83 y.o. male.    Chief Complaint: Results, Prostate Cancer, and Chemotherapy    HPI:  83-year-old male history of metastatic prostate carcinoma.  Patient has been started on Lupron in most recently on Zytiga and prednisone patient returns with repeat PSA that has been climbing on a monthly basis and now is stable at this particular point patient returns for review really asymptomatic no evidence of any discomfort or pain ECOG status 2    Past Medical History:   Diagnosis Date    Abnormal ECG 6/24/2013    Adenomatous rectal polyp 12/7/2015    Aortic insufficiency 6/24/2013    Arthritis     Asthma     as a child    Benign neoplasm of cecum 2/27/2017    Benign neoplasm of transverse colon 2/27/2017    Cataract     CHF (congestive heart failure)     Pt states He's never been told this    Diverticulosis of large intestine without hemorrhage 2/27/2017    Encounter for blood transfusion     Frequent PVCs     Gallstones 3/28/2018    By Us done 3/21/2018    GERD (gastroesophageal reflux disease)     History of colon polyps     Hypertension     Iron deficiency anemia 10/26/2015    Lymphoma     waldenstrom's macroglobulinemia    Mixed hyperlipidemia 6/24/2013    Premature atrial contractions     Prostate cancer     PSVT (paroxysmal supraventricular tachycardia) 6/24/2013    Pulmonary hypertension 6/13/2016    PVC's (premature ventricular contractions) 6/13/2016    Rectal adenocarcinoma     Rectal cancer 5/1/2018    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    left parietal scalp    Thymoma     TR (tricuspid regurgitation) 6/13/2016    Tubular adenoma of colon 06/2020    VT (ventricular tachycardia) 9/18/2018     Family History   Problem Relation Age of Onset    Diabetes Father     Cataracts Mother     Amblyopia Neg Hx     Blindness Neg Hx     Cancer Neg Hx     Glaucoma Neg Hx     Hypertension Neg Hx     Macular degeneration Neg Hx     Retinal detachment Neg Hx     Strabismus  Neg Hx     Stroke Neg Hx     Thyroid disease Neg Hx     Melanoma Neg Hx      Social History     Socioeconomic History    Marital status: Single   Tobacco Use    Smoking status: Former     Current packs/day: 0.00     Average packs/day: 1 pack/day for 15.0 years (15.0 ttl pk-yrs)     Types: Cigarettes     Start date: 1954     Quit date: 1969     Years since quittin.7    Smokeless tobacco: Never   Substance and Sexual Activity    Alcohol use: Yes     Alcohol/week: 3.0 standard drinks of alcohol     Types: 3 Cans of beer per week     Comment: socially, NO ALCOHOL 72 HOURS PRIOR TO SX    Drug use: No     Past Surgical History:   Procedure Laterality Date    biopsy for chest mass      BONE MARROW BIOPSY Left 2018    Procedure: Biopsy-bone marrow;  Surgeon: Charanjit Dacosta MD;  Location: [facility];  Service: General;  Laterality: Left;    CATARACT EXTRACTION W/  INTRAOCULAR LENS IMPLANT Right 2019    CATARACT EXTRACTION W/  INTRAOCULAR LENS IMPLANT Left 2019    COLON SURGERY      COLONOSCOPY N/A 10/26/2015    Procedure: Colonoscopy;  Surgeon: Abraham Hong MD;  Location: [facility];  Service: Endoscopy;  Laterality: N/A;    COLONOSCOPY Left 2017    Procedure: COLONOSCOPY;  Surgeon: Abraham Hong MD;  Location: [facility];  Service: Endoscopy;  Laterality: Left;    COLONOSCOPY N/A 2018    Procedure: hixtory of rectal cancer. Colonsocopy asked to be repeated minnie  year, alst one 2017;  Surgeon: Patricio Streeter MD;  Location: [facility];  Service: Endoscopy;  Laterality: N/A;    COLONOSCOPY N/A 2018    Procedure: COLONOSCOPY/hybrid APC or a EndoRotor device;  Surgeon: Rao Medeiros MD;  Location: [facility] ([address] [place] [address]);  Service: Endoscopy;  Laterality: N/A;    COLONOSCOPY N/A 2020    Procedure: COLONOSCOPY;  Surgeon: Yu Wells MD;  Location: [facility];  Service: Endoscopy;  Laterality: N/A;    ESOPHAGOGASTRODUODENOSCOPY N/A 10/22/2019    Procedure:  ESOPHAGOGASTRODUODENOSCOPY (EGD);  Surgeon: Mariela Greer MD;  Location: [facility] ENDO;  Service: Endoscopy;  Laterality: N/A;    FLEXIBLE SIGMOIDOSCOPY Left 4/23/2019    Procedure: SIGMOIDOSCOPY, FLEXIBLE;  Surgeon: Ranjit Will MD;  Location: [facility] ENDO;  Service: Endoscopy;  Laterality: Left;    HERNIA REPAIR Right 02/2018    Inguinal, open with mesh    mohs      PROSTATECTOMY      RECTAL SURGERY N/A 09/2014    REPAIR OF SLIDING INGUINAL HERNIA Right 2/26/2019    Procedure: REPAIR, HERNIA, INGUINAL, SLIDING;  Surgeon: Bridger Alvarez MD;  Location: [facility] OR;  Service: General;  Laterality: Right;    SKIN BIOPSY      TONSILLECTOMY      TUMOR REMOVAL         Labs:  Lab Results   Component Value Date    WBC 12.07 10/10/2023    HGB 14.3 10/10/2023    HCT 42.8 10/10/2023    MCV 96 10/10/2023     10/10/2023     BMP  Lab Results   Component Value Date     10/10/2023    K 4.7 10/10/2023    CL 98 10/10/2023    CO2 27 10/10/2023    BUN 19 10/10/2023    CREATININE 1.4 10/10/2023    CALCIUM 9.6 10/10/2023    ANIONGAP 16 10/10/2023    ESTGFRAFRICA 60 03/09/2022    EGFRNONAA 52 (A) 03/09/2022     Lab Results   Component Value Date    ALT 16 10/10/2023    AST 22 10/10/2023     (H) 12/14/2020    ALKPHOS 115 10/10/2023    BILITOT 1.3 (H) 10/10/2023       Lab Results   Component Value Date    IRON 49 02/18/2020    TIBC 271 02/18/2020    FERRITIN 349 (H) 02/18/2020     Lab Results   Component Value Date    WHFPJQDW20 508 02/09/2023     Lab Results   Component Value Date    FOLATE 8.8 02/09/2023     Lab Results   Component Value Date    TSH 1.726 03/09/2022         Review of Systems   Constitutional:  Positive for fatigue. Negative for activity change, appetite change, chills, diaphoresis, fever and unexpected weight change.   HENT:  Negative for congestion, dental problem, drooling, ear discharge, ear pain, facial swelling, hearing loss, mouth sores, nosebleeds, postnasal drip, rhinorrhea, sinus pressure,  sneezing, sore throat, tinnitus, trouble swallowing and voice change.    Eyes:  Negative for photophobia, pain, discharge, redness, itching and visual disturbance.   Respiratory:  Negative for apnea, cough, choking, chest tightness, shortness of breath, wheezing and stridor.    Cardiovascular:  Negative for chest pain, palpitations and leg swelling.   Gastrointestinal:  Negative for abdominal distention, abdominal pain, anal bleeding, blood in stool, constipation, diarrhea, nausea, rectal pain and vomiting.   Endocrine: Negative for cold intolerance, heat intolerance, polydipsia, polyphagia and polyuria.   Genitourinary:  Negative for decreased urine volume, difficulty urinating, dysuria, enuresis, flank pain, frequency, genital sores, hematuria, penile discharge, penile pain, penile swelling, scrotal swelling, testicular pain and urgency.   Musculoskeletal:  Negative for arthralgias, back pain, gait problem, joint swelling, myalgias, neck pain and neck stiffness.   Skin:  Negative for color change, pallor, rash and wound.   Allergic/Immunologic: Negative for environmental allergies, food allergies and immunocompromised state.   Neurological:  Positive for weakness. Negative for dizziness, tremors, seizures, syncope, facial asymmetry, speech difficulty, light-headedness, numbness and headaches.   Hematological:  Negative for adenopathy. Does not bruise/bleed easily.   Psychiatric/Behavioral:  Positive for dysphoric mood. Negative for agitation, behavioral problems, confusion, decreased concentration, hallucinations, self-injury, sleep disturbance and suicidal ideas. The patient is not nervous/anxious and is not hyperactive.        Objective:      Physical Exam  Constitutional:       Appearance: Normal appearance. He is ill-appearing.             Assessment:      1. Prostate cancer metastatic to bone    2. Malignant neoplasm metastatic to both lungs    3. Immunodeficiency due to chemotherapy    4. Chronic kidney  disease, stage 3a           Med Onc Chart Routing      Follow up with physician . Return in 6 weeks with CBC CMP PSA testosterone and PMS a PET scan prior   Follow up with [facility]    Infusion scheduling note    Injection scheduling note    Labs    Imaging    Pharmacy appointment    Other referrals                   Plan:     The patient location is:  Home  The chief complaint leading to consultation is:  Prostate cancer    Visit type: audiovisual    Face to Face time with patient: 25 minutes of total time spent on the encounter, which includes face to face time and non-face to face time preparing to see the patient (eg, review of tests), Obtaining and/or reviewing separately obtained history, Documenting clinical information in the electronic or other health record, Independently interpreting results (not separately reported) and communicating results to the patient/family/caregiver, or Care coordination (not separately reported).         Each patient to whom he or she provides medical services by telemedicine is:  (1) informed of the relationship between the physician and patient and the respective role of any other health care provider with respect to management of the patient; and (2) notified that he or she may decline to receive medical services by telemedicine and may withdraw from such care at any time.    Notes:   Reviewed information.  At this time PSA appears to have stabilized.  At this time would recommend continuation with current dosing of Lupron Zytiga and prednisone with the patient states that he is taking.  I will repeat a PMS a scan for response over the next 6 weeks and review with him with laboratory studies prior      Dustin Sparks Jr, MD FACP

## 2023-10-30 ENCOUNTER — OFFICE VISIT (OUTPATIENT)
Dept: INTERNAL MEDICINE | Facility: CLINIC | Age: 83
End: 2023-10-30
Payer: MEDICARE

## 2023-10-30 VITALS
SYSTOLIC BLOOD PRESSURE: 132 MMHG | BODY MASS INDEX: 23.98 KG/M2 | OXYGEN SATURATION: 95 % | WEIGHT: 167.13 LBS | TEMPERATURE: 98 F | HEART RATE: 94 BPM | DIASTOLIC BLOOD PRESSURE: 70 MMHG

## 2023-10-30 DIAGNOSIS — C78.02 MALIGNANT NEOPLASM METASTATIC TO BOTH LUNGS: ICD-10-CM

## 2023-10-30 DIAGNOSIS — C61 PROSTATE CANCER METASTATIC TO BONE: ICD-10-CM

## 2023-10-30 DIAGNOSIS — E78.2 MIXED HYPERLIPIDEMIA: ICD-10-CM

## 2023-10-30 DIAGNOSIS — C78.01 MALIGNANT NEOPLASM METASTATIC TO BOTH LUNGS: ICD-10-CM

## 2023-10-30 DIAGNOSIS — C79.51 PROSTATE CANCER METASTATIC TO BONE: ICD-10-CM

## 2023-10-30 DIAGNOSIS — I49.1 PREMATURE ATRIAL CONTRACTIONS: ICD-10-CM

## 2023-10-30 DIAGNOSIS — I10 ESSENTIAL HYPERTENSION: Primary | Chronic | ICD-10-CM

## 2023-10-30 PROCEDURE — 99214 OFFICE O/P EST MOD 30 MIN: CPT | Mod: HCNC,S$GLB,, | Performed by: INTERNAL MEDICINE

## 2023-10-30 PROCEDURE — 3078F PR MOST RECENT DIASTOLIC BLOOD PRESSURE < 80 MM HG: ICD-10-PCS | Mod: HCNC,CPTII,S$GLB, | Performed by: INTERNAL MEDICINE

## 2023-10-30 PROCEDURE — 1159F PR MEDICATION LIST DOCUMENTED IN MEDICAL RECORD: ICD-10-PCS | Mod: HCNC,CPTII,S$GLB, | Performed by: INTERNAL MEDICINE

## 2023-10-30 PROCEDURE — 1126F AMNT PAIN NOTED NONE PRSNT: CPT | Mod: HCNC,CPTII,S$GLB, | Performed by: INTERNAL MEDICINE

## 2023-10-30 PROCEDURE — 1159F MED LIST DOCD IN RCRD: CPT | Mod: HCNC,CPTII,S$GLB, | Performed by: INTERNAL MEDICINE

## 2023-10-30 PROCEDURE — 3075F PR MOST RECENT SYSTOLIC BLOOD PRESS GE 130-139MM HG: ICD-10-PCS | Mod: HCNC,CPTII,S$GLB, | Performed by: INTERNAL MEDICINE

## 2023-10-30 PROCEDURE — 3288F FALL RISK ASSESSMENT DOCD: CPT | Mod: HCNC,CPTII,S$GLB, | Performed by: INTERNAL MEDICINE

## 2023-10-30 PROCEDURE — 99999 PR PBB SHADOW E&M-EST. PATIENT-LVL IV: ICD-10-PCS | Mod: PBBFAC,HCNC,, | Performed by: INTERNAL MEDICINE

## 2023-10-30 PROCEDURE — 1101F PT FALLS ASSESS-DOCD LE1/YR: CPT | Mod: HCNC,CPTII,S$GLB, | Performed by: INTERNAL MEDICINE

## 2023-10-30 PROCEDURE — 1101F PR PT FALLS ASSESS DOC 0-1 FALLS W/OUT INJ PAST YR: ICD-10-PCS | Mod: HCNC,CPTII,S$GLB, | Performed by: INTERNAL MEDICINE

## 2023-10-30 PROCEDURE — 3288F PR FALLS RISK ASSESSMENT DOCUMENTED: ICD-10-PCS | Mod: HCNC,CPTII,S$GLB, | Performed by: INTERNAL MEDICINE

## 2023-10-30 PROCEDURE — 3075F SYST BP GE 130 - 139MM HG: CPT | Mod: HCNC,CPTII,S$GLB, | Performed by: INTERNAL MEDICINE

## 2023-10-30 PROCEDURE — 1126F PR PAIN SEVERITY QUANTIFIED, NO PAIN PRESENT: ICD-10-PCS | Mod: HCNC,CPTII,S$GLB, | Performed by: INTERNAL MEDICINE

## 2023-10-30 PROCEDURE — 99999 PR PBB SHADOW E&M-EST. PATIENT-LVL IV: CPT | Mod: PBBFAC,HCNC,, | Performed by: INTERNAL MEDICINE

## 2023-10-30 PROCEDURE — 1160F RVW MEDS BY RX/DR IN RCRD: CPT | Mod: HCNC,CPTII,S$GLB, | Performed by: INTERNAL MEDICINE

## 2023-10-30 PROCEDURE — 99214 PR OFFICE/OUTPT VISIT, EST, LEVL IV, 30-39 MIN: ICD-10-PCS | Mod: HCNC,S$GLB,, | Performed by: INTERNAL MEDICINE

## 2023-10-30 PROCEDURE — 1160F PR REVIEW ALL MEDS BY PRESCRIBER/CLIN PHARMACIST DOCUMENTED: ICD-10-PCS | Mod: HCNC,CPTII,S$GLB, | Performed by: INTERNAL MEDICINE

## 2023-10-30 PROCEDURE — 3078F DIAST BP <80 MM HG: CPT | Mod: HCNC,CPTII,S$GLB, | Performed by: INTERNAL MEDICINE

## 2023-10-30 NOTE — PATIENT INSTRUCTIONS
Recommend Respiratory Syncitial Virus (RSV) vaccine at your local pharmacy.      Patient Education       Mediterranean Diet   About this topic   This is a heart healthy diet. It is based on widely used foods and cooking styles from many countries around the [place] Sea. The main pattern for the diet is more plant foods and monounsaturated fats, or good fats, like olive oil. Protein in this diet comes from seafood, legumes, nuts, seeds, and poultry and eggs in lowered amounts. You will also eat more whole grains, vegetables, and fruits and moderate amounts of alcohol are also included. This diet has less red meats, dairy products, and processed foods.       What will the results be?   Your diet will have less saturated fat, cholesterol, calories, sodium, and added sugars. Your diet will be higher in fiber. This will help to keep your blood sugar steady. This diet lowers the chance of heart disease and other health problems.  What lifestyle changes are needed?   If you do not often eat this way, you will need to change your eating habits. Be sure to get regular exercise. It is believed to help the health benefits of this diet.  What changes to diet are needed?   You may need to limit the amount of red meat and processed foods in your diet. Ask your dietitian for help planning meals that are right for you.  What foods are good to eat?   Plenty of fish and other seafood  Fresh, frozen, or canned fruits and vegetables  Nuts and nut butters and dried beans, lentils, or peas  Foods high in fiber like whole grains and whole grain products  Olive oil (good fat), peanut or canola oil, margarine, or spreads that list vegetable oil as the first ingredient and do not  contain trans fat or partially hydrogenated oil  Small amounts of poultry and eggs  What foods should be limited or avoided?   Red meats  Sweets, desserts, and processed foods  Butter, oils, and fats that contain trans fats or are hydrogenated or partially  hydrogenated  Gravies and sauces  What problems could happen?   Your weight may rise because your diet will be higher in fat from olive oil and nuts.  You may have lower iron levels. Be sure to eat foods rich in iron. Also, eat foods rich in vitamin C. This will help your body take in iron.  You may have lower calcium levels because you are eating less dairy products. Ask your doctor if you need to take a calcium supplement.  Wine is often thought of as part of a Mediterranean diet. It is not needed and you may choose not to include it. Avoid wine if you are prone to alcohol abuse or are pregnant. Also, avoid it if you are at risk for breast cancer, have liver problems, or have other illnesses that make it important for you to not have alcohol.  When do I need to call the doctor?   If you have any concerns about your diet  Where can I learn more?   Academy of Nutrition and Dietetics  http://www.eatright.org/resource/food/planning-and-prep/cooking-tips-and-trends/make-it-mediterranean   American Heart Association  https://www.heart.org/en/healthy-living/healthy-eating/eat-smart/nutrition-basics/mediterranean-diet   Last Reviewed Date   2021-10-11  Consumer Information Use and Disclaimer   This information is not specific medical advice and does not replace information you receive from your health care provider. This is only a brief summary of general information. It does NOT include all information about conditions, illnesses, injuries, tests, procedures, treatments, therapies, discharge instructions or life-style choices that may apply to you. You must talk with your health care provider for complete information about your health and treatment options. This information should not be used to decide whether or not to accept your health care providers advice, instructions or recommendations. Only your health care provider has the knowledge and training to provide advice that is right for you.  Copyright   Copyright ©  2021 [employer] and its affiliates and/or licensors. All rights reserved.

## 2023-10-30 NOTE — ASSESSMENT & PLAN NOTE
We will continue the current regimen at this time.  Remain focused on low fat diet and high dietary fiber intake.

## 2023-10-30 NOTE — PROGRESS NOTES
Subjective:       Patient ID: Homero Tanner is a 83 y.o. male.    Chief Complaint: follow up       HPI:  Patient is an 83-year-old male presenting today for updated follow-up on chronic health issues.  Patient has history of hypertension, hyperlipidemia, prostate cancer with Mets to the bone and to the lungs.  He also has history of premature early true contractions.    Prostate cancer at this point is the primary concerned that he has.  He is following with Hematology-Oncology.  His PSA has steady over the last 2 checks.  It was 95.7 and 95.6 the following check.  He continues take his regimen as prescribed.  He is cautiously optimistic at this point.  He is not having any significant symptoms associated with the metastatic lesions.  He has follow-up PET scan and labs in November.    Patient has had a history of some atrial bigeminy.  He notes no problems with palpitations chest pain or lightheadedness or near-syncope.  He states that he is not noticed any significant cardiac issues.  He sees Dr. Wright periodically on this issue.    Review of Systems   Constitutional:  Negative for fever and unexpected weight change.   Respiratory:  Negative for cough, shortness of breath and wheezing.    Cardiovascular:  Negative for chest pain and palpitations.   Gastrointestinal:  Negative for constipation, diarrhea, nausea and vomiting.   Genitourinary:  Negative for dysuria and hematuria.       Objective:   /70   Pulse 94   Temp 97.5 °F (36.4 °C) (Tympanic)   Wt 75.8 kg (167 lb 1.7 oz)   SpO2 95%   BMI 23.98 kg/m²      Physical Exam  Vitals reviewed.   Constitutional:       Appearance: He is well-developed.   HENT:      Head: Normocephalic and atraumatic.      Right Ear: Tympanic membrane, ear canal and external ear normal.      Left Ear: Tympanic membrane, ear canal and external ear normal.   Eyes:      Pupils: Pupils are equal, round, and reactive to light.   Neck:      Thyroid: No thyromegaly.    Cardiovascular:      Rate and Rhythm: Normal rate and regular rhythm.      Heart sounds: Normal heart sounds. No murmur heard.     No friction rub. No gallop.   Pulmonary:      Effort: Pulmonary effort is normal.      Breath sounds: Normal breath sounds. No wheezing or rales.   Abdominal:      General: Bowel sounds are normal. There is no distension.      Palpations: Abdomen is soft.      Tenderness: There is no abdominal tenderness.   Musculoskeletal:      Cervical back: Neck supple.   Psychiatric:         Mood and Affect: Mood normal.         No visits with results within 2 Week(s) from this visit.   Latest known visit with results is:   Lab Visit on 10/10/2023   Component Date Value    Sodium 10/10/2023 141     Potassium 10/10/2023 4.7     Chloride 10/10/2023 98     CO2 10/10/2023 27     Glucose 10/10/2023 118 (H)     BUN 10/10/2023 19     Creatinine 10/10/2023 1.4     Calcium 10/10/2023 9.6     Total Protein 10/10/2023 7.2     Albumin 10/10/2023 4.1     Total Bilirubin 10/10/2023 1.3 (H)     Alkaline Phosphatase 10/10/2023 115     AST 10/10/2023 22     ALT 10/10/2023 16     eGFR 10/10/2023 50 (A)     Anion Gap 10/10/2023 16     WBC 10/10/2023 12.07     RBC 10/10/2023 4.48 (L)     Hemoglobin 10/10/2023 14.3     Hematocrit 10/10/2023 42.8     MCV 10/10/2023 96     MCH 10/10/2023 31.9 (H)     MCHC 10/10/2023 33.4     RDW 10/10/2023 14.0     Platelets 10/10/2023 178     MPV 10/10/2023 9.3     Immature Granulocytes 10/10/2023 0.6 (H)     Gran # (ANC) 10/10/2023 9.8 (H)     Immature Grans (Abs) 10/10/2023 0.07 (H)     Lymph # 10/10/2023 1.1     Mono # 10/10/2023 0.9     Eos # 10/10/2023 0.1     Baso # 10/10/2023 0.08     nRBC 10/10/2023 0     Gran % 10/10/2023 80.9 (H)     Lymph % 10/10/2023 9.4 (L)     Mono % 10/10/2023 7.2     Eosinophil % 10/10/2023 1.2     Basophil % 10/10/2023 0.7     Differential Method 10/10/2023 Automated     Testosterone, Total 10/10/2023 <4 (L)     PSA Diagnostic 10/10/2023 95.6 (H)         Assessment:       1. Essential hypertension    2. Mixed hyperlipidemia    3. Prostate cancer metastatic to bone    4. Malignant neoplasm metastatic to both lungs    5. Premature atrial contractions        Plan:   Essential hypertension  Blood pressure is under good control.  We will continue the current regimen.  Will work on regular aerobic exercise and a low salt diet.        Hyperlipidemia   We will continue the current regimen at this time.  Remain focused on low fat diet and high dietary fiber intake.    Essential hypertension    Mixed hyperlipidemia    Prostate cancer metastatic to bone  Comments:  Continue current therapy with Oncology. Follow up scan in November and Dr. Sparks.    Malignant neoplasm metastatic to both lungs  Comments:  Continue current therapy with Oncology. Follow up scan in November and Dr. Sparks.    Premature atrial contractions  Comments:  Stable. no symptoms reported.  Bigeminy noted on exam          Follow up in about 6 months (around 4/30/2024).

## 2023-11-04 ENCOUNTER — OFFICE VISIT (OUTPATIENT)
Dept: URGENT CARE | Facility: CLINIC | Age: 83
End: 2023-11-04
Payer: MEDICARE

## 2023-11-04 VITALS
HEART RATE: 73 BPM | HEIGHT: 70 IN | OXYGEN SATURATION: 96 % | TEMPERATURE: 98 F | RESPIRATION RATE: 16 BRPM | WEIGHT: 178.88 LBS | BODY MASS INDEX: 25.61 KG/M2 | DIASTOLIC BLOOD PRESSURE: 83 MMHG | SYSTOLIC BLOOD PRESSURE: 139 MMHG

## 2023-11-04 DIAGNOSIS — S80.11XA HEMATOMA OF RIGHT LOWER LEG: Primary | ICD-10-CM

## 2023-11-04 PROCEDURE — 99213 OFFICE O/P EST LOW 20 MIN: CPT | Mod: 25,S$GLB,, | Performed by: EMERGENCY MEDICINE

## 2023-11-04 PROCEDURE — 10160 PNXR ASPIR ABSC HMTMA BULLA: CPT | Mod: S$GLB,,, | Performed by: EMERGENCY MEDICINE

## 2023-11-04 PROCEDURE — 10160 INCISION & DRAINAGE: ICD-10-PCS | Mod: S$GLB,,, | Performed by: EMERGENCY MEDICINE

## 2023-11-04 PROCEDURE — 99213 PR OFFICE/OUTPT VISIT, EST, LEVL III, 20-29 MIN: ICD-10-PCS | Mod: 25,S$GLB,, | Performed by: EMERGENCY MEDICINE

## 2023-11-04 RX ORDER — MUPIROCIN 20 MG/G
OINTMENT TOPICAL
Status: COMPLETED | OUTPATIENT
Start: 2023-11-04 | End: 2023-11-04

## 2023-11-04 RX ADMIN — MUPIROCIN: 20 OINTMENT TOPICAL at 05:11

## 2023-11-04 NOTE — PATIENT INSTRUCTIONS
Keep the foot elevated to prevent swelling.    Leave the dressing in place until Monday morning.  You may take the Ace wrap down if you find it becoming too tight later on.  Avoid putting hydrogen peroxide or any ointments on this area.    After Monday morning, wash daily with soap and water.  Monitor for signs of infection including increased swelling, drainage, increased redness or other worsening.  Return to clinic or go to the emergency room if these occur.

## 2023-11-04 NOTE — PROGRESS NOTES
"Subjective:      Patient ID: Homero Tanner is a 83 y.o. male.    Vitals:  height is 5' 10" (1.778 m) and weight is 81.1 kg (178 lb 14.5 oz). His oral temperature is 98 °F (36.7 °C). His blood pressure is 139/83 and his pulse is 73. His respiration is 16 and oxygen saturation is 96%.     Chief Complaint: Leg Injury    Pt is here today with a large hematoma on his right lower leg that happened when he hit his leg on the bottom of a grocery cart appx 3 hours ago.  Patient reports pain at the site of the hematoma but is able to walk on it without difficulty or pain.  States that the hematoma arose quite rapidly.  He applied ice on it at home and it did not expand anymore.  Not on any blood thinners.  Patient has metastatic prostate cancer    Leg Pain   The incident occurred 1 to 3 hours ago. Incident location: grocery store. The injury mechanism was a direct blow. The pain is present in the right leg. The quality of the pain is described as aching. The pain is at a severity of 5/10. The pain is moderate. The pain has been Constant since onset. Pertinent negatives include no inability to bear weight, loss of motion, loss of sensation, muscle weakness, numbness or tingling. He reports no foreign bodies present. The symptoms are aggravated by palpation, weight bearing and movement. He has tried ice and NSAIDs for the symptoms. The treatment provided no relief.       Constitution: Negative for fatigue and fever.   Musculoskeletal:  Positive for pain and trauma. Negative for back pain.   Skin:  Positive for wound (Hematoma).   Neurological:  Negative for numbness.      Objective:     Physical Exam   Constitutional: He is oriented to person, place, and time. He does not appear ill. No distress.   HENT:   Head: Normocephalic and atraumatic.   Eyes: Extraocular movement intact   Cardiovascular: Normal rate, regular rhythm and normal pulses.   Pulmonary/Chest: Effort normal.   Abdominal: Normal appearance. "   Neurological: He is alert and oriented to person, place, and time.   Skin: Skin is warm and dry.         Comments: There is a large baseball sized hematoma noted to the anterior surface of the shin midway down.  No bony tenderness associated with this.  The skin is intact   Psychiatric: His behavior is normal.   Nursing note and vitals reviewed.      Assessment:     1. Hematoma of right lower leg        Plan:       Hematoma of right lower leg  -     mupirocin 2 % ointment          Medical Decision Making:   History:   I obtained history from: someone other than patient.       <> Summary of History: Patient's wife is with him  Initial Assessment:   Hematoma  Urgent Care Management:  Patient tolerated the needle aspiration.  There was a large amount of blood that issued with minimal clot material.  He tolerated this very well.  Given wound instructions.  Wound was dressed with nonstick gauze and a lightly place Ace wrap.  Given instructions to loosen the Ace wrap if he develops swelling in the foot or has discomfort with it.

## 2023-11-05 ENCOUNTER — TELEPHONE (OUTPATIENT)
Dept: URGENT CARE | Facility: CLINIC | Age: 83
End: 2023-11-05
Payer: MEDICARE

## 2023-11-05 NOTE — TELEPHONE ENCOUNTER
Called to check on patient.  He had some bleeding last night but today no bleeding.  He is going to leave the dressing on until the in the morning.  No complaints at this time other than a little bit of swelling proximal to the Ace wrap.

## 2023-11-07 ENCOUNTER — OFFICE VISIT (OUTPATIENT)
Dept: URGENT CARE | Facility: CLINIC | Age: 83
End: 2023-11-07
Payer: MEDICARE

## 2023-11-07 ENCOUNTER — TELEPHONE (OUTPATIENT)
Dept: URGENT CARE | Facility: CLINIC | Age: 83
End: 2023-11-07
Payer: MEDICARE

## 2023-11-07 VITALS
SYSTOLIC BLOOD PRESSURE: 113 MMHG | BODY MASS INDEX: 23.83 KG/M2 | TEMPERATURE: 98 F | HEIGHT: 70 IN | DIASTOLIC BLOOD PRESSURE: 61 MMHG | WEIGHT: 166.44 LBS | RESPIRATION RATE: 18 BRPM | OXYGEN SATURATION: 98 % | HEART RATE: 82 BPM

## 2023-11-07 DIAGNOSIS — S80.11XA HEMATOMA OF RIGHT LOWER LEG: Primary | ICD-10-CM

## 2023-11-07 PROCEDURE — 10160 PNXR ASPIR ABSC HMTMA BULLA: CPT | Mod: S$GLB,,, | Performed by: PHYSICIAN ASSISTANT

## 2023-11-07 PROCEDURE — 10160 INCISION & DRAINAGE: ICD-10-PCS | Mod: S$GLB,,, | Performed by: PHYSICIAN ASSISTANT

## 2023-11-07 NOTE — PROCEDURES
Incision & Drainage    Date/Time: 11/7/2023 1:00 PM    Performed by: Naye Amin PA-C  Authorized by: Naye Amin PA-C    Consent Done?:  Yes (Verbal)    Type:  Hematoma  Body area:  Lower extremity  Location details:  Right leg  Incision type: Attempted to aspirate with 18G needle and 30 cc syringe. Only about 1 cc blood aspirated, as most of material was coagulated. There was a skin tear (1 cm) during aspiration. Was able to express some coagulated material manually (approximately 5-<10 cc)  Drainage:  Bloody (coagulated, jelly like material)  Drainage amount:  Moderate  Wound treatment:  Drainage (bactroban applied. Wound dressed with non-stick gauze and pressure dressing (ace bandage))  Packing material:  None  Patient tolerance:  Patient tolerated the procedure well with no immediate complications  Pain Assessment: 2

## 2023-11-07 NOTE — PATIENT INSTRUCTIONS
Keep the foot elevated to prevent swelling.    Leave the dressing in place until Thursday evening.  You may take the Ace wrap down if you find it becoming too tight later on.  Avoid putting hydrogen peroxide or any ointments on this area.    After Thursday evening, wash daily with soap and water.  Monitor for signs of infection including increased swelling, drainage, increased redness or other worsening.  Please go to the emergency room immediately if these occur.

## 2023-11-07 NOTE — PROGRESS NOTES
"Subjective:      Patient ID: Homero Tanner is a 83 y.o. male.    Vitals:  height is 5' 10" (1.778 m) and weight is 75.5 kg (166 lb 7.2 oz). His oral temperature is 97.8 °F (36.6 °C). His blood pressure is 113/61 and his pulse is 82. His respiration is 18 and oxygen saturation is 98%.     Chief Complaint: Leg Pain    Pt is 84 y/o male with hx of metastatic prostate cancer who is returning to clinic for recurrence of large hematoma on his right lower leg. Pt hit his leg on the bottom of a grocery cart on 11/5 and quickly developed hematoma. He as seen in clinic approximately 3 hrs after the incident and hematoma was drained in clinic.  He takes daily aspirin. Per chart review, there was a large amount of blood and minimal clot material at that time and he tolerated procedure well. Wound was dressed with Bactroban, nonstick gauze, and a lightly place Ace wrap, which he was advised to keep on until Monday morning. Pt states he took the dressing off yesterday and the hematoma was there again. Reports it is about the same in size as it was at his original visit. He says he was told he could come back to clinic or go to ED if it came back. He is requesting area be drained again.  He denies any increased pain the site, numbness/tingling, fever/chills, increased lower leg edema, or no trouble walking. Tetanus UTD.     Leg Pain   The incident occurred 3 to 5 days ago. The incident occurred at home. The injury mechanism was a direct blow. The pain is present in the right leg. The pain is at a severity of 0/10. The patient is experiencing no pain. Pertinent negatives include no inability to bear weight, loss of motion, loss of sensation, muscle weakness, numbness or tingling. He reports no foreign bodies present. Nothing aggravates the symptoms. He has tried nothing for the symptoms. The treatment provided no relief.       Constitution: Negative.   Cardiovascular: Negative.    Respiratory: Negative.   "   Gastrointestinal: Negative.    Musculoskeletal: Negative.    Skin:  Positive for wound and bruising.   Neurological:  Negative for numbness and tingling.   Hematologic/Lymphatic: Negative for history of blood clots and history of bleeding disorder.      Objective:     Physical Exam   Constitutional: He appears well-developed.  Non-toxic appearance. He does not appear ill. No distress.   HENT:   Head: Normocephalic and atraumatic.   Ears:   Right Ear: External ear normal.   Left Ear: External ear normal.   Nose: Nose normal.   Eyes: Conjunctivae and EOM are normal.   Neck: Neck supple.   Cardiovascular:      Comments: Edema to bilateral ankles and feet.    Pulmonary/Chest: Effort normal.   Abdominal: Normal appearance.   Musculoskeletal: Normal range of motion.         General: Normal range of motion.   Neurological: no focal deficit. He is alert. He displays no weakness. Gait normal.   Skin: Skin is warm, dry, not diaphoretic, not pale and no rash.        Psychiatric: His behavior is normal.       Assessment:     1. Hematoma of right lower leg        Plan:     Discussed pt with Dr. Patel prior to procedure. Attempted needle aspiration but most of material was coagulated and unable to aspirate with 18G. There was a small skin tear during aspiration attempt, in which some clotted material was expressed from medial portion of hematoma. Given large size of hematoma, I did not attempt to continue manually express remainder of hematoma, as I did not want to have further skin tear or open wound any further.  Bactroban was applied to wound and we did a similar dressing as previous visit with nonstick gauze and pressure dressing with Ace wrap.  Patient was advised to leave dressing on until Thursday.  He was advised to monitor closely and to loosen or adjust wrap if it is too tight. He was given external referral for wound care, as internal referral can take up to 4 weeks before evaluation. Advised to contact [facility]  for possible wound care follow up. Monitor for signs of infection including increased swelling, drainage, increased redness or other worsening symptoms. Patient was advised to go to the emergency room if these occur. Pt voiced understanding.       Hematoma of right lower leg  -     Ambulatory referral/consult to Wound Clinic  -     Incision & Drainage

## 2023-11-07 NOTE — TELEPHONE ENCOUNTER
Courtesy call made.  Spoke with Patient(Self) and he states that he is not doing well. Patient advised to come in to be reevaluated.

## 2023-11-10 ENCOUNTER — TELEPHONE (OUTPATIENT)
Dept: URGENT CARE | Facility: CLINIC | Age: 83
End: 2023-11-10
Payer: MEDICARE

## 2023-11-11 NOTE — TELEPHONE ENCOUNTER
Made courtesy call. Pt states that his leg definitely looks better than it did at his last visit. Pt thanked me for calling and checking on him and expressed how much he appreciated the care that he received at [facility].

## 2023-11-13 ENCOUNTER — TELEPHONE (OUTPATIENT)
Dept: URGENT CARE | Facility: CLINIC | Age: 83
End: 2023-11-13
Payer: MEDICARE

## 2023-11-13 NOTE — TELEPHONE ENCOUNTER
Call to check on patient since his recent visit.  Patient states that the area on his leg seems to be improving.  States the area of bruising has gotten a little smaller in the swelling has decreased as well.  He is continuing to keep the area clean and elevate his leg.  Discussed to continue to monitor and try to follow-up with wound care.

## 2023-11-16 ENCOUNTER — TELEPHONE (OUTPATIENT)
Dept: HEMATOLOGY/ONCOLOGY | Facility: CLINIC | Age: 83
End: 2023-11-16
Payer: MEDICARE

## 2023-11-16 NOTE — TELEPHONE ENCOUNTER
Returned patient phone call and informed his that appointment have been changed. Patient verbalized understanding and had no other issues at this time.

## 2023-11-16 NOTE — TELEPHONE ENCOUNTER
----- Message from Thalia Salazar LPN sent at 11/15/2023  4:19 PM CST -----  Roderick Mesa can you please call Mr Tanner, he is needing to speak with you about his appointment with Dr Sparks, thank you   ----- Message -----  From: Bonita Madrigal LPN  Sent: 11/15/2023   3:55 PM CST  To: Thalia Salazar LPN      ----- Message -----  From: Adele Jacobo  Sent: 11/15/2023   1:17 PM CST  To: Bridger CIFUENTES Staff    Patient is requesting a call back from Thalia. Call back at [phone]

## 2023-11-20 ENCOUNTER — HOSPITAL ENCOUNTER (OUTPATIENT)
Dept: RADIOLOGY | Facility: HOSPITAL | Age: 83
Discharge: HOME | End: 2023-11-20
Attending: INTERNAL MEDICINE
Payer: MEDICARE

## 2023-11-20 DIAGNOSIS — C61 PROSTATE CANCER METASTATIC TO BONE: ICD-10-CM

## 2023-11-20 DIAGNOSIS — C79.51 PROSTATE CANCER METASTATIC TO BONE: ICD-10-CM

## 2023-11-20 DIAGNOSIS — C78.01 MALIGNANT NEOPLASM METASTATIC TO BOTH LUNGS: ICD-10-CM

## 2023-11-20 DIAGNOSIS — C78.02 MALIGNANT NEOPLASM METASTATIC TO BOTH LUNGS: ICD-10-CM

## 2023-11-20 PROCEDURE — A9595 NM PET CT F 18 PYL PSMA, MIDTHIGH TO VERTEX: HCPCS | Mod: TB,HCNC

## 2023-11-20 PROCEDURE — 78815 PET IMAGE W/CT SKULL-THIGH: CPT | Mod: TC,HCNC

## 2023-11-20 PROCEDURE — 78815 NM PET CT F 18 PYL PSMA, MIDTHIGH TO VERTEX: ICD-10-PCS | Mod: 26,PS,HCNC, | Performed by: RADIOLOGY

## 2023-11-20 PROCEDURE — 78815 PET IMAGE W/CT SKULL-THIGH: CPT | Mod: 26,PS,HCNC, | Performed by: RADIOLOGY

## 2023-11-28 ENCOUNTER — OFFICE VISIT (OUTPATIENT)
Dept: HEMATOLOGY/ONCOLOGY | Facility: CLINIC | Age: 83
End: 2023-11-28
Payer: MEDICARE

## 2023-11-28 ENCOUNTER — HOSPITAL ENCOUNTER (EMERGENCY)
Facility: HOSPITAL | Age: 83
Discharge: HOME | End: 2023-11-28
Attending: EMERGENCY MEDICINE
Payer: MEDICARE

## 2023-11-28 VITALS
DIASTOLIC BLOOD PRESSURE: 56 MMHG | OXYGEN SATURATION: 95 % | BODY MASS INDEX: 23.56 KG/M2 | BODY MASS INDEX: 23.6 KG/M2 | DIASTOLIC BLOOD PRESSURE: 72 MMHG | HEIGHT: 70 IN | HEIGHT: 70 IN | WEIGHT: 164.88 LBS | SYSTOLIC BLOOD PRESSURE: 139 MMHG | RESPIRATION RATE: 18 BRPM | HEART RATE: 113 BPM | TEMPERATURE: 98 F | TEMPERATURE: 98 F | HEART RATE: 62 BPM | OXYGEN SATURATION: 97 % | SYSTOLIC BLOOD PRESSURE: 124 MMHG | WEIGHT: 164.56 LBS

## 2023-11-28 DIAGNOSIS — Z79.899 IMMUNODEFICIENCY DUE TO CHEMOTHERAPY: ICD-10-CM

## 2023-11-28 DIAGNOSIS — D84.821 IMMUNODEFICIENCY DUE TO CHEMOTHERAPY: ICD-10-CM

## 2023-11-28 DIAGNOSIS — C78.01 MALIGNANT NEOPLASM METASTATIC TO BOTH LUNGS: ICD-10-CM

## 2023-11-28 DIAGNOSIS — N18.31 CHRONIC KIDNEY DISEASE, STAGE 3A: ICD-10-CM

## 2023-11-28 DIAGNOSIS — C61 PROSTATE CANCER METASTATIC TO BONE: Primary | ICD-10-CM

## 2023-11-28 DIAGNOSIS — T45.1X5A IMMUNODEFICIENCY DUE TO CHEMOTHERAPY: ICD-10-CM

## 2023-11-28 DIAGNOSIS — L97.812 ULCER OF RIGHT SHIN WITH FAT LAYER EXPOSED: ICD-10-CM

## 2023-11-28 DIAGNOSIS — Z51.89 VISIT FOR WOUND CHECK: Primary | ICD-10-CM

## 2023-11-28 DIAGNOSIS — C78.02 MALIGNANT NEOPLASM METASTATIC TO BOTH LUNGS: ICD-10-CM

## 2023-11-28 DIAGNOSIS — C79.51 PROSTATE CANCER METASTATIC TO BONE: Primary | ICD-10-CM

## 2023-11-28 PROCEDURE — 99214 OFFICE O/P EST MOD 30 MIN: CPT | Mod: HCNC,S$GLB,, | Performed by: INTERNAL MEDICINE

## 2023-11-28 PROCEDURE — 99999 PR PBB SHADOW E&M-EST. PATIENT-LVL IV: ICD-10-PCS | Mod: PBBFAC,HCNC,, | Performed by: INTERNAL MEDICINE

## 2023-11-28 PROCEDURE — 3078F DIAST BP <80 MM HG: CPT | Mod: HCNC,CPTII,S$GLB, | Performed by: INTERNAL MEDICINE

## 2023-11-28 PROCEDURE — 99214 PR OFFICE/OUTPT VISIT, EST, LEVL IV, 30-39 MIN: ICD-10-PCS | Mod: HCNC,S$GLB,, | Performed by: INTERNAL MEDICINE

## 2023-11-28 PROCEDURE — 1101F PT FALLS ASSESS-DOCD LE1/YR: CPT | Mod: HCNC,CPTII,S$GLB, | Performed by: INTERNAL MEDICINE

## 2023-11-28 PROCEDURE — 1126F PR PAIN SEVERITY QUANTIFIED, NO PAIN PRESENT: ICD-10-PCS | Mod: HCNC,CPTII,S$GLB, | Performed by: INTERNAL MEDICINE

## 2023-11-28 PROCEDURE — 99999 PR PBB SHADOW E&M-EST. PATIENT-LVL IV: CPT | Mod: PBBFAC,HCNC,, | Performed by: INTERNAL MEDICINE

## 2023-11-28 PROCEDURE — 3078F PR MOST RECENT DIASTOLIC BLOOD PRESSURE < 80 MM HG: ICD-10-PCS | Mod: HCNC,CPTII,S$GLB, | Performed by: INTERNAL MEDICINE

## 2023-11-28 PROCEDURE — 99281 EMR DPT VST MAYX REQ PHY/QHP: CPT | Mod: HCNC

## 2023-11-28 PROCEDURE — 3074F SYST BP LT 130 MM HG: CPT | Mod: HCNC,CPTII,S$GLB, | Performed by: INTERNAL MEDICINE

## 2023-11-28 PROCEDURE — 1126F AMNT PAIN NOTED NONE PRSNT: CPT | Mod: HCNC,CPTII,S$GLB, | Performed by: INTERNAL MEDICINE

## 2023-11-28 PROCEDURE — 3288F PR FALLS RISK ASSESSMENT DOCUMENTED: ICD-10-PCS | Mod: HCNC,CPTII,S$GLB, | Performed by: INTERNAL MEDICINE

## 2023-11-28 PROCEDURE — 1160F PR REVIEW ALL MEDS BY PRESCRIBER/CLIN PHARMACIST DOCUMENTED: ICD-10-PCS | Mod: HCNC,CPTII,S$GLB, | Performed by: INTERNAL MEDICINE

## 2023-11-28 PROCEDURE — 1159F MED LIST DOCD IN RCRD: CPT | Mod: HCNC,CPTII,S$GLB, | Performed by: INTERNAL MEDICINE

## 2023-11-28 PROCEDURE — 3288F FALL RISK ASSESSMENT DOCD: CPT | Mod: HCNC,CPTII,S$GLB, | Performed by: INTERNAL MEDICINE

## 2023-11-28 PROCEDURE — 1101F PR PT FALLS ASSESS DOC 0-1 FALLS W/OUT INJ PAST YR: ICD-10-PCS | Mod: HCNC,CPTII,S$GLB, | Performed by: INTERNAL MEDICINE

## 2023-11-28 PROCEDURE — 1159F PR MEDICATION LIST DOCUMENTED IN MEDICAL RECORD: ICD-10-PCS | Mod: HCNC,CPTII,S$GLB, | Performed by: INTERNAL MEDICINE

## 2023-11-28 PROCEDURE — 1160F RVW MEDS BY RX/DR IN RCRD: CPT | Mod: HCNC,CPTII,S$GLB, | Performed by: INTERNAL MEDICINE

## 2023-11-28 PROCEDURE — 3074F PR MOST RECENT SYSTOLIC BLOOD PRESSURE < 130 MM HG: ICD-10-PCS | Mod: HCNC,CPTII,S$GLB, | Performed by: INTERNAL MEDICINE

## 2023-11-28 NOTE — PROGRESS NOTES
Subjective:       Patient ID: Homero Tanner is a 83 y.o. male.    Chief Complaint: Results, Prostate Cancer, and Chemotherapy    HPI:  83-year-old male history of metastatic prostate carcinoma patient is currently on Zytiga prednisone Lupron.  The patient's returns with repeat PMS a scan as well as PSA demonstrates stable findings on PMS a scan decreasing PSA after addition of Zytiga and prednisone ECOG status 1    Past Medical History:   Diagnosis Date    Abnormal ECG 6/24/2013    Adenomatous rectal polyp 12/7/2015    Aortic insufficiency 6/24/2013    Arthritis     Asthma     as a child    Benign neoplasm of cecum 2/27/2017    Benign neoplasm of transverse colon 2/27/2017    Cataract     CHF (congestive heart failure)     Pt states He's never been told this    Diverticulosis of large intestine without hemorrhage 2/27/2017    Encounter for blood transfusion     Frequent PVCs     Gallstones 3/28/2018    By Us done 3/21/2018    GERD (gastroesophageal reflux disease)     History of colon polyps     Hypertension     Iron deficiency anemia 10/26/2015    Lymphoma     waldenstrom's macroglobulinemia    Mixed hyperlipidemia 6/24/2013    Premature atrial contractions     Prostate cancer     PSVT (paroxysmal supraventricular tachycardia) 6/24/2013    Pulmonary hypertension 6/13/2016    PVC's (premature ventricular contractions) 6/13/2016    Rectal adenocarcinoma     Rectal cancer 5/1/2018    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    left parietal scalp    Thymoma     TR (tricuspid regurgitation) 6/13/2016    Tubular adenoma of colon 06/2020    VT (ventricular tachycardia) 9/18/2018     Family History   Problem Relation Age of Onset    Diabetes Father     Cataracts Mother     Amblyopia Neg Hx     Blindness Neg Hx     Cancer Neg Hx     Glaucoma Neg Hx     Hypertension Neg Hx     Macular degeneration Neg Hx     Retinal detachment Neg Hx     Strabismus Neg Hx     Stroke Neg Hx     Thyroid disease Neg Hx     Melanoma Neg Hx       Social History     Socioeconomic History    Marital status: Single   Tobacco Use    Smoking status: Former     Current packs/day: 0.00     Average packs/day: 1 pack/day for 15.0 years (15.0 ttl pk-yrs)     Types: Cigarettes     Start date: 1954     Quit date: 1969     Years since quittin.8    Smokeless tobacco: Never   Substance and Sexual Activity    Alcohol use: Yes     Alcohol/week: 3.0 standard drinks of alcohol     Types: 3 Cans of beer per week     Comment: socially, NO ALCOHOL 72 HOURS PRIOR TO SX    Drug use: No     Past Surgical History:   Procedure Laterality Date    biopsy for chest mass      BONE MARROW BIOPSY Left 2018    Procedure: Biopsy-bone marrow;  Surgeon: Charanjit Dacosta MD;  Location: [facility];  Service: General;  Laterality: Left;    CATARACT EXTRACTION W/  INTRAOCULAR LENS IMPLANT Right 2019    CATARACT EXTRACTION W/  INTRAOCULAR LENS IMPLANT Left 2019    COLON SURGERY      COLONOSCOPY N/A 10/26/2015    Procedure: Colonoscopy;  Surgeon: Abraham Hong MD;  Location: [facility];  Service: Endoscopy;  Laterality: N/A;    COLONOSCOPY Left 2017    Procedure: COLONOSCOPY;  Surgeon: Abraham Hong MD;  Location: [facility];  Service: Endoscopy;  Laterality: Left;    COLONOSCOPY N/A 2018    Procedure: hixtory of rectal cancer. Colonsocopy asked to be repeated minnie  year, alst one 2017;  Surgeon: Patricio Streeter MD;  Location: [facility];  Service: Endoscopy;  Laterality: N/A;    COLONOSCOPY N/A 2018    Procedure: COLONOSCOPY/hybrid APC or a EndoRotor device;  Surgeon: Rao Medeiros MD;  [facility]  Service: Endoscopy;  Laterality: N/A;    COLONOSCOPY N/A 2020    Procedure: COLONOSCOPY;  Surgeon: Yu Wells MD;  Location: [facility];  Service: Endoscopy;  Laterality: N/A;    ESOPHAGOGASTRODUODENOSCOPY N/A 10/22/2019    Procedure: ESOPHAGOGASTRODUODENOSCOPY (EGD);  Surgeon: Mariela Greer MD;  Location: [facility];   Service: Endoscopy;  Laterality: N/A;    FLEXIBLE SIGMOIDOSCOPY Left 4/23/2019    Procedure: SIGMOIDOSCOPY, FLEXIBLE;  Surgeon: Ranjit Will MD;  Location: [facility];  Service: Endoscopy;  Laterality: Left;    HERNIA REPAIR Right 02/2018    Inguinal, open with mesh    mohs      PROSTATECTOMY      RECTAL SURGERY N/A 09/2014    REPAIR OF SLIDING INGUINAL HERNIA Right 2/26/2019    Procedure: REPAIR, HERNIA, INGUINAL, SLIDING;  Surgeon: Bridger Alvarez MD;  Location: [facility] OR;  Service: General;  Laterality: Right;    SKIN BIOPSY      TONSILLECTOMY      TUMOR REMOVAL         Labs:  Lab Results   Component Value Date    WBC 7.50 11/20/2023    HGB 11.9 (L) 11/20/2023    HCT 35.5 (L) 11/20/2023    MCV 98 11/20/2023     11/20/2023     BMP  Lab Results   Component Value Date     11/20/2023    K 4.4 11/20/2023    CL 94 (L) 11/20/2023    CO2 31 (H) 11/20/2023    BUN 36 (H) 11/20/2023    CREATININE 1.6 (H) 11/20/2023    CALCIUM 9.5 11/20/2023    ANIONGAP 14 11/20/2023    ESTGFRAFRICA 60 03/09/2022    EGFRNONAA 52 (A) 03/09/2022     Lab Results   Component Value Date    ALT 10 11/20/2023    AST 16 11/20/2023     (H) 12/14/2020    ALKPHOS 84 11/20/2023    BILITOT 0.8 11/20/2023       Lab Results   Component Value Date    IRON 49 02/18/2020    TIBC 271 02/18/2020    FERRITIN 349 (H) 02/18/2020     Lab Results   Component Value Date    LOEWBHDK28 508 02/09/2023     Lab Results   Component Value Date    FOLATE 8.8 02/09/2023     Lab Results   Component Value Date    TSH 1.726 03/09/2022         Review of Systems   Constitutional:  Negative for activity change, appetite change, chills, diaphoresis, fatigue, fever and unexpected weight change.   HENT:  Negative for congestion, dental problem, drooling, ear discharge, ear pain, facial swelling, hearing loss, mouth sores, nosebleeds, postnasal drip, rhinorrhea, sinus pressure, sneezing, sore throat, tinnitus, trouble swallowing and voice change.    Eyes:   Negative for photophobia, pain, discharge, redness, itching and visual disturbance.   Respiratory:  Negative for apnea, cough, choking, chest tightness, shortness of breath, wheezing and stridor.    Cardiovascular:  Negative for chest pain, palpitations and leg swelling.   Gastrointestinal:  Negative for abdominal distention, abdominal pain, anal bleeding, blood in stool, constipation, diarrhea, nausea, rectal pain and vomiting.   Endocrine: Negative for cold intolerance, heat intolerance, polydipsia, polyphagia and polyuria.   Genitourinary:  Negative for decreased urine volume, difficulty urinating, dysuria, enuresis, flank pain, frequency, genital sores, hematuria, penile discharge, penile pain, penile swelling, scrotal swelling, testicular pain and urgency.   Musculoskeletal:  Negative for arthralgias, back pain, gait problem, joint swelling, myalgias, neck pain and neck stiffness.   Skin:  Negative for color change, pallor, rash and wound.   Allergic/Immunologic: Negative for environmental allergies, food allergies and immunocompromised state.   Neurological:  Negative for dizziness, tremors, seizures, syncope, facial asymmetry, speech difficulty, weakness, light-headedness, numbness and headaches.   Hematological:  Negative for adenopathy. Does not bruise/bleed easily.   Psychiatric/Behavioral:  Negative for agitation, behavioral problems, confusion, decreased concentration, dysphoric mood, hallucinations, self-injury, sleep disturbance and suicidal ideas. The patient is not nervous/anxious and is not hyperactive.        Objective:      Physical Exam  Vitals reviewed.   Constitutional:       General: He is not in acute distress.     Appearance: He is well-developed. He is not diaphoretic.   HENT:      Head: Normocephalic.      Right Ear: External ear normal.      Left Ear: External ear normal.      Nose: Nose normal.      Right Sinus: No maxillary sinus tenderness or frontal sinus tenderness.      Left Sinus:  No maxillary sinus tenderness or frontal sinus tenderness.      Mouth/Throat:      Pharynx: No oropharyngeal exudate.   Eyes:      General: Lids are normal. No scleral icterus.        Right eye: No discharge.         Left eye: No discharge.      Extraocular Movements:      Right eye: Normal extraocular motion.      Left eye: Normal extraocular motion.      Conjunctiva/sclera:      Right eye: Right conjunctiva is not injected. No hemorrhage.     Left eye: Left conjunctiva is not injected. No hemorrhage.     Pupils: Pupils are equal, round, and reactive to light.   Neck:      Thyroid: No thyromegaly.      Vascular: No JVD.      Trachea: No tracheal deviation.   Cardiovascular:      Rate and Rhythm: Normal rate.   Pulmonary:      Effort: Pulmonary effort is normal. No respiratory distress.      Breath sounds: No stridor.   Abdominal:      General: Bowel sounds are normal.      Palpations: Abdomen is soft. There is no hepatomegaly, splenomegaly or mass.      Tenderness: There is no abdominal tenderness.   Musculoskeletal:         General: No tenderness. Normal range of motion.      Cervical back: Normal range of motion and neck supple.   Lymphadenopathy:      Head:      Right side of head: No posterior auricular or occipital adenopathy.      Left side of head: No posterior auricular or occipital adenopathy.      Cervical: No cervical adenopathy.      Right cervical: No superficial, deep or posterior cervical adenopathy.     Left cervical: No superficial, deep or posterior cervical adenopathy.      Upper Body:      Right upper body: No supraclavicular adenopathy.      Left upper body: No supraclavicular adenopathy.   Skin:     General: Skin is dry.      Findings: No erythema or rash.      Nails: There is no clubbing.   Neurological:      Mental Status: He is alert and oriented to person, place, and time.      Cranial Nerves: No cranial nerve deficit.      Coordination: Coordination normal.   Psychiatric:          Behavior: Behavior normal.         Thought Content: Thought content normal.         Judgment: Judgment normal.             Assessment:      1. Prostate cancer metastatic to bone    2. Malignant neoplasm metastatic to both lungs    3. Immunodeficiency due to chemotherapy    4. Chronic kidney disease, stage 3a           Med Onc Chart Routing      Follow up with physician . Return to clinic to see me in the end of January 2024 with CBC CMP PSA and testosterone prior   Follow up with [facility]    Infusion scheduling note    Injection scheduling note    Labs    Imaging    Pharmacy appointment    Other referrals                   Plan:     Most recent PMS a scan demonstrates stable findings no new findings noted.  At this time would recommend that patient return in the end of January 2024 he is scheduled to be seen by Urology with Lupron injection in early January.  Continue with current dosing of Zytiga prednisone.  Discussed implications answered questions patient        Dustin Sparks Jr, MD FACP

## 2023-11-28 NOTE — ED NOTES
Patient examined, evaluated, and educated on discharge instructions by Dr. Duffy without nursing assistance. Patient discharged to [facility]

## 2023-11-28 NOTE — ED PROVIDER NOTES
SCRIBE #1 NOTE: I, Connor Rubi, am scribing for, and in the presence of, Love Duffy MD. I have scribed the entire note.      History      Chief Complaint   Patient presents with    Wound Check     Patient has nonhealing wound to RLE with edema, had two hematomas that were drained at Urgent Care about a month ago. Patient currently a cancer patient and was instructed to come to ED.       Review of patient's allergies indicates:  No Known Allergies     HPI   HPI    11/28/2023, 9:02 AM   History obtained from the patient      History of Present Illness: Homero Tanner is a 83 y.o. male patient with a PMHx of CHF, HTN, metastatic prostate cancer who presents to the Emergency Department for RLE wound check. Pt states that he injured his RLE after hitting it against a grocery cart 1 month ago, causing a hematoma to develop. Pt had the hematoma drained twice at urgent care, but states that the wound site has since worsened. Symptoms are constant and moderate in severity. No mitigating or exacerbating factors reported. No associated sxs reported. Patient denies any fever, chills, n/v/d, SOB, CP, weakness, numbness, dizziness, headache, and all other sxs at this time. Pt currently follows with Dr. Sparks (Hem/Onc), but does not currently follow with Wound Care. Pt states that he has been compliant with his home medications, including Lasix. No further complaints or concerns at this time.     Arrival mode: Personal vehicle    PCP: Solomon Cortés MD       Past Medical History:  Past Medical History:   Diagnosis Date    Abnormal ECG 6/24/2013    Adenomatous rectal polyp 12/7/2015    Aortic insufficiency 6/24/2013    Arthritis     Asthma     as a child    Benign neoplasm of cecum 2/27/2017    Benign neoplasm of transverse colon 2/27/2017    Cataract     CHF (congestive heart failure)     Pt states He's never been told this    Diverticulosis of large intestine without hemorrhage 2/27/2017    Encounter for blood  transfusion     Frequent PVCs     Gallstones 3/28/2018    By Us done 3/21/2018    GERD (gastroesophageal reflux disease)     History of colon polyps     Hypertension     Iron deficiency anemia 10/26/2015    Lymphoma     waldenstrom's macroglobulinemia    Mixed hyperlipidemia 6/24/2013    Premature atrial contractions     Prostate cancer     PSVT (paroxysmal supraventricular tachycardia) 6/24/2013    Pulmonary hypertension 6/13/2016    PVC's (premature ventricular contractions) 6/13/2016    Rectal adenocarcinoma     Rectal cancer 5/1/2018    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    left parietal scalp    Thymoma     TR (tricuspid regurgitation) 6/13/2016    Tubular adenoma of colon 06/2020    VT (ventricular tachycardia) 9/18/2018       Past Surgical History:  Past Surgical History:   Procedure Laterality Date    biopsy for chest mass      BONE MARROW BIOPSY Left 7/11/2018    Procedure: Biopsy-bone marrow;  Surgeon: Charanjit Dacosta MD;  Location: [facility];  Service: General;  Laterality: Left;    CATARACT EXTRACTION W/  INTRAOCULAR LENS IMPLANT Right 01/03/2019    CATARACT EXTRACTION W/  INTRAOCULAR LENS IMPLANT Left 02/07/2019    COLON SURGERY      COLONOSCOPY N/A 10/26/2015    Procedure: Colonoscopy;  Surgeon: Abraham Hong MD;  Location: [facility];  Service: Endoscopy;  Laterality: N/A;    COLONOSCOPY Left 2/27/2017    Procedure: COLONOSCOPY;  Surgeon: Abraham Hong MD;  Location: [facility];  Service: Endoscopy;  Laterality: Left;    COLONOSCOPY N/A 5/1/2018    Procedure: hixtory of rectal cancer. Colonsocopy asked to be repeated minnie  year, alst one 2/2017;  Surgeon: Patricio Streeter MD;  Location: [facility];  Service: Endoscopy;  Laterality: N/A;    COLONOSCOPY N/A 6/27/2018    Procedure: COLONOSCOPY/hybrid APC or a EndoRotor device;  Surgeon: Rao Medeiros MD;  Location: [facility] ([address] [place] [address]);  Service: Endoscopy;  Laterality: N/A;    COLONOSCOPY N/A 6/22/2020    Procedure: COLONOSCOPY;  Surgeon: Yu  CARISA Wells MD;  Location: [facility] ENDO;  Service: Endoscopy;  Laterality: N/A;    ESOPHAGOGASTRODUODENOSCOPY N/A 10/22/2019    Procedure: ESOPHAGOGASTRODUODENOSCOPY (EGD);  Surgeon: Mariela Greer MD;  Location: [facility];  Service: Endoscopy;  Laterality: N/A;    FLEXIBLE SIGMOIDOSCOPY Left 2019    Procedure: SIGMOIDOSCOPY, FLEXIBLE;  Surgeon: Ranjit Will MD;  Location: [facility] ENDO;  Service: Endoscopy;  Laterality: Left;    HERNIA REPAIR Right 2018    Inguinal, open with mesh    mohs      PROSTATECTOMY      RECTAL SURGERY N/A 2014    REPAIR OF SLIDING INGUINAL HERNIA Right 2019    Procedure: REPAIR, HERNIA, INGUINAL, SLIDING;  Surgeon: Bridger Alvarez MD;  Location: [facility] OR;  Service: General;  Laterality: Right;    SKIN BIOPSY      TONSILLECTOMY      TUMOR REMOVAL           Family History:  Family History   Problem Relation Age of Onset    Diabetes Father     Cataracts Mother     Amblyopia Neg Hx     Blindness Neg Hx     Cancer Neg Hx     Glaucoma Neg Hx     Hypertension Neg Hx     Macular degeneration Neg Hx     Retinal detachment Neg Hx     Strabismus Neg Hx     Stroke Neg Hx     Thyroid disease Neg Hx     Melanoma Neg Hx        Social History:  Social History     Tobacco Use    Smoking status: Former     Current packs/day: 0.00     Average packs/day: 1 pack/day for 15.0 years (15.0 ttl pk-yrs)     Types: Cigarettes     Start date: 1954     Quit date: 1969     Years since quittin.8    Smokeless tobacco: Never   Substance and Sexual Activity    Alcohol use: Yes     Alcohol/week: 3.0 standard drinks of alcohol     Types: 3 Cans of beer per week     Comment: socially, NO ALCOHOL 72 HOURS PRIOR TO SX    Drug use: No    Sexual activity: Not on file       ROS   Review of Systems   Constitutional:  Negative for chills and fever.   HENT:  Negative for sore throat.    Respiratory:  Negative for shortness of breath.    Cardiovascular:  Negative for chest pain.   Gastrointestinal:   "Negative for diarrhea, nausea and vomiting.   Genitourinary:  Negative for dysuria.   Musculoskeletal:  Negative for back pain.   Skin:  Positive for wound (RLE). Negative for rash.   Neurological:  Negative for dizziness, weakness, numbness and headaches.   Hematological:  Does not bruise/bleed easily.   All other systems reviewed and are negative.    Physical Exam      Initial Vitals [11/28/23 0838]   BP Pulse Resp Temp SpO2   (!) 139/56 (!) 113 18 98.2 °F (36.8 °C) 97 %      MAP       --          Physical Exam  Nursing Notes and Vital Signs Reviewed.  Constitutional: Patient is in no acute distress. Well-developed and well-nourished.  Head: Atraumatic. Normocephalic.  Eyes: PERRL. EOM intact. Conjunctivae are not pale. No scleral icterus.  ENT: Mucous membranes are moist. Oropharynx is clear and symmetric.    Neck: Supple. Full ROM. No lymphadenopathy.  Cardiovascular: Tachycardic. Regular rhythm. No murmurs, rubs, or gallops. Distal pulses are 2+ and symmetric.  Pulmonary/Chest: No respiratory distress. Clear to auscultation bilaterally. No wheezing or rales.  Abdominal: Soft and non-distended.  There is no tenderness.  No rebound, guarding, or rigidity.   Musculoskeletal: Moves all extremities. No obvious deformities. 1+ BLE edema.  RLE: Large open wound to R shin. No drainage, no evidence of infection. ROM is normal. Cap refill distally is <2 seconds. DP and PT pulses are equal and 2+ bilaterally. No motor deficit. No distal sensory deficit.  Skin: Warm and dry.  Neurological:  Alert, awake, and appropriate.  Normal speech.  No acute focal neurological deficits are appreciated.  Psychiatric: Normal affect. Good eye contact. Appropriate in content.        ED Course    Procedures  ED Vital Signs:  Vitals:    11/28/23 0838   BP: (!) 139/56   Pulse: (!) 113   Resp: 18   Temp: 98.2 °F (36.8 °C)   TempSrc: Oral   SpO2: 97%   Weight: 74.6 kg (164 lb 9.2 oz)   Height: 5' 10" (1.778 m)       Abnormal Lab Results:  Labs " Reviewed - No data to display     Imaging Results:  Imaging Results    None                 The Emergency Provider reviewed the vital signs and test results, which are outlined above.    ED Discussion     9:06 AM: Reassessed pt at this time. Discussed with pt all pertinent ED information. Discussed pt dx and plan of tx. Gave pt all f/u and return to the ED instructions. All questions and concerns were addressed at this time. Pt expresses understanding of information and instructions, and is comfortable with plan to discharge. Pt is stable for discharge.    I discussed with patient and/or family/caretaker that evaluation in the ED does not suggest any emergent or life threatening medical conditions requiring immediate intervention beyond what was provided in the ED, and I believe patient is safe for discharge.  Regardless, an unremarkable evaluation in the ED does not preclude the development or presence of a serious of life threatening condition. As such, patient was instructed to return immediately for any worsening or change in current symptoms.         ED Medication(s):  Medications - No data to display     Follow-up Information       [facility] Schedule an appointment as soon as possible for a visit in 2 days.    Specialty: Wound Care  Why: Return to the Emergency Room, If symptoms worsen  Contact information:  [address] [facility] [id number] [phone]                         Discharge Medication List as of 11/28/2023  9:05 AM            Medical Decision Making    Medical Decision Making  Patient presents for further evaluation of a chronic wound to his left shin after he developed a traumatic hematoma several weeks ago and then had the hematoma drained at urgent care.  No systemic complaints.  Wound as noted, is not infected appearing, possibility of lab work and imaging considered but wound is not infectious looking, vital signs are stable, no concerns for  sepsis or blood loss.  He will be referred to wound care outpatient, stable for discharge.     Amount and/or Complexity of Data Reviewed  External Data Reviewed: notes.     Details: Urgent care notes                Scribe Attestation:   Scribe #1: I performed the above scribed service and the documentation accurately describes the services I performed. I attest to the accuracy of the note.    Attending:   Physician Attestation Statement for Scribe #1: I, Love Duffy MD, personally performed the services described in this documentation, as scribed by Connor Rubi, in my presence, and it is both accurate and complete.          Clinical Impression       ICD-10-CM ICD-9-CM   1. Visit for wound check  Z51.89 V58.89   2. Ulcer of right shin with fat layer exposed  L97.812 707.19       Disposition:   Disposition: Discharged  Condition: Stable         Love Duffy MD  11/29/23 1630

## 2024-01-05 ENCOUNTER — OFFICE VISIT (OUTPATIENT)
Dept: URGENT CARE | Facility: CLINIC | Age: 84
End: 2024-01-05
Payer: MEDICARE

## 2024-01-05 VITALS
HEIGHT: 70 IN | WEIGHT: 164 LBS | TEMPERATURE: 98 F | DIASTOLIC BLOOD PRESSURE: 90 MMHG | SYSTOLIC BLOOD PRESSURE: 136 MMHG | RESPIRATION RATE: 15 BRPM | HEART RATE: 71 BPM | BODY MASS INDEX: 23.48 KG/M2 | OXYGEN SATURATION: 97 %

## 2024-01-05 DIAGNOSIS — S81.801D LEG WOUND, RIGHT, SUBSEQUENT ENCOUNTER: Primary | ICD-10-CM

## 2024-01-05 DIAGNOSIS — S81.802A AVULSION OF SKIN OF LEFT LOWER LEG, INITIAL ENCOUNTER: ICD-10-CM

## 2024-01-05 DIAGNOSIS — S81.812A NONINFECTED SKIN TEAR OF LEFT LOWER EXTREMITY, INITIAL ENCOUNTER: ICD-10-CM

## 2024-01-05 PROCEDURE — 99213 OFFICE O/P EST LOW 20 MIN: CPT | Mod: S$GLB,,, | Performed by: NURSE PRACTITIONER

## 2024-01-05 NOTE — PROGRESS NOTES
"Subjective:      Patient ID: Homero Tanner is a 83 y.o. male.    Vitals:  height is 5' 10" (1.778 m) and weight is 74.4 kg (164 lb). His oral temperature is 98.1 °F (36.7 °C). His blood pressure is 136/90 (abnormal) and his pulse is 71. His respiration is 15 and oxygen saturation is 97%.     Chief Complaint: Suture / Staple Removal (Left lower leg)    Homero Tanner is a 83 year old male whom presents to the clinic today for suture removal. Patient states  he had sutures placed [place] [facility] [place]/24/2023 after a laceration that occurred when he hit his left lower leg on a piece of furniture at his daughter's house. Patient reports he originally went to an urgent care and the laceration was sutured in a "shoe string" fashion. Patient report by the time he returned to his daughters house the would began bleeding again which resulted in him going to the ED. He reports sutures were placed in between the sutures that were already in place with steri stripes on top.Patient would also like his wound on his right lower leg evaluated. Patient reports he has been getting the run around with wound care.     Suture / Staple Removal  The sutures were placed 7 to 10 days ago. He tried nothing since the wound repair. The treatment provided mild relief. There has been no drainage from the wound. There is no pain present. He has no difficulty moving the affected extremity or digit.       Skin:  Positive for color change, wound, laceration and erythema.      Objective:     Physical Exam   HENT:   Head: Normocephalic.   Nose: Nose normal.   Abdominal: Normal appearance.   Musculoskeletal: Normal range of motion.         General: Normal range of motion.      Right lower leg: Edema present.      Left lower leg: Edema present.   Neurological: He is alert.   Skin: Skin is warm. bruising and erythema        Nursing note and vitals reviewed.                Assessment:     1. Leg wound, right, " subsequent encounter    2. Avulsion of skin of left lower leg, initial encounter    3. Noninfected skin tear of left lower extremity, initial encounter        Plan:       Leg wound, right, subsequent encounter    Avulsion of skin of left lower leg, initial encounter    Noninfected skin tear of left lower extremity, initial encounter          Medical Decision Making:   History:   Old Records Summarized: records from clinic visits.       <> Summary of Records: 11/4/23 Patient presented to urgent care for evaluation of a large hematoma on his right lower leg as a result of getting hit by a grocery cart. Needle aspiration of the hematoma occurred at this time.     11/7/23 Patient presents to urgent care for re-evaluation of his right lower leg.  Hematoma had returned at this time. At this time there was an usuccesful attempt at aspirating the hematoma due to the material inside the hematoma being coagulate.  Patient was referred to wound care at this time.   11/28/23 Patient presents to the ED for evaluation of the wound to his right lower leg.  He was referred to wound care at this time.     12/24/23 Laceration to left lower extremity was repaired at [facility] in [place] with sutures and steri stripes.   Urgent Care Management:  Previous encounters and labs were independently reviewed. Discussed with patient  all pertinent information and results. Referral placed to  [facility] wound care called and got patient scheduled for 01/08/24. Decision was made to not remove steri strips and sutures at this time given the edges are not completely approximated and the  wound is still bleeding. Bilateral wounds were dressed loosely with non stick gauze and ace wrap. Discussed patient diagnosis and plan of treatment. Patient verbalized understanding and agrees with the discussed plan of care. Additional plan of care as outlined above. Patient  was given all follow up and return instructions. All questions and  concerns were addressed at this time. Patient  expresses understanding of information and instructions, and is comfortable with plan.    Patient was instructed to follow up with wound care as scheduled or go to ED immediately for any worsening or change in current symptoms.             Patient Instructions    Please follow up with Wound care as discussed.  Keep areas clean and dry, cover while out and about    [facility] Location  [id number]  Appointment Monday 01/08/24 900 AM.   Please arrange follow up with your primary medical clinic as soon as possible. You must understand that you've received an Urgent Care treatment only and that you may be released before all of your medical problems are known or treated. You, the patient, will arrange for follow up as instructed. If your symptoms worsen or fail to improve you should go to the Emergency Room.

## 2024-01-05 NOTE — PATIENT INSTRUCTIONS
Please follow up with Wound care as discussed.  Keep areas clean and dry, cover while out and about    [facility] Location  [phone]  Appointment Monday 01/08/24 900 AM.   Please arrange follow up with your primary medical clinic as soon as possible. You must understand that you've received an Urgent Care treatment only and that you may be released before all of your medical problems are known or treated. You, the patient, will arrange for follow up as instructed. If your symptoms worsen or fail to improve you should go to the Emergency Room.

## 2024-01-15 ENCOUNTER — HOSPITAL ENCOUNTER (EMERGENCY)
Facility: HOSPITAL | Age: 84
Discharge: HOME | End: 2024-01-15
Attending: EMERGENCY MEDICINE
Payer: MEDICARE

## 2024-01-15 VITALS
HEART RATE: 76 BPM | TEMPERATURE: 98 F | WEIGHT: 165.81 LBS | BODY MASS INDEX: 23.79 KG/M2 | SYSTOLIC BLOOD PRESSURE: 113 MMHG | OXYGEN SATURATION: 95 % | RESPIRATION RATE: 14 BRPM | DIASTOLIC BLOOD PRESSURE: 65 MMHG

## 2024-01-15 DIAGNOSIS — R00.2 PALPITATIONS: ICD-10-CM

## 2024-01-15 DIAGNOSIS — M79.672 LEFT FOOT PAIN: ICD-10-CM

## 2024-01-15 DIAGNOSIS — M79.89 LEG SWELLING: Primary | ICD-10-CM

## 2024-01-15 DIAGNOSIS — M79.605 LEFT LEG PAIN: ICD-10-CM

## 2024-01-15 LAB
ALBUMIN SERPL BCP-MCNC: 3.7 G/DL (ref 3.5–5.2)
ALP SERPL-CCNC: 109 U/L (ref 55–135)
ALT SERPL W/O P-5'-P-CCNC: 11 U/L (ref 10–44)
ANION GAP SERPL CALC-SCNC: 15 MMOL/L (ref 8–16)
ANISOCYTOSIS BLD QL SMEAR: SLIGHT
AST SERPL-CCNC: 15 U/L (ref 10–40)
BASOPHILS # BLD AUTO: 0.03 K/UL (ref 0–0.2)
BASOPHILS NFR BLD: 0.3 % (ref 0–1.9)
BILIRUB SERPL-MCNC: 0.9 MG/DL (ref 0.1–1)
BILIRUB UR QL STRIP: NEGATIVE
BUN SERPL-MCNC: 31 MG/DL (ref 8–23)
BURR CELLS BLD QL SMEAR: ABNORMAL
CALCIUM SERPL-MCNC: 9.2 MG/DL (ref 8.7–10.5)
CHLORIDE SERPL-SCNC: 98 MMOL/L (ref 95–110)
CLARITY UR: CLEAR
CO2 SERPL-SCNC: 25 MMOL/L (ref 23–29)
COLOR UR: COLORLESS
CREAT SERPL-MCNC: 1.8 MG/DL (ref 0.5–1.4)
DACRYOCYTES BLD QL SMEAR: ABNORMAL
DIFFERENTIAL METHOD BLD: ABNORMAL
EOSINOPHIL # BLD AUTO: 0.1 K/UL (ref 0–0.5)
EOSINOPHIL NFR BLD: 0.6 % (ref 0–8)
ERYTHROCYTE [DISTWIDTH] IN BLOOD BY AUTOMATED COUNT: 13.1 % (ref 11.5–14.5)
EST. GFR  (NO RACE VARIABLE): 37 ML/MIN/1.73 M^2
GLUCOSE SERPL-MCNC: 119 MG/DL (ref 70–110)
GLUCOSE UR QL STRIP: NEGATIVE
HCT VFR BLD AUTO: 35.6 % (ref 40–54)
HGB BLD-MCNC: 11.7 G/DL (ref 14–18)
HGB UR QL STRIP: NEGATIVE
IMM GRANULOCYTES # BLD AUTO: 0.09 K/UL (ref 0–0.04)
IMM GRANULOCYTES NFR BLD AUTO: 0.9 % (ref 0–0.5)
KETONES UR QL STRIP: NEGATIVE
LACTATE SERPL-SCNC: 0.9 MMOL/L (ref 0.5–2.2)
LACTATE SERPL-SCNC: 3.4 MMOL/L (ref 0.5–2.2)
LEUKOCYTE ESTERASE UR QL STRIP: NEGATIVE
LYMPHOCYTES # BLD AUTO: 1.2 K/UL (ref 1–4.8)
LYMPHOCYTES NFR BLD: 11.8 % (ref 18–48)
MCH RBC QN AUTO: 31.6 PG (ref 27–31)
MCHC RBC AUTO-ENTMCNC: 32.9 G/DL (ref 32–36)
MCV RBC AUTO: 96 FL (ref 82–98)
MONOCYTES # BLD AUTO: 0.7 K/UL (ref 0.3–1)
MONOCYTES NFR BLD: 6.7 % (ref 4–15)
NEUTROPHILS # BLD AUTO: 8.3 K/UL (ref 1.8–7.7)
NEUTROPHILS NFR BLD: 79.7 % (ref 38–73)
NITRITE UR QL STRIP: NEGATIVE
NRBC BLD-RTO: 0 /100 WBC
PH UR STRIP: 7 [PH] (ref 5–8)
PLATELET # BLD AUTO: 264 K/UL (ref 150–450)
PLATELET BLD QL SMEAR: ABNORMAL
PMV BLD AUTO: 9.5 FL (ref 9.2–12.9)
POIKILOCYTOSIS BLD QL SMEAR: ABNORMAL
POTASSIUM SERPL-SCNC: 4 MMOL/L (ref 3.5–5.1)
PROCALCITONIN SERPL IA-MCNC: 0.07 NG/ML
PROT SERPL-MCNC: 7 G/DL (ref 6–8.4)
PROT UR QL STRIP: NEGATIVE
RBC # BLD AUTO: 3.7 M/UL (ref 4.6–6.2)
SODIUM SERPL-SCNC: 138 MMOL/L (ref 136–145)
SP GR UR STRIP: 1.01 (ref 1–1.03)
URN SPEC COLLECT METH UR: ABNORMAL
UROBILINOGEN UR STRIP-ACNC: NEGATIVE EU/DL
WBC # BLD AUTO: 10.45 K/UL (ref 3.9–12.7)

## 2024-01-15 PROCEDURE — 63600175 PHARM REV CODE 636 W HCPCS: Mod: HCNC | Performed by: EMERGENCY MEDICINE

## 2024-01-15 PROCEDURE — 87040 BLOOD CULTURE FOR BACTERIA: CPT | Mod: HCNC | Performed by: NURSE PRACTITIONER

## 2024-01-15 PROCEDURE — 93005 ELECTROCARDIOGRAM TRACING: CPT | Mod: HCNC

## 2024-01-15 PROCEDURE — 83605 ASSAY OF LACTIC ACID: CPT | Mod: 91,HCNC | Performed by: EMERGENCY MEDICINE

## 2024-01-15 PROCEDURE — 80053 COMPREHEN METABOLIC PANEL: CPT | Mod: HCNC | Performed by: NURSE PRACTITIONER

## 2024-01-15 PROCEDURE — 99285 EMERGENCY DEPT VISIT HI MDM: CPT | Mod: 25,HCNC

## 2024-01-15 PROCEDURE — 81003 URINALYSIS AUTO W/O SCOPE: CPT | Mod: HCNC | Performed by: NURSE PRACTITIONER

## 2024-01-15 PROCEDURE — 83605 ASSAY OF LACTIC ACID: CPT | Mod: HCNC | Performed by: NURSE PRACTITIONER

## 2024-01-15 PROCEDURE — 96365 THER/PROPH/DIAG IV INF INIT: CPT | Mod: HCNC

## 2024-01-15 PROCEDURE — 25000003 PHARM REV CODE 250: Mod: HCNC | Performed by: EMERGENCY MEDICINE

## 2024-01-15 PROCEDURE — 84145 PROCALCITONIN (PCT): CPT | Mod: HCNC | Performed by: NURSE PRACTITIONER

## 2024-01-15 PROCEDURE — 93010 ELECTROCARDIOGRAM REPORT: CPT | Mod: HCNC,,, | Performed by: INTERNAL MEDICINE

## 2024-01-15 PROCEDURE — 85025 COMPLETE CBC W/AUTO DIFF WBC: CPT | Mod: HCNC | Performed by: NURSE PRACTITIONER

## 2024-01-15 PROCEDURE — 96366 THER/PROPH/DIAG IV INF ADDON: CPT | Mod: HCNC

## 2024-01-15 RX ORDER — CLINDAMYCIN HYDROCHLORIDE 300 MG/1
300 CAPSULE ORAL 3 TIMES DAILY
Qty: 21 CAPSULE | Refills: 0 | Status: SHIPPED | OUTPATIENT
Start: 2024-01-15 | End: 2024-01-22

## 2024-01-15 RX ADMIN — VANCOMYCIN HYDROCHLORIDE 1500 MG: 1.5 INJECTION, POWDER, LYOPHILIZED, FOR SOLUTION INTRAVENOUS at 12:01

## 2024-01-15 RX ADMIN — SODIUM CHLORIDE 1000 ML: 9 INJECTION, SOLUTION INTRAVENOUS at 12:01

## 2024-01-15 NOTE — ED PROVIDER NOTES
SCRIBE #1 NOTE: I, Jett Turner, am scribing for, and in the presence of, Jackie Valdivia MD. I have scribed the entire note.       History     Chief Complaint   Patient presents with    Leg Swelling     Sent for further evaluation of new onset LLE pitting edema and cool toes by [facility] advance wound care.  Currently receiving tx for prostate cancer, is immunocompromised.     Review of patient's allergies indicates:  No Known Allergies      History of Present Illness     HPI    1/15/2024, 12:08 PM  History obtained from the patient      History of Present Illness: Homero Tanner is a 83 y.o. male patient with a PMHx of CHF, diverticulosis, A-fib, PACs, PVCs, GERD, HTN, anemia, HLD, PSVT, rectal cancer, prostate cancer, and ventricular tachycardia who presents to the Emergency Department for evaluation of LLE swelling which onset gradually. He was sent to the ED by wound care after visiting him today. 12/23/23, pt lacerated his LLE and went to urgent care for a lac repair. The next day, the injury started bleeding, and he went to the ED, where they added a second set of sutures. Wound care removed the first set of stitches 10 days ago and were supposed to remove the second today, but they sent pt to the ED after noticing a new onset of LLE swelling. Pt states that there has always been swelling, but it has never been this bad. Associated sxs include LLE ecchymosis which pt notes onset after the injury. Patient denies any CP, SOB, HA, fever, weakness, and all other sxs at this time. No prior Tx. Pt is currently taking medication for his prostate CA. No further complaints or concerns at this time.       Arrival mode: Personal vehicle    PCP: Solomon Cortés MD        Past Medical History:  Past Medical History:   Diagnosis Date    Abnormal ECG 6/24/2013    Adenomatous rectal polyp 12/7/2015    Aortic insufficiency 6/24/2013    Arthritis     Asthma     as a child    Benign neoplasm of cecum 2/27/2017     Benign neoplasm of transverse colon 2/27/2017    Cataract     CHF (congestive heart failure)     Pt states He's never been told this    Diverticulosis of large intestine without hemorrhage 2/27/2017    Encounter for blood transfusion     Frequent PVCs     Gallstones 3/28/2018    By Us done 3/21/2018    GERD (gastroesophageal reflux disease)     History of colon polyps     Hypertension     Iron deficiency anemia 10/26/2015    Lymphoma     waldenstrom's macroglobulinemia    Mixed hyperlipidemia 6/24/2013    Premature atrial contractions     Prostate cancer     PSVT (paroxysmal supraventricular tachycardia) 6/24/2013    Pulmonary hypertension 6/13/2016    PVC's (premature ventricular contractions) 6/13/2016    Rectal adenocarcinoma     Rectal cancer 5/1/2018    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    left parietal scalp    Thymoma     TR (tricuspid regurgitation) 6/13/2016    Tubular adenoma of colon 06/2020    VT (ventricular tachycardia) 9/18/2018       Past Surgical History:  Past Surgical History:   Procedure Laterality Date    biopsy for chest mass      BONE MARROW BIOPSY Left 7/11/2018    Procedure: Biopsy-bone marrow;  Surgeon: Charanjit Dacosta MD;  Location: [facility];  Service: General;  Laterality: Left;    CATARACT EXTRACTION W/  INTRAOCULAR LENS IMPLANT Right 01/03/2019    CATARACT EXTRACTION W/  INTRAOCULAR LENS IMPLANT Left 02/07/2019    COLON SURGERY      COLONOSCOPY N/A 10/26/2015    Procedure: Colonoscopy;  Surgeon: Abraham Hong MD;  Location: [facility];  Service: Endoscopy;  Laterality: N/A;    COLONOSCOPY Left 2/27/2017    Procedure: COLONOSCOPY;  Surgeon: Abraham Hong MD;  Location: [facility];  Service: Endoscopy;  Laterality: Left;    COLONOSCOPY N/A 5/1/2018    Procedure: hixtory of rectal cancer. Colonsocopy asked to be repeated minnie  year, alst one 2/2017;  Surgeon: Patricio Streeter MD;  Location: [facility];  Service: Endoscopy;  Laterality: N/A;    COLONOSCOPY N/A 6/27/2018     Procedure: COLONOSCOPY/hybrid APC or a EndoRotor device;  Surgeon: Rao Medeiros MD;  Location: [facility] ENDO ([address] [place] [address]);  Service: Endoscopy;  Laterality: N/A;    COLONOSCOPY N/A 2020    Procedure: COLONOSCOPY;  Surgeon: Yu Wells MD;  Location: [facility] ENDO;  Service: Endoscopy;  Laterality: N/A;    ESOPHAGOGASTRODUODENOSCOPY N/A 10/22/2019    Procedure: ESOPHAGOGASTRODUODENOSCOPY (EGD);  Surgeon: Mariela Greer MD;  Location: [facility];  Service: Endoscopy;  Laterality: N/A;    FLEXIBLE SIGMOIDOSCOPY Left 2019    Procedure: SIGMOIDOSCOPY, FLEXIBLE;  Surgeon: Ranjit Will MD;  Location: [facility];  Service: Endoscopy;  Laterality: Left;    HERNIA REPAIR Right 2018    Inguinal, open with mesh    mohs      PROSTATECTOMY      RECTAL SURGERY N/A 2014    REPAIR OF SLIDING INGUINAL HERNIA Right 2019    Procedure: REPAIR, HERNIA, INGUINAL, SLIDING;  Surgeon: Bridger Alvarez MD;  Location: [facility] OR;  Service: General;  Laterality: Right;    SKIN BIOPSY      TONSILLECTOMY      TUMOR REMOVAL           Family History:  Family History   Problem Relation Age of Onset    Diabetes Father     Cataracts Mother     Amblyopia Neg Hx     Blindness Neg Hx     Cancer Neg Hx     Glaucoma Neg Hx     Hypertension Neg Hx     Macular degeneration Neg Hx     Retinal detachment Neg Hx     Strabismus Neg Hx     Stroke Neg Hx     Thyroid disease Neg Hx     Melanoma Neg Hx        Social History:  Social History     Tobacco Use    Smoking status: Former     Current packs/day: 0.00     Average packs/day: 1 pack/day for 15.0 years (15.0 ttl pk-yrs)     Types: Cigarettes     Start date: 1954     Quit date: 1969     Years since quittin.9    Smokeless tobacco: Never   Substance and Sexual Activity    Alcohol use: Yes     Alcohol/week: 3.0 standard drinks of alcohol     Types: 3 Cans of beer per week     Comment: socially, NO ALCOHOL 72 HOURS PRIOR TO SX    Drug use: No    Sexual activity: Not on file         Review of Systems     Review of Systems   Constitutional:  Negative for fever.   HENT:  Negative for sore throat.    Respiratory:  Negative for shortness of breath.    Cardiovascular:  Positive for leg swelling (LLE). Negative for chest pain.   Gastrointestinal:  Negative for nausea.   Genitourinary:  Negative for dysuria.   Musculoskeletal:  Negative for back pain.   Skin:  Positive for color change (LLE ecchymosis) and wound (LLE). Negative for rash.   Neurological:  Negative for weakness and headaches.   Hematological:  Does not bruise/bleed easily.   All other systems reviewed and are negative.     Physical Exam   Open but no cellulitis  Initial Vitals [01/15/24 1103]   BP Pulse Resp Temp SpO2   117/64 68 18 97.7 °F (36.5 °C) 98 %      MAP       --          Physical Exam  Nursing Notes and Vital Signs Reviewed.  Constitutional: Patient is in no acute distress. Well-developed and well-nourished.  Head: Atraumatic. Normocephalic.  Eyes: PERRL. EOM intact. Conjunctivae are not pale. No scleral icterus.  ENT: Mucous membranes are moist. Oropharynx is clear and symmetric.    Neck: Supple. Full ROM. No lymphadenopathy.  Cardiovascular: Regular rate. Regular rhythm. No murmurs, rubs, or gallops. Not able to palpate DP pulses d/t swelling.   Pulmonary/Chest: No respiratory distress. Clear to auscultation bilaterally. No wheezing or rales.  Abdominal: Soft and non-distended.  There is no tenderness.  No rebound, guarding, or rigidity. Good bowel sounds.  Genitourinary: No CVA tenderness  Musculoskeletal: Moves all extremities. No obvious deformities. LLE swelling. 4+ pitting edema to L ankle. No calf tenderness.  Skin: L lateral leg open wound. No obvious pus or cellulitis or crepitus. Some ecchymotic areas surrounding toe and L lateral ankle areas.  Right leg with a healing ulcerated area which no cellulitis or pus drainage  Neurological:  Alert, awake, and appropriate.  Normal speech.  No acute focal  neurological deficits are appreciated.  Psychiatric: Normal affect. Good eye contact. Appropriate in content.    R leg      L lateral leg     ED Course   Procedures  ED Vital Signs:  Vitals:    01/15/24 1103 01/15/24 1302 01/15/24 1502   BP: 117/64 (!) 119/56 113/65   Pulse: 68 79 76   Resp: 18 (!) 21 14   Temp: 97.7 °F (36.5 °C)     TempSrc: Oral     SpO2: 98%  95%   Weight: 75.2 kg (165 lb 12.6 oz)         Abnormal Lab Results:  Labs Reviewed   CBC W/ AUTO DIFFERENTIAL - Abnormal; Notable for the following components:       Result Value    RBC 3.70 (*)     Hemoglobin 11.7 (*)     Hematocrit 35.6 (*)     MCH 31.6 (*)     Immature Granulocytes 0.9 (*)     Gran # (ANC) 8.3 (*)     Immature Grans (Abs) 0.09 (*)     Gran % 79.7 (*)     Lymph % 11.8 (*)     All other components within normal limits   COMPREHENSIVE METABOLIC PANEL - Abnormal; Notable for the following components:    Glucose 119 (*)     BUN 31 (*)     Creatinine 1.8 (*)     eGFR 37 (*)     All other components within normal limits   LACTIC ACID, PLASMA - Abnormal; Notable for the following components:    Lactate (Lactic Acid) 3.4 (*)     All other components within normal limits   URINALYSIS, REFLEX TO URINE CULTURE - Abnormal; Notable for the following components:    Color, UA Colorless (*)     All other components within normal limits    Narrative:     Specimen Source->Urine   CULTURE, BLOOD   CULTURE, BLOOD   PROCALCITONIN   LACTIC ACID, PLASMA        All Lab Results:  Results for orders placed or performed during the hospital encounter of 01/15/24   CBC auto differential   Result Value Ref Range    WBC 10.45 3.90 - 12.70 K/uL    RBC 3.70 (L) 4.60 - 6.20 M/uL    Hemoglobin 11.7 (L) 14.0 - 18.0 g/dL    Hematocrit 35.6 (L) 40.0 - 54.0 %    MCV 96 82 - 98 fL    MCH 31.6 (H) 27.0 - 31.0 pg    MCHC 32.9 32.0 - 36.0 g/dL    RDW 13.1 11.5 - 14.5 %    Platelets 264 150 - 450 K/uL    MPV 9.5 9.2 - 12.9 fL    Immature Granulocytes 0.9 (H) 0.0 - 0.5 %    Gran #  (ANC) 8.3 (H) 1.8 - 7.7 K/uL    Immature Grans (Abs) 0.09 (H) 0.00 - 0.04 K/uL    Lymph # 1.2 1.0 - 4.8 K/uL    Mono # 0.7 0.3 - 1.0 K/uL    Eos # 0.1 0.0 - 0.5 K/uL    Baso # 0.03 0.00 - 0.20 K/uL    nRBC 0 0 /100 WBC    Gran % 79.7 (H) 38.0 - 73.0 %    Lymph % 11.8 (L) 18.0 - 48.0 %    Mono % 6.7 4.0 - 15.0 %    Eosinophil % 0.6 0.0 - 8.0 %    Basophil % 0.3 0.0 - 1.9 %    Platelet Estimate Appears normal     Aniso Slight     Poik CANCELED     Tear Drop Cells Occasional     [place] Cells Occasional     Differential Method Automated    Comprehensive metabolic panel   Result Value Ref Range    Sodium 138 136 - 145 mmol/L    Potassium 4.0 3.5 - 5.1 mmol/L    Chloride 98 95 - 110 mmol/L    CO2 25 23 - 29 mmol/L    Glucose 119 (H) 70 - 110 mg/dL    BUN 31 (H) 8 - 23 mg/dL    Creatinine 1.8 (H) 0.5 - 1.4 mg/dL    Calcium 9.2 8.7 - 10.5 mg/dL    Total Protein 7.0 6.0 - 8.4 g/dL    Albumin 3.7 3.5 - 5.2 g/dL    Total Bilirubin 0.9 0.1 - 1.0 mg/dL    Alkaline Phosphatase 109 55 - 135 U/L    AST 15 10 - 40 U/L    ALT 11 10 - 44 U/L    eGFR 37 (A) >60 mL/min/1.73 m^2    Anion Gap 15 8 - 16 mmol/L   Lactic acid, plasma #1   Result Value Ref Range    Lactate (Lactic Acid) 3.4 (H) 0.5 - 2.2 mmol/L   Urinalysis, Reflex to Urine Culture Urine, Clean Catch    Specimen: Urine   Result Value Ref Range    Specimen UA Urine, Clean Catch     Color, UA Colorless (A) Yellow, Straw, Luz    Appearance, UA Clear Clear    pH, UA 7.0 5.0 - 8.0    Specific Gravity, UA 1.010 1.005 - 1.030    Protein, UA Negative Negative    Glucose, UA Negative Negative    Ketones, UA Negative Negative    Bilirubin (UA) Negative Negative    Occult Blood UA Negative Negative    Nitrite, UA Negative Negative    Urobilinogen, UA Negative <2.0 EU/dL    Leukocytes, UA Negative Negative   Procalcitonin   Result Value Ref Range    Procalcitonin 0.07 <0.25 ng/mL   Lactic acid, plasma   Result Value Ref Range    Lactate (Lactic Acid) 0.9 0.5 - 2.2 mmol/L     *Note: Due  to a large number of results and/or encounters for the requested time period, some results have not been displayed. A complete set of results can be found in Results Review.        Imaging Results:  Imaging Results              US Lower Extremity Arteries Left (Final result)  Result time 01/15/24 12:52:50      Final result by CHRISTOPHER Dsouza Sr., MD (01/15/24 12:52:50)                   Impression:      1. Mild peripheral arterial disease in the left lower extremity.  2. There is a moderate amount of atherosclerosis.      Electronically signed by: Zelalem Dsouza MD  Date:    01/15/2024  Time:    12:52               Narrative:    EXAMINATION:  US LOWER EXTREMITY ARTERIES LEFT    CLINICAL HISTORY:  Pain in left leg    TECHNIQUE:  Multiple static images are submitted for interpretation with color flow and spectral doppler imaging.    COMPARISON:  None    FINDINGS:  The common femoral artery has triphasic arterial waveforms with a peak systolic velocity of 114 centimeters/second.  The profunda femoral, superficial femoral, popliteal, posterior tibial, anterior tibial, and peroneal arteries have biphasic arterial waveforms.  There is a moderate amount of atherosclerosis.                                       US Lower Extremity Veins Left (Final result)  Result time 01/15/24 12:49:09      Final result by Patrick Kingsley MD (01/15/24 12:49:09)                   Impression:      No evidence of deep venous thrombosis in the left lower extremity.      Electronically signed by: Patrick Kingsley  Date:    01/15/2024  Time:    12:49               Narrative:    EXAMINATION:  US LOWER EXTREMITY VEINS LEFT    CLINICAL HISTORY:  Pain in left leg    TECHNIQUE:  Duplex and color flow Doppler evaluation and graded compression of the left lower extremity veins was performed.    COMPARISON:  None    FINDINGS:  Left thigh veins: The common femoral, femoral, popliteal, upper greater saphenous, and deep femoral veins are patent and free of  thrombus. The veins are normally compressible and have normal phasic flow and augmentation response.    Left calf veins: The visualized calf veins are patent.    Contralateral CFV: The contralateral (right) common femoral vein is patent and free of thrombus.    Miscellaneous: None                                       X-Ray Foot Complete Left (Final result)  Result time 01/15/24 12:31:12      Final result by Patrick Kingsley MD (01/15/24 12:31:12)                   Impression:      Bones appear osteopenic.  Severe soft tissue swelling dorsum of the left foot. No evidence of fracture or dislocation.      Electronically signed by: Patrick Kingsley  Date:    01/15/2024  Time:    12:31               Narrative:    EXAMINATION:  XR FOOT COMPLETE 3 VIEW LEFT    CLINICAL HISTORY:  .  Pain in left foot    TECHNIQUE:  AP, lateral and oblique views of the left foot were performed.    COMPARISON:  None    FINDINGS:  Bones appear osteopenic.  Severe soft tissue swelling dorsum of the left foot.  No evidence of fracture or dislocation.   No radiopaque foreign body.  Joint spaces appear well maintained.  Advanced atherosclerotic calcification.                                       The EKG was ordered, reviewed, and independently interpreted by the ED provider.  Interpretation time: 12:40  Rate: 87 BPM  Rhythm: Atrial fibrillation with premature ventricular or aberrantly conducted complexes.  Interpretation: ST and T wave abnormality, consider inferior ischemia. ST and T wave abnormality, consider anterolateral ischemia. No STEMI.    The EKG was ordered, reviewed, and independently interpreted by the ED provider.  Interpretation time: 13:27  Rate: 77 BPM  Rhythm: Atrial fibrillation with premature ventricular or aberrantly conducted complexes.   Interpretation: ST and T wave abnormality, consider anterior ischemia. No STEMI.           The Emergency Provider reviewed the vital signs and test results, which are outlined above.     ED  Discussion     2:10 PM: Discussed possibility of admission to pt. Pt seems adamant for discharge.     2:16 PM: Discussed pt's case with Dr. Arteaga ([facility]) who recommends repeating Lactic Acid lab to ensure that it is trending down. She agrees with discharging pt with PO abx and if the lactate is trending downwards. Additionally, he recommends Lasix and a f/u with [facility] wound care tomorrow.      3:16 PM: Reassessed pt at this time. Discussed with pt all pertinent ED information and results. Discussed pt dx and plan of tx. Gave pt all f/u and return to the ED instructions. All questions and concerns were addressed at this time. Pt expresses understanding of information and instructions, and is comfortable with plan to discharge. Pt is stable for discharge.    I discussed with patient and/or family/caretaker that evaluation in the ED does not suggest any emergent or life threatening medical conditions requiring immediate intervention beyond what was provided in the ED, and I believe patient is safe for discharge.  Regardless, an unremarkable evaluation in the ED does not preclude the development or presence of a serious of life threatening condition. As such, patient was instructed to return immediately for any worsening or change in current symptoms.         Medical Decision Making  Patient here for evaluation of left lower leg swelling and ecchymosis and worsening wound    Amount and/or Complexity of Data Reviewed  External Data Reviewed: notes.     Details: Patient is being seen by [facility] who sent him here to rule out abscess versus deep tissue infection versus arterial clot versus DVT  Labs: ordered. Decision-making details documented in ED Course.  Radiology: ordered. Decision-making details documented in ED Course.  ECG/medicine tests: ordered and independent interpretation performed. Decision-making details documented in ED Course.  Discussion of management or test interpretation with  "external provider(s): Consulted Dr. Brain Arteaga, [facility]: "Can you please look at this case patient is on think immunotherapy for cancer.  Had a wound to the left leg, he cut it on his furniture at home back in December.  He is actually being followed by [facility] wound care.  They sent him here due to swelling of his leg and thought he might have abscess versus DVT versus arterial blockage.  Compared to his last visit (please review  pictures on the chart) his leg is a lot more swollen and there is some bruising to foot.  I do not think it is infected, there is really no drainage ,no cellulitis,  no foul odor at this time.  Ultrasound with no DVT and his arterial ultrasound shows some atherosclerosis but no acute clot.  Lactate was elevated at 3.4.  Procalcitonin  normal, white blood cell count normal, they did note that he is immunocompromised due to the immuno therapy.      Discussed case, will get a 2nd lactate and if that goes down pt will be discharged on prophylactic antibiotics and maybe there is a infection forming but has not fully showed itself.      Second lactate trended down.  Patient also did not want admission.  He was still follow up with wound care    Risk  Prescription drug management.  Decision regarding hospitalization.  Risk Details: Differential diagnosis:  Abscess compartment syndrome, cellulitis, osteomyelitis, DVT, peripheral arterial occlusion                ED Medication(s):  Medications   vancomycin - pharmacy to dose (has no administration in time range)   sodium chloride 0.9% bolus 1,000 mL 1,000 mL (0 mLs Intravenous Stopped 1/15/24 1434)   vancomycin 1,500 mg in dextrose 5 % (D5W) 250 mL IVPB (Vial-Mate) (0 mg Intravenous Stopped 1/15/24 1434)       Discharge Medication List as of 1/15/2024  3:16 PM        START taking these medications    Details   clindamycin (CLEOCIN) 300 MG capsule Take 1 capsule (300 mg total) by mouth 3 (three) times daily. for 7 days, Starting Mon " 1/15/2024, Until Mon 1/22/2024, Normal              Follow-up Information       Solomon Cortés MD. Schedule an appointment as soon as possible for a visit in 2 days.    Specialty: Internal Medicine  Contact information:  [address]  [place] [id number] [phone]                                 Scribe Attestation:   Scribe #1: I performed the above scribed service and the documentation accurately describes the services I performed. I attest to the accuracy of the note.     Attending:   Physician Attestation Statement for Scribe #1: I, Jackie Valdivia MD, personally performed the services described in this documentation, as scribed by Jett Turner, in my presence, and it is both accurate and complete.           Clinical Impression       ICD-10-CM ICD-9-CM   1. Leg swelling  M79.89 729.81   2. Left foot pain  M79.672 729.5   3. Left leg pain  M79.605 729.5   4. Palpitations  R00.2 785.1       Disposition:   Disposition: Discharged  Condition: Stable         Jackie Valdivia MD  01/15/24 8247

## 2024-01-15 NOTE — DISCHARGE INSTRUCTIONS
Keep your wound clean dry and intact.  Keep your appointment with wound care    Clindamycin antibiotic has been prescribed to prevent infection

## 2024-01-15 NOTE — CONSULTS
"Pharmacokinetic Initial Assessment: IV Vancomycin    Assessment/Plan:    Initiate intravenous vancomycin with loading dose of 1500 mg once with subsequent doses when random concentrations are less than 20 mcg/mL  Desired empiric serum trough concentration is 10 to 20 mcg/mL  Draw vancomycin random level on 1/16 at 0645.  Pharmacy will continue to follow and monitor vancomycin.      Please contact pharmacy at extension [phone] with any questions regarding this assessment.     Thank you for the consult,   Adrianna Bonds, PharmD 1/15/2024 3:34 PM         Patient brief summary:  Homero Tanner is a 83 y.o. male initiated on antimicrobial therapy with IV Vancomycin for treatment of suspected skin & soft tissue infection    Drug Allergies:   Review of patient's allergies indicates:  No Known Allergies    Actual Body Weight:   75.2 kg    Renal Function:   Estimated Creatinine Clearance: 32.7 mL/min (A) (based on SCr of 1.8 mg/dL (H)).,     Dialysis Method (if applicable):  N/A    CBC (last 72 hours):  Recent Labs   Lab Result Units 01/15/24  1133   WBC K/uL 10.45   Hemoglobin g/dL 11.7*   Hematocrit % 35.6*   Platelets K/uL 264   Gran % % 79.7*   Lymph % % 11.8*   Mono % % 6.7   Eosinophil % % 0.6   Basophil % % 0.3   Differential Method  Automated       Metabolic Panel (last 72 hours):  Recent Labs   Lab Result Units 01/15/24  1133 01/15/24  1247   Sodium mmol/L 138  --    Potassium mmol/L 4.0  --    Chloride mmol/L 98  --    CO2 mmol/L 25  --    Glucose mg/dL 119*  --    Glucose, UA   --  Negative   BUN mg/dL 31*  --    Creatinine mg/dL 1.8*  --    Albumin g/dL 3.7  --    Total Bilirubin mg/dL 0.9  --    Alkaline Phosphatase U/L 109  --    AST U/L 15  --    ALT U/L 11  --        Drug levels (last 3 results):  No results for input(s): "VANCOMYCINRA", "VANCORANDOM", "VANCOMYCINPE", "VANCOPEAK", "VANCOMYCINTR", "VANCOTROUGH" in the last 72 hours.    Microbiologic Results:  Microbiology Results (last 7 days)       " Procedure Component Value Units Date/Time    Blood culture x two cultures. Draw prior to antibiotics. [[id number]] Collected: 01/15/24 1134    Order Status: Sent Specimen: Blood from Peripheral, Antecubital, Left Updated: 01/15/24 1134    Blood culture x two cultures. Draw prior to antibiotics. [[id number]] Collected: 01/15/24 1133    Order Status: Sent Specimen: Blood from Peripheral, Antecubital, Right Updated: 01/15/24 1134

## 2024-01-15 NOTE — FIRST PROVIDER EVALUATION
Medical screening examination initiated.  I have conducted a focused provider triage encounter, findings are as follows:    Brief history of present illness:  pt sent from wound care for evaluation of left foot pain. Send for ace and art u/s. Reports wound worsening to left foot.     There were no vitals filed for this visit.    Pertinent physical exam:  nad    Brief workup plan:  labs, imaging, further eval    Preliminary workup initiated; this workup will be continued and followed by the physician or advanced practice provider that is assigned to the patient when roomed.

## 2024-01-18 ENCOUNTER — LAB VISIT (OUTPATIENT)
Dept: LAB | Facility: HOSPITAL | Age: 84
End: 2024-01-18
Attending: INTERNAL MEDICINE
Payer: MEDICARE

## 2024-01-18 DIAGNOSIS — C61 PROSTATE CANCER METASTATIC TO BONE: ICD-10-CM

## 2024-01-18 DIAGNOSIS — C78.01 MALIGNANT NEOPLASM METASTATIC TO BOTH LUNGS: ICD-10-CM

## 2024-01-18 DIAGNOSIS — C79.51 PROSTATE CANCER METASTATIC TO BONE: ICD-10-CM

## 2024-01-18 DIAGNOSIS — C78.02 MALIGNANT NEOPLASM METASTATIC TO BOTH LUNGS: ICD-10-CM

## 2024-01-18 LAB
ALBUMIN SERPL BCP-MCNC: 3.7 G/DL (ref 3.5–5.2)
ALP SERPL-CCNC: 95 U/L (ref 55–135)
ALT SERPL W/O P-5'-P-CCNC: 10 U/L (ref 10–44)
ANION GAP SERPL CALC-SCNC: 14 MMOL/L (ref 8–16)
AST SERPL-CCNC: 14 U/L (ref 10–40)
BASOPHILS # BLD AUTO: 0.04 K/UL (ref 0–0.2)
BASOPHILS NFR BLD: 0.4 % (ref 0–1.9)
BILIRUB SERPL-MCNC: 0.8 MG/DL (ref 0.1–1)
BUN SERPL-MCNC: 26 MG/DL (ref 8–23)
CALCIUM SERPL-MCNC: 9.2 MG/DL (ref 8.7–10.5)
CHLORIDE SERPL-SCNC: 97 MMOL/L (ref 95–110)
CO2 SERPL-SCNC: 29 MMOL/L (ref 23–29)
CREAT SERPL-MCNC: 1.8 MG/DL (ref 0.5–1.4)
DIFFERENTIAL METHOD BLD: ABNORMAL
EOSINOPHIL # BLD AUTO: 0.1 K/UL (ref 0–0.5)
EOSINOPHIL NFR BLD: 1.2 % (ref 0–8)
ERYTHROCYTE [DISTWIDTH] IN BLOOD BY AUTOMATED COUNT: 13.1 % (ref 11.5–14.5)
EST. GFR  (NO RACE VARIABLE): 37 ML/MIN/1.73 M^2
GLUCOSE SERPL-MCNC: 121 MG/DL (ref 70–110)
HCT VFR BLD AUTO: 34.4 % (ref 40–54)
HGB BLD-MCNC: 11.5 G/DL (ref 14–18)
IMM GRANULOCYTES # BLD AUTO: 0.07 K/UL (ref 0–0.04)
IMM GRANULOCYTES NFR BLD AUTO: 0.7 % (ref 0–0.5)
LYMPHOCYTES # BLD AUTO: 1.3 K/UL (ref 1–4.8)
LYMPHOCYTES NFR BLD: 13.4 % (ref 18–48)
MCH RBC QN AUTO: 31.9 PG (ref 27–31)
MCHC RBC AUTO-ENTMCNC: 33.4 G/DL (ref 32–36)
MCV RBC AUTO: 96 FL (ref 82–98)
MONOCYTES # BLD AUTO: 0.8 K/UL (ref 0.3–1)
MONOCYTES NFR BLD: 8 % (ref 4–15)
NEUTROPHILS # BLD AUTO: 7.4 K/UL (ref 1.8–7.7)
NEUTROPHILS NFR BLD: 76.3 % (ref 38–73)
NRBC BLD-RTO: 0 /100 WBC
PLATELET # BLD AUTO: 311 K/UL (ref 150–450)
PMV BLD AUTO: 8.5 FL (ref 9.2–12.9)
POTASSIUM SERPL-SCNC: 4 MMOL/L (ref 3.5–5.1)
PROT SERPL-MCNC: 6.7 G/DL (ref 6–8.4)
RBC # BLD AUTO: 3.6 M/UL (ref 4.6–6.2)
SODIUM SERPL-SCNC: 140 MMOL/L (ref 136–145)
WBC # BLD AUTO: 9.72 K/UL (ref 3.9–12.7)

## 2024-01-18 PROCEDURE — 36415 COLL VENOUS BLD VENIPUNCTURE: CPT | Mod: HCNC | Performed by: INTERNAL MEDICINE

## 2024-01-18 PROCEDURE — 84403 ASSAY OF TOTAL TESTOSTERONE: CPT | Mod: HCNC | Performed by: INTERNAL MEDICINE

## 2024-01-18 PROCEDURE — 85025 COMPLETE CBC W/AUTO DIFF WBC: CPT | Mod: HCNC | Performed by: INTERNAL MEDICINE

## 2024-01-18 PROCEDURE — 80053 COMPREHEN METABOLIC PANEL: CPT | Mod: HCNC | Performed by: INTERNAL MEDICINE

## 2024-01-18 PROCEDURE — 84153 ASSAY OF PSA TOTAL: CPT | Mod: HCNC | Performed by: INTERNAL MEDICINE

## 2024-01-19 LAB
COMPLEXED PSA SERPL-MCNC: 69.6 NG/ML (ref 0–4)
TESTOST SERPL-MCNC: <4 NG/DL (ref 304–1227)

## 2024-01-20 LAB
BACTERIA BLD CULT: NORMAL
BACTERIA BLD CULT: NORMAL

## 2024-01-22 ENCOUNTER — OFFICE VISIT (OUTPATIENT)
Dept: HEMATOLOGY/ONCOLOGY | Facility: CLINIC | Age: 84
End: 2024-01-22
Payer: MEDICARE

## 2024-01-22 VITALS
DIASTOLIC BLOOD PRESSURE: 62 MMHG | OXYGEN SATURATION: 97 % | SYSTOLIC BLOOD PRESSURE: 104 MMHG | HEART RATE: 55 BPM | TEMPERATURE: 98 F | BODY MASS INDEX: 23.35 KG/M2 | HEIGHT: 70 IN | WEIGHT: 163.13 LBS

## 2024-01-22 DIAGNOSIS — C61 PROSTATE CANCER METASTATIC TO BONE: Primary | ICD-10-CM

## 2024-01-22 DIAGNOSIS — Z85.79 HISTORY OF WALDENSTROM'S MACROGLOBULINEMIA: ICD-10-CM

## 2024-01-22 DIAGNOSIS — C78.01 MALIGNANT NEOPLASM METASTATIC TO BOTH LUNGS: ICD-10-CM

## 2024-01-22 DIAGNOSIS — D84.821 IMMUNODEFICIENCY DUE TO CHEMOTHERAPY: ICD-10-CM

## 2024-01-22 DIAGNOSIS — T45.1X5A IMMUNODEFICIENCY DUE TO CHEMOTHERAPY: ICD-10-CM

## 2024-01-22 DIAGNOSIS — C78.02 MALIGNANT NEOPLASM METASTATIC TO BOTH LUNGS: ICD-10-CM

## 2024-01-22 DIAGNOSIS — Z79.899 IMMUNODEFICIENCY DUE TO CHEMOTHERAPY: ICD-10-CM

## 2024-01-22 DIAGNOSIS — C79.51 PROSTATE CANCER METASTATIC TO BONE: Primary | ICD-10-CM

## 2024-01-22 PROCEDURE — 1101F PT FALLS ASSESS-DOCD LE1/YR: CPT | Mod: HCNC,CPTII,S$GLB, | Performed by: INTERNAL MEDICINE

## 2024-01-22 PROCEDURE — 3074F SYST BP LT 130 MM HG: CPT | Mod: HCNC,CPTII,S$GLB, | Performed by: INTERNAL MEDICINE

## 2024-01-22 PROCEDURE — 99214 OFFICE O/P EST MOD 30 MIN: CPT | Mod: HCNC,S$GLB,, | Performed by: INTERNAL MEDICINE

## 2024-01-22 PROCEDURE — 3078F DIAST BP <80 MM HG: CPT | Mod: HCNC,CPTII,S$GLB, | Performed by: INTERNAL MEDICINE

## 2024-01-22 PROCEDURE — 1126F AMNT PAIN NOTED NONE PRSNT: CPT | Mod: HCNC,CPTII,S$GLB, | Performed by: INTERNAL MEDICINE

## 2024-01-22 PROCEDURE — 1159F MED LIST DOCD IN RCRD: CPT | Mod: HCNC,CPTII,S$GLB, | Performed by: INTERNAL MEDICINE

## 2024-01-22 PROCEDURE — 3288F FALL RISK ASSESSMENT DOCD: CPT | Mod: HCNC,CPTII,S$GLB, | Performed by: INTERNAL MEDICINE

## 2024-01-22 PROCEDURE — 99999 PR PBB SHADOW E&M-EST. PATIENT-LVL IV: CPT | Mod: PBBFAC,HCNC,, | Performed by: INTERNAL MEDICINE

## 2024-01-22 RX ORDER — SULFAMETHOXAZOLE AND TRIMETHOPRIM 800; 160 MG/1; MG/1
1 TABLET ORAL EVERY 12 HOURS
COMMUNITY
Start: 2023-12-24

## 2024-01-22 NOTE — PROGRESS NOTES
Subjective:       Patient ID: Homero Tanner is a 83 y.o. male.    Chief Complaint: Results, Chemotherapy, and Prostate Cancer    HPI:  83-year-old male mold in wound care at [facility] because of chronic venous stasis of lower extremities.  Recent ultrasound demonstrating no DVT.  Continues on Zytiga 1000 mg daily and prednisone.  Receiving Lupron injections through Dr. Hurley office of side Urology ECOG status 1    Past Medical History:   Diagnosis Date    Abnormal ECG 6/24/2013    Adenomatous rectal polyp 12/7/2015    Aortic insufficiency 6/24/2013    Arthritis     Asthma     as a child    Benign neoplasm of cecum 2/27/2017    Benign neoplasm of transverse colon 2/27/2017    Cataract     CHF (congestive heart failure)     Pt states He's never been told this    Diverticulosis of large intestine without hemorrhage 2/27/2017    Encounter for blood transfusion     Frequent PVCs     Gallstones 3/28/2018    By Us done 3/21/2018    GERD (gastroesophageal reflux disease)     History of colon polyps     Hypertension     Iron deficiency anemia 10/26/2015    Lymphoma     waldenstrom's macroglobulinemia    Mixed hyperlipidemia 6/24/2013    Premature atrial contractions     Prostate cancer     PSVT (paroxysmal supraventricular tachycardia) 6/24/2013    Pulmonary hypertension 6/13/2016    PVC's (premature ventricular contractions) 6/13/2016    Rectal adenocarcinoma     Rectal cancer 5/1/2018    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    left parietal scalp    Thymoma     TR (tricuspid regurgitation) 6/13/2016    Tubular adenoma of colon 06/2020    VT (ventricular tachycardia) 9/18/2018     Family History   Problem Relation Age of Onset    Diabetes Father     Cataracts Mother     Amblyopia Neg Hx     Blindness Neg Hx     Cancer Neg Hx     Glaucoma Neg Hx     Hypertension Neg Hx     Macular degeneration Neg Hx     Retinal detachment Neg Hx     Strabismus Neg Hx     Stroke Neg Hx     Thyroid disease Neg Hx      Melanoma Neg Hx      Social History     Socioeconomic History    Marital status: Single   Tobacco Use    Smoking status: Former     Current packs/day: 0.00     Average packs/day: 1 pack/day for 15.0 years (15.0 ttl pk-yrs)     Types: Cigarettes     Start date: 1954     Quit date: 1969     Years since quittin.9    Smokeless tobacco: Never   Substance and Sexual Activity    Alcohol use: Yes     Alcohol/week: 3.0 standard drinks of alcohol     Types: 3 Cans of beer per week     Comment: socially, NO ALCOHOL 72 HOURS PRIOR TO SX    Drug use: No     Past Surgical History:   Procedure Laterality Date    biopsy for chest mass      BONE MARROW BIOPSY Left 2018    Procedure: Biopsy-bone marrow;  Surgeon: Charanjit Dacosta MD;  Location: [facility];  Service: General;  Laterality: Left;    CATARACT EXTRACTION W/  INTRAOCULAR LENS IMPLANT Right 2019    CATARACT EXTRACTION W/  INTRAOCULAR LENS IMPLANT Left 2019    COLON SURGERY      COLONOSCOPY N/A 10/26/2015    Procedure: Colonoscopy;  Surgeon: Abraham Hong MD;  Location: [facility];  Service: Endoscopy;  Laterality: N/A;    COLONOSCOPY Left 2017    Procedure: COLONOSCOPY;  Surgeon: Abraham Hong MD;  Location: [facility];  Service: Endoscopy;  Laterality: Left;    COLONOSCOPY N/A 2018    Procedure: hixtory of rectal cancer. Colonsocopy asked to be repeated minnie  year, alst one 2017;  Surgeon: Patricio Streeter MD;  Location: [facility];  Service: Endoscopy;  Laterality: N/A;    COLONOSCOPY N/A 2018    Procedure: COLONOSCOPY/hybrid APC or a EndoRotor device;  Surgeon: Rao Medeiros MD;  Location: [facility] ([address] [place] [address]);  Service: Endoscopy;  Laterality: N/A;    COLONOSCOPY N/A 2020    Procedure: COLONOSCOPY;  Surgeon: Yu Wells MD;  Location: [facility];  Service: Endoscopy;  Laterality: N/A;    ESOPHAGOGASTRODUODENOSCOPY N/A 10/22/2019    Procedure: ESOPHAGOGASTRODUODENOSCOPY (EGD);  Surgeon: Mariela Greer MD;   Location: [facility] ENDO;  Service: Endoscopy;  Laterality: N/A;    FLEXIBLE SIGMOIDOSCOPY Left 4/23/2019    Procedure: SIGMOIDOSCOPY, FLEXIBLE;  Surgeon: Ranjit Will MD;  Location: [facility] ENDO;  Service: Endoscopy;  Laterality: Left;    HERNIA REPAIR Right 02/2018    Inguinal, open with mesh    mohs      PROSTATECTOMY      RECTAL SURGERY N/A 09/2014    REPAIR OF SLIDING INGUINAL HERNIA Right 2/26/2019    Procedure: REPAIR, HERNIA, INGUINAL, SLIDING;  Surgeon: Bridger Alvarez MD;  Location: [facility] OR;  Service: General;  Laterality: Right;    SKIN BIOPSY      TONSILLECTOMY      TUMOR REMOVAL         Labs:  Lab Results   Component Value Date    WBC 9.72 01/18/2024    HGB 11.5 (L) 01/18/2024    HCT 34.4 (L) 01/18/2024    MCV 96 01/18/2024     01/18/2024     BMP  Lab Results   Component Value Date     01/18/2024    K 4.0 01/18/2024    CL 97 01/18/2024    CO2 29 01/18/2024    BUN 26 (H) 01/18/2024    CREATININE 1.8 (H) 01/18/2024    CALCIUM 9.2 01/18/2024    ANIONGAP 14 01/18/2024    ESTGFRAFRICA 60 03/09/2022    EGFRNONAA 52 (A) 03/09/2022     Lab Results   Component Value Date    ALT 10 01/18/2024    AST 14 01/18/2024     (H) 12/14/2020    ALKPHOS 95 01/18/2024    BILITOT 0.8 01/18/2024       Lab Results   Component Value Date    IRON 49 02/18/2020    TIBC 271 02/18/2020    FERRITIN 349 (H) 02/18/2020     Lab Results   Component Value Date    NGQFULZJ34 508 02/09/2023     Lab Results   Component Value Date    FOLATE 8.8 02/09/2023     Lab Results   Component Value Date    TSH 1.726 03/09/2022         Review of Systems   Constitutional:  Negative for activity change, appetite change, chills, diaphoresis, fatigue, fever and unexpected weight change.   HENT:  Negative for congestion, dental problem, drooling, ear discharge, ear pain, facial swelling, hearing loss, mouth sores, nosebleeds, postnasal drip, rhinorrhea, sinus pressure, sneezing, sore throat, tinnitus, trouble swallowing and voice change.     Eyes:  Negative for photophobia, pain, discharge, redness, itching and visual disturbance.   Respiratory:  Negative for apnea, cough, choking, chest tightness, shortness of breath, wheezing and stridor.    Cardiovascular:  Negative for chest pain, palpitations and leg swelling.   Gastrointestinal:  Negative for abdominal distention, abdominal pain, anal bleeding, blood in stool, constipation, diarrhea, nausea, rectal pain and vomiting.   Endocrine: Negative for cold intolerance, heat intolerance, polydipsia, polyphagia and polyuria.   Genitourinary:  Negative for decreased urine volume, difficulty urinating, dysuria, enuresis, flank pain, frequency, genital sores, hematuria, penile discharge, penile pain, penile swelling, scrotal swelling, testicular pain and urgency.   Musculoskeletal:  Positive for gait problem. Negative for arthralgias, back pain, joint swelling, myalgias, neck pain and neck stiffness.   Skin:  Positive for wound. Negative for color change, pallor and rash.   Allergic/Immunologic: Negative for environmental allergies, food allergies and immunocompromised state.   Neurological:  Positive for weakness. Negative for dizziness, tremors, seizures, syncope, facial asymmetry, speech difficulty, light-headedness, numbness and headaches.   Hematological:  Negative for adenopathy. Does not bruise/bleed easily.   Psychiatric/Behavioral:  Negative for agitation, behavioral problems, confusion, decreased concentration, dysphoric mood, hallucinations, self-injury, sleep disturbance and suicidal ideas. The patient is not nervous/anxious and is not hyperactive.        Objective:      Physical Exam  Vitals reviewed.   Constitutional:       General: He is not in acute distress.     Appearance: He is well-developed. He is not diaphoretic.   HENT:      Head: Normocephalic.      Right Ear: External ear normal.      Left Ear: External ear normal.      Nose: Nose normal.      Right Sinus: No maxillary sinus  tenderness or frontal sinus tenderness.      Left Sinus: No maxillary sinus tenderness or frontal sinus tenderness.      Mouth/Throat:      Pharynx: No oropharyngeal exudate.   Eyes:      General: Lids are normal. No scleral icterus.        Right eye: No discharge.         Left eye: No discharge.      Extraocular Movements:      Right eye: Normal extraocular motion.      Left eye: Normal extraocular motion.      Conjunctiva/sclera:      Right eye: Right conjunctiva is not injected. No hemorrhage.     Left eye: Left conjunctiva is not injected. No hemorrhage.     Pupils: Pupils are equal, round, and reactive to light.   Neck:      Thyroid: No thyromegaly.      Vascular: No JVD.      Trachea: No tracheal deviation.   Cardiovascular:      Rate and Rhythm: Normal rate.   Pulmonary:      Effort: Pulmonary effort is normal. No respiratory distress.      Breath sounds: No stridor.   Abdominal:      General: Bowel sounds are normal.      Palpations: Abdomen is soft. There is no hepatomegaly, splenomegaly or mass.      Tenderness: There is no abdominal tenderness.   Musculoskeletal:         General: No tenderness. Normal range of motion.      Cervical back: Normal range of motion and neck supple.   Lymphadenopathy:      Head:      Right side of head: No posterior auricular or occipital adenopathy.      Left side of head: No posterior auricular or occipital adenopathy.      Cervical: No cervical adenopathy.      Right cervical: No superficial, deep or posterior cervical adenopathy.     Left cervical: No superficial, deep or posterior cervical adenopathy.      Upper Body:      Right upper body: No supraclavicular adenopathy.      Left upper body: No supraclavicular adenopathy.   Skin:     General: Skin is dry.      Findings: No erythema or rash.      Nails: There is no clubbing.   Neurological:      Mental Status: He is alert and oriented to person, place, and time.      Cranial Nerves: No cranial nerve deficit.      Motor:  Weakness present.      Coordination: Coordination normal.      Gait: Gait abnormal.   Psychiatric:         Behavior: Behavior normal.         Thought Content: Thought content normal.         Judgment: Judgment normal.             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 Return to clinic to see me in 3 months with CBC CMP testosterone and PSA prior   Follow up with [facility]    Infusion scheduling note    Injection scheduling note    Labs    Imaging    Pharmacy appointment    Other referrals                   Plan:     Patient's PSA remained stable to slightly decreased.  Continue with current Lupron dosing.  Continue with Zytiga and prednisone concurrent care in reviewed most recent ER visits no evidence of DVT lower extremities.        Dustin Sparks Jr, MD FACP

## 2024-02-25 DIAGNOSIS — E78.2 MIXED HYPERLIPIDEMIA: ICD-10-CM

## 2024-02-25 NOTE — TELEPHONE ENCOUNTER
Care Due:                  Date            Visit Type   Department     Provider  --------------------------------------------------------------------------------                                EP -                              PRIMARY      [facility] INTER[facility]  Last Visit: 10-      CARE ([facility])   MEDICINE       Solomon Cortés  Next Visit: None Scheduled  None         None Found                                                            Last  Test          Frequency    Reason                     Performed    Due Date  --------------------------------------------------------------------------------    Lipid Panel.  12 months..  rosuvastatin.............  04- 04-    [facility] Embedded Care Due Messages. Reference number: [id number].   2/25/2024 7:55:53 AM CST

## 2024-02-26 RX ORDER — ROSUVASTATIN CALCIUM 10 MG/1
TABLET, COATED ORAL
Qty: 90 TABLET | Refills: 3 | Status: SHIPPED | OUTPATIENT
Start: 2024-02-26

## 2024-04-02 ENCOUNTER — OFFICE VISIT (OUTPATIENT)
Dept: OPHTHALMOLOGY | Facility: CLINIC | Age: 84
End: 2024-04-02
Payer: MEDICARE

## 2024-04-02 DIAGNOSIS — Z96.1 PSEUDOPHAKIA OF BOTH EYES: ICD-10-CM

## 2024-04-02 DIAGNOSIS — H52.203 ASTIGMATISM WITH PRESBYOPIA, BILATERAL: ICD-10-CM

## 2024-04-02 DIAGNOSIS — H52.4 ASTIGMATISM WITH PRESBYOPIA, BILATERAL: ICD-10-CM

## 2024-04-02 DIAGNOSIS — H18.513 FUCHS' CORNEAL DYSTROPHY OF BOTH EYES: Primary | ICD-10-CM

## 2024-04-02 PROCEDURE — 99999 PR PBB SHADOW E&M-EST. PATIENT-LVL III: CPT | Mod: PBBFAC,HCNC,, | Performed by: OPTOMETRIST

## 2024-04-02 PROCEDURE — 92004 COMPRE OPH EXAM NEW PT 1/>: CPT | Mod: HCNC,S$GLB,, | Performed by: OPTOMETRIST

## 2024-04-02 PROCEDURE — 92015 DETERMINE REFRACTIVE STATE: CPT | Mod: HCNC,S$GLB,, | Performed by: OPTOMETRIST

## 2024-04-02 NOTE — PROGRESS NOTES
HPI     Annual Exam            Comments:   Vision changes since last eye exam?: yes blurry [facility]  Any eye pain today: no  Other ocular symptoms: no  Interested in contact lens fitting today? no           Last edited by Crista Frost MA on 4/2/2024  8:45 AM.            Assessment /Plan     For exam results, see Encounter Report.    Fuchs' corneal dystrophy of both eyes  Nature of condition explained to pt  No treatment required at this time  Monitor 12 months    Pseudophakia of both eyes  Stable OU  Monitor 12 months    Astigmatism with presbyopia, bilateral  Eyeglass Final Rx       Eyeglass Final Rx         Sphere Cylinder Axis Add    Right -0.25 +1.50 007 +2.50    Left +0.25 +1.25 175 +2.50      Expiration Date: 4/2/2025    Comments: Signs are correct                      RTC 1 yr for dilated eye exam or PRN if any problems.   Discussed above and answered questions.

## 2024-04-25 ENCOUNTER — LAB VISIT (OUTPATIENT)
Dept: LAB | Facility: HOSPITAL | Age: 84
End: 2024-04-25
Attending: INTERNAL MEDICINE
Payer: MEDICARE

## 2024-04-25 DIAGNOSIS — C79.51 PROSTATE CANCER METASTATIC TO BONE: ICD-10-CM

## 2024-04-25 DIAGNOSIS — C78.01 MALIGNANT NEOPLASM METASTATIC TO BOTH LUNGS: ICD-10-CM

## 2024-04-25 DIAGNOSIS — C61 PROSTATE CANCER METASTATIC TO BONE: ICD-10-CM

## 2024-04-25 DIAGNOSIS — C78.02 MALIGNANT NEOPLASM METASTATIC TO BOTH LUNGS: ICD-10-CM

## 2024-04-25 LAB
ALBUMIN SERPL BCP-MCNC: 3.8 G/DL (ref 3.5–5.2)
ALP SERPL-CCNC: 67 U/L (ref 55–135)
ALT SERPL W/O P-5'-P-CCNC: 10 U/L (ref 10–44)
ANION GAP SERPL CALC-SCNC: 13 MMOL/L (ref 8–16)
AST SERPL-CCNC: 13 U/L (ref 10–40)
BASOPHILS # BLD AUTO: 0.03 K/UL (ref 0–0.2)
BASOPHILS NFR BLD: 0.3 % (ref 0–1.9)
BILIRUB SERPL-MCNC: 1.2 MG/DL (ref 0.1–1)
BUN SERPL-MCNC: 33 MG/DL (ref 8–23)
CALCIUM SERPL-MCNC: 9.6 MG/DL (ref 8.7–10.5)
CHLORIDE SERPL-SCNC: 94 MMOL/L (ref 95–110)
CO2 SERPL-SCNC: 31 MMOL/L (ref 23–29)
COMPLEXED PSA SERPL-MCNC: 68.1 NG/ML (ref 0–4)
CREAT SERPL-MCNC: 1.8 MG/DL (ref 0.5–1.4)
DIFFERENTIAL METHOD BLD: ABNORMAL
EOSINOPHIL # BLD AUTO: 0 K/UL (ref 0–0.5)
EOSINOPHIL NFR BLD: 0.4 % (ref 0–8)
ERYTHROCYTE [DISTWIDTH] IN BLOOD BY AUTOMATED COUNT: 15.2 % (ref 11.5–14.5)
EST. GFR  (NO RACE VARIABLE): 37 ML/MIN/1.73 M^2
GLUCOSE SERPL-MCNC: 121 MG/DL (ref 70–110)
HCT VFR BLD AUTO: 34.7 % (ref 40–54)
HGB BLD-MCNC: 11.4 G/DL (ref 14–18)
IMM GRANULOCYTES # BLD AUTO: 0.06 K/UL (ref 0–0.04)
IMM GRANULOCYTES NFR BLD AUTO: 0.6 % (ref 0–0.5)
LYMPHOCYTES # BLD AUTO: 1.1 K/UL (ref 1–4.8)
LYMPHOCYTES NFR BLD: 10.3 % (ref 18–48)
MCH RBC QN AUTO: 29.1 PG (ref 27–31)
MCHC RBC AUTO-ENTMCNC: 32.9 G/DL (ref 32–36)
MCV RBC AUTO: 89 FL (ref 82–98)
MONOCYTES # BLD AUTO: 0.9 K/UL (ref 0.3–1)
MONOCYTES NFR BLD: 8.1 % (ref 4–15)
NEUTROPHILS # BLD AUTO: 8.7 K/UL (ref 1.8–7.7)
NEUTROPHILS NFR BLD: 80.3 % (ref 38–73)
NRBC BLD-RTO: 0 /100 WBC
PLATELET # BLD AUTO: 219 K/UL (ref 150–450)
PMV BLD AUTO: 9.2 FL (ref 9.2–12.9)
POTASSIUM SERPL-SCNC: 3.5 MMOL/L (ref 3.5–5.1)
PROT SERPL-MCNC: 7 G/DL (ref 6–8.4)
RBC # BLD AUTO: 3.92 M/UL (ref 4.6–6.2)
SODIUM SERPL-SCNC: 138 MMOL/L (ref 136–145)
WBC # BLD AUTO: 10.82 K/UL (ref 3.9–12.7)

## 2024-04-25 PROCEDURE — 80053 COMPREHEN METABOLIC PANEL: CPT | Mod: HCNC | Performed by: INTERNAL MEDICINE

## 2024-04-25 PROCEDURE — 84153 ASSAY OF PSA TOTAL: CPT | Mod: HCNC | Performed by: INTERNAL MEDICINE

## 2024-04-25 PROCEDURE — 85025 COMPLETE CBC W/AUTO DIFF WBC: CPT | Mod: HCNC | Performed by: INTERNAL MEDICINE

## 2024-04-25 PROCEDURE — 36415 COLL VENOUS BLD VENIPUNCTURE: CPT | Mod: HCNC | Performed by: INTERNAL MEDICINE

## 2024-04-25 PROCEDURE — 84403 ASSAY OF TOTAL TESTOSTERONE: CPT | Mod: HCNC | Performed by: INTERNAL MEDICINE

## 2024-04-26 LAB — TESTOST SERPL-MCNC: 5 NG/DL (ref 304–1227)

## 2024-04-29 ENCOUNTER — TELEPHONE (OUTPATIENT)
Dept: HEMATOLOGY/ONCOLOGY | Facility: CLINIC | Age: 84
End: 2024-04-29

## 2024-04-29 ENCOUNTER — OFFICE VISIT (OUTPATIENT)
Dept: HEMATOLOGY/ONCOLOGY | Facility: CLINIC | Age: 84
End: 2024-04-29
Payer: MEDICARE

## 2024-04-29 ENCOUNTER — LAB VISIT (OUTPATIENT)
Dept: LAB | Facility: HOSPITAL | Age: 84
End: 2024-04-29
Attending: INTERNAL MEDICINE
Payer: MEDICARE

## 2024-04-29 VITALS
WEIGHT: 157.88 LBS | DIASTOLIC BLOOD PRESSURE: 77 MMHG | HEIGHT: 70 IN | HEART RATE: 92 BPM | SYSTOLIC BLOOD PRESSURE: 121 MMHG | BODY MASS INDEX: 22.6 KG/M2 | TEMPERATURE: 98 F

## 2024-04-29 DIAGNOSIS — T14.8XXA BRUISING: ICD-10-CM

## 2024-04-29 DIAGNOSIS — C79.51 PROSTATE CANCER METASTATIC TO BONE: ICD-10-CM

## 2024-04-29 DIAGNOSIS — T45.1X5A IMMUNODEFICIENCY DUE TO CHEMOTHERAPY: ICD-10-CM

## 2024-04-29 DIAGNOSIS — Z85.79 HISTORY OF WALDENSTROM'S MACROGLOBULINEMIA: ICD-10-CM

## 2024-04-29 DIAGNOSIS — C61 PROSTATE CANCER METASTATIC TO BONE: ICD-10-CM

## 2024-04-29 DIAGNOSIS — T14.8XXA BRUISING: Primary | ICD-10-CM

## 2024-04-29 DIAGNOSIS — D84.821 IMMUNODEFICIENCY DUE TO CHEMOTHERAPY: ICD-10-CM

## 2024-04-29 DIAGNOSIS — Z79.899 IMMUNODEFICIENCY DUE TO CHEMOTHERAPY: ICD-10-CM

## 2024-04-29 DIAGNOSIS — N18.31 CHRONIC KIDNEY DISEASE, STAGE 3A: ICD-10-CM

## 2024-04-29 LAB
APTT PPP: 24.9 SEC (ref 21–32)
INR PPP: 0.9 (ref 0.8–1.2)
PROTHROMBIN TIME: 10.9 SEC (ref 9–12.5)

## 2024-04-29 PROCEDURE — 3288F FALL RISK ASSESSMENT DOCD: CPT | Mod: HCNC,CPTII,S$GLB, | Performed by: INTERNAL MEDICINE

## 2024-04-29 PROCEDURE — 3078F DIAST BP <80 MM HG: CPT | Mod: HCNC,CPTII,S$GLB, | Performed by: INTERNAL MEDICINE

## 2024-04-29 PROCEDURE — 1125F AMNT PAIN NOTED PAIN PRSNT: CPT | Mod: HCNC,CPTII,S$GLB, | Performed by: INTERNAL MEDICINE

## 2024-04-29 PROCEDURE — 99214 OFFICE O/P EST MOD 30 MIN: CPT | Mod: HCNC,S$GLB,, | Performed by: INTERNAL MEDICINE

## 2024-04-29 PROCEDURE — 1160F RVW MEDS BY RX/DR IN RCRD: CPT | Mod: HCNC,CPTII,S$GLB, | Performed by: INTERNAL MEDICINE

## 2024-04-29 PROCEDURE — 36415 COLL VENOUS BLD VENIPUNCTURE: CPT | Mod: HCNC | Performed by: INTERNAL MEDICINE

## 2024-04-29 PROCEDURE — 1101F PT FALLS ASSESS-DOCD LE1/YR: CPT | Mod: HCNC,CPTII,S$GLB, | Performed by: INTERNAL MEDICINE

## 2024-04-29 PROCEDURE — 85730 THROMBOPLASTIN TIME PARTIAL: CPT | Mod: HCNC | Performed by: INTERNAL MEDICINE

## 2024-04-29 PROCEDURE — 3074F SYST BP LT 130 MM HG: CPT | Mod: HCNC,CPTII,S$GLB, | Performed by: INTERNAL MEDICINE

## 2024-04-29 PROCEDURE — 99999 PR PBB SHADOW E&M-EST. PATIENT-LVL IV: CPT | Mod: PBBFAC,HCNC,, | Performed by: INTERNAL MEDICINE

## 2024-04-29 PROCEDURE — 1159F MED LIST DOCD IN RCRD: CPT | Mod: HCNC,CPTII,S$GLB, | Performed by: INTERNAL MEDICINE

## 2024-04-29 PROCEDURE — 85610 PROTHROMBIN TIME: CPT | Mod: HCNC | Performed by: INTERNAL MEDICINE

## 2024-04-29 NOTE — TELEPHONE ENCOUNTER
Spoke to patient and informed him of recent lab results as per Dr. Sparks.   Informed pt of new orders and scheduled new lab appt. @ [facility] as per pt's request.

## 2024-04-29 NOTE — PROGRESS NOTES
Subjective:       Patient ID: Homero Tanner is a 83 y.o. male.    Chief Complaint: Results, Chemotherapy, and Prostate Cancer    HPI: 83-year-old male history of metastatic prostate carcinoma patient continues with Lupron Zytiga and prednisone patient has marked bruising returns for clinical follow-up      Past Medical History:   Diagnosis Date    Abnormal ECG 6/24/2013    Adenomatous rectal polyp 12/7/2015    Aortic insufficiency 6/24/2013    Arthritis     Asthma     as a child    Benign neoplasm of cecum 2/27/2017    Benign neoplasm of transverse colon 2/27/2017    Cataract     CHF (congestive heart failure)     Pt states He's never been told this    Diverticulosis of large intestine without hemorrhage 2/27/2017    Encounter for blood transfusion     Frequent PVCs     Gallstones 3/28/2018    By Us done 3/21/2018    GERD (gastroesophageal reflux disease)     History of colon polyps     Hypertension     Iron deficiency anemia 10/26/2015    Lymphoma     waldenstrom's macroglobulinemia    Mixed hyperlipidemia 6/24/2013    Premature atrial contractions     Prostate cancer     PSVT (paroxysmal supraventricular tachycardia) 6/24/2013    Pulmonary hypertension 6/13/2016    PVC's (premature ventricular contractions) 6/13/2016    Rectal adenocarcinoma     Rectal cancer 5/1/2018    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    left parietal scalp    Thymoma     TR (tricuspid regurgitation) 6/13/2016    Tubular adenoma of colon 06/2020    VT (ventricular tachycardia) 9/18/2018     Family History   Problem Relation Name Age of Onset    Diabetes Father      Cataracts Mother      Amblyopia Neg Hx      Blindness Neg Hx      Cancer Neg Hx      Glaucoma Neg Hx      Hypertension Neg Hx      Macular degeneration Neg Hx      Retinal detachment Neg Hx      Strabismus Neg Hx      Stroke Neg Hx      Thyroid disease Neg Hx      Melanoma Neg Hx       Social History     Socioeconomic History    Marital status: Single   Tobacco Use     Smoking status: Former     Current packs/day: 0.00     Average packs/day: 1 pack/day for 15.0 years (15.0 ttl pk-yrs)     Types: Cigarettes     Start date: 1954     Quit date: 1969     Years since quittin.2    Smokeless tobacco: Never   Substance and Sexual Activity    Alcohol use: Yes     Alcohol/week: 3.0 standard drinks of alcohol     Types: 3 Cans of beer per week     Comment: socially, NO ALCOHOL 72 HOURS PRIOR TO SX    Drug use: No     Past Surgical History:   Procedure Laterality Date    biopsy for chest mass      BONE MARROW BIOPSY Left 2018    Procedure: Biopsy-bone marrow;  Surgeon: Charanjit Dacosta MD;  Location: [facility];  Service: General;  Laterality: Left;    CATARACT EXTRACTION W/  INTRAOCULAR LENS IMPLANT Right 2019    CATARACT EXTRACTION W/  INTRAOCULAR LENS IMPLANT Left 2019    COLON SURGERY      COLONOSCOPY N/A 10/26/2015    Procedure: Colonoscopy;  Surgeon: Abraham Hong MD;  Location: [facility];  Service: Endoscopy;  Laterality: N/A;    COLONOSCOPY Left 2017    Procedure: COLONOSCOPY;  Surgeon: Abraham Hong MD;  Location: [facility];  Service: Endoscopy;  Laterality: Left;    COLONOSCOPY N/A 2018    Procedure: hixtory of rectal cancer. Colonsocopy asked to be repeated minnie  year, alst one 2017;  Surgeon: Patricio Streeter MD;  Location: [facility];  Service: Endoscopy;  Laterality: N/A;    COLONOSCOPY N/A 2018    Procedure: COLONOSCOPY/hybrid APC or a EndoRotor device;  Surgeon: Rao Medeiros MD;  Location: [facility] ([address] [place] [address]);  Service: Endoscopy;  Laterality: N/A;    COLONOSCOPY N/A 2020    Procedure: COLONOSCOPY;  Surgeon: Yu Wells MD;  Location: [facility];  Service: Endoscopy;  Laterality: N/A;    ESOPHAGOGASTRODUODENOSCOPY N/A 10/22/2019    Procedure: ESOPHAGOGASTRODUODENOSCOPY (EGD);  Surgeon: Mariela Greer MD;  Location: [facility];  Service: Endoscopy;  Laterality: N/A;    FLEXIBLE SIGMOIDOSCOPY Left 2019     Procedure: SIGMOIDOSCOPY, FLEXIBLE;  Surgeon: Ranjit Will MD;  Location: [facility] ENDO;  Service: Endoscopy;  Laterality: Left;    HERNIA REPAIR Right 02/2018    Inguinal, open with mesh    mohs      PROSTATECTOMY      RECTAL SURGERY N/A 09/2014    REPAIR OF SLIDING INGUINAL HERNIA Right 2/26/2019    Procedure: REPAIR, HERNIA, INGUINAL, SLIDING;  Surgeon: Bridger Alvarez MD;  Location: [facility] OR;  Service: General;  Laterality: Right;    SKIN BIOPSY      TONSILLECTOMY      TUMOR REMOVAL         Labs:  Lab Results   Component Value Date    WBC 10.82 04/25/2024    HGB 11.4 (L) 04/25/2024    HCT 34.7 (L) 04/25/2024    MCV 89 04/25/2024     04/25/2024     BMP  Lab Results   Component Value Date     04/25/2024    K 3.5 04/25/2024    CL 94 (L) 04/25/2024    CO2 31 (H) 04/25/2024    BUN 33 (H) 04/25/2024    CREATININE 1.8 (H) 04/25/2024    CALCIUM 9.6 04/25/2024    ANIONGAP 13 04/25/2024    ESTGFRAFRICA 60 03/09/2022    EGFRNONAA 52 (A) 03/09/2022     Lab Results   Component Value Date    ALT 10 04/25/2024    AST 13 04/25/2024     (H) 12/14/2020    ALKPHOS 67 04/25/2024    BILITOT 1.2 (H) 04/25/2024       Lab Results   Component Value Date    IRON 49 02/18/2020    TIBC 271 02/18/2020    FERRITIN 349 (H) 02/18/2020     Lab Results   Component Value Date    BTWIEXSM04 508 02/09/2023     Lab Results   Component Value Date    FOLATE 8.8 02/09/2023     Lab Results   Component Value Date    TSH 1.726 03/09/2022         Review of Systems   Constitutional:  Negative for activity change, appetite change, chills, diaphoresis, fatigue, fever and unexpected weight change.   HENT:  Negative for congestion, dental problem, drooling, ear discharge, ear pain, facial swelling, hearing loss, mouth sores, nosebleeds, postnasal drip, rhinorrhea, sinus pressure, sneezing, sore throat, tinnitus, trouble swallowing and voice change.    Eyes:  Negative for photophobia, pain, discharge, redness, itching and visual  disturbance.   Respiratory:  Negative for apnea, cough, choking, chest tightness, shortness of breath, wheezing and stridor.    Cardiovascular:  Negative for chest pain, palpitations and leg swelling.   Gastrointestinal:  Negative for abdominal distention, abdominal pain, anal bleeding, blood in stool, constipation, diarrhea, nausea, rectal pain and vomiting.   Endocrine: Negative for cold intolerance, heat intolerance, polydipsia, polyphagia and polyuria.   Genitourinary:  Negative for decreased urine volume, difficulty urinating, dysuria, enuresis, flank pain, frequency, genital sores, hematuria, penile discharge, penile pain, penile swelling, scrotal swelling, testicular pain and urgency.   Musculoskeletal:  Negative for arthralgias, back pain, gait problem, joint swelling, myalgias, neck pain and neck stiffness.   Skin:  Positive for rash. Negative for color change, pallor and wound.   Allergic/Immunologic: Negative for environmental allergies, food allergies and immunocompromised state.   Neurological:  Positive for weakness. Negative for dizziness, tremors, seizures, syncope, facial asymmetry, speech difficulty, light-headedness, numbness and headaches.   Hematological:  Negative for adenopathy. Does not bruise/bleed easily.   Psychiatric/Behavioral:  Negative for agitation, behavioral problems, confusion, decreased concentration, dysphoric mood, hallucinations, self-injury, sleep disturbance and suicidal ideas. The patient is not nervous/anxious and is not hyperactive.        Objective:      Physical Exam  Vitals reviewed.   Constitutional:       General: He is not in acute distress.     Appearance: He is well-developed. He is not diaphoretic.   HENT:      Head: Normocephalic.      Right Ear: External ear normal.      Left Ear: External ear normal.      Nose: Nose normal.      Right Sinus: No maxillary sinus tenderness or frontal sinus tenderness.      Left Sinus: No maxillary sinus tenderness or frontal  sinus tenderness.      Mouth/Throat:      Pharynx: No oropharyngeal exudate.   Eyes:      General: Lids are normal. No scleral icterus.        Right eye: No discharge.         Left eye: No discharge.      Extraocular Movements:      Right eye: Normal extraocular motion.      Left eye: Normal extraocular motion.      Conjunctiva/sclera:      Right eye: Right conjunctiva is not injected. No hemorrhage.     Left eye: Left conjunctiva is not injected. No hemorrhage.     Pupils: Pupils are equal, round, and reactive to light.   Neck:      Thyroid: No thyromegaly.      Vascular: No JVD.      Trachea: No tracheal deviation.   Cardiovascular:      Rate and Rhythm: Normal rate.   Pulmonary:      Effort: Pulmonary effort is normal. No respiratory distress.      Breath sounds: No stridor.   Abdominal:      General: Bowel sounds are normal.      Palpations: Abdomen is soft. There is no hepatomegaly, splenomegaly or mass.      Tenderness: There is no abdominal tenderness.   Musculoskeletal:         General: No tenderness. Normal range of motion.      Cervical back: Normal range of motion and neck supple.   Lymphadenopathy:      Head:      Right side of head: No posterior auricular or occipital adenopathy.      Left side of head: No posterior auricular or occipital adenopathy.      Cervical: No cervical adenopathy.      Right cervical: No superficial, deep or posterior cervical adenopathy.     Left cervical: No superficial, deep or posterior cervical adenopathy.      Upper Body:      Right upper body: No supraclavicular adenopathy.      Left upper body: No supraclavicular adenopathy.   Skin:     General: Skin is dry.      Findings: Bruising present. No erythema or rash.      Nails: There is no clubbing.   Neurological:      Mental Status: He is alert and oriented to person, place, and time.      Cranial Nerves: No cranial nerve deficit.      Coordination: Coordination normal.   Psychiatric:         Behavior: Behavior normal.          Thought Content: Thought content normal.         Judgment: Judgment normal.             Assessment:      1. Bruising    2. Prostate cancer metastatic to bone    3. History of Waldenstrom's macroglobulinemia    4. Immunodeficiency due to chemotherapy    5. Chronic kidney disease, stage 3a           Med Onc Chart Routing      Follow up with physician . Return to clinic in 2-3 months with CBC CMP PSA and testosterone prior   Follow up with [facility]    Infusion scheduling note    Injection scheduling note    Labs   Scheduling:  Preferred lab:  Lab interval:  Patient needs quantitative immunoglobulins done now   Imaging    Pharmacy appointment    Other referrals                   Plan:     Marked bruising.  Told to decrease his prednisone to 5 mg daily.  I checked INR PTT both normal.  Will ask that he come back to get quantitative immunoglobulins drawn to see if his previous history of Waldenstrom's could affect this easy bruisability.  At this point otherwise follow-up in 2-3 months with  CBC CMP PSA and testosterone prior        Dustin Sparks Jr, MD FACP

## 2024-04-29 NOTE — TELEPHONE ENCOUNTER
----- Message from Dustin Sparks MD sent at 4/29/2024  2:50 PM CDT -----  Please let the patient know that his PT in PTT that I drew today were both normal.  In let him know I was reviewing his chart in the history of Waldenstrom's last time we checked for this was about a year ago I would like to do quantitative immunoglobulins on him.  This may be causing his bruising.  So please see if we can get this test drawn as soon as possible and I will communicate the results to him before his next visit

## 2024-04-30 ENCOUNTER — OFFICE VISIT (OUTPATIENT)
Dept: INTERNAL MEDICINE | Facility: CLINIC | Age: 84
End: 2024-04-30
Payer: MEDICARE

## 2024-04-30 ENCOUNTER — LAB VISIT (OUTPATIENT)
Dept: LAB | Facility: HOSPITAL | Age: 84
End: 2024-04-30
Attending: INTERNAL MEDICINE
Payer: MEDICARE

## 2024-04-30 VITALS
OXYGEN SATURATION: 98 % | DIASTOLIC BLOOD PRESSURE: 72 MMHG | SYSTOLIC BLOOD PRESSURE: 120 MMHG | WEIGHT: 158.75 LBS | BODY MASS INDEX: 22.78 KG/M2 | HEART RATE: 60 BPM

## 2024-04-30 DIAGNOSIS — I10 ESSENTIAL HYPERTENSION: Primary | ICD-10-CM

## 2024-04-30 DIAGNOSIS — C79.51 PROSTATE CANCER METASTATIC TO BONE: ICD-10-CM

## 2024-04-30 DIAGNOSIS — C61 PROSTATE CANCER METASTATIC TO BONE: ICD-10-CM

## 2024-04-30 DIAGNOSIS — C78.02 MALIGNANT NEOPLASM METASTATIC TO BOTH LUNGS: ICD-10-CM

## 2024-04-30 DIAGNOSIS — Z85.79 HISTORY OF WALDENSTROM'S MACROGLOBULINEMIA: ICD-10-CM

## 2024-04-30 DIAGNOSIS — C78.01 MALIGNANT NEOPLASM METASTATIC TO BOTH LUNGS: ICD-10-CM

## 2024-04-30 DIAGNOSIS — I50.32 CHRONIC DIASTOLIC HEART FAILURE: ICD-10-CM

## 2024-04-30 DIAGNOSIS — E78.2 MIXED HYPERLIPIDEMIA: ICD-10-CM

## 2024-04-30 DIAGNOSIS — R60.0 EDEMA OF BOTH LEGS: ICD-10-CM

## 2024-04-30 PROBLEM — J47.9 BRONCHIECTASIS WITHOUT COMPLICATION: Status: RESOLVED | Noted: 2019-02-21 | Resolved: 2024-04-30

## 2024-04-30 LAB
CHOLEST SERPL-MCNC: 187 MG/DL (ref 120–199)
CHOLEST/HDLC SERPL: 1.9 {RATIO} (ref 2–5)
HDLC SERPL-MCNC: 98 MG/DL (ref 40–75)
HDLC SERPL: 52.4 % (ref 20–50)
IGA SERPL-MCNC: 125 MG/DL (ref 40–350)
IGG SERPL-MCNC: 386 MG/DL (ref 650–1600)
IGM SERPL-MCNC: 416 MG/DL (ref 50–300)
LDLC SERPL CALC-MCNC: 74 MG/DL (ref 63–159)
NONHDLC SERPL-MCNC: 89 MG/DL
TRIGL SERPL-MCNC: 75 MG/DL (ref 30–150)

## 2024-04-30 PROCEDURE — 36415 COLL VENOUS BLD VENIPUNCTURE: CPT | Mod: HCNC,PO | Performed by: INTERNAL MEDICINE

## 2024-04-30 PROCEDURE — 99214 OFFICE O/P EST MOD 30 MIN: CPT | Mod: HCNC,S$GLB,, | Performed by: NURSE PRACTITIONER

## 2024-04-30 PROCEDURE — 3074F SYST BP LT 130 MM HG: CPT | Mod: HCNC,CPTII,S$GLB, | Performed by: NURSE PRACTITIONER

## 2024-04-30 PROCEDURE — 3078F DIAST BP <80 MM HG: CPT | Mod: HCNC,CPTII,S$GLB, | Performed by: NURSE PRACTITIONER

## 2024-04-30 PROCEDURE — 82784 ASSAY IGA/IGD/IGG/IGM EACH: CPT | Mod: 59,HCNC | Performed by: INTERNAL MEDICINE

## 2024-04-30 PROCEDURE — 99999 PR PBB SHADOW E&M-EST. PATIENT-LVL III: CPT | Mod: PBBFAC,HCNC,, | Performed by: NURSE PRACTITIONER

## 2024-04-30 PROCEDURE — 1159F MED LIST DOCD IN RCRD: CPT | Mod: HCNC,CPTII,S$GLB, | Performed by: NURSE PRACTITIONER

## 2024-04-30 PROCEDURE — 3288F FALL RISK ASSESSMENT DOCD: CPT | Mod: HCNC,CPTII,S$GLB, | Performed by: NURSE PRACTITIONER

## 2024-04-30 PROCEDURE — 1126F AMNT PAIN NOTED NONE PRSNT: CPT | Mod: HCNC,CPTII,S$GLB, | Performed by: NURSE PRACTITIONER

## 2024-04-30 PROCEDURE — 80061 LIPID PANEL: CPT | Mod: HCNC | Performed by: NURSE PRACTITIONER

## 2024-04-30 PROCEDURE — 1101F PT FALLS ASSESS-DOCD LE1/YR: CPT | Mod: HCNC,CPTII,S$GLB, | Performed by: NURSE PRACTITIONER

## 2024-04-30 PROCEDURE — 1160F RVW MEDS BY RX/DR IN RCRD: CPT | Mod: HCNC,CPTII,S$GLB, | Performed by: NURSE PRACTITIONER

## 2024-04-30 NOTE — PROGRESS NOTES
Subjective:       Patient ID: Homero Tanner is a 83 y.o. male.    Chief Complaint: Follow-up    Pt presents to clinic today for follow up  Pt  has history of hypertension, hyperlipidemia, prostate cancer with Mets to the bone and to the lungs.    He also has history of PACs for which he follows with cardio     Prostate cancer at this point is the primary concerned that he has.    He is following with Hematology-Oncology.    His PSA has steadily increased over the last 2 checks.    He is cautiously optimistic at this point.    Current treatment includes Lupron Zytiga and prednisone  He is not having any significant symptoms associated with the metastatic lesions with the exception of the increase in pain     Follows with Dr. Wright for his heart    He notes no problems with palpitations chest pain or lightheadedness or near-syncope.    He states that he is not noticed any significant cardiac issues.      Had some chronic wounds to his lower legs  Was seeing wound care and was discharged yesterday  Wounds have healed  He has lots of bruising and some swelling but reports this as a normal occurrence for him         /72   Pulse 60   Wt 72 kg (158 lb 11.7 oz)   SpO2 98%   BMI 22.78 kg/m²     Review of Systems   Constitutional:  Negative for fever and unexpected weight change.   Respiratory:  Negative for cough, shortness of breath and wheezing.    Cardiovascular:  Negative for chest pain and palpitations.   Gastrointestinal:  Negative for constipation, diarrhea, nausea and vomiting.   Genitourinary:  Negative for dysuria and hematuria.   Skin:  Positive for color change.       Objective:      Physical Exam  Vitals reviewed.   Constitutional:       Appearance: He is well-developed. He is ill-appearing.   HENT:      Head: Normocephalic and atraumatic.      Right Ear: Tympanic membrane, ear canal and external ear normal.      Left Ear: Tympanic membrane, ear canal and external ear normal.   Eyes:       Pupils: Pupils are equal, round, and reactive to light.   Neck:      Thyroid: No thyromegaly.   Cardiovascular:      Rate and Rhythm: Normal rate and regular rhythm.      Heart sounds: Normal heart sounds. No murmur heard.     No friction rub. No gallop.   Pulmonary:      Effort: Pulmonary effort is normal.      Breath sounds: Normal breath sounds. No wheezing or rales.   Abdominal:      General: Bowel sounds are normal. There is no distension.      Palpations: Abdomen is soft.      Tenderness: There is no abdominal tenderness.   Musculoskeletal:      Cervical back: Neck supple.      Right lower leg: Edema present.      Left lower leg: Edema present.   Skin:     Findings: Bruising and ecchymosis present.      Comments: Scattered bruising noted to bilateral arms and legs    Psychiatric:         Mood and Affect: Mood normal.         Assessment:       1. Essential hypertension    2. Mixed hyperlipidemia    3. Prostate cancer metastatic to bone    4. Malignant neoplasm metastatic to both lungs    5. Chronic diastolic heart failure    6. Edema of both legs        Plan:       Homero was seen today for follow-up.    Diagnoses and all orders for this visit:    Essential hypertension    Mixed hyperlipidemia  -     Lipid Panel; Future    Prostate cancer metastatic to bone    Malignant neoplasm metastatic to both lungs    Chronic diastolic heart failure    Edema of both legs      Chronic conditions stable at this time  Will add lipids to labs Dr. Sparks is doing today  Continuing following with oncology   See Dr. Cortés in 6 months and PRN

## 2024-05-01 ENCOUNTER — TELEPHONE (OUTPATIENT)
Dept: HEMATOLOGY/ONCOLOGY | Facility: CLINIC | Age: 84
End: 2024-05-01
Payer: MEDICARE

## 2024-05-01 NOTE — TELEPHONE ENCOUNTER
4/29/2024     1:44 PM 1/22/2024    11:24 AM 11/28/2023     8:08 AM 9/11/2023     1:16 PM 7/6/2023     6:58 AM 6/20/2023     7:00 AM 5/11/2023     9:05 AM   DISTRESS SCREENING   Distress Score 5 0 - No Distress 0 - No Distress 1 0 - No Distress 10 - Extreme Distress 0 - No Distress   Practical Concerns None of these None of these None of these None of these None of these None of these None of these   Social Concerns None of these None of these None of these None of these None of these None of these None of these   Emotional Concerns Worry or anxiety None of these None of these None of these None of these None of these None of these   Retire Spiritual or Mu-ism Concerns   No No  No No   Spiritual or Mu-ism Concerns None of these None of these        Physical Concerns None of these None of these None of these Breathing Fatigue None of these None of these     SW intern spoke with pt regarding distress score of 5 due to emotional concerns. Pt reported his anxiety stems from his diagnosis and concerns about his time left. SW intern educated pt on  support group and individual counseling available. Pt reported he was uninterested at this time and he would contact  if needed. Pt reported he may be interested if his distress score if over a 5 and to reach out if SS notices it is. SW intern encouraged pt to contact  as needed. SW intern will remain available.

## 2024-05-27 DIAGNOSIS — I50.32 CHRONIC DIASTOLIC HEART FAILURE: ICD-10-CM

## 2024-05-28 RX ORDER — FUROSEMIDE 20 MG/1
TABLET ORAL
Qty: 30 TABLET | Refills: 11 | Status: SHIPPED | OUTPATIENT
Start: 2024-05-28

## 2024-06-06 ENCOUNTER — TELEPHONE (OUTPATIENT)
Dept: INTERNAL MEDICINE | Facility: CLINIC | Age: 84
End: 2024-06-06
Payer: MEDICARE

## 2024-06-06 NOTE — TELEPHONE ENCOUNTER
----- Message from Alyson Hay sent at 6/6/2024 12:31 PM CDT -----  Regarding: pt callback  Name of Who is Calling:Pt         What is the request in detail: Requesting callback in regards to last visit   Pls Advise           Can the clinic reply by [facility]: yes         What Number to [facility] Back [facility]:Telephone Information:  [employer]          [phone]

## 2024-06-06 NOTE — TELEPHONE ENCOUNTER
Spoke to pt.  Pt states that he has completed wound care with the [facility] at [facility].  Pt reports his leg is still swollen and the last provider to discuss the leg with him was Ms. Gonzalez.  Pt would like Ms. Gonzalez's advice on next step[s and any referrals she feels are necessary.

## 2024-06-07 NOTE — TELEPHONE ENCOUNTER
Pt is reporting unrelieved edema in leg.  It looks like pt is due for a f/u w/ you.  Please reach out to pt and assist with scheduling

## 2024-06-07 NOTE — TELEPHONE ENCOUNTER
Our last visit was in April. At that time he reported that the wounds were healing and he was finished wound care.    If he has active wounds, he needs another visit.    If he is just swelling, then looks like he is due for his cardiology follow up with DR. Kyle Hancock

## 2024-06-11 ENCOUNTER — OFFICE VISIT (OUTPATIENT)
Dept: CARDIOLOGY | Facility: CLINIC | Age: 84
End: 2024-06-11
Payer: MEDICARE

## 2024-06-11 ENCOUNTER — LAB VISIT (OUTPATIENT)
Dept: LAB | Facility: HOSPITAL | Age: 84
End: 2024-06-11
Attending: INTERNAL MEDICINE
Payer: MEDICARE

## 2024-06-11 VITALS
SYSTOLIC BLOOD PRESSURE: 110 MMHG | DIASTOLIC BLOOD PRESSURE: 66 MMHG | BODY MASS INDEX: 23.42 KG/M2 | WEIGHT: 163.56 LBS | HEIGHT: 70 IN | HEART RATE: 80 BPM

## 2024-06-11 DIAGNOSIS — I10 ESSENTIAL HYPERTENSION: Chronic | ICD-10-CM

## 2024-06-11 DIAGNOSIS — I47.10 PSVT (PAROXYSMAL SUPRAVENTRICULAR TACHYCARDIA): ICD-10-CM

## 2024-06-11 DIAGNOSIS — C61 PROSTATE CANCER METASTATIC TO BONE: ICD-10-CM

## 2024-06-11 DIAGNOSIS — R60.0 EDEMA OF BOTH LEGS: ICD-10-CM

## 2024-06-11 DIAGNOSIS — I70.0 AORTIC ATHEROSCLEROSIS: ICD-10-CM

## 2024-06-11 DIAGNOSIS — C79.51 PROSTATE CANCER METASTATIC TO BONE: ICD-10-CM

## 2024-06-11 DIAGNOSIS — I48.0 PAROXYSMAL ATRIAL FIBRILLATION: ICD-10-CM

## 2024-06-11 DIAGNOSIS — E78.2 MIXED HYPERLIPIDEMIA: ICD-10-CM

## 2024-06-11 DIAGNOSIS — I27.20 PULMONARY HYPERTENSION: Chronic | ICD-10-CM

## 2024-06-11 DIAGNOSIS — N18.31 CHRONIC KIDNEY DISEASE, STAGE 3A: ICD-10-CM

## 2024-06-11 DIAGNOSIS — I35.1 NONRHEUMATIC AORTIC VALVE INSUFFICIENCY: Chronic | ICD-10-CM

## 2024-06-11 DIAGNOSIS — I49.1 PREMATURE ATRIAL CONTRACTIONS: ICD-10-CM

## 2024-06-11 DIAGNOSIS — R94.31 ABNORMAL ECG: Chronic | ICD-10-CM

## 2024-06-11 DIAGNOSIS — I48.0 PAROXYSMAL ATRIAL FIBRILLATION: Primary | ICD-10-CM

## 2024-06-11 DIAGNOSIS — I50.32 CHRONIC DIASTOLIC HEART FAILURE: ICD-10-CM

## 2024-06-11 DIAGNOSIS — I36.1 NONRHEUMATIC TRICUSPID VALVE REGURGITATION: ICD-10-CM

## 2024-06-11 DIAGNOSIS — I73.9 PAD (PERIPHERAL ARTERY DISEASE): ICD-10-CM

## 2024-06-11 PROCEDURE — 36415 COLL VENOUS BLD VENIPUNCTURE: CPT | Performed by: INTERNAL MEDICINE

## 2024-06-11 PROCEDURE — 83880 ASSAY OF NATRIURETIC PEPTIDE: CPT | Performed by: INTERNAL MEDICINE

## 2024-06-11 PROCEDURE — 80053 COMPREHEN METABOLIC PANEL: CPT | Performed by: INTERNAL MEDICINE

## 2024-06-11 PROCEDURE — 99999 PR PBB SHADOW E&M-EST. PATIENT-LVL III: CPT | Mod: PBBFAC,HCNC,, | Performed by: INTERNAL MEDICINE

## 2024-06-11 NOTE — PROGRESS NOTES
Subjective:    Patient ID:  Homero Tanner is a 83 y.o. male who presents for evaluation of Congestive Heart Failure        HPIPt presents for eval.   His current medical conditions include diastolic CHF, PAD, Waldenstrom's macroglobulinemia lymphoma,h/o thymoma, HTN, DD, CRI, PHTN, AS, AI, PVCs, TR, dyslipidemia, metastatic prostate cancer, h/o rectal cancer.   Nonsmoker.    Past hx pertinent for following:  Had LHC 20+ years ago, no significant blockages noted.    He has chronic abnl ecgs.   Also has had PSVT with stress testing in past.    He has h/o thymoma surgery with xrt and also had rectal polyp surgery.  - Stress MPI Aug 2017.  Echo Aug 2017 normal EF, DD, mild-mod PHTN, mild AI, LVH, mild RVE.  Echo 9/18 EF 50%, normal diastolic function, mild PHTN, LVH.  Taken off statin for abnl LFTs at one point, eventually restarted.  Echo 8/21: EF 50%, DD, mild AS, mild AI, mod-severe TR, mild-mod MR, biatrial enlargement, mod PHTN,  48 hour Holter 8/21: NSR, frequent PVCs, frequent PACs, nonsustained AT.  Now here.  Pt last seen March 2023.  Prostate cancer came back since last appt, metastatic to lungs, spine.  Seeing heme/onc.  Pt states incurable.  He had ecg Jan 2024 a fib, anterior st-t abnl.  Bruises very easily all over his body.  Weight down since last visit.  Has a lot of leg edema.  Has f/u w wound care [facility] until few months ago.  Did have multiple B LE vasc studies in 2024, no occlusive PAD and no DVT.  No angina.  Minimal BACH.  No pnd/orthopnea.           Past Medical History:   Diagnosis Date    Abnormal ECG 6/24/2013    Adenomatous rectal polyp 12/7/2015    Aortic insufficiency 6/24/2013    Arthritis     Asthma     as a child    Benign neoplasm of cecum 2/27/2017    Benign neoplasm of transverse colon 2/27/2017    Cataract     CHF (congestive heart failure)     Pt states He's never been told this    Diverticulosis of large intestine without hemorrhage 2/27/2017    Encounter for blood transfusion      Frequent PVCs     Gallstones 3/28/2018    By Us done 3/21/2018    GERD (gastroesophageal reflux disease)     History of colon polyps     Hypertension     Iron deficiency anemia 10/26/2015    Lymphoma     waldenstrom's macroglobulinemia    Mixed hyperlipidemia 6/24/2013    Premature atrial contractions     Prostate cancer     PSVT (paroxysmal supraventricular tachycardia) 6/24/2013    Pulmonary hypertension 6/13/2016    PVC's (premature ventricular contractions) 6/13/2016    Rectal adenocarcinoma     Rectal cancer 5/1/2018    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    left parietal scalp    Thymoma     TR (tricuspid regurgitation) 6/13/2016    Tubular adenoma of colon 06/2020    VT (ventricular tachycardia) 9/18/2018     Current Outpatient Medications   Medication Instructions    abiraterone (ZYTIGA) 250 mg Tab Take 4 tablets by mouth once daily as directed on an empty stomach    amLODIPine (NORVASC) 2.5 mg, Oral, 2 times daily    aspirin (ECOTRIN) 81 mg, Oral, Daily    chlorhexidine (PERIDEX) 0.12 % solution SMARTSIG:15 Milliliter(s) By Mouth Morning-Night    clobetasoL (TEMOVATE) 0.05 % external solution APPLY TO SCALP 1 TO 2 TIMES A DAY AS NEEDED FOR FLARES, DO NOT APPLY TO FACE OR FOLDS OF SKIN    cyanocobalamin 1,000 mcg/mL injection INJECT 1ML UNDER THE SKIN EVERY MONTH    erythromycin with ethanoL (EMGEL) 2 % gel APPLY TO THE AFFECTED AREA OF FACE TWICE DAILY FOR ROSACEA/ REDNESS    furosemide (LASIX) 20 MG tablet TAKE 1 TABLET ON TUESDAYS AND THURSDAYS.    furosemide (LASIX) 40 mg, Oral, Every Mon, Wed, Fri    ibuprofen (ADVIL,MOTRIN) 800 MG tablet Oral    leuprolide (LUPRON) 3.75 mg, Intramuscular, Every 3 months    metoprolol succinate (TOPROL-XL) 100 MG 24 hr tablet TAKE 1 TABLET(100 MG) BY MOUTH EVERY DAY    metronidazole 0.75% (METROCREAM) 0.75 % Crea APPLY TO AFFECTED AREA ON FACE TWICE DAILY    omeprazole (PRILOSEC) 20 mg, Oral, Daily    predniSONE (DELTASONE) 5 mg, Oral, 2 times daily, Take as  "directed with water on an empty stomach.    rosuvastatin (CRESTOR) 10 MG tablet TAKE 1 TABLET(10 MG) BY MOUTH EVERY DAY    sulfamethoxazole-trimethoprim 800-160mg (BACTRIM DS) 800-160 mg Tab 1 tablet, Every 12 hours         Review of Systems   Constitutional: Positive for weight loss.   HENT: Negative.     Eyes: Negative.    Cardiovascular:  Positive for dyspnea on exertion and leg swelling.   Respiratory:  Positive for shortness of breath.    Endocrine: Negative.    Hematologic/Lymphatic: Bruises/bleeds easily.   Skin: Negative.    Musculoskeletal:  Positive for arthritis.   Gastrointestinal: Negative.    Genitourinary: Negative.    Neurological:  Positive for weakness.   Psychiatric/Behavioral: Negative.     Allergic/Immunologic: Negative.           /66 (BP Location: Right arm, Patient Position: Sitting)   Pulse 80   Ht 5' 10" (1.778 m)   Wt 74.2 kg (163 lb 9.3 oz)   BMI 23.47 kg/m²     Wt Readings from Last 3 Encounters:   06/11/24 74.2 kg (163 lb 9.3 oz)   04/30/24 72 kg (158 lb 11.7 oz)   04/29/24 71.6 kg (157 lb 13.6 oz)     Temp Readings from Last 3 Encounters:   04/29/24 97.7 °F (36.5 °C) (Temporal)   01/22/24 97.8 °F (36.6 °C) (Temporal)   01/15/24 97.7 °F (36.5 °C) (Oral)     BP Readings from Last 3 Encounters:   06/11/24 110/66   04/30/24 120/72   04/29/24 121/77     Pulse Readings from Last 3 Encounters:   06/11/24 80   04/30/24 60   04/29/24 92       Objective:    Physical Exam  Vitals and nursing note reviewed.   Constitutional:       Appearance: He is well-developed.   HENT:      Head: Normocephalic.   Neck:      Thyroid: No thyromegaly.      Vascular: Normal carotid pulses. No carotid bruit or JVD.   Cardiovascular:      Rate and Rhythm: Normal rate. Rhythm irregularly irregular.      Pulses:           Radial pulses are 2+ on the right side and 2+ on the left side.      Heart sounds: S1 normal and S2 normal. Heart sounds not distant. No midsystolic click and no opening snap. Murmur heard. "      Systolic murmur is present.      No friction rub. No S3 or S4 sounds.   Pulmonary:      Effort: Pulmonary effort is normal.      Breath sounds: Normal breath sounds. No wheezing or rales.   Abdominal:      General: Bowel sounds are normal. There is no distension or abdominal bruit.      Palpations: Abdomen is soft.      Tenderness: There is no abdominal tenderness.   Musculoskeletal:      Cervical back: Neck supple.      Right lower leg: Edema present.      Left lower leg: Edema present.   Skin:     Findings: Bruising present.   Neurological:      Mental Status: He is alert and oriented to person, place, and time.   Psychiatric:         Behavior: Behavior normal.         I have reviewed all pertinent labs and cardiac studies.      Component      Latest Ref Rng 5/11/2023   BNP      0 - 99 pg/mL 331 (H)       Legend:  (H) High    Component      Latest Ref Rng & Units 3/6/2023   BNP      0 - 99 pg/mL 638 (H)         Chemistry        Component Value Date/Time     04/25/2024 1121    K 3.5 04/25/2024 1121    CL 94 (L) 04/25/2024 1121    CO2 31 (H) 04/25/2024 1121    BUN 33 (H) 04/25/2024 1121    CREATININE 1.8 (H) 04/25/2024 1121     (H) 04/25/2024 1121        Component Value Date/Time    CALCIUM 9.6 04/25/2024 1121    ALKPHOS 67 04/25/2024 1121    AST 13 04/25/2024 1121    ALT 10 04/25/2024 1121    BILITOT 1.2 (H) 04/25/2024 1121    ESTGFRAFRICA 60 03/09/2022 0858    EGFRNONAA 52 (A) 03/09/2022 0858        Lab Results   Component Value Date    WBC 10.82 04/25/2024    HGB 11.4 (L) 04/25/2024    HCT 34.7 (L) 04/25/2024    MCV 89 04/25/2024     04/25/2024       Lab Results   Component Value Date    HGBA1C 5.2 04/28/2023       Lab Results   Component Value Date    CHOL 187 04/30/2024    CHOL 189 04/28/2023    CHOL 187 02/02/2022     Lab Results   Component Value Date    HDL 98 (H) 04/30/2024    HDL 76 (H) 04/28/2023    HDL 75 02/02/2022     Lab Results   Component Value Date    LDLCALC 74.0  04/30/2024    LDLCALC 99.8 04/28/2023    LDLCALC 98.0 02/02/2022     Lab Results   Component Value Date    TRIG 75 04/30/2024    TRIG 66 04/28/2023    TRIG 70 02/02/2022     Lab Results   Component Value Date    CHOLHDL 52.4 (H) 04/30/2024    CHOLHDL 40.2 04/28/2023    CHOLHDL 40.1 02/02/2022           Assessment:       1. Paroxysmal atrial fibrillation    2. Abnormal ECG    3. Nonrheumatic aortic valve insufficiency    4. Aortic atherosclerosis    5. Chronic kidney disease, stage 3a    6. Chronic diastolic heart failure    7. Essential hypertension    8. Edema of both legs    9. Mixed hyperlipidemia    10. Premature atrial contractions    11. Pulmonary hypertension    12. PSVT (paroxysmal supraventricular tachycardia)    13. Prostate cancer metastatic to bone    14. Nonrheumatic tricuspid valve regurgitation    15. PAD (peripheral artery disease)           Plan:               Pt with decline in health since I last saw him in 2023.  Extensive metastatic prostate cancer issues with poor long term prognosis.  New PAF  noted this year.  Seems not to be good candidate for full anticoagulation -- has extensive bruising just on low dose aspirin alone.  Advised pt to take asa qd as in past and continue Toprol for rate control.  EP consult will be ordered as well.  His leg edema is significant, multifactorial for sure.  Repeat CMP, BNP today and will adjust diuretics accordingly.  Echocardiogram to be ordered.  Diastolic CHF: as above.  Low salt diet.  HTN: stop Amlodipine as BP is soft and likely will only worsen in future with his cancer, weight loss.  CRI: f/u w Nephrology or PCP.  Valvular heart disease: F/u echo.  Lipids: statin tx.  Aortic atherosclerosis: statin tx, asa.  PHTN: f/u echo in future.  Abnl ecg: monitor.  PACs/PVCs:Monitor.  Beta-blocker tx.  PAD: No occlusive stenosis on 2024 imaging. Med tx.     Phone review.    F/u 1 month.       I have reviewed all pertinent labs and cardiac studies independently.  Plans and recommendations have been formulated under my direct supervision. All questions answered and patient voiced understanding.       Complex visit.

## 2024-06-12 ENCOUNTER — TELEPHONE (OUTPATIENT)
Dept: CARDIOLOGY | Facility: HOSPITAL | Age: 84
End: 2024-06-12
Payer: MEDICARE

## 2024-06-12 DIAGNOSIS — I50.32 CHRONIC DIASTOLIC HEART FAILURE: Primary | ICD-10-CM

## 2024-06-12 LAB
ALBUMIN SERPL BCP-MCNC: 3.8 G/DL (ref 3.5–5.2)
ALP SERPL-CCNC: 68 U/L (ref 55–135)
ALT SERPL W/O P-5'-P-CCNC: 10 U/L (ref 10–44)
ANION GAP SERPL CALC-SCNC: 15 MMOL/L (ref 8–16)
AST SERPL-CCNC: 23 U/L (ref 10–40)
BILIRUB SERPL-MCNC: 1.1 MG/DL (ref 0.1–1)
BNP SERPL-MCNC: 865 PG/ML (ref 0–99)
BUN SERPL-MCNC: 25 MG/DL (ref 8–23)
CALCIUM SERPL-MCNC: 9.4 MG/DL (ref 8.7–10.5)
CHLORIDE SERPL-SCNC: 95 MMOL/L (ref 95–110)
CO2 SERPL-SCNC: 28 MMOL/L (ref 23–29)
CREAT SERPL-MCNC: 1.7 MG/DL (ref 0.5–1.4)
EST. GFR  (NO RACE VARIABLE): 39.8 ML/MIN/1.73 M^2
GLUCOSE SERPL-MCNC: 110 MG/DL (ref 70–110)
POTASSIUM SERPL-SCNC: 5.2 MMOL/L (ref 3.5–5.1)
PROT SERPL-MCNC: 6.4 G/DL (ref 6–8.4)
SODIUM SERPL-SCNC: 138 MMOL/L (ref 136–145)

## 2024-06-13 NOTE — TELEPHONE ENCOUNTER
Please call pt.  Labs reviewed.  BNP CHF lab higher.    Need to change Lasix dose.  Go to 40 mg daily (every day).  Will reassess CMP, BNP in 1 week after being on higher dose then make further decisions on Lasix dose.    Schedule CMP, BNP next Thurs 6/20/24.    Dr Wright

## 2024-06-13 NOTE — TELEPHONE ENCOUNTER
Please call pt.  Labs reviewed.  BNP CHF lab higher.    Need to change Lasix dose.  Go to 40 mg daily (every day).  Will reassess CMP, BNP in 1 week after being on higher dose then make further decisions on Lasix dose.    Schedule CMP, BNP next Thurs 6/20/24.    Dr Wright spoke to patient verbalized understanding labs and echo scheduled

## 2024-06-20 ENCOUNTER — HOSPITAL ENCOUNTER (OUTPATIENT)
Dept: CARDIOLOGY | Facility: HOSPITAL | Age: 84
Discharge: HOME | End: 2024-06-20
Attending: INTERNAL MEDICINE
Payer: MEDICARE

## 2024-06-20 VITALS
BODY MASS INDEX: 23.34 KG/M2 | DIASTOLIC BLOOD PRESSURE: 66 MMHG | WEIGHT: 163 LBS | SYSTOLIC BLOOD PRESSURE: 110 MMHG | HEIGHT: 70 IN

## 2024-06-20 DIAGNOSIS — C79.51 PROSTATE CANCER METASTATIC TO BONE: ICD-10-CM

## 2024-06-20 DIAGNOSIS — R60.0 EDEMA OF BOTH LEGS: ICD-10-CM

## 2024-06-20 DIAGNOSIS — E78.2 MIXED HYPERLIPIDEMIA: ICD-10-CM

## 2024-06-20 DIAGNOSIS — I36.1 NONRHEUMATIC TRICUSPID VALVE REGURGITATION: ICD-10-CM

## 2024-06-20 DIAGNOSIS — I48.0 PAROXYSMAL ATRIAL FIBRILLATION: ICD-10-CM

## 2024-06-20 DIAGNOSIS — I70.0 AORTIC ATHEROSCLEROSIS: ICD-10-CM

## 2024-06-20 DIAGNOSIS — I47.10 PSVT (PAROXYSMAL SUPRAVENTRICULAR TACHYCARDIA): ICD-10-CM

## 2024-06-20 DIAGNOSIS — I35.1 NONRHEUMATIC AORTIC VALVE INSUFFICIENCY: Chronic | ICD-10-CM

## 2024-06-20 DIAGNOSIS — I50.32 CHRONIC DIASTOLIC HEART FAILURE: ICD-10-CM

## 2024-06-20 DIAGNOSIS — I49.1 PREMATURE ATRIAL CONTRACTIONS: ICD-10-CM

## 2024-06-20 DIAGNOSIS — R94.31 ABNORMAL ECG: Chronic | ICD-10-CM

## 2024-06-20 DIAGNOSIS — I10 ESSENTIAL HYPERTENSION: Chronic | ICD-10-CM

## 2024-06-20 DIAGNOSIS — I73.9 PAD (PERIPHERAL ARTERY DISEASE): ICD-10-CM

## 2024-06-20 DIAGNOSIS — N18.31 CHRONIC KIDNEY DISEASE, STAGE 3A: ICD-10-CM

## 2024-06-20 DIAGNOSIS — C61 PROSTATE CANCER METASTATIC TO BONE: ICD-10-CM

## 2024-06-20 DIAGNOSIS — I27.20 PULMONARY HYPERTENSION: Chronic | ICD-10-CM

## 2024-06-20 PROCEDURE — 93306 TTE W/DOPPLER COMPLETE: CPT

## 2024-06-21 LAB
AV INDEX (PROSTH): 0.76
AV MEAN GRADIENT: 2 MMHG
AV PEAK GRADIENT: 4 MMHG
AV REGURGITATION PRESSURE HALF TIME: 647.73 MS
AV VALVE AREA BY VELOCITY RATIO: 2.04 CM²
AV VALVE AREA: 2.39 CM²
AV VELOCITY RATIO: 0.65
BSA FOR ECHO PROCEDURE: 1.91 M2
CV ECHO LV RWT: 0.33 CM
DOP CALC AO PEAK VEL: 1 M/S
DOP CALC AO VTI: 17.7 CM
DOP CALC LVOT AREA: 3.1 CM2
DOP CALC LVOT DIAMETER: 2 CM
DOP CALC LVOT PEAK VEL: 0.65 M/S
DOP CALC LVOT STROKE VOLUME: 42.39 CM3
DOP CALCLVOT PEAK VEL VTI: 13.5 CM
E/A RATIO: 0.21
ECHO LV POSTERIOR WALL: 0.85 CM (ref 0.6–1.1)
FRACTIONAL SHORTENING: 28 % (ref 28–44)
INTERVENTRICULAR SEPTUM: 1.06 CM (ref 0.6–1.1)
LA MAJOR: 6.99 CM
LA MINOR: 5.36 CM
LA WIDTH: 4.1 CM
LEFT ATRIUM AREA SYSTOLIC (APICAL 2 CHAMBER): 22.82 CM2
LEFT ATRIUM AREA SYSTOLIC (APICAL 4 CHAMBER): 32.03 CM2
LEFT ATRIUM SIZE: 5.51 CM
LEFT ATRIUM VOLUME INDEX MOD: 52.1 ML/M2
LEFT ATRIUM VOLUME INDEX: 61 ML/M2
LEFT ATRIUM VOLUME MOD: 99.51 CM3
LEFT ATRIUM VOLUME: 116.51 CM3
LEFT INTERNAL DIMENSION IN SYSTOLE: 3.72 CM (ref 2.1–4)
LEFT VENTRICLE DIASTOLIC VOLUME INDEX: 67.14 ML/M2
LEFT VENTRICLE DIASTOLIC VOLUME: 128.24 ML
LEFT VENTRICLE END SYSTOLIC VOLUME APICAL 2 CHAMBER: 72.78 ML
LEFT VENTRICLE END SYSTOLIC VOLUME APICAL 4 CHAMBER: 114.11 ML
LEFT VENTRICLE MASS INDEX: 95 G/M2
LEFT VENTRICLE SYSTOLIC VOLUME INDEX: 30.8 ML/M2
LEFT VENTRICLE SYSTOLIC VOLUME: 58.8 ML
LEFT VENTRICULAR INTERNAL DIMENSION IN DIASTOLE: 5.18 CM (ref 3.5–6)
LEFT VENTRICULAR MASS: 181.48 G
LVED V (TEICH): 128.24 ML
LVES V (TEICH): 58.8 ML
LVOT MG: 0.93 MMHG
LVOT MV: 0.46 CM/S
MV PEAK A VEL: 0.99 M/S
MV PEAK E VEL: 0.21 M/S
PISA AR MAX VEL: 3.42 M/S
PISA TR MAX VEL: 3.44 M/S
PV PEAK GRADIENT: 5 MMHG
PV PEAK VELOCITY: 1.13 M/S
RA MAJOR: 5.64 CM
RA PRESSURE ESTIMATED: 3 MMHG
RA WIDTH: 3.08 CM
RV TB RVSP: 6 MMHG
SINUS: 2.94 CM
STJ: 2.27 CM
TR MAX PG: 47 MMHG
TV REST PULMONARY ARTERY PRESSURE: 50 MMHG
Z-SCORE OF LEFT VENTRICULAR DIMENSION IN END DIASTOLE: -0.37
Z-SCORE OF LEFT VENTRICULAR DIMENSION IN END SYSTOLE: 0.92

## 2024-06-22 ENCOUNTER — TELEPHONE (OUTPATIENT)
Dept: CARDIOLOGY | Facility: CLINIC | Age: 84
End: 2024-06-22
Payer: MEDICARE

## 2024-06-22 DIAGNOSIS — I50.32 CHRONIC DIASTOLIC HEART FAILURE: Primary | ICD-10-CM

## 2024-06-22 NOTE — TELEPHONE ENCOUNTER
Please call pt  Echo shows mild weakness of heart.  BNP CHF lab remains elevated, stable renal function.    Continue Lasix but change from 40 mg daily to 60 mg daily dose.  Will need repeat CMP, BNP in 2 weeks to assess response.  Please schedule.    Dr Wright

## 2024-06-24 ENCOUNTER — TELEPHONE (OUTPATIENT)
Dept: CARDIOLOGY | Facility: CLINIC | Age: 84
End: 2024-06-24
Payer: MEDICARE

## 2024-06-24 DIAGNOSIS — C61 PROSTATE CANCER METASTATIC TO BONE: ICD-10-CM

## 2024-06-24 DIAGNOSIS — C79.51 PROSTATE CANCER METASTATIC TO BONE: ICD-10-CM

## 2024-06-24 RX ORDER — PREDNISONE 5 MG/1
TABLET ORAL
Qty: 60 TABLET | Refills: 11 | Status: SHIPPED | OUTPATIENT
Start: 2024-06-24

## 2024-06-24 NOTE — TELEPHONE ENCOUNTER
Please call pt  Echo shows mild weakness of heart.  BNP CHF lab remains elevated, stable renal function.     Continue Lasix but change from 40 mg daily to 60 mg daily dose.  Will need repeat CMP, BNP in 2 weeks to assess response.  Please schedule.     Dr Wright          Spoke to patient verbalized understanding labs scheduled

## 2024-07-08 ENCOUNTER — LAB VISIT (OUTPATIENT)
Dept: LAB | Facility: HOSPITAL | Age: 84
End: 2024-07-08
Attending: INTERNAL MEDICINE
Payer: MEDICARE

## 2024-07-08 DIAGNOSIS — I50.32 CHRONIC DIASTOLIC HEART FAILURE: ICD-10-CM

## 2024-07-08 PROCEDURE — 80053 COMPREHEN METABOLIC PANEL: CPT | Performed by: INTERNAL MEDICINE

## 2024-07-08 PROCEDURE — 83880 ASSAY OF NATRIURETIC PEPTIDE: CPT | Performed by: INTERNAL MEDICINE

## 2024-07-08 PROCEDURE — 36415 COLL VENOUS BLD VENIPUNCTURE: CPT | Performed by: INTERNAL MEDICINE

## 2024-07-09 LAB
ALBUMIN SERPL BCP-MCNC: 3.7 G/DL (ref 3.5–5.2)
ALP SERPL-CCNC: 63 U/L (ref 55–135)
ALT SERPL W/O P-5'-P-CCNC: 8 U/L (ref 10–44)
ANION GAP SERPL CALC-SCNC: 14 MMOL/L (ref 8–16)
AST SERPL-CCNC: 20 U/L (ref 10–40)
BILIRUB SERPL-MCNC: 1 MG/DL (ref 0.1–1)
BNP SERPL-MCNC: 509 PG/ML (ref 0–99)
BUN SERPL-MCNC: 24 MG/DL (ref 8–23)
CALCIUM SERPL-MCNC: 9.3 MG/DL (ref 8.7–10.5)
CHLORIDE SERPL-SCNC: 95 MMOL/L (ref 95–110)
CO2 SERPL-SCNC: 29 MMOL/L (ref 23–29)
CREAT SERPL-MCNC: 1.4 MG/DL (ref 0.5–1.4)
EST. GFR  (NO RACE VARIABLE): 50.2 ML/MIN/1.73 M^2
GLUCOSE SERPL-MCNC: 108 MG/DL (ref 70–110)
POTASSIUM SERPL-SCNC: 3.8 MMOL/L (ref 3.5–5.1)
PROT SERPL-MCNC: 6.3 G/DL (ref 6–8.4)
SODIUM SERPL-SCNC: 138 MMOL/L (ref 136–145)

## 2024-07-10 ENCOUNTER — PATIENT MESSAGE (OUTPATIENT)
Dept: CARDIOLOGY | Facility: CLINIC | Age: 84
End: 2024-07-10
Payer: MEDICARE

## 2024-07-10 ENCOUNTER — TELEPHONE (OUTPATIENT)
Dept: CARDIOLOGY | Facility: CLINIC | Age: 84
End: 2024-07-10
Payer: MEDICARE

## 2024-07-10 ENCOUNTER — TELEPHONE (OUTPATIENT)
Dept: CARDIOLOGY | Facility: HOSPITAL | Age: 84
End: 2024-07-10
Payer: MEDICARE

## 2024-07-10 DIAGNOSIS — I50.32 CHRONIC DIASTOLIC HEART FAILURE: Primary | ICD-10-CM

## 2024-07-10 NOTE — TELEPHONE ENCOUNTER
Pt was contacted about results:       Please call pt.  BNP CHF lab improved.  Stable renal function.     Continue Lasix 60 mg daily.  Recommend repeat CMP, BNP in 4 weeks.     Please schedule.     Dr Wright         Note     Labs were linked to Dr. Sparks scheduled on 8/1/24    Pt verbalized understanding with no questions or concerns.

## 2024-07-10 NOTE — TELEPHONE ENCOUNTER
Please call pt.  BNP CHF lab improved.  Stable renal function.    Continue Lasix 60 mg daily.  Recommend repeat CMP, BNP in 4 weeks.     Please schedule.     Dr Wright

## 2024-07-15 ENCOUNTER — TELEPHONE (OUTPATIENT)
Dept: CARDIOLOGY | Facility: CLINIC | Age: 84
End: 2024-07-15
Payer: MEDICARE

## 2024-07-15 DIAGNOSIS — C79.51 PROSTATE CANCER METASTATIC TO BONE: Primary | ICD-10-CM

## 2024-07-15 DIAGNOSIS — C61 PROSTATE CANCER METASTATIC TO BONE: Primary | ICD-10-CM

## 2024-07-15 RX ORDER — ABIRATERONE ACETATE 250 MG/1
TABLET ORAL
Qty: 120 TABLET | Refills: 11 | Status: CANCELLED | OUTPATIENT
Start: 2024-07-15

## 2024-07-15 RX ORDER — ABIRATERONE ACETATE 250 MG/1
TABLET ORAL
Qty: 120 TABLET | Refills: 11 | Status: SHIPPED | OUTPATIENT
Start: 2024-07-15

## 2024-07-15 NOTE — TELEPHONE ENCOUNTER
----- Message from Sofia Lin sent at 7/15/2024 11:34 AM CDT -----  States he is calling to confirm this appt. States he thought he was told that Ms Frost wasn't going to be in on 7/25. Please call pt [phone]. Thank you

## 2024-07-15 NOTE — TELEPHONE ENCOUNTER
----- Message from Claudia Cordova sent at 7/15/2024 11:22 AM CDT -----  .Type:  RX Refill Request    Who Called: .Homero Tanner   Refill or New Rx:refill  RX Name and Strength:abiraterone (ZYTIGA) 250 mg Tab   [[id number]]  How is the patient currently taking it? (ex. 1XDay):daily  Is this a 30 day or 90 day RX:    Preferred Pharmacy with phone number:[facility] [address]     Local or Mail Order:mail order  Ordering Provider:Bridger  Would the patient rather a call back or a response via [facility]? Call back  Best Call Back Number:.[phone]     Additional Information: Please call the patient regarding the refill because he is out of medciation

## 2024-07-19 NOTE — ANESTHESIA RELEASE NOTE
"Alert oriented, discussing 60s history. Stable EKG with sinus dysrhythmia. Anesthesia Release from PACU Note    Patient: Homero Tanner    Procedure(s) Performed: Procedure(s) (LRB):  COLONOSCOPY/hybrid APC or a EndoRotor device (N/A)    Anesthesia type: GEN    Post pain: Adequate analgesia reported    Post assessment: no apparent anesthetic complications, tolerated procedure well and no evidence of recall    Post vital signs: BP (!) 110/56 (BP Location: Left arm, Patient Position: Lying)   Pulse 70   Temp 36.5 °C (97.7 °F) (Temporal)   Resp 18   Ht 5' 10" (1.778 m)   Wt 84.8 kg (187 lb)   SpO2 97%   BMI 26.83 kg/m²     Level of consciousness: awake, alert and oriented    Nausea/Vomiting: no nausea/no vomiting    Complications: none    Airway Patency: patent    Respiratory: unassisted, spontaneous ventilation    Cardiovascular: stable and blood pressure at baseline    Hydration: euvolemic    " Message was sent via [employer]

## 2024-07-22 ENCOUNTER — PATIENT MESSAGE (OUTPATIENT)
Dept: CARDIOLOGY | Facility: CLINIC | Age: 84
End: 2024-07-22
Payer: MEDICARE

## 2024-07-23 DIAGNOSIS — I50.32 CHRONIC DIASTOLIC HEART FAILURE: Primary | ICD-10-CM

## 2024-07-25 ENCOUNTER — OFFICE VISIT (OUTPATIENT)
Dept: CARDIOLOGY | Facility: CLINIC | Age: 84
End: 2024-07-25
Payer: MEDICARE

## 2024-07-25 ENCOUNTER — PATIENT MESSAGE (OUTPATIENT)
Dept: CARDIOLOGY | Facility: CLINIC | Age: 84
End: 2024-07-25
Payer: MEDICARE

## 2024-07-25 VITALS
BODY MASS INDEX: 21.62 KG/M2 | HEIGHT: 70 IN | WEIGHT: 151 LBS | SYSTOLIC BLOOD PRESSURE: 108 MMHG | DIASTOLIC BLOOD PRESSURE: 76 MMHG | HEART RATE: 90 BPM

## 2024-07-25 DIAGNOSIS — I50.32 CHRONIC DIASTOLIC HEART FAILURE: ICD-10-CM

## 2024-07-25 DIAGNOSIS — I48.0 PAROXYSMAL ATRIAL FIBRILLATION: ICD-10-CM

## 2024-07-25 DIAGNOSIS — I35.1 NONRHEUMATIC AORTIC VALVE INSUFFICIENCY: Primary | Chronic | ICD-10-CM

## 2024-07-25 DIAGNOSIS — I73.9 PAD (PERIPHERAL ARTERY DISEASE): ICD-10-CM

## 2024-07-25 DIAGNOSIS — E78.2 MIXED HYPERLIPIDEMIA: ICD-10-CM

## 2024-07-25 DIAGNOSIS — I10 ESSENTIAL HYPERTENSION: Chronic | ICD-10-CM

## 2024-07-25 DIAGNOSIS — I27.20 PULMONARY HYPERTENSION: Chronic | ICD-10-CM

## 2024-07-25 DIAGNOSIS — I70.0 AORTIC ATHEROSCLEROSIS: ICD-10-CM

## 2024-07-25 DIAGNOSIS — I49.1 PREMATURE ATRIAL CONTRACTIONS: ICD-10-CM

## 2024-07-25 PROCEDURE — 99999 PR PBB SHADOW E&M-EST. PATIENT-LVL IV: CPT | Mod: PBBFAC,HCNC,,

## 2024-07-25 NOTE — PROGRESS NOTES
Subjective:   Patient ID:  Homero Tanner is a 84 y.o. male who presents for evaluation of No chief complaint on file.      HPI  85y/o M with PMHx of  diastolic CHF, PAD, Waldenstrom's macroglobulinemia lymphoma,h/o thymoma, HTN, DD, CRI, PHTN, AS, AI, PVCs, TR, dyslipidemia, metastatic prostate cancer, h/o rectal cancer. Followed by Dr. Wright in cardiology clinic. Currently undergoing prostate cancer treatment with mets to lungs, spine. Heme-onc states incurable. Recently found to have PAF, no on AC given extensive bruising jsut on low dose ASA, will be seeing EP tomorrow. Here today, leg edema has improved some, weight down 12lbs since last visit (multifactoral). BNP trended down since last visit, Crt stable. Echo EF 45-50%. Currently doing lasix 60mg daily  Past Medical History:   Diagnosis Date    Abnormal ECG 6/24/2013    Adenomatous rectal polyp 12/7/2015    Aortic insufficiency 6/24/2013    Arthritis     Asthma     as a child    Benign neoplasm of cecum 2/27/2017    Benign neoplasm of transverse colon 2/27/2017    Cataract     CHF (congestive heart failure)     Pt states He's never been told this    Diverticulosis of large intestine without hemorrhage 2/27/2017    Encounter for blood transfusion     Frequent PVCs     Gallstones 3/28/2018    By Us done 3/21/2018    GERD (gastroesophageal reflux disease)     History of colon polyps     Hypertension     Iron deficiency anemia 10/26/2015    Lymphoma     waldenstrom's macroglobulinemia    Mixed hyperlipidemia 6/24/2013    Premature atrial contractions     Prostate cancer     PSVT (paroxysmal supraventricular tachycardia) 6/24/2013    Pulmonary hypertension 6/13/2016    PVC's (premature ventricular contractions) 6/13/2016    Rectal adenocarcinoma     Rectal cancer 5/1/2018    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    left parietal scalp    Thymoma     TR (tricuspid regurgitation) 6/13/2016    Tubular adenoma of colon 06/2020    VT (ventricular tachycardia)  9/18/2018       Past Surgical History:   Procedure Laterality Date    biopsy for chest mass      BONE MARROW BIOPSY Left 7/11/2018    Procedure: Biopsy-bone marrow;  Surgeon: Charanjit Dacosta MD;  Location: [facility];  Service: General;  Laterality: Left;    CATARACT EXTRACTION W/  INTRAOCULAR LENS IMPLANT Right 01/03/2019    CATARACT EXTRACTION W/  INTRAOCULAR LENS IMPLANT Left 02/07/2019    COLON SURGERY      COLONOSCOPY N/A 10/26/2015    Procedure: Colonoscopy;  Surgeon: Abraham Hong MD;  Location: [facility];  Service: Endoscopy;  Laterality: N/A;    COLONOSCOPY Left 2/27/2017    Procedure: COLONOSCOPY;  Surgeon: Abraham Hong MD;  Location: [facility];  Service: Endoscopy;  Laterality: Left;    COLONOSCOPY N/A 5/1/2018    Procedure: hixtory of rectal cancer. Colonsocopy asked to be repeated minnie  year, alst one 2/2017;  Surgeon: Patricio Streeter MD;  Location: [facility];  Service: Endoscopy;  Laterality: N/A;    COLONOSCOPY N/A 6/27/2018    Procedure: COLONOSCOPY/hybrid APC or a EndoRotor device;  Surgeon: Rao Medeiros MD;  Location: [facility] ([address] [place] [address]);  Service: Endoscopy;  Laterality: N/A;    COLONOSCOPY N/A 6/22/2020    Procedure: COLONOSCOPY;  Surgeon: Yu Wells MD;  Location: [facility];  Service: Endoscopy;  Laterality: N/A;    ESOPHAGOGASTRODUODENOSCOPY N/A 10/22/2019    Procedure: ESOPHAGOGASTRODUODENOSCOPY (EGD);  Surgeon: Mariela Greer MD;  Location: [facility];  Service: Endoscopy;  Laterality: N/A;    FLEXIBLE SIGMOIDOSCOPY Left 4/23/2019    Procedure: SIGMOIDOSCOPY, FLEXIBLE;  Surgeon: Ranjit Will MD;  Location: [facility];  Service: Endoscopy;  Laterality: Left;    HERNIA REPAIR Right 02/2018    Inguinal, open with mesh    mohs      PROSTATECTOMY      RECTAL SURGERY N/A 09/2014    REPAIR OF SLIDING INGUINAL HERNIA Right 2/26/2019    Procedure: REPAIR, HERNIA, INGUINAL, SLIDING;  Surgeon: Bridger Alvarez MD;  Location: [facility];  Service: General;  Laterality: Right;     SKIN BIOPSY      TONSILLECTOMY      TUMOR REMOVAL         Social History     Tobacco Use    Smoking status: Former     Current packs/day: 0.00     Average packs/day: 1 pack/day for 15.0 years (15.0 ttl pk-yrs)     Types: Cigarettes     Start date: 1954     Quit date: 1969     Years since quittin.5     Passive exposure: Never    Smokeless tobacco: Never   Substance Use Topics    Alcohol use: Yes     Alcohol/week: 3.0 standard drinks of alcohol     Types: 3 Cans of beer per week     Comment: socially, NO ALCOHOL 72 HOURS PRIOR TO SX    Drug use: No       Family History   Problem Relation Name Age of Onset    Diabetes Father      Cataracts Mother      Amblyopia Neg Hx      Blindness Neg Hx      Cancer Neg Hx      Glaucoma Neg Hx      Hypertension Neg Hx      Macular degeneration Neg Hx      Retinal detachment Neg Hx      Strabismus Neg Hx      Stroke Neg Hx      Thyroid disease Neg Hx      Melanoma Neg Hx         Current Outpatient Medications on File Prior to Visit   Medication Sig Dispense Refill    abiraterone (ZYTIGA) 250 mg Tab Take 4 tablets by mouth once daily as directed on an empty stomach. 120 tablet 11    aspirin (ECOTRIN) 81 MG EC tablet Take 81 mg by mouth once daily.      clobetasoL (TEMOVATE) 0.05 % external solution APPLY TO SCALP 1 TO 2 TIMES A DAY AS NEEDED FOR FLARES, DO NOT APPLY TO FACE OR FOLDS OF SKIN 50 mL 5    cyanocobalamin 1,000 mcg/mL injection INJECT 1ML UNDER THE SKIN EVERY MONTH 10 mL 4    erythromycin with ethanoL (EMGEL) 2 % gel APPLY TO THE AFFECTED AREA OF FACE TWICE DAILY FOR ROSACEA/ REDNESS 60 g 3    furosemide (LASIX) 20 MG tablet TAKE 1 TABLET ON  AND . 30 tablet 11    furosemide (LASIX) 40 MG tablet Take 1 tablet (40 mg total) by mouth every Mon, Wed, Fri. 30 tablet 11    leuprolide (LUPRON) 3.75 mg injection Inject 3.75 mg into the muscle every 3 (three) months.       metoprolol succinate (TOPROL-XL) 100 MG 24 hr tablet TAKE 1 TABLET(100 MG) BY  MOUTH EVERY DAY 30 tablet 11    metronidazole 0.75% (METROCREAM) 0.75 % Crea APPLY TO AFFECTED AREA ON FACE TWICE DAILY 45 g 3    omeprazole (PRILOSEC) 20 MG capsule Take 1 capsule (20 mg total) by mouth once daily. 90 capsule 3    rosuvastatin (CRESTOR) 10 MG tablet TAKE 1 TABLET(10 MG) BY MOUTH EVERY DAY 90 tablet 3    chlorhexidine (PERIDEX) 0.12 % solution SMARTSIG:15 Milliliter(s) By Mouth Morning-Night (Patient not taking: Reported on 7/25/2024)      ibuprofen (ADVIL,MOTRIN) 800 MG tablet Take by mouth. (Patient not taking: Reported on 7/25/2024)      predniSONE (DELTASONE) 5 MG tablet TAKE 1 TABLET(5 MG) BY MOUTH TWICE DAILY WITH WATER AND ON AN EMPTY STOMACH AS DIRECTED (Patient not taking: Reported on 7/25/2024) 60 tablet 11    sulfamethoxazole-trimethoprim 800-160mg (BACTRIM DS) 800-160 mg Tab Take 1 tablet by mouth every 12 (twelve) hours. (Patient not taking: Reported on 4/30/2024)       No current facility-administered medications on file prior to visit.      Wt Readings from Last 3 Encounters:   07/25/24 68.5 kg (151 lb 0.2 oz)   06/20/24 73.9 kg (163 lb)   06/11/24 74.2 kg (163 lb 9.3 oz)     Temp Readings from Last 3 Encounters:   04/29/24 97.7 °F (36.5 °C) (Temporal)   01/22/24 97.8 °F (36.6 °C) (Temporal)   01/15/24 97.7 °F (36.5 °C) (Oral)     BP Readings from Last 3 Encounters:   07/25/24 108/76   06/20/24 110/66   06/11/24 110/66     Pulse Readings from Last 3 Encounters:   07/25/24 90   06/11/24 80   04/30/24 60        Review of Systems   Constitutional: Positive for malaise/fatigue and weight loss.   HENT: Negative.     Eyes: Negative.    Cardiovascular: Negative.    Respiratory: Negative.     Skin:  Positive for color change and poor wound healing.   Musculoskeletal:  Positive for arthritis, back pain, joint pain, muscle weakness and myalgias.   Gastrointestinal: Negative.    Genitourinary: Negative.    Neurological:  Positive for weakness.   Psychiatric/Behavioral: Negative.          Objective:   Physical Exam  Vitals and nursing note reviewed.   Constitutional:       Appearance: Normal appearance. He is ill-appearing.   HENT:      Head: Normocephalic and atraumatic.   Eyes:      General:         Right eye: No discharge.         Left eye: No discharge.      Pupils: Pupils are equal, round, and reactive to light.   Cardiovascular:      Rate and Rhythm: Normal rate. Rhythm irregular.      Heart sounds: S1 normal and S2 normal. No murmur heard.     No friction rub.   Pulmonary:      Effort: Pulmonary effort is normal. No respiratory distress.      Breath sounds: Normal breath sounds. No rales.   Abdominal:      Palpations: Abdomen is soft.      Tenderness: There is no abdominal tenderness.   Musculoskeletal:      Cervical back: Neck supple.      Right lower leg: No edema.      Left lower leg: No edema.   Skin:     General: Skin is warm and dry.      Findings: Bruising and erythema present.   Neurological:      General: No focal deficit present.      Mental Status: He is alert and oriented to person, place, and time.      Motor: Weakness present.   Psychiatric:         Mood and Affect: Mood normal.         Behavior: Behavior normal.         Thought Content: Thought content normal.         Lab Results   Component Value Date    CHOL 187 04/30/2024    CHOL 189 04/28/2023    CHOL 187 02/02/2022     Lab Results   Component Value Date    HDL 98 (H) 04/30/2024    HDL 76 (H) 04/28/2023    HDL 75 02/02/2022     Lab Results   Component Value Date    LDLCALC 74.0 04/30/2024    LDLCALC 99.8 04/28/2023    LDLCALC 98.0 02/02/2022     Lab Results   Component Value Date    TRIG 75 04/30/2024    TRIG 66 04/28/2023    TRIG 70 02/02/2022     Lab Results   Component Value Date    CHOLHDL 52.4 (H) 04/30/2024    CHOLHDL 40.2 04/28/2023    CHOLHDL 40.1 02/02/2022       Chemistry        Component Value Date/Time     07/08/2024 1047    K 3.8 07/08/2024 1047    CL 95 07/08/2024 1047    CO2 29 07/08/2024 1047     BUN 24 (H) 07/08/2024 1047    CREATININE 1.4 07/08/2024 1047     07/08/2024 1047        Component Value Date/Time    CALCIUM 9.3 07/08/2024 1047    ALKPHOS 63 07/08/2024 1047    AST 20 07/08/2024 1047    ALT 8 (L) 07/08/2024 1047    BILITOT 1.0 07/08/2024 1047    ESTGFRAFRICA 60 03/09/2022 0858    EGFRNONAA 52 (A) 03/09/2022 0858          Lab Results   Component Value Date    TSH 1.726 03/09/2022     Lab Results   Component Value Date    INR 0.9 04/29/2024    INR 1.2 08/30/2018    INR 1.1 10/10/2014     @RESUFAST(WBC,HGB,HCT,MCV,PLT)  @LABRCNTIP(BNP,BNPTRIAGEBLO)@  CrCl cannot be calculated (Patient's most recent lab result is older than the maximum 7 days allowed.).     Results for orders placed during the hospital encounter of 06/20/24    Echo    Interpretation Summary    Left Ventricle: The left ventricle is normal in size. Normal wall thickness. Mild global hypokinesis present. There is mildly reduced systolic function with a visually estimated ejection fraction of 45 - 50%.    Right Ventricle: Mild right ventricular enlargement. Wall thickness is normal. Systolic function is mildly reduced.    Left Atrium: Left atrium is severely dilated.    Aortic Valve: There is mild aortic regurgitation.    Tricuspid Valve: There is moderate regurgitation.    IVC/SVC: Normal venous pressure at 3 mmHg.     No results found for this or any previous visit.     Assessment:      1. Nonrheumatic aortic valve insufficiency    2. Essential hypertension    3. Pulmonary hypertension    4. Mixed hyperlipidemia    5. Chronic diastolic heart failure    6. Aortic atherosclerosis    7. Premature atrial contractions    8. Paroxysmal atrial fibrillation    9. PAD (peripheral artery disease)        Plan:   Nonrheumatic aortic valve insufficiency    Essential hypertension    Pulmonary hypertension    Mixed hyperlipidemia    Chronic diastolic heart failure    Aortic atherosclerosis    Premature atrial contractions    Paroxysmal atrial  fibrillation    PAD (peripheral artery disease)      Cont current diuretic regimen  Repeat labs in 2 weeks  Swelling improved on exam, wrinkles  Cont all other CV medications, BP labile cont BB  Keep follow up with EP tomorrow for Afib  RF modifications    RTC 3 mos or sooner if needed  Pending labs    Courtney Guillot, FNP-C [facility], Cardiology

## 2024-07-26 ENCOUNTER — OFFICE VISIT (OUTPATIENT)
Dept: CARDIOLOGY | Facility: CLINIC | Age: 84
End: 2024-07-26
Payer: MEDICARE

## 2024-07-26 ENCOUNTER — HOSPITAL ENCOUNTER (OUTPATIENT)
Dept: CARDIOLOGY | Facility: HOSPITAL | Age: 84
Discharge: HOME | End: 2024-07-26
Attending: INTERNAL MEDICINE
Payer: MEDICARE

## 2024-07-26 VITALS
HEIGHT: 70 IN | OXYGEN SATURATION: 97 % | BODY MASS INDEX: 21.78 KG/M2 | WEIGHT: 152.13 LBS | SYSTOLIC BLOOD PRESSURE: 112 MMHG | DIASTOLIC BLOOD PRESSURE: 68 MMHG | HEART RATE: 83 BPM

## 2024-07-26 DIAGNOSIS — I50.32 CHRONIC DIASTOLIC HEART FAILURE: ICD-10-CM

## 2024-07-26 DIAGNOSIS — I70.0 AORTIC ATHEROSCLEROSIS: ICD-10-CM

## 2024-07-26 DIAGNOSIS — T45.1X5A IMMUNODEFICIENCY DUE TO CHEMOTHERAPY: ICD-10-CM

## 2024-07-26 DIAGNOSIS — I48.0 PAROXYSMAL ATRIAL FIBRILLATION: ICD-10-CM

## 2024-07-26 DIAGNOSIS — Z79.899 IMMUNODEFICIENCY DUE TO CHEMOTHERAPY: ICD-10-CM

## 2024-07-26 DIAGNOSIS — D89.2 PARAPROTEINEMIA: ICD-10-CM

## 2024-07-26 DIAGNOSIS — Z87.898 HISTORY OF THYMOMA: ICD-10-CM

## 2024-07-26 DIAGNOSIS — C61 PROSTATE CANCER METASTATIC TO BONE: ICD-10-CM

## 2024-07-26 DIAGNOSIS — C78.01 MALIGNANT NEOPLASM METASTATIC TO BOTH LUNGS: ICD-10-CM

## 2024-07-26 DIAGNOSIS — D50.0 IRON DEFICIENCY ANEMIA DUE TO CHRONIC BLOOD LOSS: ICD-10-CM

## 2024-07-26 DIAGNOSIS — C78.02 MALIGNANT NEOPLASM METASTATIC TO BOTH LUNGS: ICD-10-CM

## 2024-07-26 DIAGNOSIS — I73.9 PAD (PERIPHERAL ARTERY DISEASE): ICD-10-CM

## 2024-07-26 DIAGNOSIS — D84.821 IMMUNODEFICIENCY DUE TO CHEMOTHERAPY: ICD-10-CM

## 2024-07-26 DIAGNOSIS — C79.51 PROSTATE CANCER METASTATIC TO BONE: ICD-10-CM

## 2024-07-26 DIAGNOSIS — I10 ESSENTIAL HYPERTENSION: Chronic | ICD-10-CM

## 2024-07-26 DIAGNOSIS — I27.20 PULMONARY HYPERTENSION: Chronic | ICD-10-CM

## 2024-07-26 DIAGNOSIS — Z85.79 HISTORY OF WALDENSTROM'S MACROGLOBULINEMIA: ICD-10-CM

## 2024-07-26 DIAGNOSIS — I35.1 NONRHEUMATIC AORTIC VALVE INSUFFICIENCY: Chronic | ICD-10-CM

## 2024-07-26 DIAGNOSIS — E78.2 MIXED HYPERLIPIDEMIA: ICD-10-CM

## 2024-07-26 DIAGNOSIS — I48.19 PERSISTENT ATRIAL FIBRILLATION: Primary | ICD-10-CM

## 2024-07-26 PROCEDURE — 93010 ELECTROCARDIOGRAM REPORT: CPT | Mod: HCNC,,, | Performed by: INTERNAL MEDICINE

## 2024-07-26 PROCEDURE — 93005 ELECTROCARDIOGRAM TRACING: CPT | Mod: HCNC

## 2024-07-26 PROCEDURE — 99999 PR PBB SHADOW E&M-EST. PATIENT-LVL IV: CPT | Mod: PBBFAC,HCNC,, | Performed by: INTERNAL MEDICINE

## 2024-07-26 NOTE — PROGRESS NOTES
Subjective:   Patient ID:  Homero Tanner is a 84 y.o. male     Chief complaint: AF    HPI  New patient to me. (7/29/2024 )  Referred by  Dr. Wright for evaluation and management of  AF   --     Background as gleaned from patient's records :  PMHx of  diastolic CHF, PAD, Waldenstrom's macroglobulinemia lymphoma,h/o thymoma, HTN, DD, CRI, PHTN, AS, AI, PVCs, TR, dyslipidemia, metastatic prostate cancer, h/o rectal cancer.     Followed by Dr. Wright in cardiology clinic.   Currently undergoing treatment  for prostate cancer with mets to lungs, spine. Heme-onc states incurable.     Recently found to have AF, not on AC given extensive bruising just on low dose ASA.     Recently, his leg edema has improved some, weight down 11lbs over the past 6 weeks (multifactoral).   BNP trended down recently, Crt stable. Currently taking lasix 60mg daily'  Echo EF 45-50%    Additional data of relevance:  Has been in AF at least since Jan 2024 and he is still in it. He is only on ASA. His CHADSVASC score is 5.   He does not feel palpitations. He does not think that he has been more dyspneic the past few months.  I have reviewed the actual image of the ECG tracing obtained today and it shows AF with calculated average VR of 91  bpm, measured RR of 560-760 msec with o/w normal intervals. T-waves are broad especially in the anterior leads.    Current Outpatient Medications   Medication Sig    abiraterone (ZYTIGA) 250 mg Tab Take 4 tablets by mouth once daily as directed on an empty stomach.    aspirin (ECOTRIN) 81 MG EC tablet Take 81 mg by mouth once daily.    clobetasoL (TEMOVATE) 0.05 % external solution APPLY TO SCALP 1 TO 2 TIMES A DAY AS NEEDED FOR FLARES, DO NOT APPLY TO FACE OR FOLDS OF SKIN    cyanocobalamin 1,000 mcg/mL injection INJECT 1ML UNDER THE SKIN EVERY MONTH    erythromycin with ethanoL (EMGEL) 2 % gel APPLY TO THE AFFECTED AREA OF FACE TWICE DAILY FOR ROSACEA/ REDNESS    furosemide (LASIX) 20 MG tablet TAKE 1  TABLET ON TUESDAYS AND THURSDAYS.    furosemide (LASIX) 40 MG tablet Take 1 tablet (40 mg total) by mouth every Mon, Wed, Fri.    leuprolide (LUPRON) 3.75 mg injection Inject 3.75 mg into the muscle every 3 (three) months.     metoprolol succinate (TOPROL-XL) 100 MG 24 hr tablet TAKE 1 TABLET(100 MG) BY MOUTH EVERY DAY    metronidazole 0.75% (METROCREAM) 0.75 % Crea APPLY TO AFFECTED AREA ON FACE TWICE DAILY    omeprazole (PRILOSEC) 20 MG capsule Take 1 capsule (20 mg total) by mouth once daily.    rosuvastatin (CRESTOR) 10 MG tablet TAKE 1 TABLET(10 MG) BY MOUTH EVERY DAY    apixaban (ELIQUIS) 2.5 mg Tab Take 1 tablet (2.5 mg total) by mouth once daily.    chlorhexidine (PERIDEX) 0.12 % solution     ibuprofen (ADVIL,MOTRIN) 800 MG tablet Take by mouth.    predniSONE (DELTASONE) 5 MG tablet TAKE 1 TABLET(5 MG) BY MOUTH TWICE DAILY WITH WATER AND ON AN EMPTY STOMACH AS DIRECTED    sulfamethoxazole-trimethoprim 800-160mg (BACTRIM DS) 800-160 mg Tab Take 1 tablet by mouth every 12 (twelve) hours.     No current facility-administered medications for this visit.       Review of Systems     Constitutional: Reviewed  for decreased appetite, weight gain and weight loss.   HENT: Reviewed for nosebleeds.    Eyes:  Reviewed for blurred vision and visual disturbance.   Cardiovascular: Reviewed for chest pain, claudication, cyanosis,dyspnea on exertion, leg swelling, orthopnea,paroxysmal nocturnal dyspnearregular heartbeats, palpitations, near-syncope, and syncope.   Respiratory: Reviewed for cough, shortness of breath, wheezing, sleep disturbances due to breathing and snoring, .    Endocrine: Reviewed for heat intolerance.   Hematologic/Lymphatic: Reviewed for easy bruisability/bleeding.   Skin: Reviewed for rash.   Musculoskeletal: Reviewed for muscle weakness and myalgias.   Gastrointestinal: Reviewed for abdominal pain, anorexia, melena, nausea and vomiting.   Genitourinary: Reviewed for hesitancy, frequency, nocturia and  incontinence.   Neurological: Reviewed for excessive daytime sleepiness, dizziness, vertigo, weakness, headaches, loss of balance and seizures,   Psychiatric/Behavioral:  Reviewed for insomnia, altered mental status, depression, anxiety and nervousness.       All symptoms reviewed above were negative except for   Get discomfort while walking, shortness of breath/dyspnea on exertion, leg swelling, heartburn, daytime urinary frequency with nocturia, easy bruisability, daytime sleepiness, dizziness, weakness lightheadedness as well as multiple arthritic complaints and anxiety     Social History     Tobacco Use   Smoking Status Former    Current packs/day: 0.00    Average packs/day: 1 pack/day for 15.0 years (15.0 ttl pk-yrs)    Types: Cigarettes    Start date: 1954    Quit date: 1969    Years since quittin.5    Passive exposure: Never   Smokeless Tobacco Never       reports current alcohol use of about 3.0 standard drinks of alcohol per week.   Past Medical History:   Diagnosis Date    Abnormal ECG 2013    Adenomatous rectal polyp 2015    Aortic insufficiency 2013    Arthritis     Asthma     as a child    Benign neoplasm of cecum 2017    Benign neoplasm of transverse colon 2017    Cataract     CHF (congestive heart failure)     Pt states He's never been told this    Diverticulosis of large intestine without hemorrhage 2017    Encounter for blood transfusion     Frequent PVCs     Gallstones 3/28/2018    By Us done 3/21/2018    GERD (gastroesophageal reflux disease)     History of colon polyps     Hypertension     Iron deficiency anemia 10/26/2015    Lymphoma     waldenstrom's macroglobulinemia    Mixed hyperlipidemia 2013    Premature atrial contractions     Prostate cancer     PSVT (paroxysmal supraventricular tachycardia) 2013    Pulmonary hypertension 2016    PVC's (premature ventricular contractions) 2016    Rectal adenocarcinoma     Rectal cancer  2018    SCC (squamous cell carcinoma) 2015    left parietal scalp    Thymoma     TR (tricuspid regurgitation) 2016    Tubular adenoma of colon 2020    VT (ventricular tachycardia) 2018     Family History   Problem Relation Name Age of Onset    Diabetes Father      Cataracts Mother      Amblyopia Neg Hx      Blindness Neg Hx      Cancer Neg Hx      Glaucoma Neg Hx      Hypertension Neg Hx      Macular degeneration Neg Hx      Retinal detachment Neg Hx      Strabismus Neg Hx      Stroke Neg Hx      Thyroid disease Neg Hx      Melanoma Neg Hx       Social History     Socioeconomic History    Marital status: Single   Tobacco Use    Smoking status: Former     Current packs/day: 0.00     Average packs/day: 1 pack/day for 15.0 years (15.0 ttl pk-yrs)     Types: Cigarettes     Start date: 1954     Quit date: 1969     Years since quittin.5     Passive exposure: Never    Smokeless tobacco: Never   Substance and Sexual Activity    Alcohol use: Yes     Alcohol/week: 3.0 standard drinks of alcohol     Types: 3 Cans of beer per week     Comment: socially, NO ALCOHOL 72 HOURS PRIOR TO SX    Drug use: No     Past Surgical History:   Procedure Laterality Date    biopsy for chest mass      BONE MARROW BIOPSY Left 2018    Procedure: Biopsy-bone marrow;  Surgeon: Charanjit Dacosta MD;  Location: [facility] OR;  Service: General;  Laterality: Left;    CATARACT EXTRACTION W/  INTRAOCULAR LENS IMPLANT Right 2019    CATARACT EXTRACTION W/  INTRAOCULAR LENS IMPLANT Left 2019    COLON SURGERY      COLONOSCOPY N/A 10/26/2015    Procedure: Colonoscopy;  Surgeon: Abraham Hong MD;  Location: [facility] ENDO;  Service: Endoscopy;  Laterality: N/A;    COLONOSCOPY Left 2017    Procedure: COLONOSCOPY;  Surgeon: Abraham Hong MD;  Location: [facility] ENDO;  Service: Endoscopy;  Laterality: Left;    COLONOSCOPY N/A 2018    Procedure: hixtory of rectal cancer. Colonsocopy asked to be repeated minnie  year,  alst one 2/2017;  Surgeon: Patricio Streeter MD;  Location: [facility] ENDO;  Service: Endoscopy;  Laterality: N/A;    COLONOSCOPY N/A 6/27/2018    Procedure: COLONOSCOPY/hybrid APC or a EndoRotor device;  Surgeon: Rao Medeiros MD;  Location: [facility] ENDO (2ND FLR);  Service: Endoscopy;  Laterality: N/A;    COLONOSCOPY N/A 6/22/2020    Procedure: COLONOSCOPY;  Surgeon: Yu Wells MD;  Location: [facility] ENDO;  Service: Endoscopy;  Laterality: N/A;    ESOPHAGOGASTRODUODENOSCOPY N/A 10/22/2019    Procedure: ESOPHAGOGASTRODUODENOSCOPY (EGD);  Surgeon: Mariela Greer MD;  Location: [facility];  Service: Endoscopy;  Laterality: N/A;    FLEXIBLE SIGMOIDOSCOPY Left 4/23/2019    Procedure: SIGMOIDOSCOPY, FLEXIBLE;  Surgeon: Ranjit Will MD;  Location: [facility];  Service: Endoscopy;  Laterality: Left;    HERNIA REPAIR Right 02/2018    Inguinal, open with mesh    mohs      PROSTATECTOMY      RECTAL SURGERY N/A 09/2014    REPAIR OF SLIDING INGUINAL HERNIA Right 2/26/2019    Procedure: REPAIR, HERNIA, INGUINAL, SLIDING;  Surgeon: Bridger Alvarez MD;  Location: [facility];  Service: General;  Laterality: Right;    SKIN BIOPSY      TONSILLECTOMY      TUMOR REMOVAL         Objective:   Physical Exam  Vitals and nursing note reviewed.   Constitutional:       Appearance: Normal appearance. He is well-developed. He is not diaphoretic.   HENT:      Head: Normocephalic and atraumatic.      Right Ear: External ear normal.      Left Ear: External ear normal.   Eyes:      General:         Right eye: No discharge.         Left eye: No discharge.      Conjunctiva/sclera: Conjunctivae normal.      Right eye: Right conjunctiva is not injected.      Left eye: No hemorrhage.     Pupils: Pupils are equal, round, and reactive to light.   Neck:      Thyroid: No thyromegaly.      Vascular: No JVD.   Cardiovascular:      Rate and Rhythm: Normal rate. Rhythm irregularly irregular.      Chest Wall: PMI is not displaced.      Pulses: Intact  "distal pulses.           Carotid pulses are 2+ on the right side and 2+ on the left side.       Radial pulses are 2+ on the right side and 2+ on the left side.        Dorsalis pedis pulses are 2+ on the right side and 2+ on the left side.        Posterior tibial pulses are 2+ on the right side and 2+ on the left side.      Heart sounds: Normal heart sounds. No midsystolic click and no opening snap. No murmur heard.     No friction rub. No gallop.   Pulmonary:      Effort: Pulmonary effort is normal. No respiratory distress.      Breath sounds: Normal breath sounds. No wheezing or rales.   Chest:      Chest wall: No tenderness.   Abdominal:      Palpations: Abdomen is soft. There is no hepatomegaly.      Tenderness: There is no abdominal tenderness. There is no guarding or rebound.   Musculoskeletal:         General: No tenderness. Normal range of motion.      Cervical back: Neck supple.      Right knee: No swelling.      Left knee: No swelling.      Right lower leg: No swelling.      Left lower leg: No swelling.      Right ankle: No swelling.      Left ankle: No swelling.      Right foot: No swelling.      Left foot: No swelling.   Skin:     General: Skin is warm and dry.      Findings: No rash.   Neurological:      Mental Status: He is alert and oriented to person, place, and time.      Cranial Nerves: No cranial nerve deficit.      Coordination: Coordination normal.      Deep Tendon Reflexes: Reflexes are normal and symmetric.   Psychiatric:         Behavior: Behavior normal.       /68 (BP Location: Left arm, Patient Position: Sitting, BP Method: Small (Manual))   Pulse 83   Ht 5' 10" (1.778 m)   Wt 69 kg (152 lb 1.9 oz)   SpO2 97%   BMI 21.83 kg/m²       Results for orders placed during the hospital encounter of 06/20/24    Echo    Interpretation Summary    Left Ventricle: The left ventricle is normal in size. Normal wall thickness. Mild global hypokinesis present. There is mildly reduced systolic " function with a visually estimated ejection fraction of 45 - 50%.    Right Ventricle: Mild right ventricular enlargement. Wall thickness is normal. Systolic function is mildly reduced.    Left Atrium: Left atrium is severely dilated.    Aortic Valve: There is mild aortic regurgitation.    Tricuspid Valve: There is moderate regurgitation.    IVC/SVC: Normal venous pressure at 3 mmHg.    WBC   Date Value Ref Range Status   08/01/2024 11.27 3.90 - 12.70 K/uL Final     POC Hematocrit   Date Value Ref Range Status   02/06/2013 37 36 - 54 %PCV Final     Hematocrit   Date Value Ref Range Status   08/01/2024 35.8 (L) 40.0 - 54.0 % Final     Hemoglobin   Date Value Ref Range Status   08/01/2024 12.4 (L) 14.0 - 18.0 g/dL Final     Lab Results   Component Value Date     08/01/2024     Lab Results   Component Value Date    CREATININE 2.2 (H) 08/01/2024    EGFRNORACEVR 29 (A) 08/01/2024    K 3.5 08/01/2024     Lab Results   Component Value Date     (H) 07/08/2024            Assessment:            This patient has a CHADS2 score of  III  and a [id number] score of  IV         Annual stroke risk as predicted by patient's CHADS2 and the [id number] scores:  CHADS2 score       Annual Risk (%)           [id number] score            Annual risk (%)            3                            5.9                                      5                                       6.7            1. Persistent atrial fibrillation    2. Paroxysmal atrial fibrillation    3. Nonrheumatic aortic valve insufficiency    4. Aortic atherosclerosis    5. Chronic diastolic heart failure    6. Essential hypertension    7. History of thymoma    8. History of Waldenstrom's macroglobulinemia    9. Mixed hyperlipidemia    10. Immunodeficiency due to chemotherapy    11. Iron deficiency anemia due to chronic blood loss    12. Malignant neoplasm metastatic to both lungs    13. PAD (peripheral artery disease)    14. Paraproteinemia    15. Prostate cancer  metastatic to bone    16. Pulmonary hypertension        Plan:     We should consider cardioversion but only after he is fully anticoagulated.   His skin ecchymosis issues on ASA may actually become more manageable with OAC.   Orders Placed This Encounter   Procedures    ANTI-XA HEPARIN MONITORING     Standing Status:   Future     Number of Occurrences:   1     Standing Expiration Date:   10/24/2025     No follow-ups on file.  There are no discontinued medications.  Outpatient Encounter Medications as of 7/26/2024   Medication Sig Dispense Refill    abiraterone (ZYTIGA) 250 mg Tab Take 4 tablets by mouth once daily as directed on an empty stomach. 120 tablet 11    aspirin (ECOTRIN) 81 MG EC tablet Take 81 mg by mouth once daily.      clobetasoL (TEMOVATE) 0.05 % external solution APPLY TO SCALP 1 TO 2 TIMES A DAY AS NEEDED FOR FLARES, DO NOT APPLY TO FACE OR FOLDS OF SKIN 50 mL 5    cyanocobalamin 1,000 mcg/mL injection INJECT 1ML UNDER THE SKIN EVERY MONTH 10 mL 4    erythromycin with ethanoL (EMGEL) 2 % gel APPLY TO THE AFFECTED AREA OF FACE TWICE DAILY FOR ROSACEA/ REDNESS 60 g 3    furosemide (LASIX) 20 MG tablet TAKE 1 TABLET ON TUESDAYS AND THURSDAYS. 30 tablet 11    furosemide (LASIX) 40 MG tablet Take 1 tablet (40 mg total) by mouth every Mon, Wed, Fri. 30 tablet 11    leuprolide (LUPRON) 3.75 mg injection Inject 3.75 mg into the muscle every 3 (three) months.       metoprolol succinate (TOPROL-XL) 100 MG 24 hr tablet TAKE 1 TABLET(100 MG) BY MOUTH EVERY DAY 30 tablet 11    metronidazole 0.75% (METROCREAM) 0.75 % Crea APPLY TO AFFECTED AREA ON FACE TWICE DAILY 45 g 3    omeprazole (PRILOSEC) 20 MG capsule Take 1 capsule (20 mg total) by mouth once daily. 90 capsule 3    rosuvastatin (CRESTOR) 10 MG tablet TAKE 1 TABLET(10 MG) BY MOUTH EVERY DAY 90 tablet 3    apixaban (ELIQUIS) 2.5 mg Tab Take 1 tablet (2.5 mg total) by mouth once daily. 60 tablet 11    chlorhexidine (PERIDEX) 0.12 % solution       ibuprofen  (ADVIL,MOTRIN) 800 MG tablet Take by mouth.      predniSONE (DELTASONE) 5 MG tablet TAKE 1 TABLET(5 MG) BY MOUTH TWICE DAILY WITH WATER AND ON AN EMPTY STOMACH AS DIRECTED 60 tablet 11    sulfamethoxazole-trimethoprim 800-160mg (BACTRIM DS) 800-160 mg Tab Take 1 tablet by mouth every 12 (twelve) hours.      [DISCONTINUED] abiraterone (ZYTIGA) 250 mg Tab Take 4 tablets by mouth once daily as directed on an empty stomach. 120 tablet 11     No facility-administered encounter medications on file as of 7/26/2024.     Medication List with Changes/Refills   New Medications    APIXABAN (ELIQUIS) 2.5 MG TAB    Take 1 tablet (2.5 mg total) by mouth once daily.   Current Medications    ABIRATERONE (ZYTIGA) 250 MG TAB    Take 4 tablets by mouth once daily as directed on an empty stomach.    ASPIRIN (ECOTRIN) 81 MG EC TABLET    Take 81 mg by mouth once daily.    CHLORHEXIDINE (PERIDEX) 0.12 % SOLUTION        CLOBETASOL (TEMOVATE) 0.05 % EXTERNAL SOLUTION    APPLY TO SCALP 1 TO 2 TIMES A DAY AS NEEDED FOR FLARES, DO NOT APPLY TO FACE OR FOLDS OF SKIN    CYANOCOBALAMIN 1,000 MCG/ML INJECTION    INJECT 1ML UNDER THE SKIN EVERY MONTH    ERYTHROMYCIN WITH ETHANOL (EMGEL) 2 % GEL    APPLY TO THE AFFECTED AREA OF FACE TWICE DAILY FOR ROSACEA/ REDNESS    FUROSEMIDE (LASIX) 20 MG TABLET    TAKE 1 TABLET ON TUESDAYS AND THURSDAYS.    FUROSEMIDE (LASIX) 40 MG TABLET    Take 1 tablet (40 mg total) by mouth every Mon, Wed, Fri.    IBUPROFEN (ADVIL,MOTRIN) 800 MG TABLET    Take by mouth.    LEUPROLIDE (LUPRON) 3.75 MG INJECTION    Inject 3.75 mg into the muscle every 3 (three) months.     METOPROLOL SUCCINATE (TOPROL-XL) 100 MG 24 HR TABLET    TAKE 1 TABLET(100 MG) BY MOUTH EVERY DAY    METRONIDAZOLE 0.75% (METROCREAM) 0.75 % CREA    APPLY TO AFFECTED AREA ON FACE TWICE DAILY    OMEPRAZOLE (PRILOSEC) 20 MG CAPSULE    Take 1 capsule (20 mg total) by mouth once daily.    PREDNISONE (DELTASONE) 5 MG TABLET    TAKE 1 TABLET(5 MG) BY MOUTH TWICE  DAILY WITH WATER AND ON AN EMPTY STOMACH AS DIRECTED    ROSUVASTATIN (CRESTOR) 10 MG TABLET    TAKE 1 TABLET(10 MG) BY MOUTH EVERY DAY    SULFAMETHOXAZOLE-TRIMETHOPRIM 800-160MG (BACTRIM DS) 800-160 MG TAB    Take 1 tablet by mouth every 12 (twelve) hours.          This note is at least partially dictated using the M*Modal Fluency Direct word recognition program. There are word recognition mistakes that are occasionally missed on review.     Pre-visit chart review: 15 min    Face to face time: 25 min, > 50% of which spent in education    I have reviewed the actual images of all relevant ECG, rhythm, holter and long term monitoring tracings available in EPIC, MUSE  and in [facility] as well as outside records.   I have reviewed the actual images of all relevant CXRays.   I have directly evaluated all relevant data pertaining to any CIED.     Post visit chart documentation and care coordination: 18 min

## 2024-07-27 LAB
OHS QRS DURATION: 100 MS
OHS QTC CALCULATION: 474 MS

## 2024-07-29 NOTE — PROGRESS NOTES
Subjective:       Patient ID: Homero Tanner is a 84 y.o. male.    Chief Complaint: Prostate Cancer    Primary Oncologist/Hematologist: Dr. Sparks     HPI: Mr. Tanner is an 82 year old male who comes into clinic for follow up from his history of prostate cancer. He continues with lupron q 6 months + prednisone + zytiga daily.   Macroglobinemia Because of continuous rise of the monoclonal spike in the SPEP, a bone marrow was done 7/2018 which was read as being consistent with a lymphoblastic lymphoma (Waldestrom;s macroglobulinemia). He was treated with Dr. Weaver with Velcade/dex/Rituxan achieving a good biochemical response. He was given 4 additional treatments of maintenance Rituxan with the last dose around nov 2019.  Prostate cancer-He is known to us because of a history of previously treated prostate cancer. He was diagnosed around 2004 and treated with surgery. He relapsed by PSA and was treated with local radiation therapy. He had further elevation of the PSA and started intermittent LHRH analogues through the office of his urologist outside our clinic  He receives the injections at his urologist office.  Vit b12 def-He was found to be vitamin b12 def upon workup for anemia. His iron studies were normal. Vitamin B12 was low at 151. He previously was treated with vitamin b12 injections.   Thymoma-Also, as part of the workup for the anemia, serum protein electrophoresis showed a paraprotein band that was quantified at 1.03 g and identified as IgM. He had a thoracic spine view looking for lytic lesions. The radiologist reported that there were no lytic lesions but there was a mass in the center of the chest. This was followed by chest x-rays and CT scans. The CT scan showed a large mediastinal mass. A CT-guided biopsy at [facility] was read as showing a thymoma. He underwent surgery with Dr. Levi Butler at [facility]. The thymoma was involving the pericardium so he received postoperative  radiation therapy. He finished the radiation therapy and has remained with without evaluable disease in regards to his thymoma.  Rectal cancer- Patient has had previous colonoscopies that found tubular adenomas.The patient underwent a colonoscopy on 2014, as part of the work up for his diagnosed iron deficiency. It was done by Dr. Jaime Ash. Dr. Ash found a mass in the rectal area. It was partially excised. Pathology was once again that of tubulovillous adenoma with dysplasia. The area of involvement coincided with the radiation therapy field for the prostate cancer. The patient had removal of the villous adenoma by Dr Ansari. 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 Following the excision he had 2 episodes of rectal bleeding. He ended up having another surgery to control the bleeing and to do a re-excision of the area where the minimally invasive cancer was found.Pathology revealed no residual cancer.There has been no more bleeding.    Today: His kidney function has declined. Likely due to increase in diuretic use, 60mg daily lasix. He follows with cardiology Dr. Aguilar for aortic valve insuff, PAF, dCHF, HTN. He was switched recently from baby asa to eliquis 2.5mg daily. He continues to take his prednisone, zytiga and recently received his lupron which he is getting q 3 months. He does have visible bruising.     Social History     Socioeconomic History    Marital status: Single   Tobacco Use    Smoking status: Former     Current packs/day: 0.00     Average packs/day: 1 pack/day for 15.0 years (15.0 ttl pk-yrs)     Types: Cigarettes     Start date: 1954     Quit date: 1969     Years since quittin.5     Passive exposure: Never    Smokeless tobacco: Never   Substance and Sexual Activity    Alcohol use: Yes     Alcohol/week: 3.0 standard drinks of alcohol     Types: 3 Cans of beer per week     Comment: socially, NO ALCOHOL 72 HOURS PRIOR TO SX     Drug use: No       Past Medical History:   Diagnosis Date    Abnormal ECG 6/24/2013    Adenomatous rectal polyp 12/7/2015    Aortic insufficiency 6/24/2013    Arthritis     Asthma     as a child    Benign neoplasm of cecum 2/27/2017    Benign neoplasm of transverse colon 2/27/2017    Cataract     CHF (congestive heart failure)     Pt states He's never been told this    Diverticulosis of large intestine without hemorrhage 2/27/2017    Encounter for blood transfusion     Frequent PVCs     Gallstones 3/28/2018    By Us done 3/21/2018    GERD (gastroesophageal reflux disease)     History of colon polyps     Hypertension     Iron deficiency anemia 10/26/2015    Lymphoma     waldenstrom's macroglobulinemia    Mixed hyperlipidemia 6/24/2013    Premature atrial contractions     Prostate cancer     PSVT (paroxysmal supraventricular tachycardia) 6/24/2013    Pulmonary hypertension 6/13/2016    PVC's (premature ventricular contractions) 6/13/2016    Rectal adenocarcinoma     Rectal cancer 5/1/2018    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    left parietal scalp    Thymoma     TR (tricuspid regurgitation) 6/13/2016    Tubular adenoma of colon 06/2020    VT (ventricular tachycardia) 9/18/2018       Family History   Problem Relation Name Age of Onset    Diabetes Father      Cataracts Mother      Amblyopia Neg Hx      Blindness Neg Hx      Cancer Neg Hx      Glaucoma Neg Hx      Hypertension Neg Hx      Macular degeneration Neg Hx      Retinal detachment Neg Hx      Strabismus Neg Hx      Stroke Neg Hx      Thyroid disease Neg Hx      Melanoma Neg Hx         Past Surgical History:   Procedure Laterality Date    biopsy for chest mass      BONE MARROW BIOPSY Left 7/11/2018    Procedure: Biopsy-bone marrow;  Surgeon: Charanjit Dacosta MD;  Location: [facility];  Service: General;  Laterality: Left;    CATARACT EXTRACTION W/  INTRAOCULAR LENS IMPLANT Right 01/03/2019    CATARACT EXTRACTION W/  INTRAOCULAR LENS IMPLANT Left 02/07/2019     COLON SURGERY      COLONOSCOPY N/A 10/26/2015    Procedure: Colonoscopy;  Surgeon: Abraham Hong MD;  Location: [facility] ENDO;  Service: Endoscopy;  Laterality: N/A;    COLONOSCOPY Left 2/27/2017    Procedure: COLONOSCOPY;  Surgeon: Abraham Hong MD;  Location: [facility] ENDO;  Service: Endoscopy;  Laterality: Left;    COLONOSCOPY N/A 5/1/2018    Procedure: hixtory of rectal cancer. Colonsocopy asked to be repeated minnie  year, alst one 2/2017;  Surgeon: Patricio Streeter MD;  Location: [facility];  Service: Endoscopy;  Laterality: N/A;    COLONOSCOPY N/A 6/27/2018    Procedure: COLONOSCOPY/hybrid APC or a EndoRotor device;  Surgeon: Rao Medeiros MD;  Location: [facility] ([address] [place] [address]);  Service: Endoscopy;  Laterality: N/A;    COLONOSCOPY N/A 6/22/2020    Procedure: COLONOSCOPY;  Surgeon: Yu Wells MD;  Location: [facility];  Service: Endoscopy;  Laterality: N/A;    ESOPHAGOGASTRODUODENOSCOPY N/A 10/22/2019    Procedure: ESOPHAGOGASTRODUODENOSCOPY (EGD);  Surgeon: Mariela Greer MD;  Location: [facility];  Service: Endoscopy;  Laterality: N/A;    FLEXIBLE SIGMOIDOSCOPY Left 4/23/2019    Procedure: SIGMOIDOSCOPY, FLEXIBLE;  Surgeon: Ranjit Will MD;  Location: [facility];  Service: Endoscopy;  Laterality: Left;    HERNIA REPAIR Right 02/2018    Inguinal, open with mesh    mohs      PROSTATECTOMY      RECTAL SURGERY N/A 09/2014    REPAIR OF SLIDING INGUINAL HERNIA Right 2/26/2019    Procedure: REPAIR, HERNIA, INGUINAL, SLIDING;  Surgeon: Bridger Alvarez MD;  Location: [facility];  Service: General;  Laterality: Right;    SKIN BIOPSY      TONSILLECTOMY      TUMOR REMOVAL         Review of Systems   Constitutional:  Negative for activity change, appetite change, chills, diaphoresis, fatigue, fever and unexpected weight change.   HENT:  Negative for congestion, nosebleeds and rhinorrhea.    Respiratory:  Negative for cough and shortness of breath.    Cardiovascular:  Negative for chest pain and leg swelling.    Gastrointestinal:  Negative for abdominal pain, anal bleeding, blood in stool, constipation, diarrhea, nausea and vomiting.   Genitourinary:  Negative for hematuria.   Skin:  Negative for color change and pallor.   Neurological:  Negative for dizziness, weakness, light-headedness, numbness and headaches.   Hematological:  Bruises/bleeds easily.         Medication List with Changes/Refills   Current Medications    ABIRATERONE (ZYTIGA) 250 MG TAB    Take 4 tablets by mouth once daily as directed on an empty stomach.    APIXABAN (ELIQUIS) 2.5 MG TAB    Take 1 tablet (2.5 mg total) by mouth once daily.    ASPIRIN (ECOTRIN) 81 MG EC TABLET    Take 81 mg by mouth once daily.    CHLORHEXIDINE (PERIDEX) 0.12 % SOLUTION        CLOBETASOL (TEMOVATE) 0.05 % EXTERNAL SOLUTION    APPLY TO SCALP 1 TO 2 TIMES A DAY AS NEEDED FOR FLARES, DO NOT APPLY TO FACE OR FOLDS OF SKIN    CYANOCOBALAMIN 1,000 MCG/ML INJECTION    INJECT 1ML UNDER THE SKIN EVERY MONTH    ERYTHROMYCIN WITH ETHANOL (EMGEL) 2 % GEL    APPLY TO THE AFFECTED AREA OF FACE TWICE DAILY FOR ROSACEA/ REDNESS    FUROSEMIDE (LASIX) 20 MG TABLET    TAKE 1 TABLET ON TUESDAYS AND THURSDAYS.    FUROSEMIDE (LASIX) 40 MG TABLET    Take 1 tablet (40 mg total) by mouth every Mon, Wed, Fri.    IBUPROFEN (ADVIL,MOTRIN) 800 MG TABLET    Take by mouth.    LEUPROLIDE (LUPRON) 3.75 MG INJECTION    Inject 3.75 mg into the muscle every 3 (three) months.     METOPROLOL SUCCINATE (TOPROL-XL) 100 MG 24 HR TABLET    TAKE 1 TABLET(100 MG) BY MOUTH EVERY DAY    METRONIDAZOLE 0.75% (METROCREAM) 0.75 % CREA    APPLY TO AFFECTED AREA ON FACE TWICE DAILY    OMEPRAZOLE (PRILOSEC) 20 MG CAPSULE    Take 1 capsule (20 mg total) by mouth once daily.    PREDNISONE (DELTASONE) 5 MG TABLET    TAKE 1 TABLET(5 MG) BY MOUTH TWICE DAILY WITH WATER AND ON AN EMPTY STOMACH AS DIRECTED    ROSUVASTATIN (CRESTOR) 10 MG TABLET    TAKE 1 TABLET(10 MG) BY MOUTH EVERY DAY    SULFAMETHOXAZOLE-TRIMETHOPRIM 800-160MG  (BACTRIM DS) 800-160 MG TAB    Take 1 tablet by mouth every 12 (twelve) hours.     Objective:     Vitals:    08/01/24 1323   BP: 96/64   Pulse: 91   Resp: 20   Temp: 98.1 °F (36.7 °C)     Physical Exam  Vitals reviewed.   Constitutional:       General: He is not in acute distress.     Appearance: He is not ill-appearing, toxic-appearing or diaphoretic.   HENT:      Head: Normocephalic and atraumatic.   Cardiovascular:      Rate and Rhythm: Normal rate.   Musculoskeletal:      Right lower leg: No edema.      Left lower leg: No edema.   Skin:     General: Skin is warm.      Coloration: Skin is not jaundiced or pale.      Findings: Bruising present. No erythema, lesion or rash.   Neurological:      Mental Status: He is alert.      Gait: Gait abnormal (cane).   Psychiatric:         Mood and Affect: Mood normal.         Behavior: Behavior normal.         Thought Content: Thought content normal.          Labs/Results:  Lab Results   Component Value Date    WBC 11.27 08/01/2024    RBC 3.92 (L) 08/01/2024    HGB 12.4 (L) 08/01/2024    HCT 35.8 (L) 08/01/2024    MCV 91 08/01/2024    MCH 31.6 (H) 08/01/2024    MCHC 34.6 08/01/2024    RDW 13.1 08/01/2024     08/01/2024    MPV 9.0 (L) 08/01/2024    GRAN 9.3 (H) 08/01/2024    GRAN 82.8 (H) 08/01/2024    LYMPH 1.2 08/01/2024    LYMPH 10.7 (L) 08/01/2024    MONO 0.6 08/01/2024    MONO 5.5 08/01/2024    EOS 0.0 08/01/2024    BASO 0.04 08/01/2024    EOSINOPHIL 0.2 08/01/2024    BASOPHIL 0.4 08/01/2024     CMP  Sodium   Date Value Ref Range Status   08/01/2024 135 (L) 136 - 145 mmol/L Final     Potassium   Date Value Ref Range Status   08/01/2024 3.5 3.5 - 5.1 mmol/L Final     Chloride   Date Value Ref Range Status   08/01/2024 84 (L) 95 - 110 mmol/L Final     CO2   Date Value Ref Range Status   08/01/2024 34 (H) 23 - 29 mmol/L Final     Glucose   Date Value Ref Range Status   08/01/2024 122 (H) 70 - 110 mg/dL Final     BUN   Date Value Ref Range Status   08/01/2024 22 8 - 23  mg/dL Final     Creatinine   Date Value Ref Range Status   08/01/2024 2.2 (H) 0.5 - 1.4 mg/dL Final     Calcium   Date Value Ref Range Status   08/01/2024 9.6 8.7 - 10.5 mg/dL Final     Total Protein   Date Value Ref Range Status   08/01/2024 6.4 6.0 - 8.4 g/dL Final     Albumin   Date Value Ref Range Status   08/01/2024 3.6 3.5 - 5.2 g/dL Final     Total Bilirubin   Date Value Ref Range Status   08/01/2024 1.4 (H) 0.1 - 1.0 mg/dL Final     Comment:     For infants and newborns, interpretation of results should be based  on gestational age, weight and in agreement with clinical  observations.    Premature Infant recommended reference ranges:  Up to 24 hours.............<8.0 mg/dL  Up to 48 hours............<12.0 mg/dL  3-5 days..................<15.0 mg/dL  6-29 days.................<15.0 mg/dL       Alkaline Phosphatase   Date Value Ref Range Status   08/01/2024 112 55 - 135 U/L Final     AST   Date Value Ref Range Status   08/01/2024 16 10 - 40 U/L Final     ALT   Date Value Ref Range Status   08/01/2024 9 (L) 10 - 44 U/L Final     Anion Gap   Date Value Ref Range Status   08/01/2024 17 (H) 8 - 16 mmol/L Final     eGFR   Date Value Ref Range Status   08/01/2024 29 (A) >60 mL/min/1.73 m^2 Final          Latest Reference Range & Units 04/29/24 14:00   PT 9.0 - 12.5 sec 10.9   INR 0.8 - 1.2  0.9      Latest Reference Range & Units 04/30/24 10:24   IgG 650 - 1600 mg/dL 386 (L)   IgM 50 - 300 mg/dL 416 (H)   IgA Level 40 - 350 mg/dL 125     Pet Ct Scan 11/2023  Impression:  Stable pulmonary and right sacral metastatic disease.  No new sites of disease identified.    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    --most recent colonoscopy in 2020.   --Recommended repeat in 3-5 years.     Other vitamin B12 deficiency anemia  --previously on vitamin b12 injections monthly   --b12 and folate levels are within normal limits      Waldenstrom's macroglobulinemia  --Treated with Velcade/dex/Rituxan with excellent biochemical response  --been followed with MARQUIS, spep, light chains, immunoglobulins  --stable     History of prostate cancer  --Status post prostatectomy in 2004 with local relapse treated with radiation and intermittent ADT.    --Has since been followed through an outside urologist for Lupron injection.   --continue with zytiga daily   --continue with prednisone 5mg daily   --continue with lupron injections q 3 months, with Dr. Hurley     History of thymoma  --Status post resection and post surgery radiation.    --Stable since 2018 CT chest      --continue to follow with cardiology    Follow-Up: 3 months with [facility]bc cmp psa testosterone vitamin b12 prior with primary oncologist-standing orders in    Irasema Ramos PA-C  Hematology Oncology    Route Chart for Scheduling    Med Onc Chart Routing      Follow up with physician . 3 months with cbc cmp psa testosterone vitamin b12 prior   Follow up with [facility]    Infusion scheduling note    Injection scheduling note    Labs CMP, CBC, PSA, other and vitamin B12   Scheduling:  Preferred lab:  Lab interval:     Imaging    Pharmacy appointment    Other referrals                  Treatment Plan Information   OP ABIRATERONE DAILY PREDNISONE BID   Dustin Sparks MD   Upcoming Treatment Dates - OP ABIRATERONE DAILY PREDNISONE BID    No upcoming days in selected categories.

## 2024-08-01 ENCOUNTER — OFFICE VISIT (OUTPATIENT)
Dept: HEMATOLOGY/ONCOLOGY | Facility: CLINIC | Age: 84
End: 2024-08-01
Payer: MEDICARE

## 2024-08-01 ENCOUNTER — LAB VISIT (OUTPATIENT)
Dept: LAB | Facility: HOSPITAL | Age: 84
End: 2024-08-01
Attending: INTERNAL MEDICINE
Payer: MEDICARE

## 2024-08-01 VITALS
OXYGEN SATURATION: 97 % | DIASTOLIC BLOOD PRESSURE: 64 MMHG | HEART RATE: 91 BPM | HEIGHT: 70 IN | SYSTOLIC BLOOD PRESSURE: 96 MMHG | TEMPERATURE: 98 F | BODY MASS INDEX: 21.47 KG/M2 | WEIGHT: 149.94 LBS | RESPIRATION RATE: 20 BRPM

## 2024-08-01 DIAGNOSIS — D84.821 IMMUNODEFICIENCY DUE TO CHEMOTHERAPY: ICD-10-CM

## 2024-08-01 DIAGNOSIS — I48.0 PAROXYSMAL ATRIAL FIBRILLATION: ICD-10-CM

## 2024-08-01 DIAGNOSIS — D50.9 IRON DEFICIENCY ANEMIA, UNSPECIFIED IRON DEFICIENCY ANEMIA TYPE: ICD-10-CM

## 2024-08-01 DIAGNOSIS — Z79.899 IMMUNODEFICIENCY DUE TO CHEMOTHERAPY: ICD-10-CM

## 2024-08-01 DIAGNOSIS — C61 PROSTATE CANCER METASTATIC TO BONE: ICD-10-CM

## 2024-08-01 DIAGNOSIS — C78.01 MALIGNANT NEOPLASM METASTATIC TO BOTH LUNGS: ICD-10-CM

## 2024-08-01 DIAGNOSIS — D50.0 IRON DEFICIENCY ANEMIA DUE TO CHRONIC BLOOD LOSS: ICD-10-CM

## 2024-08-01 DIAGNOSIS — D51.8 OTHER VITAMIN B12 DEFICIENCY ANEMIA: ICD-10-CM

## 2024-08-01 DIAGNOSIS — C79.51 PROSTATE CANCER METASTATIC TO BONE: Primary | ICD-10-CM

## 2024-08-01 DIAGNOSIS — Z85.79 HISTORY OF WALDENSTROM'S MACROGLOBULINEMIA: ICD-10-CM

## 2024-08-01 DIAGNOSIS — C79.51 PROSTATE CANCER METASTATIC TO BONE: ICD-10-CM

## 2024-08-01 DIAGNOSIS — C78.02 MALIGNANT NEOPLASM METASTATIC TO BOTH LUNGS: ICD-10-CM

## 2024-08-01 DIAGNOSIS — C61 PROSTATE CANCER METASTATIC TO BONE: Primary | ICD-10-CM

## 2024-08-01 DIAGNOSIS — T45.1X5A IMMUNODEFICIENCY DUE TO CHEMOTHERAPY: ICD-10-CM

## 2024-08-01 LAB
ALBUMIN SERPL BCP-MCNC: 3.6 G/DL (ref 3.5–5.2)
ALP SERPL-CCNC: 112 U/L (ref 55–135)
ALT SERPL W/O P-5'-P-CCNC: 9 U/L (ref 10–44)
ANION GAP SERPL CALC-SCNC: 17 MMOL/L (ref 8–16)
AST SERPL-CCNC: 16 U/L (ref 10–40)
BASOPHILS # BLD AUTO: 0.04 K/UL (ref 0–0.2)
BASOPHILS NFR BLD: 0.4 % (ref 0–1.9)
BILIRUB SERPL-MCNC: 1.4 MG/DL (ref 0.1–1)
BUN SERPL-MCNC: 22 MG/DL (ref 8–23)
CALCIUM SERPL-MCNC: 9.6 MG/DL (ref 8.7–10.5)
CHLORIDE SERPL-SCNC: 84 MMOL/L (ref 95–110)
CO2 SERPL-SCNC: 34 MMOL/L (ref 23–29)
COMPLEXED PSA SERPL-MCNC: 46.4 NG/ML (ref 0–4)
CREAT SERPL-MCNC: 2.2 MG/DL (ref 0.5–1.4)
DIFFERENTIAL METHOD BLD: ABNORMAL
EOSINOPHIL # BLD AUTO: 0 K/UL (ref 0–0.5)
EOSINOPHIL NFR BLD: 0.2 % (ref 0–8)
ERYTHROCYTE [DISTWIDTH] IN BLOOD BY AUTOMATED COUNT: 13.1 % (ref 11.5–14.5)
EST. GFR  (NO RACE VARIABLE): 29 ML/MIN/1.73 M^2
FACT X PPP CHRO-ACNC: 1.22 IU/ML (ref 0.3–0.7)
GLUCOSE SERPL-MCNC: 122 MG/DL (ref 70–110)
HCT VFR BLD AUTO: 35.8 % (ref 40–54)
HGB BLD-MCNC: 12.4 G/DL (ref 14–18)
IMM GRANULOCYTES # BLD AUTO: 0.05 K/UL (ref 0–0.04)
IMM GRANULOCYTES NFR BLD AUTO: 0.4 % (ref 0–0.5)
LYMPHOCYTES # BLD AUTO: 1.2 K/UL (ref 1–4.8)
LYMPHOCYTES NFR BLD: 10.7 % (ref 18–48)
MCH RBC QN AUTO: 31.6 PG (ref 27–31)
MCHC RBC AUTO-ENTMCNC: 34.6 G/DL (ref 32–36)
MCV RBC AUTO: 91 FL (ref 82–98)
MONOCYTES # BLD AUTO: 0.6 K/UL (ref 0.3–1)
MONOCYTES NFR BLD: 5.5 % (ref 4–15)
NEUTROPHILS # BLD AUTO: 9.3 K/UL (ref 1.8–7.7)
NEUTROPHILS NFR BLD: 82.8 % (ref 38–73)
NRBC BLD-RTO: 0 /100 WBC
PLATELET # BLD AUTO: 330 K/UL (ref 150–450)
PMV BLD AUTO: 9 FL (ref 9.2–12.9)
POTASSIUM SERPL-SCNC: 3.5 MMOL/L (ref 3.5–5.1)
PROT SERPL-MCNC: 6.4 G/DL (ref 6–8.4)
RBC # BLD AUTO: 3.92 M/UL (ref 4.6–6.2)
SODIUM SERPL-SCNC: 135 MMOL/L (ref 136–145)
TESTOST SERPL-MCNC: <4 NG/DL (ref 304–1227)
WBC # BLD AUTO: 11.27 K/UL (ref 3.9–12.7)

## 2024-08-01 PROCEDURE — 36415 COLL VENOUS BLD VENIPUNCTURE: CPT | Mod: HCNC | Performed by: INTERNAL MEDICINE

## 2024-08-01 PROCEDURE — 80053 COMPREHEN METABOLIC PANEL: CPT | Mod: HCNC | Performed by: INTERNAL MEDICINE

## 2024-08-01 PROCEDURE — 85025 COMPLETE CBC W/AUTO DIFF WBC: CPT | Mod: HCNC | Performed by: INTERNAL MEDICINE

## 2024-08-01 PROCEDURE — 84403 ASSAY OF TOTAL TESTOSTERONE: CPT | Mod: HCNC | Performed by: INTERNAL MEDICINE

## 2024-08-01 PROCEDURE — 84153 ASSAY OF PSA TOTAL: CPT | Mod: HCNC | Performed by: INTERNAL MEDICINE

## 2024-08-01 PROCEDURE — 99999 PR PBB SHADOW E&M-EST. PATIENT-LVL IV: CPT | Mod: PBBFAC,HCNC,,

## 2024-08-01 PROCEDURE — 85520 HEPARIN ASSAY: CPT | Mod: HCNC | Performed by: INTERNAL MEDICINE

## 2024-08-21 PROBLEM — I48.0 PAROXYSMAL ATRIAL FIBRILLATION: Status: RESOLVED | Noted: 2024-06-11 | Resolved: 2024-08-21

## 2024-08-21 PROBLEM — I48.19 PERSISTENT ATRIAL FIBRILLATION: Status: ACTIVE | Noted: 2024-08-21

## 2024-08-25 ENCOUNTER — HOSPITAL ENCOUNTER (INPATIENT)
Facility: HOSPITAL | Age: 84
LOS: 1 days | Discharge: HOME HEALTH | DRG: 312 | End: 2024-08-27
Attending: FAMILY MEDICINE | Admitting: FAMILY MEDICINE
Payer: MEDICARE

## 2024-08-25 DIAGNOSIS — I50.22 CHRONIC SYSTOLIC CONGESTIVE HEART FAILURE: ICD-10-CM

## 2024-08-25 DIAGNOSIS — C78.01 MALIGNANT NEOPLASM METASTATIC TO BOTH LUNGS: ICD-10-CM

## 2024-08-25 DIAGNOSIS — R55 SYNCOPE AND COLLAPSE: Primary | ICD-10-CM

## 2024-08-25 DIAGNOSIS — E87.6 HYPOKALEMIA: ICD-10-CM

## 2024-08-25 DIAGNOSIS — E87.1 HYPONATREMIA: ICD-10-CM
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
## 2024-08-25 DIAGNOSIS — R07.9 CHEST PAIN: ICD-10-CM

## 2024-08-25 DIAGNOSIS — I95.1 ORTHOSTATIC HYPOTENSION: ICD-10-CM

## 2024-08-25 DIAGNOSIS — R79.89 ELEVATED TROPONIN: ICD-10-CM

## 2024-08-25 DIAGNOSIS — C78.02 MALIGNANT NEOPLASM METASTATIC TO BOTH LUNGS: ICD-10-CM

## 2024-08-25 LAB
ALBUMIN SERPL BCP-MCNC: 3.3 G/DL (ref 3.5–5.2)
ALBUMIN SERPL BCP-MCNC: 3.3 G/DL (ref 3.5–5.2)
ALP SERPL-CCNC: 73 U/L (ref 55–135)
ALP SERPL-CCNC: 81 U/L (ref 55–135)
ALT SERPL W/O P-5'-P-CCNC: 10 U/L (ref 10–44)
ALT SERPL W/O P-5'-P-CCNC: 9 U/L (ref 10–44)
ANION GAP SERPL CALC-SCNC: 15 MMOL/L (ref 8–16)
ANION GAP SERPL CALC-SCNC: 15 MMOL/L (ref 8–16)
AST SERPL-CCNC: 22 U/L (ref 10–40)
AST SERPL-CCNC: 22 U/L (ref 10–40)
BASOPHILS # BLD AUTO: 0.05 K/UL (ref 0–0.2)
BASOPHILS NFR BLD: 0.6 % (ref 0–1.9)
BILIRUB SERPL-MCNC: 1.3 MG/DL (ref 0.1–1)
BILIRUB SERPL-MCNC: 1.3 MG/DL (ref 0.1–1)
BILIRUB UR QL STRIP: NEGATIVE
BNP SERPL-MCNC: 569 PG/ML (ref 0–99)
BUN SERPL-MCNC: 18 MG/DL (ref 8–23)
BUN SERPL-MCNC: 21 MG/DL (ref 8–23)
CALCIUM SERPL-MCNC: 8.7 MG/DL (ref 8.7–10.5)
CALCIUM SERPL-MCNC: 9.1 MG/DL (ref 8.7–10.5)
CHLORIDE SERPL-SCNC: 77 MMOL/L (ref 95–110)
CHLORIDE SERPL-SCNC: 83 MMOL/L (ref 95–110)
CK SERPL-CCNC: 186 U/L (ref 20–200)
CLARITY UR: CLEAR
CO2 SERPL-SCNC: 33 MMOL/L (ref 23–29)
CO2 SERPL-SCNC: 36 MMOL/L (ref 23–29)
COLOR UR: YELLOW
CREAT SERPL-MCNC: 1.4 MG/DL (ref 0.5–1.4)
CREAT SERPL-MCNC: 1.5 MG/DL (ref 0.5–1.4)
DIFFERENTIAL METHOD BLD: ABNORMAL
EOSINOPHIL # BLD AUTO: 0.1 K/UL (ref 0–0.5)
EOSINOPHIL NFR BLD: 0.7 % (ref 0–8)
ERYTHROCYTE [DISTWIDTH] IN BLOOD BY AUTOMATED COUNT: 12.8 % (ref 11.5–14.5)
EST. GFR  (NO RACE VARIABLE): 46 ML/MIN/1.73 M^2
EST. GFR  (NO RACE VARIABLE): 50 ML/MIN/1.73 M^2
GLUCOSE SERPL-MCNC: 113 MG/DL (ref 70–110)
GLUCOSE SERPL-MCNC: 131 MG/DL (ref 70–110)
GLUCOSE UR QL STRIP: NEGATIVE
HCT VFR BLD AUTO: 33.9 % (ref 40–54)
HGB BLD-MCNC: 12 G/DL (ref 14–18)
HGB UR QL STRIP: ABNORMAL
IMM GRANULOCYTES # BLD AUTO: 0.05 K/UL (ref 0–0.04)
IMM GRANULOCYTES NFR BLD AUTO: 0.6 % (ref 0–0.5)
INR PPP: 1.1 (ref 0.8–1.2)
KETONES UR QL STRIP: NEGATIVE
LACTATE SERPL-SCNC: 2.2 MMOL/L (ref 0.5–2.2)
LEUKOCYTE ESTERASE UR QL STRIP: NEGATIVE
LYMPHOCYTES # BLD AUTO: 1.2 K/UL (ref 1–4.8)
LYMPHOCYTES NFR BLD: 12.9 % (ref 18–48)
MAGNESIUM SERPL-MCNC: 2 MG/DL (ref 1.6–2.6)
MCH RBC QN AUTO: 31.2 PG (ref 27–31)
MCHC RBC AUTO-ENTMCNC: 35.4 G/DL (ref 32–36)
MCV RBC AUTO: 88 FL (ref 82–98)
MONOCYTES # BLD AUTO: 0.8 K/UL (ref 0.3–1)
MONOCYTES NFR BLD: 9.2 % (ref 4–15)
NEUTROPHILS # BLD AUTO: 6.9 K/UL (ref 1.8–7.7)
NEUTROPHILS NFR BLD: 76 % (ref 38–73)
NITRITE UR QL STRIP: NEGATIVE
NRBC BLD-RTO: 0 /100 WBC
OHS QRS DURATION: 108 MS
OHS QTC CALCULATION: 458 MS
PH UR STRIP: 7 [PH] (ref 5–8)
PLATELET # BLD AUTO: 213 K/UL (ref 150–450)
PMV BLD AUTO: 9.1 FL (ref 9.2–12.9)
POTASSIUM SERPL-SCNC: 2.5 MMOL/L (ref 3.5–5.1)
POTASSIUM SERPL-SCNC: 3 MMOL/L (ref 3.5–5.1)
PROT SERPL-MCNC: 5.4 G/DL (ref 6–8.4)
PROT SERPL-MCNC: 5.7 G/DL (ref 6–8.4)
PROT UR QL STRIP: ABNORMAL
PROTHROMBIN TIME: 12.2 SEC (ref 9–12.5)
RBC # BLD AUTO: 3.85 M/UL (ref 4.6–6.2)
SODIUM SERPL-SCNC: 128 MMOL/L (ref 136–145)
SODIUM SERPL-SCNC: 131 MMOL/L (ref 136–145)
SP GR UR STRIP: 1.01 (ref 1–1.03)
TROPONIN I SERPL DL<=0.01 NG/ML-MCNC: 0.07 NG/ML (ref 0–0.03)
TROPONIN I SERPL DL<=0.01 NG/ML-MCNC: 0.09 NG/ML (ref 0–0.03)
URN SPEC COLLECT METH UR: ABNORMAL
UROBILINOGEN UR STRIP-ACNC: NEGATIVE EU/DL
WBC # BLD AUTO: 9.01 K/UL (ref 3.9–12.7)

## 2024-08-25 PROCEDURE — 83735 ASSAY OF MAGNESIUM: CPT | Mod: HCNC | Performed by: FAMILY MEDICINE

## 2024-08-25 PROCEDURE — 85025 COMPLETE CBC W/AUTO DIFF WBC: CPT | Mod: HCNC | Performed by: FAMILY MEDICINE

## 2024-08-25 PROCEDURE — 63600175 PHARM REV CODE 636 W HCPCS: Mod: HCNC | Performed by: FAMILY MEDICINE

## 2024-08-25 PROCEDURE — 83605 ASSAY OF LACTIC ACID: CPT | Mod: HCNC | Performed by: NURSE PRACTITIONER

## 2024-08-25 PROCEDURE — 63600175 PHARM REV CODE 636 W HCPCS: Mod: HCNC | Performed by: NURSE PRACTITIONER

## 2024-08-25 PROCEDURE — 80053 COMPREHEN METABOLIC PANEL: CPT | Mod: 91,HCNC | Performed by: NURSE PRACTITIONER

## 2024-08-25 PROCEDURE — 82550 ASSAY OF CK (CPK): CPT | Mod: HCNC | Performed by: NURSE PRACTITIONER

## 2024-08-25 PROCEDURE — 25000003 PHARM REV CODE 250: Mod: HCNC | Performed by: NURSE PRACTITIONER

## 2024-08-25 PROCEDURE — G0378 HOSPITAL OBSERVATION PER HR: HCPCS | Mod: HCNC

## 2024-08-25 PROCEDURE — 81003 URINALYSIS AUTO W/O SCOPE: CPT | Mod: HCNC | Performed by: FAMILY MEDICINE

## 2024-08-25 PROCEDURE — 84484 ASSAY OF TROPONIN QUANT: CPT | Mod: HCNC | Performed by: FAMILY MEDICINE

## 2024-08-25 PROCEDURE — 80053 COMPREHEN METABOLIC PANEL: CPT | Mod: HCNC | Performed by: FAMILY MEDICINE

## 2024-08-25 PROCEDURE — 93010 ELECTROCARDIOGRAM REPORT: CPT | Mod: HCNC,,, | Performed by: INTERNAL MEDICINE

## 2024-08-25 PROCEDURE — 84484 ASSAY OF TROPONIN QUANT: CPT | Mod: 91,HCNC | Performed by: NURSE PRACTITIONER

## 2024-08-25 PROCEDURE — 36415 COLL VENOUS BLD VENIPUNCTURE: CPT | Mod: HCNC | Performed by: NURSE PRACTITIONER

## 2024-08-25 PROCEDURE — 93005 ELECTROCARDIOGRAM TRACING: CPT | Mod: HCNC

## 2024-08-25 PROCEDURE — 83880 ASSAY OF NATRIURETIC PEPTIDE: CPT | Mod: HCNC | Performed by: FAMILY MEDICINE

## 2024-08-25 PROCEDURE — 85610 PROTHROMBIN TIME: CPT | Mod: HCNC | Performed by: FAMILY MEDICINE

## 2024-08-25 PROCEDURE — 25000003 PHARM REV CODE 250: Mod: HCNC | Performed by: FAMILY MEDICINE

## 2024-08-25 RX ORDER — BISACODYL 10 MG/1
10 SUPPOSITORY RECTAL DAILY PRN
Status: DISCONTINUED | OUTPATIENT
Start: 2024-08-25 | End: 2024-08-27 | Stop reason: HOSPADM

## 2024-08-25 RX ORDER — IBUPROFEN 200 MG
16 TABLET ORAL
Status: DISCONTINUED | OUTPATIENT
Start: 2024-08-25 | End: 2024-08-27 | Stop reason: HOSPADM

## 2024-08-25 RX ORDER — ONDANSETRON HYDROCHLORIDE 2 MG/ML
4 INJECTION, SOLUTION INTRAVENOUS EVERY 8 HOURS PRN
Status: DISCONTINUED | OUTPATIENT
Start: 2024-08-25 | End: 2024-08-27 | Stop reason: HOSPADM

## 2024-08-25 RX ORDER — POTASSIUM CHLORIDE 20 MEQ/1
40 TABLET, EXTENDED RELEASE ORAL ONCE
Status: COMPLETED | OUTPATIENT
Start: 2024-08-25 | End: 2024-08-25

## 2024-08-25 RX ORDER — ALUMINUM HYDROXIDE, MAGNESIUM HYDROXIDE, AND SIMETHICONE 1200; 120; 1200 MG/30ML; MG/30ML; MG/30ML
30 SUSPENSION ORAL 4 TIMES DAILY PRN
Status: DISCONTINUED | OUTPATIENT
Start: 2024-08-25 | End: 2024-08-27 | Stop reason: HOSPADM

## 2024-08-25 RX ORDER — GLUCAGON 1 MG
1 KIT INJECTION
Status: DISCONTINUED | OUTPATIENT
Start: 2024-08-25 | End: 2024-08-27 | Stop reason: HOSPADM

## 2024-08-25 RX ORDER — POLYETHYLENE GLYCOL 3350 17 G/17G
17 POWDER, FOR SOLUTION ORAL 2 TIMES DAILY PRN
Status: DISCONTINUED | OUTPATIENT
Start: 2024-08-25 | End: 2024-08-27 | Stop reason: HOSPADM

## 2024-08-25 RX ORDER — IBUPROFEN 200 MG
24 TABLET ORAL
Status: DISCONTINUED | OUTPATIENT
Start: 2024-08-25 | End: 2024-08-27 | Stop reason: HOSPADM

## 2024-08-25 RX ORDER — TALC
6 POWDER (GRAM) TOPICAL NIGHTLY PRN
Status: DISCONTINUED | OUTPATIENT
Start: 2024-08-25 | End: 2024-08-27 | Stop reason: HOSPADM

## 2024-08-25 RX ORDER — SODIUM CHLORIDE 0.9 % (FLUSH) 0.9 %
10 SYRINGE (ML) INJECTION EVERY 12 HOURS PRN
Status: DISCONTINUED | OUTPATIENT
Start: 2024-08-25 | End: 2024-08-27 | Stop reason: HOSPADM

## 2024-08-25 RX ORDER — POTASSIUM CHLORIDE 7.45 MG/ML
10 INJECTION INTRAVENOUS
Status: COMPLETED | OUTPATIENT
Start: 2024-08-25 | End: 2024-08-25

## 2024-08-25 RX ORDER — ACETAMINOPHEN 325 MG/1
650 TABLET ORAL EVERY 4 HOURS PRN
Status: DISCONTINUED | OUTPATIENT
Start: 2024-08-25 | End: 2024-08-27 | Stop reason: HOSPADM

## 2024-08-25 RX ORDER — NALOXONE HCL 0.4 MG/ML
0.02 VIAL (ML) INJECTION
Status: DISCONTINUED | OUTPATIENT
Start: 2024-08-25 | End: 2024-08-27 | Stop reason: HOSPADM

## 2024-08-25 RX ORDER — PREDNISONE 5 MG/1
5 TABLET ORAL 2 TIMES DAILY
Status: DISCONTINUED | OUTPATIENT
Start: 2024-08-25 | End: 2024-08-27 | Stop reason: HOSPADM

## 2024-08-25 RX ORDER — ATORVASTATIN CALCIUM 40 MG/1
40 TABLET, FILM COATED ORAL DAILY
Status: DISCONTINUED | OUTPATIENT
Start: 2024-08-25 | End: 2024-08-27 | Stop reason: HOSPADM

## 2024-08-25 RX ADMIN — SODIUM CHLORIDE 1000 ML: 9 INJECTION, SOLUTION INTRAVENOUS at 12:08

## 2024-08-25 RX ADMIN — ATORVASTATIN CALCIUM 40 MG: 40 TABLET, FILM COATED ORAL at 03:08

## 2024-08-25 RX ADMIN — BACITRACIN ZINC, NEOMYCIN, POLYMYXIN B: 400; 3.5; 5 OINTMENT TOPICAL at 11:08

## 2024-08-25 RX ADMIN — PREDNISONE 5 MG: 5 TABLET ORAL at 08:08

## 2024-08-25 RX ADMIN — POTASSIUM CHLORIDE 40 MEQ: 1500 TABLET, EXTENDED RELEASE ORAL at 12:08

## 2024-08-25 RX ADMIN — POTASSIUM CHLORIDE 10 MEQ: 7.46 INJECTION, SOLUTION INTRAVENOUS at 12:08

## 2024-08-25 RX ADMIN — POTASSIUM CHLORIDE 40 MEQ: 1500 TABLET, EXTENDED RELEASE ORAL at 02:08

## 2024-08-25 RX ADMIN — APIXABAN 2.5 MG: 2.5 TABLET, FILM COATED ORAL at 02:08

## 2024-08-25 NOTE — HPI
84 y.o. male, PMHx: Metastatic Prostate Cx, HTN, HLD, rectal adenocarcinoma, CHF, thymoma, GERD, pulmonary HTN. Presented to the Emergency Department for evaluation of syncopal episode with fall injuries which onset PTA. Pt has multiple skin tears to right arm and BLE. For the past two weeks, the pt states that he has been dealing with dehydration and constipation. Pt's diet has been normal, but the pt has not had solid food the past week drinking smoothies. Recently had an increase in Lasix dosage. Stopped taking ASA and was placed on Eliquis a month ago for AFIB. Symptoms are constant and moderate in severity. No mitigating or exacerbating factors reported. Associated sxs include low BP. Patient denies any HA. Fever, N/V, dysuria, and all other sxs at this time. No prior Tx. In the ED,  vitals: 111/71, 100, 16, 97.7F, 95% RA on arrival. +orthostatic vitals. Significant labs: Na: 128, K+: 2.5, Cr: 1.5, Bili: 1.3, BNP: 569, Trop: 0.071. CXR: no acute finding. CT Head: No acute intracranial CT abnormality. Treated with IV fluids, Potassium repleted. Patient is a full code. Placed in observation under the care of Hospital Medicine for management of Hypokalemia, Syncope.

## 2024-08-25 NOTE — H&P
[facility] - Emergency Dept.  Hospital Medicine  History & Physical    Patient Name: Homero Tanner  MRN: [id number]  Patient Class: OP- Observation  Admission Date: 8/25/2024  Attending Physician: Patrick Davis DO   Primary Care Provider: Solomon Cortés MD         Patient information was obtained from patient, past medical records, and ER records.     Subjective:     Principal Problem:Syncope    Chief Complaint:   Chief Complaint   Patient presents with    Loss of Consciousness     Pt. Is coming in for evaluation after a syncopal episode with a fall. Pt. Has abrasions noted to his right arm and right leg and takes blood thinners. Pt. Has no complaints at this time. Hx of bone, lung and rectal CA.         HPI: 84 y.o. male, PMHx: Metastatic Prostate Cx, HTN, HLD, rectal adenocarcinoma, CHF, thymoma, GERD, pulmonary HTN. Presented to the Emergency Department for evaluation of syncopal episode with fall injuries which onset PTA. Pt has multiple skin tears to right arm and BLE. For the past two weeks, the pt states that he has been dealing with dehydration and constipation. Pt's diet has been normal, but the pt has not had solid food the past week drinking smoothies. Recently had an increase in Lasix dosage. Stopped taking ASA and was placed on Eliquis a month ago for AFIB. Symptoms are constant and moderate in severity. No mitigating or exacerbating factors reported. Associated sxs include low BP. Patient denies any HA. Fever, N/V, dysuria, and all other sxs at this time. No prior Tx. In the ED,  vitals: 111/71, 100, 16, 97.7F, 95% RA on arrival. +orthostatic vitals. Significant labs: Na: 128, K+: 2.5, Cr: 1.5, Bili: 1.3, BNP: 569, Trop: 0.071. CXR: no acute finding. CT Head: No acute intracranial CT abnormality. Treated with IV fluids, Potassium repleted. Patient is a full code. Placed in observation under the care of Hospital Medicine for management of Hypokalemia, Syncope.     Past Medical History:    Diagnosis Date    Abnormal ECG 6/24/2013    Adenomatous rectal polyp 12/7/2015    Aortic insufficiency 6/24/2013    Arthritis     Asthma     as a child    Benign neoplasm of cecum 2/27/2017    Benign neoplasm of transverse colon 2/27/2017    Cataract     CHF (congestive heart failure)     Pt states He's never been told this    Diverticulosis of large intestine without hemorrhage 2/27/2017    Encounter for blood transfusion     Frequent PVCs     Gallstones 3/28/2018    By Us done 3/21/2018    GERD (gastroesophageal reflux disease)     History of colon polyps     Hypertension     Iron deficiency anemia 10/26/2015    Lymphoma     waldenstrom's macroglobulinemia    Mixed hyperlipidemia 6/24/2013    Premature atrial contractions     Prostate cancer     PSVT (paroxysmal supraventricular tachycardia) 6/24/2013    Pulmonary hypertension 6/13/2016    PVC's (premature ventricular contractions) 6/13/2016    Rectal adenocarcinoma     Rectal cancer 5/1/2018    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    left parietal scalp    Thymoma     TR (tricuspid regurgitation) 6/13/2016    Tubular adenoma of colon 06/2020    VT (ventricular tachycardia) 9/18/2018       Past Surgical History:   Procedure Laterality Date    biopsy for chest mass      BONE MARROW BIOPSY Left 7/11/2018    Procedure: Biopsy-bone marrow;  Surgeon: Charanjit Dacosta MD;  Location: [facility];  Service: General;  Laterality: Left;    CATARACT EXTRACTION W/  INTRAOCULAR LENS IMPLANT Right 01/03/2019    CATARACT EXTRACTION W/  INTRAOCULAR LENS IMPLANT Left 02/07/2019    COLON SURGERY      COLONOSCOPY N/A 10/26/2015    Procedure: Colonoscopy;  Surgeon: Abraham Hong MD;  Location: [facility] ENDO;  Service: Endoscopy;  Laterality: N/A;    COLONOSCOPY Left 2/27/2017    Procedure: COLONOSCOPY;  Surgeon: Abraham Hong MD;  Location: [facility] ENDO;  Service: Endoscopy;  Laterality: Left;    COLONOSCOPY N/A 5/1/2018    Procedure: hixtory of rectal cancer. Colonsocopy asked to be  repeated minnie  year, alst one 2/2017;  Surgeon: Patricio Streeter MD;  Location: [facility] ENDO;  Service: Endoscopy;  Laterality: N/A;    COLONOSCOPY N/A 6/27/2018    Procedure: COLONOSCOPY/hybrid APC or a EndoRotor device;  Surgeon: Rao Medeiros MD;  Location: [facility] ENDO (2ND FLR);  Service: Endoscopy;  Laterality: N/A;    COLONOSCOPY N/A 6/22/2020    Procedure: COLONOSCOPY;  Surgeon: Yu Wells MD;  Location: [facility] ENDO;  Service: Endoscopy;  Laterality: N/A;    ESOPHAGOGASTRODUODENOSCOPY N/A 10/22/2019    Procedure: ESOPHAGOGASTRODUODENOSCOPY (EGD);  Surgeon: Mariela Greer MD;  Location: [facility];  Service: Endoscopy;  Laterality: N/A;    FLEXIBLE SIGMOIDOSCOPY Left 4/23/2019    Procedure: SIGMOIDOSCOPY, FLEXIBLE;  Surgeon: Ranjit Will MD;  Location: [facility];  Service: Endoscopy;  Laterality: Left;    HERNIA REPAIR Right 02/2018    Inguinal, open with mesh    mohs      PROSTATECTOMY      RECTAL SURGERY N/A 09/2014    REPAIR OF SLIDING INGUINAL HERNIA Right 2/26/2019    Procedure: REPAIR, HERNIA, INGUINAL, SLIDING;  Surgeon: Bridger Alvarez MD;  Location: [facility] OR;  Service: General;  Laterality: Right;    SKIN BIOPSY      TONSILLECTOMY      TUMOR REMOVAL         Review of patient's allergies indicates:  No Known Allergies    No current facility-administered medications on file prior to encounter.     Current Outpatient Medications on File Prior to Encounter   Medication Sig    abiraterone (ZYTIGA) 250 mg Tab Take 4 tablets by mouth once daily as directed on an empty stomach.    apixaban (ELIQUIS) 2.5 mg Tab Take 1 tablet (2.5 mg total) by mouth once daily.    clobetasoL (TEMOVATE) 0.05 % external solution APPLY TO SCALP 1 TO 2 TIMES A DAY AS NEEDED FOR FLARES, DO NOT APPLY TO FACE OR FOLDS OF SKIN (Patient taking differently: Apply topically 2 (two) times daily as needed.)    furosemide (LASIX) 20 MG tablet TAKE 1 TABLET ON TUESDAYS AND THURSDAYS. (Patient taking differently: Take 60 mg by  mouth once daily.)    furosemide (LASIX) 40 MG tablet Take 1 tablet (40 mg total) by mouth every Mon, Wed, Fri.    metoprolol succinate (TOPROL-XL) 100 MG 24 hr tablet TAKE 1 TABLET(100 MG) BY MOUTH EVERY DAY (Patient taking differently: Take 100 mg by mouth once daily.)    rosuvastatin (CRESTOR) 10 MG tablet TAKE 1 TABLET(10 MG) BY MOUTH EVERY DAY    [DISCONTINUED] sulfamethoxazole-trimethoprim 800-160mg (BACTRIM DS) 800-160 mg Tab Take 1 tablet by mouth every 12 (twelve) hours.    cyanocobalamin 1,000 mcg/mL injection INJECT 1ML UNDER THE SKIN EVERY MONTH (Patient taking differently: Inject 1,000 mcg into the muscle every 30 days. INJECT 1ML UNDER THE SKIN EVERY MONTH)    erythromycin with ethanoL (EMGEL) 2 % gel APPLY TO THE AFFECTED AREA OF FACE TWICE DAILY FOR ROSACEA/ REDNESS    ibuprofen (ADVIL,MOTRIN) 800 MG tablet Take 800 mg by mouth 3 (three) times daily as needed.    leuprolide (LUPRON) 3.75 mg injection Inject 3.75 mg into the muscle every 3 (three) months.     metronidazole 0.75% (METROCREAM) 0.75 % Crea APPLY TO AFFECTED AREA ON FACE TWICE DAILY    predniSONE (DELTASONE) 5 MG tablet TAKE 1 TABLET(5 MG) BY MOUTH TWICE DAILY WITH WATER AND ON AN EMPTY STOMACH AS DIRECTED (Patient taking differently: Take 5 mg by mouth 2 (two) times daily.)    [DISCONTINUED] aspirin (ECOTRIN) 81 MG EC tablet Take 81 mg by mouth once daily.    [DISCONTINUED] chlorhexidine (PERIDEX) 0.12 % solution     [DISCONTINUED] omeprazole (PRILOSEC) 20 MG capsule Take 1 capsule (20 mg total) by mouth once daily.     Family History       Problem Relation (Age of Onset)    Cataracts Mother    Diabetes Father          Tobacco Use    Smoking status: Former     Current packs/day: 0.00     Average packs/day: 1 pack/day for 15.0 years (15.0 ttl pk-yrs)     Types: Cigarettes     Start date: 1954     Quit date: 1969     Years since quittin.5     Passive exposure: Never    Smokeless tobacco: Never   Substance and Sexual Activity     Alcohol use: Yes     Alcohol/week: 3.0 standard drinks of alcohol     Types: 3 Cans of beer per week     Comment: socially, NO ALCOHOL 72 HOURS PRIOR TO SX    Drug use: No    Sexual activity: Not Currently     Review of Systems   Constitutional:  Positive for fatigue.   Skin:  Positive for wound.   Neurological:  Positive for syncope and weakness.     Objective:     Vital Signs (Most Recent):  Temp: 98 °F (36.7 °C) (08/25/24 1300)  Pulse: 74 (08/25/24 1300)  Resp: 17 (08/25/24 1300)  BP: (!) 113/54 (08/25/24 1300)  SpO2: 98 % (08/25/24 1300) Vital Signs (24h Range):  Temp:  [97.7 °F (36.5 °C)-98 °F (36.7 °C)] 98 °F (36.7 °C)  Pulse:  [] 74  Resp:  [13-20] 17  SpO2:  [95 %-98 %] 98 %  BP: ()/(54-71) 113/54        There is no height or weight on file to calculate BMI.     Physical Exam  Vitals and nursing note reviewed.   Constitutional:       General: He is not in acute distress.     Appearance: He is well-developed. He is not diaphoretic.   HENT:      Head: Normocephalic and atraumatic.      Right Ear: Hearing and external ear normal.      Left Ear: Hearing and external ear normal.      Nose: Nose normal. No mucosal edema or rhinorrhea.      Mouth/Throat:      Pharynx: Uvula midline.   Eyes:      General:         Right eye: No discharge.         Left eye: No discharge.      Conjunctiva/sclera: Conjunctivae normal.      Right eye: No chemosis.     Left eye: No chemosis.     Pupils: Pupils are equal, round, and reactive to light.   Neck:      Thyroid: No thyroid mass or thyromegaly.      Trachea: Trachea normal.   Cardiovascular:      Rate and Rhythm: Normal rate and regular rhythm.      Pulses:           Dorsalis pedis pulses are 2+ on the right side and 2+ on the left side.      Heart sounds: Normal heart sounds. No murmur heard.  Pulmonary:      Effort: Pulmonary effort is normal. No respiratory distress.      Breath sounds: Normal breath sounds. No decreased breath sounds or wheezing.   Abdominal:       General: Bowel sounds are normal. There is no distension.      Palpations: Abdomen is soft.      Tenderness: There is no abdominal tenderness.   Musculoskeletal:         General: Normal range of motion.      Cervical back: Normal range of motion and neck supple.   Lymphadenopathy:      Cervical: No cervical adenopathy.      Upper Body:      Right upper body: No supraclavicular adenopathy.      Left upper body: No supraclavicular adenopathy.   Skin:     General: Skin is warm and dry.      Capillary Refill: Capillary refill takes less than 2 seconds.      Findings: No rash.          Neurological:      Mental Status: He is alert and oriented to person, place, and time.   Psychiatric:         Mood and Affect: Mood is not anxious.         Speech: Speech normal.         Behavior: Behavior normal.         Thought Content: Thought content normal.         Judgment: Judgment normal.              CRANIAL NERVES     CN III, IV, VI   Pupils are equal, round, and reactive to light.       Significant Labs: All pertinent labs within the past 24 hours have been reviewed.  CBC:   Recent Labs   Lab 08/25/24  1040   WBC 9.01   HGB 12.0*   HCT 33.9*        CMP:   Recent Labs   Lab 08/25/24  1040   *   K 2.5*   CL 77*   CO2 36*   *   BUN 21   CREATININE 1.5*   CALCIUM 9.1   PROT 5.7*   ALBUMIN 3.3*   BILITOT 1.3*   ALKPHOS 73   AST 22   ALT 9*   ANIONGAP 15       Significant Imaging: I have reviewed all pertinent imaging results/findings within the past 24 hours.  Assessment/Plan:     * Syncope  Syncope at home, reported increase in Lasix dose per Cardiology recently.  CT head: no acute finding. +Orthostatic vitals on arrival.   Likely secondary to hypotension/volume depletion    --Carotid US  --Tele  --Repeat orthostatic vitals in AM  --Replete K+  --Hold Lasix/BP meds for now      Orthostatic hypotension  Likely secondary to dehydration r/t increased Lasix dose. Treated with 1ltr NS in ED, improved BP    --Hold  Lasix for now, monitor for edema  --Repeat Orthostatic vitals in AM  --Cautious use of IV fluids in setting of CHF  --Fall Risk      Hypokalemia  Likely secondary to increased Lasix dose, patient is not managed with PO Potassium as OP.     Patient's most recent potassium results are listed below.   Recent Labs     08/25/24  1040   K 2.5*     Plan  - Replete potassium per protocol  - Monitor potassium Every 12 hours  - Patient's hypokalemia is  New finding, repeat CMP @ 1600      Chronic kidney disease, stage 3a  Creatine stable for now. BMP reviewed- noted CrCl cannot be calculated (Unknown ideal weight.). according to latest data. Based on current GFR, CKD stage is stage 3 - GFR 30-59.  Monitor UOP and serial BMP and adjust therapy as needed. Renally dose meds. Avoid nephrotoxic medications and procedures.    Chronic combined systolic and diastolic heart failure  Patient is identified as having Combined Systolic and Diastolic heart failure that is Chronic. CHF is currently controlled. Latest ECHO performed and demonstrates- Results for orders placed during the hospital encounter of 06/20/24    Echo    Interpretation Summary    Left Ventricle: The left ventricle is normal in size. Normal wall thickness. Mild global hypokinesis present. There is mildly reduced systolic function with a visually estimated ejection fraction of 45 - 50%.    Right Ventricle: Mild right ventricular enlargement. Wall thickness is normal. Systolic function is mildly reduced.    Left Atrium: Left atrium is severely dilated.    Aortic Valve: There is mild aortic regurgitation.    Tricuspid Valve: There is moderate regurgitation.    IVC/SVC: Normal venous pressure at 3 mmHg.  . Continue Beta Blocker and Furosemide and monitor clinical status closely. Monitor on telemetry. Patient is off CHF pathway.  Monitor strict Is&Os and daily weights.  Place on fluid restriction of 1.5 L. Cardiology has not been consulted. Continue to stress to patient  importance of self efficacy and  on diet for CHF. Last BNP reviewed- and noted below   Recent Labs   Lab 08/25/24  1040   *          Prostate cancer metastatic to bone  Followed by oncology as OP,, treated with Zytiga/Lupron/Prednisone    --Hold Zytiga for now, if has prolonged hospitalization, consult Hem/Onc to resume home med    Essential hypertension  Chronic, uncontrolled. Latest blood pressure and vitals reviewed-     Temp:  [97.7 °F (36.5 °C)]   Pulse:  []   Resp:  [13-20]   BP: ()/(55-71)   SpO2:  [95 %-97 %] .   Home meds for hypertension were reviewed and noted below.   Hypertension Medications               furosemide (LASIX) 20 MG tablet TAKE 1 TABLET ON TUESDAYS AND THURSDAYS.    furosemide (LASIX) 40 MG tablet Take 1 tablet (40 mg total) by mouth every Mon, Wed, Fri.    metoprolol succinate (TOPROL-XL) 100 MG 24 hr tablet TAKE 1 TABLET(100 MG) BY MOUTH EVERY DAY            While in the hospital, will manage blood pressure as follows; Adjust home antihypertensive regimen as follows- hold for now in setting of hypotension    Will utilize p.r.n. blood pressure medication only if patient's blood pressure greater than 160/100 and he develops symptoms such as worsening chest pain or shortness of breath.      VTE Risk Mitigation (From admission, onward)           Ordered     apixaban tablet 2.5 mg  Daily         08/25/24 1312     IP VTE HIGH RISK PATIENT  Once         08/25/24 1312     Place sequential compression device  Until discontinued         08/25/24 1312     Reason for No Pharmacological VTE Prophylaxis  Once        Question:  Reasons:  Answer:  Already adequately anticoagulated on oral Anticoagulants    08/25/24 1312                         On 08/25/2024, patient should be placed in hospital observation services under my care in collaboration with Patrick Davis MD.           Nuha Nair NP  Department of Hospital Medicine  [facility] - Emergency Dept.

## 2024-08-25 NOTE — ASSESSMENT & PLAN NOTE
Syncope at home, reported increase in Lasix dose per Cardiology recently.  CT head: no acute finding. +Orthostatic vitals on arrival.   Likely secondary to hypotension/volume depletion    --Carotid US  --Tele  --Repeat orthostatic vitals in AM  --Replete K+  --Hold Lasix/BP meds for now

## 2024-08-25 NOTE — ED NOTES
Report has been received from the previous shift nurse.  Care has been resumed seamlessly.     Upon assessment, no acute findings or signs and symptoms of distress were observed.  Homero remains stable and alert.    Ongoing monitoring will be implemented, and I will continue to adhere to the established treatment orders and plan of care to ensure optimal patient outcomes from ER visit.

## 2024-08-25 NOTE — PHARMACY MED REC
"Admission Medication History     The home medication history was taken by Valerie Tijerina.    You may go to "Admission" then "Reconcile Home Medications" tabs to review and/or act upon these items.     The home medication list has been updated by the Pharmacy department.   Please read ALL comments highlighted in yellow.   Please address this information as you see fit.    Feel free to contact us if you have any questions or require assistance.      The medications listed below were removed from the home medication list. Please reorder if appropriate:  Patient reports no longer taking the following medication(s):  Asprin 81 mg  Peridex 0.12% solution  Prilosec 20 ,g  Bactrim 800-160 mg    Medications listed below were obtained from: Patient/family and Medications brought from home      LAST MED REC COMPLETED:   Valeriekatiuska Tijerina  [id number]      Current Outpatient Medications on File Prior to Encounter   Medication Sig Dispense Refill Last Dose    abiraterone (ZYTIGA) 250 mg Tab Take 4 tablets by mouth once daily as directed on an empty stomach. 120 tablet 11 8/24/2024    apixaban (ELIQUIS) 2.5 mg Tab Take 1 tablet (2.5 mg total) by mouth once daily. 60 tablet 11 8/24/2024    clobetasoL (TEMOVATE) 0.05 % external solution APPLY TO SCALP 1 TO 2 TIMES A DAY AS NEEDED FOR FLARES, DO NOT APPLY TO FACE OR FOLDS OF SKIN (Patient taking differently: Apply topically 2 (two) times daily as needed.) 50 mL 5 Past Week    furosemide (LASIX) 20 MG tablet TAKE 1 TABLET ON TUESDAYS AND THURSDAYS. (Patient taking differently: Take 60 mg by mouth once daily.) 30 tablet 11 8/24/2024    furosemide (LASIX) 40 MG tablet Take 1 tablet (40 mg total) by mouth every Mon, Wed, Fri. 30 tablet 11 8/24/2024    metoprolol succinate (TOPROL-XL) 100 MG 24 hr tablet TAKE 1 TABLET(100 MG) BY MOUTH EVERY DAY (Patient taking differently: Take 100 mg by mouth once daily.) 30 tablet 11 8/24/2024    rosuvastatin (CRESTOR) 10 MG tablet TAKE 1 TABLET(10 " MG) BY MOUTH EVERY DAY 90 tablet 3 8/24/2024    [DISCONTINUED] sulfamethoxazole-trimethoprim 800-160mg (BACTRIM DS) 800-160 mg Tab Take 1 tablet by mouth every 12 (twelve) hours.   8/24/2024    cyanocobalamin 1,000 mcg/mL injection INJECT 1ML UNDER THE SKIN EVERY MONTH (Patient taking differently: Inject 1,000 mcg into the muscle every 30 days. INJECT 1ML UNDER THE SKIN EVERY MONTH) 10 mL 4     erythromycin with ethanoL (EMGEL) 2 % gel APPLY TO THE AFFECTED AREA OF FACE TWICE DAILY FOR ROSACEA/ REDNESS 60 g 3 Unknown    ibuprofen (ADVIL,MOTRIN) 800 MG tablet Take 800 mg by mouth 3 (three) times daily as needed.   Unknown    leuprolide (LUPRON) 3.75 mg injection Inject 3.75 mg into the muscle every 3 (three) months.    More than a month    metronidazole 0.75% (METROCREAM) 0.75 % Crea APPLY TO AFFECTED AREA ON FACE TWICE DAILY 45 g 3 Unknown    predniSONE (DELTASONE) 5 MG tablet TAKE 1 TABLET(5 MG) BY MOUTH TWICE DAILY WITH WATER AND ON AN EMPTY STOMACH AS DIRECTED (Patient taking differently: Take 5 mg by mouth 2 (two) times daily.) 60 tablet 11                            .

## 2024-08-25 NOTE — ASSESSMENT & PLAN NOTE
Patient is identified as having Combined Systolic and Diastolic heart failure that is Chronic. CHF is currently controlled. Latest ECHO performed and demonstrates- Results for orders placed during the hospital encounter of 06/20/24    Echo    Interpretation Summary    Left Ventricle: The left ventricle is normal in size. Normal wall thickness. Mild global hypokinesis present. There is mildly reduced systolic function with a visually estimated ejection fraction of 45 - 50%.    Right Ventricle: Mild right ventricular enlargement. Wall thickness is normal. Systolic function is mildly reduced.    Left Atrium: Left atrium is severely dilated.    Aortic Valve: There is mild aortic regurgitation.    Tricuspid Valve: There is moderate regurgitation.    IVC/SVC: Normal venous pressure at 3 mmHg.  . Continue Beta Blocker and Furosemide and monitor clinical status closely. Monitor on telemetry. Patient is off CHF pathway.  Monitor strict Is&Os and daily weights.  Place on fluid restriction of 1.5 L. Cardiology has not been consulted. Continue to stress to patient importance of self efficacy and  on diet for CHF. Last BNP reviewed- and noted below   Recent Labs   Lab 08/25/24  1040   *

## 2024-08-25 NOTE — SUBJECTIVE & OBJECTIVE
Past Medical History:   Diagnosis Date    Abnormal ECG 6/24/2013    Adenomatous rectal polyp 12/7/2015    Aortic insufficiency 6/24/2013    Arthritis     Asthma     as a child    Benign neoplasm of cecum 2/27/2017    Benign neoplasm of transverse colon 2/27/2017    Cataract     CHF (congestive heart failure)     Pt states He's never been told this    Diverticulosis of large intestine without hemorrhage 2/27/2017    Encounter for blood transfusion     Frequent PVCs     Gallstones 3/28/2018    By Us done 3/21/2018    GERD (gastroesophageal reflux disease)     History of colon polyps     Hypertension     Iron deficiency anemia 10/26/2015    Lymphoma     waldenstrom's macroglobulinemia    Mixed hyperlipidemia 6/24/2013    Premature atrial contractions     Prostate cancer     PSVT (paroxysmal supraventricular tachycardia) 6/24/2013    Pulmonary hypertension 6/13/2016    PVC's (premature ventricular contractions) 6/13/2016    Rectal adenocarcinoma     Rectal cancer 5/1/2018    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    left parietal scalp    Thymoma     TR (tricuspid regurgitation) 6/13/2016    Tubular adenoma of colon 06/2020    VT (ventricular tachycardia) 9/18/2018       Past Surgical History:   Procedure Laterality Date    biopsy for chest mass      BONE MARROW BIOPSY Left 7/11/2018    Procedure: Biopsy-bone marrow;  Surgeon: Charanjit Dacosta MD;  Location: [facility];  Service: General;  Laterality: Left;    CATARACT EXTRACTION W/  INTRAOCULAR LENS IMPLANT Right 01/03/2019    CATARACT EXTRACTION W/  INTRAOCULAR LENS IMPLANT Left 02/07/2019    COLON SURGERY      COLONOSCOPY N/A 10/26/2015    Procedure: Colonoscopy;  Surgeon: Abraham Hong MD;  Location: [facility] ENDO;  Service: Endoscopy;  Laterality: N/A;    COLONOSCOPY Left 2/27/2017    Procedure: COLONOSCOPY;  Surgeon: Abraham Hong MD;  Location: [facility] ENDO;  Service: Endoscopy;  Laterality: Left;    COLONOSCOPY N/A 5/1/2018    Procedure: hixtory of rectal cancer.  Colonsocopy asked to be repeated minnie  year, alst one 2/2017;  Surgeon: Patricio Streeter MD;  Location: [facility] ENDO;  Service: Endoscopy;  Laterality: N/A;    COLONOSCOPY N/A 6/27/2018    Procedure: COLONOSCOPY/hybrid APC or a EndoRotor device;  Surgeon: Rao Medeiros MD;  Location: [facility] ENDO (2ND FLR);  Service: Endoscopy;  Laterality: N/A;    COLONOSCOPY N/A 6/22/2020    Procedure: COLONOSCOPY;  Surgeon: Yu Wells MD;  Location: [facility] ENDO;  Service: Endoscopy;  Laterality: N/A;    ESOPHAGOGASTRODUODENOSCOPY N/A 10/22/2019    Procedure: ESOPHAGOGASTRODUODENOSCOPY (EGD);  Surgeon: Mariela Greer MD;  Location: [facility];  Service: Endoscopy;  Laterality: N/A;    FLEXIBLE SIGMOIDOSCOPY Left 4/23/2019    Procedure: SIGMOIDOSCOPY, FLEXIBLE;  Surgeon: Ranjit Will MD;  Location: [facility];  Service: Endoscopy;  Laterality: Left;    HERNIA REPAIR Right 02/2018    Inguinal, open with mesh    mohs      PROSTATECTOMY      RECTAL SURGERY N/A 09/2014    REPAIR OF SLIDING INGUINAL HERNIA Right 2/26/2019    Procedure: REPAIR, HERNIA, INGUINAL, SLIDING;  Surgeon: Bridger Alvarez MD;  Location: [facility];  Service: General;  Laterality: Right;    SKIN BIOPSY      TONSILLECTOMY      TUMOR REMOVAL         Review of patient's allergies indicates:  No Known Allergies    No current facility-administered medications on file prior to encounter.     Current Outpatient Medications on File Prior to Encounter   Medication Sig    abiraterone (ZYTIGA) 250 mg Tab Take 4 tablets by mouth once daily as directed on an empty stomach.    apixaban (ELIQUIS) 2.5 mg Tab Take 1 tablet (2.5 mg total) by mouth once daily.    clobetasoL (TEMOVATE) 0.05 % external solution APPLY TO SCALP 1 TO 2 TIMES A DAY AS NEEDED FOR FLARES, DO NOT APPLY TO FACE OR FOLDS OF SKIN (Patient taking differently: Apply topically 2 (two) times daily as needed.)    furosemide (LASIX) 20 MG tablet TAKE 1 TABLET ON TUESDAYS AND THURSDAYS. (Patient taking  differently: Take 60 mg by mouth once daily.)    furosemide (LASIX) 40 MG tablet Take 1 tablet (40 mg total) by mouth every Mon, Wed, Fri.    metoprolol succinate (TOPROL-XL) 100 MG 24 hr tablet TAKE 1 TABLET(100 MG) BY MOUTH EVERY DAY (Patient taking differently: Take 100 mg by mouth once daily.)    rosuvastatin (CRESTOR) 10 MG tablet TAKE 1 TABLET(10 MG) BY MOUTH EVERY DAY    [DISCONTINUED] sulfamethoxazole-trimethoprim 800-160mg (BACTRIM DS) 800-160 mg Tab Take 1 tablet by mouth every 12 (twelve) hours.    cyanocobalamin 1,000 mcg/mL injection INJECT 1ML UNDER THE SKIN EVERY MONTH (Patient taking differently: Inject 1,000 mcg into the muscle every 30 days. INJECT 1ML UNDER THE SKIN EVERY MONTH)    erythromycin with ethanoL (EMGEL) 2 % gel APPLY TO THE AFFECTED AREA OF FACE TWICE DAILY FOR ROSACEA/ REDNESS    ibuprofen (ADVIL,MOTRIN) 800 MG tablet Take 800 mg by mouth 3 (three) times daily as needed.    leuprolide (LUPRON) 3.75 mg injection Inject 3.75 mg into the muscle every 3 (three) months.     metronidazole 0.75% (METROCREAM) 0.75 % Crea APPLY TO AFFECTED AREA ON FACE TWICE DAILY    predniSONE (DELTASONE) 5 MG tablet TAKE 1 TABLET(5 MG) BY MOUTH TWICE DAILY WITH WATER AND ON AN EMPTY STOMACH AS DIRECTED (Patient taking differently: Take 5 mg by mouth 2 (two) times daily.)    [DISCONTINUED] aspirin (ECOTRIN) 81 MG EC tablet Take 81 mg by mouth once daily.    [DISCONTINUED] chlorhexidine (PERIDEX) 0.12 % solution     [DISCONTINUED] omeprazole (PRILOSEC) 20 MG capsule Take 1 capsule (20 mg total) by mouth once daily.     Family History       Problem Relation (Age of Onset)    Cataracts Mother    Diabetes Father          Tobacco Use    Smoking status: Former     Current packs/day: 0.00     Average packs/day: 1 pack/day for 15.0 years (15.0 ttl pk-yrs)     Types: Cigarettes     Start date: 1954     Quit date: 1969     Years since quittin.5     Passive exposure: Never    Smokeless tobacco: Never    Substance and Sexual Activity    Alcohol use: Yes     Alcohol/week: 3.0 standard drinks of alcohol     Types: 3 Cans of beer per week     Comment: socially, NO ALCOHOL 72 HOURS PRIOR TO SX    Drug use: No    Sexual activity: Not Currently     Review of Systems   Constitutional:  Positive for fatigue.   Skin:  Positive for wound.   Neurological:  Positive for syncope and weakness.     Objective:     Vital Signs (Most Recent):  Temp: 98 °F (36.7 °C) (08/25/24 1300)  Pulse: 74 (08/25/24 1300)  Resp: 17 (08/25/24 1300)  BP: (!) 113/54 (08/25/24 1300)  SpO2: 98 % (08/25/24 1300) Vital Signs (24h Range):  Temp:  [97.7 °F (36.5 °C)-98 °F (36.7 °C)] 98 °F (36.7 °C)  Pulse:  [] 74  Resp:  [13-20] 17  SpO2:  [95 %-98 %] 98 %  BP: ()/(54-71) 113/54        There is no height or weight on file to calculate BMI.     Physical Exam  Vitals and nursing note reviewed.   Constitutional:       General: He is not in acute distress.     Appearance: He is well-developed. He is not diaphoretic.   HENT:      Head: Normocephalic and atraumatic.      Right Ear: Hearing and external ear normal.      Left Ear: Hearing and external ear normal.      Nose: Nose normal. No mucosal edema or rhinorrhea.      Mouth/Throat:      Pharynx: Uvula midline.   Eyes:      General:         Right eye: No discharge.         Left eye: No discharge.      Conjunctiva/sclera: Conjunctivae normal.      Right eye: No chemosis.     Left eye: No chemosis.     Pupils: Pupils are equal, round, and reactive to light.   Neck:      Thyroid: No thyroid mass or thyromegaly.      Trachea: Trachea normal.   Cardiovascular:      Rate and Rhythm: Normal rate and regular rhythm.      Pulses:           Dorsalis pedis pulses are 2+ on the right side and 2+ on the left side.      Heart sounds: Normal heart sounds. No murmur heard.  Pulmonary:      Effort: Pulmonary effort is normal. No respiratory distress.      Breath sounds: Normal breath sounds. No decreased breath  sounds or wheezing.   Abdominal:      General: Bowel sounds are normal. There is no distension.      Palpations: Abdomen is soft.      Tenderness: There is no abdominal tenderness.   Musculoskeletal:         General: Normal range of motion.      Cervical back: Normal range of motion and neck supple.   Lymphadenopathy:      Cervical: No cervical adenopathy.      Upper Body:      Right upper body: No supraclavicular adenopathy.      Left upper body: No supraclavicular adenopathy.   Skin:     General: Skin is warm and dry.      Capillary Refill: Capillary refill takes less than 2 seconds.      Findings: No rash.          Neurological:      Mental Status: He is alert and oriented to person, place, and time.   Psychiatric:         Mood and Affect: Mood is not anxious.         Speech: Speech normal.         Behavior: Behavior normal.         Thought Content: Thought content normal.         Judgment: Judgment normal.              CRANIAL NERVES     CN III, IV, VI   Pupils are equal, round, and reactive to light.       Significant Labs: All pertinent labs within the past 24 hours have been reviewed.  CBC:   Recent Labs   Lab 08/25/24  1040   WBC 9.01   HGB 12.0*   HCT 33.9*        CMP:   Recent Labs   Lab 08/25/24  1040   *   K 2.5*   CL 77*   CO2 36*   *   BUN 21   CREATININE 1.5*   CALCIUM 9.1   PROT 5.7*   ALBUMIN 3.3*   BILITOT 1.3*   ALKPHOS 73   AST 22   ALT 9*   ANIONGAP 15       Significant Imaging: I have reviewed all pertinent imaging results/findings within the past 24 hours.

## 2024-08-25 NOTE — ED PROVIDER NOTES
SCRIBE #1 NOTE: I, Forrest Black, am scribing for, and in the presence of, Sarah Linton MD. I have scribed the entire note.       History     Chief Complaint   Patient presents with    Loss of Consciousness     Pt. Is coming in for evaluation after a syncopal episode with a fall. Pt. Has abrasions noted to his right arm and right leg and takes blood thinners. Pt. Has no complaints at this time. Hx of bone, lung and rectal CA.      Review of patient's allergies indicates:  No Known Allergies      History of Present Illness     HPI    8/25/2024, 10:53 AM  History obtained from the patient      History of Present Illness: Homero Tanner is a 84 y.o. male patient with a PMHx of HTN, mixed HLD, iron defiecncy, rectal adenocarcinoma, diverticulosis, CHF, benign neoplasm of cecum, rectal cancer, thymoma, GERD, pulmonary HTN, lymphoma, colon polyps, and squamous cell carcinoma who presents to the Emergency Department for evaluation of syncopal episode with fall injuries which onset PTA. Pt has multiple skin tears to right arm and BLE. For the past two weeks, the pt states that he has been dealing with dehydration and constipation. Pt's diet has been normal, but the pt has not had solid food the past week drinking smoothies. Pt recently had an increase in Lasix dosage. Pt recently stopped taking ASA and was placed on Eliquis a month ago for AFIB. Pt's Cardiologist is Dr. Wright, and lives with someone at home. Symptoms are constant and moderate in severity. No mitigating or exacerbating factors reported. Associated sxs include low BP. Patient denies any HA. Fever, N/V, dysuria, and all other sxs at this time. No prior Tx. No further complaints or concerns at this time.       Arrival mode: Ambulance Services    PCP: Solomon Cortés MD        Past Medical History:  Past Medical History:   Diagnosis Date    Abnormal ECG 6/24/2013    Adenomatous rectal polyp 12/7/2015    Aortic insufficiency 6/24/2013     Arthritis     Asthma     as a child    Benign neoplasm of cecum 2/27/2017    Benign neoplasm of transverse colon 2/27/2017    Cataract     CHF (congestive heart failure)     Pt states He's never been told this    Diverticulosis of large intestine without hemorrhage 2/27/2017    Encounter for blood transfusion     Frequent PVCs     Gallstones 3/28/2018    By Us done 3/21/2018    GERD (gastroesophageal reflux disease)     History of colon polyps     Hypertension     Iron deficiency anemia 10/26/2015    Lymphoma     waldenstrom's macroglobulinemia    Mixed hyperlipidemia 6/24/2013    Premature atrial contractions     Prostate cancer     PSVT (paroxysmal supraventricular tachycardia) 6/24/2013    Pulmonary hypertension 6/13/2016    PVC's (premature ventricular contractions) 6/13/2016    Rectal adenocarcinoma     Rectal cancer 5/1/2018    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    left parietal scalp    Thymoma     TR (tricuspid regurgitation) 6/13/2016    Tubular adenoma of colon 06/2020    VT (ventricular tachycardia) 9/18/2018       Past Surgical History:  Past Surgical History:   Procedure Laterality Date    biopsy for chest mass      BONE MARROW BIOPSY Left 7/11/2018    Procedure: Biopsy-bone marrow;  Surgeon: Charanjit Dacosta MD;  Location: [facility];  Service: General;  Laterality: Left;    CATARACT EXTRACTION W/  INTRAOCULAR LENS IMPLANT Right 01/03/2019    CATARACT EXTRACTION W/  INTRAOCULAR LENS IMPLANT Left 02/07/2019    COLON SURGERY      COLONOSCOPY N/A 10/26/2015    Procedure: Colonoscopy;  Surgeon: Abraham Hong MD;  Location: [facility];  Service: Endoscopy;  Laterality: N/A;    COLONOSCOPY Left 2/27/2017    Procedure: COLONOSCOPY;  Surgeon: Abraham Hong MD;  Location: [facility];  Service: Endoscopy;  Laterality: Left;    COLONOSCOPY N/A 5/1/2018    Procedure: hixtory of rectal cancer. Colonsocopy asked to be repeated minnie  year, alst one 2/2017;  Surgeon: Patricio Streeter MD;  Location: [facility];   Service: Endoscopy;  Laterality: N/A;    COLONOSCOPY N/A 2018    Procedure: COLONOSCOPY/hybrid APC or a EndoRotor device;  Surgeon: Rao Medeiros MD;  Location: [facility] ([address] [place] [address]);  Service: Endoscopy;  Laterality: N/A;    COLONOSCOPY N/A 2020    Procedure: COLONOSCOPY;  Surgeon: Yu Wells MD;  Location: [facility] ENDO;  Service: Endoscopy;  Laterality: N/A;    ESOPHAGOGASTRODUODENOSCOPY N/A 10/22/2019    Procedure: ESOPHAGOGASTRODUODENOSCOPY (EGD);  Surgeon: Mariela Greer MD;  Location: [facility];  Service: Endoscopy;  Laterality: N/A;    FLEXIBLE SIGMOIDOSCOPY Left 2019    Procedure: SIGMOIDOSCOPY, FLEXIBLE;  Surgeon: Ranjit Will MD;  Location: [facility];  Service: Endoscopy;  Laterality: Left;    HERNIA REPAIR Right 2018    Inguinal, open with mesh    mohs      PROSTATECTOMY      RECTAL SURGERY N/A 2014    REPAIR OF SLIDING INGUINAL HERNIA Right 2019    Procedure: REPAIR, HERNIA, INGUINAL, SLIDING;  Surgeon: Bridger Alvarez MD;  Location: [facility];  Service: General;  Laterality: Right;    SKIN BIOPSY      TONSILLECTOMY      TUMOR REMOVAL           Family History:  Family History   Problem Relation Name Age of Onset    Diabetes Father      Cataracts Mother      Amblyopia Neg Hx      Blindness Neg Hx      Cancer Neg Hx      Glaucoma Neg Hx      Hypertension Neg Hx      Macular degeneration Neg Hx      Retinal detachment Neg Hx      Strabismus Neg Hx      Stroke Neg Hx      Thyroid disease Neg Hx      Melanoma Neg Hx         Social History:  Social History     Tobacco Use    Smoking status: Former     Current packs/day: 0.00     Average packs/day: 1 pack/day for 15.0 years (15.0 ttl pk-yrs)     Types: Cigarettes     Start date: 1954     Quit date: 1969     Years since quittin.5     Passive exposure: Never    Smokeless tobacco: Never   Substance and Sexual Activity    Alcohol use: Yes     Alcohol/week: 3.0 standard drinks of alcohol     Types: 3 Cans of beer  per week     Comment: socially, NO ALCOHOL 72 HOURS PRIOR TO SX    Drug use: No    Sexual activity: Not Currently        Review of Systems     Review of Systems   Constitutional:  Negative for chills and fever.        (+) dehydration   HENT:  Negative for congestion and sore throat.    Respiratory:  Negative for cough, chest tightness and shortness of breath.    Cardiovascular:  Negative for chest pain.        (+) low BP   Gastrointestinal:  Positive for constipation. Negative for abdominal pain, anal bleeding, nausea and vomiting.   Genitourinary:  Negative for dysuria.   Musculoskeletal:  Negative for back pain.   Skin:  Positive for wound (RUE and BLE). Negative for rash.   Neurological:  Positive for syncope. Negative for dizziness, facial asymmetry, weakness, light-headedness and headaches.   Hematological:  Does not bruise/bleed easily.   All other systems reviewed and are negative.       Physical Exam     Initial Vitals [08/25/24 0941]   BP Pulse Resp Temp SpO2   111/71 100 16 97.7 °F (36.5 °C) 95 %      MAP       --          Physical Exam  Nursing Notes and Vital Signs Reviewed.  Constitutional: Patient is in no acute distress. Well-developed and well-nourished.  Head: Atraumatic. Normocephalic.  Eyes: PERRL. EOM intact. Conjunctivae are not pale. No scleral icterus.  ENT: Mucous membranes are moist. Oropharynx is clear and symmetric.    Neck: Supple. Full ROM. No lymphadenopathy.  Cardiovascular: AFIB. No murmurs, rubs, or gallops. Distal pulses are 2+ and symmetric.  Pulmonary/Chest: No respiratory distress. Clear to auscultation bilaterally. No wheezing or rales.  Abdominal: Soft and non-distended.  There is no tenderness.  No rebound, guarding, or rigidity. Good bowel sounds.  Genitourinary: No CVA tenderness  Musculoskeletal: Moves all extremities. No obvious deformities. No edema. No calf tenderness. Multiple skin tears to BLE and RUE. Large blood-filled blisters to BLE.  Skin: Warm and  dry.  Neurological:  Alert, awake, and appropriate.  Normal speech.  No acute focal neurological deficits are appreciated.  Psychiatric: Normal affect. Good eye contact. Appropriate in content.     ED Course   Critical Care    Date/Time: 8/25/2024 12:29 PM    Performed by: Sarah Linton MD  Authorized by: Sarah Linton MD  Direct patient critical care time: 14 minutes  Additional history critical care time: 9 minutes  Ordering / reviewing critical care time: 8 minutes  Documentation critical care time: 7 minutes  Consulting other physicians critical care time: 7 minutes  Total critical care time (exclusive of procedural time) : 45 minutes  Critical care time was exclusive of teaching time and separately billable procedures and treating other patients.  Critical care was necessary to treat or prevent imminent or life-threatening deterioration of the following conditions: Syncope, Hypokalemia.  Critical care was time spent personally by me on the following activities: blood draw for specimens, development of treatment plan with patient or surrogate, discussions with consultants, evaluation of patient's response to treatment, interpretation of cardiac output measurements, ordering and performing treatments and interventions, examination of patient, obtaining history from patient or surrogate, ordering and review of radiographic studies, ordering and review of laboratory studies, review of old charts, re-evaluation of patient's condition and pulse oximetry.        ED Vital Signs:  Vitals:    08/25/24 1050 08/25/24 1100 08/25/24 1115 08/25/24 1200   BP: (!) 99/55 (!) 90/55 96/64 97/60   Pulse:  78 79 75   Resp:  20 13 20   Temp:       TempSrc:       SpO2:  97%  96%    08/25/24 1300 08/25/24 1400 08/25/24 1500 08/25/24 1600   BP: (!) 113/54 (!) 101/55 (!) 89/53 (!) 101/59   Pulse: 74 74 71 62   Resp: 17 14 19 (!) 21   Temp: 98 °F (36.7 °C)      TempSrc: Oral      SpO2: 98% 98% 95% 96%    08/25/24 1700 08/25/24  1811 08/25/24 2000 08/25/24 2020   BP: (!) 97/56 125/67  (!) 98/52   Pulse: 67 92 82 (!) 54   Resp: 15 16  18   Temp:  97.6 °F (36.4 °C)  97.8 °F (36.6 °C)   TempSrc:  Oral  Oral   SpO2: 95% 96%  97%    08/26/24 0400 08/26/24 0403 08/26/24 0742   BP:  (!) 105/57 (!) 99/57   Pulse: 82 86 75   Resp:  17 18   Temp:  98.8 °F (37.1 °C) 98.1 °F (36.7 °C)   TempSrc:  Oral Oral   SpO2:  96% 95%       Abnormal Lab Results:  Labs Reviewed   CBC W/ AUTO DIFFERENTIAL - Abnormal       Result Value    WBC 9.01      RBC 3.85 (*)     Hemoglobin 12.0 (*)     Hematocrit 33.9 (*)     MCV 88      MCH 31.2 (*)     MCHC 35.4      RDW 12.8      Platelets 213      MPV 9.1 (*)     Immature Granulocytes 0.6 (*)     Gran # (ANC) 6.9      Immature Grans (Abs) 0.05 (*)     Lymph # 1.2      Mono # 0.8      Eos # 0.1      Baso # 0.05      nRBC 0      Gran % 76.0 (*)     Lymph % 12.9 (*)     Mono % 9.2      Eosinophil % 0.7      Basophil % 0.6      Differential Method Automated     COMPREHENSIVE METABOLIC PANEL - Abnormal    Sodium 128 (*)     Potassium 2.5 (*)     Chloride 77 (*)     CO2 36 (*)     Glucose 113 (*)     BUN 21      Creatinine 1.5 (*)     Calcium 9.1      Total Protein 5.7 (*)     Albumin 3.3 (*)     Total Bilirubin 1.3 (*)     Alkaline Phosphatase 73      AST 22      ALT 9 (*)     eGFR 46 (*)     Anion Gap 15      Narrative:     K critical result(s) called and verbal readback obtained from Barbara Benito RN by BECKI 08/25/2024 11:31   URINALYSIS - Abnormal    Specimen UA Urine, Clean Catch      Color, UA Yellow      Appearance, UA Clear      pH, UA 7.0      Specific Gravity, UA 1.010      Protein, UA Trace (*)     Glucose, UA Negative      Ketones, UA Negative      Bilirubin (UA) Negative      Occult Blood UA Trace (*)     Nitrite, UA Negative      Urobilinogen, UA Negative      Leukocytes, UA Negative     TROPONIN I - Abnormal    Troponin I 0.071 (*)    B-TYPE NATRIURETIC PEPTIDE - Abnormal     (*)    PROTIME-INR    PROTIME-INR    Prothrombin Time 12.2      INR 1.1     MAGNESIUM   MAGNESIUM    Magnesium 2.0          All Lab Results:  Results for orders placed or performed during the hospital encounter of 08/25/24   CBC auto differential   Result Value Ref Range    WBC 9.01 3.90 - 12.70 K/uL    RBC 3.85 (L) 4.60 - 6.20 M/uL    Hemoglobin 12.0 (L) 14.0 - 18.0 g/dL    Hematocrit 33.9 (L) 40.0 - 54.0 %    MCV 88 82 - 98 fL    MCH 31.2 (H) 27.0 - 31.0 pg    MCHC 35.4 32.0 - 36.0 g/dL    RDW 12.8 11.5 - 14.5 %    Platelets 213 150 - 450 K/uL    MPV 9.1 (L) 9.2 - 12.9 fL    Immature Granulocytes 0.6 (H) 0.0 - 0.5 %    Gran # (ANC) 6.9 1.8 - 7.7 K/uL    Immature Grans (Abs) 0.05 (H) 0.00 - 0.04 K/uL    Lymph # 1.2 1.0 - 4.8 K/uL    Mono # 0.8 0.3 - 1.0 K/uL    Eos # 0.1 0.0 - 0.5 K/uL    Baso # 0.05 0.00 - 0.20 K/uL    nRBC 0 0 /100 WBC    Gran % 76.0 (H) 38.0 - 73.0 %    Lymph % 12.9 (L) 18.0 - 48.0 %    Mono % 9.2 4.0 - 15.0 %    Eosinophil % 0.7 0.0 - 8.0 %    Basophil % 0.6 0.0 - 1.9 %    Differential Method Automated    Comprehensive metabolic panel   Result Value Ref Range    Sodium 128 (L) 136 - 145 mmol/L    Potassium 2.5 (LL) 3.5 - 5.1 mmol/L    Chloride 77 (L) 95 - 110 mmol/L    CO2 36 (H) 23 - 29 mmol/L    Glucose 113 (H) 70 - 110 mg/dL    BUN 21 8 - 23 mg/dL    Creatinine 1.5 (H) 0.5 - 1.4 mg/dL    Calcium 9.1 8.7 - 10.5 mg/dL    Total Protein 5.7 (L) 6.0 - 8.4 g/dL    Albumin 3.3 (L) 3.5 - 5.2 g/dL    Total Bilirubin 1.3 (H) 0.1 - 1.0 mg/dL    Alkaline Phosphatase 73 55 - 135 U/L    AST 22 10 - 40 U/L    ALT 9 (L) 10 - 44 U/L    eGFR 46 (A) >60 mL/min/1.73 m^2    Anion Gap 15 8 - 16 mmol/L   Urinalysis - Clean Catch   Result Value Ref Range    Specimen UA Urine, Clean Catch     Color, UA Yellow Yellow, Straw, Lzu    Appearance, UA Clear Clear    pH, UA 7.0 5.0 - 8.0    Specific Gravity, UA 1.010 1.005 - 1.030    Protein, UA Trace (A) Negative    Glucose, UA Negative Negative    Ketones, UA Negative Negative     Bilirubin (UA) Negative Negative    Occult Blood UA Trace (A) Negative    Nitrite, UA Negative Negative    Urobilinogen, UA Negative <2.0 EU/dL    Leukocytes, UA Negative Negative   Troponin I   Result Value Ref Range    Troponin I 0.071 (H) 0.000 - 0.026 ng/mL   B-Type natriuretic peptide (BNP)   Result Value Ref Range     (H) 0 - 99 pg/mL   Protime-INR   Result Value Ref Range    Prothrombin Time 12.2 9.0 - 12.5 sec    INR 1.1 0.8 - 1.2   Magnesium   Result Value Ref Range    Magnesium 2.0 1.6 - 2.6 mg/dL   Lactic acid, plasma   Result Value Ref Range    Lactate (Lactic Acid) 2.2 0.5 - 2.2 mmol/L   CK   Result Value Ref Range     20 - 200 U/L   Comprehensive metabolic panel   Result Value Ref Range    Sodium 131 (L) 136 - 145 mmol/L    Potassium 3.0 (L) 3.5 - 5.1 mmol/L    Chloride 83 (L) 95 - 110 mmol/L    CO2 33 (H) 23 - 29 mmol/L    Glucose 131 (H) 70 - 110 mg/dL    BUN 18 8 - 23 mg/dL    Creatinine 1.4 0.5 - 1.4 mg/dL    Calcium 8.7 8.7 - 10.5 mg/dL    Total Protein 5.4 (L) 6.0 - 8.4 g/dL    Albumin 3.3 (L) 3.5 - 5.2 g/dL    Total Bilirubin 1.3 (H) 0.1 - 1.0 mg/dL    Alkaline Phosphatase 81 55 - 135 U/L    AST 22 10 - 40 U/L    ALT 10 10 - 44 U/L    eGFR 50 (A) >60 mL/min/1.73 m^2    Anion Gap 15 8 - 16 mmol/L   Troponin I   Result Value Ref Range    Troponin I 0.087 (H) 0.000 - 0.026 ng/mL   Comprehensive Metabolic Panel (CMP)   Result Value Ref Range    Sodium 126 (L) 136 - 145 mmol/L    Potassium 3.4 (L) 3.5 - 5.1 mmol/L    Chloride 83 (L) 95 - 110 mmol/L    CO2 30 (H) 23 - 29 mmol/L    Glucose 100 70 - 110 mg/dL    BUN 17 8 - 23 mg/dL    Creatinine 1.3 0.5 - 1.4 mg/dL    Calcium 8.5 (L) 8.7 - 10.5 mg/dL    Total Protein 4.9 (L) 6.0 - 8.4 g/dL    Albumin 2.8 (L) 3.5 - 5.2 g/dL    Total Bilirubin 0.9 0.1 - 1.0 mg/dL    Alkaline Phosphatase 68 55 - 135 U/L    AST 19 10 - 40 U/L    ALT 8 (L) 10 - 44 U/L    eGFR 55 (A) >60 mL/min/1.73 m^2    Anion Gap 13 8 - 16 mmol/L   Magnesium   Result Value  Ref Range    Magnesium 2.0 1.6 - 2.6 mg/dL   Phosphorus   Result Value Ref Range    Phosphorus 2.1 (L) 2.7 - 4.5 mg/dL   CBC with Automated Differential   Result Value Ref Range    WBC 6.76 3.90 - 12.70 K/uL    RBC 3.23 (L) 4.60 - 6.20 M/uL    Hemoglobin 10.0 (L) 14.0 - 18.0 g/dL    Hematocrit 28.8 (L) 40.0 - 54.0 %    MCV 89 82 - 98 fL    MCH 31.0 27.0 - 31.0 pg    MCHC 34.7 32.0 - 36.0 g/dL    RDW 12.8 11.5 - 14.5 %    Platelets 180 150 - 450 K/uL    MPV 9.3 9.2 - 12.9 fL    Immature Granulocytes 0.6 (H) 0.0 - 0.5 %    Gran # (ANC) 5.1 1.8 - 7.7 K/uL    Immature Grans (Abs) 0.04 0.00 - 0.04 K/uL    Lymph # 0.9 (L) 1.0 - 4.8 K/uL    Mono # 0.7 0.3 - 1.0 K/uL    Eos # 0.0 0.0 - 0.5 K/uL    Baso # 0.02 0.00 - 0.20 K/uL    nRBC 0 0 /100 WBC    Gran % 75.6 (H) 38.0 - 73.0 %    Lymph % 13.8 (L) 18.0 - 48.0 %    Mono % 9.6 4.0 - 15.0 %    Eosinophil % 0.1 0.0 - 8.0 %    Basophil % 0.3 0.0 - 1.9 %    Differential Method Automated    Troponin I   Result Value Ref Range    Troponin I 0.054 (H) 0.000 - 0.026 ng/mL   EKG 12-lead   Result Value Ref Range    QRS Duration 108 ms    [facility] QTC Calculation 458 ms     *Note: Due to a large number of results and/or encounters for the requested time period, some results have not been displayed. A complete set of results can be found in Results Review.         Imaging Results:  Imaging Results               US Carotid Bilateral (Final result)  Result time 08/25/24 14:29:46      Final result by Anurag Leal MD (08/25/24 14:29:46)                   Impression:      Less than 50% stenosis suspected bilaterally but with significant plaque burden.    Incidental cardiac arrhythmia noted.    This report was flagged in Epic as containing an incidental finding.      Electronically signed by: Anurag Leal  Date:    08/25/2024  Time:    14:29               Narrative:    EXAMINATION:  US CAROTID BILATERAL    CLINICAL HISTORY:  Syncope;    TECHNIQUE:  Grayscale and color Doppler ultrasound  examination of the carotid and vertebral artery systems bilaterally.  Stenosis estimates are per the NASCET measurement criteria.    COMPARISON:  None.    FINDINGS:  Right:    Internal Carotid Artery (ICA) peak systolic velocity 43 cm/sec    ICA/CCA peak systolic velocity ratio: 1.6    Plaque formation: Heterogeneous    Vertebral artery: Antegrade flow and normal waveform.    Left:    Internal Carotid Artery (ICA)  peak systolic velocity 39 cm/sec    ICA/CCA peak systolic velocity ratio: 0.9    Plaque formation: Homogeneous    Vertebral artery: Antegrade flow and normal waveform.    Cardiac arrhythmia noted.                                       CT Head Without Contrast (Final result)  Result time 08/25/24 10:55:18      Final result by Anurag Leal MD (08/25/24 10:55:18)                   Impression:      No acute intracranial CT abnormality.    All CT scans at this facility are performed  using dose modulation techniques as appropriate to performed exam including the following:  automated exposure control; adjustment of mA and/or kV according to the patients size (this includes techniques or standardized protocols for targeted exams where dose is matched to indication/reason for exam: i.e. extremities or head);  iterative reconstruction technique.      Electronically signed by: Anurag Leal  Date:    08/25/2024  Time:    10:55               Narrative:    EXAMINATION:  CT HEAD WITHOUT CONTRAST    CLINICAL HISTORY:  Head trauma, minor (Age >= 65y);    TECHNIQUE:  Low dose axial CT images obtained throughout the head without intravenous contrast. Sagittal and coronal reconstructions were performed.    COMPARISON:  None.    FINDINGS:  Intracranial compartment:    Ventricles and sulci are normal in size for age without evidence of hydrocephalus. No extra-axial blood or fluid collections.    The brain parenchyma appears normal. No parenchymal mass, hemorrhage, edema or major vascular distribution  infarct.    Skull/extracranial contents (limited evaluation): No fracture. Mastoid air cells and paranasal sinuses are essentially clear.                                        X-Ray Chest AP Portable (Final result)  Result time 08/25/24 10:31:01      Final result by Anurag Leal MD (08/25/24 10:31:01)                   Impression:      As above.    This report was flagged in Epic as containing an incidental finding.      Electronically signed by: Anurag Leal  Date:    08/25/2024  Time:    10:31               Narrative:    EXAMINATION:  XR CHEST AP PORTABLE    CLINICAL HISTORY:  Chest Pain;    TECHNIQUE:  Single frontal view of the chest was performed.    COMPARISON:  None    FINDINGS:  The lungs are clear, with normal appearance of pulmonary vasculature and no pleural effusion or pneumothorax.    The cardiac silhouette is normal in size. Right perihilar prominence of unclear significance.  Consider outpatient chest CT once the patient's symptoms have resolved.    Bones are intact.                                       The EKG was ordered, reviewed, and independently interpreted by the ED provider.  Interpretation time: 10:38  Rate: 84 BPM  Rhythm: atrial fibrillation  Interpretation: ST & T wave abnormality, consider anterior ischemia. No STEMI.           The Emergency Provider reviewed the vital signs and test results, which are outlined above.     ED Discussion       12:13 PM: Discussed case with Nuha Nair NP (Hospital Medicine). Dr. Davis agrees with current care and management of pt and accepts admission.   Admitting Service:   Admitting Physician: Dr. Davis  Admit to: OBS Med/Tele     12:13 PM: Re-evaluated pt. I have discussed test results, shared treatment plan, and the need for admission with patient and family at bedside. Pt and family express understanding at this time and agree with all information. All questions answered. Pt and family have no further questions or concerns at this  time. Pt is ready for admit.        Medical Decision Making  83 yo male was walking in his home when he passed out and fell down.  Walks with a walker.  He said he was out only briefly and was able to get up.  He denies any symptoms now, no chest pain, SOB, headache, n/v/d.  He says he was recently started on eliquis 2.5 mg daily instead of ASA.  He has significant skin tears to bilateral lower extremities and right arm.  Na 128, K+ 2.5, troponin 0.078.  EKG atrial fibrillation, rates 90s.  CT brain: negative.  CXR:  Right perihilar prominence of unclear significance.  Consider outpatient chest CT once the patient's symptoms have resolved.  He has been orthostatic on exam, and 1L NS ordered.  KCL 10 meq IV and 40 meq po given.      Right perihilar prominence of unclear significance.  Consider outpatient chest CT once the patient's symptoms have resolved.         Amount and/or Complexity of Data Reviewed  Independent Historian: EMS  External Data Reviewed: labs, radiology, ECG and notes.  Labs: ordered. Decision-making details documented in ED Course.  Radiology: ordered and independent interpretation performed. Decision-making details documented in ED Course.  ECG/medicine tests: ordered and independent interpretation performed. Decision-making details documented in ED Course.    Risk  OTC drugs.  Prescription drug management.  Decision regarding hospitalization.    Critical Care  Total time providing critical care: 45 minutes                ED Medication(s):  Medications   apixaban tablet 2.5 mg (2.5 mg Oral Given 8/25/24 1459)   predniSONE tablet 5 mg (5 mg Oral Given 8/25/24 2021)   atorvastatin tablet 40 mg (40 mg Oral Given 8/25/24 1500)   sodium chloride 0.9% flush 10 mL (has no administration in time range)   melatonin tablet 6 mg (has no administration in time range)   ondansetron injection 4 mg (has no administration in time range)   bisacodyL suppository 10 mg (has no administration in time range)    aluminum-magnesium hydroxide-simethicone 200-200-20 mg/5 mL suspension 30 mL (has no administration in time range)   acetaminophen tablet 650 mg (has no administration in time range)   naloxone 0.4 mg/mL injection 0.02 mg (has no administration in time range)   glucose chewable tablet 16 g (has no administration in time range)   glucose chewable tablet 24 g (has no administration in time range)   glucagon (human recombinant) injection 1 mg (has no administration in time range)   polyethylene glycol packet 17 g (has no administration in time range)   dextrose 10% bolus 125 mL 125 mL (has no administration in time range)   dextrose 10% bolus 250 mL 250 mL (has no administration in time range)   sodium chloride 0.9% bolus 1,000 mL 1,000 mL (0 mLs Intravenous Stopped 8/25/24 1345)   neomycin-bacitracin-polymyxin ointment ( Topical (Top) Given 8/25/24 1115)   potassium chloride SA CR tablet 40 mEq (40 mEq Oral Given 8/25/24 1242)   potassium chloride 10 mEq in 100 mL IVPB (0 mEq Intravenous Stopped 8/25/24 1345)   potassium chloride SA CR tablet 40 mEq (40 mEq Oral Given 8/25/24 1459)       Current Discharge Medication List                  Scribe Attestation:   Scribe #1: I performed the above scribed service and the documentation accurately describes the services I performed. I attest to the accuracy of the note.     Attending:   Physician Attestation Statement for Scribe #1: I, Sarah Linton MD, personally performed the services described in this documentation, as scribed by Forrest Black, in my presence, and it is both accurate and complete.           Clinical Impression       ICD-10-CM ICD-9-CM   1. Syncope and collapse  R55 780.2   2. Fall  W19.XXXA E888.9   3. Orthostatic hypotension  I95.1 458.0   4. Hypokalemia  E87.6 276.8   5. Hyponatremia  E87.1 276.1   6. Elevated troponin  R79.89 790.6   7. Chronic systolic congestive heart failure  I50.22 428.22     428.0   8. Chest pain  R07.9 786.50        Disposition:   Disposition: Placed in Observation  Condition: Stable        Sarah Linton MD  08/26/24 1027

## 2024-08-25 NOTE — ASSESSMENT & PLAN NOTE
Chronic, uncontrolled. Latest blood pressure and vitals reviewed-     Temp:  [97.7 °F (36.5 °C)]   Pulse:  []   Resp:  [13-20]   BP: ()/(55-71)   SpO2:  [95 %-97 %] .   Home meds for hypertension were reviewed and noted below.   Hypertension Medications               furosemide (LASIX) 20 MG tablet TAKE 1 TABLET ON TUESDAYS AND THURSDAYS.    furosemide (LASIX) 40 MG tablet Take 1 tablet (40 mg total) by mouth every Mon, Wed, Fri.    metoprolol succinate (TOPROL-XL) 100 MG 24 hr tablet TAKE 1 TABLET(100 MG) BY MOUTH EVERY DAY            While in the hospital, will manage blood pressure as follows; Adjust home antihypertensive regimen as follows- hold for now in setting of hypotension    Will utilize p.r.n. blood pressure medication only if patient's blood pressure greater than 160/100 and he develops symptoms such as worsening chest pain or shortness of breath.

## 2024-08-25 NOTE — ASSESSMENT & PLAN NOTE
Followed by oncology as OP,, treated with Zytiga/Lupron/Prednisone    --Hold Zytiga for now, if has prolonged hospitalization, consult Hem/Onc to resume home med

## 2024-08-25 NOTE — ASSESSMENT & PLAN NOTE
Likely secondary to increased Lasix dose, patient is not managed with PO Potassium as OP.     Patient's most recent potassium results are listed below.   Recent Labs     08/25/24  1040   K 2.5*     Plan  - Replete potassium per protocol  - Monitor potassium Every 12 hours  - Patient's hypokalemia is  New finding, repeat CMP @ 1600

## 2024-08-25 NOTE — ASSESSMENT & PLAN NOTE
Likely secondary to dehydration r/t increased Lasix dose. Treated with 1ltr NS in ED, improved BP    --Hold Lasix for now, monitor for edema  --Repeat Orthostatic vitals in AM  --Cautious use of IV fluids in setting of CHF  --Fall Risk     Oxycodone sent electronically

## 2024-08-26 ENCOUNTER — TELEPHONE (OUTPATIENT)
Dept: CARDIOLOGY | Facility: CLINIC | Age: 84
End: 2024-08-26
Payer: MEDICARE

## 2024-08-26 LAB
ALBUMIN SERPL BCP-MCNC: 2.8 G/DL (ref 3.5–5.2)
ALP SERPL-CCNC: 68 U/L (ref 55–135)
ALT SERPL W/O P-5'-P-CCNC: 8 U/L (ref 10–44)
ANION GAP SERPL CALC-SCNC: 13 MMOL/L (ref 8–16)
AST SERPL-CCNC: 19 U/L (ref 10–40)
BASOPHILS # BLD AUTO: 0.02 K/UL (ref 0–0.2)
BASOPHILS NFR BLD: 0.3 % (ref 0–1.9)
BILIRUB SERPL-MCNC: 0.9 MG/DL (ref 0.1–1)
BUN SERPL-MCNC: 17 MG/DL (ref 8–23)
CALCIUM SERPL-MCNC: 8.5 MG/DL (ref 8.7–10.5)
CHLORIDE SERPL-SCNC: 83 MMOL/L (ref 95–110)
CO2 SERPL-SCNC: 30 MMOL/L (ref 23–29)
CREAT SERPL-MCNC: 1.3 MG/DL (ref 0.5–1.4)
DIFFERENTIAL METHOD BLD: ABNORMAL
EOSINOPHIL # BLD AUTO: 0 K/UL (ref 0–0.5)
EOSINOPHIL NFR BLD: 0.1 % (ref 0–8)
ERYTHROCYTE [DISTWIDTH] IN BLOOD BY AUTOMATED COUNT: 12.8 % (ref 11.5–14.5)
EST. GFR  (NO RACE VARIABLE): 55 ML/MIN/1.73 M^2
GLUCOSE SERPL-MCNC: 100 MG/DL (ref 70–110)
HCT VFR BLD AUTO: 28.8 % (ref 40–54)
HGB BLD-MCNC: 10 G/DL (ref 14–18)
IMM GRANULOCYTES # BLD AUTO: 0.04 K/UL (ref 0–0.04)
IMM GRANULOCYTES NFR BLD AUTO: 0.6 % (ref 0–0.5)
LYMPHOCYTES # BLD AUTO: 0.9 K/UL (ref 1–4.8)
LYMPHOCYTES NFR BLD: 13.8 % (ref 18–48)
MAGNESIUM SERPL-MCNC: 2 MG/DL (ref 1.6–2.6)
MCH RBC QN AUTO: 31 PG (ref 27–31)
MCHC RBC AUTO-ENTMCNC: 34.7 G/DL (ref 32–36)
MCV RBC AUTO: 89 FL (ref 82–98)
MONOCYTES # BLD AUTO: 0.7 K/UL (ref 0.3–1)
MONOCYTES NFR BLD: 9.6 % (ref 4–15)
NEUTROPHILS # BLD AUTO: 5.1 K/UL (ref 1.8–7.7)
NEUTROPHILS NFR BLD: 75.6 % (ref 38–73)
NRBC BLD-RTO: 0 /100 WBC
PHOSPHATE SERPL-MCNC: 2.1 MG/DL (ref 2.7–4.5)
PLATELET # BLD AUTO: 180 K/UL (ref 150–450)
PMV BLD AUTO: 9.3 FL (ref 9.2–12.9)
POTASSIUM SERPL-SCNC: 3.4 MMOL/L (ref 3.5–5.1)
PROT SERPL-MCNC: 4.9 G/DL (ref 6–8.4)
RBC # BLD AUTO: 3.23 M/UL (ref 4.6–6.2)
SODIUM SERPL-SCNC: 126 MMOL/L (ref 136–145)
TROPONIN I SERPL DL<=0.01 NG/ML-MCNC: 0.05 NG/ML (ref 0–0.03)
WBC # BLD AUTO: 6.76 K/UL (ref 3.9–12.7)

## 2024-08-26 PROCEDURE — 11000001 HC ACUTE MED/SURG PRIVATE ROOM: Mod: HCNC

## 2024-08-26 PROCEDURE — 99223 1ST HOSP IP/OBS HIGH 75: CPT | Mod: HCNC,,, | Performed by: INTERNAL MEDICINE

## 2024-08-26 PROCEDURE — 25000003 PHARM REV CODE 250: Mod: HCNC | Performed by: NURSE PRACTITIONER

## 2024-08-26 PROCEDURE — 21400001 HC TELEMETRY ROOM: Mod: HCNC

## 2024-08-26 PROCEDURE — 36415 COLL VENOUS BLD VENIPUNCTURE: CPT | Mod: HCNC

## 2024-08-26 PROCEDURE — 83735 ASSAY OF MAGNESIUM: CPT | Mod: HCNC | Performed by: NURSE PRACTITIONER

## 2024-08-26 PROCEDURE — 85025 COMPLETE CBC W/AUTO DIFF WBC: CPT | Mod: HCNC | Performed by: NURSE PRACTITIONER

## 2024-08-26 PROCEDURE — 80053 COMPREHEN METABOLIC PANEL: CPT | Mod: HCNC | Performed by: NURSE PRACTITIONER

## 2024-08-26 PROCEDURE — 84100 ASSAY OF PHOSPHORUS: CPT | Mod: HCNC | Performed by: NURSE PRACTITIONER

## 2024-08-26 PROCEDURE — 97530 THERAPEUTIC ACTIVITIES: CPT | Mod: HCNC

## 2024-08-26 PROCEDURE — 84484 ASSAY OF TROPONIN QUANT: CPT | Mod: HCNC

## 2024-08-26 PROCEDURE — 97162 PT EVAL MOD COMPLEX 30 MIN: CPT | Mod: HCNC

## 2024-08-26 PROCEDURE — 63600175 PHARM REV CODE 636 W HCPCS: Mod: HCNC | Performed by: NURSE PRACTITIONER

## 2024-08-26 PROCEDURE — 25000003 PHARM REV CODE 250: Mod: HCNC

## 2024-08-26 PROCEDURE — 36415 COLL VENOUS BLD VENIPUNCTURE: CPT | Mod: HCNC | Performed by: NURSE PRACTITIONER

## 2024-08-26 RX ORDER — POTASSIUM CHLORIDE 20 MEQ/1
20 TABLET, EXTENDED RELEASE ORAL ONCE
Status: COMPLETED | OUTPATIENT
Start: 2024-08-26 | End: 2024-08-26

## 2024-08-26 RX ADMIN — PREDNISONE 5 MG: 5 TABLET ORAL at 10:08

## 2024-08-26 RX ADMIN — POTASSIUM CHLORIDE 20 MEQ: 1500 TABLET, EXTENDED RELEASE ORAL at 08:08

## 2024-08-26 RX ADMIN — PREDNISONE 5 MG: 5 TABLET ORAL at 09:08

## 2024-08-26 RX ADMIN — APIXABAN 2.5 MG: 2.5 TABLET, FILM COATED ORAL at 10:08

## 2024-08-26 RX ADMIN — ATORVASTATIN CALCIUM 40 MG: 40 TABLET, FILM COATED ORAL at 10:08

## 2024-08-26 NOTE — SUBJECTIVE & OBJECTIVE
Interval History: f/u syncopal episode. Patient is awake and alert. NAD. Patient reports he is feeling much better today. Denies any CP, SOB, nausea, vomiting and diarrhea. Pt/ot to eval and treat. Mild Electrolyte abnormalities noted, will replete and trend. Cardiology consulted recommend continue to treat electrolyte abnormalities and hold BP meds/diuretics.     Review of Systems see interval history for ROS   Objective:     Vital Signs (Most Recent):  Temp: 98.7 °F (37.1 °C) (08/26/24 1607)  Pulse: 80 (08/26/24 1607)  Resp: 17 (08/26/24 1607)  BP: (!) 107/59 (08/26/24 1607)  SpO2: 97 % (08/26/24 1607) Vital Signs (24h Range):  Temp:  [97.6 °F (36.4 °C)-98.8 °F (37.1 °C)] 98.7 °F (37.1 °C)  Pulse:  [54-86] 80  Resp:  [17-18] 17  SpO2:  [95 %-100 %] 97 %  BP: ()/(52-59) 107/59     Weight: 68.2 kg (150 lb 5.7 oz)  Body mass index is 21.57 kg/m².    Intake/Output Summary (Last 24 hours) at 8/26/2024 1816  Last data filed at 8/26/2024 0401  Gross per 24 hour   Intake --   Output 400 ml   Net -400 ml         Physical Exam  Vitals and nursing note reviewed.   Constitutional:       General: He is not in acute distress.     Appearance: He is ill-appearing (chronically).   HENT:      Mouth/Throat:      Mouth: Mucous membranes are moist.      Pharynx: Oropharynx is clear.   Eyes:      Pupils: Pupils are equal, round, and reactive to light.   Cardiovascular:      Rate and Rhythm: Normal rate and regular rhythm.      Heart sounds: No murmur heard.  Pulmonary:      Effort: Pulmonary effort is normal.      Breath sounds: Normal breath sounds. No wheezing.   Abdominal:      General: Bowel sounds are normal. There is no distension.      Palpations: Abdomen is soft.      Tenderness: There is no abdominal tenderness.   Musculoskeletal:         General: Normal range of motion.   Skin:     General: Skin is warm and dry.      Comments: Bilateral upper extremity skin tears   Bilateral low extremity skin tears to knees    Neurological:      General: No focal deficit present.      Mental Status: He is alert and oriented to person, place, and time.             Significant Labs: All pertinent labs within the past 24 hours have been reviewed.  Recent Lab Results         08/26/24  0550   08/25/24 1912        Albumin 2.8   3.3       ALP 68   81       ALT 8   10       Anion Gap 13   15       AST 19   22       Baso # 0.02         Basophil % 0.3         BILIRUBIN TOTAL 0.9  Comment: For infants and newborns, interpretation of results should be based  on gestational age, weight and in agreement with clinical  observations.    Premature Infant recommended reference ranges:  Up to 24 hours.............<8.0 mg/dL  Up to 48 hours............<12.0 mg/dL  3-5 days..................<15.0 mg/dL  6-29 days.................<15.0 mg/dL     1.3  Comment: For infants and newborns, interpretation of results should be based  on gestational age, weight and in agreement with clinical  observations.    Premature Infant recommended reference ranges:  Up to 24 hours.............<8.0 mg/dL  Up to 48 hours............<12.0 mg/dL  3-5 days..................<15.0 mg/dL  6-29 days.................<15.0 mg/dL         BUN 17   18       Calcium 8.5   8.7       Chloride 83   83       CO2 30   33       CPK   186       Creatinine 1.3   1.4       Differential Method Automated         eGFR 55   50       Eos # 0.0         Eos % 0.1         Glucose 100   131       Gran # (ANC) 5.1         Gran % 75.6         Hematocrit 28.8         Hemoglobin 10.0         Immature Grans (Abs) 0.04  Comment: Mild elevation in immature granulocytes is non specific and   can be seen in a variety of conditions including stress response,   acute inflammation, trauma and pregnancy. Correlation with other   laboratory and clinical findings is essential.           Immature Granulocytes 0.6         Lactic Acid Level   2.2  Comment: Falsely low lactic acid results can be found in samples   containing  >=13.0 mg/dL total bilirubin and/or >=3.5 mg/dL   direct bilirubin.         Lymph # 0.9         Lymph % 13.8         Magnesium  2.0         MCH 31.0         MCHC 34.7         MCV 89         Mono # 0.7         Mono % 9.6         MPV 9.3         nRBC 0         Phosphorus Level 2.1         Platelet Count 180         Potassium 3.4   3.0       PROTEIN TOTAL 4.9   5.4       RBC 3.23         RDW 12.8         Sodium 126   131       Troponin I 0.054  Comment: The reference interval for Troponin I represents the 99th percentile   cutoff   for our facility and is consistent with 3rd generation assay   performance.     0.087  Comment: The reference interval for Troponin I represents the 99th percentile   cutoff   for our facility and is consistent with 3rd generation assay   performance.         WBC 6.76                 Significant Imaging: I have reviewed all pertinent imaging results/findings within the past 24 hours.

## 2024-08-26 NOTE — ASSESSMENT & PLAN NOTE
Creatine stable for now. BMP reviewed- noted Estimated Creatinine Clearance: 41.5 mL/min (based on SCr of 1.3 mg/dL). according to latest data. Based on current GFR, CKD stage is stage 3 - GFR 30-59.  Monitor UOP and serial BMP and adjust therapy as needed. Renally dose meds. Avoid nephrotoxic medications and procedures.

## 2024-08-26 NOTE — PLAN OF CARE
Inpatient Upgrade Note    Homero Tanner has warranted treatment spanning two or more midnights of hospital level care for the management of  Syncope and Orthostatic Hypotension, Hypokalemia  . He continues to require daily labs, monitoring of vital signs, medication adjustments, further evaluation by consultants, and Correction of Electrolyte imbalances . His condition is also complicated by the following comorbidities: Hypertension, Chronic kidney disease, Heart failure, Malignancy, and Lymphoma, Metastatic Prostate Cancer .

## 2024-08-26 NOTE — PLAN OF CARE
[facility] - Med Surg  Initial Discharge Assessment       Primary Care Provider: Solomon Cortés MD    Admission Diagnosis: Orthostatic hypotension [I95.1]  Syncope and collapse [R55]  Hypokalemia [E87.6]  Hyponatremia [E87.1]  Fall [W19.XXXA]  Elevated troponin [R79.89]  Chronic systolic congestive heart failure [I50.22]  Chest pain [R07.9]    Admission Date: 8/25/2024  Expected Discharge Date: Per Attending     Transition of Care Barriers: None    Payor: [employer] MEDICARE / Plan: [employer] MEDICARE HMO / Product Type: Capitation /     Extended Emergency Contact Information  Primary Emergency Contact: Reshma Tanner  Mobile Phone: [phone]  Relation: Brother  Secondary Emergency Contact: Jennifer Bui   [place]  Mobile Phone: [phone]  Relation: Friend    Discharge Plan A: Home         [id number]  [place] - [address] AT [facility] [address]  [address] [facility]  [address]  [facility] [address]  Phone: [phone] Fax: [phone]    [facility] - [place], [place] - [address]  [address]  [place] [id number]  Phone: [phone] Fax: [phone]      Initial Assessment (most recent)       Adult Discharge Assessment - 08/26/24 1249          Discharge Assessment    Assessment Type Discharge Planning Assessment     Confirmed/corrected address, phone number and insurance Yes     Confirmed Demographics Correct on Facesheet     Source of Information patient     When was your last doctors appointment? --   3-4 months    People in Home friend(s)     Do you expect to return to your current living situation? Yes     Do you have help at home or someone to help you manage your care at home? Yes     Who are your caregiver(s) and their phone number(s)? Jennifer anderson     Prior to hospitilization cognitive status: Alert/Oriented     Current cognitive status: Alert/Oriented     Walking or Climbing Stairs Difficulty yes      Walking or Climbing Stairs ambulation difficulty, requires equipment     Dressing/Bathing Difficulty no     Home Layout Able to live on 1st floor     Equipment Currently Used at Home rollator     Readmission within 30 days? No     Patient currently being followed by outpatient case management? No     Do you currently have service(s) that help you manage your care at home? No     Do you take prescription medications? Yes     Do you have prescription coverage? Yes     Coverage [employer]     Do you have any problems affording any of your prescribed medications? No     Is the patient taking medications as prescribed? yes     Who is going to help you get home at discharge? Jennifer anderson     How do you get to doctors appointments? car, drives self     Are you on dialysis? No     Do you take coumadin? No     Discharge Plan A Home     DME Needed Upon Discharge  none     Discharge Plan discussed with: Patient     Transition of Care Barriers None                      Anticipated DC dispo: Home   Prior Level of Function: Independent   People in home:  Jennifer Anderson     Comments:  CM met with patient at bedside to introduce role and discuss discharge planning. Jennifer Anderson, will be help at home and can provide transport at time of discharge. CM discharge needs depends on hospital progress. CM will continue following to assist with other needs.

## 2024-08-26 NOTE — CONSULTS
[facility] - [facility] Surg  Wound Care    Patient Name:  Homero Tanner   MRN:  [id number]  Date: 2024  Diagnosis: Syncope    History:     Past Medical History:   Diagnosis Date    Abnormal ECG 2013    Adenomatous rectal polyp 2015    Aortic insufficiency 2013    Arthritis     Asthma     as a child    Benign neoplasm of cecum 2017    Benign neoplasm of transverse colon 2017    Cataract     CHF (congestive heart failure)     Pt states He's never been told this    Diverticulosis of large intestine without hemorrhage 2017    Encounter for blood transfusion     Frequent PVCs     Gallstones 3/28/2018    By Us done 3/21/2018    GERD (gastroesophageal reflux disease)     History of colon polyps     Hypertension     Iron deficiency anemia 10/26/2015    Lymphoma     waldenstrom's macroglobulinemia    Mixed hyperlipidemia 2013    Premature atrial contractions     Prostate cancer     PSVT (paroxysmal supraventricular tachycardia) 2013    Pulmonary hypertension 2016    PVC's (premature ventricular contractions) 2016    Rectal adenocarcinoma     Rectal cancer 2018    SCC (squamous cell carcinoma) 2015    left parietal scalp    Thymoma     TR (tricuspid regurgitation) 2016    Tubular adenoma of colon 2020    VT (ventricular tachycardia) 2018       Social History     Socioeconomic History    Marital status: Single   Tobacco Use    Smoking status: Former     Current packs/day: 0.00     Average packs/day: 1 pack/day for 15.0 years (15.0 ttl pk-yrs)     Types: Cigarettes     Start date: 1954     Quit date: 1969     Years since quittin.5     Passive exposure: Never    Smokeless tobacco: Never   Substance and Sexual Activity    Alcohol use: Yes     Alcohol/week: 3.0 standard drinks of alcohol     Types: 3 Cans of beer per week     Comment: socially, NO ALCOHOL 72 HOURS PRIOR TO SX    Drug use: No    Sexual activity: Not Currently       Precautions:      Allergies as of 08/25/2024    (No Known Allergies)       [facility] Assessment Details/Treatment     Consulted on this 85 y/o M patient due to present on admission traumatic skin tears and hematomas to BLE and right forearm. Patient admitted after syncopal spell and fall. He is awake and alert, denies pain at present. Patient does report he recently stopped going to wound care when 2 ulcerations to BLE healed.  Large traumatic skin tears are noted to right forearm and bilateral distal knee/proximal lower legs with large areas of ecchymosis, hematomas, and jagged skin tears. Cleansed all with vashe and allowed to dwell, patted dry. Adaptic applied over open jagged skin tears, then covered with abd pads and wrapped with kerlix to secure. Recommend change every MWF and prn. Patient should f/u with his OP wound clinic for ongoing management, discussed with patient likelihood of woulds worsening and skin sloughing off and he reports this has happened to him before when he had a hematoma.       08/26/24 0935   WO Assessment   WOCN Total Time (mins) 60   Visit Date 08/26/24   Visit Time 0935   Consult Type New   [facility] Wound   Wound skin tear;other  (trauma)   Number of Wounds 3   Intervention assessed;applied;chart review;coordination of care;team conference;orders   Teaching on-going;complication;discharge        Wound 08/25/24 1020 Traumatic Left distal Knee   Date First Assessed/Time First Assessed: 08/25/24 1020   Present on Original Admission: Yes  Primary Wound Type: Traumatic  Side: Left  Orientation: distal  Location: Knee   Wound Image    Dressing Appearance Intact;Dried drainage   Drainage Amount Small   Drainage Characteristics/Odor Sanguineous   Appearance Red   Tissue loss description Full thickness   Periwound Area Ecchymotic;Hematoma   Wound Edges Jagged   Wound Length (cm) 6 cm   Wound Width (cm) 5 cm   Wound Depth (cm) 0.1 cm   Wound Volume (cm^3) 3 cm^3   Wound Surface Area (cm^2) 30 cm^2   Care  Cleansed with:;Antimicrobial agent;Applied:;Skin Barrier   Dressing Non-adherent;Absorptive Pad;Rolled gauze   Dressing Change Due 08/28/24        Wound 08/25/24 1021 Traumatic Right anterior;lower;proximal Leg   Date First Assessed/Time First Assessed: 08/25/24 1021   Present on Original Admission: Yes  Primary Wound Type: Traumatic  Side: Right  Orientation: anterior;lower;proximal  Location: Leg   Wound Image    Dressing Appearance Intact;Dried drainage   Drainage Amount Small   Drainage Characteristics/Odor Sanguineous   Appearance Red   Tissue loss description Full thickness   Periwound Area Ecchymotic   Wound Edges Jagged   Wound Length (cm) 18 cm   Wound Width (cm) 10 cm   Wound Depth (cm) 0.1 cm   Wound Volume (cm^3) 18 cm^3   Wound Surface Area (cm^2) 180 cm^2   Care Cleansed with:;Antimicrobial agent;Applied:;Skin Barrier   Dressing Non-adherent;Absorptive Pad;Rolled gauze   Dressing Change Due 08/28/24        Wound 08/25/24 1021 Traumatic Right lower Arm   Date First Assessed/Time First Assessed: 08/25/24 1021   Present on Original Admission: Yes  Primary Wound Type: Traumatic  Side: Right  Orientation: lower  Location: Arm   Wound Image    Dressing Appearance Intact;Moist drainage;Dried drainage   Drainage Amount Small   Drainage Characteristics/Odor Sanguineous   Appearance Red   Tissue loss description Full thickness   Periwound Area Ecchymotic   Wound Edges Jagged   Wound Length (cm) 12 cm   Wound Width (cm) 5 cm   Wound Depth (cm) 0.1 cm   Wound Volume (cm^3) 6 cm^3   Wound Surface Area (cm^2) 60 cm^2   Care Cleansed with:;Antimicrobial agent;Applied:;Skin Barrier   Dressing Non-adherent;Absorptive Pad;Rolled gauze   Dressing Change Due 08/28/24       Recommendations made to primary team for wound care, skin protection. Orders placed.     08/26/2024

## 2024-08-26 NOTE — ASSESSMENT & PLAN NOTE
-TTE from 6/24 with EF of 45-50%, mildly reduced RV function  -Recently had Lasix increased to 60 mg daily, clinically dry/volume contracted on exam  -Hold diuretics

## 2024-08-26 NOTE — ASSESSMENT & PLAN NOTE
Chronic, uncontrolled. Latest blood pressure and vitals reviewed-     Temp:  [97.6 °F (36.4 °C)-98.8 °F (37.1 °C)]   Pulse:  [54-86]   Resp:  [17-18]   BP: ()/(52-59)   SpO2:  [95 %-100 %] .   Home meds for hypertension were reviewed and noted below.   Hypertension Medications               furosemide (LASIX) 20 MG tablet TAKE 1 TABLET ON TUESDAYS AND THURSDAYS.    furosemide (LASIX) 40 MG tablet Take 1 tablet (40 mg total) by mouth every Mon, Wed, Fri.    metoprolol succinate (TOPROL-XL) 100 MG 24 hr tablet TAKE 1 TABLET(100 MG) BY MOUTH EVERY DAY            While in the hospital, will manage blood pressure as follows; Adjust home antihypertensive regimen as follows- hold for now in setting of hypotension    Will utilize p.r.n. blood pressure medication only if patient's blood pressure greater than 160/100 and he develops symptoms such as worsening chest pain or shortness of breath.

## 2024-08-26 NOTE — PROGRESS NOTES
[facility] [place][note 3] Medicine  Progress Note    Patient Name: Homero Tanner  MRN: [id number]  Patient Class: IP- Inpatient   Admission Date: 8/25/2024  Length of Stay: 0 days  Attending Physician: Brain Arteaga MD  Primary Care Provider: Solomon Cortés MD        Subjective:     Principal Problem:Syncope        HPI:  84 y.o. male, PMHx: Metastatic Prostate Cx, HTN, HLD, rectal adenocarcinoma, CHF, thymoma, GERD, pulmonary HTN. Presented to the Emergency Department for evaluation of syncopal episode with fall injuries which onset PTA. Pt has multiple skin tears to right arm and BLE. For the past two weeks, the pt states that he has been dealing with dehydration and constipation. Pt's diet has been normal, but the pt has not had solid food the past week drinking smoothies. Recently had an increase in Lasix dosage. Stopped taking ASA and was placed on Eliquis a month ago for AFIB. Symptoms are constant and moderate in severity. No mitigating or exacerbating factors reported. Associated sxs include low BP. Patient denies any HA. Fever, N/V, dysuria, and all other sxs at this time. No prior Tx. In the ED,  vitals: 111/71, 100, 16, 97.7F, 95% RA on arrival. +orthostatic vitals. Significant labs: Na: 128, K+: 2.5, Cr: 1.5, Bili: 1.3, BNP: 569, Trop: 0.071. CXR: no acute finding. CT Head: No acute intracranial CT abnormality. Treated with IV fluids, Potassium repleted. Patient is a full code. Placed in observation under the care of Hospital Medicine for management of Hypokalemia, Syncope.     Overview/Hospital Course:  85 y/o male presented to the ER with c/o syncope.   Labs: Na: 126, K+: 3.4, Cr: 1.3, Bili: 0.9, BNP: 569, Trop: 0.054. CXR: no acute finding. CT Head: No acute intracranial CT abnormality.    Pt/ot to eval and treat   Carotid US: Less than 50% stenosis suspected bilaterally but with significant plaque burden.  Cardiology consulted, recommend hold diuretics/BP meds.Replete electrolytes. Can  consider resumption of low dose BB if BP improves.        Interval History: f/u syncopal episode. Patient is awake and alert. NAD. Patient reports he is feeling much better today. Denies any CP, SOB, nausea, vomiting and diarrhea. Pt/ot to eval and treat. Mild Electrolyte abnormalities noted, will replete and trend. Cardiology consulted recommend continue to treat electrolyte abnormalities and hold BP meds/diuretics.     Review of Systems see interval history for ROS   Objective:     Vital Signs (Most Recent):  Temp: 98.7 °F (37.1 °C) (08/26/24 1607)  Pulse: 80 (08/26/24 1607)  Resp: 17 (08/26/24 1607)  BP: (!) 107/59 (08/26/24 1607)  SpO2: 97 % (08/26/24 1607) Vital Signs (24h Range):  Temp:  [97.6 °F (36.4 °C)-98.8 °F (37.1 °C)] 98.7 °F (37.1 °C)  Pulse:  [54-86] 80  Resp:  [17-18] 17  SpO2:  [95 %-100 %] 97 %  BP: ()/(52-59) 107/59     Weight: 68.2 kg (150 lb 5.7 oz)  Body mass index is 21.57 kg/m².    Intake/Output Summary (Last 24 hours) at 8/26/2024 1816  Last data filed at 8/26/2024 0401  Gross per 24 hour   Intake --   Output 400 ml   Net -400 ml         Physical Exam  Vitals and nursing note reviewed.   Constitutional:       General: He is not in acute distress.     Appearance: He is ill-appearing (chronically).   HENT:      Mouth/Throat:      Mouth: Mucous membranes are moist.      Pharynx: Oropharynx is clear.   Eyes:      Pupils: Pupils are equal, round, and reactive to light.   Cardiovascular:      Rate and Rhythm: Normal rate and regular rhythm.      Heart sounds: No murmur heard.  Pulmonary:      Effort: Pulmonary effort is normal.      Breath sounds: Normal breath sounds. No wheezing.   Abdominal:      General: Bowel sounds are normal. There is no distension.      Palpations: Abdomen is soft.      Tenderness: There is no abdominal tenderness.   Musculoskeletal:         General: Normal range of motion.   Skin:     General: Skin is warm and dry.      Comments: Bilateral upper extremity skin tears    Bilateral low extremity skin tears to knees   Neurological:      General: No focal deficit present.      Mental Status: He is alert and oriented to person, place, and time.             Significant Labs: All pertinent labs within the past 24 hours have been reviewed.  Recent Lab Results         08/26/24  0550   08/25/24  1912        Albumin 2.8   3.3       ALP 68   81       ALT 8   10       Anion Gap 13   15       AST 19   22       Baso # 0.02         Basophil % 0.3         BILIRUBIN TOTAL 0.9  Comment: For infants and newborns, interpretation of results should be based  on gestational age, weight and in agreement with clinical  observations.    Premature Infant recommended reference ranges:  Up to 24 hours.............<8.0 mg/dL  Up to 48 hours............<12.0 mg/dL  3-5 days..................<15.0 mg/dL  6-29 days.................<15.0 mg/dL     1.3  Comment: For infants and newborns, interpretation of results should be based  on gestational age, weight and in agreement with clinical  observations.    Premature Infant recommended reference ranges:  Up to 24 hours.............<8.0 mg/dL  Up to 48 hours............<12.0 mg/dL  3-5 days..................<15.0 mg/dL  6-29 days.................<15.0 mg/dL         BUN 17   18       Calcium 8.5   8.7       Chloride 83   83       CO2 30   33       CPK   186       Creatinine 1.3   1.4       Differential Method Automated         eGFR 55   50       Eos # 0.0         Eos % 0.1         Glucose 100   131       Gran # (ANC) 5.1         Gran % 75.6         Hematocrit 28.8         Hemoglobin 10.0         Immature Grans (Abs) 0.04  Comment: Mild elevation in immature granulocytes is non specific and   can be seen in a variety of conditions including stress response,   acute inflammation, trauma and pregnancy. Correlation with other   laboratory and clinical findings is essential.           Immature Granulocytes 0.6         Lactic Acid Level   2.2  Comment: Falsely low lactic acid  results can be found in samples   containing >=13.0 mg/dL total bilirubin and/or >=3.5 mg/dL   direct bilirubin.         Lymph # 0.9         Lymph % 13.8         Magnesium  2.0         MCH 31.0         MCHC 34.7         MCV 89         Mono # 0.7         Mono % 9.6         MPV 9.3         nRBC 0         Phosphorus Level 2.1         Platelet Count 180         Potassium 3.4   3.0       PROTEIN TOTAL 4.9   5.4       RBC 3.23         RDW 12.8         Sodium 126   131       Troponin I 0.054  Comment: The reference interval for Troponin I represents the 99th percentile   cutoff   for our facility and is consistent with 3rd generation assay   performance.     0.087  Comment: The reference interval for Troponin I represents the 99th percentile   cutoff   for our facility and is consistent with 3rd generation assay   performance.         WBC 6.76                 Significant Imaging: I have reviewed all pertinent imaging results/findings within the past 24 hours.    Assessment/Plan:      * Syncope  Syncope at home, reported increase in Lasix dose per Cardiology recently.  CT head: no acute finding. +Orthostatic vitals on arrival.   Likely secondary to hypotension/volume depletion    --Carotid US: Less than 50% stenosis suspected bilaterally but with significant plaque burden.   --Tele  --Repeat orthostatic vitals in AM  --Replete K+  --Hold Lasix/BP meds for now  --Cardiology consulted       Orthostatic hypotension  Likely secondary to dehydration r/t increased Lasix dose. Treated with 1ltr NS in ED, improved BP    --Hold Lasix for now, monitor for edema  --Repeat Orthostatic vitals in AM  --Cautious use of IV fluids in setting of CHF  --Fall Risk      Hypokalemia  Likely secondary to increased Lasix dose, patient is not managed with PO Potassium as OP.     Patient's most recent potassium results are listed below.   Recent Labs     08/25/24  1040 08/25/24  1912 08/26/24  0550   K 2.5* 3.0* 3.4*       Plan  - Replete potassium  per protocol  - Monitor potassium Every 12 hours  - Patient's hypokalemia is improving       Chronic kidney disease, stage 3a  Creatine stable for now. BMP reviewed- noted Estimated Creatinine Clearance: 41.5 mL/min (based on SCr of 1.3 mg/dL). according to latest data. Based on current GFR, CKD stage is stage 3 - GFR 30-59.  Monitor UOP and serial BMP and adjust therapy as needed. Renally dose meds. Avoid nephrotoxic medications and procedures.    Chronic combined systolic and diastolic heart failure  Patient is identified as having Combined Systolic and Diastolic heart failure that is Chronic. CHF is currently controlled. Latest ECHO performed and demonstrates- Results for orders placed during the hospital encounter of 06/20/24    Echo    Interpretation Summary    Left Ventricle: The left ventricle is normal in size. Normal wall thickness. Mild global hypokinesis present. There is mildly reduced systolic function with a visually estimated ejection fraction of 45 - 50%.    Right Ventricle: Mild right ventricular enlargement. Wall thickness is normal. Systolic function is mildly reduced.    Left Atrium: Left atrium is severely dilated.    Aortic Valve: There is mild aortic regurgitation.    Tricuspid Valve: There is moderate regurgitation.    IVC/SVC: Normal venous pressure at 3 mmHg.  . Continue Beta Blocker and Furosemide and monitor clinical status closely. Monitor on telemetry. Patient is off CHF pathway.  Monitor strict Is&Os and daily weights.  Place on fluid restriction of 1.5 L. Cardiology has not been consulted. Continue to stress to patient importance of self efficacy and  on diet for CHF. Last BNP reviewed- and noted below   Recent Labs   Lab 08/25/24  1040   *            Prostate cancer metastatic to bone  Followed by oncology as OP,, treated with Zytiga/Lupron/Prednisone    --Hold Zytiga for now, if has prolonged hospitalization, consult Hem/Onc to resume home med    Essential  hypertension  Chronic, uncontrolled. Latest blood pressure and vitals reviewed-     Temp:  [97.6 °F (36.4 °C)-98.8 °F (37.1 °C)]   Pulse:  [54-86]   Resp:  [17-18]   BP: ()/(52-59)   SpO2:  [95 %-100 %] .   Home meds for hypertension were reviewed and noted below.   Hypertension Medications               furosemide (LASIX) 20 MG tablet TAKE 1 TABLET ON TUESDAYS AND THURSDAYS.    furosemide (LASIX) 40 MG tablet Take 1 tablet (40 mg total) by mouth every Mon, Wed, Fri.    metoprolol succinate (TOPROL-XL) 100 MG 24 hr tablet TAKE 1 TABLET(100 MG) BY MOUTH EVERY DAY            While in the hospital, will manage blood pressure as follows; Adjust home antihypertensive regimen as follows- hold for now in setting of hypotension    Will utilize p.r.n. blood pressure medication only if patient's blood pressure greater than 160/100 and he develops symptoms such as worsening chest pain or shortness of breath.      VTE Risk Mitigation (From admission, onward)           Ordered     apixaban tablet 2.5 mg  Daily         08/25/24 1312     IP VTE HIGH RISK PATIENT  Once         08/25/24 1312     Place sequential compression device  Until discontinued         08/25/24 1312     Reason for No Pharmacological VTE Prophylaxis  Once        Question:  Reasons:  Answer:  Already adequately anticoagulated on oral Anticoagulants    08/25/24 1312                    Discharge Planning   SVETLANA:      Code Status: Full Code   Is the patient medically ready for discharge?:     Reason for patient still in hospital (select all that apply): Patient trending condition, Laboratory test, and Treatment  Discharge Plan A: Home            The patient's case has been reviewed by my supervising physician.   Supervising physician will provide additional orders and recommendations at his/her discretion.  Please see supervising physicians documentation and/or orders for further details.         Jacqueline Schneider NP  Department of Hospital Medicine   [facility]  - Med Surg

## 2024-08-26 NOTE — CONSULTS
"[facility]  Cardiology  Consult Note    Patient Name: Homero Tanner  MRN: [id number]  Admission Date: 8/25/2024  Hospital Length of Stay: 0 days  Code Status: Full Code   Attending Provider: Brain Arteaga MD   Consulting Provider: Marysol Philip PA-C  Primary Care Physician: Solomon Cortés MD  Principal Problem:Syncope    Patient information was obtained from patient, past medical records, and ER records.     Inpatient consult to Cardiology  Consult performed by: Marysol Philip PA-C  Consult ordered by: Brain Arteaga MD        Subjective:     Chief Complaint:  Syncope    HPI:   Mr. Tanner is an 84 year old male patient whose current medical conditions include GERD, HTN, hyperlipidemia, persistent afib (on Eliquis), rectal CA, thymoma, pulmonary HTN, metastatic prostate CA, and CHF who presented to [facility] ED yesterday due to fall/syncope. Patient stated he tripped and fell, unclear if he truly lost consciousness. Reports he had not been eating solid food for past week, only drinking smoothies and had been dealing with constipation and feeling "dehydrated". He denied any associated fever, chills, eren CP, or palpitations. Noted that his Lasix dosage had been increased to 60 mg daily. Initial workup in ED revealed electrolyte derangements (hyponatremia, hypokalemia), BNP of 569, hypotension, and troponin of 0.071 and patient was subsequently admitted for further evaluation and treatment. Cardiology consulted to assist with management. Patient seen and examined today, resting in bed. Feels ok. Weak. BP soft with systolic in 90's. No CP. Denies SOB, PND, orthopnea. Extensive skin tearing/bruising from fall. He reports compliance with his medications, recently placed on Eliquis by EP, Dr. Aguilar. TTE from 6/24 reviewed, EF of 45-50%, mildly reduced RV function.    Past Medical History:   Diagnosis Date    Abnormal ECG 6/24/2013    Adenomatous rectal polyp 12/7/2015    Aortic insufficiency " 6/24/2013    Arthritis     Asthma     as a child    Benign neoplasm of cecum 2/27/2017    Benign neoplasm of transverse colon 2/27/2017    Cataract     CHF (congestive heart failure)     Pt states He's never been told this    Diverticulosis of large intestine without hemorrhage 2/27/2017    Encounter for blood transfusion     Frequent PVCs     Gallstones 3/28/2018    By Us done 3/21/2018    GERD (gastroesophageal reflux disease)     History of colon polyps     Hypertension     Iron deficiency anemia 10/26/2015    Lymphoma     waldenstrom's macroglobulinemia    Mixed hyperlipidemia 6/24/2013    Premature atrial contractions     Prostate cancer     PSVT (paroxysmal supraventricular tachycardia) 6/24/2013    Pulmonary hypertension 6/13/2016    PVC's (premature ventricular contractions) 6/13/2016    Rectal adenocarcinoma     Rectal cancer 5/1/2018    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    left parietal scalp    Thymoma     TR (tricuspid regurgitation) 6/13/2016    Tubular adenoma of colon 06/2020    VT (ventricular tachycardia) 9/18/2018       Past Surgical History:   Procedure Laterality Date    biopsy for chest mass      BONE MARROW BIOPSY Left 7/11/2018    Procedure: Biopsy-bone marrow;  Surgeon: Charanjit Dacosta MD;  Location: [facility];  Service: General;  Laterality: Left;    CATARACT EXTRACTION W/  INTRAOCULAR LENS IMPLANT Right 01/03/2019    CATARACT EXTRACTION W/  INTRAOCULAR LENS IMPLANT Left 02/07/2019    COLON SURGERY      COLONOSCOPY N/A 10/26/2015    Procedure: Colonoscopy;  Surgeon: Abraham Hong MD;  Location: [facility];  Service: Endoscopy;  Laterality: N/A;    COLONOSCOPY Left 2/27/2017    Procedure: COLONOSCOPY;  Surgeon: Abraham Hong MD;  Location: [facility] ENDO;  Service: Endoscopy;  Laterality: Left;    COLONOSCOPY N/A 5/1/2018    Procedure: hixtory of rectal cancer. Colonsocopy asked to be repeated minnie  year, alst one 2/2017;  Surgeon: Patricio Streeter MD;  Location: [facility] ENDO;  Service:  Endoscopy;  Laterality: N/A;    COLONOSCOPY N/A 6/27/2018    Procedure: COLONOSCOPY/hybrid APC or a EndoRotor device;  Surgeon: Rao Medeiros MD;  Location: [facility] ENDO ([address] [place] [address]);  Service: Endoscopy;  Laterality: N/A;    COLONOSCOPY N/A 6/22/2020    Procedure: COLONOSCOPY;  Surgeon: Yu Wells MD;  Location: [facility] ENDO;  Service: Endoscopy;  Laterality: N/A;    ESOPHAGOGASTRODUODENOSCOPY N/A 10/22/2019    Procedure: ESOPHAGOGASTRODUODENOSCOPY (EGD);  Surgeon: Mariela Greer MD;  Location: [facility] ENDO;  Service: Endoscopy;  Laterality: N/A;    FLEXIBLE SIGMOIDOSCOPY Left 4/23/2019    Procedure: SIGMOIDOSCOPY, FLEXIBLE;  Surgeon: Ranjit Will MD;  Location: [facility];  Service: Endoscopy;  Laterality: Left;    HERNIA REPAIR Right 02/2018    Inguinal, open with mesh    mohs      PROSTATECTOMY      RECTAL SURGERY N/A 09/2014    REPAIR OF SLIDING INGUINAL HERNIA Right 2/26/2019    Procedure: REPAIR, HERNIA, INGUINAL, SLIDING;  Surgeon: Bridger Alvarez MD;  Location: [facility] OR;  Service: General;  Laterality: Right;    SKIN BIOPSY      TONSILLECTOMY      TUMOR REMOVAL         Review of patient's allergies indicates:  No Known Allergies    No current facility-administered medications on file prior to encounter.     Current Outpatient Medications on File Prior to Encounter   Medication Sig    abiraterone (ZYTIGA) 250 mg Tab Take 4 tablets by mouth once daily as directed on an empty stomach.    apixaban (ELIQUIS) 2.5 mg Tab Take 1 tablet (2.5 mg total) by mouth once daily.    clobetasoL (TEMOVATE) 0.05 % external solution APPLY TO SCALP 1 TO 2 TIMES A DAY AS NEEDED FOR FLARES, DO NOT APPLY TO FACE OR FOLDS OF SKIN (Patient taking differently: Apply topically 2 (two) times daily as needed.)    furosemide (LASIX) 20 MG tablet TAKE 1 TABLET ON TUESDAYS AND THURSDAYS. (Patient taking differently: Take 60 mg by mouth once daily.)    furosemide (LASIX) 40 MG tablet Take 1 tablet (40 mg total) by mouth every Mon, Wed,  Fri.    metoprolol succinate (TOPROL-XL) 100 MG 24 hr tablet TAKE 1 TABLET(100 MG) BY MOUTH EVERY DAY (Patient taking differently: Take 100 mg by mouth once daily.)    rosuvastatin (CRESTOR) 10 MG tablet TAKE 1 TABLET(10 MG) BY MOUTH EVERY DAY    cyanocobalamin 1,000 mcg/mL injection INJECT 1ML UNDER THE SKIN EVERY MONTH (Patient taking differently: Inject 1,000 mcg into the muscle every 30 days. INJECT 1ML UNDER THE SKIN EVERY MONTH)    erythromycin with ethanoL (EMGEL) 2 % gel APPLY TO THE AFFECTED AREA OF FACE TWICE DAILY FOR ROSACEA/ REDNESS    ibuprofen (ADVIL,MOTRIN) 800 MG tablet Take 800 mg by mouth 3 (three) times daily as needed.    leuprolide (LUPRON) 3.75 mg injection Inject 3.75 mg into the muscle every 3 (three) months.     metronidazole 0.75% (METROCREAM) 0.75 % Crea APPLY TO AFFECTED AREA ON FACE TWICE DAILY    predniSONE (DELTASONE) 5 MG tablet TAKE 1 TABLET(5 MG) BY MOUTH TWICE DAILY WITH WATER AND ON AN EMPTY STOMACH AS DIRECTED (Patient taking differently: Take 5 mg by mouth 2 (two) times daily.)     Family History       Problem Relation (Age of Onset)    Cataracts Mother    Diabetes Father          Tobacco Use    Smoking status: Former     Current packs/day: 0.00     Average packs/day: 1 pack/day for 15.0 years (15.0 ttl pk-yrs)     Types: Cigarettes     Start date: 1954     Quit date: 1969     Years since quittin.5     Passive exposure: Never    Smokeless tobacco: Never   Substance and Sexual Activity    Alcohol use: Yes     Alcohol/week: 3.0 standard drinks of alcohol     Types: 3 Cans of beer per week     Comment: socially, NO ALCOHOL 72 HOURS PRIOR TO SX    Drug use: No    Sexual activity: Not Currently     Review of Systems   Constitutional: Positive for decreased appetite and malaise/fatigue.   HENT: Negative.     Eyes: Negative.    Cardiovascular:  Positive for syncope.   Respiratory: Negative.     Endocrine: Negative.    Hematologic/Lymphatic: Bruises/bleeds easily.   Skin:  Negative.    Musculoskeletal:  Positive for arthritis and joint pain.   Gastrointestinal: Negative.    Genitourinary: Negative.    Neurological:  Positive for weakness.   Psychiatric/Behavioral: Negative.     Allergic/Immunologic: Negative.      Objective:     Vital Signs (Most Recent):  Temp: 97.6 °F (36.4 °C) (08/26/24 1156)  Pulse: 84 (08/26/24 1156)  Resp: 17 (08/26/24 1156)  BP: (!) 91/52 (08/26/24 1156)  SpO2: 100 % (08/26/24 1156) Vital Signs (24h Range):  Temp:  [97.6 °F (36.4 °C)-98.8 °F (37.1 °C)] 97.6 °F (36.4 °C)  Pulse:  [54-92] 84  Resp:  [15-21] 17  SpO2:  [95 %-100 %] 100 %  BP: ()/(52-67) 91/52     Weight: 68.2 kg (150 lb 5.7 oz)  Body mass index is 21.57 kg/m².    SpO2: 100 %         Intake/Output Summary (Last 24 hours) at 8/26/2024 1407  Last data filed at 8/26/2024 0401  Gross per 24 hour   Intake --   Output 400 ml   Net -400 ml       Lines/Drains/Airways       Peripheral Intravenous Line  Duration                  Peripheral IV - Single Lumen 08/25/24 1000 20 G Left Antecubital 1 day                     Physical Exam  Vitals and nursing note reviewed.   Constitutional:       General: He is not in acute distress.     Appearance: Normal appearance. He is well-developed. He is not diaphoretic.   HENT:      Head: Normocephalic and atraumatic.   Eyes:      General:         Right eye: No discharge.         Left eye: No discharge.      Pupils: Pupils are equal, round, and reactive to light.   Cardiovascular:      Rate and Rhythm: Normal rate. Rhythm irregularly irregular.      Heart sounds: Normal heart sounds, S1 normal and S2 normal. No murmur heard.  Pulmonary:      Effort: Pulmonary effort is normal. No respiratory distress.      Breath sounds: Normal breath sounds.   Abdominal:      General: There is no distension.   Musculoskeletal:      Right lower leg: No edema.      Left lower leg: No edema.   Skin:     General: Skin is warm and dry.      Findings: No erythema.      Comments: CVI  changes BLE    Skin tears noted and bruising noted to BUE and BLE    Neurological:      Mental Status: He is alert and oriented to person, place, and time.   Psychiatric:         Behavior: Behavior normal.          Significant Labs: CMP   Recent Labs   Lab 08/25/24  1040 08/25/24 1912 08/26/24  0550   * 131* 126*   K 2.5* 3.0* 3.4*   CL 77* 83* 83*   CO2 36* 33* 30*   * 131* 100   BUN 21 18 17   CREATININE 1.5* 1.4 1.3   CALCIUM 9.1 8.7 8.5*   PROT 5.7* 5.4* 4.9*   ALBUMIN 3.3* 3.3* 2.8*   BILITOT 1.3* 1.3* 0.9   ALKPHOS 73 81 68   AST 22 22 19   ALT 9* 10 8*   ANIONGAP 15 15 13   , CBC   Recent Labs   Lab 08/25/24 1040 08/26/24  0550   WBC 9.01 6.76   HGB 12.0* 10.0*   HCT 33.9* 28.8*    180   , Troponin   Recent Labs   Lab 08/25/24 1040 08/25/24 1912 08/26/24  0550   TROPONINI 0.071* 0.087* 0.054*   , and All pertinent lab results from the last 24 hours have been reviewed.    Significant Imaging: Echocardiogram: Transthoracic echo (TTE) complete (Cupid Only):   Results for orders placed or performed during the hospital encounter of 06/20/24   Echo   Result Value Ref Range    RA Width 3.08 cm    LA volume (mod) 99.51 cm3    LV ESV A2C 72.78 mL    LA area A4C 32.03 cm2    LA area A2C 22.82 cm2    LV ESV A4C 114.11 mL    Left Atrium Major Axis 6.99 cm    Left Atrium Minor Axis 5.36 cm    RA Major Axis 5.64 cm    LV Diastolic Volume 128.24 mL    LV Systolic Volume 58.80 mL    MV Peak A Surjit 0.99 m/s    TR Max Surjit 3.44 m/s    AR Max Surjit 3.42 m/s    MV Peak E Surjit 0.21 m/s    Ao VTI 17.70 cm    Ao peak surjit 1.00 m/s    LVOT peak VTI 13.50 cm    LVOT peak surjit 0.65 m/s    LVOT diameter 2.00 cm    AV mean gradient 2 mmHg    AV regurgitation pressure 1/2 time [id number] ms    LA size 5.51 cm    STJ 2.27 cm    Sinus 2.94 cm    LVIDs 3.72 2.1 - 4.0 cm    Posterior Wall 0.85 0.6 - 1.1 cm    IVS 1.06 0.6 - 1.1 cm    LVIDd 5.18 3.5 - 6.0 cm    PV PEAK VELOCITY 1.13 m/s    Left Ventricular Outflow  Tract Mean Gradient 0.93 mmHg    Left Ventricular Outflow Tract Mean Velocity 0.46 cm/s    Left Ventricular End Systolic Volume by Teichholz Method 58.80 mL    Left Ventricular End Diastolic Volume by Teichholz Method 128.24 mL    FS 28 28 - 44 %    LV mass 181.48 g    Left Ventricle Relative Wall Thickness 0.33 cm    AV valve area 2.39 cm²    AV Velocity Ratio 0.65     AV index (prosthetic) 0.76     PV peak gradient 5 mmHg    E/A ratio 0.21     LVOT area 3.1 cm2    LVOT stroke volume 42.39 cm3    AV peak gradient 4 mmHg    Triscuspid Valve Regurgitation Peak Gradient 47 mmHg    FACUNDO by Velocity Ratio 2.04 cm²    BSA 1.91 m2    LV Systolic Volume Index 30.8 mL/m2    LV Diastolic Volume Index 67.14 mL/m2    LV Mass Index 95 g/m2    LA Volume Index (Mod) 52.1 mL/m2    ZLVIDS 0.92     ZLVIDD -0.37     LA Volume Index 61.0 mL/m2    LA volume 116.51 cm3    LA WIDTH 4.1 cm    TV resting pulmonary artery pressure 50 mmHg    RV TB RVSP 6 mmHg    Est. RA pres 3 mmHg    Narrative      Left Ventricle: The left ventricle is normal in size. Normal wall   thickness. Mild global hypokinesis present. There is mildly reduced   systolic function with a visually estimated ejection fraction of 45 - 50%.    Right Ventricle: Mild right ventricular enlargement. Wall thickness is   normal. Systolic function is mildly reduced.    Left Atrium: Left atrium is severely dilated.    Aortic Valve: There is mild aortic regurgitation.    Tricuspid Valve: There is moderate regurgitation.    IVC/SVC: Normal venous pressure at 3 mmHg.      and EKG: reviewed  Assessment and Plan:   Patient who presents s/p syncope/fall in setting of IVVD/dehydration/hypotension. Agree with holding Lasix/BP medications. Replete electrolytes. Can consider resumption of low dose BB if BP improves.     * Syncope  -Due to volume depletion/dehydration/hypotension  -Hold diuretics/BP meds  -Monitor BP trend  -Encourage adequate po intake  -Correct  electrolytes    Hypokalemia  -Repletion as per primary team    Chronic combined systolic and diastolic heart failure  -TTE from 6/24 with EF of 45-50%, mildly reduced RV function  -Recently had Lasix increased to 60 mg daily, clinically dry/volume contracted on exam  -Hold diuretics       Essential hypertension  -BP marginal, systolic < 100  -Can consider resumption of Metoprolol at lower dosage, 25 mg daily        VTE Risk Mitigation (From admission, onward)           Ordered     apixaban tablet 2.5 mg  Daily         08/25/24 1312     IP VTE HIGH RISK PATIENT  Once         08/25/24 1312     Place sequential compression device  Until discontinued         08/25/24 1312     Reason for No Pharmacological VTE Prophylaxis  Once        Question:  Reasons:  Answer:  Already adequately anticoagulated on oral Anticoagulants    08/25/24 1312                    Thank you for your consult. I will follow-up with patient. Please contact us if you have any additional questions.    Marysol Philip PA-C  Cardiology   [facility]

## 2024-08-26 NOTE — HOSPITAL COURSE
85 y/o male presented to the ER with c/o syncope.   Labs: Na: 126, K+: 3.4, Cr: 1.3, Bili: 0.9, BNP: 569, Trop: 0.054. CXR: no acute finding. CT Head: No acute intracranial CT abnormality.    Pt/ot to eval and treat   Carotid US: Less than 50% stenosis suspected bilaterally but with significant plaque burden.  Cardiology consulted, recommend hold diuretics/BP meds.Replete electrolytes. Can consider resumption of low dose BB if BP improves.        Patient seen and examined on the day of discharge, patient reports he is feeling much better today. Electrolyte abnormalities improved and patient tolerating diet well. Patient to continue to hold BP meds and diuretics until follow up with PCP. Patient educated on importance of monitoring BP at home in a log and bringing log to follow up with PCP for possible medication adjustments. Patient case discussed with Dr. Gabriel and he agrees stable for discharge. Patient discharged home with home health and wound care instructions.   All questions and concerns were addressed prior to discharge.    Face to face encounter with patient: 45 min

## 2024-08-26 NOTE — ASSESSMENT & PLAN NOTE
Likely secondary to increased Lasix dose, patient is not managed with PO Potassium as OP.     Patient's most recent potassium results are listed below.   Recent Labs     08/25/24  1040 08/25/24  1912 08/26/24  0550   K 2.5* 3.0* 3.4*       Plan  - Replete potassium per protocol  - Monitor potassium Every 12 hours  - Patient's hypokalemia is improving

## 2024-08-26 NOTE — SUBJECTIVE & OBJECTIVE
Past Medical History:   Diagnosis Date    Abnormal ECG 6/24/2013    Adenomatous rectal polyp 12/7/2015    Aortic insufficiency 6/24/2013    Arthritis     Asthma     as a child    Benign neoplasm of cecum 2/27/2017    Benign neoplasm of transverse colon 2/27/2017    Cataract     CHF (congestive heart failure)     Pt states He's never been told this    Diverticulosis of large intestine without hemorrhage 2/27/2017    Encounter for blood transfusion     Frequent PVCs     Gallstones 3/28/2018    By Us done 3/21/2018    GERD (gastroesophageal reflux disease)     History of colon polyps     Hypertension     Iron deficiency anemia 10/26/2015    Lymphoma     waldenstrom's macroglobulinemia    Mixed hyperlipidemia 6/24/2013    Premature atrial contractions     Prostate cancer     PSVT (paroxysmal supraventricular tachycardia) 6/24/2013    Pulmonary hypertension 6/13/2016    PVC's (premature ventricular contractions) 6/13/2016    Rectal adenocarcinoma     Rectal cancer 5/1/2018    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    left parietal scalp    Thymoma     TR (tricuspid regurgitation) 6/13/2016    Tubular adenoma of colon 06/2020    VT (ventricular tachycardia) 9/18/2018       Past Surgical History:   Procedure Laterality Date    biopsy for chest mass      BONE MARROW BIOPSY Left 7/11/2018    Procedure: Biopsy-bone marrow;  Surgeon: Charanjit Dacosta MD;  Location: [facility];  Service: General;  Laterality: Left;    CATARACT EXTRACTION W/  INTRAOCULAR LENS IMPLANT Right 01/03/2019    CATARACT EXTRACTION W/  INTRAOCULAR LENS IMPLANT Left 02/07/2019    COLON SURGERY      COLONOSCOPY N/A 10/26/2015    Procedure: Colonoscopy;  Surgeon: Abraham Hong MD;  Location: [facility] ENDO;  Service: Endoscopy;  Laterality: N/A;    COLONOSCOPY Left 2/27/2017    Procedure: COLONOSCOPY;  Surgeon: Abraham Hong MD;  Location: [facility] ENDO;  Service: Endoscopy;  Laterality: Left;    COLONOSCOPY N/A 5/1/2018    Procedure: hixtory of rectal cancer.  Colonsocopy asked to be repeated minnie  year, alst one 2/2017;  Surgeon: Patricio Streeter MD;  Location: [facility] ENDO;  Service: Endoscopy;  Laterality: N/A;    COLONOSCOPY N/A 6/27/2018    Procedure: COLONOSCOPY/hybrid APC or a EndoRotor device;  Surgeon: Rao Medeiros MD;  Location: [facility] ENDO (2ND FLR);  Service: Endoscopy;  Laterality: N/A;    COLONOSCOPY N/A 6/22/2020    Procedure: COLONOSCOPY;  Surgeon: Yu Wells MD;  Location: [facility] ENDO;  Service: Endoscopy;  Laterality: N/A;    ESOPHAGOGASTRODUODENOSCOPY N/A 10/22/2019    Procedure: ESOPHAGOGASTRODUODENOSCOPY (EGD);  Surgeon: Mariela Greer MD;  Location: [facility];  Service: Endoscopy;  Laterality: N/A;    FLEXIBLE SIGMOIDOSCOPY Left 4/23/2019    Procedure: SIGMOIDOSCOPY, FLEXIBLE;  Surgeon: Ranjit Will MD;  Location: [facility];  Service: Endoscopy;  Laterality: Left;    HERNIA REPAIR Right 02/2018    Inguinal, open with mesh    mohs      PROSTATECTOMY      RECTAL SURGERY N/A 09/2014    REPAIR OF SLIDING INGUINAL HERNIA Right 2/26/2019    Procedure: REPAIR, HERNIA, INGUINAL, SLIDING;  Surgeon: Bridger Alvarez MD;  Location: [facility];  Service: General;  Laterality: Right;    SKIN BIOPSY      TONSILLECTOMY      TUMOR REMOVAL         Review of patient's allergies indicates:  No Known Allergies    No current facility-administered medications on file prior to encounter.     Current Outpatient Medications on File Prior to Encounter   Medication Sig    abiraterone (ZYTIGA) 250 mg Tab Take 4 tablets by mouth once daily as directed on an empty stomach.    apixaban (ELIQUIS) 2.5 mg Tab Take 1 tablet (2.5 mg total) by mouth once daily.    clobetasoL (TEMOVATE) 0.05 % external solution APPLY TO SCALP 1 TO 2 TIMES A DAY AS NEEDED FOR FLARES, DO NOT APPLY TO FACE OR FOLDS OF SKIN (Patient taking differently: Apply topically 2 (two) times daily as needed.)    furosemide (LASIX) 20 MG tablet TAKE 1 TABLET ON TUESDAYS AND THURSDAYS. (Patient taking  differently: Take 60 mg by mouth once daily.)    furosemide (LASIX) 40 MG tablet Take 1 tablet (40 mg total) by mouth every Mon, Wed, Fri.    metoprolol succinate (TOPROL-XL) 100 MG 24 hr tablet TAKE 1 TABLET(100 MG) BY MOUTH EVERY DAY (Patient taking differently: Take 100 mg by mouth once daily.)    rosuvastatin (CRESTOR) 10 MG tablet TAKE 1 TABLET(10 MG) BY MOUTH EVERY DAY    cyanocobalamin 1,000 mcg/mL injection INJECT 1ML UNDER THE SKIN EVERY MONTH (Patient taking differently: Inject 1,000 mcg into the muscle every 30 days. INJECT 1ML UNDER THE SKIN EVERY MONTH)    erythromycin with ethanoL (EMGEL) 2 % gel APPLY TO THE AFFECTED AREA OF FACE TWICE DAILY FOR ROSACEA/ REDNESS    ibuprofen (ADVIL,MOTRIN) 800 MG tablet Take 800 mg by mouth 3 (three) times daily as needed.    leuprolide (LUPRON) 3.75 mg injection Inject 3.75 mg into the muscle every 3 (three) months.     metronidazole 0.75% (METROCREAM) 0.75 % Crea APPLY TO AFFECTED AREA ON FACE TWICE DAILY    predniSONE (DELTASONE) 5 MG tablet TAKE 1 TABLET(5 MG) BY MOUTH TWICE DAILY WITH WATER AND ON AN EMPTY STOMACH AS DIRECTED (Patient taking differently: Take 5 mg by mouth 2 (two) times daily.)     Family History       Problem Relation (Age of Onset)    Cataracts Mother    Diabetes Father          Tobacco Use    Smoking status: Former     Current packs/day: 0.00     Average packs/day: 1 pack/day for 15.0 years (15.0 ttl pk-yrs)     Types: Cigarettes     Start date: 1954     Quit date: 1969     Years since quittin.5     Passive exposure: Never    Smokeless tobacco: Never   Substance and Sexual Activity    Alcohol use: Yes     Alcohol/week: 3.0 standard drinks of alcohol     Types: 3 Cans of beer per week     Comment: socially, NO ALCOHOL 72 HOURS PRIOR TO SX    Drug use: No    Sexual activity: Not Currently     Review of Systems   Constitutional: Positive for decreased appetite and malaise/fatigue.   HENT: Negative.     Eyes: Negative.     Cardiovascular:  Positive for syncope.   Respiratory: Negative.     Endocrine: Negative.    Hematologic/Lymphatic: Bruises/bleeds easily.   Skin: Negative.    Musculoskeletal:  Positive for arthritis and joint pain.   Gastrointestinal: Negative.    Genitourinary: Negative.    Neurological:  Positive for weakness.   Psychiatric/Behavioral: Negative.     Allergic/Immunologic: Negative.      Objective:     Vital Signs (Most Recent):  Temp: 97.6 °F (36.4 °C) (08/26/24 1156)  Pulse: 84 (08/26/24 1156)  Resp: 17 (08/26/24 1156)  BP: (!) 91/52 (08/26/24 1156)  SpO2: 100 % (08/26/24 1156) Vital Signs (24h Range):  Temp:  [97.6 °F (36.4 °C)-98.8 °F (37.1 °C)] 97.6 °F (36.4 °C)  Pulse:  [54-92] 84  Resp:  [15-21] 17  SpO2:  [95 %-100 %] 100 %  BP: ()/(52-67) 91/52     Weight: 68.2 kg (150 lb 5.7 oz)  Body mass index is 21.57 kg/m².    SpO2: 100 %         Intake/Output Summary (Last 24 hours) at 8/26/2024 1407  Last data filed at 8/26/2024 0401  Gross per 24 hour   Intake --   Output 400 ml   Net -400 ml       Lines/Drains/Airways       Peripheral Intravenous Line  Duration                  Peripheral IV - Single Lumen 08/25/24 1000 20 G Left Antecubital 1 day                     Physical Exam  Vitals and nursing note reviewed.   Constitutional:       General: He is not in acute distress.     Appearance: Normal appearance. He is well-developed. He is not diaphoretic.   HENT:      Head: Normocephalic and atraumatic.   Eyes:      General:         Right eye: No discharge.         Left eye: No discharge.      Pupils: Pupils are equal, round, and reactive to light.   Cardiovascular:      Rate and Rhythm: Normal rate. Rhythm irregularly irregular.      Heart sounds: Normal heart sounds, S1 normal and S2 normal. No murmur heard.  Pulmonary:      Effort: Pulmonary effort is normal. No respiratory distress.      Breath sounds: Normal breath sounds.   Abdominal:      General: There is no distension.   Musculoskeletal:      Right  lower leg: No edema.      Left lower leg: No edema.   Skin:     General: Skin is warm and dry.      Findings: No erythema.      Comments: CVI changes BLE    Skin tears noted and bruising noted to BUE and BLE    Neurological:      Mental Status: He is alert and oriented to person, place, and time.   Psychiatric:         Behavior: Behavior normal.          Significant Labs: CMP   Recent Labs   Lab 08/25/24  1040 08/25/24 1912 08/26/24  0550   * 131* 126*   K 2.5* 3.0* 3.4*   CL 77* 83* 83*   CO2 36* 33* 30*   * 131* 100   BUN 21 18 17   CREATININE 1.5* 1.4 1.3   CALCIUM 9.1 8.7 8.5*   PROT 5.7* 5.4* 4.9*   ALBUMIN 3.3* 3.3* 2.8*   BILITOT 1.3* 1.3* 0.9   ALKPHOS 73 81 68   AST 22 22 19   ALT 9* 10 8*   ANIONGAP 15 15 13   , CBC   Recent Labs   Lab 08/25/24  1040 08/26/24  0550   WBC 9.01 6.76   HGB 12.0* 10.0*   HCT 33.9* 28.8*    180   , Troponin   Recent Labs   Lab 08/25/24  1040 08/25/24 1912 08/26/24  0550   TROPONINI 0.071* 0.087* 0.054*   , and All pertinent lab results from the last 24 hours have been reviewed.    Significant Imaging: Echocardiogram: Transthoracic echo (TTE) complete (Cupid Only):   Results for orders placed or performed during the hospital encounter of 06/20/24   Echo   Result Value Ref Range    RA Width 3.08 cm    LA volume (mod) 99.51 cm3    LV ESV A2C 72.78 mL    LA area A4C 32.03 cm2    LA area A2C 22.82 cm2    LV ESV A4C 114.11 mL    Left Atrium Major Axis 6.99 cm    Left Atrium Minor Axis 5.36 cm    RA Major Axis 5.64 cm    LV Diastolic Volume 128.24 mL    LV Systolic Volume 58.80 mL    MV Peak A Surjit 0.99 m/s    TR Max Surjit 3.44 m/s    AR Max Surjit 3.42 m/s    MV Peak E Surjit 0.21 m/s    Ao VTI 17.70 cm    Ao peak surjit 1.00 m/s    LVOT peak VTI 13.50 cm    LVOT peak surjit 0.65 m/s    LVOT diameter 2.00 cm    AV mean gradient 2 mmHg    AV regurgitation pressure 1/2 time [id number] ms    LA size 5.51 cm    STJ 2.27 cm    Sinus 2.94 cm    LVIDs 3.72 2.1 - 4.0 cm     Posterior Wall 0.85 0.6 - 1.1 cm    IVS 1.06 0.6 - 1.1 cm    LVIDd 5.18 3.5 - 6.0 cm    PV PEAK VELOCITY 1.13 m/s    Left Ventricular Outflow Tract Mean Gradient 0.93 mmHg    Left Ventricular Outflow Tract Mean Velocity 0.46 cm/s    Left Ventricular End Systolic Volume by Teichholz Method 58.80 mL    Left Ventricular End Diastolic Volume by Teichholz Method 128.24 mL    FS 28 28 - 44 %    LV mass 181.48 g    Left Ventricle Relative Wall Thickness 0.33 cm    AV valve area 2.39 cm²    AV Velocity Ratio 0.65     AV index (prosthetic) 0.76     PV peak gradient 5 mmHg    E/A ratio 0.21     LVOT area 3.1 cm2    LVOT stroke volume 42.39 cm3    AV peak gradient 4 mmHg    Triscuspid Valve Regurgitation Peak Gradient 47 mmHg    FACUNDO by Velocity Ratio 2.04 cm²    BSA 1.91 m2    LV Systolic Volume Index 30.8 mL/m2    LV Diastolic Volume Index 67.14 mL/m2    LV Mass Index 95 g/m2    LA Volume Index (Mod) 52.1 mL/m2    ZLVIDS 0.92     ZLVIDD -0.37     LA Volume Index 61.0 mL/m2    LA volume 116.51 cm3    LA WIDTH 4.1 cm    TV resting pulmonary artery pressure 50 mmHg    RV TB RVSP 6 mmHg    Est. RA pres 3 mmHg    Narrative      Left Ventricle: The left ventricle is normal in size. Normal wall   thickness. Mild global hypokinesis present. There is mildly reduced   systolic function with a visually estimated ejection fraction of 45 - 50%.    Right Ventricle: Mild right ventricular enlargement. Wall thickness is   normal. Systolic function is mildly reduced.    Left Atrium: Left atrium is severely dilated.    Aortic Valve: There is mild aortic regurgitation.    Tricuspid Valve: There is moderate regurgitation.    IVC/SVC: Normal venous pressure at 3 mmHg.      and EKG: reviewed

## 2024-08-26 NOTE — PLAN OF CARE
EVAL AND TX COMPLETED: facilitated bed mobility with CGA, transfers with min A. Ambulated 3 ft min A with RW, decreased BP in standing. Recommend continued PT services at low vs mod intensity pending further OOB activity.

## 2024-08-26 NOTE — ASSESSMENT & PLAN NOTE
Likely secondary to dehydration r/t increased Lasix dose. Treated with 1ltr NS in ED, improved BP    --Hold Lasix for now, monitor for edema  --Repeat Orthostatic vitals in AM  --Cautious use of IV fluids in setting of CHF  --Fall Risk

## 2024-08-26 NOTE — ASSESSMENT & PLAN NOTE
Syncope at home, reported increase in Lasix dose per Cardiology recently.  CT head: no acute finding. +Orthostatic vitals on arrival.   Likely secondary to hypotension/volume depletion    --Carotid US: Less than 50% stenosis suspected bilaterally but with significant plaque burden.   --Tele  --Repeat orthostatic vitals in AM  --Replete K+  --Hold Lasix/BP meds for now  --Cardiology consulted

## 2024-08-26 NOTE — ASSESSMENT & PLAN NOTE
Patient is identified as having Combined Systolic and Diastolic heart failure that is Chronic. CHF is currently controlled. Latest ECHO performed and demonstrates- Results for orders placed during the hospital encounter of 06/20/24    Echo    Interpretation Summary    Left Ventricle: The left ventricle is normal in size. Normal wall thickness. Mild global hypokinesis present. There is mildly reduced systolic function with a visually estimated ejection fraction of 45 - 50%.    Right Ventricle: Mild right ventricular enlargement. Wall thickness is normal. Systolic function is mildly reduced.    Left Atrium: Left atrium is severely dilated.    Aortic Valve: There is mild aortic regurgitation.    Tricuspid Valve: There is moderate regurgitation.    IVC/SVC: Normal venous pressure at 3 mmHg.  . Continue Beta Blocker and Furosemide and monitor clinical status closely. Monitor on telemetry. Patient is off CHF pathway.  Monitor strict Is&Os and daily weights.  Place on fluid restriction of 1.5 L. Cardiology has not been consulted. Continue to stress to patient importance of self efficacy and  on diet for CHF. Last BNP reviewed- and noted below   Recent Labs   Lab 08/25/24  1040   *

## 2024-08-26 NOTE — PT/OT/SLP EVAL
Physical Therapy Evaluation    Patient Name:  Homero Tanner   MRN:  [id number]    Recommendations:     Discharge Recommendations:  (low vs mod intensity)   Discharge Equipment Recommendations: to be determined by next level of care   Barriers to discharge: None    Assessment:     Homero Tanner is a 84 y.o. male admitted with a medical diagnosis of Syncope.  He presents with the following impairments/functional limitations: weakness, impaired endurance, impaired functional mobility, gait instability, impaired balance, decreased lower extremity function, decreased safety awareness, decreased coordination which limits mobility/negatively impacts functional status, increases risk of falls, and increases caregiver burden/increases risk of issues related to immobility. Pt participation limited by decreased BP. Orthostatics as follows: supine 107/58, sitting 96/80, standing 78/50. Pt will benefit from continued PT services at low vs mod intensity in order to return to baseline.    Rehab Prognosis: Good; patient would benefit from acute skilled PT services to address these deficits and reach maximum level of function.    Recent Surgery: * No surgery found *      Plan:     During this hospitalization, patient to be seen 3 x/week to address the identified rehab impairments via therapeutic activities, gait training, therapeutic exercises, neuromuscular re-education and progress toward the following goals:    Plan of Care Expires:  09/09/24    Subjective     Chief Complaint: denies pt but endorses stiffness  Patient/Family Comments/goals: to get better/go home  Pain/Comfort:  Pain Rating 1: 0/10 (denied pain but reported stiffness)  Pain Rating Post-Intervention 1: 0/10    Patients cultural, spiritual, Spiritism conflicts given the current situation: no    Living Environment:  Pt lives with significant other in 2 story raised home with stair-lift to get to front door. Bed and bath on ground floor.   Prior to  admission, patients level of function was independent with ADLs, ambulatory in home and community with no AD until recently pt acquired a rollator. Pt reported falls last week x 3.  Equipment used at home: rollator, bedside commode (adjustable bed).  DME owned (not currently used): none.  Upon discharge, patient will have assistance from significant other.    Objective:     Communicated with nursing (Yadira) and performed chart review via epic system prior to session.  Patient found supine with peripheral IV, telemetry  upon PT entry to room. Significant other and nsg at bedside providing meds.     General Precautions: Standard, fall  Orthopedic Precautions:N/A   Braces: N/A  Respiratory Status: Room air    Exams:  Cognitive Exam:  Patient is oriented to Person, Place, Time, and Situation  Gross Motor Coordination:  WFL  Postural Exam:  Patient presented with the following abnormalities:    -       Rounded shoulders  -       Forward head  RLE ROM: WFL but stiff/sore due to recent falls (bandages in place to B knee  RLE Strength: WFL  LLE ROM: WFL  LLE Strength: WFL    Functional Mobility:  Bed Mobility:     Scooting: contact guard assistance  Supine to Sit: contact guard assistance  Tolerated sitting EOB x 7-10 mins  Sit to Supine: contact guard assistance  Transfers:     Sit to Stand:  minimum assistance with rolling walker  Tolerated standing x 3-5 mins with intermittent postural sway  Gait: 3 ft min A with RW, demonstrated slow pace, flexed posture, wide VEENA, shuffled steps, unsteadiness on feet  Balance: poor plus dynamic standing balance      AM-PAC 6 CLICK MOBILITY  Total Score:16       Treatment & Education:  Educated pt on benefits of consistent participation in PT services to meet functional goals. Educated pt on floating heels with pillow to promote skin integrity of heels. Educated pt on importance of sitting OOB to promote endurance and overall activity tolerance, but limited by low BP. Educated pt on  call don't fall policy and use of call button to alert nursing staff of needs. Pt expressed understanding.     Patient left supine with all lines intact, call button in reach, bed alarm on, and nursing notified.    GOALS:   Multidisciplinary Problems       Physical Therapy Goals          Problem: Physical Therapy    Goal Priority Disciplines Outcome Goal Variances Interventions   Physical Therapy Goal     PT, PT/OT      Description: Pt will perform bed mobility independently in order to participate in EOB activity.  Pt will perform transfers independently in order to participate in OOB activity.  Pt will ambulate 350 ft SPV with LRAD in order to participate in daily tasks.                          History:     Past Medical History:   Diagnosis Date    Abnormal ECG 6/24/2013    Adenomatous rectal polyp 12/7/2015    Aortic insufficiency 6/24/2013    Arthritis     Asthma     as a child    Benign neoplasm of cecum 2/27/2017    Benign neoplasm of transverse colon 2/27/2017    Cataract     CHF (congestive heart failure)     Pt states He's never been told this    Diverticulosis of large intestine without hemorrhage 2/27/2017    Encounter for blood transfusion     Frequent PVCs     Gallstones 3/28/2018    By Us done 3/21/2018    GERD (gastroesophageal reflux disease)     History of colon polyps     Hypertension     Iron deficiency anemia 10/26/2015    Lymphoma     waldenstrom's macroglobulinemia    Mixed hyperlipidemia 6/24/2013    Premature atrial contractions     Prostate cancer     PSVT (paroxysmal supraventricular tachycardia) 6/24/2013    Pulmonary hypertension 6/13/2016    PVC's (premature ventricular contractions) 6/13/2016    Rectal adenocarcinoma     Rectal cancer 5/1/2018    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    left parietal scalp    Thymoma     TR (tricuspid regurgitation) 6/13/2016    Tubular adenoma of colon 06/2020    VT (ventricular tachycardia) 9/18/2018       Past Surgical History:   Procedure  Laterality Date    biopsy for chest mass      BONE MARROW BIOPSY Left 7/11/2018    Procedure: Biopsy-bone marrow;  Surgeon: Charanjit Dacosta MD;  Location: [facility];  Service: General;  Laterality: Left;    CATARACT EXTRACTION W/  INTRAOCULAR LENS IMPLANT Right 01/03/2019    CATARACT EXTRACTION W/  INTRAOCULAR LENS IMPLANT Left 02/07/2019    COLON SURGERY      COLONOSCOPY N/A 10/26/2015    Procedure: Colonoscopy;  Surgeon: Abraham Hong MD;  Location: [facility];  Service: Endoscopy;  Laterality: N/A;    COLONOSCOPY Left 2/27/2017    Procedure: COLONOSCOPY;  Surgeon: Abraham Hong MD;  Location: [facility];  Service: Endoscopy;  Laterality: Left;    COLONOSCOPY N/A 5/1/2018    Procedure: hixtory of rectal cancer. Colonsocopy asked to be repeated minnie  year, alst one 2/2017;  Surgeon: Patricio Streeter MD;  Location: [facility];  Service: Endoscopy;  Laterality: N/A;    COLONOSCOPY N/A 6/27/2018    Procedure: COLONOSCOPY/hybrid APC or a EndoRotor device;  Surgeon: Rao Medeiros MD;  [facility]  Service: Endoscopy;  Laterality: N/A;    COLONOSCOPY N/A 6/22/2020    Procedure: COLONOSCOPY;  Surgeon: Yu Wells MD;  Location: [facility];  Service: Endoscopy;  Laterality: N/A;    ESOPHAGOGASTRODUODENOSCOPY N/A 10/22/2019    Procedure: ESOPHAGOGASTRODUODENOSCOPY (EGD);  Surgeon: Mariela Greer MD;  Location: [facility];  Service: Endoscopy;  Laterality: N/A;    FLEXIBLE SIGMOIDOSCOPY Left 4/23/2019    Procedure: SIGMOIDOSCOPY, FLEXIBLE;  Surgeon: Ranjit Will MD;  Location: [facility];  Service: Endoscopy;  Laterality: Left;    HERNIA REPAIR Right 02/2018    Inguinal, open with mesh    mohs      PROSTATECTOMY      RECTAL SURGERY N/A 09/2014    REPAIR OF SLIDING INGUINAL HERNIA Right 2/26/2019    Procedure: REPAIR, HERNIA, INGUINAL, SLIDING;  Surgeon: Bridger Alvarez MD;  Location: [facility];  Service: General;  Laterality: Right;    SKIN BIOPSY      TONSILLECTOMY      TUMOR REMOVAL          Time Tracking:     PT Received On: 08/26/24  PT Start Time: 1050     PT Stop Time: 1131  PT Total Time (min): 41 min     Billable Minutes: Evaluation 15 and Therapeutic Activity 26 08/26/2024

## 2024-08-26 NOTE — ASSESSMENT & PLAN NOTE
-Due to volume depletion/dehydration/hypotension  -Hold diuretics/BP meds  -Monitor BP trend  -Encourage adequate po intake  -Correct electrolytes

## 2024-08-26 NOTE — PLAN OF CARE
Discussed poc with pt, pt verbalized understanding    Purposeful rounding every 2hours    VS wnl  Cardiac monitoring in use, pt is NSR, tele monitor # [id number]  Fall precautions in place, remains injury free  Pt denies c/o pain and nausea    Accurate I&Os  Bed locked at lowest position  Call light within reach    Chart check complete  Will cont with POC

## 2024-08-26 NOTE — TELEPHONE ENCOUNTER
Patient needs to be scheduled for Cardioversion in September  Will call with date/time      ----- Message -----  From: Nicholas Aguilar MD  Sent: 8/21/2024   1:13 PM CDT  To: Lorna Moseley LPN; Shruthi Kulkarni LPN    See comments below and call patient to discuss.   Please close encounter when done -- no need to route back to me.  Thanks  His anti Xa on Eliquis is appropriately elevated   We can plan on DCCV in early September

## 2024-08-26 NOTE — PLAN OF CARE
Discussed poc with pt, pt verbalized understanding    Purposeful rounding    VS wnl  Cardiac monitoring in use tele monitor # [id number]  Fall precautions in place, remains injury free    Bed locked at lowest position  Call light within reach    Chart check continued  Will cont with POC

## 2024-08-27 VITALS
WEIGHT: 150.38 LBS | BODY MASS INDEX: 21.57 KG/M2 | OXYGEN SATURATION: 98 % | DIASTOLIC BLOOD PRESSURE: 71 MMHG | SYSTOLIC BLOOD PRESSURE: 114 MMHG | TEMPERATURE: 98 F | HEART RATE: 99 BPM | RESPIRATION RATE: 16 BRPM

## 2024-08-27 LAB
ALBUMIN SERPL BCP-MCNC: 2.8 G/DL (ref 3.5–5.2)
ALP SERPL-CCNC: 67 U/L (ref 55–135)
ALT SERPL W/O P-5'-P-CCNC: 6 U/L (ref 10–44)
ANION GAP SERPL CALC-SCNC: 12 MMOL/L (ref 8–16)
AST SERPL-CCNC: 15 U/L (ref 10–40)
BASOPHILS # BLD AUTO: 0.01 K/UL (ref 0–0.2)
BASOPHILS NFR BLD: 0.2 % (ref 0–1.9)
BILIRUB SERPL-MCNC: 0.7 MG/DL (ref 0.1–1)
BUN SERPL-MCNC: 18 MG/DL (ref 8–23)
CALCIUM SERPL-MCNC: 8.7 MG/DL (ref 8.7–10.5)
CHLORIDE SERPL-SCNC: 87 MMOL/L (ref 95–110)
CO2 SERPL-SCNC: 31 MMOL/L (ref 23–29)
CREAT SERPL-MCNC: 1.3 MG/DL (ref 0.5–1.4)
DIFFERENTIAL METHOD BLD: ABNORMAL
EOSINOPHIL # BLD AUTO: 0 K/UL (ref 0–0.5)
EOSINOPHIL NFR BLD: 0.3 % (ref 0–8)
ERYTHROCYTE [DISTWIDTH] IN BLOOD BY AUTOMATED COUNT: 12.9 % (ref 11.5–14.5)
EST. GFR  (NO RACE VARIABLE): 55 ML/MIN/1.73 M^2
GLUCOSE SERPL-MCNC: 111 MG/DL (ref 70–110)
HCT VFR BLD AUTO: 30.2 % (ref 40–54)
HGB BLD-MCNC: 10.1 G/DL (ref 14–18)
IMM GRANULOCYTES # BLD AUTO: 0.03 K/UL (ref 0–0.04)
IMM GRANULOCYTES NFR BLD AUTO: 0.5 % (ref 0–0.5)
LYMPHOCYTES # BLD AUTO: 1.1 K/UL (ref 1–4.8)
LYMPHOCYTES NFR BLD: 18.1 % (ref 18–48)
MAGNESIUM SERPL-MCNC: 1.9 MG/DL (ref 1.6–2.6)
MCH RBC QN AUTO: 30.4 PG (ref 27–31)
MCHC RBC AUTO-ENTMCNC: 33.4 G/DL (ref 32–36)
MCV RBC AUTO: 91 FL (ref 82–98)
MONOCYTES # BLD AUTO: 0.5 K/UL (ref 0.3–1)
MONOCYTES NFR BLD: 7.6 % (ref 4–15)
NEUTROPHILS # BLD AUTO: 4.4 K/UL (ref 1.8–7.7)
NEUTROPHILS NFR BLD: 73.3 % (ref 38–73)
NRBC BLD-RTO: 0 /100 WBC
PHOSPHATE SERPL-MCNC: 2.9 MG/DL (ref 2.7–4.5)
PLATELET # BLD AUTO: 191 K/UL (ref 150–450)
PMV BLD AUTO: 9.3 FL (ref 9.2–12.9)
POTASSIUM SERPL-SCNC: 4 MMOL/L (ref 3.5–5.1)
PROT SERPL-MCNC: 5 G/DL (ref 6–8.4)
RBC # BLD AUTO: 3.32 M/UL (ref 4.6–6.2)
SODIUM SERPL-SCNC: 130 MMOL/L (ref 136–145)
WBC # BLD AUTO: 5.96 K/UL (ref 3.9–12.7)

## 2024-08-27 PROCEDURE — 80053 COMPREHEN METABOLIC PANEL: CPT | Mod: HCNC | Performed by: NURSE PRACTITIONER

## 2024-08-27 PROCEDURE — 97530 THERAPEUTIC ACTIVITIES: CPT | Mod: HCNC,CQ

## 2024-08-27 PROCEDURE — 97116 GAIT TRAINING THERAPY: CPT | Mod: HCNC,CQ

## 2024-08-27 PROCEDURE — 97166 OT EVAL MOD COMPLEX 45 MIN: CPT | Mod: HCNC

## 2024-08-27 PROCEDURE — 83735 ASSAY OF MAGNESIUM: CPT | Mod: HCNC | Performed by: NURSE PRACTITIONER

## 2024-08-27 PROCEDURE — 85025 COMPLETE CBC W/AUTO DIFF WBC: CPT | Mod: HCNC | Performed by: NURSE PRACTITIONER

## 2024-08-27 PROCEDURE — 36415 COLL VENOUS BLD VENIPUNCTURE: CPT | Mod: HCNC | Performed by: NURSE PRACTITIONER

## 2024-08-27 PROCEDURE — 25000003 PHARM REV CODE 250: Mod: HCNC | Performed by: NURSE PRACTITIONER

## 2024-08-27 PROCEDURE — 84100 ASSAY OF PHOSPHORUS: CPT | Mod: HCNC | Performed by: NURSE PRACTITIONER

## 2024-08-27 PROCEDURE — 97535 SELF CARE MNGMENT TRAINING: CPT | Mod: HCNC

## 2024-08-27 PROCEDURE — 99232 SBSQ HOSP IP/OBS MODERATE 35: CPT | Mod: HCNC,,, | Performed by: INTERNAL MEDICINE

## 2024-08-27 PROCEDURE — 63600175 PHARM REV CODE 636 W HCPCS: Mod: HCNC | Performed by: NURSE PRACTITIONER

## 2024-08-27 RX ADMIN — ATORVASTATIN CALCIUM 40 MG: 40 TABLET, FILM COATED ORAL at 09:08

## 2024-08-27 RX ADMIN — APIXABAN 2.5 MG: 2.5 TABLET, FILM COATED ORAL at 09:08

## 2024-08-27 RX ADMIN — PREDNISONE 5 MG: 5 TABLET ORAL at 09:08

## 2024-08-27 NOTE — ASSESSMENT & PLAN NOTE
Chronic, uncontrolled. Latest blood pressure and vitals reviewed-     Temp:  [97.8 °F (36.6 °C)-98.9 °F (37.2 °C)]   Pulse:  [71-99]   Resp:  [16-18]   BP: ()/(56-78)   SpO2:  [96 %-98 %] .   Home meds for hypertension were reviewed and noted below.   Hypertension Medications               furosemide (LASIX) 20 MG tablet TAKE 1 TABLET ON TUESDAYS AND THURSDAYS.    furosemide (LASIX) 40 MG tablet Take 1 tablet (40 mg total) by mouth every Mon, Wed, Fri.    metoprolol succinate (TOPROL-XL) 100 MG 24 hr tablet TAKE 1 TABLET(100 MG) BY MOUTH EVERY DAY            While in the hospital, will manage blood pressure as follows; Adjust home antihypertensive regimen as follows- hold for now in setting of hypotension    Will utilize p.r.n. blood pressure medication only if patient's blood pressure greater than 160/100 and he develops symptoms such as worsening chest pain or shortness of breath.

## 2024-08-27 NOTE — DISCHARGE INSTRUCTIONS
Follow up with PCP within 3-5 days   Monitor BP and HR- keep BP log to and bring log to follow up appointment   Hold Blood pressure and diuretic (Lasix) medications until follow up appointment

## 2024-08-27 NOTE — PROGRESS NOTES
"[facility]  Cardiology  Progress Note    Patient Name: Hmoero Tanner  MRN: [id number]  Admission Date: 8/25/2024  Hospital Length of Stay: 1 days  Code Status: Full Code   Attending Physician: Brain Arteaga MD   Primary Care Physician: Solomon Cortés MD  Expected Discharge Date:   Principal Problem:Syncope    Subjective:   HPI:  Mr. Tanner is an 84 year old male patient whose current medical conditions include GERD, HTN, hyperlipidemia, persistent afib (on Eliquis), rectal CA, thymoma, pulmonary HTN, metastatic prostate CA, and CHF who presented to [facility] ED yesterday due to fall/syncope. Patient stated he tripped and fell, unclear if he truly lost consciousness. Reports he had not been eating solid food for past week, only drinking smoothies and had been dealing with constipation and feeling "dehydrated". He denied any associated fever, chills, eren CP, or palpitations. Noted that his Lasix dosage had been increased to 60 mg daily. Initial workup in ED revealed electrolyte derangements (hyponatremia, hypokalemia), BNP of 569, hypotension, and troponin of 0.071 and patient was subsequently admitted for further evaluation and treatment. Cardiology consulted to assist with management. Patient seen and examined today, resting in bed. Feels ok. Weak. BP soft with systolic in 90's. No CP. Denies SOB, PND, orthopnea. Extensive skin tearing/bruising from fall. He reports compliance with his medications, recently placed on Eliquis by EP, Dr. Aguilar. TTE from 6/24 reviewed, EF of 45-50%, mildly reduced RV function.     Hospital Course:   8/27/24-Patient seen and examined today, sitting up in bedside chair. Feels ok. No CP or SOB. BP still soft. Labs reviewed. Creatinine stable. Na 130.       Review of Systems   Constitutional: Positive for malaise/fatigue.   HENT: Negative.     Eyes: Negative.    Cardiovascular: Negative.    Respiratory: Negative.     Endocrine: Negative.    Hematologic/Lymphatic: " Bruises/bleeds easily.   Skin: Negative.    Musculoskeletal:  Positive for arthritis and joint pain.   Gastrointestinal: Negative.    Genitourinary: Negative.    Neurological:  Positive for weakness.   Psychiatric/Behavioral: Negative.     Allergic/Immunologic: Negative.      Objective:     Vital Signs (Most Recent):  Temp: 97.9 °F (36.6 °C) (08/27/24 1122)  Pulse: 97 (08/27/24 1122)  Resp: 16 (08/27/24 1122)  BP: (!) 81/57 (08/27/24 1122)  SpO2: 98 % (08/27/24 1122) Vital Signs (24h Range):  Temp:  [97.8 °F (36.6 °C)-98.9 °F (37.2 °C)] 97.9 °F (36.6 °C)  Pulse:  [71-98] 97  Resp:  [16-18] 16  SpO2:  [96 %-98 %] 98 %  BP: ()/(56-78) 81/57     Weight: 68.2 kg (150 lb 5.7 oz)  Body mass index is 21.57 kg/m².     SpO2: 98 %         Intake/Output Summary (Last 24 hours) at 8/27/2024 1210  Last data filed at 8/27/2024 0558  Gross per 24 hour   Intake --   Output 800 ml   Net -800 ml       Lines/Drains/Airways       None                      Physical Exam  Vitals and nursing note reviewed.   Constitutional:       General: He is not in acute distress.     Appearance: He is well-developed. He is not diaphoretic.   HENT:      Head: Normocephalic and atraumatic.   Eyes:      General:         Right eye: No discharge.         Left eye: No discharge.      Pupils: Pupils are equal, round, and reactive to light.   Cardiovascular:      Rate and Rhythm: Normal rate and regular rhythm.      Heart sounds: Normal heart sounds, S1 normal and S2 normal. No murmur heard.  Pulmonary:      Effort: Pulmonary effort is normal. No respiratory distress.      Breath sounds: Normal breath sounds.   Abdominal:      General: There is no distension.   Musculoskeletal:      Right lower leg: No edema.      Left lower leg: No edema.   Skin:     General: Skin is warm and dry.      Findings: No erythema.      Comments: Scattered bruises upper and lower extremities   Neurological:      Mental Status: He is alert and oriented to person, place, and  time.   Psychiatric:         Mood and Affect: Mood normal.         Behavior: Behavior normal.            Significant Labs: CMP   Recent Labs   Lab 08/25/24 1912 08/26/24  0550 08/27/24  0558   * 126* 130*   K 3.0* 3.4* 4.0   CL 83* 83* 87*   CO2 33* 30* 31*   * 100 111*   BUN 18 17 18   CREATININE 1.4 1.3 1.3   CALCIUM 8.7 8.5* 8.7   PROT 5.4* 4.9* 5.0*   ALBUMIN 3.3* 2.8* 2.8*   BILITOT 1.3* 0.9 0.7   ALKPHOS 81 68 67   AST 22 19 15   ALT 10 8* 6*   ANIONGAP 15 13 12   , CBC   Recent Labs   Lab 08/26/24  0550 08/27/24  0558   WBC 6.76 5.96   HGB 10.0* 10.1*   HCT 28.8* 30.2*    191   , Troponin   Recent Labs   Lab 08/25/24 1912 08/26/24  0550   TROPONINI 0.087* 0.054*   , and All pertinent lab results from the last 24 hours have been reviewed.    Significant Imaging: Echocardiogram: Transthoracic echo (TTE) complete (Cupid Only):   Results for orders placed or performed during the hospital encounter of 06/20/24   Echo   Result Value Ref Range    RA Width 3.08 cm    LA volume (mod) 99.51 cm3    LV ESV A2C 72.78 mL    LA area A4C 32.03 cm2    LA area A2C 22.82 cm2    LV ESV A4C 114.11 mL    Left Atrium Major Axis 6.99 cm    Left Atrium Minor Axis 5.36 cm    RA Major Axis 5.64 cm    LV Diastolic Volume 128.24 mL    LV Systolic Volume 58.80 mL    MV Peak A Surjit 0.99 m/s    TR Max Surjit 3.44 m/s    AR Max Surjit 3.42 m/s    MV Peak E Surjit 0.21 m/s    Ao VTI 17.70 cm    Ao peak surjit 1.00 m/s    LVOT peak VTI 13.50 cm    LVOT peak surjit 0.65 m/s    LVOT diameter 2.00 cm    AV mean gradient 2 mmHg    AV regurgitation pressure 1/2 time [id number] ms    LA size 5.51 cm    STJ 2.27 cm    Sinus 2.94 cm    LVIDs 3.72 2.1 - 4.0 cm    Posterior Wall 0.85 0.6 - 1.1 cm    IVS 1.06 0.6 - 1.1 cm    LVIDd 5.18 3.5 - 6.0 cm    PV PEAK VELOCITY 1.13 m/s    Left Ventricular Outflow Tract Mean Gradient 0.93 mmHg    Left Ventricular Outflow Tract Mean Velocity 0.46 cm/s    Left Ventricular End Systolic Volume by  Teichholz Method 58.80 mL    Left Ventricular End Diastolic Volume by Teichholz Method 128.24 mL    FS 28 28 - 44 %    LV mass 181.48 g    Left Ventricle Relative Wall Thickness 0.33 cm    AV valve area 2.39 cm²    AV Velocity Ratio 0.65     AV index (prosthetic) 0.76     PV peak gradient 5 mmHg    E/A ratio 0.21     LVOT area 3.1 cm2    LVOT stroke volume 42.39 cm3    AV peak gradient 4 mmHg    Triscuspid Valve Regurgitation Peak Gradient 47 mmHg    FACUNDO by Velocity Ratio 2.04 cm²    BSA 1.91 m2    LV Systolic Volume Index 30.8 mL/m2    LV Diastolic Volume Index 67.14 mL/m2    LV Mass Index 95 g/m2    LA Volume Index (Mod) 52.1 mL/m2    ZLVIDS 0.92     ZLVIDD -0.37     LA Volume Index 61.0 mL/m2    LA volume 116.51 cm3    LA WIDTH 4.1 cm    TV resting pulmonary artery pressure 50 mmHg    RV TB RVSP 6 mmHg    Est. RA pres 3 mmHg    Narrative      Left Ventricle: The left ventricle is normal in size. Normal wall   thickness. Mild global hypokinesis present. There is mildly reduced   systolic function with a visually estimated ejection fraction of 45 - 50%.    Right Ventricle: Mild right ventricular enlargement. Wall thickness is   normal. Systolic function is mildly reduced.    Left Atrium: Left atrium is severely dilated.    Aortic Valve: There is mild aortic regurgitation.    Tricuspid Valve: There is moderate regurgitation.    IVC/SVC: Normal venous pressure at 3 mmHg.      and EKG: reviewed  Assessment and Plan:   Patient who presents with syncope in setting of hypotension/IVVD. BP still marginal consider gentle IVF. BP meds/Lasix on hold.    * Syncope  -Due to volume depletion/dehydration/hypotension  -Hold diuretics/BP meds  -Monitor BP trend  -Encourage adequate po intake  -Correct electrolytes    Orthostatic hypotension  -Mgmt as per primary team  -BP meds/diuretics held    Hypokalemia  -Repletion as per primary team    Chronic combined systolic and diastolic heart failure  -TTE from 6/24 with EF of 45-50%,  mildly reduced RV function  -Recently had Lasix increased to 60 mg daily, clinically dry/volume contracted on exam  -Hold diuretics       Essential hypertension  -BP marginal, systolic < 100  -Can consider resumption of Metoprolol at lower dosage, 25 mg daily        VTE Risk Mitigation (From admission, onward)           Ordered     apixaban tablet 2.5 mg  Daily         08/25/24 1312     IP VTE HIGH RISK PATIENT  Once         08/25/24 1312     Place sequential compression device  Until discontinued         08/25/24 1312     Reason for No Pharmacological VTE Prophylaxis  Once        Question:  Reasons:  Answer:  Already adequately anticoagulated on oral Anticoagulants    08/25/24 1312                    Marysol Philip PA-C  Cardiology  [facility]

## 2024-08-27 NOTE — PT/OT/SLP EVAL
"Occupational Therapy   Evaluation    Name: Homero Tanner  MRN: [id number]  Admitting Diagnosis: Syncope  Recent Surgery: * No surgery found *      Recommendations:     Discharge Recommendations: Low Intensity Therapy  Discharge Equipment Recommendations:  none  Barriers to discharge:  None    Assessment:     Homero Tanner is a 84 y.o. male with a medical diagnosis of Syncope.  He presents with the following performance deficits affecting function: weakness, impaired endurance, impaired self care skills, impaired balance, impaired functional mobility, impaired skin, impaired cardiopulmonary response to activity.      BP monitored throughout:  90/57 Sitting EOB  84/57 Standing  127/58 Following activity    Rehab Prognosis: Good; patient would benefit from acute skilled OT services to address these deficits and reach maximum level of function.       Plan:     Patient to be seen 2 x/week to address the above listed problems via self-care/home management, therapeutic activities, therapeutic exercises  Plan of Care Expires: 09/10/24  Plan of Care Reviewed with: patient    Subjective     Chief Complaint: "I have run 30 marathons in my life."  Patient/Family Comments/goals: To return to [facility].    Occupational Profile:  Living Environment: Lives with significant other in two story home with stair lift. Pt able to live on first floor. Walk-in shower with built-in bench.  Previous level of function: Independent with rollator.  Roles and Routines: driving, not working  Equipment Used at Home: rollator, bedside commode, shower chair  Assistance upon Discharge: Significant other.     Pain/Comfort:  Pain Rating 1: 0/10    Patients cultural, spiritual, Confucianist conflicts given the current situation: no    Objective:     Communicated with: RN prior to session.  Patient found HOB elevated with peripheral IV, telemetry upon OT entry to room.    General Precautions: Standard, fall  Orthopedic Precautions: N/A  Braces: " N/A  Respiratory Status: Room air    Occupational Performance:    Bed Mobility:    Patient completed Scooting/Bridging with stand by assistance  Patient completed Supine to Sit with stand by assistance    Functional Mobility/Transfers:  Patient completed Sit <> Stand Transfer with contact guard assistance  with  rolling walker   Patient completed Bed <> Chair Transfer using Step Transfer technique with contact guard assistance with rolling walker  Patient completed Toilet Transfer Step Transfer technique with minimum assistance with  rolling walker  Functional Mobility: Pt ambulating with RW and CGA in room, bathroom, and hallway.     Activities of Daily Living:  Lower Body Dressing: minimum assistance to don socks  Toileting: moderate assistance to manage clothing and ensure total perineal hygiene following BM    Cognitive/Visual Perceptual:  Cognitive/Psychosocial Skills:     -       Oriented to: Person, Place, Time, and Situation   -       Follows Commands/attention:Follows two-step commands  -       Communication: clear/fluent  -       Memory: No Deficits noted  -       Safety awareness/insight to disability: impaired   -       Mood/Affect/Coping skills/emotional control: Appropriate to situation, Cooperative, and Pleasant    Physical Exam:  Balance:    -       Sitting: WFL; Standing: Fair  Upper Extremity Range of Motion:     -       Right Upper Extremity: WNL  -       Left Upper Extremity: WNL  Upper Extremity Strength:    -       Right Upper Extremity: WNL  -       Left Upper Extremity: WNL   Strength:    -       Right Upper Extremity: WNL  -       Left Upper Extremity: WNL    AMPAC 6 Click ADL:  AMPAC Total Score: 17    Treatment & Education:  Pt educated on role of OT in acute care. Pt agreeable to evaluation and treatment. He remains at an elevated fall risk and would benefit from continued therapy intervention to improve safety upon returning home.     Patient left up in chair with all lines intact,  call button in reach, and chair alarm on    GOALS:   Multidisciplinary Problems       Occupational Therapy Goals          Problem: Occupational Therapy    Goal Priority Disciplines Outcome Interventions   Occupational Therapy Goal     OT, PT/OT     Description: Goals to be met by: 9/10/24     Patient will increase functional independence with ADLs by performing:    LE Dressing with Modified [place].  Toileting from toilet with Modified [place] for hygiene and clothing management.   Toilet transfer to toilet with Modified [place].  Upper extremity exercise program x15 reps per handout, with independence.                         History:     Past Medical History:   Diagnosis Date    Abnormal ECG 6/24/2013    Adenomatous rectal polyp 12/7/2015    Aortic insufficiency 6/24/2013    Arthritis     Asthma     as a child    Benign neoplasm of cecum 2/27/2017    Benign neoplasm of transverse colon 2/27/2017    Cataract     CHF (congestive heart failure)     Pt states He's never been told this    Diverticulosis of large intestine without hemorrhage 2/27/2017    Encounter for blood transfusion     Frequent PVCs     Gallstones 3/28/2018    By Us done 3/21/2018    GERD (gastroesophageal reflux disease)     History of colon polyps     Hypertension     Iron deficiency anemia 10/26/2015    Lymphoma     waldenstrom's macroglobulinemia    Mixed hyperlipidemia 6/24/2013    Premature atrial contractions     Prostate cancer     PSVT (paroxysmal supraventricular tachycardia) 6/24/2013    Pulmonary hypertension 6/13/2016    PVC's (premature ventricular contractions) 6/13/2016    Rectal adenocarcinoma     Rectal cancer 5/1/2018    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    left parietal scalp    Thymoma     TR (tricuspid regurgitation) 6/13/2016    Tubular adenoma of colon 06/2020    VT (ventricular tachycardia) 9/18/2018         Past Surgical History:   Procedure Laterality Date    biopsy for chest mass      BONE MARROW BIOPSY  Left 7/11/2018    Procedure: Biopsy-bone marrow;  Surgeon: Charanjit Dacosta MD;  Location: [facility] OR;  Service: General;  Laterality: Left;    CATARACT EXTRACTION W/  INTRAOCULAR LENS IMPLANT Right 01/03/2019    CATARACT EXTRACTION W/  INTRAOCULAR LENS IMPLANT Left 02/07/2019    COLON SURGERY      COLONOSCOPY N/A 10/26/2015    Procedure: Colonoscopy;  Surgeon: Abraham Hong MD;  Location: [facility] ENDO;  Service: Endoscopy;  Laterality: N/A;    COLONOSCOPY Left 2/27/2017    Procedure: COLONOSCOPY;  Surgeon: Abraham Hong MD;  Location: [facility] ENDO;  Service: Endoscopy;  Laterality: Left;    COLONOSCOPY N/A 5/1/2018    Procedure: hixtory of rectal cancer. Colonsocopy asked to be repeated minnie  year, alst one 2/2017;  Surgeon: Patricio Streeter MD;  Location: [facility];  Service: Endoscopy;  Laterality: N/A;    COLONOSCOPY N/A 6/27/2018    Procedure: COLONOSCOPY/hybrid APC or a EndoRotor device;  Surgeon: Rao Medeiros MD;  Location: [facility] ([address] [place] [address]);  Service: Endoscopy;  Laterality: N/A;    COLONOSCOPY N/A 6/22/2020    Procedure: COLONOSCOPY;  Surgeon: Yu Wells MD;  Location: [facility];  Service: Endoscopy;  Laterality: N/A;    ESOPHAGOGASTRODUODENOSCOPY N/A 10/22/2019    Procedure: ESOPHAGOGASTRODUODENOSCOPY (EGD);  Surgeon: Mariela Greer MD;  Location: [facility];  Service: Endoscopy;  Laterality: N/A;    FLEXIBLE SIGMOIDOSCOPY Left 4/23/2019    Procedure: SIGMOIDOSCOPY, FLEXIBLE;  Surgeon: Ranjit Will MD;  Location: [facility] ENDO;  Service: Endoscopy;  Laterality: Left;    HERNIA REPAIR Right 02/2018    Inguinal, open with mesh    mohs      PROSTATECTOMY      RECTAL SURGERY N/A 09/2014    REPAIR OF SLIDING INGUINAL HERNIA Right 2/26/2019    Procedure: REPAIR, HERNIA, INGUINAL, SLIDING;  Surgeon: Bridger Alvarez MD;  Location: [facility] OR;  Service: General;  Laterality: Right;    SKIN BIOPSY      TONSILLECTOMY      TUMOR REMOVAL         Time Tracking:     OT Date of Treatment: 08/27/24  OT Start  Time: 0900  OT Stop Time: 0927  OT Total Time (min): 27 min    Billable Minutes:Evaluation 10  Self Care/Home Management 17    8/27/2024

## 2024-08-27 NOTE — DISCHARGE SUMMARY
[facility] Medicine  Discharge Summary      Patient Name: Homero Tanner  MRN: [id number]  LELO: [id number]  Patient Class: IP- Inpatient  Admission Date: 8/25/2024  Hospital Length of Stay: 1 days  Discharge Date and Time:  08/27/2024 6:21 PM  Attending Physician: No att. providers found   Discharging Provider: Jacqueline Schneider NP  Primary Care Provider: Solomon Cortés MD    Primary Care Team: Networked reference to record PCT     HPI:   84 y.o. male, PMHx: Metastatic Prostate Cx, HTN, HLD, rectal adenocarcinoma, CHF, thymoma, GERD, pulmonary HTN. Presented to the Emergency Department for evaluation of syncopal episode with fall injuries which onset PTA. Pt has multiple skin tears to right arm and BLE. For the past two weeks, the pt states that he has been dealing with dehydration and constipation. Pt's diet has been normal, but the pt has not had solid food the past week drinking smoothies. Recently had an increase in Lasix dosage. Stopped taking ASA and was placed on Eliquis a month ago for AFIB. Symptoms are constant and moderate in severity. No mitigating or exacerbating factors reported. Associated sxs include low BP. Patient denies any HA. Fever, N/V, dysuria, and all other sxs at this time. No prior Tx. In the ED,  vitals: 111/71, 100, 16, 97.7F, 95% RA on arrival. +orthostatic vitals. Significant labs: Na: 128, K+: 2.5, Cr: 1.5, Bili: 1.3, BNP: 569, Trop: 0.071. CXR: no acute finding. CT Head: No acute intracranial CT abnormality. Treated with IV fluids, Potassium repleted. Patient is a full code. Placed in observation under the care of Hospital Medicine for management of Hypokalemia, Syncope.     * No surgery found *      Hospital Course:   85 y/o male presented to the ER with c/o syncope.   Labs: Na: 126, K+: 3.4, Cr: 1.3, Bili: 0.9, BNP: 569, Trop: 0.054. CXR: no acute finding. CT Head: No acute intracranial CT abnormality.    Pt/ot to eval and treat   Carotid US: Less than 50%  stenosis suspected bilaterally but with significant plaque burden.  Cardiology consulted, recommend hold diuretics/BP meds.Replete electrolytes. Can consider resumption of low dose BB if BP improves.        Patient seen and examined on the day of discharge, patient reports he is feeling much better today. Electrolyte abnormalities improved and patient tolerating diet well. Patient to continue to hold BP meds and diuretics until follow up with PCP. Patient educated on importance of monitoring BP at home in a log and bringing log to follow up with PCP for possible medication adjustments. Patient case discussed with Dr. Gabriel and he agrees stable for discharge. Patient discharged home with home health and wound care instructions.   All questions and concerns were addressed prior to discharge.    Face to face encounter with patient: 45 min      Goals of Care Treatment Preferences:  Code Status: Full Code          What is most important right now is to focus on remaining as independent as possible, symptom/pain control, extending life as long as possible, even it it means sacrificing quality, curative/life-prolongation (regardless of treatment burdens).  Accordingly, we have decided that the best plan to meet the patient's goals includes continuing with treatment.         Consults:   Consults (From admission, onward)          Status Ordering Provider     IP consult to case management  Once        Provider:  (Not yet assigned)    Completed STEFANO NORMAN     Inpatient consult to Cardiology  Once        Provider:  Micky Gabriel MD    Completed STEFANO NORMAN            Cardiac/Vascular  Orthostatic hypotension  Likely secondary to dehydration r/t increased Lasix dose. Treated with 1ltr NS in ED, improved BP    --Hold Lasix for now, monitor for edema  --Repeat Orthostatic vitals in AM  --Cautious use of IV fluids in setting of CHF  --Fall Risk    --Patient educated on importance of monitoring BP at home in a log and  bringing log to follow up with PCP for possible medication adjustments.   --patient to follow up with PCP within 3-5 days   --discharged home with home health     Chronic combined systolic and diastolic heart failure  Patient is identified as having Combined Systolic and Diastolic heart failure that is Chronic. CHF is currently controlled. Latest ECHO performed and demonstrates- Results for orders placed during the hospital encounter of 06/20/24    Echo    Interpretation Summary    Left Ventricle: The left ventricle is normal in size. Normal wall thickness. Mild global hypokinesis present. There is mildly reduced systolic function with a visually estimated ejection fraction of 45 - 50%.    Right Ventricle: Mild right ventricular enlargement. Wall thickness is normal. Systolic function is mildly reduced.    Left Atrium: Left atrium is severely dilated.    Aortic Valve: There is mild aortic regurgitation.    Tricuspid Valve: There is moderate regurgitation.    IVC/SVC: Normal venous pressure at 3 mmHg.  . Continue Beta Blocker and Furosemide and monitor clinical status closely. Monitor on telemetry. Patient is off CHF pathway.  Monitor strict Is&Os and daily weights.  Place on fluid restriction of 1.5 L. Cardiology has not been consulted. Continue to stress to patient importance of self efficacy and  on diet for CHF. Last BNP reviewed- and noted below   Recent Labs   Lab 08/25/24  1040   *            Essential hypertension  Chronic, uncontrolled. Latest blood pressure and vitals reviewed-     Temp:  [97.8 °F (36.6 °C)-98.9 °F (37.2 °C)]   Pulse:  [71-99]   Resp:  [16-18]   BP: ()/(56-78)   SpO2:  [96 %-98 %] .   Home meds for hypertension were reviewed and noted below.   Hypertension Medications               furosemide (LASIX) 20 MG tablet TAKE 1 TABLET ON TUESDAYS AND THURSDAYS.    furosemide (LASIX) 40 MG tablet Take 1 tablet (40 mg total) by mouth every Mon, Wed, Fri.    metoprolol succinate  (TOPROL-XL) 100 MG 24 hr tablet TAKE 1 TABLET(100 MG) BY MOUTH EVERY DAY            While in the hospital, will manage blood pressure as follows; Adjust home antihypertensive regimen as follows- hold for now in setting of hypotension    Will utilize p.r.n. blood pressure medication only if patient's blood pressure greater than 160/100 and he develops symptoms such as worsening chest pain or shortness of breath.    Renal/  Hypokalemia  Likely secondary to increased Lasix dose, patient is not managed with PO Potassium as OP.     Patient's most recent potassium results are listed below.   Recent Labs     08/25/24  1912 08/26/24  0550 08/27/24  0558   K 3.0* 3.4* 4.0       Plan  - Replete potassium per protocol  - Monitor potassium Every 12 hours  - Patient's hypokalemia is improving   -improved and stable for d/c       Chronic kidney disease, stage 3a  Creatine stable for now. BMP reviewed- noted Estimated Creatinine Clearance: 41.5 mL/min (based on SCr of 1.3 mg/dL). according to latest data. Based on current GFR, CKD stage is stage 3 - GFR 30-59.  Monitor UOP and serial BMP and adjust therapy as needed. Renally dose meds. Avoid nephrotoxic medications and procedures.    Oncology  Prostate cancer metastatic to bone  Followed by oncology as OP,, treated with Zytiga/Lupron/Prednisone    --Hold Zytiga for now, if has prolonged hospitalization, consult Hem/Onc to resume home med    Other  * Syncope  Syncope at home, reported increase in Lasix dose per Cardiology recently.  CT head: no acute finding. +Orthostatic vitals on arrival.   Likely secondary to hypotension/volume depletion    --Carotid US: Less than 50% stenosis suspected bilaterally but with significant plaque burden.   --Tele  --Repeat orthostatic vitals in AM  --Replete K+  --Hold Lasix/BP meds for now  --Cardiology consulted and agreed with holding BP medications and Diuretics, patient will need follow up with pcp   --Patient educated on importance of  monitoring BP at home in a log and bringing log to follow up with PCP for possible medication adjustments.   --patient discharged home with home health   --tcc order placed         Final Active Diagnoses:    Diagnosis Date Noted POA    PRINCIPAL PROBLEM:  Syncope [R55] 08/25/2024 Yes    Hypokalemia [E87.6] 08/25/2024 Yes    Orthostatic hypotension [I95.1] 08/25/2024 Yes    Chronic kidney disease, stage 3a [N18.31] 12/02/2021 Yes    Chronic combined systolic and diastolic heart failure [I50.42] 09/13/2016 Yes    Prostate cancer metastatic to bone [C61, C79.51] 02/25/2016 Yes    Essential hypertension [I10] 06/24/2013 Yes     Chronic      Problems Resolved During this Admission:       Discharged Condition: good    Disposition: Home-Health Care [facility] [place]    Follow Up:   Contact information for follow-up providers       Solomon Cortés MD. Schedule an appointment as soon as possible for a visit.    Specialty: Internal Medicine  Why: Follow up within 3-5 days  Contact information:  [id number] [facility] [address] [phone]                       Contact information for after-discharge care       Home Medical Care       [facility] .    Service: Home Health Services  Contact information:  [address] [facility] [address] [phone]                                 Patient Instructions:      Ambulatory referral/consult to [facility]   Standing Status: Future   Referral Priority: Routine Referral Type: Consultation   Referral Reason: Specialty Services Required   Number of Visits Requested: 1     Ambulatory referral/consult to Home Health   Standing Status: Future   Referral Priority: Routine Referral Type: Home Health   Referral Reason: Specialty Services Required   Requested Specialty: Home Health Services   Number of Visits Requested: 1     Diet Cardiac     Activity as tolerated       Significant Diagnostic Studies: Labs:  "BMP:   Recent Labs   Lab 08/25/24 1912 08/26/24  0550 08/27/24  0558   * 100 111*   * 126* 130*   K 3.0* 3.4* 4.0   CL 83* 83* 87*   CO2 33* 30* 31*   BUN 18 17 18   CREATININE 1.4 1.3 1.3   CALCIUM 8.7 8.5* 8.7   MG  --  2.0 1.9   , CMP   Recent Labs   Lab 08/25/24 1912 08/26/24  0550 08/27/24  0558   * 126* 130*   K 3.0* 3.4* 4.0   CL 83* 83* 87*   CO2 33* 30* 31*   * 100 111*   BUN 18 17 18   CREATININE 1.4 1.3 1.3   CALCIUM 8.7 8.5* 8.7   PROT 5.4* 4.9* 5.0*   ALBUMIN 3.3* 2.8* 2.8*   BILITOT 1.3* 0.9 0.7   ALKPHOS 81 68 67   AST 22 19 15   ALT 10 8* 6*   ANIONGAP 15 13 12   , CBC   Recent Labs   Lab 08/26/24  0550 08/27/24  0558   WBC 6.76 5.96   HGB 10.0* 10.1*   HCT 28.8* 30.2*    191   , INR   Lab Results   Component Value Date    INR 1.1 08/25/2024    INR 0.9 04/29/2024    INR 1.2 08/30/2018   , Lipid Panel   Lab Results   Component Value Date    CHOL 187 04/30/2024    HDL 98 (H) 04/30/2024    LDLCALC 74.0 04/30/2024    TRIG 75 04/30/2024    CHOLHDL 52.4 (H) 04/30/2024   , Troponin   Recent Labs   Lab 08/25/24  1040 08/25/24 1912 08/26/24  0550   TROPONINI 0.071* 0.087* 0.054*   , A1C: No results for input(s): "HGBA1C" in the last 4320 hours., and All labs within the past 24 hours have been reviewed  Radiology: CT head: No acute intracranial CT abnormality. CXR: The lungs are clear, with normal appearance of pulmonary vasculature and no pleural effusion or pneumothorax.     The cardiac silhouette is normal in size. Right perihilar prominence of unclear significance.  Consider outpatient chest CT once the patient's symptoms have resolved.   Cardiac Graphics: ECG: atrial fibrillation rate 84 beats per minute and Echocardiogram: 2D echo with color flow doppler:   Results for orders placed or performed in visit on 09/12/18   2D echo with color flow doppler   Result Value Ref Range    EF + QEF 50 55 - 65    Diastolic Dysfunction No     Aortic Valve Regurgitation TRIVIAL TO " MILD     Est. PA Systolic Pressure 41.44 (A)     Tricuspid Valve Regurgitation MILD     Narrative    Date of Procedure: 09/12/2018        TEST DESCRIPTION   Technical Quality: This is a technically challenging study.     Aorta: The aortic root is normal in size, measuring 2.6 cm at sinotubular junction and 3.3 cm at Sinuses of Valsalva. The proximal ascending aorta is normal in size, measuring 4.3 cm across.     Left Atrium: The left atrium is normal in size, measuring 5.3 cm across in the parasternal view, and 6.2 cm across in the apical view.     Left Ventricle: The left ventricle is normal in size, with an end-diastolic diameter of 4.9 cm, and an end-systolic diameter of 3.7 cm. Septal wall thickness is markedly increased, with the septum measuring 1.8 cm and the posterior wall measuring 1.1 cm   across. Relative wall thickness was increased at 0.45, and the LV mass index was increased at 177.9 g/m2 consistent with concentric left ventricular hypertrophy. There are no regional wall motion abnormalities. Left ventricular systolic function appears   low normal to mildly depressed. Visually estimated ejection fraction is 50-55%. The LV Doppler derived stroke volume equals 74.0 ccs.     Diastolic indices: E wave velocity 0.5 m/s, E/A ratio 0.7,  msec., E/e' ratio(avg) 5. Diastolic function is normal.     Right Atrium: The right atrium is normal in size, measuring 4.8 cm in length and 3.7 cm in width in the apical view.     Right Ventricle: The right ventricle is normal in size measuring 3.1 cm at the base in the apical right ventricle-focused view. Global right ventricular systolic function appears normal. There is abnormal septal motion. Tricuspid annular plane systolic   excursion (TAPSE) is 2.1 cm. The estimated PA systolic pressure is 41 mmHg.     Aortic Valve:  Aortic valve is normal in structure with normal leaflet mobility. The peak gradient obtained across the aortic valve is 5 mmHg, with a mean  gradient of 3 mmHg. Using a left ventricular outflow tract diameter of 2.0 cm, a left ventricular   outflow tract velocity time integral of 23 cm, and a peak instantaneous transvalvular velocity time integral of 28 cm, the calculated aortic valve area is 2.66 cm2(AVAi is 1.32 cm2/m2). Additionally, there is trivial to mild aortic regurgitation.     Mitral Valve:  Mitral valve is normal in structure with normal leaflet mobility.     Tricuspid Valve:  Tricuspid valve is normal in structure with normal leaflet mobility. There is mild tricuspid regurgitation.     Pulmonary Valve:  Pulmonary valve is normal in structure with normal leaflet mobility. There is trivial pulmonic regurgitation.     IVC: IVC is normal in size and collapses > 50% with a sniff, suggesting normal right atrial pressure of 3 mmHg.     Atrial Septum: The atrial septum is intact.     Intracavitary: There is no evidence of pericardial effusion, intracavity mass, thrombi, or vegetation.         CONCLUSIONS     1 - Concentric hypertrophy.     2 - No wall motion abnormalities.     3 - Low normal to mildly depressed left ventricular systolic function (EF 50-55%).     4 - Normal left ventricular diastolic function.     5 - Normal right ventricular systolic function .     6 - Pulmonary hypertension. The estimated PA systolic pressure is 41 mmHg.     7 - Trivial to mild aortic regurgitation.     8 - Mild tricuspid regurgitation.     9 - Trivial pulmonic regurgitation.             This document has been electronically    SIGNED BY: Jay Augustine MD On: 09/12/2018 12:42    and Transthoracic echo (TTE) complete (Cupid Only):   Results for orders placed or performed during the hospital encounter of 06/20/24   Echo   Result Value Ref Range    RA Width 3.08 cm    LA volume (mod) 99.51 cm3    LV ESV A2C 72.78 mL    LA area A4C 32.03 cm2    LA area A2C 22.82 cm2    LV ESV A4C 114.11 mL    Left Atrium Major Axis 6.99 cm    Left Atrium Minor Axis 5.36 cm    RA Major  Axis 5.64 cm    LV Diastolic Volume 128.24 mL    LV Systolic Volume 58.80 mL    MV Peak A Surjit 0.99 m/s    TR Max Surjit 3.44 m/s    AR Max Surjit 3.42 m/s    MV Peak E Surjit 0.21 m/s    Ao VTI 17.70 cm    Ao peak surjit 1.00 m/s    LVOT peak VTI 13.50 cm    LVOT peak surjit 0.65 m/s    LVOT diameter 2.00 cm    AV mean gradient 2 mmHg    AV regurgitation pressure 1/2 time [id number] ms    LA size 5.51 cm    STJ 2.27 cm    Sinus 2.94 cm    LVIDs 3.72 2.1 - 4.0 cm    Posterior Wall 0.85 0.6 - 1.1 cm    IVS 1.06 0.6 - 1.1 cm    LVIDd 5.18 3.5 - 6.0 cm    PV PEAK VELOCITY 1.13 m/s    Left Ventricular Outflow Tract Mean Gradient 0.93 mmHg    Left Ventricular Outflow Tract Mean Velocity 0.46 cm/s    Left Ventricular End Systolic Volume by Teichholz Method 58.80 mL    Left Ventricular End Diastolic Volume by Teichholz Method 128.24 mL    FS 28 28 - 44 %    LV mass 181.48 g    Left Ventricle Relative Wall Thickness 0.33 cm    AV valve area 2.39 cm²    AV Velocity Ratio 0.65     AV index (prosthetic) 0.76     PV peak gradient 5 mmHg    E/A ratio 0.21     LVOT area 3.1 cm2    LVOT stroke volume 42.39 cm3    AV peak gradient 4 mmHg    Triscuspid Valve Regurgitation Peak Gradient 47 mmHg    FACUNDO by Velocity Ratio 2.04 cm²    BSA 1.91 m2    LV Systolic Volume Index 30.8 mL/m2    LV Diastolic Volume Index 67.14 mL/m2    LV Mass Index 95 g/m2    LA Volume Index (Mod) 52.1 mL/m2    ZLVIDS 0.92     ZLVIDD -0.37     LA Volume Index 61.0 mL/m2    LA volume 116.51 cm3    LA WIDTH 4.1 cm    TV resting pulmonary artery pressure 50 mmHg    RV TB RVSP 6 mmHg    Est. RA pres 3 mmHg    Narrative      Left Ventricle: The left ventricle is normal in size. Normal wall   thickness. Mild global hypokinesis present. There is mildly reduced   systolic function with a visually estimated ejection fraction of 45 - 50%.    Right Ventricle: Mild right ventricular enlargement. Wall thickness is   normal. Systolic function is mildly reduced.    Left Atrium: Left  atrium is severely dilated.    Aortic Valve: There is mild aortic regurgitation.    Tricuspid Valve: There is moderate regurgitation.    IVC/SVC: Normal venous pressure at 3 mmHg.         Pending Diagnostic Studies:       None           Medications:  Reconciled Home Medications:      Medication List        CHANGE how you take these medications      clobetasoL 0.05 % external solution  Commonly known as: TEMOVATE  APPLY TO SCALP 1 TO 2 TIMES A DAY AS NEEDED FOR FLARES, DO NOT APPLY TO FACE OR FOLDS OF SKIN  What changed:   how to take this  when to take this  reasons to take this  additional instructions     cyanocobalamin 1,000 mcg/mL injection  INJECT 1ML UNDER THE SKIN EVERY MONTH  What changed:   how much to take  how to take this  when to take this     predniSONE 5 MG tablet  Commonly known as: DELTASONE  TAKE 1 TABLET(5 MG) BY MOUTH TWICE DAILY WITH WATER AND ON AN EMPTY STOMACH AS DIRECTED  What changed: See the new instructions.            CONTINUE taking these medications      abiraterone 250 mg Tab  Commonly known as: ZYTIGA  Take 4 tablets by mouth once daily as directed on an empty stomach.     apixaban 2.5 mg Tab  Commonly known as: ELIQUIS  Take 1 tablet (2.5 mg total) by mouth once daily.     erythromycin with ethanoL 2 % gel  Commonly known as: EMGEL  APPLY TO THE AFFECTED AREA OF FACE TWICE DAILY FOR ROSACEA/ REDNESS     ibuprofen 800 MG tablet  Commonly known as: ADVIL,MOTRIN  Take 800 mg by mouth 3 (three) times daily as needed.     leuprolide 3.75 mg injection  Commonly known as: LUPRON  Inject 3.75 mg into the muscle every 3 (three) months.     metronidazole 0.75% 0.75 % Crea  Commonly known as: METROCREAM  APPLY TO AFFECTED AREA ON FACE TWICE DAILY     rosuvastatin 10 MG tablet  Commonly known as: CRESTOR  TAKE 1 TABLET(10 MG) BY MOUTH EVERY DAY            STOP taking these medications      furosemide 20 MG tablet  Commonly known as: LASIX     furosemide 40 MG tablet  Commonly known as: LASIX      metoprolol succinate 100 MG 24 hr tablet  Commonly known as: TOPROL-XL              Indwelling Lines/Drains at time of discharge:   Lines/Drains/Airways       None                   Time spent on the discharge of patient: 55 minutes    The patient's case has been reviewed by my supervising physician.   Supervising physician will provide additional orders and recommendations at his/her discretion.  Please see supervising physicians documentation and/or orders for further details.          Jacqueline Schneider NP  Department of Hospital Medicine  [facility] - Med Surg

## 2024-08-27 NOTE — PT/OT/SLP PROGRESS
Physical Therapy  Treatment    Homero Tanner   MRN: [id number]   Admitting Diagnosis: Syncope    PT Received On: 24  PT Start Time: 900     PT Stop Time: 930    PT Total Time (min): 30 min       Billable Minutes:  Gait Training 15 and Therapeutic Activity 15    Treatment Type: Treatment  PT/PTA: PTA     Number of PTA visits since last PT visit: 1       General Precautions: Standard, fall  Orthopedic Precautions: N/A  Braces: N/A  Respiratory Status: Room air    Spiritual, Cultural Beliefs, Caodaism Practices, Values that Affect Care: no    Subjective:  Communicated with patient's nurse, Attila, and completed Epic chart review prior to session.  Patient agreed to PT session. Reports feeling better today.     Pain/Comfort  Pain Rating 1: 0/10  Pain Rating Post-Intervention 1: 0/10    Objective:   Patient found with: peripheral IV, telemetry    BP monitored intermittently throughout session. Patient asymptomatic throughout. Findings as follows:   Sitting EOB: 90/57 mmHg   Standin/57 mmHg   After gait trainin/58 mmHg    Supine > sit EOB: SBA    Forward scoot towards EOB: SBA    STS from EOB > RW: CGA    8ft w/ RW (EOB > toilet) w/ RW: CGA    Stand pivot T/F to toilet w/ RW: CGA    STS from toilet > RW: Min A     250ft w/ RW CGA    Stand pivot T/F to chair w/ RW: CGA    Reviewed AROM TE to BLE including: hip flex/ext, knee flex/ext, ankle PF/DF  To be completed a minimum of 10 reps for each LE in order to promote return of function, strength and ROM.     Educated patient on importance of increased tolerance to upright position and direct impact on CV endurance and strength. Patient encouraged to sit up in chair/ EOB, for a minimum of 2 consecutive hours, 3x per day. Encouraged patient to perform AROM TE to BLE throughout the day within all available planes of motion. Re enforced importance of utilizing call light to meet needs in room and not attempt to get up without staff assistance. Patient  verbalized understanding and agreed to comply.         AM-PAC 6 CLICK MOBILITY  How much help from another person does this patient currently need?   1 = Unable, Total/Dependent Assistance  2 = A lot, Maximum/Moderate Assistance  3 = A little, Minimum/Contact Guard/Supervision  4 = None, Modified [place]/Independent    Turning over in bed (including adjusting bedclothes, sheets and blankets)?: 3  Sitting down on and standing up from a chair with arms (e.g., wheelchair, bedside commode, etc.): 3  Moving from lying on back to sitting on the side of the bed?: 3  Moving to and from a bed to a chair (including a wheelchair)?: 3  Need to walk in hospital room?: 3  Climbing 3-5 steps with a railing?: 1 (NT)  Basic Mobility Total Score: 16    AM-PAC Raw Score CMS G-Code Modifier Level of Impairment Assistance   6 % Total / Unable   7 - 9 CM 80 - 100% Maximal Assist   10 - 14 CL 60 - 80% Moderate Assist   15 - 19 CK 40 - 60% Moderate Assist   20 - 22 CJ 20 - 40% Minimal Assist   23 CI 1-20% SBA / CGA   24 CH 0% Independent/ Mod I     Patient left up in chair with call button in reach and chair alarm on.    Assessment:  Homero Tanner is a 84 y.o. male with a medical diagnosis of Syncope and presents with overall decline in functional mobility. Patient would continue to benefit from skilled PT to address functional limitations listed below in order to return to PLOF/decrease caregiver burden.     Rehab identified problem list/impairments: weakness, impaired endurance, impaired functional mobility, gait instability, impaired balance, decreased safety awareness, decreased lower extremity function, decreased ROM    Rehab potential is good.    Activity tolerance: Fair    Discharge recommendations:  (MODERATE VS LOW INTENSITY)      Barriers to discharge:      Equipment recommendations: to be determined by next level of care     GOALS:   Multidisciplinary Problems       Physical Therapy Goals          Problem:  Physical Therapy    Goal Priority Disciplines Outcome Goal Variances Interventions   Physical Therapy Goal     PT, PT/OT      Description: Pt will perform bed mobility independently in order to participate in EOB activity.  Pt will perform transfers independently in order to participate in OOB activity.  Pt will ambulate 350 ft SPV with LRAD in order to participate in daily tasks.                          PLAN:    Patient to be seen 3 x/week to address the above listed problems via gait training, therapeutic activities, therapeutic exercises  Plan of Care expires: 09/09/24  Plan of Care reviewed with: patient         08/27/2024

## 2024-08-27 NOTE — ASSESSMENT & PLAN NOTE
Likely secondary to dehydration r/t increased Lasix dose. Treated with 1ltr NS in ED, improved BP    --Hold Lasix for now, monitor for edema  --Repeat Orthostatic vitals in AM  --Cautious use of IV fluids in setting of CHF  --Fall Risk    --Patient educated on importance of monitoring BP at home in a log and bringing log to follow up with PCP for possible medication adjustments.   --patient to follow up with PCP within 3-5 days   --discharged home with home health

## 2024-08-27 NOTE — SUBJECTIVE & OBJECTIVE
Review of Systems   Constitutional: Positive for malaise/fatigue.   HENT: Negative.     Eyes: Negative.    Cardiovascular: Negative.    Respiratory: Negative.     Endocrine: Negative.    Hematologic/Lymphatic: Bruises/bleeds easily.   Skin: Negative.    Musculoskeletal:  Positive for arthritis and joint pain.   Gastrointestinal: Negative.    Genitourinary: Negative.    Neurological:  Positive for weakness.   Psychiatric/Behavioral: Negative.     Allergic/Immunologic: Negative.      Objective:     Vital Signs (Most Recent):  Temp: 97.9 °F (36.6 °C) (08/27/24 1122)  Pulse: 97 (08/27/24 1122)  Resp: 16 (08/27/24 1122)  BP: (!) 81/57 (08/27/24 1122)  SpO2: 98 % (08/27/24 1122) Vital Signs (24h Range):  Temp:  [97.8 °F (36.6 °C)-98.9 °F (37.2 °C)] 97.9 °F (36.6 °C)  Pulse:  [71-98] 97  Resp:  [16-18] 16  SpO2:  [96 %-98 %] 98 %  BP: ()/(56-78) 81/57     Weight: 68.2 kg (150 lb 5.7 oz)  Body mass index is 21.57 kg/m².     SpO2: 98 %         Intake/Output Summary (Last 24 hours) at 8/27/2024 1210  Last data filed at 8/27/2024 0558  Gross per 24 hour   Intake --   Output 800 ml   Net -800 ml       Lines/Drains/Airways       None                      Physical Exam  Vitals and nursing note reviewed.   Constitutional:       General: He is not in acute distress.     Appearance: He is well-developed. He is not diaphoretic.   HENT:      Head: Normocephalic and atraumatic.   Eyes:      General:         Right eye: No discharge.         Left eye: No discharge.      Pupils: Pupils are equal, round, and reactive to light.   Cardiovascular:      Rate and Rhythm: Normal rate and regular rhythm.      Heart sounds: Normal heart sounds, S1 normal and S2 normal. No murmur heard.  Pulmonary:      Effort: Pulmonary effort is normal. No respiratory distress.      Breath sounds: Normal breath sounds.   Abdominal:      General: There is no distension.   Musculoskeletal:      Right lower leg: No edema.      Left lower leg: No edema.    Skin:     General: Skin is warm and dry.      Findings: No erythema.      Comments: Scattered bruises upper and lower extremities   Neurological:      Mental Status: He is alert and oriented to person, place, and time.   Psychiatric:         Mood and Affect: Mood normal.         Behavior: Behavior normal.            Significant Labs: CMP   Recent Labs   Lab 08/25/24 1912 08/26/24  0550 08/27/24  0558   * 126* 130*   K 3.0* 3.4* 4.0   CL 83* 83* 87*   CO2 33* 30* 31*   * 100 111*   BUN 18 17 18   CREATININE 1.4 1.3 1.3   CALCIUM 8.7 8.5* 8.7   PROT 5.4* 4.9* 5.0*   ALBUMIN 3.3* 2.8* 2.8*   BILITOT 1.3* 0.9 0.7   ALKPHOS 81 68 67   AST 22 19 15   ALT 10 8* 6*   ANIONGAP 15 13 12   , CBC   Recent Labs   Lab 08/26/24  0550 08/27/24  0558   WBC 6.76 5.96   HGB 10.0* 10.1*   HCT 28.8* 30.2*    191   , Troponin   Recent Labs   Lab 08/25/24  1912 08/26/24  0550   TROPONINI 0.087* 0.054*   , and All pertinent lab results from the last 24 hours have been reviewed.    Significant Imaging: Echocardiogram: Transthoracic echo (TTE) complete (Cupid Only):   Results for orders placed or performed during the hospital encounter of 06/20/24   Echo   Result Value Ref Range    RA Width 3.08 cm    LA volume (mod) 99.51 cm3    LV ESV A2C 72.78 mL    LA area A4C 32.03 cm2    LA area A2C 22.82 cm2    LV ESV A4C 114.11 mL    Left Atrium Major Axis 6.99 cm    Left Atrium Minor Axis 5.36 cm    RA Major Axis 5.64 cm    LV Diastolic Volume 128.24 mL    LV Systolic Volume 58.80 mL    MV Peak A Surjit 0.99 m/s    TR Max Surjit 3.44 m/s    AR Max Surjit 3.42 m/s    MV Peak E Surjit 0.21 m/s    Ao VTI 17.70 cm    Ao peak surjit 1.00 m/s    LVOT peak VTI 13.50 cm    LVOT peak surjit 0.65 m/s    LVOT diameter 2.00 cm    AV mean gradient 2 mmHg    AV regurgitation pressure 1/2 time [id number] ms    LA size 5.51 cm    STJ 2.27 cm    Sinus 2.94 cm    LVIDs 3.72 2.1 - 4.0 cm    Posterior Wall 0.85 0.6 - 1.1 cm    IVS 1.06 0.6 - 1.1 cm     LVIDd 5.18 3.5 - 6.0 cm    PV PEAK VELOCITY 1.13 m/s    Left Ventricular Outflow Tract Mean Gradient 0.93 mmHg    Left Ventricular Outflow Tract Mean Velocity 0.46 cm/s    Left Ventricular End Systolic Volume by Teichholz Method 58.80 mL    Left Ventricular End Diastolic Volume by Teichholz Method 128.24 mL    FS 28 28 - 44 %    LV mass 181.48 g    Left Ventricle Relative Wall Thickness 0.33 cm    AV valve area 2.39 cm²    AV Velocity Ratio 0.65     AV index (prosthetic) 0.76     PV peak gradient 5 mmHg    E/A ratio 0.21     LVOT area 3.1 cm2    LVOT stroke volume 42.39 cm3    AV peak gradient 4 mmHg    Triscuspid Valve Regurgitation Peak Gradient 47 mmHg    FACUNDO by Velocity Ratio 2.04 cm²    BSA 1.91 m2    LV Systolic Volume Index 30.8 mL/m2    LV Diastolic Volume Index 67.14 mL/m2    LV Mass Index 95 g/m2    LA Volume Index (Mod) 52.1 mL/m2    ZLVIDS 0.92     ZLVIDD -0.37     LA Volume Index 61.0 mL/m2    LA volume 116.51 cm3    LA WIDTH 4.1 cm    TV resting pulmonary artery pressure 50 mmHg    RV TB RVSP 6 mmHg    Est. RA pres 3 mmHg    Narrative      Left Ventricle: The left ventricle is normal in size. Normal wall   thickness. Mild global hypokinesis present. There is mildly reduced   systolic function with a visually estimated ejection fraction of 45 - 50%.    Right Ventricle: Mild right ventricular enlargement. Wall thickness is   normal. Systolic function is mildly reduced.    Left Atrium: Left atrium is severely dilated.    Aortic Valve: There is mild aortic regurgitation.    Tricuspid Valve: There is moderate regurgitation.    IVC/SVC: Normal venous pressure at 3 mmHg.      and EKG: reviewed

## 2024-08-27 NOTE — PLAN OF CARE
Recommending Low Intensity Therapy. Bed mobility SBA, ambulating 250ft CGA. Lower body dressing and toileting  Min A.

## 2024-08-27 NOTE — HOSPITAL COURSE
8/27/24-Patient seen and examined today, sitting up in bedside chair. Feels ok. No CP or SOB. BP still soft. Labs reviewed. Creatinine stable. Na 130.

## 2024-08-27 NOTE — ASSESSMENT & PLAN NOTE
Syncope at home, reported increase in Lasix dose per Cardiology recently.  CT head: no acute finding. +Orthostatic vitals on arrival.   Likely secondary to hypotension/volume depletion    --Carotid US: Less than 50% stenosis suspected bilaterally but with significant plaque burden.   --Tele  --Repeat orthostatic vitals in AM  --Replete K+  --Hold Lasix/BP meds for now  --Cardiology consulted and agreed with holding BP medications and Diuretics, patient will need follow up with pcp   --Patient educated on importance of monitoring BP at home in a log and bringing log to follow up with PCP for possible medication adjustments.   --patient discharged home with home health   --tcc order placed

## 2024-08-27 NOTE — PLAN OF CARE
[facility] - Med Surg  Discharge Final Note    Primary Care Provider: Solomon Cortés MD    Expected Discharge Date: 8/27/2024    Final Discharge Note (most recent)       Final Note - 08/27/24 1352          Final Note    Assessment Type Final Discharge Note     Anticipated Discharge Disposition Home-Health Care [facility]     Hospital Resources/Appts/Education Provided Appointments scheduled and added to AVS;Post-Acute resouces added to AVS        Post-Acute Status    Post-Acute Authorization Home Health     Home Health Status Set-up Complete/Auth obtained     Discharge Delays None known at this time                      Follow-up providers       Solomon Cortés MD   Specialty: Internal Medicine   Relationship: PCP - General    [id number] [facility] [address]   Phone: [phone]       Next Steps: Schedule an appointment as soon as possible for a visit    Instructions: Follow up within 3-5 days              After-discharge care                Home Medical Care       [facility]   Service: Home Health Services    [address] [place] [id number]   Phone: [phone]                             Per pt, preference if [facility] upon d/c.     Referral sent and Liaison notified of d/c plans for today.     Patient has no d/c needs at this time. Sw to follow up, as needed, for d/c planning purposes.     Sw manually [facility]

## 2024-08-27 NOTE — ASSESSMENT & PLAN NOTE
Likely secondary to increased Lasix dose, patient is not managed with PO Potassium as OP.     Patient's most recent potassium results are listed below.   Recent Labs     08/25/24  1912 08/26/24  0550 08/27/24  0558   K 3.0* 3.4* 4.0       Plan  - Replete potassium per protocol  - Monitor potassium Every 12 hours  - Patient's hypokalemia is improving   -improved and stable for d/c

## 2024-08-28 ENCOUNTER — PATIENT OUTREACH (OUTPATIENT)
Dept: ADMINISTRATIVE | Facility: CLINIC | Age: 84
End: 2024-08-28
Payer: MEDICARE

## 2024-08-30 ENCOUNTER — TELEPHONE (OUTPATIENT)
Dept: CARDIOLOGY | Facility: HOSPITAL | Age: 84
End: 2024-08-30
Payer: MEDICARE

## 2024-08-30 ENCOUNTER — OFFICE VISIT (OUTPATIENT)
Dept: HOME HEALTH SERVICES | Facility: CLINIC | Age: 84
End: 2024-08-30
Payer: MEDICARE

## 2024-08-30 DIAGNOSIS — C61 PROSTATE CANCER METASTATIC TO BONE: ICD-10-CM

## 2024-08-30 DIAGNOSIS — I50.22 CHRONIC SYSTOLIC CONGESTIVE HEART FAILURE: ICD-10-CM

## 2024-08-30 DIAGNOSIS — Z09 HOSPITAL DISCHARGE FOLLOW-UP: Primary | ICD-10-CM

## 2024-08-30 DIAGNOSIS — R55 SYNCOPE AND COLLAPSE: ICD-10-CM

## 2024-08-30 DIAGNOSIS — C88.0 WALDENSTROM'S MACROGLOBULINEMIA: ICD-10-CM

## 2024-08-30 DIAGNOSIS — I48.19 PERSISTENT ATRIAL FIBRILLATION: ICD-10-CM

## 2024-08-30 DIAGNOSIS — C79.51 PROSTATE CANCER METASTATIC TO BONE: ICD-10-CM

## 2024-08-30 PROBLEM — C88.00 WALDENSTROM'S MACROGLOBULINEMIA: Status: ACTIVE | Noted: 2024-08-30

## 2024-08-30 NOTE — TELEPHONE ENCOUNTER
Telephoned patient to discuss scheduling Cardioversion and he proceeded to let me know that he's been in and out of the Hospital with falls for the last few days and that all medications have been changed. No longer taking any blood pressure pills or fluid pills only Eliquis 2.5 mg daily which was agreed upon discharge from Hospital on 8/27/24  He has Home Health care for wounds on his legs after the falls and Hospital NP saw him today and will see Dr. Cortés in October and would like for Rachel Wright and Karishma to talk and be notified that he currently needs to just follow up with his Chemo Dr. Sparks before scheduling anything else.

## 2024-08-30 NOTE — TELEPHONE ENCOUNTER
Called patient to schedule 1 week follow up in Cardiology. Pt, stated his medications has been adjusted, he's seen Dr. Mcgregor and don't understrand why appointment is needed. He also stated he's now using a walker, doesn't feel recover from fall and insist someone call and explain.

## 2024-08-30 NOTE — PROGRESS NOTES
[facility]  Transitional Care Management (TCM) Home Visit    Encounter Provider: Pb Estrada   PCP: Solomon Cortés MD  Consult Requested By: Jacqueline Schneider  Admit Date: 8/25/24   IP Discharge Date: 8/27/24  Hospital Length of Stay:RRHLOS@ days  Days since discharge (from IP or SNF): 3   [facility] Contact Note: 8/28/24  Hospital Diagnosis: Syncope and collapse [R55];Chronic systolic congestive heart failure [I50.22]     HISTORY OF PRESENT ILLNESS      Patient ID: Homero Tanner is a 84 y.o. male was recently admitted to the hospital, this is their TCM encounter.    Hospital Course Synopsis:  83 y/o male presented to the ER with c/o syncope.   Labs: Na: 126, K+: 3.4, Cr: 1.3, Bili: 0.9, BNP: 569, Trop: 0.054. CXR: no acute finding. CT Head: No acute intracranial CT abnormality.    Pt/ot to eval and treat   Carotid US: Less than 50% stenosis suspected bilaterally but with significant plaque burden.  Cardiology consulted, recommend hold diuretics/BP meds.Replete electrolytes. Can consider resumption of low dose BB if BP improves.       Patient seen and examined on the day of discharge, patient reports he is feeling much better today. Electrolyte abnormalities improved and patient tolerating diet well. Patient to continue to hold BP meds and diuretics until follow up with PCP. Patient educated on importance of monitoring BP at home in a log and bringing log to follow up with PCP for possible medication adjustments. Patient case discussed with Dr. Gabriel and he agrees stable for discharge. Patient discharged home with home health and wound care instructions.     Doing well since discharge. Denies reoccurrence of symptoms in which prompted hospital stay. Ambulating with walker. [facility] and PT working with patient. Has wounds to right hand and BLE. HH performing wound care 3x week. HH coming later today to address wounds. Reports compliance with medications. Denies any further complaints or  concerns. Questions elicited. Time allowed for questions, all issues addressed. Contact info given for any concerns or changes.   DECISION MAKING TODAY       Assessment & Plan:  1. Hospital discharge follow-up    2. Syncope and collapse  Comments:  Denies further syncopal episodes since discharge  Continue HH with PT  Fall precautions  Orders:  -     [facility]    3. Chronic systolic congestive heart failure  Comments:  Edema to BLE  Continue CHF diet, fluid restriction  Continue current medication  F/U with cardiology  Orders:  -     Ambulatory referral/consult to [facility]    4. Persistent atrial fibrillation  Assessment & Plan:  Chronic  Continue Eliquis  F/U with cardiology      5. Prostate cancer metastatic to bone  Assessment & Plan:  Currently on Zytiga, Lupron, Prednisone  Followed by heme/onc and urology  Continue plan      6. Waldenstrom's macroglobulinemia  Comments:  history of waldenstrom's, stable  Treated with Velcade/dex/Rituxan   followed by heme/onc         Medication List on Discharge:     Medication List            Accurate as of August 30, 2024  3:22 PM. If you have any questions, ask your nurse or doctor.                CHANGE how you take these medications      clobetasoL 0.05 % external solution  Commonly known as: TEMOVATE  APPLY TO SCALP 1 TO 2 TIMES A DAY AS NEEDED FOR FLARES, DO NOT APPLY TO FACE OR FOLDS OF SKIN  What changed:   how to take this  when to take this  reasons to take this  additional instructions     cyanocobalamin 1,000 mcg/mL injection  INJECT 1ML UNDER THE SKIN EVERY MONTH  What changed:   how much to take  how to take this  when to take this     predniSONE 5 MG tablet  Commonly known as: DELTASONE  TAKE 1 TABLET(5 MG) BY MOUTH TWICE DAILY WITH WATER AND ON AN EMPTY STOMACH AS DIRECTED  What changed: See the new instructions.            CONTINUE taking these medications      abiraterone 250 mg Tab  Commonly  known as: ZYTIGA  Take 4 tablets by mouth once daily as directed on an empty stomach.     apixaban 2.5 mg Tab  Commonly known as: ELIQUIS  Take 1 tablet (2.5 mg total) by mouth once daily.     erythromycin with ethanoL 2 % gel  Commonly known as: EMGEL  APPLY TO THE AFFECTED AREA OF FACE TWICE DAILY FOR ROSACEA/ REDNESS     ibuprofen 800 MG tablet  Commonly known as: ADVIL,MOTRIN  Take 800 mg by mouth 3 (three) times daily as needed.     leuprolide 3.75 mg injection  Commonly known as: LUPRON  Inject 3.75 mg into the muscle every 3 (three) months.     metronidazole 0.75% 0.75 % Crea  Commonly known as: METROCREAM  APPLY TO AFFECTED AREA ON FACE TWICE DAILY     rosuvastatin 10 MG tablet  Commonly known as: CRESTOR  TAKE 1 TABLET(10 MG) BY MOUTH EVERY DAY              Medication Reconciliation:  Were medications changed on discharge? Yes  Were medications in the home? Yes  Is the patient taking the medications as directed? Yes  Does the patient understand the medications and changes? Yes  Does updated med list accurately reflects meds patient is currently taking? Yes    ENVIRONMENT OF CARE      Family and/or Caregiver present at visit?  Yes  Name of Caregiver: terrell  History provided by: patient    Advance Care Planning   Advanced Care Planning Status:  Patient has had an ACP conversation  Living Will: No  Power of : No  [place]: No    Does Caregiver have HCPoA: No  Changes today: none  Is patient hospice appropriate: No  (If needed, use PPS <30 or FAST score >7)  Was referral to hospice placed: No       Impression upon entering the home:  Physical Dwelling: single family home   Appearance of home environment: cleaniness: clean, walking pathways: clear, lighting: adequate, and home structure: sound structure  Functional Status: independent  Mobility: ambulatory with device  Nutritional access: adequate intake and access  Home Health: Yes,  [facility]     DME/Supplies: rolling walker     Diagnostic tests  reviewed/disposition: I have reviewed all completed as well as pending diagnostic tests at the time of discharge.  Disease/illness education: Take all medication as prescribed. Activity as tolerated. Keep all upcoming appts.   Establishment or re-establishment of referral orders for community resources: No other necessary community resources.   Discussion with other health care providers: No discussion with other health care providers necessary.   Does patient have a PCP at [facility]? Yes   Repatriation plan with PCP? follow-up with PCP within 90d   Does patient have an ostomy (ileostomy, colostomy, suprapubic catheter, nephrostomy tube, tracheostomy, PEG tube, pleurex catheter, cholecystostomy, etc)? No  Were BPAs reviewed? Yes    Social History     Socioeconomic History    Marital status: Single   Tobacco Use    Smoking status: Former     Current packs/day: 0.00     Average packs/day: 1 pack/day for 15.0 years (15.0 ttl pk-yrs)     Types: Cigarettes     Start date: 1954     Quit date: 1969     Years since quittin.6     Passive exposure: Never    Smokeless tobacco: Never   Substance and Sexual Activity    Alcohol use: Yes     Alcohol/week: 3.0 standard drinks of alcohol     Types: 3 Cans of beer per week     Comment: socially, NO ALCOHOL 72 HOURS PRIOR TO SX    Drug use: No    Sexual activity: Not Currently       OBJECTIVE:     Vital Signs:  Vitals:    24 1238   BP: (P) 116/78   Pulse: (P) 83   Resp: (P) 18       Review of Systems   Constitutional: Negative.    HENT: Negative.     Eyes: Negative.    Respiratory: Negative.  Negative for chest tightness.    Cardiovascular:  Positive for leg swelling.   Gastrointestinal: Negative.    Endocrine: Negative.    Genitourinary: Negative.    Musculoskeletal:  Positive for gait problem.   Skin:  Positive for wound.   Allergic/Immunologic: Negative.    Neurological:  Positive for weakness.   Hematological:  Bruises/bleeds easily.   Psychiatric/Behavioral: Negative.   Negative for agitation.    All other systems reviewed and are negative.      Physical Exam:  Physical Exam  Vitals reviewed.   Constitutional:       General: He is not in acute distress.     Appearance: Normal appearance. He is well-developed.   HENT:      Head: Normocephalic and atraumatic.      Nose: Nose normal.      Mouth/Throat:      Mouth: Mucous membranes are dry.      Pharynx: Oropharynx is clear.   Eyes:      Pupils: Pupils are equal, round, and reactive to light.   Cardiovascular:      Rate and Rhythm: Normal rate and regular rhythm.      Pulses: Normal pulses.      Heart sounds: Normal heart sounds.   Pulmonary:      Effort: Pulmonary effort is normal.      Breath sounds: Normal breath sounds.   Abdominal:      General: Bowel sounds are normal.      Palpations: Abdomen is soft.   Musculoskeletal:         General: Normal range of motion.      Cervical back: Normal range of motion and neck supple.      Right lower leg: Edema present.      Left lower leg: Edema present.   Skin:     General: Skin is warm and dry.      Findings: Bruising and wound present.             Comments: Bandages in place   Neurological:      General: No focal deficit present.      Mental Status: He is alert and oriented to person, place, and time. Mental status is at baseline.      Motor: Weakness present.      Gait: Gait abnormal.   Psychiatric:         Mood and Affect: Mood normal.         Behavior: Behavior normal.         Thought Content: Thought content normal.         Judgment: Judgment normal.         INSTRUCTIONS FOR PATIENT:   - Continue all medications, treatments and therapies as ordered.   - Follow all instructions, recommendations as discussed.  - Maintain Safety Precautions at all times.  - Attend all medical appointments as scheduled.  - For worsening symptoms: call Primary Care Physician or Nurse Practitioner.  - For emergencies, call 911 or immediately report to the nearest emergency room.   Scheduled Follow-up, Appts  Reviewed with Modifications if Needed: Yes  Future Appointments   Date Time Provider Department Center   10/30/2024 11:20 AM Solomon Cortés MD [facility] IM [place]   11/4/2024 10:20 AM LABORATORY, [facility] LAB [facility]   11/7/2024  2:00 PM Dustin Sparks MD [facility] HEM ONC [facility]   1/28/2025 11:40 AM Cristhian Wright MD [facility] CARDIO [facility]       Signature: Pb Estrada NP    Transition of Care Visit:  I have reviewed and updated the history and problem list.  I have reconciled the medication list.  I have discussed the hospitalization and current medical issues, prognosis and plans with the patient/family.

## 2024-09-03 NOTE — TELEPHONE ENCOUNTER
I called patient to explain reason for 1 week post hospital visit. Patient stated he would like a call personally from Mrs. Philip. I informed patient I will pass along the message.

## 2024-09-05 ENCOUNTER — TELEPHONE (OUTPATIENT)
Dept: CARDIOLOGY | Facility: CLINIC | Age: 84
End: 2024-09-05
Payer: MEDICARE

## 2024-09-05 NOTE — TELEPHONE ENCOUNTER
Contacted PT and informed him that Marysol Butler will be calling him back PT stated verbal understanding              ----- Message from Marysol Philip PA-C sent at 9/5/2024 12:38 PM CDT -----  Contact: [phone]  Please let him know I am on hospital service this week and actively rounding, I will do my best to get back with him today or tmw  ----- Message -----  From: Iona Mitchell LPN  Sent: 9/5/2024  11:47 AM CDT  To: Marysol Philip PA-C    FYI,    PT is requesting you personally call them back.    Thank You  Iona Mitchell LPN  ----- Message -----  From: Nubia Adame  Sent: 9/5/2024  11:00 AM CDT  To: Kyle ACHARYA Staff    Patient is calling for the DANE Gregg is asking to call him directly

## 2024-09-09 ENCOUNTER — TELEPHONE (OUTPATIENT)
Dept: CARDIOLOGY | Facility: CLINIC | Age: 84
End: 2024-09-09
Payer: MEDICARE

## 2024-09-09 NOTE — TELEPHONE ENCOUNTER
The following order were called to [facility], spoke with Lacrmike RN  Labs, BMP,BNP and Mag level, fax results [phone]  Lasix 20mg by mouth daily  LLE elevation as much as possible    Lacretia repeated orders back correctly.

## 2024-09-09 NOTE — TELEPHONE ENCOUNTER
----- Message from Elisabeth Galarza MA sent at 9/9/2024  3:46 PM CDT -----  [facility]    ----- Message -----  From: Mariela Mcclain  Sent: 9/9/2024   3:15 PM CDT  To: Cedrick Gregg Staff    ..Type:  Patient Requesting Call    Who Called: [facility]   Does the patient know what this is regarding?: can't draw BNP b/c specimen have to be frozen  Would the patient rather a call back or a response via [facility]?  call  Best Call Back Number: [phone]  Additional Information:

## 2024-09-09 NOTE — TELEPHONE ENCOUNTER
Returned call. Home Health unable to collect BNP specimen, due to sample need to be frozen until it can be tested.    Please advise

## 2024-09-13 ENCOUNTER — TELEPHONE (OUTPATIENT)
Dept: CARDIOLOGY | Facility: HOSPITAL | Age: 84
End: 2024-09-13
Payer: MEDICARE
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
## 2024-09-13 NOTE — TELEPHONE ENCOUNTER
Can we please call to check on patient/BLE edema?    Also I need BMP results, collected via .    Route back,    Thanks

## 2024-09-16 ENCOUNTER — TELEPHONE (OUTPATIENT)
Dept: CARDIOLOGY | Facility: CLINIC | Age: 84
End: 2024-09-16
Payer: MEDICARE

## 2024-09-16 NOTE — TELEPHONE ENCOUNTER
Contacted pt and informed him his BMP shows stable creatinine and electrolytes WNL, continue same mgmt. Pt states since he has been back home, he has been able to elevate his feet a lot more which has helped in the swelling going down significantly. Informed him I would call him at the end of the week to check on him. Pt vu w/o q/c       ---- Marysol Philip PA-C 09/16/24  1:15 PM ----  Please let patient know I reviewed his BMP, stable creatinine, electrolytes WNL.    Keep same mgmt. PHone review at end of the week to check on him.    Thanks

## 2024-09-20 ENCOUNTER — TELEPHONE (OUTPATIENT)
Dept: HEMATOLOGY/ONCOLOGY | Facility: CLINIC | Age: 84
End: 2024-09-20
Payer: MEDICARE

## 2024-09-20 DIAGNOSIS — D49.89 THYMOMA: ICD-10-CM

## 2024-09-20 DIAGNOSIS — I50.32 CHRONIC DIASTOLIC HEART FAILURE: ICD-10-CM

## 2024-09-20 DIAGNOSIS — I50.32 CHRONIC DIASTOLIC HEART FAILURE: Primary | ICD-10-CM

## 2024-09-20 DIAGNOSIS — D51.8 OTHER VITAMIN B12 DEFICIENCY ANEMIA: ICD-10-CM

## 2024-09-20 RX ORDER — FUROSEMIDE 20 MG/1
20 TABLET ORAL DAILY
Qty: 90 TABLET | Refills: 3 | Status: SHIPPED | OUTPATIENT
Start: 2024-09-20 | End: 2025-09-20

## 2024-09-20 RX ORDER — FUROSEMIDE 20 MG/1
20 TABLET ORAL DAILY
Qty: 90 TABLET | Refills: 3 | Status: SHIPPED | OUTPATIENT
Start: 2024-09-20 | End: 2024-09-20 | Stop reason: SDUPTHER

## 2024-09-20 NOTE — TELEPHONE ENCOUNTER
Attempted to contact pt to find out which medication he has been waiting to be filled. No answer, LVM    ----- Message from Axel Valdez MA sent at 9/20/2024 10:38 AM CDT -----  Contact: Homero @ [phone]  The patient calling to speak with the provider about his medications not getting filled for the past 3 weeks. Please give him a call back at [phone].      [id number]  [place] - [address] [facility] [facility] [address] [facility]  [address] [facility]  [place] [address]  Phone: [phone] Fax: [phone]

## 2024-09-20 NOTE — TELEPHONE ENCOUNTER
Called patient back and spoke to patient, explained to him that I did not see a request in his chart before today where the pharmacy had sent requesting a refill for his b12. I did explain to him that Irasema was out of the office today, but I did send the refill request to her, and she may refill it, or may not be till Monday, if it is approved by her. Patient verbalized understanding.

## 2024-09-20 NOTE — TELEPHONE ENCOUNTER
----- Message from Axel Valdez MA sent at 9/20/2024 10:42 AM CDT -----  The patient calling to speak with staff about a prescription B12 injections he has been trying to get refilled for 3 weeks. Please give him a call back at [phone].        [id number] - [place] - [address] [facility] [address]  [address] [facility]  [address] [facility]  [place] [address]  Phone: [phone] Fax: [phone]

## 2024-09-20 NOTE — TELEPHONE ENCOUNTER
Please advise. Last chart review from 9/9/24 said he was to be taking Lasix 20mg daily.      PT called and wanted to get his lasix refilled stated he went to pharmacy and they said it was Disc..the patient stated he seen Marysol pérez and was put back on the lasix, PT is requesting a RX asap he said this is what lead him to go back to hospital is not having this medication,

## 2024-09-23 RX ORDER — CYANOCOBALAMIN 1000 UG/ML
INJECTION, SOLUTION INTRAMUSCULAR; SUBCUTANEOUS
Qty: 10 ML | Refills: 4 | Status: SHIPPED | OUTPATIENT
Start: 2024-09-23

## 2024-09-26 ENCOUNTER — EXTERNAL HOME HEALTH (OUTPATIENT)
Dept: HOME HEALTH SERVICES | Facility: HOSPITAL | Age: 84
End: 2024-09-26
Payer: MEDICARE

## 2024-09-27 ENCOUNTER — TELEPHONE (OUTPATIENT)
Dept: CARDIOLOGY | Facility: HOSPITAL | Age: 84
End: 2024-09-27
Payer: MEDICARE

## 2024-10-07 RX ORDER — OMEPRAZOLE 20 MG/1
20 CAPSULE, DELAYED RELEASE ORAL
Qty: 90 CAPSULE | Refills: 3 | OUTPATIENT
Start: 2024-10-07

## 2024-10-07 NOTE — TELEPHONE ENCOUNTER
No care due was identified.  [facility] Embedded Care Due Messages. Reference number: [id number].   10/07/2024 9:08:22 AM CDT

## 2024-10-22 ENCOUNTER — DOCUMENT SCAN (OUTPATIENT)
Dept: HOME HEALTH SERVICES | Facility: HOSPITAL | Age: 84
End: 2024-10-22
Payer: MEDICARE

## 2024-10-22 NOTE — PROGRESS NOTES
Subjective:       Patient ID: Homero Tanner is a 78 y.o. male.    Post right inguinal hernia, open repair with mesh    Chief Complaint: No chief complaint on file.    Postop pain is resolved, slight fullness in the area      Review of Systems   Gastrointestinal: Negative.        Objective:      Physical Exam   Constitutional: He appears well-developed and well-nourished.   Abdominal: Soft. Bowel sounds are normal.   Right inguinal incision is healing well.  There is no erythema.  There is a slight fullness beneath the incision, not unexpected   Vitals reviewed.      Assessment:      doing well after an open right inguinal hernia repair  Fullness should resolve over time     Plan:       Activity as tolerated.  Follow up with surgery as needed     
4 = No assist / stand by assistance

## 2024-10-30 ENCOUNTER — LAB VISIT (OUTPATIENT)
Dept: LAB | Facility: HOSPITAL | Age: 84
End: 2024-10-30
Attending: INTERNAL MEDICINE
Payer: MEDICARE

## 2024-10-30 ENCOUNTER — OFFICE VISIT (OUTPATIENT)
Dept: INTERNAL MEDICINE | Facility: CLINIC | Age: 84
End: 2024-10-30
Payer: MEDICARE

## 2024-10-30 VITALS
BODY MASS INDEX: 21.92 KG/M2 | OXYGEN SATURATION: 99 % | TEMPERATURE: 97 F | WEIGHT: 152.75 LBS | SYSTOLIC BLOOD PRESSURE: 120 MMHG | DIASTOLIC BLOOD PRESSURE: 72 MMHG

## 2024-10-30 DIAGNOSIS — C79.51 PROSTATE CANCER METASTATIC TO BONE: ICD-10-CM

## 2024-10-30 DIAGNOSIS — C78.02 MALIGNANT NEOPLASM METASTATIC TO BOTH LUNGS: ICD-10-CM

## 2024-10-30 DIAGNOSIS — C61 PROSTATE CANCER METASTATIC TO BONE: ICD-10-CM

## 2024-10-30 DIAGNOSIS — D51.8 OTHER VITAMIN B12 DEFICIENCY ANEMIA: ICD-10-CM

## 2024-10-30 DIAGNOSIS — E78.2 MIXED HYPERLIPIDEMIA: ICD-10-CM

## 2024-10-30 DIAGNOSIS — Z23 NEED FOR VACCINATION: ICD-10-CM

## 2024-10-30 DIAGNOSIS — I48.19 PERSISTENT ATRIAL FIBRILLATION: ICD-10-CM

## 2024-10-30 DIAGNOSIS — I10 ESSENTIAL HYPERTENSION: Primary | Chronic | ICD-10-CM

## 2024-10-30 DIAGNOSIS — C78.01 MALIGNANT NEOPLASM METASTATIC TO BOTH LUNGS: ICD-10-CM

## 2024-10-30 DIAGNOSIS — I49.1 PREMATURE ATRIAL CONTRACTIONS: ICD-10-CM

## 2024-10-30 LAB
ALBUMIN SERPL BCP-MCNC: 4 G/DL (ref 3.5–5.2)
ALP SERPL-CCNC: 82 U/L (ref 40–150)
ALT SERPL W/O P-5'-P-CCNC: 7 U/L (ref 10–44)
ANION GAP SERPL CALC-SCNC: 16 MMOL/L (ref 8–16)
AST SERPL-CCNC: 18 U/L (ref 10–40)
BASOPHILS # BLD AUTO: 0.04 K/UL (ref 0–0.2)
BASOPHILS NFR BLD: 0.5 % (ref 0–1.9)
BILIRUB SERPL-MCNC: 0.9 MG/DL (ref 0.1–1)
BUN SERPL-MCNC: 22 MG/DL (ref 8–23)
CALCIUM SERPL-MCNC: 9.7 MG/DL (ref 8.7–10.5)
CHLORIDE SERPL-SCNC: 97 MMOL/L (ref 95–110)
CO2 SERPL-SCNC: 27 MMOL/L (ref 23–29)
COMPLEXED PSA SERPL-MCNC: 49 NG/ML (ref 0–4)
CREAT SERPL-MCNC: 1.5 MG/DL (ref 0.5–1.4)
DIFFERENTIAL METHOD BLD: ABNORMAL
EOSINOPHIL # BLD AUTO: 0 K/UL (ref 0–0.5)
EOSINOPHIL NFR BLD: 0.1 % (ref 0–8)
ERYTHROCYTE [DISTWIDTH] IN BLOOD BY AUTOMATED COUNT: 13.9 % (ref 11.5–14.5)
EST. GFR  (NO RACE VARIABLE): 46 ML/MIN/1.73 M^2
GLUCOSE SERPL-MCNC: 118 MG/DL (ref 70–110)
HCT VFR BLD AUTO: 35.5 % (ref 40–54)
HGB BLD-MCNC: 11.1 G/DL (ref 14–18)
IMM GRANULOCYTES # BLD AUTO: 0.03 K/UL (ref 0–0.04)
IMM GRANULOCYTES NFR BLD AUTO: 0.4 % (ref 0–0.5)
LYMPHOCYTES # BLD AUTO: 0.7 K/UL (ref 1–4.8)
LYMPHOCYTES NFR BLD: 8 % (ref 18–48)
MCH RBC QN AUTO: 29.5 PG (ref 27–31)
MCHC RBC AUTO-ENTMCNC: 31.3 G/DL (ref 32–36)
MCV RBC AUTO: 94 FL (ref 82–98)
MONOCYTES # BLD AUTO: 0.6 K/UL (ref 0.3–1)
MONOCYTES NFR BLD: 7.7 % (ref 4–15)
NEUTROPHILS # BLD AUTO: 6.8 K/UL (ref 1.8–7.7)
NEUTROPHILS NFR BLD: 83.3 % (ref 38–73)
NRBC BLD-RTO: 0 /100 WBC
PLATELET # BLD AUTO: 263 K/UL (ref 150–450)
PMV BLD AUTO: 10.1 FL (ref 9.2–12.9)
POTASSIUM SERPL-SCNC: 4.4 MMOL/L (ref 3.5–5.1)
PROT SERPL-MCNC: 7 G/DL (ref 6–8.4)
RBC # BLD AUTO: 3.76 M/UL (ref 4.6–6.2)
SODIUM SERPL-SCNC: 140 MMOL/L (ref 136–145)
TESTOST SERPL-MCNC: <4 NG/DL (ref 304–1227)
VIT B12 SERPL-MCNC: 456 PG/ML (ref 210–950)
WBC # BLD AUTO: 8.14 K/UL (ref 3.9–12.7)

## 2024-10-30 PROCEDURE — 3078F DIAST BP <80 MM HG: CPT | Mod: CPTII,S$GLB,, | Performed by: INTERNAL MEDICINE

## 2024-10-30 PROCEDURE — 1159F MED LIST DOCD IN RCRD: CPT | Mod: CPTII,S$GLB,, | Performed by: INTERNAL MEDICINE

## 2024-10-30 PROCEDURE — 3288F FALL RISK ASSESSMENT DOCD: CPT | Mod: CPTII,S$GLB,, | Performed by: INTERNAL MEDICINE

## 2024-10-30 PROCEDURE — 85025 COMPLETE CBC W/AUTO DIFF WBC: CPT | Performed by: INTERNAL MEDICINE

## 2024-10-30 PROCEDURE — G0008 ADMIN INFLUENZA VIRUS VAC: HCPCS | Mod: S$GLB,,, | Performed by: INTERNAL MEDICINE

## 2024-10-30 PROCEDURE — 99214 OFFICE O/P EST MOD 30 MIN: CPT | Mod: 25,S$GLB,, | Performed by: INTERNAL MEDICINE

## 2024-10-30 PROCEDURE — 84153 ASSAY OF PSA TOTAL: CPT | Performed by: INTERNAL MEDICINE

## 2024-10-30 PROCEDURE — 99999 PR PBB SHADOW E&M-EST. PATIENT-LVL III: CPT | Mod: PBBFAC,,, | Performed by: INTERNAL MEDICINE

## 2024-10-30 PROCEDURE — 1126F AMNT PAIN NOTED NONE PRSNT: CPT | Mod: CPTII,S$GLB,, | Performed by: INTERNAL MEDICINE

## 2024-10-30 PROCEDURE — 90653 IIV ADJUVANT VACCINE IM: CPT | Mod: S$GLB,,, | Performed by: INTERNAL MEDICINE

## 2024-10-30 PROCEDURE — 3074F SYST BP LT 130 MM HG: CPT | Mod: CPTII,S$GLB,, | Performed by: INTERNAL MEDICINE

## 2024-10-30 PROCEDURE — 82607 VITAMIN B-12: CPT

## 2024-10-30 PROCEDURE — 80053 COMPREHEN METABOLIC PANEL: CPT | Performed by: INTERNAL MEDICINE

## 2024-10-30 PROCEDURE — 36415 COLL VENOUS BLD VENIPUNCTURE: CPT | Mod: PO | Performed by: INTERNAL MEDICINE

## 2024-10-30 PROCEDURE — 84403 ASSAY OF TOTAL TESTOSTERONE: CPT | Performed by: INTERNAL MEDICINE

## 2024-10-30 PROCEDURE — 1101F PT FALLS ASSESS-DOCD LE1/YR: CPT | Mod: CPTII,S$GLB,, | Performed by: INTERNAL MEDICINE

## 2024-10-30 RX ORDER — PHENOL/SODIUM PHENOLATE
20 AEROSOL, SPRAY (ML) MUCOUS MEMBRANE DAILY
Qty: 90 EACH | Refills: 3 | Status: SHIPPED | OUTPATIENT
Start: 2024-10-30

## 2024-11-07 ENCOUNTER — OFFICE VISIT (OUTPATIENT)
Dept: HEMATOLOGY/ONCOLOGY | Facility: CLINIC | Age: 84
End: 2024-11-07
Payer: MEDICARE

## 2024-11-07 VITALS
DIASTOLIC BLOOD PRESSURE: 72 MMHG | TEMPERATURE: 98 F | HEIGHT: 70 IN | SYSTOLIC BLOOD PRESSURE: 144 MMHG | BODY MASS INDEX: 22 KG/M2 | HEART RATE: 100 BPM | OXYGEN SATURATION: 98 % | WEIGHT: 153.69 LBS

## 2024-11-07 DIAGNOSIS — N18.31 CHRONIC KIDNEY DISEASE, STAGE 3A: ICD-10-CM

## 2024-11-07 DIAGNOSIS — T45.1X5A IMMUNODEFICIENCY DUE TO CHEMOTHERAPY: ICD-10-CM

## 2024-11-07 DIAGNOSIS — C79.51 PROSTATE CANCER METASTATIC TO BONE: Primary | ICD-10-CM

## 2024-11-07 DIAGNOSIS — D84.821 IMMUNODEFICIENCY DUE TO CHEMOTHERAPY: ICD-10-CM

## 2024-11-07 DIAGNOSIS — Z79.899 IMMUNODEFICIENCY DUE TO CHEMOTHERAPY: ICD-10-CM

## 2024-11-07 DIAGNOSIS — C61 PROSTATE CANCER METASTATIC TO BONE: Primary | ICD-10-CM

## 2024-11-07 DIAGNOSIS — C88.00 WALDENSTROM'S MACROGLOBULINEMIA: ICD-10-CM

## 2024-11-07 DIAGNOSIS — Z87.898 HISTORY OF THYMOMA: ICD-10-CM

## 2024-11-07 PROCEDURE — 1159F MED LIST DOCD IN RCRD: CPT | Mod: CPTII,S$GLB,, | Performed by: INTERNAL MEDICINE

## 2024-11-07 PROCEDURE — 3288F FALL RISK ASSESSMENT DOCD: CPT | Mod: CPTII,S$GLB,, | Performed by: INTERNAL MEDICINE

## 2024-11-07 PROCEDURE — 1100F PTFALLS ASSESS-DOCD GE2>/YR: CPT | Mod: CPTII,S$GLB,, | Performed by: INTERNAL MEDICINE

## 2024-11-07 PROCEDURE — 1126F AMNT PAIN NOTED NONE PRSNT: CPT | Mod: CPTII,S$GLB,, | Performed by: INTERNAL MEDICINE

## 2024-11-07 PROCEDURE — 99999 PR PBB SHADOW E&M-EST. PATIENT-LVL IV: CPT | Mod: PBBFAC,,, | Performed by: INTERNAL MEDICINE

## 2024-11-07 PROCEDURE — 1160F RVW MEDS BY RX/DR IN RCRD: CPT | Mod: CPTII,S$GLB,, | Performed by: INTERNAL MEDICINE

## 2024-11-07 PROCEDURE — 99214 OFFICE O/P EST MOD 30 MIN: CPT | Mod: S$GLB,,, | Performed by: INTERNAL MEDICINE

## 2024-11-07 PROCEDURE — 3077F SYST BP >= 140 MM HG: CPT | Mod: CPTII,S$GLB,, | Performed by: INTERNAL MEDICINE

## 2024-11-07 PROCEDURE — 3078F DIAST BP <80 MM HG: CPT | Mod: CPTII,S$GLB,, | Performed by: INTERNAL MEDICINE

## 2024-11-07 NOTE — PROGRESS NOTES
Subjective:       Patient ID: Homero Tanner is a 84 y.o. male.    Chief Complaint: Results, Prostate Cancer, and Chemotherapy    HPI:  84-year-old male history of metastatic prostate carcinoma continues on Lupron q.3 months through Urology as well as Zytiga prednisone previous history of stable Waldenstrom's macroglobulinemia.  Patient returns for clinical follow-up is scheduled to move to [place] would like to be seen at [facility] in [place].  Where he will be living in a long-term community with this family close by ECOG status 2    Past Medical History:   Diagnosis Date    Abnormal ECG 6/24/2013    Adenomatous rectal polyp 12/7/2015    Aortic insufficiency 6/24/2013    Arthritis     Asthma     as a child    Benign neoplasm of cecum 2/27/2017    Benign neoplasm of transverse colon 2/27/2017    Cataract     CHF (congestive heart failure)     Pt states He's never been told this    Diverticulosis of large intestine without hemorrhage 2/27/2017    Encounter for blood transfusion     Frequent PVCs     Gallstones 3/28/2018    By Us done 3/21/2018    GERD (gastroesophageal reflux disease)     History of colon polyps     Hypertension     Iron deficiency anemia 10/26/2015    Lymphoma     waldenstrom's macroglobulinemia    Mixed hyperlipidemia 6/24/2013    Premature atrial contractions     Prostate cancer     PSVT (paroxysmal supraventricular tachycardia) 6/24/2013    Pulmonary hypertension 6/13/2016    PVC's (premature ventricular contractions) 6/13/2016    Rectal adenocarcinoma     Rectal cancer 5/1/2018    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    left parietal scalp    Thymoma     TR (tricuspid regurgitation) 6/13/2016    Tubular adenoma of colon 06/2020    VT (ventricular tachycardia) 9/18/2018     Family History   Problem Relation Name Age of Onset    Diabetes Father      Cataracts Mother      Amblyopia Neg Hx      Blindness Neg Hx      Cancer Neg Hx      Glaucoma Neg Hx      Hypertension Neg Hx       Macular degeneration Neg Hx      Retinal detachment Neg Hx      Strabismus Neg Hx      Stroke Neg Hx      Thyroid disease Neg Hx      Melanoma Neg Hx       Social History     Socioeconomic History    Marital status: Single   Tobacco Use    Smoking status: Former     Current packs/day: 0.00     Average packs/day: 1 pack/day for 15.0 years (15.0 ttl pk-yrs)     Types: Cigarettes     Start date: 1954     Quit date: 1969     Years since quittin.7     Passive exposure: Never    Smokeless tobacco: Never   Substance and Sexual Activity    Alcohol use: Yes     Alcohol/week: 3.0 standard drinks of alcohol     Types: 3 Cans of beer per week     Comment: socially, NO ALCOHOL 72 HOURS PRIOR TO SX    Drug use: No    Sexual activity: Not Currently     Past Surgical History:   Procedure Laterality Date    biopsy for chest mass      BONE MARROW BIOPSY Left 2018    Procedure: Biopsy-bone marrow;  Surgeon: Charanjit Dacosta MD;  Location: [facility];  Service: General;  Laterality: Left;    CATARACT EXTRACTION W/  INTRAOCULAR LENS IMPLANT Right 2019    CATARACT EXTRACTION W/  INTRAOCULAR LENS IMPLANT Left 2019    COLON SURGERY      COLONOSCOPY N/A 10/26/2015    Procedure: Colonoscopy;  Surgeon: Abraham Hong MD;  Location: [facility];  Service: Endoscopy;  Laterality: N/A;    COLONOSCOPY Left 2017    Procedure: COLONOSCOPY;  Surgeon: Abraham Hong MD;  Location: [facility];  Service: Endoscopy;  Laterality: Left;    COLONOSCOPY N/A 2018    Procedure: hixtory of rectal cancer. Colonsocopy asked to be repeated minnie  year, alst one 2017;  Surgeon: Patricio Streeter MD;  Location: [facility];  Service: Endoscopy;  Laterality: N/A;    COLONOSCOPY N/A 2018    Procedure: COLONOSCOPY/hybrid APC or a EndoRotor device;  Surgeon: Rao Medeiros MD;  Location: [facility] ([address] [place] [address]);  Service: Endoscopy;  Laterality: N/A;    COLONOSCOPY N/A 2020    Procedure: COLONOSCOPY;  Surgeon: Yu YOUNGER  MD Priscilla;  Location: [facility];  Service: Endoscopy;  Laterality: N/A;    ESOPHAGOGASTRODUODENOSCOPY N/A 10/22/2019    Procedure: ESOPHAGOGASTRODUODENOSCOPY (EGD);  Surgeon: Mariela Greer MD;  Location: [facility];  Service: Endoscopy;  Laterality: N/A;    FLEXIBLE SIGMOIDOSCOPY Left 4/23/2019    Procedure: SIGMOIDOSCOPY, FLEXIBLE;  Surgeon: Ranjit Will MD;  Location: [facility];  Service: Endoscopy;  Laterality: Left;    HERNIA REPAIR Right 02/2018    Inguinal, open with mesh    mohs      PROSTATECTOMY      RECTAL SURGERY N/A 09/2014    REPAIR OF SLIDING INGUINAL HERNIA Right 2/26/2019    Procedure: REPAIR, HERNIA, INGUINAL, SLIDING;  Surgeon: Bridger Alvarez MD;  [facility];  Service: General;  Laterality: Right;    SKIN BIOPSY      TONSILLECTOMY      TUMOR REMOVAL         Labs:  Lab Results   Component Value Date    WBC 8.14 10/30/2024    HGB 11.1 (L) 10/30/2024    HCT 35.5 (L) 10/30/2024    MCV 94 10/30/2024     10/30/2024     BMP  Lab Results   Component Value Date     10/30/2024    K 4.4 10/30/2024    CL 97 10/30/2024    CO2 27 10/30/2024    BUN 22 10/30/2024    CREATININE 1.5 (H) 10/30/2024    CALCIUM 9.7 10/30/2024    ANIONGAP 16 10/30/2024    ESTGFRAFRICA 60 03/09/2022    EGFRNONAA 52 (A) 03/09/2022     Lab Results   Component Value Date    ALT 7 (L) 10/30/2024    AST 18 10/30/2024     (H) 12/14/2020    ALKPHOS 82 10/30/2024    BILITOT 0.9 10/30/2024       Lab Results   Component Value Date    IRON 49 02/18/2020    TIBC 271 02/18/2020    FERRITIN 349 (H) 02/18/2020     Lab Results   Component Value Date    IKAJJMAI57 456 10/30/2024     Lab Results   Component Value Date    FOLATE 8.8 02/09/2023     Lab Results   Component Value Date    TSH 1.726 03/09/2022         Review of Systems   Constitutional:  Positive for fatigue. Negative for activity change, appetite change, chills, diaphoresis, fever and unexpected weight change.   HENT:  Negative for congestion, dental  problem, drooling, ear discharge, ear pain, facial swelling, hearing loss, mouth sores, nosebleeds, postnasal drip, rhinorrhea, sinus pressure, sneezing, sore throat, tinnitus, trouble swallowing and voice change.    Eyes:  Negative for photophobia, pain, discharge, redness, itching and visual disturbance.   Respiratory:  Negative for apnea, cough, choking, chest tightness, shortness of breath, wheezing and stridor.    Cardiovascular:  Negative for chest pain, palpitations and leg swelling.   Gastrointestinal:  Negative for abdominal distention, abdominal pain, anal bleeding, blood in stool, constipation, diarrhea, nausea, rectal pain and vomiting.   Endocrine: Negative for cold intolerance, heat intolerance, polydipsia, polyphagia and polyuria.   Genitourinary:  Negative for decreased urine volume, difficulty urinating, dysuria, enuresis, flank pain, frequency, genital sores, hematuria, penile discharge, penile pain, penile swelling, scrotal swelling, testicular pain and urgency.   Musculoskeletal:  Negative for arthralgias, back pain, gait problem, joint swelling, myalgias, neck pain and neck stiffness.   Skin:  Positive for color change. Negative for pallor, rash and wound.   Allergic/Immunologic: Negative for environmental allergies, food allergies and immunocompromised state.   Neurological:  Positive for weakness. Negative for dizziness, tremors, seizures, syncope, facial asymmetry, speech difficulty, light-headedness, numbness and headaches.   Hematological:  Negative for adenopathy. Does not bruise/bleed easily.   Psychiatric/Behavioral:  Negative for agitation, behavioral problems, confusion, decreased concentration, dysphoric mood, hallucinations, self-injury, sleep disturbance and suicidal ideas. The patient is not nervous/anxious and is not hyperactive.        Objective:      Physical Exam  Vitals reviewed.   Constitutional:       General: He is not in acute distress.     Appearance: He is  well-developed. He is not diaphoretic.   HENT:      Head: Normocephalic.      Right Ear: External ear normal.      Left Ear: External ear normal.      Nose: Nose normal.      Right Sinus: No maxillary sinus tenderness or frontal sinus tenderness.      Left Sinus: No maxillary sinus tenderness or frontal sinus tenderness.      Mouth/Throat:      Pharynx: No oropharyngeal exudate.   Eyes:      General: Lids are normal. No scleral icterus.        Right eye: No discharge.         Left eye: No discharge.      Extraocular Movements:      Right eye: Normal extraocular motion.      Left eye: Normal extraocular motion.      Conjunctiva/sclera:      Right eye: Right conjunctiva is not injected. No hemorrhage.     Left eye: Left conjunctiva is not injected. No hemorrhage.     Pupils: Pupils are equal, round, and reactive to light.   Neck:      Thyroid: No thyromegaly.      Vascular: No JVD.      Trachea: No tracheal deviation.   Cardiovascular:      Rate and Rhythm: Normal rate and regular rhythm.   Pulmonary:      Effort: Pulmonary effort is normal. No respiratory distress.      Breath sounds: No stridor.   Abdominal:      General: Bowel sounds are normal.      Palpations: Abdomen is soft. There is no hepatomegaly, splenomegaly or mass.      Tenderness: There is no abdominal tenderness.   Musculoskeletal:         General: Swelling present. No tenderness. Normal range of motion.      Cervical back: Normal range of motion and neck supple.   Lymphadenopathy:      Head:      Right side of head: No posterior auricular or occipital adenopathy.      Left side of head: No posterior auricular or occipital adenopathy.      Cervical: No cervical adenopathy.      Right cervical: No superficial, deep or posterior cervical adenopathy.     Left cervical: No superficial, deep or posterior cervical adenopathy.      Upper Body:      Right upper body: No supraclavicular adenopathy.      Left upper body: No supraclavicular adenopathy.   Skin:      General: Skin is dry.      Findings: Bruising and lesion present. No erythema or rash.      Nails: There is no clubbing.   Neurological:      Mental Status: He is alert and oriented to person, place, and time.      Cranial Nerves: No cranial nerve deficit.      Motor: Weakness present.      Coordination: Coordination normal.      Gait: Gait abnormal.   Psychiatric:         Behavior: Behavior normal.         Thought Content: Thought content normal.         Judgment: Judgment normal.             Assessment:      1. Prostate cancer metastatic to bone    2. Waldenstrom's macroglobulinemia    3. History of thymoma    4. Immunodeficiency due to chemotherapy    5. Chronic kidney disease, stage 3a           Med Onc Chart Routing      Follow up with physician . 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  As discussed above referral has been placed for if [facility] in the [place] location.  Discussed above with him        Dustin Sparks Jr, MD FACP

## 2024-11-11 ENCOUNTER — DOCUMENT SCAN (OUTPATIENT)
Dept: HOME HEALTH SERVICES | Facility: HOSPITAL | Age: 84
End: 2024-11-11
Payer: MEDICARE

## 2025-02-27 DIAGNOSIS — C61 PROSTATE CANCER METASTATIC TO BONE: ICD-10-CM

## 2025-02-27 DIAGNOSIS — C79.51 PROSTATE CANCER METASTATIC TO BONE: ICD-10-CM

## 2025-02-27 RX ORDER — PREDNISONE 5 MG/1
TABLET ORAL
Qty: 60 TABLET | Refills: 11 | Status: SHIPPED | OUTPATIENT
Start: 2025-02-27

## 2025-03-07 DIAGNOSIS — E78.2 MIXED HYPERLIPIDEMIA: ICD-10-CM

## 2025-03-07 RX ORDER — ROSUVASTATIN CALCIUM 10 MG/1
10 TABLET, COATED ORAL DAILY
Qty: 90 TABLET | Refills: 3 | Status: SHIPPED | OUTPATIENT
Start: 2025-03-07

## 2025-03-07 NOTE — TELEPHONE ENCOUNTER
Care Due:                  Date            Visit Type   Department     Provider  --------------------------------------------------------------------------------                                EP -                              PRIMARY      [facility] INTER[facility]  Last Visit: 10-      CARE ([facility])   MEDICINE       Solomon Cortés  Next Visit: None Scheduled  None         None Found                                                            Last  Test          Frequency    Reason                     Performed    Due Date  --------------------------------------------------------------------------------    Lipid Panel.  12 months..  rosuvastatin.............  04- 04-    [facility] Embedded Care Due Messages. Reference number: [id number].   3/07/2025 2:34:34 PM CST

## 2025-06-19 NOTE — TELEPHONE ENCOUNTER
No new care gaps identified.  [facility] Embedded Care Gaps. Reference number: [id number]. 3/26/2023   9:54:47 AM [facility]  
Patient overdue for follow up.  Refill is given but the patient needs an appointment with Dr. Cortés or Karissa Gonzalez NP, for follow up.  
Opt out

## 2025-07-01 DIAGNOSIS — C61 PROSTATE CANCER METASTATIC TO BONE: ICD-10-CM

## 2025-07-01 DIAGNOSIS — C79.51 PROSTATE CANCER METASTATIC TO BONE: ICD-10-CM

## 2025-07-01 RX ORDER — PREDNISONE 5 MG/1
TABLET ORAL
Qty: 60 TABLET | Refills: 11 | Status: SHIPPED | OUTPATIENT
Start: 2025-07-01

## 2025-07-10 ENCOUNTER — TELEPHONE (OUTPATIENT)
Dept: HEMATOLOGY/ONCOLOGY | Facility: CLINIC | Age: 85
End: 2025-07-10
Payer: MEDICARE

## 2025-07-10 NOTE — TELEPHONE ENCOUNTER
Received call on 7/9 from Sarah at [facility]. Voiced that pt had genetic testing awhile ago, unsure of exact time, wants to have those results faxed to their office.    7/10-found Juanita results from 2023. Faxed results to [phone] Attn to Sarah.

## 2025-07-30 ENCOUNTER — TELEPHONE (OUTPATIENT)
Dept: HEMATOLOGY/ONCOLOGY | Facility: CLINIC | Age: 85
End: 2025-07-30
Payer: MEDICARE

## 2025-07-30 NOTE — TELEPHONE ENCOUNTER
0800 from: [facility]  Spoke to : Hetal Santamaria      Critical Lab: 2nd Culture  Staph Aureus    0805: Dr. Sparks notified via staff messaging

## 2025-08-26 ENCOUNTER — TELEPHONE (OUTPATIENT)
Dept: CARDIOLOGY | Facility: CLINIC | Age: 85
End: 2025-08-26
Payer: MEDICARE

## (undated) DEVICE — CLOSURE SKIN STERI STRIP 1/2X4

## (undated) DEVICE — DRAIN PENRS STERILE LTX 18X1/2

## (undated) DEVICE — GAUZE SPONGE 4X4 12PLY

## (undated) DEVICE — APPLICATOR CHLORAPREP ORN 26ML

## (undated) DEVICE — SYR 10CC LUER LOCK

## (undated) DEVICE — ELECTRODE REM PLYHSV RETURN 9

## (undated) DEVICE — SUT PROLENE 2-0 SH 36IN BLU

## (undated) DEVICE — SUT VICRYL 3-0 27 SH

## (undated) DEVICE — DRESSING TRANS 4X4 TEGADERM

## (undated) DEVICE — SOL 9P NACL IRR PIC IL

## (undated) DEVICE — NDL SAFETY 25G X 1.5 ECLIPSE

## (undated) DEVICE — ADHESIVE MASTISOL VIAL 48/BX

## (undated) DEVICE — SUT VICRYL 0 SH

## (undated) DEVICE — SEE MEDLINE ITEM 157117

## (undated) DEVICE — MANIFOLD 4 PORT

## (undated) DEVICE — SUT MONOCRYL 4.0 PS2 CP496G

## (undated) DEVICE — GLOVE PROTEXIS HYDROGEL SZ7.5

## (undated) DEVICE — COVER OVERHEAD SURG LT BLUE

## (undated) DEVICE — DRAPE LAP TIBURON 77X122IN

## (undated) DEVICE — SEE MEDLINE ITEM 157027